# Patient Record
Sex: FEMALE | Race: WHITE | NOT HISPANIC OR LATINO | Employment: OTHER | URBAN - METROPOLITAN AREA
[De-identification: names, ages, dates, MRNs, and addresses within clinical notes are randomized per-mention and may not be internally consistent; named-entity substitution may affect disease eponyms.]

---

## 2018-01-31 RX ORDER — LANOLIN ALCOHOL/MO/W.PET/CERES
1000 CREAM (GRAM) TOPICAL EVERY EVENING
COMMUNITY

## 2018-01-31 RX ORDER — DIGOXIN 125 MCG
125 TABLET ORAL DAILY
COMMUNITY
End: 2018-02-06 | Stop reason: DRUGHIGH

## 2018-01-31 RX ORDER — SPIRONOLACTONE 50 MG/1
50 TABLET, FILM COATED ORAL DAILY
COMMUNITY
End: 2018-11-13 | Stop reason: SDUPTHER

## 2018-01-31 RX ORDER — FENOFIBRATE 48 MG/1
48 TABLET, COATED ORAL DAILY
COMMUNITY
End: 2019-06-14 | Stop reason: HOSPADM

## 2018-01-31 RX ORDER — WARFARIN SODIUM 4 MG/1
TABLET ORAL
COMMUNITY
End: 2018-02-06 | Stop reason: DRUGHIGH

## 2018-01-31 RX ORDER — WARFARIN SODIUM 1 MG/1
0.5 TABLET ORAL DAILY
COMMUNITY
End: 2018-02-06 | Stop reason: DRUGHIGH

## 2018-02-05 ENCOUNTER — ANESTHESIA EVENT (OUTPATIENT)
Dept: PERIOP | Facility: HOSPITAL | Age: 83
End: 2018-02-05
Payer: COMMERCIAL

## 2018-02-06 RX ORDER — CALCIUM GLUCONATE 45(500) MG
500 TABLET ORAL EVERY EVENING
COMMUNITY
End: 2020-03-12 | Stop reason: HOSPADM

## 2018-02-06 RX ORDER — MELATONIN
1000 EVERY EVENING
COMMUNITY

## 2018-02-06 NOTE — ANESTHESIA PREPROCEDURE EVALUATION
Review of Systems/Medical History  Patient summary reviewed  Chart reviewed      Cardiovascular  Exercise tolerance: good,  Dysrhythmias, atrial fibrillation,    Pulmonary  Negative pulmonary ROS        GI/Hepatic  Negative GI/hepatic ROS          Negative  ROS        Endo/Other  Negative endo/other ROS Diabetes type 2 Oral agent,      GYN  Negative gynecology ROS          Hematology  Negative hematology ROS      Musculoskeletal  Negative musculoskeletal ROS   Comment: Left leg fx  Unable to walk      Neurology  Negative neurology ROS      Psychology           Physical Exam    Airway    Mallampati score: II  TM Distance: <3 FB  Neck ROM: full     Dental   lower dentures and upper dentures,     Cardiovascular  Rhythm: irregular, Rate: abnormal,     Pulmonary  Breath sounds clear to auscultation,     Other Findings        Anesthesia Plan  ASA Score- 3     Anesthesia Type- IV sedation with anesthesia and general with ASA Monitors  Additional Monitors:   Airway Plan:         Plan Factors- Patient instructed to abstain from smoking on day of procedure  Patient did not smoke on day of surgery  Induction- intravenous  Postoperative Plan-     Informed Consent- Anesthetic plan and risks discussed with patient

## 2018-02-06 NOTE — PRE-PROCEDURE INSTRUCTIONS
Pre-Surgery Instructions:   Medication Instructions    calcium gluconate 500 mg tablet Patient was instructed by Physician and understands   cholecalciferol (VITAMIN D3) 1,000 units tablet Patient was instructed by Physician and understands   cyanocobalamin (VITAMIN B-12) 1,000 mcg tablet Patient was instructed by Physician and understands   DIGOXIN PO Patient was instructed by Physician and understands   fenofibrate (TRICOR) 48 mg tablet Patient was instructed by Physician and understands   metFORMIN (GLUCOPHAGE) 1000 MG tablet Patient was instructed by Physician and understands   Mirabegron ER (MYRBETRIQ) 50 MG TB24 Patient was instructed by Physician and understands   spironolactone (ALDACTONE) 50 mg tablet Patient was instructed by Physician and understands   WARFARIN SODIUM PO Patient was instructed by Physician and understands   [DISCONTINUED] digoxin (LANOXIN) 0 125 mg tablet -    [DISCONTINUED] warfarin (COUMADIN) 1 mg tablet -    [DISCONTINUED] warfarin (COUMADIN) 4 mg tablet -    Pt instructed to take digoxin with a small sip of water the morning of surgery  Pt given [facility] preop instructions and reviewed with pt  Pt given Chlorhexidine

## 2018-02-14 ENCOUNTER — APPOINTMENT (OUTPATIENT)
Dept: RADIOLOGY | Facility: HOSPITAL | Age: 83
End: 2018-02-14
Payer: COMMERCIAL

## 2018-02-14 ENCOUNTER — ANESTHESIA (OUTPATIENT)
Dept: PERIOP | Facility: HOSPITAL | Age: 83
End: 2018-02-14
Payer: COMMERCIAL

## 2018-02-14 ENCOUNTER — HOSPITAL ENCOUNTER (OUTPATIENT)
Facility: HOSPITAL | Age: 83
Setting detail: OUTPATIENT SURGERY
Discharge: HOME | End: 2018-02-14
Attending: OBSTETRICS & GYNECOLOGY | Admitting: OBSTETRICS & GYNECOLOGY
Payer: COMMERCIAL

## 2018-02-14 VITALS
RESPIRATION RATE: 16 BRPM | OXYGEN SATURATION: 100 % | TEMPERATURE: 97.7 F | HEART RATE: 55 BPM | HEIGHT: 65 IN | WEIGHT: 189 LBS | BODY MASS INDEX: 31.49 KG/M2 | DIASTOLIC BLOOD PRESSURE: 70 MMHG | SYSTOLIC BLOOD PRESSURE: 164 MMHG

## 2018-02-14 DIAGNOSIS — T85.193A OTHER MECHANICAL COMPLICATION OF IMPLANTED ELECTRONIC NEUROSTIMULATOR, GENERATOR, INITIAL ENCOUNTER (HCC): ICD-10-CM

## 2018-02-14 LAB
GLUCOSE SERPL-MCNC: 128 MG/DL (ref 65–140)
GLUCOSE SERPL-MCNC: 86 MG/DL (ref 65–140)

## 2018-02-14 PROCEDURE — C1767 GENERATOR, NEURO NON-RECHARG: HCPCS | Performed by: OBSTETRICS & GYNECOLOGY

## 2018-02-14 PROCEDURE — C1787 PATIENT PROGR, NEUROSTIM: HCPCS | Performed by: OBSTETRICS & GYNECOLOGY

## 2018-02-14 PROCEDURE — 82948 REAGENT STRIP/BLOOD GLUCOSE: CPT

## 2018-02-14 DEVICE — INS 3058 INTERSTIM II EMANUAL US
Type: IMPLANTABLE DEVICE | Site: BACK | Status: FUNCTIONAL
Brand: INTERSTIM®

## 2018-02-14 RX ORDER — PHENAZOPYRIDINE HYDROCHLORIDE 200 MG/1
200 TABLET, FILM COATED ORAL
Status: DISCONTINUED | OUTPATIENT
Start: 2018-02-14 | End: 2018-02-14 | Stop reason: HOSPADM

## 2018-02-14 RX ORDER — FENTANYL CITRATE/PF 50 MCG/ML
25 SYRINGE (ML) INJECTION
Status: DISCONTINUED | OUTPATIENT
Start: 2018-02-14 | End: 2018-02-14 | Stop reason: HOSPADM

## 2018-02-14 RX ORDER — BUPIVACAINE HYDROCHLORIDE 2.5 MG/ML
INJECTION, SOLUTION INFILTRATION; PERINEURAL AS NEEDED
Status: DISCONTINUED | OUTPATIENT
Start: 2018-02-14 | End: 2018-02-14 | Stop reason: HOSPADM

## 2018-02-14 RX ORDER — ONDANSETRON 2 MG/ML
4 INJECTION INTRAMUSCULAR; INTRAVENOUS ONCE AS NEEDED
Status: DISCONTINUED | OUTPATIENT
Start: 2018-02-14 | End: 2018-02-14 | Stop reason: HOSPADM

## 2018-02-14 RX ORDER — FENTANYL CITRATE 50 UG/ML
INJECTION, SOLUTION INTRAMUSCULAR; INTRAVENOUS AS NEEDED
Status: DISCONTINUED | OUTPATIENT
Start: 2018-02-14 | End: 2018-02-14 | Stop reason: SURG

## 2018-02-14 RX ORDER — SODIUM CHLORIDE 9 MG/ML
125 INJECTION, SOLUTION INTRAVENOUS CONTINUOUS
Status: DISCONTINUED | OUTPATIENT
Start: 2018-02-14 | End: 2018-02-14 | Stop reason: HOSPADM

## 2018-02-14 RX ORDER — MAGNESIUM HYDROXIDE 1200 MG/15ML
LIQUID ORAL AS NEEDED
Status: DISCONTINUED | OUTPATIENT
Start: 2018-02-14 | End: 2018-02-14 | Stop reason: HOSPADM

## 2018-02-14 RX ORDER — TRAMADOL HYDROCHLORIDE 50 MG/1
50 TABLET ORAL EVERY 6 HOURS PRN
Status: DISCONTINUED | OUTPATIENT
Start: 2018-02-14 | End: 2018-02-14 | Stop reason: HOSPADM

## 2018-02-14 RX ORDER — PROPOFOL 10 MG/ML
INJECTION, EMULSION INTRAVENOUS AS NEEDED
Status: DISCONTINUED | OUTPATIENT
Start: 2018-02-14 | End: 2018-02-14 | Stop reason: SURG

## 2018-02-14 RX ORDER — PROPOFOL 10 MG/ML
INJECTION, EMULSION INTRAVENOUS CONTINUOUS PRN
Status: DISCONTINUED | OUTPATIENT
Start: 2018-02-14 | End: 2018-02-14 | Stop reason: SURG

## 2018-02-14 RX ADMIN — PROPOFOL 35 MCG/KG/MIN: 10 INJECTION, EMULSION INTRAVENOUS at 07:48

## 2018-02-14 RX ADMIN — FENTANYL CITRATE 100 MCG: 50 INJECTION INTRAMUSCULAR; INTRAVENOUS at 07:31

## 2018-02-14 RX ADMIN — SODIUM CHLORIDE 125 ML/HR: 0.9 INJECTION, SOLUTION INTRAVENOUS at 06:18

## 2018-02-14 RX ADMIN — PHENAZOPYRIDINE HYDROCHLORIDE 200 MG: 200 TABLET ORAL at 06:35

## 2018-02-14 RX ADMIN — PROPOFOL 20 MG: 10 INJECTION, EMULSION INTRAVENOUS at 07:44

## 2018-02-14 RX ADMIN — CEFAZOLIN SODIUM 2000 MG: 2 SOLUTION INTRAVENOUS at 07:47

## 2018-02-14 RX ADMIN — SODIUM CHLORIDE 125 ML/HR: 0.9 INJECTION, SOLUTION INTRAVENOUS at 09:03

## 2018-02-14 RX ADMIN — SODIUM CHLORIDE: 0.9 INJECTION, SOLUTION INTRAVENOUS at 07:31

## 2018-02-14 RX ADMIN — PROPOFOL 10 MG: 10 INJECTION, EMULSION INTRAVENOUS at 07:47

## 2018-02-14 NOTE — OP NOTE
OPERATIVE REPORT  PATIENT NAME: Nasrin Chatterjee    :  1935  MRN: [id number]  Pt Location: [facility] OR ROOM 08    SURGERY DATE: 2018    Surgeon(s) and Role:     * Yamila Moralez MD - Primary     * Arabella Norton MD - Fellow, assisting    Preop Diagnosis:  Other mechanical complication of implanted electronic neurostimulator, generator, initial encounter [facility]) Kamran Rasheed    Post-Op Diagnosis Codes:     * Other mechanical complication of implanted electronic neurostimulator, generator, initial encounter ([address] ) [T85 193A]    Procedure(s) (LRB):  REMOVE AND REPLACE IPG (N/A)    Specimen(s):  * No specimens in log *    Estimated Blood Loss:   10cc    Drains:     None    Anesthesia Type:   IV Sedation with Anesthesia    Operative Indications: Other mechanical complication of implanted electronic neurostimulator, generator, initial encounter [facility]) [T85 193A]      Operative Findings:  Older model interstim removed, leads tested, new stimulator and battery replaced  Complications:   None    Procedure and Technique:  Appropriate preoperative antibiotics chosen per ACOG guidelines were given  Bilateral SCDs were placed in the lower extremities for DVT prevention prior to the institution of anesthesia      No bladder, ureteral, viscus, or solid organ injury were noted at the end of the procedure      Patient was properly identi ed and placed in prone position per OR protocol  IV sedation anesthesia was administered  Patient was prepped and draped in the usual sterile fashion after a timeout was performed      The location of the current IPG device was identified  The previous scar was noted  Local injection of 20cc of Lidocain 1% plain  was administered  Previous upper buttock incision on left was opened sharply with a scalpel, then using blunt dissection the fibrous capsule over the neurostimulator was identified and entered sharply without difficulty    The incision over the capsule was then extended sharply under direct visualization  The Neurostimulator was removed from the scar pocket  It was noted that this InterStim was an older model  The lead wire removed  The leads were individually tested  The best response, by anal bellow,  were seen from leads 0 and 2  The lead was inserted into the header of the new InterStim neurostimulator until the white tip was visualized at the distal window  The single set screw was tightened using the torque wrench until audible click was heard  The neurostimulator was placed into the pocket with the etched identifcation side placed upwards and any excessive lead was placed under the neurostimulator  The clinician  telemetry head,covered in a sterile sleeve was placed ove rthe implanted neurostimulator to ensure proper lead connection and that parameters were within normal range  Impedances were con rmed to be within normal limits, greater than 50 and less than 4,000 ohms  The wound was irrigated with antibiotic solution in sterile water and closed with plain glut subcuticular sutures and horizontal mattress skin sutures using silk  Counts were correct  Adhesive strips and gauze were placed over the incision and covered with transparent dressing      Estimated blood loss was <10cc  patient was transferred toPACU in satisfactory condition    All sponge, lap, instrument counts were correct *2      Patient was provided utilization instructions for the patient  prior to discharge      Dr Kimberlee Harrington was present for the complete procedure          A qualified resident physician was not available   I was present for the entire procedure    Patient Disposition:  PACU     SIGNATURE: Conner Franco MD  DATE: February 14, 2018  TIME: 8:32 AM

## 2018-02-14 NOTE — DISCHARGE INSTRUCTIONS
Acute Wound Care   WHAT YOU NEED TO KNOW:   An acute wound is an injury that causes a break in the skin  DISCHARGE INSTRUCTIONS:   Medicines:   · NSAIDs  help decrease swelling, pain, and fever  This medicine is available with or without a doctor's order  NSAIDs can cause stomach bleeding or kidney problems in certain people  If you take blood thinner medicine, always ask your healthcare provider if NSAIDs are safe for you  Always read the medicine label and follow directions  · Acetaminophen  decreases pain and fever  It is available without a doctor's order  Ask how much to take and how often to take it  Follow directions  Acetaminophen can cause liver damage if not taken correctly  · Antibiotics  may be given to prevent or treat an infection caused by bacteria  · Take your medicine as directed  Contact your healthcare provider if you think your medicine is not helping or if you have side effects  Tell him if you are allergic to any medicine  Keep a list of the medicines, vitamins, and herbs you take  Include the amounts, and when and why you take them  Bring the list or the pill bottles to follow-up visits  Carry your medicine list with you in case of an emergency  Follow up with your healthcare provider as directed: You may need to return to have your stitches or staples removed, wound checked, or bandage changed  Write down your questions so you remember to ask them during your visits  Wound care:   · If your wound was closed with thin strips of medical tape, keep them clean and dry  The strips of medical tape will fall off on their own  Do not pull them off  · Keep the bandage clean and dry  Do not remove the bandage over your wound unless your healthcare provider says it is okay  · Wash your hands before and after you take care of your wound to prevent infection  · Clean the wound as directed  If you cannot reach the wound, have someone help you      · If you have packing, make sure all the gauze used to pack the wound is taken out and replaced as directed  Keep track of how many gauze dressings are placed inside the wound  Contact your healthcare provider if:   · You have muscle, joint, or body aches, sweating, or a fever  · You have more swelling, redness, or bleeding in your wound  · Your skin is itchy, swollen, or you have a rash  · You have questions or concerns about your condition or care  Seek care immediately if:   · You have pus or a foul odor coming from the wound  · You have sudden trouble breathing or chest pain  · Blood soaks through your bandage  © 2016 [address] [employer] is for End User's use only and may not be sold, redistributed or otherwise used for commercial purposes  All illustrations and images included in CareNotes® are the copyrighted property of [employer]  or Raghu Winter  The above information is an  only  It is not intended as medical advice for individual conditions or treatments  Talk to your doctor, nurse or pharmacist before following any medical regimen to see if it is safe and effective for you

## 2018-05-25 ENCOUNTER — HOSPITAL ENCOUNTER (EMERGENCY)
Facility: HOSPITAL | Age: 83
Discharge: HOME | End: 2018-05-25
Payer: COMMERCIAL

## 2018-05-25 VITALS
WEIGHT: 189 LBS | SYSTOLIC BLOOD PRESSURE: 183 MMHG | BODY MASS INDEX: 31.45 KG/M2 | DIASTOLIC BLOOD PRESSURE: 77 MMHG | HEART RATE: 68 BPM | OXYGEN SATURATION: 93 % | TEMPERATURE: 97.9 F | RESPIRATION RATE: 20 BRPM

## 2018-05-25 DIAGNOSIS — S81.812A LACERATION OF LEFT LOWER EXTREMITY, INITIAL ENCOUNTER: Primary | ICD-10-CM

## 2018-05-25 PROCEDURE — 99283 EMERGENCY DEPT VISIT LOW MDM: CPT

## 2018-05-25 PROCEDURE — 90715 TDAP VACCINE 7 YRS/> IM: CPT | Performed by: PHYSICIAN ASSISTANT

## 2018-05-25 PROCEDURE — 90471 IMMUNIZATION ADMIN: CPT

## 2018-05-25 RX ADMIN — TETANUS TOXOID, REDUCED DIPHTHERIA TOXOID AND ACELLULAR PERTUSSIS VACCINE, ADSORBED 0.5 ML: 5; 2.5; 8; 8; 2.5 SUSPENSION INTRAMUSCULAR at 20:06

## 2018-05-25 RX ADMIN — Medication 1 APPLICATION: at 20:06

## 2018-05-25 RX ADMIN — LIDOCAINE HYDROCHLORIDE 5 ML: 10; .005 INJECTION, SOLUTION EPIDURAL; INFILTRATION; INTRACAUDAL; PERINEURAL at 20:30

## 2018-05-26 NOTE — DISCHARGE INSTRUCTIONS
Laceration   WHAT YOU NEED TO KNOW:   A laceration is an injury to the skin and the soft tissue underneath it  Lacerations happen when you are cut or hit by something  They can happen anywhere on the body  DISCHARGE INSTRUCTIONS:   Return to the emergency department if:   · You have heavy bleeding or bleeding that does not stop after 10 minutes of holding firm, direct pressure over the wound  · Your wound opens up  Contact your healthcare provider if:   · You have a fever or chills  · Your laceration is red, warm, or swollen  · You have red streaks on your skin coming from your wound  · You have white or yellow drainage from the wound that smells bad  · You have pain that gets worse, even after treatment  · You have questions or concerns about your condition or care  Medicines:   · Prescription pain medicine  may be given  Ask how to take this medicine safely  · Antibiotics  help treat or prevent a bacterial infection  · Take your medicine as directed  Contact your healthcare provider if you think your medicine is not helping or if you have side effects  Tell him or her if you are allergic to any medicine  Keep a list of the medicines, vitamins, and herbs you take  Include the amounts, and when and why you take them  Bring the list or the pill bottles to follow-up visits  Carry your medicine list with you in case of an emergency  Care for your wound as directed:   · Do not get your wound wet  until your healthcare provider says it is okay  Do not soak your wound in water  Do not go swimming until your healthcare provider says it is okay  Carefully wash the wound with soap and water  Gently pat the area dry or allow it to air dry  · Change your bandages  when they get wet, dirty, or after washing  Apply new, clean bandages as directed  Do not apply elastic bandages or tape too tight  Do not put powders or lotions over your incision  · Apply antibiotic ointment as directed  Your healthcare provider may give you antibiotic ointment to put over your wound if you have stitches  If you have strips of tape over your incision, let them dry up and fall off on their own  If they do not fall off within 14 days, gently remove them  If you have glue over your wound, do not remove or pick at it  If your glue comes off, do not replace it with glue that you have at home  · Check your wound every day for signs of infection such as swelling, redness, or pus  Self-care:   · Apply ice  on your wound for 15 to 20 minutes every hour or as directed  Use an ice pack, or put crushed ice in a plastic bag  Cover it with a towel  Ice helps prevent tissue damage and decreases swelling and pain  · Use a splint as directed  A splint will decrease movement and stress on your wound  It may help it heal faster  A splint may be used for lacerations over joints or areas of your body that bend  Ask your healthcare provider how to apply and remove a splint  · Decrease scarring of your wound  by applying ointments as directed  Do not apply ointments until your healthcare provider says it is okay  You may need to wait until your wound is healed  Ask which ointment to buy and how often to use it  After your wound is healed, use sunscreen over the area when you are out in the sun  You should do this for at least 6 months to 1 year after your injury  Follow up with your healthcare provider as directed: You may need to follow up in 24 to 48 hours to have your wound checked for infection  You will need to return in 3 to 14 days if you have stitches or staples so they can be removed  Care for your wound as directed to prevent infection and help it heal  Write down your questions so you remember to ask them during your visits  © 2017 [employer] Information is for End User's use only and may not be sold, redistributed or otherwise used for commercial purposes   All illustrations and images included in [facility] are the copyrighted property of A [employer] M [employer] Inc  or Raghu Winter  The above information is an  only  It is not intended as medical advice for individual conditions or treatments  Talk to your doctor, nurse or pharmacist before following any medical regimen to see if it is safe and effective for you

## 2018-05-26 NOTE — ED NOTES
Placed a dressing on patient's laceration at this time; provider educated patient on wound care at this time      Maribeth Fajardo RN  05/25/18 6413

## 2018-05-26 NOTE — ED PROVIDER NOTES
History  Chief Complaint   Patient presents with    Extremity Laceration     patient transported via EMS from home; patient was sitting in wheelchair and cut her left leg on frig     80year old F with extensive PMH including a fib on coumadin presents today with laceration to her left lower extremity  Was in her power wheelchair when she hit her leg on the bottom corner of the refrigerator  Dressing applied by patient's   Pt denies any injuries or pain elsewhere  Describes the pain as "throbbing"  Unknown last tetanus  Prior to Admission Medications   Prescriptions Last Dose Informant Patient Reported? Taking?    DIGOXIN PO  Self Yes No   Sig: Take 250 mcg by mouth every morning   Mirabegron ER (MYRBETRIQ) 50 MG TB24  Self Yes No   Sig: Take 50 mg by mouth daily     WARFARIN SODIUM PO  Self Yes No   Sig: Take 4 5 mg by mouth daily   calcium gluconate 500 mg tablet  Self Yes No   Sig: Take 500 mg by mouth every evening   cholecalciferol (VITAMIN D3) 1,000 units tablet  Self Yes No   Sig: Take 1,000 Units by mouth every evening   cyanocobalamin (VITAMIN B-12) 1,000 mcg tablet  Self Yes No   Sig: Take 1,000 mcg by mouth every evening     fenofibrate (TRICOR) 48 mg tablet  Self Yes No   Sig: Take 48 mg by mouth daily   metFORMIN (GLUCOPHAGE) 1000 MG tablet  Self Yes No   Sig: Take 1,000 mg by mouth 2 (two) times a day with meals   spironolactone (ALDACTONE) 50 mg tablet  Self Yes No   Sig: Take 50 mg by mouth daily      Facility-Administered Medications: None       Past Medical History:   Diagnosis Date    [facility]     Arthritis     Assistance needed for mobility     pt can stand with assistance and transfer    Chronic pain disorder     Diabetes mellitus (HCC)     NIDDM    Full dentures     History of transfusion     no adverse reaction    Hyperlipidemia     Irregular heart beat     Numbness and tingling of both feet     Skin abnormality     sacrum area - small red area, less than a dime size    Spinal stenosis     Stroke ([facility] )     Unable to ambulate     Urinary incontinence     ipg stimulator x3 repakcements,battery exchange today 2/14/2018    Wears glasses     Wheelchair dependence        Past Surgical History:   Procedure Laterality Date    BACK SURGERY      fusion    BLADDER SUSPENSION      CARPAL TUNNEL RELEASE Bilateral     COLONOSCOPY      DILATION AND CURETTAGE OF UTERUS      FRACTURE SURGERY Left     femur with hardware    HYSTERECTOMY      INSERTION / PLACEMENT / REVISION NEUROSTIMULATOR      JOINT REPLACEMENT Bilateral     knees    SACRAL NERVE STIMULATOR PLACEMENT N/A 2/14/2018    Procedure: REMOVE AND REPLACE IPG;  Surgeon: Kerry Paulson MD;  Location: [facility];  Service: UroGynecology           TONSILLECTOMY         History reviewed  No pertinent family history  I have reviewed and agree with the history as documented  Social History   Substance Use Topics    Smoking status: Never Smoker    Smokeless tobacco: Never Used    Alcohol use Yes      Comment: very rare        Review of Systems   Skin: Positive for wound  All other systems reviewed and are negative  Physical Exam  Physical Exam   Constitutional: She appears well-developed and well-nourished  No distress  HENT:   Head: Normocephalic and atraumatic  Cardiovascular: Intact distal pulses  An irregularly irregular rhythm present  Pulmonary/Chest: Effort normal and breath sounds normal  No respiratory distress  She has no wheezes  Musculoskeletal: Normal range of motion  Neurological: She is alert  No sensory deficit  Skin: Capillary refill takes less than 2 seconds  She is not diaphoretic    7cm partially gaping laceration to left lower shin  No active bleeding  No foreign bodies  Good sensation and distal pulses  No hematoma  Psychiatric: She has a normal mood and affect         Vital Signs  ED Triage Vitals   Temperature Pulse Respirations Blood Pressure SpO2   05/25/18 1939 05/25/18 1939 05/25/18 1939 05/25/18 1940 05/25/18 1939   97 9 °F (36 6 °C) 68 20 (!) 183/77 93 %      Temp Source Heart Rate Source Patient Position - Orthostatic VS BP Location FiO2 (%)   05/25/18 1939 05/25/18 1939 05/25/18 1939 05/25/18 1939 --   Oral Monitor Sitting Right arm       Pain Score       --                  Vitals:    05/25/18 1939 05/25/18 1940   BP:  (!) 183/77   Pulse: 68    Patient Position - Orthostatic VS: Sitting        Visual Acuity      ED Medications  Medications   LET gel 1 application (1 application Topical Given 5/25/18 2006)   tetanus-diphtheria-acellular pertussis (BOOSTRIX) IM injection 0 5 mL (0 5 mL Intramuscular Given 5/25/18 2006)   lidocaine-epinephrine (XYLOCAINE-MPF/EPINEPHRINE) 1%-1:200,000 injection 5 mL (5 mL Infiltration Given by Other 5/25/18 2030)       Diagnostic Studies  Results Reviewed     None                 No orders to display              Procedures  Lac Repair  Date/Time: 5/25/2018 9:04 PM  Performed by: Jayy Rather by: Emilee Overall   Consent: Verbal consent obtained  Risks and benefits: risks, benefits and alternatives were discussed  Consent given by: patient  Patient identity confirmed: verbally with patient and arm band  Body area: lower extremity  Location details: left lower leg  Laceration length: 7 cm  Foreign bodies: no foreign bodies    Anesthesia:  Local Anesthetic: LET (lido,epi,tetracaine) and lidocaine 1% with epinephrine  Anesthetic total: 6 mL      Procedure Details:  Preparation: Patient was prepped and draped in the usual sterile fashion    Amount of cleaning: extensive  Skin closure: 4-0 nylon  Number of sutures: 10  Approximation: close  Approximation difficulty: simple  Dressing: 4x4 sterile gauze, pressure dressing and gauze roll  Patient tolerance: Patient tolerated the procedure well with no immediate complications             Phone Contacts  ED Phone Contact    ED Course                               MDM  Number of Diagnoses or Management Options  Laceration of left lower extremity, initial encounter:   Diagnosis management comments: Tetanus renewed  Wound approximated well  No hematoma, skin flap appears pink and well-vascularized  Wound care instructions reviewed with patient,  and caregiver  Advised to keep the wound clean and dry, daily dressing changes  Advised follow-up with PCP as soon as possible and suture removal in 10 days  Return to ER for any skin color changes (red or dusky-appearing), purulent drainage, warmth or fevers  CritCare Time    Disposition  Final diagnoses:   Laceration of left lower extremity, initial encounter     Time reflects when diagnosis was documented in both MDM as applicable and the Disposition within this note     Time User Action Codes Description Comment    5/25/2018  8:56 PM Ruthie Barnes Add [Y07 [id number]] Laceration of right lower extremity, initial encounter     5/25/2018  8:57 PM [address] [place] Laceration of right lower extremity, initial encounter     5/25/2018  8:57 PM Ruthie Barnes Add [W93 [id number]] Laceration of left lower extremity, initial encounter       ED Disposition     ED Disposition Condition Comment    Discharge  Allison Crooked discharge to home/self care      Condition at discharge: Good        Follow-up Information     Follow up With Specialties Details Why Contact Info Additional Information    Matthew   [place] [address]  [phone] [address], [address] Angel Souza MD Family Medicine In 10 days For suture removal [facility]  [address]   [phone]             Discharge Medication List as of 5/25/2018  8:59 PM      CONTINUE these medications which have NOT CHANGED    Details   calcium gluconate 500 mg tablet Take 500 mg by mouth every evening, Historical Med cholecalciferol (VITAMIN D3) 1,000 units tablet Take 1,000 Units by mouth every evening, Historical Med      cyanocobalamin (VITAMIN B-12) 1,000 mcg tablet Take 1,000 mcg by mouth every evening  , Historical Med      DIGOXIN PO Take 250 mcg by mouth every morning, Historical Med      fenofibrate (TRICOR) 48 mg tablet Take 48 mg by mouth daily, Historical Med      metFORMIN (GLUCOPHAGE) 1000 MG tablet Take 1,000 mg by mouth 2 (two) times a day with meals, Historical Med      Mirabegron ER (MYRBETRIQ) 50 MG TB24 Take 50 mg by mouth daily  , Historical Med      spironolactone (ALDACTONE) 50 mg tablet Take 50 mg by mouth daily, Historical Med      WARFARIN SODIUM PO Take 4 5 mg by mouth daily, Historical Med           No discharge procedures on file      ED Provider  Electronically Signed by           Jaspreet Zelaya PA-C  05/25/18 3800

## 2018-05-31 ENCOUNTER — APPOINTMENT (INPATIENT)
Dept: RADIOLOGY | Facility: HOSPITAL | Age: 83
DRG: 417 | End: 2018-05-31
Payer: COMMERCIAL

## 2018-05-31 ENCOUNTER — APPOINTMENT (EMERGENCY)
Dept: CT IMAGING | Facility: HOSPITAL | Age: 83
DRG: 417 | End: 2018-05-31
Payer: COMMERCIAL

## 2018-05-31 ENCOUNTER — HOSPITAL ENCOUNTER (INPATIENT)
Facility: HOSPITAL | Age: 83
LOS: 6 days | Discharge: HOME HEALTH | DRG: 417 | End: 2018-06-06
Attending: INTERNAL MEDICINE | Admitting: HOSPITALIST
Payer: COMMERCIAL

## 2018-05-31 DIAGNOSIS — M54.9 BACK PAIN: ICD-10-CM

## 2018-05-31 DIAGNOSIS — Z86.79 HISTORY OF ATRIAL FIBRILLATION: ICD-10-CM

## 2018-05-31 DIAGNOSIS — R77.8 ELEVATED TROPONIN: ICD-10-CM

## 2018-05-31 DIAGNOSIS — R74.8 ELEVATED LIPASE: ICD-10-CM

## 2018-05-31 DIAGNOSIS — K81.0 ACUTE CHOLECYSTITIS: Primary | ICD-10-CM

## 2018-05-31 DIAGNOSIS — R26.2 AMBULATORY DYSFUNCTION: ICD-10-CM

## 2018-05-31 DIAGNOSIS — K85.10 GALLSTONE PANCREATITIS: ICD-10-CM

## 2018-05-31 DIAGNOSIS — R11.2 NAUSEA AND VOMITING: ICD-10-CM

## 2018-05-31 PROBLEM — I48.91 A-FIB (HCC): Status: ACTIVE | Noted: 2018-05-31

## 2018-05-31 PROBLEM — R79.89 ELEVATED TROPONIN: Status: ACTIVE | Noted: 2018-05-31

## 2018-05-31 PROBLEM — E11.9 DIABETES MELLITUS (HCC): Status: ACTIVE | Noted: 2018-05-31

## 2018-05-31 PROBLEM — K81.9 CHOLECYSTITIS: Status: ACTIVE | Noted: 2018-05-31

## 2018-05-31 PROBLEM — Z99.3 WHEELCHAIR DEPENDENCE: Status: ACTIVE | Noted: 2018-05-31

## 2018-05-31 LAB
ALBUMIN SERPL BCP-MCNC: 3.6 G/DL (ref 3.5–5)
ALP SERPL-CCNC: 114 U/L (ref 46–116)
ALT SERPL W P-5'-P-CCNC: 48 U/L (ref 12–78)
ANION GAP SERPL CALCULATED.3IONS-SCNC: 9 MMOL/L (ref 4–13)
APTT PPP: 46 SECONDS (ref 24–36)
AST SERPL W P-5'-P-CCNC: 62 U/L (ref 5–45)
BASOPHILS # BLD AUTO: 0.02 THOUSANDS/ΜL (ref 0–0.1)
BASOPHILS NFR BLD AUTO: 0 % (ref 0–1)
BILIRUB SERPL-MCNC: 0.85 MG/DL (ref 0.2–1)
BUN SERPL-MCNC: 19 MG/DL (ref 5–25)
CALCIUM SERPL-MCNC: 9.3 MG/DL (ref 8.3–10.1)
CHLORIDE SERPL-SCNC: 102 MMOL/L (ref 100–108)
CO2 SERPL-SCNC: 30 MMOL/L (ref 21–32)
CREAT SERPL-MCNC: 0.86 MG/DL (ref 0.6–1.3)
DEPRECATED D DIMER PPP: 369 NG/ML (FEU) (ref 0–424)
EOSINOPHIL # BLD AUTO: 0.05 THOUSAND/ΜL (ref 0–0.61)
EOSINOPHIL NFR BLD AUTO: 1 % (ref 0–6)
ERYTHROCYTE [DISTWIDTH] IN BLOOD BY AUTOMATED COUNT: 13.3 % (ref 11.6–15.1)
GFR SERPL CREATININE-BSD FRML MDRD: 63 ML/MIN/1.73SQ M
GLUCOSE SERPL-MCNC: 175 MG/DL (ref 65–140)
HCT VFR BLD AUTO: 43.2 % (ref 34.8–46.1)
HGB BLD-MCNC: 14.2 G/DL (ref 11.5–15.4)
INR PPP: 2.82 (ref 0.86–1.17)
LIPASE SERPL-CCNC: 1401 U/L (ref 73–393)
LYMPHOCYTES # BLD AUTO: 0.81 THOUSANDS/ΜL (ref 0.6–4.47)
LYMPHOCYTES NFR BLD AUTO: 8 % (ref 14–44)
MCH RBC QN AUTO: 30.4 PG (ref 26.8–34.3)
MCHC RBC AUTO-ENTMCNC: 32.9 G/DL (ref 31.4–37.4)
MCV RBC AUTO: 93 FL (ref 82–98)
MONOCYTES # BLD AUTO: 0.61 THOUSAND/ΜL (ref 0.17–1.22)
MONOCYTES NFR BLD AUTO: 6 % (ref 4–12)
NEUTROPHILS # BLD AUTO: 8.94 THOUSANDS/ΜL (ref 1.85–7.62)
NEUTS SEG NFR BLD AUTO: 86 % (ref 43–75)
NRBC BLD AUTO-RTO: 0 /100 WBCS
PLATELET # BLD AUTO: 140 THOUSANDS/UL (ref 149–390)
PMV BLD AUTO: 10.1 FL (ref 8.9–12.7)
POTASSIUM SERPL-SCNC: 4.6 MMOL/L (ref 3.5–5.3)
PROT SERPL-MCNC: 7.4 G/DL (ref 6.4–8.2)
PROTHROMBIN TIME: 29.7 SECONDS (ref 11.8–14.2)
RBC # BLD AUTO: 4.67 MILLION/UL (ref 3.81–5.12)
SODIUM SERPL-SCNC: 141 MMOL/L (ref 136–145)
TROPONIN I SERPL-MCNC: 0.41 NG/ML
WBC # BLD AUTO: 10.43 THOUSAND/UL (ref 4.31–10.16)

## 2018-05-31 PROCEDURE — 71046 X-RAY EXAM CHEST 2 VIEWS: CPT

## 2018-05-31 PROCEDURE — 74177 CT ABD & PELVIS W/CONTRAST: CPT

## 2018-05-31 PROCEDURE — 85025 COMPLETE CBC W/AUTO DIFF WBC: CPT | Performed by: PHYSICIAN ASSISTANT

## 2018-05-31 PROCEDURE — 85379 FIBRIN DEGRADATION QUANT: CPT | Performed by: PHYSICIAN ASSISTANT

## 2018-05-31 PROCEDURE — 87040 BLOOD CULTURE FOR BACTERIA: CPT | Performed by: PHYSICIAN ASSISTANT

## 2018-05-31 PROCEDURE — 85730 THROMBOPLASTIN TIME PARTIAL: CPT | Performed by: PHYSICIAN ASSISTANT

## 2018-05-31 PROCEDURE — 93005 ELECTROCARDIOGRAM TRACING: CPT

## 2018-05-31 PROCEDURE — 80053 COMPREHEN METABOLIC PANEL: CPT | Performed by: PHYSICIAN ASSISTANT

## 2018-05-31 PROCEDURE — 36415 COLL VENOUS BLD VENIPUNCTURE: CPT | Performed by: PHYSICIAN ASSISTANT

## 2018-05-31 PROCEDURE — 85610 PROTHROMBIN TIME: CPT | Performed by: PHYSICIAN ASSISTANT

## 2018-05-31 PROCEDURE — 83690 ASSAY OF LIPASE: CPT | Performed by: PHYSICIAN ASSISTANT

## 2018-05-31 PROCEDURE — 84484 ASSAY OF TROPONIN QUANT: CPT | Performed by: PHYSICIAN ASSISTANT

## 2018-05-31 PROCEDURE — 99223 1ST HOSP IP/OBS HIGH 75: CPT | Performed by: PHYSICIAN ASSISTANT

## 2018-05-31 RX ORDER — ONDANSETRON 2 MG/ML
4 INJECTION INTRAMUSCULAR; INTRAVENOUS ONCE AS NEEDED
Status: CANCELLED | OUTPATIENT
Start: 2018-05-31

## 2018-05-31 RX ORDER — FENOFIBRATE 48 MG/1
48 TABLET, COATED ORAL DAILY
Status: DISCONTINUED | OUTPATIENT
Start: 2018-06-01 | End: 2018-06-06 | Stop reason: HOSPADM

## 2018-05-31 RX ORDER — DIGOXIN 125 MCG
250 TABLET ORAL EVERY MORNING
Status: DISCONTINUED | OUTPATIENT
Start: 2018-06-01 | End: 2018-06-01

## 2018-05-31 RX ORDER — MORPHINE SULFATE 2 MG/ML
2 INJECTION, SOLUTION INTRAMUSCULAR; INTRAVENOUS EVERY 4 HOURS PRN
Status: DISCONTINUED | OUTPATIENT
Start: 2018-05-31 | End: 2018-06-06 | Stop reason: HOSPADM

## 2018-05-31 RX ORDER — CHOLECALCIFEROL (VITAMIN D3) 125 MCG
1000 CAPSULE ORAL EVERY EVENING
Status: DISCONTINUED | OUTPATIENT
Start: 2018-05-31 | End: 2018-05-31

## 2018-05-31 RX ORDER — ONDANSETRON 2 MG/ML
4 INJECTION INTRAMUSCULAR; INTRAVENOUS EVERY 6 HOURS PRN
Status: DISCONTINUED | OUTPATIENT
Start: 2018-05-31 | End: 2018-06-06 | Stop reason: HOSPADM

## 2018-05-31 RX ORDER — SPIRONOLACTONE 50 MG/1
50 TABLET, FILM COATED ORAL DAILY
Status: DISCONTINUED | OUTPATIENT
Start: 2018-06-01 | End: 2018-06-06 | Stop reason: HOSPADM

## 2018-05-31 RX ORDER — MELATONIN
1000 EVERY EVENING
Status: DISCONTINUED | OUTPATIENT
Start: 2018-05-31 | End: 2018-05-31

## 2018-05-31 RX ORDER — CALCIUM CARBONATE 500(1250)
1 TABLET ORAL
Status: DISCONTINUED | OUTPATIENT
Start: 2018-06-01 | End: 2018-05-31

## 2018-05-31 RX ORDER — ASPIRIN 325 MG
325 TABLET ORAL ONCE
Status: COMPLETED | OUTPATIENT
Start: 2018-05-31 | End: 2018-05-31

## 2018-05-31 RX ORDER — WARFARIN SODIUM 4 MG/1
4 TABLET ORAL DAILY
Status: DISCONTINUED | OUTPATIENT
Start: 2018-06-01 | End: 2018-05-31

## 2018-05-31 RX ORDER — SODIUM CHLORIDE 9 MG/ML
75 INJECTION, SOLUTION INTRAVENOUS CONTINUOUS
Status: DISCONTINUED | OUTPATIENT
Start: 2018-05-31 | End: 2018-06-06 | Stop reason: HOSPADM

## 2018-05-31 RX ADMIN — SODIUM CHLORIDE 3 G: 9 INJECTION, SOLUTION INTRAVENOUS at 23:38

## 2018-05-31 RX ADMIN — ASPIRIN 325 MG: 325 TABLET ORAL at 22:31

## 2018-05-31 RX ADMIN — IOHEXOL 100 ML: 350 INJECTION, SOLUTION INTRAVENOUS at 21:55

## 2018-06-01 ENCOUNTER — APPOINTMENT (INPATIENT)
Dept: NON INVASIVE DIAGNOSTICS | Facility: HOSPITAL | Age: 83
DRG: 417 | End: 2018-06-01
Payer: COMMERCIAL

## 2018-06-01 PROBLEM — K85.10 GALLSTONE PANCREATITIS: Status: ACTIVE | Noted: 2018-06-01

## 2018-06-01 LAB
ANION GAP SERPL CALCULATED.3IONS-SCNC: 9 MMOL/L (ref 4–13)
ATRIAL RATE: 54 BPM
ATRIAL RATE: 60 BPM
BUN SERPL-MCNC: 16 MG/DL (ref 5–25)
CALCIUM SERPL-MCNC: 9.2 MG/DL (ref 8.3–10.1)
CHLORIDE SERPL-SCNC: 104 MMOL/L (ref 100–108)
CO2 SERPL-SCNC: 26 MMOL/L (ref 21–32)
CREAT SERPL-MCNC: 0.78 MG/DL (ref 0.6–1.3)
DIGOXIN SERPL-MCNC: 1.6 NG/ML (ref 0.8–2)
ERYTHROCYTE [DISTWIDTH] IN BLOOD BY AUTOMATED COUNT: 13.3 % (ref 11.6–15.1)
EST. AVERAGE GLUCOSE BLD GHB EST-MCNC: 140 MG/DL
GFR SERPL CREATININE-BSD FRML MDRD: 71 ML/MIN/1.73SQ M
GLUCOSE SERPL-MCNC: 134 MG/DL (ref 65–140)
GLUCOSE SERPL-MCNC: 148 MG/DL (ref 65–140)
GLUCOSE SERPL-MCNC: 158 MG/DL (ref 65–140)
GLUCOSE SERPL-MCNC: 85 MG/DL (ref 65–140)
GLUCOSE SERPL-MCNC: 88 MG/DL (ref 65–140)
GLUCOSE SERPL-MCNC: 90 MG/DL (ref 65–140)
HBA1C MFR BLD: 6.5 % (ref 4.2–6.3)
HCT VFR BLD AUTO: 39.2 % (ref 34.8–46.1)
HGB BLD-MCNC: 12.5 G/DL (ref 11.5–15.4)
INR PPP: 2.82 (ref 0.86–1.17)
MAGNESIUM SERPL-MCNC: 2 MG/DL (ref 1.6–2.6)
MCH RBC QN AUTO: 29.5 PG (ref 26.8–34.3)
MCHC RBC AUTO-ENTMCNC: 31.9 G/DL (ref 31.4–37.4)
MCV RBC AUTO: 93 FL (ref 82–98)
P AXIS: 64 DEGREES
P AXIS: 77 DEGREES
PLATELET # BLD AUTO: 144 THOUSANDS/UL (ref 149–390)
PMV BLD AUTO: 10 FL (ref 8.9–12.7)
POTASSIUM SERPL-SCNC: 3.9 MMOL/L (ref 3.5–5.3)
PR INTERVAL: 288 MS
PR INTERVAL: 310 MS
PROTHROMBIN TIME: 29.7 SECONDS (ref 11.8–14.2)
QRS AXIS: 257 DEGREES
QRS AXIS: 260 DEGREES
QRSD INTERVAL: 142 MS
QRSD INTERVAL: 148 MS
QT INTERVAL: 440 MS
QT INTERVAL: 440 MS
QTC INTERVAL: 417 MS
QTC INTERVAL: 440 MS
RBC # BLD AUTO: 4.24 MILLION/UL (ref 3.81–5.12)
SODIUM SERPL-SCNC: 139 MMOL/L (ref 136–145)
T WAVE AXIS: 162 DEGREES
T WAVE AXIS: 50 DEGREES
TROPONIN I SERPL-MCNC: 0.57 NG/ML
TROPONIN I SERPL-MCNC: 0.74 NG/ML
TROPONIN I SERPL-MCNC: 0.8 NG/ML
VENTRICULAR RATE: 54 BPM
VENTRICULAR RATE: 60 BPM
WBC # BLD AUTO: 7.38 THOUSAND/UL (ref 4.31–10.16)

## 2018-06-01 PROCEDURE — 99285 EMERGENCY DEPT VISIT HI MDM: CPT

## 2018-06-01 PROCEDURE — 93306 TTE W/DOPPLER COMPLETE: CPT | Performed by: INTERNAL MEDICINE

## 2018-06-01 PROCEDURE — 85610 PROTHROMBIN TIME: CPT | Performed by: PHYSICIAN ASSISTANT

## 2018-06-01 PROCEDURE — 99232 SBSQ HOSP IP/OBS MODERATE 35: CPT | Performed by: HOSPITALIST

## 2018-06-01 PROCEDURE — 82948 REAGENT STRIP/BLOOD GLUCOSE: CPT

## 2018-06-01 PROCEDURE — G8988 SELF CARE GOAL STATUS: HCPCS

## 2018-06-01 PROCEDURE — 85027 COMPLETE CBC AUTOMATED: CPT | Performed by: PHYSICIAN ASSISTANT

## 2018-06-01 PROCEDURE — 80162 ASSAY OF DIGOXIN TOTAL: CPT | Performed by: PHYSICIAN ASSISTANT

## 2018-06-01 PROCEDURE — 84484 ASSAY OF TROPONIN QUANT: CPT | Performed by: PHYSICIAN ASSISTANT

## 2018-06-01 PROCEDURE — 80048 BASIC METABOLIC PNL TOTAL CA: CPT | Performed by: PHYSICIAN ASSISTANT

## 2018-06-01 PROCEDURE — 83036 HEMOGLOBIN GLYCOSYLATED A1C: CPT | Performed by: PHYSICIAN ASSISTANT

## 2018-06-01 PROCEDURE — 97167 OT EVAL HIGH COMPLEX 60 MIN: CPT

## 2018-06-01 PROCEDURE — 97163 PT EVAL HIGH COMPLEX 45 MIN: CPT

## 2018-06-01 PROCEDURE — 83735 ASSAY OF MAGNESIUM: CPT | Performed by: PHYSICIAN ASSISTANT

## 2018-06-01 PROCEDURE — G8987 SELF CARE CURRENT STATUS: HCPCS

## 2018-06-01 PROCEDURE — 99221 1ST HOSP IP/OBS SF/LOW 40: CPT | Performed by: INTERNAL MEDICINE

## 2018-06-01 PROCEDURE — G8978 MOBILITY CURRENT STATUS: HCPCS

## 2018-06-01 PROCEDURE — 93010 ELECTROCARDIOGRAM REPORT: CPT | Performed by: INTERNAL MEDICINE

## 2018-06-01 PROCEDURE — 93306 TTE W/DOPPLER COMPLETE: CPT

## 2018-06-01 PROCEDURE — G8979 MOBILITY GOAL STATUS: HCPCS

## 2018-06-01 PROCEDURE — 93005 ELECTROCARDIOGRAM TRACING: CPT

## 2018-06-01 RX ORDER — DIGOXIN 125 MCG
125 TABLET ORAL EVERY MORNING
Status: DISCONTINUED | OUTPATIENT
Start: 2018-06-02 | End: 2018-06-06 | Stop reason: HOSPADM

## 2018-06-01 RX ORDER — PHYTONADIONE 5 MG/1
5 TABLET ORAL ONCE
Status: COMPLETED | OUTPATIENT
Start: 2018-06-01 | End: 2018-06-01

## 2018-06-01 RX ORDER — HYDRALAZINE HYDROCHLORIDE 20 MG/ML
5 INJECTION INTRAMUSCULAR; INTRAVENOUS EVERY 6 HOURS PRN
Status: DISCONTINUED | OUTPATIENT
Start: 2018-06-01 | End: 2018-06-06 | Stop reason: HOSPADM

## 2018-06-01 RX ORDER — OXYBUTYNIN CHLORIDE 5 MG/1
10 TABLET, EXTENDED RELEASE ORAL DAILY
Status: DISCONTINUED | OUTPATIENT
Start: 2018-06-01 | End: 2018-06-06 | Stop reason: HOSPADM

## 2018-06-01 RX ADMIN — MORPHINE SULFATE 2 MG: 2 INJECTION, SOLUTION INTRAMUSCULAR; INTRAVENOUS at 01:41

## 2018-06-01 RX ADMIN — SODIUM CHLORIDE 3 G: 9 INJECTION, SOLUTION INTRAVENOUS at 12:36

## 2018-06-01 RX ADMIN — SODIUM CHLORIDE 3 G: 9 INJECTION, SOLUTION INTRAVENOUS at 23:34

## 2018-06-01 RX ADMIN — OXYBUTYNIN CHLORIDE 10 MG: 5 TABLET, EXTENDED RELEASE ORAL at 09:20

## 2018-06-01 RX ADMIN — SPIRONOLACTONE 50 MG: 50 TABLET ORAL at 09:19

## 2018-06-01 RX ADMIN — ONDANSETRON 4 MG: 2 INJECTION INTRAMUSCULAR; INTRAVENOUS at 10:40

## 2018-06-01 RX ADMIN — MORPHINE SULFATE 2 MG: 2 INJECTION, SOLUTION INTRAMUSCULAR; INTRAVENOUS at 05:43

## 2018-06-01 RX ADMIN — SODIUM CHLORIDE 3 G: 9 INJECTION, SOLUTION INTRAVENOUS at 05:52

## 2018-06-01 RX ADMIN — SODIUM CHLORIDE 75 ML/HR: 0.9 INJECTION, SOLUTION INTRAVENOUS at 00:23

## 2018-06-01 RX ADMIN — ONDANSETRON 4 MG: 2 INJECTION INTRAMUSCULAR; INTRAVENOUS at 01:41

## 2018-06-01 RX ADMIN — SODIUM CHLORIDE 3 G: 9 INJECTION, SOLUTION INTRAVENOUS at 16:53

## 2018-06-01 RX ADMIN — FENOFIBRATE 48 MG: 48 TABLET ORAL at 09:20

## 2018-06-01 RX ADMIN — INSULIN LISPRO 1 UNITS: 100 INJECTION, SOLUTION INTRAVENOUS; SUBCUTANEOUS at 16:53

## 2018-06-01 RX ADMIN — PHYTONADIONE 5 MG: 5 TABLET ORAL at 09:20

## 2018-06-01 RX ADMIN — SODIUM CHLORIDE 75 ML/HR: 0.9 INJECTION, SOLUTION INTRAVENOUS at 12:38

## 2018-06-01 RX ADMIN — DIGOXIN 250 MCG: 125 TABLET ORAL at 09:19

## 2018-06-01 NOTE — CASE MANAGEMENT
Initial Clinical Review    Admission: Date/Time/Statement: 5/31/18 @ 2252     Orders Placed This Encounter   Procedures    Inpatient Admission (expected length of stay for this patient is greater than two midnights)     Standing Status:   Standing     Number of Occurrences:   1     Order Specific Question:   Admitting Physician     Answer:   NANCY SILVA [[id number]]     Order Specific Question:   Level of Care     Answer:   Med Surg [[id number]]     Order Specific Question:   Estimated length of stay     Answer:   More than 2 Midnights     Order Specific Question:   Certification     Answer:   I certify that inpatient services are medically necessary for this patient for a duration of greater than two midnights  See H&P and MD Progress Notes for additional information about the patient's course of treatment  ED: Date/Time/Mode of Arrival:   ED Arrival Information     Expected Arrival Acuity Means of Arrival Escorted By Service Admission Type    - 5/31/2018 19:52 [facility] [address] [facility],[address] EMS General Medicine Urgent    Arrival Complaint    Back Pain           Chief Complaint:   Chief Complaint   Patient presents with    Back Pain     hx of spinal stenosis, today began with increasing back pain and dry heaves,EMS gave 4mg zofran and 50mcg of fentanyl, pt reports relief, 5/10       History of Illness:  80 y o  female who presents with back pain/vomiting since 3pm  She states she has had 3 prior episodes similar to this in the past  These episodes have resolved on their own  Not related to eating certain foods  She only ate fruit for lunch  She denies diarrhea  Denies fever  She is wheelchair bound from prior orthopedic issues  She lives with her  and has home care aide at home       ED Vital Signs:   ED Triage Vitals   Temperature Pulse Respirations Blood Pressure SpO2   05/31/18 1954 05/31/18 1954 05/31/18 1954 05/31/18 1956 05/31/18 1954   97 9 °F (36 6 °C) 62 18 167/60 96 %      Temp Source Heart Rate Source Patient Position - Orthostatic VS BP Location FiO2 (%)   05/31/18 1954 05/31/18 1954 05/31/18 2224 05/31/18 2224 --   Oral Monitor Lying Left arm       Pain Score       05/31/18 1954       5        Wt Readings from Last 1 Encounters:   06/01/18 85 7 kg (188 lb 15 oz)       Abnormal Labs/Diagnostic Test Results:   TROP 0 41,  Glu 175,   AST 62,   LIPASE 1401,   WBC's 10 43,   ANC 8 94  PT 29 7,  PTT 46,  INR 2 82  BC's pending    CXR: No acute cardiopulmonary disease    CT A&P: Gallbladder wall is inflamed and there is pericholecystic free fluid and fat stranding   Dependent gallstones stones are seen however not in the gallbladder neck   Findings are concerning for acute cholecystitis   No CBD dilatation  No CT evident pancreatitis     Incidentally noted fatty liver  ED Treatment:   Medication Administration from 05/31/2018 1952 to 06/01/2018 0025       Date/Time Order Dose Route Action Action by Comments     05/31/2018 2007  EMS REPLENISHMENT MED 0  Does not apply Given to EMS       05/31/2018 2231 aspirin tablet 325 mg 325 mg Oral Given       05/31/2018 2155 iohexol (OMNIPAQUE) 350 MG/ML injection (MULTI-DOSE) 100 mL 100 mL Intravenous Given       05/31/2018 2338 ampicillin-sulbactam (UNASYN) 3 g in sodium chloride 0 9 % 100 mL IVPB 3 g Intravenous New Bag       06/01/2018 0023 sodium chloride 0 9 % infusion 75 mL/hr Intravenous New Bag            Past Medical/Surgical History:    Active Ambulatory Problems     Diagnosis Date Noted    No Active Ambulatory Problems     Resolved Ambulatory Problems     Diagnosis Date Noted    No Resolved Ambulatory Problems     Past Medical History:   Diagnosis Date    A-fib ([address] )     Arthritis     Assistance needed for mobility     Chronic pain disorder     Diabetes mellitus ([address] )     Full dentures     History of transfusion     Hyperlipidemia     Irregular heart beat     Numbness and tingling of both feet     Skin abnormality     Spinal stenosis     Stroke ([address] )  Unable to ambulate     Urinary incontinence     Wears glasses     Wheelchair dependence        Admitting Diagnosis: Acute cholecystitis [K81 0]  Back pain [M54 9]  Nausea and vomiting [R11 2]  Elevated troponin [R74 8]  History of atrial fibrillation [Z86 79]  Elevated lipase [R74 8]  Ambulatory dysfunction [R26 2]    Age/Sex: 80 y o  female    Assessment/Plan:   Principal Problem:    Cholecystitis  Active Problems:    Wheelchair dependence    Diabetes mellitus ([facility] )    A-fib ([facility] )    Elevated troponin     Plan for the Primary Problem(s):  · Acute cholecystitis  ? Admit to med/surg  ED spoke with general surgery  May need cholecystectomy but unable to consider at this point with elevated troponin and INR  WBC only slightly elevated  Afebrile  General surgery recommends starting unasyn  Will keep NPO  Order zofran and morphine PRN     Plan for Additional Problems:   · Wheelchair dependence- from prior orthopedic issues  · DM- hold oral agents  Order accuchecks every 6 hours while NPO  · afib- on coumadin  INR 2 82  Hold coumadin pending surgical evaluation  · Elevated troponin- denies chest pain  Will trend troponin and serial EKG  Consult cardiology     VTE Prophylaxis: Warfarin (Coumadin)  / sequential compression device   Code Status: DNR/DNI  POLST: There is no POLST form on file for this patient (pre-hospital)  Anticipated Length of Stay:  Patient will be admitted on an Inpatient basis with an anticipated length of stay of  Greater than 2 midnights         Admission Orders:  IP  TELE  Serial trops  EKG with 2nd and 3rd Trops  EKG with CP or ST changes  NPO - sips with meds  Consult Cardio  Consult Surgery  PT / OT Eval  CBC,  BMP,  Mag  SCD's    Scheduled Meds:   Current Facility-Administered Medications:  ampicillin-sulbactam 3 g Intravenous Q6H  Last Rate: Stopped (06/01/18 0630)   digoxin 250 mcg Oral QAM     fenofibrate 48 mg Oral Daily             insulin lispro 1-5 Units Subcutaneous Q6H [address] oxybutynin 10 mg Oral Daily             spironolactone 50 mg Oral Daily       Continuous Infusions:   sodium chloride 75 mL/hr Last Rate: 75 mL/hr (06/01/18 0023)     PRN Meds: hydrALAZINE    morphine injection IV x 2    Ondansetron IV X 2  Trops  0 74    6/1: 97 9 - 66 - 18   151/68  Trops: 0 80,   0 57  Mephyton 5 mg po x 1;   INR 2 82  · Acute cholecystitis-patient presented with right upper quadrant pain nausea and vomiting  Has had 3 prior episodes in the past   No fever or evidence of sepsis however CT scan reveals evidence of severely inflamed gallbladder with cholelithiasis  No evidence of choledocholithiasis currently  LFT is normal   Patient on IV Zosyn  Her INR is elevated at 2 82 -under troponin is elevated likely secondary to type 2 non ST elevation MI  Will need her INR to be around 1 5 prior to any surgical intervention  Will have surgery evaluate and decide  If no surgery contemplated today will resume her diet      · DM- hold oral agents  Continue sliding scale  Blood sugars are currently stable      · [id number] coumadin  INR 2 82  Hold coumadin pending surgical evaluation  Will give the patient vitamin K  anticipate that surgery will likely be on Monday  If that is the case INR should trend down to around 1 5  If surgery planned earlier will start the patient on FFP  Continue digoxin  ·         Elevated troponin- denies chest pain  Likely secondary to type 2 non ST wave elevation MI secondary to         cholecystitis  This is now trending down to 0 58 from 0 80  Cardiology eval pending           Essential hypertension-blood pressure currently stable    Continue on IV hydralazine for now        -monitor INR to get it down to around 1 5  Continue IV antibiotic  Will require surgery however timing of surgery to be determined       Thank you,  [address] [facility] in the [facility] by [address] for 2017  Network Utilization Review Department  Phone: [phone]; Fax [phone]  ATTENTION: The Network Utilization Review Department is now centralized for our 7 Facilities  Make a note that we have a new phone and fax numbers for our Department  Please call with any questions or concerns to [phone] and carefully follow the prompts so that you are directed to the right person  All voicemails are confidential  Fax any determinations, approvals, denials, and requests for initial or continue stay review clinical to [phone]  Due to HIGH CALL volume, it would be easier if you could please send faxed requests to expedite your requests and in part, help us provide discharge notifications faster

## 2018-06-01 NOTE — ED PROVIDER NOTES
History  Chief Complaint   Patient presents with    Back Pain     hx of spinal stenosis, today began with increasing back pain and dry heaves,EMS gave 4mg zofran and 50mcg of fentanyl, pt reports relief, 5/10       Vomiting   Severity:  Moderate  Timing:  Constant  Quality:  Stomach contents  Able to tolerate:  Liquids  Progression:  Unchanged  Chronicity:  New  Recent urination:  Normal  Context comment:  Back pain  Relieved by:  Nothing  Worsened by:  Nothing  Ineffective treatments:  None tried  Associated symptoms: abdominal pain    Associated symptoms: no arthralgias, no chills, no cough, no diarrhea, no fever and no headaches    Risk factors: no alcohol use, not pregnant and no suspect food intake        Prior to Admission Medications   Prescriptions Last Dose Informant Patient Reported? Taking?    DIGOXIN PO  Self Yes Yes   Sig: Take 250 mcg by mouth every morning   Mirabegron ER (MYRBETRIQ) 50 MG TB24  Self Yes Yes   Sig: Take 50 mg by mouth daily     WARFARIN SODIUM PO  Self Yes Yes   Sig: Take 4 5 mg by mouth daily   calcium gluconate 500 mg tablet  Self Yes Yes   Sig: Take 500 mg by mouth every evening   cholecalciferol (VITAMIN D3) 1,000 units tablet  Self Yes Yes   Sig: Take 1,000 Units by mouth every evening   cyanocobalamin (VITAMIN B-12) 1,000 mcg tablet  Self Yes Yes   Sig: Take 1,000 mcg by mouth every evening     fenofibrate (TRICOR) 48 mg tablet  Self Yes Yes   Sig: Take 48 mg by mouth daily   metFORMIN (GLUCOPHAGE) 1000 MG tablet  Self Yes Yes   Sig: Take 1,000 mg by mouth 2 (two) times a day with meals   spironolactone (ALDACTONE) 50 mg tablet  Self Yes Yes   Sig: Take 50 mg by mouth daily      Facility-Administered Medications: None       Past Medical History:   Diagnosis Date    [facility]fib ([facility] )     Arthritis     Assistance needed for mobility     pt can stand with assistance and transfer    Chronic pain disorder     Diabetes mellitus (HCC)     NIDDM    Full dentures     History of transfusion     no adverse reaction    Hyperlipidemia     Irregular heart beat     Numbness and tingling of both feet     Skin abnormality     sacrum area - small red area, less than a dime size    Spinal stenosis     Stroke ([address] )     Unable to ambulate     Urinary incontinence     ipg stimulator x3 repakcements,battery exchange today 2/14/2018    Wears glasses     Wheelchair dependence        Past Surgical History:   Procedure Laterality Date    BACK SURGERY      fusion    BLADDER SUSPENSION      CARPAL TUNNEL RELEASE Bilateral     COLONOSCOPY      DILATION AND CURETTAGE OF UTERUS      FRACTURE SURGERY Left     femur with hardware    HYSTERECTOMY      INSERTION / PLACEMENT / REVISION NEUROSTIMULATOR      JOINT REPLACEMENT Bilateral     knees    SACRAL NERVE STIMULATOR PLACEMENT N/A 2/14/2018    Procedure: REMOVE AND REPLACE IPG;  Surgeon: Valentina Henry MD;  Location: [facility];  Service: UroGynecology           TONSILLECTOMY         History reviewed  No pertinent family history  I have reviewed and agree with the history as documented  Social History   Substance Use Topics    Smoking status: Never Smoker    Smokeless tobacco: Never Used    Alcohol use Yes      Comment: very rare        Review of Systems   Constitutional: Negative for chills and fever  HENT: Negative for drooling and sneezing  Eyes: Negative for pain  Respiratory: Negative for cough  Cardiovascular: Negative for chest pain  Gastrointestinal: Positive for abdominal pain and vomiting  Negative for diarrhea  Endocrine: Negative for polydipsia  Genitourinary: Negative for flank pain  Musculoskeletal: Positive for back pain  Negative for arthralgias  Allergic/Immunologic: Negative for food allergies  Neurological: Negative for headaches  Hematological: Does not bruise/bleed easily  Psychiatric/Behavioral: Negative for behavioral problems         Physical Exam  Physical Exam   Constitutional: She appears well-developed and well-nourished  HENT:   Head: Normocephalic and atraumatic  Eyes: Conjunctivae are normal    Neck: Normal range of motion  Neck supple  Cardiovascular: Normal rate  Pulmonary/Chest: Effort normal    Abdominal: She exhibits no abdominal bruit and no mass  There is tenderness         Musculoskeletal:        Back:        Vital Signs  ED Triage Vitals   Temperature Pulse Respirations Blood Pressure SpO2   05/31/18 1954 05/31/18 1954 05/31/18 1954 05/31/18 1956 05/31/18 1954   97 9 °F (36 6 °C) 62 18 167/60 96 %      Temp Source Heart Rate Source Patient Position - Orthostatic VS BP Location FiO2 (%)   05/31/18 1954 05/31/18 1954 05/31/18 2224 05/31/18 2224 --   Oral Monitor Lying Left arm       Pain Score       05/31/18 1954       5           Vitals:    06/02/18 0730 06/02/18 1041 06/02/18 1505 06/02/18 1936   BP: 149/88 149/74 136/59 140/59   Pulse: 63 63 55 58   Patient Position - Orthostatic VS: Lying Lying Sitting Lying       Visual Acuity      ED Medications  Medications   fenofibrate (TRICOR) tablet 48 mg (48 mg Oral Given 6/2/18 0948)   spironolactone (ALDACTONE) tablet 50 mg (50 mg Oral Given 6/2/18 0948)   ampicillin-sulbactam (UNASYN) 3 g in sodium chloride 0 9 % 100 mL IVPB (3 g Intravenous New Bag 6/2/18 2056)   sodium chloride 0 9 % infusion (75 mL/hr Intravenous New Bag 6/2/18 1839)   ondansetron (ZOFRAN) injection 4 mg (4 mg Intravenous Given 6/1/18 1040)   insulin lispro (HumaLOG) 100 units/mL subcutaneous injection 1-5 Units (1 Units Subcutaneous Not Given 6/2/18 8415)   morphine injection 2 mg (2 mg Intravenous Given 6/1/18 0543)   oxybutynin (DITROPAN-XL) 24 hr tablet 10 mg (10 mg Oral Given 6/2/18 0949)   hydrALAZINE (APRESOLINE) injection 5 mg (not administered)   digoxin (LANOXIN) tablet 125 mcg (125 mcg Oral Given 6/2/18 0948)    EMS REPLENISHMENT MED ( Does not apply Given to EMS 5/31/18 2007)   aspirin tablet 325 mg (325 mg Oral Given 5/31/18 6095)   iohexol (OMNIPAQUE) 350 MG/ML injection (MULTI-DOSE) 100 mL (100 mL Intravenous Given 5/31/18 2155)   phytonadione (MEPHYTON) tablet 5 mg (5 mg Oral Given 6/1/18 0920)   phytonadione (MEPHYTON) tablet 5 mg (5 mg Oral Given 6/2/18 0947)       Diagnostic Studies  Results Reviewed     Procedure Component Value Units Date/Time    Blood culture #1 [[id number]] Collected:  05/31/18 2337    Lab Status:  Preliminary result Specimen:  Blood from Arm, Right Updated:  06/02/18 0701     Blood Culture No Growth at 24 hrs  Blood culture #2 [[id number]] Collected:  05/31/18 2337    Lab Status:  Preliminary result Specimen:  Blood from Hand, Right Updated:  06/02/18 0701     Blood Culture No Growth at 24 hrs  Hemoglobin A1c w/EAG Estimation (Orders if not completed within the last 90 days) [[id number]]  (Abnormal) Collected:  06/01/18 0553    Lab Status:  Final result Specimen:  Blood from Arm, Left Updated:  06/01/18 1200     Hemoglobin A1C 6 5 (H) %       mg/dl     Basic metabolic panel [[id number]] Collected:  06/01/18 0553    Lab Status:  Final result Specimen:  Blood from Arm, Left Updated:  06/01/18 0646     Sodium 139 mmol/L      Potassium 3 9 mmol/L      Chloride 104 mmol/L      CO2 26 mmol/L      Anion Gap 9 mmol/L      BUN 16 mg/dL      Creatinine 0 78 mg/dL      Glucose 90 mg/dL      Calcium 9 2 mg/dL      eGFR 71 ml/min/1 73sq m     Narrative:         National Kidney Disease Education Program recommendations are as follows:  GFR calculation is accurate only with a steady state creatinine  Chronic Kidney disease less than 60 ml/min/1 73 sq  meters  Kidney failure less than 15 ml/min/1 73 sq  meters      Magnesium [[id number]]  (Normal) Collected:  06/01/18 0553    Lab Status:  Final result Specimen:  Blood from Arm, Left Updated:  06/01/18 0646     Magnesium 2 0 mg/dL     Protime-INR [[id number]]  (Abnormal) Collected:  06/01/18 0553    Lab Status:  Final result Specimen:  Blood from Arm, Left Updated:  06/01/18 4086     Protime 29 7 (H) seconds      INR 2 82 (H)    Troponin I [[id number]]  (Abnormal) Collected:  06/01/18 0554    Lab Status:  Final result Specimen:  Blood from Arm, Left Updated:  06/01/18 6308     Troponin I 0 57 (H) ng/mL     Narrative:         Siemens Chemistry analyzer 99% cutoff is > 0 04 ng/mL in network labs    o cTnI 99% cutoff is useful only when applied to patients in the clinical setting of myocardial ischemia  o cTnI 99% cutoff should be interpreted in the context of clinical history, ECG findings and possibly cardiac imaging to establish correct diagnosis  o cTnI 99% cutoff may be suggestive but clearly not indicative of a coronary event without the clinical setting of myocardial ischemia  CBC (With Platelets) [[id number]]  (Abnormal) Collected:  06/01/18 0553    Lab Status:  Final result Specimen:  Blood from Arm, Left Updated:  06/01/18 0608     WBC 7 38 Thousand/uL      RBC 4 24 Million/uL      Hemoglobin 12 5 g/dL      Hematocrit 39 2 %      MCV 93 fL      MCH 29 5 pg      MCHC 31 9 g/dL      RDW 13 3 %      Platelets 096 (L) Thousands/uL      MPV 10 0 fL     Troponin I [[id number]]  (Abnormal) Collected:  06/01/18 0304    Lab Status:  Final result Specimen:  Blood from Hand, Left Updated:  06/01/18 0334     Troponin I 0 80 (H) ng/mL     Narrative:         Siemens Chemistry analyzer 99% cutoff is > 0 04 ng/mL in network labs    o cTnI 99% cutoff is useful only when applied to patients in the clinical setting of myocardial ischemia  o cTnI 99% cutoff should be interpreted in the context of clinical history, ECG findings and possibly cardiac imaging to establish correct diagnosis  o cTnI 99% cutoff may be suggestive but clearly not indicative of a coronary event without the clinical setting of myocardial ischemia      Troponin I [[id number]]  (Abnormal) Collected:  06/01/18 0030    Lab Status:  Final result Specimen:  Blood from Hand, Left Updated:  06/01/18 0104     Troponin I 0 74 (H) ng/mL     Narrative: Siemens Chemistry analyzer 99% cutoff is > 0 04 ng/mL in network labs    o cTnI 99% cutoff is useful only when applied to patients in the clinical setting of myocardial ischemia  o cTnI 99% cutoff should be interpreted in the context of clinical history, ECG findings and possibly cardiac imaging to establish correct diagnosis  o cTnI 99% cutoff may be suggestive but clearly not indicative of a coronary event without the clinical setting of myocardial ischemia  Protime-INR [[id number]]  (Abnormal) Collected:  05/31/18 2225    Lab Status:  Final result Specimen:  Blood from Arm, Left Updated:  05/31/18 2242     Protime 29 7 (H) seconds      INR 2 82 (H)    APTT [[id number]]  (Abnormal) Collected:  05/31/18 2225    Lab Status:  Final result Specimen:  Blood from Arm, Left Updated:  05/31/18 2242     PTT 46 (H) seconds     Troponin I [[id number]]  (Abnormal) Collected:  05/31/18 2038    Lab Status:  Final result Specimen:  Blood from Arm, Right Updated:  05/31/18 2106     Troponin I 0 41 (H) ng/mL     Narrative:         Siemens Chemistry analyzer 99% cutoff is > 0 04 ng/mL in network labs    o cTnI 99% cutoff is useful only when applied to patients in the clinical setting of myocardial ischemia  o cTnI 99% cutoff should be interpreted in the context of clinical history, ECG findings and possibly cardiac imaging to establish correct diagnosis  o cTnI 99% cutoff may be suggestive but clearly not indicative of a coronary event without the clinical setting of myocardial ischemia      Comprehensive metabolic panel [[id number]]  (Abnormal) Collected:  05/31/18 2038    Lab Status:  Final result Specimen:  Blood from Arm, Right Updated:  05/31/18 2102     Sodium 141 mmol/L      Potassium 4 6 mmol/L      Chloride 102 mmol/L      CO2 30 mmol/L      Anion Gap 9 mmol/L      BUN 19 mg/dL      Creatinine 0 86 mg/dL      Glucose 175 (H) mg/dL      Calcium 9 3 mg/dL      AST 62 (H) U/L      ALT 48 U/L      Alkaline Phosphatase 114 U/L Total Protein 7 4 g/dL      Albumin 3 6 g/dL      Total Bilirubin 0 85 mg/dL      eGFR 63 ml/min/1 73sq m     Narrative:         National Kidney Disease Education Program recommendations are as follows:  GFR calculation is accurate only with a steady state creatinine  Chronic Kidney disease less than 60 ml/min/1 73 sq  meters  Kidney failure less than 15 ml/min/1 73 sq  meters  Lipase [[id number]]  (Abnormal) Collected:  05/31/18 2038    Lab Status:  Final result Specimen:  Blood from Arm, Right Updated:  05/31/18 2102     Lipase 1,401 (H) u/L     D-Dimer [[id number]]  (Normal) Collected:  05/31/18 2038    Lab Status:  Final result Specimen:  Blood from Arm, Right Updated:  05/31/18 2057     D-Dimer, Quant 369 ng/ml (FEU)     CBC and differential [[id number]]  (Abnormal) Collected:  05/31/18 2038    Lab Status:  Final result Specimen:  Blood from Arm, Right Updated:  05/31/18 2046     WBC 10 43 (H) Thousand/uL      RBC 4 67 Million/uL      Hemoglobin 14 2 g/dL      Hematocrit 43 2 %      MCV 93 fL      MCH 30 4 pg      MCHC 32 9 g/dL      RDW 13 3 %      MPV 10 1 fL      Platelets 762 (L) Thousands/uL      nRBC 0 /100 WBCs      Neutrophils Relative 86 (H) %      Lymphocytes Relative 8 (L) %      Monocytes Relative 6 %      Eosinophils Relative 1 %      Basophils Relative 0 %      Neutrophils Absolute 8 94 (H) Thousands/µL      Lymphocytes Absolute 0 81 Thousands/µL      Monocytes Absolute 0 61 Thousand/µL      Eosinophils Absolute 0 05 Thousand/µL      Basophils Absolute 0 02 Thousands/µL     POCT urinalysis dipstick [[id number]]     Lab Status:  No result Specimen:  Urine                  XR chest 2 views   Final Result by Linda Miller MD (06/01 9170)      No acute cardiopulmonary disease  Workstation performed: [id number]         CT abdomen pelvis with contrast   Final Result by Kareem Burnett MD (05/31 2216)      Gallbladder wall is inflamed and there is pericholecystic free fluid and fat stranding  Dependent gallstones stones are seen however not in the gallbladder neck  Findings are concerning for acute cholecystitis  No CBD dilatation  No CT evident pancreatitis         Incidentally noted fatty liver  The study was marked in [facility] for immediate notification  Workstation performed: [id number]                    Procedures  Procedures       Phone Contacts  ED Phone Contact    ED Course         HEART Risk Score      Most Recent Value   History  1 Filed at: 05/31/2018 2143   ECG  1 Filed at: 05/31/2018 2143   Age  2 Filed at: 05/31/2018 2143   Risk Factors  1 Filed at: 05/31/2018 2143   Troponin  1 Filed at: 05/31/2018 2143   Heart Score Risk Calculator   History  1 Filed at: 05/31/2018 2143   ECG  1 Filed at: 05/31/2018 2143   Age  2 Filed at: 05/31/2018 2143   Risk Factors  1 Filed at: 05/31/2018 2143   Troponin  1 Filed at: 05/31/2018 2143   HEART Score  6 Filed at: 05/31/2018 2143   HEART Score  6 Filed at: 05/31/2018 2143                            [facility]  Number of Diagnoses or Management Options  Acute cholecystitis:   Ambulatory dysfunction:   Back pain:   Elevated lipase:   Elevated troponin:   History of atrial fibrillation:   Nausea and vomiting:   Diagnosis management comments: 80-year-old female presents emergency department for back pain and vomiting  Patient states this happened to her in the past   Labs reviewed  CT reviewed  At this time patient is felt for inpatient management and evaluation by surgery  I have called surgery on phone and they are aware  Patient remained stable in the department and upon transfer to the standard nursing floor      CritCare Time    Disposition  Final diagnoses:   Acute cholecystitis   Back pain   Nausea and vomiting   History of atrial fibrillation   Elevated lipase   Elevated troponin   Ambulatory dysfunction     Time reflects when diagnosis was documented in both MDM as applicable and the Disposition within this note     Time User Action Codes Description Comment    5/31/2018 10:55 PM Emmie Zazueta Add [K81 0] Acute cholecystitis     5/31/2018 10:55 PM Emmie Zazueta Add [M54 9] Back pain     5/31/2018 10:55 PM Emmie Zazueta Add [R11 2] Nausea and vomiting     5/31/2018 10:55 PM Emmie Zazueta Add [Z86 79] History of atrial fibrillation     5/31/2018 10:56 PM Emmie Zazueta Add [R74 8] Elevated lipase     5/31/2018 10:56 PM Jaskaran Fulton Add [R74 8] Elevated troponin     5/31/2018 10:56 PM Jaskaran Fulton Add [R26 2] Ambulatory dysfunction       ED Disposition     ED Disposition Condition Comment    Admit  Case was discussed with CHRISTIAN and the patient's admission status was agreed to be Admission Status: inpatient status to the service of Dr Naseem Pate   Follow-up Information     Follow up With Specialties Details Why Contact Info    [facility]  [facility]Hosp[facility]  Follow up Follow up SN/PT  [address]  [phone]            Current Discharge Medication List      CONTINUE these medications which have NOT CHANGED    Details   calcium gluconate 500 mg tablet Take 500 mg by mouth every evening      cholecalciferol (VITAMIN D3) 1,000 units tablet Take 1,000 Units by mouth every evening      cyanocobalamin (VITAMIN B-12) 1,000 mcg tablet Take 1,000 mcg by mouth every evening        DIGOXIN PO Take 250 mcg by mouth every morning      fenofibrate (TRICOR) 48 mg tablet Take 48 mg by mouth daily      metFORMIN (GLUCOPHAGE) 1000 MG tablet Take 1,000 mg by mouth 2 (two) times a day with meals      Mirabegron ER (MYRBETRIQ) 50 MG TB24 Take 50 mg by mouth daily        spironolactone (ALDACTONE) 50 mg tablet Take 50 mg by mouth daily      WARFARIN SODIUM PO Take 4 5 mg by mouth daily           No discharge procedures on file      ED Provider  Electronically Signed by           Herve Burgess PA-C  06/02/18 1296

## 2018-06-01 NOTE — PLAN OF CARE
Problem: OCCUPATIONAL THERAPY ADULT  Goal: Performs self-care activities at highest level of function for planned discharge setting  See evaluation for individualized goals  Treatment Interventions: ADL retraining, Functional transfer training, UE strengthening/ROM, Endurance training, Patient/family training, Equipment evaluation/education, Compensatory technique education, Continued evaluation, Energy conservation, Activityengagement          See flowsheet documentation for full assessment, interventions and recommendations  Limitation: Decreased ADL status, Decreased endurance, Decreased self-care trans, Decreased high-level ADLs  Prognosis: Fair  Assessment: Pt is a 80 y o  female seen for OT evaluation s/p adm to [address] on 5/31/2018 w/ back pain and vomiting and dx'd w/ Gallstone pancreatitis  Comorbidities affecting pts functional performance include a significant PMH of A-fib, Arthritis, chronic pain disorder, DM, hx of transfusion, HLD, irregular heart beat, numbness and tingling of B/L feet, skin abnormality, spinal stenosis, hx o fstroke, and urinary incontinence  Pt with active OT orders and activity orders for Up with assistance  Pt lives with spouse and dtr in a one level home w/ 0 BEULAH  Pt reports assist from spouse and limited assist from dtr  In addition, pt reports HHA for 6 hrs/day (4 hrs in AM, 2 hrs in PM)  At baseline, pt reports assist w/ ADLs and IADLs, use of electric WC in home environment for mobility, and use of sit to stand mechanical lift for mobility (however pt reports able to pull self into standing position during ADLs)  Upon evaluation, pt currently requires Mod A for LB ADLs, Supervision for UB ADLs, Mod A for toileting, and Max A of 2 for functional transfers (w/ use of Quick Move) 2* the following deficits impacting occupational performance: weakness, decreased strength, decreased balance and decreased tolerance   These impairments, as well at pts difficulty performing ADLS limit pts ability to safely engage in all baseline areas of occupation, including grooming, bathing, dressing, toileting, functional mobility/transfers, community mobility, social participation  and leisure activities   Pt scored overall 45/100 on the Barthel Index  Based on the aforementioned OT evaluation, functional performance deficits, and assessments, pt has been identified as a high complexity evaluation  Pt to continue to benefit from continued acute OT services during hospital stay to address defined deficits and to maximize level of functional independence in the following Occupational Performance areas: grooming, bathing/shower, toilet hygiene, dressing, medication management, socialization, health maintenance, functional mobility, community mobility, clothing management, social participation and transfers to common household surfaces  From OT standpoint, recommend Home OT upon D/C   OT will continue to follow pt 2-3x/wk to address the following goals to  w/in 10-14 days:     OT Discharge Recommendation: Home OT  OT - OK to Discharge: Yes (when medically cleared)

## 2018-06-01 NOTE — PLAN OF CARE
Problem: PHYSICAL THERAPY ADULT  Goal: Performs mobility at highest level of function for planned discharge setting  See evaluation for individualized goals  Treatment/Interventions: Functional transfer training, LE strengthening/ROM, Therapeutic exercise, Endurance training, Patient/family training, Equipment eval/education, Bed mobility, Compensatory technique education, Continued evaluation, Spoke to nursing          See flowsheet documentation for full assessment, interventions and recommendations  Prognosis: Fair  Problem List: Decreased strength, Decreased endurance, Impaired balance, Decreased mobility, Orthopedic restrictions  Assessment: Pt is 79 y/o female admitted with back pain, RUQ pain and nausea  Acute cholecystitis and abnormal troponin  PT consulted, up with assist   Baseline assistance for transfers using mechanical sit to stand device in home  Is able to pull self into standing to assist with ADLS  Locomotion via electric WC  Nonambulatory  Home aide x 6 hours per day  Presents with decreased strength, balance,activity tolerance and mobility  Jordan for bed mobilty needed  S for sit<>Stand transfers using quickmove device  Motivated to improve LE strength and mobilty overall to lessen caregiver burden  Will benefit from IPPT in order to assure optimal function and balance in order to assist with care in home  Anticipate abiltiy to return home with continued family support  Home PT eval may be beneficial to offer improved functional mobility in environment while lessening caregiver burden  Recommendation: Home with family support, Home PT     PT - OK to Discharge: Yes    See flowsheet documentation for full assessment

## 2018-06-01 NOTE — ED NOTES
Per ZENAIDA Fulton PA-C, okay to give Aspirin despite patients listed allergy        Kermit Myers RN  05/31/18 4908

## 2018-06-01 NOTE — PROGRESS NOTES
Progress Note - Shobha Player 80 y o  female MRN: [id number]    Unit/Bed#: E4 -01 Encounter: [id number]    Principal Problem:    Cholecystitis  Active Problems:    Wheelchair dependence    Diabetes mellitus ([facility] )    A-fib ([facility] )    Elevated troponin      Assessment/Plan:  · Acute cholecystitis-patient presented with right upper quadrant pain nausea and vomiting  Has had 3 prior episodes in the past   No fever or evidence of sepsis however CT scan reveals evidence of severely inflamed gallbladder with cholelithiasis  No evidence of choledocholithiasis currently  LFT is normal   Patient on IV Zosyn  Her INR is elevated at 2 82 -under troponin is elevated likely secondary to type 2 non ST elevation MI  Will need her INR to be around 1 5 prior to any surgical intervention  Will have surgery evaluate and decide  If no surgery contemplated today will resume her diet  acute gallstone pancreatitis--lipase 1400-ct clear liquids     · Wheelchair dependence- from prior orthopedic issues-bilateral knee surgery and subsequent femur fracture requiring a grey insertion  · DM- hold oral agents  Continue sliding scale  Blood sugars are currently stable  · [id number] coumadin  INR 2 82  Hold coumadin pending surgical evaluation  Will give the patient vitamin K  anticipate that surgery will likely be on Monday  If that is the case INR should trend down to around 1 5  If surgery planned earlier will start the patient on FFP  Continue digoxin  ·        Elevated troponin- denies chest pain  Likely secondary to type 2 non ST wave elevation MI secondary to         cholecystitis  This is now trending down to 0 58 from 0 80  Cardiology eval pending  Essential hypertension-blood pressure currently stable    Continue on IV hydralazine for now       -monitor INR to get it down to around 1 5  Continue IV antibiotic  Will require surgery however timing of surgery to be determined Subjective:  Patient complaining of pain over the right upper quadrant  Some nausea  CT reveals inflamed gallbladder wall with pericholecystic free fluid and fat stranding  She does have cholelithiasis but no choledocholithiasis  Being treated for acute cholecystitis  LFTs are normal   Troponins are trending down from 0 80 to 0 57  INR is 2 82-will give her vitamin K  Physical Exam:   Vitals: Blood pressure 151/68, pulse 66, temperature 97 9 °F (36 6 °C), temperature source Tympanic Core, resp  rate 18, height 5' 5" (1 651 m), weight 85 7 kg (188 lb 15 oz), SpO2 98 %, not currently breastfeeding  ,Body mass index is 31 44 kg/m²  Gen:  Pleasant, non-tachypnic, non-dyspnic  Conversant  Heart: irregular rate and rhythm, S1S2 present, no murmur, rub or gallop  Lungs: clear to ausculatation bilaterally  No wheezing, crackless, or rhonchi  No accessory muscle use or respiratory distress  Abd:   Right upper quadrant tenderness-Melendez sign negative  Extremities: no clubbing, cyanosis or edema  2+pedal pulses bilaterally  Full range of motion  Neuro: awake, alert and oriented  Cranial nerves 2-12 intact  Skin: warm and dry: no petechiae, purpura and rash      LABS:     Results from last 7 days  Lab Units 06/01/18  0553 05/31/18 2038   WBC Thousand/uL 7 38 10 43*   HEMOGLOBIN g/dL 12 5 14 2   HEMATOCRIT % 39 2 43 2   PLATELETS Thousands/uL 144* 140*       Results from last 7 days  Lab Units 06/01/18  0553 05/31/18 2038   SODIUM mmol/L 139 141   POTASSIUM mmol/L 3 9 4 6   CHLORIDE mmol/L 104 102   CO2 mmol/L 26 30   BUN mg/dL 16 19   CREATININE mg/dL 0 78 0 86   GLUCOSE RANDOM mg/dL 90 175*   CALCIUM mg/dL 9 2 9 3       Intake/Output Summary (Last 24 hours) at 06/01/18 0853  Last data filed at 06/01/18 0630   Gross per 24 hour   Intake           496 25 ml   Output                0 ml   Net           496 25 ml           Current Facility-Administered Medications:  ampicillin-sulbactam 3 g Intravenous Q6H Lizbeth Velasco PA-C Last Rate: Stopped (06/01/18 0630)   digoxin 250 mcg Oral QAM Lizbeth Velasco PA-C    fenofibrate 48 mg Oral Daily Lizbeth Velasco PA-C    insulin lispro 1-5 Units Subcutaneous Q6H [facility] Lizbeth Velasco PA-C    morphine injection 2 mg Intravenous Q4H PRN Lizbeth Velasco PA-C    ondansetron 4 mg Intravenous Q6H PRN Lizbeth Velasco PA-C    oxybutynin 10 mg Oral Daily Lizbeth Velasco PA-C    phytonadione 5 mg Oral Once Farrah Tolentino MD    sodium chloride 75 mL/hr Intravenous Continuous Lizbeth Velasco PA-C Last Rate: 75 mL/hr (06/01/18 0023)   spironolactone 50 mg Oral Daily Lizbeth Velasco PA-C        Family update-updated

## 2018-06-01 NOTE — SOCIAL WORK
CM met with patient at bedside to discuss discharge needs  CM explained role and updated whiteboard  LOS= 1 day, Currently patient is not a bundle  Patient lives with her spouse and daughter in a one level house  Prior to admission patient was non-ambulatory, required use of mech sit to stand to get oob to an electric w/c, required assistance with most ADL's  Patient receives help/support from her family and also has a person care aide for 6 hrs/day 7 days a week from 9am-1pm then 5pm-7pm   Patient state's she will need ambulance transportation home at discharge  Patient has Electric W/C and Mech  Sit to Stand at home  Patient denies use of any VNA services, she is open to [facility] for SN/PT at discharge  Patient has hx of STR at [facility] s/p Left femur/hip fx ORIF, she was there for 11 months  Patient uses [address] on [address]  Patient's PCP is Dr Felipa Strauss MD   Patient has not appointed anyone as POA and states she does not have a living will or advanced directive but is interested in information, Advanced Directive Booklet was provided to patient  Plan: D/C home with [facility] for SN/PT when medically stable, will need BLS transport set up  CM will continue to follow for any further anticipated d/c needs      Prakash Ngo  [phone]

## 2018-06-01 NOTE — PHYSICAL THERAPY NOTE
PT EVALUATION    80 y o     [id number]    Acute cholecystitis [K81 0]  Back pain [M54 9]  Nausea and vomiting [R11 2]  Elevated troponin [R74 8]  History of atrial fibrillation [Z86 79]  Elevated lipase [R74 8]  Ambulatory dysfunction [R26 2]    Past Medical History:   Diagnosis Date    A-fib ([facility] )     Arthritis     Assistance needed for mobility     pt can stand with assistance and transfer    Chronic pain disorder     Diabetes mellitus ([facility] )     NIDDM    Full dentures     History of transfusion     no adverse reaction    Hyperlipidemia     Irregular heart beat     Numbness and tingling of both feet     Skin abnormality     sacrum area - small red area, less than a dime size    Spinal stenosis     Stroke ([facility] )     Unable to ambulate     Urinary incontinence     ipg stimulator x3 repakcements,battery exchange today 2/14/2018    Wears glasses     Wheelchair dependence          Past Surgical History:   Procedure Laterality Date    BACK SURGERY      fusion    BLADDER SUSPENSION      CARPAL TUNNEL RELEASE Bilateral     COLONOSCOPY      DILATION AND CURETTAGE OF UTERUS      FRACTURE SURGERY Left     femur with hardware    HYSTERECTOMY      INSERTION / Luna Mulch / REVISION NEUROSTIMULATOR      JOINT REPLACEMENT Bilateral     knees    SACRAL NERVE STIMULATOR PLACEMENT N/A 2/14/2018    Procedure: REMOVE AND REPLACE IPG;  Surgeon: Lauren Baez MD;  Location: [facility];  Service: UroGynecology           TONSILLECTOMY        06/01/18 1210   Note Type   Note type Eval only   Pain Assessment   Pain Score No Pain   Home Living   Type of [address] [place] [address] One level   Bathroom Shower/Tub Walk-in shower   Bathroom Toilet Raised   Bathroom Equipment Tub transfer bench;Grab bars in shower   [address]; Other (Comment)  (sit to stand mechanical lift )   Additional Comments Reports uses electric WC in home    Sit to stand mechanical lift for mobilty, however states able to pull into standing to assist with ADL management  Prior Function   Level of [place] Needs assistance with ADLs and functional mobility   Lives With Spouse;Daughter  (spouse and dtr-both limited with abiltiy to assist)   Receives Help From Family;Personal care attendant  (91-, then 5-7 for 6 hours/dayx 7 days per week )   ADL Assistance Needs assistance   IADLs Needs assistance   Falls in the last 6 months 0   Vocational Retired   Comments hx of L hip fx with ORIF spending 4 months in bed at SNF with total time there x 11 months  NOnambulatory since  STates was going to [facility] outpt to work towards walking again  LLE and spinal stenosis limting factors to progress  Restrictions/Precautions   Weight Bearing Precautions Per Order No   Other Precautions Bed Alarm; Fall Risk;Multiple lines;Telemetry   General   Additional Pertinent History Pt is 79 y/o female admitted with acute cholecystitis, abnormal troponins with ? NSTEMI type 2  PT consulted  Up with assist    Family/Caregiver Present No  (home aide entered toward end of session )   Cognition   Overall Cognitive Status WFL   RUE Assessment   RUE Assessment WFL   LUE Assessment   LUE Assessment WFL   RLE Assessment   RLE Assessment WFL  (grossly 4-/5)   LLE Assessment   LLE Assessment WFL  (grossly 3+ to 4-/5)   Light Touch   RLE Light Touch Grossly intact   LLE Light Touch Grossly intact   Proprioception   RLE Proprioception Grossly intact   LLE Proprioception Grossly Intact   Wound 06/01/18 Laceration Leg Left   Date First Assessed/Time First Assessed: 06/01/18 0234   Pre-Existing Wound: Yes  Wound Type: Laceration  Location: Leg  Wound Location Orientation: Left   Wound Description (drsg to L lower leg noted )   Bed Mobility   Supine to Sit 4  Minimal assistance   Additional items Assist x 1; Increased time required; Bedrails;HOB elevated   Additional Comments Increased time to reach EOB   Transfers   Sit to Stand 5 Supervision   Additional items Increased time required;Verbal cues; Other  (pulling up from quickmove)   Stand to Sit 5  Supervision   Additional items Increased time required;Verbal cues; Other  (on quickmove)   Mechanical lift 2  Maximal assistance  (quickmove OOB to chair )   Additional items Assist x 2   Additional Comments Max A of 2 for OOB to chair via use of quickmove   for moving [employer] equipment  Ambulation/Elevation   Gait pattern Not appropriate  (nonambulatory at baseline )   Balance   Static Sitting Fair +   Dynamic Sitting Fair   Static Standing Poor +  (tolerated 7 seconds)   Dynamic Standing Poor -   Ambulatory Zero   Endurance Deficit   Endurance Deficit Yes   Endurance Deficit Description fatigue   Activity Tolerance   Activity Tolerance Patient tolerated treatment well;Patient limited by fatigue   Medical Staff Made Aware NurseThai OT   Nurse Made Aware NurseThai updated on mobilty status and need for use of quickmove for OOB<>chair   Assessment   Prognosis Fair   Problem List Decreased strength;Decreased endurance; Impaired balance;Decreased mobility;Orthopedic restrictions   Assessment Pt is 81 y/o female admitted with back pain, RUQ pain and nausea  Acute cholecystitis and abnormal troponin  PT consulted, up with assist   Baseline assistance for transfers using mechanical sit to stand device in home  Is able to pull self into standing to assist with ADLS  Locomotion via electric WC  Nonambulatory  Home aide x 6 hours per day  Presents with decreased strength, balance,activity tolerance and mobility  Jordan for bed mobilty needed  S for sit<>Stand transfers using quickmove device  Motivated to improve LE strength and mobilty overall to lessen caregiver burden  Will benefit from IPPT in order to assure optimal function and balance in order to assist with care in home  Anticipate abiltiy to return home with continued family support    Home PT eval may be beneficial to offer improved functional mobility in environment while lessening caregiver burden  Goals   Patient Goals get better   STG Expiration Date 06/11/18   Short Term Goal #1 10 days:  Bed mobilty wiht Bimal to improve abiltiy and lessen caregiver assist   Transfers sit to stand with Bimal in order to improve function and lessen caregiver assistance  Improve standing tolerance to 4 minutes in order to assist with ADL tolerance  Improve overall balance and strength 1/2 grade in order to assist with functional tasks  Treatment Day 0   Plan   Treatment/Interventions Functional transfer training;LE strengthening/ROM; Therapeutic exercise; Endurance training;Patient/family training;Equipment eval/education; Bed mobility; Compensatory technique education;Continued evaluation;Spoke to nursing   PT Frequency Other (Comment)  (3-4 times/wk)   Recommendation   Recommendation Home with family support;Home PT   PT - OK to Discharge Yes   Additional Comments when medically stable     Barthel Index   Feeding 10   Bathing 0   Grooming Score 5   Dressing Score 5   Bladder Score 5   Bowels Score 10   Toilet Use Score 5   Transfers (Bed/Chair) Score 5   Mobility (Level Surface) Score 0   Stairs Score 0   Barthel Index Score 45     History: co - morbidities, fall risk, use of assistive device, assist for adl's,  multiple lines  Exam: impairments in locomotion, musculoskeletal, balance,posture, joint integrity, skin integrity, cardiac, barthel 45  Clinical: unstable/unpredictable ( fall risk, ongoing medical workup/management, tele)  Complexity:high    Pattricia Boas, PT

## 2018-06-01 NOTE — H&P
History and Physical - [facility] Internal Medicine    Patient Information: Maximo Prado 80 y o  female MRN: [id number]  Unit/Bed#: ED 18 Encounter: [id number]  Admitting Physician: Catherine Alexander PA-C  PCP: Gómez Chapin MD  Date of Admission:  05/31/18    Assessment/Plan:    Hospital Problem List:     Principal Problem:    Cholecystitis  Active Problems:    Wheelchair dependence    Diabetes mellitus ([facility] )    A-fib ([facility] )    Elevated troponin      Plan for the Primary Problem(s):  · Acute cholecystitis  · Admit to med/surg  ED spoke with general surgery  May need cholecystectomy but unable to consider at this point with elevated troponin and INR  WBC only slightly elevated  Afebrile  General surgery recommends starting unasyn  Will keep NPO  Order zofran and morphine PRN    Plan for Additional Problems:   · Wheelchair dependence- from prior orthopedic issues  · DM- hold oral agents  Order accuchecks every 6 hours while NPO  · afib- on coumadin  INR 2 82  Hold coumadin pending surgical evaluation  · Elevated troponin- denies chest pain  Will trend troponin and serial EKG  Consult cardiology    VTE Prophylaxis: Warfarin (Coumadin)  / sequential compression device   Code Status: DNR/DNI  POLST: There is no POLST form on file for this patient (pre-hospital)    Anticipated Length of Stay:  Patient will be admitted on an Inpatient basis with an anticipated length of stay of  Greater than 2 midnights  Justification for Hospital Stay: patient requires surgical consult  Keep NPO for now    Total Time for Visit, including Counseling / Coordination of Care: 45 minutes  Greater than 50% of this total time spent on direct patient counseling and coordination of care      Chief Complaint:   Back pain/vomiting    History of Present Illness:    Maximo Prado is a 80 y o  female who presents with back pain/vomiting since 3pm  She states she has had 3 prior episodes similar to this in the past  These episodes have resolved on their own  Not related to eating certain foods  She only ate fruit for lunch  She denies diarrhea  Denies fever  She is wheelchair bound from prior orthopedic issues  She lives with her  and has home care aide at home  Review of Systems:    Review of Systems   Constitutional: Positive for appetite change  HENT: Negative  Eyes: Negative  Respiratory: Negative  Cardiovascular: Negative  Gastrointestinal: Positive for vomiting  Endocrine: Negative  Genitourinary: Negative  Musculoskeletal: Positive for back pain and gait problem  Skin: Negative  Allergic/Immunologic: Negative  Hematological: Negative  Psychiatric/Behavioral: Negative          Past Medical and Surgical History:     Past Medical History:   Diagnosis Date    [facility]fib ([facility] )     Arthritis     Assistance needed for mobility     pt can stand with assistance and transfer    Chronic pain disorder     Diabetes mellitus ([facility] )     NIDDM    Full dentures     History of transfusion     no adverse reaction    Hyperlipidemia     Irregular heart beat     Numbness and tingling of both feet     Skin abnormality     sacrum area - small red area, less than a dime size    Spinal stenosis     Stroke ([facility] )     Unable to ambulate     Urinary incontinence     ipg stimulator x3 repakcements,battery exchange today 2/14/2018    Wears glasses     Wheelchair dependence        Past Surgical History:   Procedure Laterality Date    BACK SURGERY      fusion    BLADDER SUSPENSION      CARPAL TUNNEL RELEASE Bilateral     COLONOSCOPY      DILATION AND CURETTAGE OF UTERUS      FRACTURE SURGERY Left     femur with hardware    HYSTERECTOMY      INSERTION / PLACEMENT / REVISION NEUROSTIMULATOR      JOINT REPLACEMENT Bilateral     knees    SACRAL NERVE STIMULATOR PLACEMENT N/A 2/14/2018    Procedure: REMOVE AND REPLACE IPG;  Surgeon: Sonia Robb MD;  Location: [facility];  Service: UroGynecology           TONSILLECTOMY         Meds/Allergies:    Prior to Admission medications    Medication Sig Start Date End Date Taking? Authorizing Provider   calcium gluconate 500 mg tablet Take 500 mg by mouth every evening   Yes Historical Provider, MD   cholecalciferol (VITAMIN D3) 1,000 units tablet Take 1,000 Units by mouth every evening   Yes Historical Provider, MD   cyanocobalamin (VITAMIN B-12) 1,000 mcg tablet Take 1,000 mcg by mouth every evening     Yes Historical Provider, MD   DIGOXIN PO Take 250 mcg by mouth every morning   Yes Historical Provider, MD   fenofibrate (TRICOR) 48 mg tablet Take 48 mg by mouth daily   Yes Historical Provider, MD   metFORMIN (GLUCOPHAGE) 1000 MG tablet Take 1,000 mg by mouth 2 (two) times a day with meals   Yes Historical Provider, MD   Mirabegron ER (MYRBETRIQ) 50 MG TB24 Take 50 mg by mouth daily     Yes Historical Provider, MD   spironolactone (ALDACTONE) 50 mg tablet Take 50 mg by mouth daily   Yes Historical Provider, MD   WARFARIN SODIUM PO Take 4 5 mg by mouth daily   Yes Historical Provider, MD     I have reviewed home medications with patient personally  Allergies:    Allergies   Allergen Reactions    Bactrim [Sulfamethoxazole-Trimethoprim] Swelling     Swelling around eyes and red eyes    Aspirin Other (See Comments)     "feels like fist in my chest"       Social History:     Marital Status: /Civil Union   Occupation: retired  Patient Pre-hospital Living Situation: lives with   Patient Pre-hospital Level of Mobility: wheelchair bound  Patient Pre-hospital Diet Restrictions: diabetic  Substance Use History:   History   Alcohol Use    Yes     Comment: very rare     History   Smoking Status    Never Smoker   Smokeless Tobacco    Never Used     History   Drug Use No       Family History:    non-contributory    Physical Exam:     Vitals:   Blood Pressure: 129/70 (05/31/18 2224)  Pulse: 64 (05/31/18 2224)  Temperature: 97 9 °F (36 6 °C) (05/31/18 1954)  Temp Source: Oral (05/31/18 1954)  Respirations: 18 (05/31/18 2224)  Weight - Scale: 86 6 kg (190 lb 14 7 oz) (05/31/18 1954)  SpO2: 96 % (05/31/18 2224)    Physical Exam   Constitutional: She is oriented to person, place, and time  She appears well-developed and well-nourished  No distress  HENT:   Head: Normocephalic and atraumatic  Eyes: Conjunctivae are normal  Pupils are equal, round, and reactive to light  Neck: Normal range of motion  Neck supple  No JVD present  No tracheal deviation present  No thyromegaly present  Cardiovascular: Normal rate  irregular   Pulmonary/Chest: Effort normal and breath sounds normal  No respiratory distress  She has no wheezes  Abdominal: Soft  Bowel sounds are normal  She exhibits no distension and no mass  There is no tenderness  There is no rebound and no guarding  Musculoskeletal: She exhibits no edema or deformity  Bilateral generalized LE weakness/deconditioning  Thoracic paraspinal muscular tenderness   Lymphadenopathy:     She has no cervical adenopathy  Neurological: She is alert and oriented to person, place, and time  Skin: Skin is warm and dry  No rash noted  She is not diaphoretic  No erythema  No pallor  Psychiatric: She has a normal mood and affect  Her behavior is normal    Vitals reviewed  Additional Data:     Lab Results: I have personally reviewed pertinent reports          Results from last 7 days  Lab Units 05/31/18 2038   WBC Thousand/uL 10 43*   HEMOGLOBIN g/dL 14 2   HEMATOCRIT % 43 2   PLATELETS Thousands/uL 140*   NEUTROS PCT % 86*   LYMPHS PCT % 8*   MONOS PCT % 6   EOS PCT % 1       Results from last 7 days  Lab Units 05/31/18 2038   SODIUM mmol/L 141   POTASSIUM mmol/L 4 6   CHLORIDE mmol/L 102   CO2 mmol/L 30   BUN mg/dL 19   CREATININE mg/dL 0 86   CALCIUM mg/dL 9 3   TOTAL PROTEIN g/dL 7 4   BILIRUBIN TOTAL mg/dL 0 85   ALK PHOS U/L 114   ALT U/L 48   AST U/L 62*   GLUCOSE RANDOM mg/dL 175*       Results from last 7 days  Lab Units 05/31/18  2225   INR  2 82*       Imaging: I have personally reviewed pertinent reports  Ct Abdomen Pelvis With Contrast    Result Date: 5/31/2018  Narrative: CT ABDOMEN AND PELVIS WITH IV CONTRAST INDICATION:   severe sudden onset of back pain and nausea vomiting  elevated lipase level  COMPARISON: None  TECHNIQUE:  CT examination of the abdomen and pelvis was performed  Axial, sagittal, and coronal 2D reformatted images were created from the source data and submitted for interpretation  Radiation dose length product (DLP) for this visit:  762 mGy-cm   This examination, like all CT scans performed in the [facility], was performed utilizing techniques to minimize radiation dose exposure, including the use of iterative reconstruction and automated exposure control  IV Contrast:  100 mL of iohexol (OMNIPAQUE) Enteric Contrast:  Enteric contrast was not administered  FINDINGS: ABDOMEN LOWER CHEST:  No clinically significant abnormality identified in the visualized lower chest  LIVER/BILIARY TREE:  Liver is diffusely decreased in density consistent with fatty change  No CT evidence of suspicious hepatic mass  Normal hepatic contours  No biliary dilatation  GALLBLADDER:  Gallbladder wall is inflamed and there is pericholecystic free fluid in fat stranding  Dependent gallstones are seen however not in the gallbladder neck  Findings are concerning for acute cholecystitis however no radiopaque stone is seen  No CBD dilatation  SPLEEN:  Unremarkable  PANCREAS:  Unremarkable  ADRENAL GLANDS:  Unremarkable  KIDNEYS/URETERS:  Unremarkable  No hydronephrosis  STOMACH AND BOWEL:  There is colonic diverticulosis without evidence of acute diverticulitis  APPENDIX:  No findings to suggest appendicitis  ABDOMINOPELVIC CAVITY:  No ascites or free intraperitoneal air  No lymphadenopathy  VESSELS:  Unremarkable for patient's age  PELVIS REPRODUCTIVE ORGANS:  Unremarkable for patient's age  URINARY BLADDER:  Unremarkable  ABDOMINAL WALL/INGUINAL REGIONS:  Right flank region spinal cord stimulator seen to enter the sacrum  Otherwise normal  OSSEOUS STRUCTURES: Thin stabilizing the facet joints of L3-4 L4-5 and L5-S1  No evidence for hardware associated complication  No acute fracture or destructive osseous lesion  Impression: Gallbladder wall is inflamed and there is pericholecystic free fluid and fat stranding  Dependent gallstones stones are seen however not in the gallbladder neck  Findings are concerning for acute cholecystitis  No CBD dilatation  No CT evident pancreatitis    Incidentally noted fatty liver  The study was marked in [facility] for immediate notification  Workstation performed: [id number]       EKG, Pathology, and Other Studies Reviewed on Admission:   · EKG: afib    Allscripts / Epic Records Reviewed: Yes     ** Please Note: This note has been constructed using a voice recognition system   **

## 2018-06-01 NOTE — PLAN OF CARE
Problem: DISCHARGE PLANNING - CARE MANAGEMENT  Goal: Discharge to post-acute care or home with appropriate resources  INTERVENTIONS:  - Conduct assessment to determine patient/family and health care team treatment goals, and need for post-acute services based on payer coverage, community resources, and patient preferences, and barriers to discharge  - Address psychosocial, clinical, and financial barriers to discharge as identified in assessment in conjunction with the patient/family and health care team  - Arrange appropriate level of post-acute services according to patient's   needs and preference and payer coverage in collaboration with the physician and health care team  - Communicate with and update the patient/family, physician, and health care team regarding progress on the discharge plan  - Arrange appropriate transportation to post-acute venues  Outcome: Progressing    Plan: D/C home with [facility] for SN/PT when medically stable, will need BLS transport set up

## 2018-06-01 NOTE — PROGRESS NOTES
Third troponin increased to 0 80  EKG completed and placed in patient chart  Patient denies chest pain  Admitting for Slim PA-C notified, no new orders  Will continue to monitor  Call bell within reach

## 2018-06-01 NOTE — PROGRESS NOTES
Second troponin increased to 0 74  EKG completed and placed in patient chart  Patient denies chest pain or any other discomfort except chronic back pain  Admitting for Slim PA-C notified, no new orders  Will continue to monitor  Call bell within reach

## 2018-06-01 NOTE — OCCUPATIONAL THERAPY NOTE
[address]  Evaluation     Patient Name: Carlos Iglesias  ZKAYG'V Date: 6/1/2018  Problem List  Patient Active Problem List   Diagnosis    Wheelchair dependence    Diabetes mellitus [facility]    [facility]    Cholecystitis    Elevated troponin    Gallstone pancreatitis     Past Medical History  Past Medical History:   Diagnosis Date    A-fib ([facility] )     Arthritis     Assistance needed for mobility     pt can stand with assistance and transfer    Chronic pain disorder     Diabetes mellitus ([facility] )     NIDDM    Full dentures     History of transfusion     no adverse reaction    Hyperlipidemia     Irregular heart beat     Numbness and tingling of both feet     Skin abnormality     sacrum area - small red area, less than a dime size    Spinal stenosis     [facility]     Unable to ambulate     Urinary incontinence     ipg stimulator x3 repakcements,battery exchange today 2/14/2018    Wears glasses     Wheelchair dependence      Past Surgical History  Past Surgical History:   Procedure Laterality Date    BACK SURGERY      fusion    BLADDER SUSPENSION      CARPAL TUNNEL RELEASE Bilateral     COLONOSCOPY      DILATION AND CURETTAGE OF UTERUS      FRACTURE SURGERY Left     femur with hardware    HYSTERECTOMY      INSERTION / PLACEMENT / REVISION NEUROSTIMULATOR      JOINT REPLACEMENT Bilateral     knees    SACRAL NERVE STIMULATOR PLACEMENT N/A 2/14/2018    Procedure: REMOVE AND REPLACE IPG;  Surgeon: Annemarie Saunders MD;  [facility];  Service: UroGynecology           TONSILLECTOMY           06/01/18 1209   Note Type   Note type Eval only   Restrictions/Precautions   Weight Bearing Precautions Per Order No   Other Precautions Bed Alarm; Fall Risk;Multiple lines;Telemetry   Pain Assessment   Pain Assessment No/denies pain   Pain Score No Pain   Home Living   Type of [address] [place] [address] One level   Bathroom Shower/Tub Walk-in shower   Bathroom Toilet Raised   Bathroom Equipment Tub transfer bench;Grab bars in shower   [address]; Other (Comment)  (sit to stand mechanical lift)   Additional Comments Pt lives with spouse and dtr in a one level home w/ 0 BEULAH  Pt reports assist from spouse and limited assist from dtr  In addition, pt reports HHA for 6 hrs/day (4 hrs in AM, 2 hrs in PM)  Prior Function   Level of Sac Needs assistance with ADLs and functional mobility; Needs assistance with IADLs   Lives With Spouse;Daughter   Receives Help From Family;Personal care attendant  (HHA 6 hrs/day 7 days/wk: 9A-1P, 5P-7p)   ADL Assistance Needs assistance   IADLs Needs assistance   Falls in the last 6 months 0   Vocational Retired   Comments At baseline, pt reports assist w/ ADLs and IADLs, use of electric WC in home environment for mobility, and use of sit to stand mechanical lift for mobility (however pt reports able to pull self into standing position during ADLs)  Lifestyle   Autonomy At baseline, pt reports assist w/ ADLs and IADLs, use of electric WC in home environment for mobility, and use of sit to stand mechanical lift for mobility (however pt reports able to pull self into standing position during ADLs)  Reciprocal Relationships Lives w/ spouse and dtr   Service to Others Retired   Intrinsic Gratification Watching TV   Psychosocial   Psychosocial (WDL) [address]  5  Supervision/Setup   Grooming Assistance 5  [address] [facility] 5  Supervision/Setup   LB [address] 3  Moderate Assistance   UB Dressing Assistance 5  Supervision/Setup    [facility] 3  Moderate [address]  3  Moderate Assistance   Bed Mobility   Supine to Sit 4  Minimal assistance   Additional items Assist x 1; Increased time required; Bedrails;HOB elevated   Sit to Supine Unable to assess  (Pt seated OOB in chair at end of session)   Additional Comments Pt seated OOB in chair at end of session w/ call bell and phone within reach  All needs met and pt reports no further questions for OT at this time   Transfers   Sit to Stand 5  Supervision   Additional items Increased time required;Verbal cues; Other  (use of Quick Move)   Stand to Sit 5  Supervision   Additional items Increased time required;Verbal cues; Other  (Use of quick move)   Mechanical lift 2  Maximal assistance   Additional items Assist x 2   Additional Comments Max A of 2 for EOB>chair transfer w/ use of Quick Move  Assist x1 to ensure safety of pt and additional person for moving equipment   Balance   Static Sitting Fair +   Dynamic Sitting Fair   Static Standing Poor +   Dynamic Standing Poor -   Ambulatory Zero   Activity Tolerance   Activity Tolerance Patient tolerated treatment well;Patient limited by fatigue   Medical Staff Made Aware Pt able to be seen per RN Ruth Dina   Nurse Made Aware yes   RUE Assessment   RUE Assessment WFL  (3+/5)   LUE Assessment   LUE Assessment WFL  (3+/5)   Hand Function   Gross Motor Coordination Functional   Fine Motor Coordination Functional   Sensation   Light Touch No apparent deficits   Sharp/Dull No apparent deficits   Vision-Basic Assessment   Current Vision Wears glasses all the time   Vision - Complex Assessment   Ocular Range of Motion [facility]   Acuity Able to read clock/calendar on wall without difficulty   Perception   Inattention/Neglect Appears intact   Cognition   Overall Cognitive Status [facility]   Arousal/Participation Alert; Cooperative   Attention Within functional limits   Orientation Level Oriented X4   Memory Within functional limits   Following Commands Follows one step commands without difficulty   Assessment   Limitation Decreased ADL status; Decreased endurance;Decreased self-care trans;Decreased high-level ADLs   Prognosis Fair   Assessment Pt is a 80 y o  female seen for OT evaluation s/p adm to [facility] on 5/31/2018 w/ back pain and vomiting and dx'd w/ Gallstone pancreatitis  Comorbidities affecting pts functional performance include a significant PMH of A-fib, Arthritis, chronic pain disorder, DM, hx of transfusion, HLD, irregular heart beat, numbness and tingling of B/L feet, skin abnormality, spinal stenosis, hx o fstroke, and urinary incontinence  Pt with active OT orders and activity orders for Up with assistance  Pt lives with spouse and dtr in a one level home w/ 0 BEULAH  Pt reports assist from spouse and limited assist from dtr  In addition, pt reports HHA for 6 hrs/day (4 hrs in AM, 2 hrs in PM)  At baseline, pt reports assist w/ ADLs and IADLs, use of electric WC in home environment for mobility, and use of sit to stand mechanical lift for mobility (however pt reports able to pull self into standing position during ADLs)  Upon evaluation, pt currently requires Mod A for LB ADLs, Supervision for UB ADLs, Mod A for toileting, and Max A of 2 for functional transfers (w/ use of Quick Move) 2* the following deficits impacting occupational performance: weakness, decreased strength, decreased balance and decreased tolerance  These impairments, as well at pts difficulty performing ADLS limit pts ability to safely engage in all baseline areas of occupation, including grooming, bathing, dressing, toileting, functional mobility/transfers, community mobility, social participation  and leisure activities   Pt scored overall 45/100 on the Barthel Index  Based on the aforementioned OT evaluation, functional performance deficits, and assessments, pt has been identified as a high complexity evaluation   Pt to continue to benefit from continued acute OT services during hospital stay to address defined deficits and to maximize level of functional independence in the following Occupational Performance areas: grooming, bathing/shower, toilet hygiene, dressing, medication management, socialization, health maintenance, functional mobility, community mobility, clothing management, social participation and transfers to common household surfaces  From OT standpoint, recommend Home OT upon D/C  OT will continue to follow pt 2-3x/wk to address the following goals to  w/in 10-14 days:   Goals   Patient Goals to get better   LTG Time Frame 10-14   Long Term Goal Please refer to LTGs listed below   Plan   Treatment Interventions ADL retraining;Functional transfer training;UE strengthening/ROM; Endurance training;Patient/family training;Equipment evaluation/education; Compensatory technique education;Continued evaluation; Energy conservation; Activityengagement   Goal Expiration Date 06/15/18   Treatment Day 0   OT Frequency 2-3x/wk   Recommendation   OT Discharge Recommendation Home OT   OT - OK to Discharge Yes  (when medically cleared)   Barthel Index   Feeding 10   Bathing 0   Grooming Score 5   Dressing Score 5   Bladder Score 5   Bowels Score 10   Toilet Use Score 5   Transfers (Bed/Chair) Score 5   Mobility (Level Surface) Score 0   Stairs Score 0   Barthel Index Score 45   Modified Cee Scale   Modified [place] Scale 4        GOALS    1) Pt will improve activity tolerance to G for min 45 min txment sessions    2) Pt will complete bed mobility at a Mod I level w/ G balance/safety demonstrated     3) Pt will complete UB dressing/bathing w/ mod I using adaptive device and DME as needed    4) Pt will complete LB dressing/bathing w/ Supervision using adaptive device and DME as needed    5) Pt will complete toileting w/ Supervision w/ G hygiene/thoroughness and F+ balance demonstrated during clothing management up/down using DME as needed    6) Pt will improve functional transfers to Mod I on/off all surfaces using DME as needed w/ G balance/safety     7) Pt will increase UE strength by 1 MM grade to increase independence in ADLs and transfers    8) Pt will engage in ongoing cognitive assessment w/ G participation w/ mod I to assist w/ safe d/c planning/recommendations    9) Pt will demonstrate G carryover of pt/caregiver education and training as appropriate w/ mod I w/o cues w/ good tolerance    10) Pt will demonstrate 100% carryover of energy conservation techniques w/ mod I t/o functional I/ADL/leisure tasks w/o cues s/p skilled education      Vlad Raw, OTR/L

## 2018-06-01 NOTE — CONSULTS
Consult - Cardiology   Kat Expose 80 y o  female MRN: [id number]  Unit/Bed#: E4 -01 Encounter: [id number]        Reason For Consult:  Abnormal troponin               Assessment and Plan:     1  Asymptomatic troponin elevation: Possible non-STEMI type 2 with uncertain precipitant -  possibly strain of intercurrent illness  2   Acute cholecystitis without choledocholithiasis:  Clinically improving with pending surgical evaluation  3  Permanent atrial fibrillation:  Rate controlled  Anticoagulated with warfarin prior to admission  4  Hypertension:  Controlled  5  Possible aortic sclerosis    · For rate control of atrial fibrillation the patient remains on pre hospital regimen of digoxin only ~~>continue same  Check level  · Warfarin being held in the event cholecystectomy is pursued during this hospitalization (surgical consultation forthcoming)  · Will check echocardiogram for assessment of structure and function without imminent plan for other cardiac testing  History Of Present Illness: This patient is an 70-year-old female patient of Dr Nic Lemus (PCP) who came to the emergency department by ambulance on the evening of 5/31/18 reporting back and right upper quadrant abdominal pain, nausea and dry heaves  A CT scan of the abdomen reported an enlarged and inflamed gallbladder with pericholecystic fluid and fat stranding with non-obstructing gallstones  She was diagnosed with acute cholecystitis and is currently improving on antibiotics  As part of her hospital presentation cardiac biomarkers were obtained with an abnormal troponin level prompting our consultation  Ms Valdez denies routine care by cardiologist   She has a history of permanent atrial fibrillation which is reportedly well controlled on monotherapy digoxin  She is anticoagulated with warfarin which is managed by her PCP  She denies any history of CAD, Congestive heart failure, or valvular heart disease    She reports she has never been hospitalized for any cardiac related problems  At baseline she is and mobile and reliant on a wheelchair  She can bear weight and with assistance transfer in and out of her wheelchair  She denies any present or prior symptoms of chest pain or dyspnea, and she is poorly where for heart rate and rhythm  Past Medical History:        Past Medical History:   Diagnosis Date    A-fib ([facility] )     Arthritis     Assistance needed for mobility     pt can stand with assistance and transfer    Chronic pain disorder     Diabetes mellitus ([facility] )     NIDDM    Full dentures     History of transfusion     no adverse reaction    Hyperlipidemia     Irregular heart beat     Numbness and tingling of both feet     Skin abnormality     sacrum area - small red area, less than a dime size    Spinal stenosis     Stroke ([facility] )     Unable to ambulate     Urinary incontinence     ipg stimulator x3 repakcements,battery exchange today 2/14/2018    Wears glasses     Wheelchair dependence     Past Surgical History:   Procedure Laterality Date    BACK SURGERY      fusion    BLADDER SUSPENSION      CARPAL TUNNEL RELEASE Bilateral     COLONOSCOPY      DILATION AND CURETTAGE OF UTERUS      FRACTURE SURGERY Left     femur with hardware    HYSTERECTOMY      INSERTION / PLACEMENT / REVISION NEUROSTIMULATOR      JOINT REPLACEMENT Bilateral     knees    SACRAL NERVE STIMULATOR PLACEMENT N/A 2/14/2018    Procedure: REMOVE AND REPLACE IPG;  Surgeon: Yani Bernard MD;  Location: [facility];  Service: UroGynecology           TONSILLECTOMY          Allergy:        Allergies   Allergen Reactions    Bactrim [Sulfamethoxazole-Trimethoprim] Swelling     Swelling around eyes and red eyes    Aspirin Other (See Comments)     "feels like fist in my chest"       Medications:       Prior to Admission medications    Medication Sig Start Date End Date Taking?  Authorizing Provider   calcium gluconate 500 mg tablet Take 500 mg by mouth every evening   Yes Historical Provider, MD   cholecalciferol (VITAMIN D3) 1,000 units tablet Take 1,000 Units by mouth every evening   Yes Historical Provider, MD   cyanocobalamin (VITAMIN B-12) 1,000 mcg tablet Take 1,000 mcg by mouth every evening     Yes Historical Provider, MD   DIGOXIN PO Take 250 mcg by mouth every morning   Yes Historical Provider, MD   fenofibrate (TRICOR) 48 mg tablet Take 48 mg by mouth daily   Yes Historical Provider, MD   metFORMIN (GLUCOPHAGE) 1000 MG tablet Take 1,000 mg by mouth 2 (two) times a day with meals   Yes Historical Provider, MD   Mirabegron ER (MYRBETRIQ) 50 MG TB24 Take 50 mg by mouth daily     Yes Historical Provider, MD   spironolactone (ALDACTONE) 50 mg tablet Take 50 mg by mouth daily   Yes Historical Provider, MD   WARFARIN SODIUM PO Take 4 5 mg by mouth daily   Yes Historical Provider, MD       Family History:     History reviewed  No pertinent family history  Social History:       Social History     Social History    Marital status: /Civil Union     Spouse name: N/A    Number of children: N/A    Years of education: N/A     Social History Main Topics    Smoking status: Never Smoker    Smokeless tobacco: Never Used    Alcohol use Yes      Comment: very rare    Drug use: No    Sexual activity: Not Asked     Other Topics Concern    None     Social History Narrative    None       ROS:  Symptoms per HPI  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 with prior arrangements for upcoming resection  Minor tremor    Exam:  General:  Alert, normally conversant, comfortable-appearing elderly female  Head:  Irregularly shaped ulcerative lesion of approximately 1 5 cm on the right skull superior to the ear with some dried blood but no acute drainage  Eyes:  EOMI  Pupils - equal, round, reactive to accomodation  No icterus  Normal Conjunctiva     Oropharynx:  Moist without lesion  Neck:  No gross bruit nor JVD  Heart: Irregular with controlled rate  No soft basilar MICHELLE  Lungs:  Clear with good effort and air exchange  No rales/rhonchi/wheeze  Abdomen:   Soft and seemingly nontender with normoactive bowel sounds  No mass or organomegaly  Lower Limbs:  No pitting edema  Left lower extremity with clean/dry/intact bandage overlying wound with stitches  Pulses[de-identified]  RLE - DP:  1-2 +                 LLE - DP:  1-2 +         Musculoskeletal:  Grossly symmetrical   Independent movement of limbs observed  Formal testing of strength and range of motion not pursued  Neurologic:    Oriented to:  Person, place, situation    Cranial Nerves: grossly intact - vision, smell, taste, and hearing  were not tested  Sensation: Was not tested    DATA:            Weights: Wt Readings from Last 3 Encounters:   06/01/18 85 7 kg (188 lb 15 oz)   05/25/18 85 7 kg (189 lb)   02/14/18 85 7 kg (189 lb)   , Body mass index is 31 44 kg/m²           Lab Studies:      Results from last 7 days  Lab Units 06/01/18  0554 06/01/18  0304 06/01/18  0030   TROPONIN I ng/mL 0 57* 0 80* 0 74*            Results from last 7 days  Lab Units 06/01/18  0553 05/31/18  2038   WBC Thousand/uL 7 38 10 43*   HEMOGLOBIN g/dL 12 5 14 2   HEMATOCRIT % 39 2 43 2   PLATELETS Thousands/uL 144* 140*   ,   Results from last 7 days  Lab Units 06/01/18  0553 05/31/18  2038   SODIUM mmol/L 139 141   POTASSIUM mmol/L 3 9 4 6   CHLORIDE mmol/L 104 102   CO2 mmol/L 26 30   BUN mg/dL 16 19   CREATININE mg/dL 0 78 0 86   CALCIUM mg/dL 9 2 9 3   TOTAL PROTEIN g/dL  --  7 4   BILIRUBIN TOTAL mg/dL  --  0 85   ALK PHOS U/L  --  114   ALT U/L  --  48   AST U/L  --  62*   GLUCOSE RANDOM mg/dL 90 175*

## 2018-06-01 NOTE — CONSULTS
Consultation - General Surgery   Dru Harp 80 y o  female MRN: [id number]  Unit/Bed#: E4 -01 Encounter: [id number]    Assessment/Plan     Assessment:  Ms Trang Burger is a pleasant 71-year-old female presenting with signs and symptoms of acute gallstone pancreatitis  She is clinically stable at the present time  I would advise allowing additional hospital days for the resolution of the pancreatitis prior to moving forward with laparoscopic cholecystectomy with intraoperative cholangiogram during this hospitalization for the definitive treatment of her symptomatic gallstone disease  Plan:  The case was discussed with Dr Catherine Lagunas   We will allow the pancreatitis to resolve and allowed time to clarify any additional issues with regards to her troponin elevations  Once these matters of been satisfactorily addressed will move forward with definitive treatment of her gallstone disease during this hospitalization  History of Present Illness     HPI:  Dru Harp is a 80 y o  female who presents with signs and symptoms of acute gallstone pancreatitis  Inpatient consult to Acute Care Surgery  Consult performed by: Neo Read ordered by: Abdiel Gracia          Review of Systems   Constitutional: Positive for activity change and appetite change  HENT: Negative  Eyes: Negative  Respiratory: Negative  Cardiovascular: Negative  Gastrointestinal: Positive for abdominal pain  Negative for nausea and vomiting  Endocrine: Negative  Genitourinary: Negative  Musculoskeletal: Negative  Skin: Negative  Allergic/Immunologic: Negative  Neurological: Negative  Hematological: Negative  Psychiatric/Behavioral: Negative          Historical Information   Past Medical History:   Diagnosis Date    A-fib [facility])     Arthritis     Assistance needed for mobility     pt can stand with assistance and transfer    Chronic pain disorder     Diabetes mellitus ([facility] )     NIDDM  Full dentures     History of transfusion     no adverse reaction    Hyperlipidemia     Irregular heart beat     Numbness and tingling of both feet     Skin abnormality     sacrum area - small red area, less than a dime size    Spinal stenosis     Stroke ([address] )     Unable to ambulate     Urinary incontinence     ipg stimulator x3 repakcements,battery exchange today 2/14/2018    Wears glasses     Wheelchair dependence      Past Surgical History:   Procedure Laterality Date    BACK SURGERY      fusion    BLADDER SUSPENSION      CARPAL TUNNEL RELEASE Bilateral     COLONOSCOPY      DILATION AND CURETTAGE OF UTERUS      FRACTURE SURGERY Left     femur with hardware    HYSTERECTOMY      INSERTION / Rubina Nanci / REVISION NEUROSTIMULATOR      JOINT REPLACEMENT Bilateral     knees    SACRAL NERVE STIMULATOR PLACEMENT N/A 2/14/2018    Procedure: REMOVE AND REPLACE IPG;  Surgeon: Gaviota Seay MD;  Location: [facility];  Service: UroGynecology           TONSILLECTOMY       Social History   History   Alcohol Use    Yes     Comment: very rare     History   Drug Use No     History   Smoking Status    Never Smoker   Smokeless Tobacco    Never Used     Family History: non-contributory    Meds/Allergies   all current active meds have been reviewed  Allergies   Allergen Reactions    Bactrim [Sulfamethoxazole-Trimethoprim] Swelling     Swelling around eyes and red eyes    Aspirin Other (See Comments)     "feels like fist in my chest"       Objective   First Vitals:   Blood Pressure: 167/60 (05/31/18 1956)  Pulse: 62 (05/31/18 1954)  Temperature: 97 9 °F (36 6 °C) (05/31/18 1954)  Temp Source: Oral (05/31/18 1954)  Respirations: 18 (05/31/18 1954)  Height: 5' 5" (165 1 cm) (06/01/18 0035)  Weight - Scale: 86 6 kg (190 lb 14 7 oz) (05/31/18 1954)  SpO2: 96 % (05/31/18 1954)    Current Vitals:   Blood Pressure: 134/85 (06/01/18 1100)  Pulse: 55 (06/01/18 1100)  Temperature: 97 9 °F (36 6 °C) (06/01/18 1100)  Temp Source: Tympanic (06/01/18 1100)  Respirations: 18 (06/01/18 1100)  Height: 5' 5" (165 1 cm) (06/01/18 0035)  Weight - Scale: 85 7 kg (188 lb 15 oz) (06/01/18 0035)  SpO2: 98 % (06/01/18 1100)      Intake/Output Summary (Last 24 hours) at 06/01/18 1312  Last data filed at 06/01/18 0630   Gross per 24 hour   Intake           496 25 ml   Output                0 ml   Net           496 25 ml       Invasive Devices     Peripheral Intravenous Line            Peripheral IV 05/31/18 Right Antecubital less than 1 day    Peripheral IV 05/31/18 Right Wrist less than 1 day                Physical Exam   Constitutional: She is oriented to person, place, and time  She appears well-developed and well-nourished  HENT:   Head: Normocephalic and atraumatic  Eyes: Conjunctivae are normal  Pupils are equal, round, and reactive to light  Neck: Normal range of motion  Neck supple  Cardiovascular: Normal rate and regular rhythm  Pulmonary/Chest: Effort normal and breath sounds normal    Abdominal:   The abdomen is soft and flat with mild epigastric tenderness to percussion and palpation  Musculoskeletal: Normal range of motion  Neurological: She is alert and oriented to person, place, and time  Skin: Skin is warm and dry  Psychiatric: Her behavior is normal  Judgment normal        Lab Results: I have personally reviewed pertinent lab results  Imaging: I have personally reviewed pertinent reports  EKG, Pathology, and Other Studies: I have personally reviewed pertinent reports  Counseling / Coordination of Care  Total floor / unit time spent today 30 minutes  Greater than 50% of total time was spent with the patient and / or family counseling and / or coordination of care

## 2018-06-02 LAB
ALBUMIN SERPL BCP-MCNC: 2.8 G/DL (ref 3.5–5)
ALP SERPL-CCNC: 99 U/L (ref 46–116)
ALT SERPL W P-5'-P-CCNC: 89 U/L (ref 12–78)
ANION GAP SERPL CALCULATED.3IONS-SCNC: 8 MMOL/L (ref 4–13)
AST SERPL W P-5'-P-CCNC: 73 U/L (ref 5–45)
ATRIAL RATE: 55 BPM
BASOPHILS # BLD AUTO: 0.03 THOUSANDS/ΜL (ref 0–0.1)
BASOPHILS NFR BLD AUTO: 1 % (ref 0–1)
BILIRUB SERPL-MCNC: 0.56 MG/DL (ref 0.2–1)
BUN SERPL-MCNC: 10 MG/DL (ref 5–25)
CALCIUM SERPL-MCNC: 8.6 MG/DL (ref 8.3–10.1)
CHLORIDE SERPL-SCNC: 106 MMOL/L (ref 100–108)
CHOLEST SERPL-MCNC: 97 MG/DL (ref 50–200)
CO2 SERPL-SCNC: 27 MMOL/L (ref 21–32)
CREAT SERPL-MCNC: 0.9 MG/DL (ref 0.6–1.3)
EOSINOPHIL # BLD AUTO: 0.18 THOUSAND/ΜL (ref 0–0.61)
EOSINOPHIL NFR BLD AUTO: 3 % (ref 0–6)
ERYTHROCYTE [DISTWIDTH] IN BLOOD BY AUTOMATED COUNT: 13.2 % (ref 11.6–15.1)
GFR SERPL CREATININE-BSD FRML MDRD: 60 ML/MIN/1.73SQ M
GLUCOSE SERPL-MCNC: 102 MG/DL (ref 65–140)
GLUCOSE SERPL-MCNC: 107 MG/DL (ref 65–140)
GLUCOSE SERPL-MCNC: 162 MG/DL (ref 65–140)
GLUCOSE SERPL-MCNC: 207 MG/DL (ref 65–140)
GLUCOSE SERPL-MCNC: 90 MG/DL (ref 65–140)
HCT VFR BLD AUTO: 38.2 % (ref 34.8–46.1)
HDLC SERPL-MCNC: 40 MG/DL (ref 40–60)
HGB BLD-MCNC: 12.4 G/DL (ref 11.5–15.4)
INR PPP: 2.39 (ref 0.86–1.17)
LDLC SERPL CALC-MCNC: 39 MG/DL (ref 0–100)
LIPASE SERPL-CCNC: 98 U/L (ref 73–393)
LYMPHOCYTES # BLD AUTO: 1.37 THOUSANDS/ΜL (ref 0.6–4.47)
LYMPHOCYTES NFR BLD AUTO: 21 % (ref 14–44)
MCH RBC QN AUTO: 30.3 PG (ref 26.8–34.3)
MCHC RBC AUTO-ENTMCNC: 32.5 G/DL (ref 31.4–37.4)
MCV RBC AUTO: 93 FL (ref 82–98)
MONOCYTES # BLD AUTO: 0.56 THOUSAND/ΜL (ref 0.17–1.22)
MONOCYTES NFR BLD AUTO: 8 % (ref 4–12)
NEUTROPHILS # BLD AUTO: 4.52 THOUSANDS/ΜL (ref 1.85–7.62)
NEUTS SEG NFR BLD AUTO: 68 % (ref 43–75)
NRBC BLD AUTO-RTO: 0 /100 WBCS
P AXIS: 64 DEGREES
PLATELET # BLD AUTO: 125 THOUSANDS/UL (ref 149–390)
PMV BLD AUTO: 10.1 FL (ref 8.9–12.7)
POTASSIUM SERPL-SCNC: 3.8 MMOL/L (ref 3.5–5.3)
PR INTERVAL: 340 MS
PROT SERPL-MCNC: 6.1 G/DL (ref 6.4–8.2)
PROTHROMBIN TIME: 26.1 SECONDS (ref 11.8–14.2)
QRS AXIS: 253 DEGREES
QRSD INTERVAL: 136 MS
QT INTERVAL: 446 MS
QTC INTERVAL: 426 MS
RBC # BLD AUTO: 4.09 MILLION/UL (ref 3.81–5.12)
SODIUM SERPL-SCNC: 141 MMOL/L (ref 136–145)
T WAVE AXIS: 189 DEGREES
TRIGL SERPL-MCNC: 88 MG/DL
VENTRICULAR RATE: 55 BPM
WBC # BLD AUTO: 6.66 THOUSAND/UL (ref 4.31–10.16)

## 2018-06-02 PROCEDURE — 85025 COMPLETE CBC W/AUTO DIFF WBC: CPT | Performed by: HOSPITALIST

## 2018-06-02 PROCEDURE — 80053 COMPREHEN METABOLIC PANEL: CPT | Performed by: HOSPITALIST

## 2018-06-02 PROCEDURE — 99232 SBSQ HOSP IP/OBS MODERATE 35: CPT | Performed by: HOSPITALIST

## 2018-06-02 PROCEDURE — 80061 LIPID PANEL: CPT | Performed by: INTERNAL MEDICINE

## 2018-06-02 PROCEDURE — 82948 REAGENT STRIP/BLOOD GLUCOSE: CPT

## 2018-06-02 PROCEDURE — 85610 PROTHROMBIN TIME: CPT | Performed by: HOSPITALIST

## 2018-06-02 PROCEDURE — 83690 ASSAY OF LIPASE: CPT | Performed by: SURGERY

## 2018-06-02 PROCEDURE — 93010 ELECTROCARDIOGRAM REPORT: CPT | Performed by: INTERNAL MEDICINE

## 2018-06-02 RX ORDER — PHYTONADIONE 5 MG/1
5 TABLET ORAL ONCE
Status: COMPLETED | OUTPATIENT
Start: 2018-06-02 | End: 2018-06-02

## 2018-06-02 RX ADMIN — SODIUM CHLORIDE 3 G: 9 INJECTION, SOLUTION INTRAVENOUS at 20:56

## 2018-06-02 RX ADMIN — SODIUM CHLORIDE 75 ML/HR: 0.9 INJECTION, SOLUTION INTRAVENOUS at 18:39

## 2018-06-02 RX ADMIN — OXYBUTYNIN CHLORIDE 10 MG: 5 TABLET, EXTENDED RELEASE ORAL at 09:49

## 2018-06-02 RX ADMIN — SODIUM CHLORIDE 75 ML/HR: 0.9 INJECTION, SOLUTION INTRAVENOUS at 05:08

## 2018-06-02 RX ADMIN — FENOFIBRATE 48 MG: 48 TABLET ORAL at 09:48

## 2018-06-02 RX ADMIN — PHYTONADIONE 5 MG: 5 TABLET ORAL at 09:47

## 2018-06-02 RX ADMIN — DIGOXIN 125 MCG: 125 TABLET ORAL at 09:48

## 2018-06-02 RX ADMIN — SPIRONOLACTONE 50 MG: 50 TABLET ORAL at 09:48

## 2018-06-02 RX ADMIN — SODIUM CHLORIDE 3 G: 9 INJECTION, SOLUTION INTRAVENOUS at 05:05

## 2018-06-02 RX ADMIN — SODIUM CHLORIDE 3 G: 9 INJECTION, SOLUTION INTRAVENOUS at 12:06

## 2018-06-02 RX ADMIN — INSULIN LISPRO 2 UNITS: 100 INJECTION, SOLUTION INTRAVENOUS; SUBCUTANEOUS at 12:07

## 2018-06-02 NOTE — PROGRESS NOTES
Progress Note - Chrissie Bernal 80 y o  female MRN: [id number]    Unit/Bed#: E4 -01 Encounter: [id number]    Principal Problem:    Gallstone pancreatitis  Acute cholecystitis  Active Problems:    Wheelchair dependence    Diabetes mellitus ([facility] )    A-fib ([facility] )    Cholecystitis    Elevated troponin      Assessment/Plan:    Acute cholecystitis-patient presented with right upper quadrant pain nausea and vomiting  Much improved  Has had 3 prior episodes in the past   No fever or evidence of sepsis however CT scan reveals evidence of severely inflamed gallbladder with cholelithiasis  No evidence of choledocholithiasis   LFT is normal   Patient on IV Zosyn  Her INR still elevated at 2 3 -Will need -Will need her INR to be around 1 5 prior to any surgical intervention  Likely will have surgery on Monday      acute gallstone pancreatitis--lipase 1400 on admission which has now normalized to 98 -will advance diet today if okay with surgery      Wheelchair dependence- from prior orthopedic issues-bilateral knee surgery and subsequent femur fracture requiring a grey insertion      DM type 2- hold oral agents  Continue sliding scale  Blood sugars are currently stable      [id number] coumadin  INR 2 39  Hold coumadin pending surgical evaluation  Will give the patient vitamin K  anticipate that surgery will likely be on Monday  Continue digoxin     Elevated troponin- denies chest pain  Likely secondary to type 2 non ST wave elevation MI secondary to  cholecystitis  This is now trending down to 0 58 from 0 80  Cardiology input appreciated  No suggestion of any ACS  Salo Murray Echocardiogram   reveals an EF of 70% with mild concentric hypertrophy with moderate to markedly dilated left atrium, mild mitral regurg, mild the tricuspid valve regurg     Essential hypertension-blood pressure currently stable  Continue on IV hydralazine for now      Mild thrombocytopenia likely secondary to 1    Will continue to monitor  Subjective:  Patient much improved  Denies abdominal pain today  Lipase has normalized to 98  INR around 2 3  Can advance diet to low fat if okay with surgery  Anticipate surgery on Monday once INR has decreased to around 1 5  Will give 1 more dose of vitamin K today  Physical Exam:   Vitals: Blood pressure 149/88, pulse 63, temperature 98 3 °F (36 8 °C), temperature source Tympanic, resp  rate 18, height 5' 5" (1 651 m), weight 85 7 kg (188 lb 15 oz), SpO2 95 %, not currently breastfeeding  ,Body mass index is 31 44 kg/m²  Gen:  Pleasant, non-tachypnic, non-dyspnic  Conversant  Heart: regular rate and rhythm, S1S2 present, no murmur, rub or gallop  Lungs: clear to ausculatation bilaterally  No wheezing, crackless, or rhonchi  No accessory muscle use or respiratory distress  Abd: soft, non-tender, non-distended  NABS, no guarding, rebound or peritoneal signs  Extremities: no clubbing, cyanosis or edema  2+pedal pulses bilaterally  Full range of motion  Neuro: awake, alert and oriented  Cranial nerves 2-12 intact  Strength and sensation grossly intact  Skin: warm and dry: no petechiae, purpura and rash      LABS:     Results from last 7 days  Lab Units 06/02/18  0721 06/01/18  0553 05/31/18 2038   WBC Thousand/uL 6 66 7 38 10 43*   HEMOGLOBIN g/dL 12 4 12 5 14 2   HEMATOCRIT % 38 2 39 2 43 2   PLATELETS Thousands/uL 125* 144* 140*       Results from last 7 days  Lab Units 06/02/18  0545 06/01/18  0553 05/31/18 2038   SODIUM mmol/L 141 139 141   POTASSIUM mmol/L 3 8 3 9 4 6   CHLORIDE mmol/L 106 104 102   CO2 mmol/L 27 26 30   BUN mg/dL 10 16 19   CREATININE mg/dL 0 90 0 78 0 86   GLUCOSE RANDOM mg/dL 90 90 175*   CALCIUM mg/dL 8 6 9 2 9 3    INR 2 39  Lipase 98    Intake/Output Summary (Last 24 hours) at 06/02/18 1003  Last data filed at 06/02/18 0700   Gross per 24 hour   Intake                0 ml   Output              861 ml   Net             -861 ml           Current Facility-Administered Medications:  ampicillin-sulbactam 3 g Intravenous Q6H Manny Lima PA-C Last Rate: 3 g (06/02/18 0505)   digoxin 125 mcg Oral QAWALE Abad MD    fenofibrate 48 mg Oral Daily Manny Lima PA-C    hydrALAZINE 5 mg Intravenous Q6H PRN Bernie Tobias MD    insulin lispro 1-5 Units Subcutaneous Q6H [address] Manny Lima PA-C    morphine injection 2 mg Intravenous Q4H PRN Manny Lima PA-C    ondansetron 4 mg Intravenous Q6H PRN Manny Lima PA-C    oxybutynin 10 mg Oral Daily Manny Lima PA-C    sodium chloride 75 mL/hr Intravenous Continuous Manny Lima PA-C Last Rate: 75 mL/hr (06/02/18 0508)   spironolactone 50 mg Oral Daily Manny Lima PA-C        Family update-updated  in detail yesterday

## 2018-06-02 NOTE — PROGRESS NOTES
Subjective: The patient reports feeling better  She denies complaints of pain at rest   She has tolerated her diet and is free from nausea and vomiting  Objective:  Vital signs: The patient has been afebrile with stable vital signs over the past 48 hr  In general the patient appears comfortable  She is awake alert and oriented x3  She has a very pleasant disposition and is a reliable historian  Abdomen:  Soft, rotund mildly tender to percussion palpation limited to the epigastrium  No right upper quadrant tenderness no peritoneal signs no Melendez sign  Hospital day 2 with resolving acute gallstone pancreatitis  The patient is clinically stable from a surgical perspective  The case was discussed with Dr Ashely Barnes of Internal Medicine    We will give 1 additional day for normalization of the INR and aim for Monday June 4th to proceed with definitive treatment of her symptomatic gallstone disease by laparoscopic cholecystectomy with intraoperative cholangiogram

## 2018-06-02 NOTE — PROGRESS NOTES
RN spoke with PT regarding taking pt via sit to stand to the bathroom  PT would not recommend pt to use the sit to stand to bathroom  It does not have a harness nor will it be safe to ambulate that distance  RN explained to pt and we can use sit to stand for getting oob to chair or commode but not safe for bathroom use

## 2018-06-03 LAB
GLUCOSE SERPL-MCNC: 102 MG/DL (ref 65–140)
GLUCOSE SERPL-MCNC: 125 MG/DL (ref 65–140)
GLUCOSE SERPL-MCNC: 126 MG/DL (ref 65–140)
GLUCOSE SERPL-MCNC: 98 MG/DL (ref 65–140)
INR PPP: 1.44 (ref 0.86–1.17)
PROTHROMBIN TIME: 17.6 SECONDS (ref 11.8–14.2)

## 2018-06-03 PROCEDURE — 85610 PROTHROMBIN TIME: CPT | Performed by: HOSPITALIST

## 2018-06-03 PROCEDURE — 82948 REAGENT STRIP/BLOOD GLUCOSE: CPT

## 2018-06-03 PROCEDURE — 99232 SBSQ HOSP IP/OBS MODERATE 35: CPT | Performed by: HOSPITALIST

## 2018-06-03 RX ADMIN — SODIUM CHLORIDE 3 G: 9 INJECTION, SOLUTION INTRAVENOUS at 04:26

## 2018-06-03 RX ADMIN — FENOFIBRATE 48 MG: 48 TABLET ORAL at 08:55

## 2018-06-03 RX ADMIN — SPIRONOLACTONE 50 MG: 50 TABLET ORAL at 08:55

## 2018-06-03 RX ADMIN — SODIUM CHLORIDE 3 G: 9 INJECTION, SOLUTION INTRAVENOUS at 20:57

## 2018-06-03 RX ADMIN — SODIUM CHLORIDE 75 ML/HR: 0.9 INJECTION, SOLUTION INTRAVENOUS at 19:16

## 2018-06-03 RX ADMIN — OXYBUTYNIN CHLORIDE 10 MG: 5 TABLET, EXTENDED RELEASE ORAL at 08:55

## 2018-06-03 RX ADMIN — INSULIN LISPRO 1 UNITS: 100 INJECTION, SOLUTION INTRAVENOUS; SUBCUTANEOUS at 00:30

## 2018-06-03 RX ADMIN — SODIUM CHLORIDE 3 G: 9 INJECTION, SOLUTION INTRAVENOUS at 16:00

## 2018-06-03 RX ADMIN — SODIUM CHLORIDE 3 G: 9 INJECTION, SOLUTION INTRAVENOUS at 08:55

## 2018-06-03 NOTE — PROGRESS NOTES
Progress Note - Allison Van 80 y o  female MRN: [id number]    Unit/Bed#: E4 -01 Encounter: [id number]    Principal Problem:    Gallstone pancreatitis  Acute cholecystitis  Active Problems:    Wheelchair dependence    Diabetes mellitus ([address] )    A-fib ([facility] [address] )    Cholecystitis    Elevated troponin      Assessment/Plan:  Acute cholecystitis-patient presented with right upper quadrant pain nausea and vomiting  resolved   Has had 3 prior episodes in the past   No fever or evidence of sepsis however CT scan reveals evidence of severely inflamed gallbladder with cholelithiasis   No evidence of choledocholithiasis    LFT is normal   Patient on IV Zosyn#3   Her INR was elevated at 2 3 but this has now trended down rapidly to 1 4 today  Patient is stable for surgery from a medical point of view  Patient kept NPO from this morning        acute gallstone pancreatitis--lipase 1400 on admission which has now normalized to 98 -patient was tolerating a p o  diet  Kept NPO today it case surgery could be performed today since INR has now trended down to below 1 5       DM type 2- hold oral agents   Continue sliding scale   Blood sugars are currently stable      wogn-[id number]-cw coumadin   INR 2 39  Hold coumadin pending surgical evaluation   Will give the patient vitamin K  anticipate that surgery will likely be on Monday  Continue digoxin     Elevated troponin- denies chest pain  Likely secondary to type 2 non ST wave elevation MI secondary to  cholecystitis   This is now trending down to 0 58 from 0 80  Cardiology input appreciated  No suggestion of any ACS  Rahul Yousif Echocardiogram   reveals an EF of 70% with mild concentric hypertrophy with moderate to markedly dilated left atrium, mild mitral regurg, mild the tricuspid valve regurg  No contraindication to surgery      Essential hypertension-blood pressure currently stable   Continue on IV hydralazine for now      Mild thrombocytopenia likely secondary to 1    Will continue to monitor    Wheelchair dependence- from prior orthopedic issues-bilateral knee surgery and subsequent femur fracture requiring a grey insertion  Discussed with surgeon Dr Ty Pizano cleared for surgery any time today or tomorrow      Subjective:  Patient remains stable  INR has trended down rapidly to 1 4  Patient denies any abdominal pain and gallstone pancreatitis has resolved  Patient is stable for surgery-will keep her NPO in case surgery could be done today  Otherwise she will have surgery on Monday  Discussed with Dr Madeline Rivera    Physical Exam:   Vitals: Blood pressure 148/78, pulse (!) 53, temperature (!) 96 7 °F (35 9 °C), temperature source Temporal, resp  rate 18, height 5' 5" (1 651 m), weight 85 7 kg (188 lb 15 oz), SpO2 95 %, not currently breastfeeding  ,Body mass index is 31 44 kg/m²  Gen:  Pleasant, non-tachypnic, non-dyspnic  Conversant  Heart: regular rate and rhythm, S1S2 present, no murmur, rub or gallop  Lungs: clear to ausculatation bilaterally  No wheezing, crackless, or rhonchi  No accessory muscle use or respiratory distress  Abd: soft, non-tender, non-distended  NABS, no guarding, rebound or peritoneal signs  Extremities: no clubbing, cyanosis or edema  2+pedal pulses bilaterally  Full range of motion  Neuro: awake, alert and oriented  Cranial nerves 2-12 intact  Strength and sensation grossly intact  Skin: warm and dry: no petechiae, purpura and rash      LABS:     Results from last 7 days  Lab Units 06/02/18  0721 06/01/18  0553 05/31/18 2038   WBC Thousand/uL 6 66 7 38 10 43*   HEMOGLOBIN g/dL 12 4 12 5 14 2   HEMATOCRIT % 38 2 39 2 43 2   PLATELETS Thousands/uL 125* 144* 140*       Results from last 7 days  Lab Units 06/02/18  0545 06/01/18  0553 05/31/18 2038   SODIUM mmol/L 141 139 141   POTASSIUM mmol/L 3 8 3 9 4 6   CHLORIDE mmol/L 106 104 102   CO2 mmol/L 27 26 30   BUN mg/dL 10 16 19   CREATININE mg/dL 0 90 0 78 0 86   GLUCOSE RANDOM mg/dL 90 90 175*   CALCIUM mg/dL 8 6 9 2 9 3       Intake/Output Summary (Last 24 hours) at 06/03/18 1042  Last data filed at 06/03/18 0834   Gross per 24 hour   Intake             1052 ml   Output              434 ml   Net              618 ml           Current Facility-Administered Medications:  ampicillin-sulbactam 3 g Intravenous Q6H Iain Ta PA-C Last Rate: 3 g (06/03/18 0855)   digoxin 125 mcg Oral QAM Clement Benavides MD    fenofibrate 48 mg Oral Daily Iain Ta PA-C    hydrALAZINE 5 mg Intravenous Q6H PRN Andie Roberts MD    insulin lispro 1-5 Units Subcutaneous Q6H [address] Iain Ta PA-C    morphine injection 2 mg Intravenous Q4H PRN Iain Ta PA-C    ondansetron 4 mg Intravenous Q6H PRN aIin Ta PA-C    oxybutynin 10 mg Oral Daily Iain Ta PA-C    sodium chloride 75 mL/hr Intravenous Continuous Iain Ta PA-C Last Rate: 75 mL/hr (06/02/18 1839)   spironolactone 50 mg Oral Daily Iain Ta PA-C        Family update- updated

## 2018-06-04 ENCOUNTER — ANESTHESIA (INPATIENT)
Dept: PERIOP | Facility: HOSPITAL | Age: 83
DRG: 417 | End: 2018-06-04
Payer: COMMERCIAL

## 2018-06-04 ENCOUNTER — ANESTHESIA EVENT (INPATIENT)
Dept: PERIOP | Facility: HOSPITAL | Age: 83
DRG: 417 | End: 2018-06-04
Payer: COMMERCIAL

## 2018-06-04 ENCOUNTER — APPOINTMENT (INPATIENT)
Dept: RADIOLOGY | Facility: HOSPITAL | Age: 83
DRG: 417 | End: 2018-06-04
Payer: COMMERCIAL

## 2018-06-04 PROBLEM — K81.0 ACUTE CHOLECYSTITIS: Status: ACTIVE | Noted: 2018-05-31

## 2018-06-04 LAB
BASOPHILS # BLD AUTO: 0.04 THOUSANDS/ΜL (ref 0–0.1)
BASOPHILS NFR BLD AUTO: 1 % (ref 0–1)
EOSINOPHIL # BLD AUTO: 0.2 THOUSAND/ΜL (ref 0–0.61)
EOSINOPHIL NFR BLD AUTO: 3 % (ref 0–6)
ERYTHROCYTE [DISTWIDTH] IN BLOOD BY AUTOMATED COUNT: 13.2 % (ref 11.6–15.1)
GLUCOSE SERPL-MCNC: 127 MG/DL (ref 65–140)
GLUCOSE SERPL-MCNC: 197 MG/DL (ref 65–140)
GLUCOSE SERPL-MCNC: 200 MG/DL (ref 65–140)
GLUCOSE SERPL-MCNC: 83 MG/DL (ref 65–140)
GLUCOSE SERPL-MCNC: 97 MG/DL (ref 65–140)
HCT VFR BLD AUTO: 36.8 % (ref 34.8–46.1)
HGB BLD-MCNC: 11.8 G/DL (ref 11.5–15.4)
INR PPP: 1.2 (ref 0.86–1.17)
LYMPHOCYTES # BLD AUTO: 1.63 THOUSANDS/ΜL (ref 0.6–4.47)
LYMPHOCYTES NFR BLD AUTO: 27 % (ref 14–44)
MCH RBC QN AUTO: 29.6 PG (ref 26.8–34.3)
MCHC RBC AUTO-ENTMCNC: 32.1 G/DL (ref 31.4–37.4)
MCV RBC AUTO: 92 FL (ref 82–98)
MONOCYTES # BLD AUTO: 0.5 THOUSAND/ΜL (ref 0.17–1.22)
MONOCYTES NFR BLD AUTO: 8 % (ref 4–12)
NEUTROPHILS # BLD AUTO: 3.69 THOUSANDS/ΜL (ref 1.85–7.62)
NEUTS SEG NFR BLD AUTO: 61 % (ref 43–75)
NRBC BLD AUTO-RTO: 0 /100 WBCS
PLATELET # BLD AUTO: 128 THOUSANDS/UL (ref 149–390)
PMV BLD AUTO: 10.1 FL (ref 8.9–12.7)
PROTHROMBIN TIME: 15.3 SECONDS (ref 11.8–14.2)
RBC # BLD AUTO: 3.99 MILLION/UL (ref 3.81–5.12)
WBC # BLD AUTO: 6.06 THOUSAND/UL (ref 4.31–10.16)

## 2018-06-04 PROCEDURE — 82948 REAGENT STRIP/BLOOD GLUCOSE: CPT

## 2018-06-04 PROCEDURE — 99232 SBSQ HOSP IP/OBS MODERATE 35: CPT | Performed by: INTERNAL MEDICINE

## 2018-06-04 PROCEDURE — 85610 PROTHROMBIN TIME: CPT | Performed by: HOSPITALIST

## 2018-06-04 PROCEDURE — 74300 X-RAY BILE DUCTS/PANCREAS: CPT

## 2018-06-04 PROCEDURE — 88304 TISSUE EXAM BY PATHOLOGIST: CPT | Performed by: PATHOLOGY

## 2018-06-04 PROCEDURE — 0FT44ZZ RESECTION OF GALLBLADDER, PERCUTANEOUS ENDOSCOPIC APPROACH: ICD-10-PCS | Performed by: SURGERY

## 2018-06-04 PROCEDURE — 47563 LAPARO CHOLECYSTECTOMY/GRAPH: CPT | Performed by: SURGERY

## 2018-06-04 PROCEDURE — BF101ZZ FLUOROSCOPY OF BILE DUCTS USING LOW OSMOLAR CONTRAST: ICD-10-PCS | Performed by: SURGERY

## 2018-06-04 PROCEDURE — 85025 COMPLETE CBC W/AUTO DIFF WBC: CPT | Performed by: HOSPITALIST

## 2018-06-04 PROCEDURE — 47563 LAPARO CHOLECYSTECTOMY/GRAPH: CPT | Performed by: PHYSICIAN ASSISTANT

## 2018-06-04 RX ORDER — FENTANYL CITRATE 50 UG/ML
INJECTION, SOLUTION INTRAMUSCULAR; INTRAVENOUS AS NEEDED
Status: DISCONTINUED | OUTPATIENT
Start: 2018-06-04 | End: 2018-06-04 | Stop reason: SURG

## 2018-06-04 RX ORDER — HYDROCODONE BITARTRATE AND ACETAMINOPHEN 5; 325 MG/1; MG/1
2 TABLET ORAL EVERY 6 HOURS PRN
Status: DISCONTINUED | OUTPATIENT
Start: 2018-06-04 | End: 2018-06-06 | Stop reason: HOSPADM

## 2018-06-04 RX ORDER — HYDROCODONE BITARTRATE AND ACETAMINOPHEN 5; 325 MG/1; MG/1
1 TABLET ORAL EVERY 4 HOURS PRN
Status: DISCONTINUED | OUTPATIENT
Start: 2018-06-04 | End: 2018-06-06 | Stop reason: HOSPADM

## 2018-06-04 RX ORDER — EPHEDRINE SULFATE 50 MG/ML
INJECTION, SOLUTION INTRAVENOUS AS NEEDED
Status: DISCONTINUED | OUTPATIENT
Start: 2018-06-04 | End: 2018-06-04 | Stop reason: SURG

## 2018-06-04 RX ORDER — FENTANYL CITRATE/PF 50 MCG/ML
25 SYRINGE (ML) INJECTION
Status: DISCONTINUED | OUTPATIENT
Start: 2018-06-04 | End: 2018-06-04 | Stop reason: HOSPADM

## 2018-06-04 RX ORDER — MAGNESIUM HYDROXIDE 1200 MG/15ML
LIQUID ORAL AS NEEDED
Status: DISCONTINUED | OUTPATIENT
Start: 2018-06-04 | End: 2018-06-04 | Stop reason: HOSPADM

## 2018-06-04 RX ORDER — ROCURONIUM BROMIDE 10 MG/ML
INJECTION, SOLUTION INTRAVENOUS AS NEEDED
Status: DISCONTINUED | OUTPATIENT
Start: 2018-06-04 | End: 2018-06-04 | Stop reason: SURG

## 2018-06-04 RX ORDER — BUPIVACAINE HYDROCHLORIDE AND EPINEPHRINE 2.5; 5 MG/ML; UG/ML
INJECTION, SOLUTION EPIDURAL; INFILTRATION; INTRACAUDAL; PERINEURAL AS NEEDED
Status: DISCONTINUED | OUTPATIENT
Start: 2018-06-04 | End: 2018-06-04 | Stop reason: HOSPADM

## 2018-06-04 RX ORDER — PROPOFOL 10 MG/ML
INJECTION, EMULSION INTRAVENOUS AS NEEDED
Status: DISCONTINUED | OUTPATIENT
Start: 2018-06-04 | End: 2018-06-04 | Stop reason: SURG

## 2018-06-04 RX ORDER — NYSTATIN 100000 [USP'U]/G
POWDER TOPICAL 2 TIMES DAILY
Status: DISCONTINUED | OUTPATIENT
Start: 2018-06-04 | End: 2018-06-06 | Stop reason: HOSPADM

## 2018-06-04 RX ORDER — ONDANSETRON 2 MG/ML
INJECTION INTRAMUSCULAR; INTRAVENOUS AS NEEDED
Status: DISCONTINUED | OUTPATIENT
Start: 2018-06-04 | End: 2018-06-04 | Stop reason: SURG

## 2018-06-04 RX ORDER — ONDANSETRON 2 MG/ML
4 INJECTION INTRAMUSCULAR; INTRAVENOUS ONCE
Status: DISCONTINUED | OUTPATIENT
Start: 2018-06-04 | End: 2018-06-04 | Stop reason: HOSPADM

## 2018-06-04 RX ORDER — GLYCOPYRROLATE 0.2 MG/ML
INJECTION INTRAMUSCULAR; INTRAVENOUS AS NEEDED
Status: DISCONTINUED | OUTPATIENT
Start: 2018-06-04 | End: 2018-06-04 | Stop reason: SURG

## 2018-06-04 RX ORDER — ESMOLOL HYDROCHLORIDE 10 MG/ML
INJECTION INTRAVENOUS AS NEEDED
Status: DISCONTINUED | OUTPATIENT
Start: 2018-06-04 | End: 2018-06-04 | Stop reason: SURG

## 2018-06-04 RX ADMIN — FENTANYL CITRATE 50 MCG: 50 INJECTION, SOLUTION INTRAMUSCULAR; INTRAVENOUS at 11:18

## 2018-06-04 RX ADMIN — DEXAMETHASONE SODIUM PHOSPHATE 4 MG: 10 INJECTION INTRAMUSCULAR; INTRAVENOUS at 11:03

## 2018-06-04 RX ADMIN — DIGOXIN 125 MCG: 125 TABLET ORAL at 08:13

## 2018-06-04 RX ADMIN — SODIUM CHLORIDE: 0.9 INJECTION, SOLUTION INTRAVENOUS at 11:50

## 2018-06-04 RX ADMIN — GLYCOPYRROLATE 0.2 MG: 0.2 INJECTION, SOLUTION INTRAMUSCULAR; INTRAVENOUS at 11:12

## 2018-06-04 RX ADMIN — ROCURONIUM BROMIDE 40 MG: 10 INJECTION INTRAVENOUS at 10:56

## 2018-06-04 RX ADMIN — OXYBUTYNIN CHLORIDE 10 MG: 5 TABLET, EXTENDED RELEASE ORAL at 08:13

## 2018-06-04 RX ADMIN — FENTANYL CITRATE 50 MCG: 50 INJECTION, SOLUTION INTRAMUSCULAR; INTRAVENOUS at 11:14

## 2018-06-04 RX ADMIN — SODIUM CHLORIDE 75 ML/HR: 0.9 INJECTION, SOLUTION INTRAVENOUS at 02:00

## 2018-06-04 RX ADMIN — FENOFIBRATE 48 MG: 48 TABLET ORAL at 08:13

## 2018-06-04 RX ADMIN — SODIUM CHLORIDE 3 G: 9 INJECTION, SOLUTION INTRAVENOUS at 08:14

## 2018-06-04 RX ADMIN — SUGAMMADEX 170 MG: 100 INJECTION, SOLUTION INTRAVENOUS at 11:44

## 2018-06-04 RX ADMIN — PROPOFOL 120 MG: 10 INJECTION, EMULSION INTRAVENOUS at 10:55

## 2018-06-04 RX ADMIN — SODIUM CHLORIDE 75 ML/HR: 0.9 INJECTION, SOLUTION INTRAVENOUS at 21:59

## 2018-06-04 RX ADMIN — FENTANYL CITRATE 50 MCG: 50 INJECTION, SOLUTION INTRAMUSCULAR; INTRAVENOUS at 10:55

## 2018-06-04 RX ADMIN — SODIUM CHLORIDE 75 ML/HR: 0.9 INJECTION, SOLUTION INTRAVENOUS at 10:13

## 2018-06-04 RX ADMIN — SPIRONOLACTONE 50 MG: 50 TABLET ORAL at 08:13

## 2018-06-04 RX ADMIN — EPHEDRINE SULFATE 5 MG: 50 INJECTION, SOLUTION INTRAMUSCULAR; INTRAVENOUS; SUBCUTANEOUS at 11:04

## 2018-06-04 RX ADMIN — INSULIN LISPRO 1 UNITS: 100 INJECTION, SOLUTION INTRAVENOUS; SUBCUTANEOUS at 17:40

## 2018-06-04 RX ADMIN — NYSTATIN: 100000 POWDER TOPICAL at 19:20

## 2018-06-04 RX ADMIN — ESMOLOL HYDROCHLORIDE 10 MG: 10 INJECTION, SOLUTION INTRAVENOUS at 11:38

## 2018-06-04 RX ADMIN — HYDRALAZINE HYDROCHLORIDE 5 MG: 20 INJECTION INTRAMUSCULAR; INTRAVENOUS at 13:39

## 2018-06-04 RX ADMIN — ONDANSETRON HYDROCHLORIDE 4 MG: 2 INJECTION, SOLUTION INTRAVENOUS at 12:36

## 2018-06-04 RX ADMIN — ONDANSETRON HYDROCHLORIDE 4 MG: 2 INJECTION, SOLUTION INTRAVENOUS at 10:53

## 2018-06-04 RX ADMIN — SODIUM CHLORIDE 3 G: 9 INJECTION, SOLUTION INTRAVENOUS at 02:44

## 2018-06-04 RX ADMIN — ESMOLOL HYDROCHLORIDE 20 MG: 10 INJECTION, SOLUTION INTRAVENOUS at 11:22

## 2018-06-04 RX ADMIN — LIDOCAINE HYDROCHLORIDE 80 MG: 20 INJECTION, SOLUTION INTRAVENOUS at 10:55

## 2018-06-04 NOTE — PROGRESS NOTES
Patient returned from the OR; 5 trochar sites assessed; no bleeding from sites  Patient has ice-pack over mid-abdomen; no complaints of pain  BP taken  Elevated in OR through procedure; hydralazine given on the unit

## 2018-06-04 NOTE — OP NOTE
OPERATIVE REPORT  PATIENT NAME: Randy Palmer    :  1935  MRN: [id number]  Pt Location: [facility] OR ROOM 03    SURGERY DATE: 2018    Surgeon(s) and Role:     * Hiral Ybarra MD - Primary     Siomara Mcmanus PA-C - Assisting    Preop Diagnosis:  Gallstone pancreatitis [K85 10]    Post-Op Diagnosis Codes:     * Gallstone pancreatitis [K85 10]    Procedure(s) (LRB):  CHOLECYSTECTOMY LAPAROSCOPIC (N/A)  CHOLANGIOGRAM (N/A)    Specimen(s):  ID Type Source Tests Collected by Time Destination   1 : GALLBLADDER Tissue Gallbladder TISSUE EXAM Hiral Ybarra MD 2018 1054        Estimated Blood Loss:   Minimal    Drains:       Anesthesia Type:   General    Operative Indications:  Gallstone pancreatitis [K85 10]      Operative Findings:  Gallstone milked backwards from cystic duct  Cholangiogram showed good flow of contrast into the intrahepatic ducts at the bifurcation as well as into the duodenum  There was a small filling defect initially noted but it was either air bubble or passed through with contrast as completion cholangiogram showed no filling defects    Complications:   None    Procedure and Technique:    Procedure Details   The patient was seen again in the Holding Room  The risks, benefits, complications, treatment options, and expected outcomes were discussed with the patient  The possibilities of reaction to medication, pulmonary aspiration, perforation of viscus, bleeding, recurrent infection, finding a normal gallbladder, the need for additional procedures, failure to diagnose a condition, the possible need to convert to an open procedure, and creating a complication requiring transfusion or operation were discussed with the patient  The patient and/or family concurred with the proposed plan, giving informed consent  The site of surgery properly noted/marked   The patient was taken to Operating Room, identified as Randy Palmer and the procedure verified as Laparoscopic Cholecystectomy with Possible Intraoperative Cholangiogram  A Time Out was held and the above information confirmed  Prior to the induction of general anesthesia, antibiotic prophylaxis was administered  General endotracheal anesthesia was then administered and tolerated well  After the induction, the abdomen was prepped in the usual sterile fashion  The patient was positioned in the supine position  With the patient in a head-up position, an 11 mm trocar was placed in the umbilical area and three 5 mm trocars placed in the right abdomen, under direct vision  There was no evidence of intra-abdominal injury or bleeding  All skin incisions were infiltrated with a local anesthetic agent before making the incision and placing the trocars  The gallbladder was identified, the fundus grasped and retracted cephalad  Adhesions were lysed bluntly and with the electrocautery where indicated, taking care not to injure any adjacent organs or viscus  The infundibulum was grasped and retracted laterally, exposing the peritoneum overlying the triangle of Calot  The peritoneum was removed anteriorly and posteriorly to the gallbladder, with special attention to the backside of the gallbladder dissection  This allowed for freeing up the gallbladder  The critical view of the triangle Calot was carried out, dissecting out the cystic duct and cystic artery as the only two tubular structures leading to the gallbladder  Once these were clearly identified, the back wall of the gallbladder was lifted away from the cystic plate to expose the posterior aspect of this dissection, ensuring that there were no posterior structures leading into the liver  An incision was made in the cystic duct and the cholangiogram catheter introduced  The catheter was secured using an endoclip  The study showed no stones in the common bile duct and free flow of dye into the duodenum, and good visualization of the distal and proximal biliary tree   The catheter was then removed  The cystic duct was then doubly ligated with surgical clips and/or Endoloop suture on the patient side and singly clipped on the gallbladder side and divided  The cystic artery was re-identified and ligated with clips and divided as well  The gallbladder was dissected from the liver bed in retrograde fashion with the electrocautery or harmonic scalpel where appropriate  The gallbladder was removed, via an Endo pouch, through the epigastric port    The liver bed was irrigated and inspected  Hemostasis was achieved  Copious irrigation was utilized and was repeatedly aspirated until clear  Pneumoperitoneum was completely reduced after viewing removal of the trocars under direct vision  The wounds were thoroughly irrigated and if needed, fascia was then closed with a figure of eight suture; the skin was then closed with 4-0 Monocryl sutures and a sterile dressing was applied  Instrument, sponge, and needle counts were correct at closure and at the conclusion of the case  The patient tolerated the procedure well  This text is generated with voice recognition software  There may be translation, syntax,  or grammatical errors  If you have any questions, please contact the dictating provider                I was present for the entire procedure and A qualified resident physician was not available    Patient Disposition:  PACU     SIGNATURE: William Godfrey MD  DATE: June 4, 2018  TIME: 11:45 AM

## 2018-06-04 NOTE — OCCUPATIONAL THERAPY NOTE
Occupational Therapy Cancellation Note:    Patient Name: Itzel Steiner  [id number] Date: 6/4/2018    Attempted to see pt for OT treatment session this AM, however pt cx'd this AM 2* unavailable off floor @ OR for scheduled Laparoscopic Cholecystectomy and Cholangiogram  OT to follow pt on caseload as able to A w/ safe d/c planning/recommendations         Nomi Macias, OTR/L

## 2018-06-04 NOTE — PHYSICAL THERAPY NOTE
Physical Therapy Cancellation Note            Pt off floor in OR for scheduled lap Cholecystectomy and Cholangiogram  Will  attempt at later time for PT intervention and assist in d/c planning and recommendations   Alberto Petersen, PTA

## 2018-06-04 NOTE — ANESTHESIA PREPROCEDURE EVALUATION
Review of Systems/Medical History  Patient summary reviewed  Chart reviewed      Cardiovascular  Exercise tolerance (METS): good,  Hyperlipidemia, Dysrhythmias , atrial fibrillation,   Comment:    LEFT VENTRICLE:  Systolic function was hyperdynamic  Ejection fraction was estimated to be 70 %  There were no regional wall motion abnormalities  There was mild concentric hypertrophy  Features were consistent with a pseudonormal left ventricular filling pattern, with concomitant abnormal relaxation and increased filling pressure (grade 2 diastolic dysfunction)    ,  Pulmonary  Negative pulmonary ROS        GI/Hepatic  Negative GI/hepatic ROS          Negative  ROS   Comment: Urinary incontinence- ipg stimulator in place     Endo/Other  Negative endo/other ROS Diabetes type 2 Oral agent,      GYN  Negative gynecology ROS          Hematology  Negative hematology ROS     Comment: inr currently 1 2 Musculoskeletal  Negative musculoskeletal ROS   Comment: Left leg fx  Unable to walk  Spinal stenosis Arthritis     Neurology  Negative neurology ROS   TIA, CVA , no residual symptoms,   Comment: TIA x 2 plus CVA Psychology   Negative psychology ROS              Physical Exam    Airway    Mallampati score: III  TM Distance: <3 FB  Neck ROM: full     Dental   lower dentures and upper dentures,     Cardiovascular  Rhythm: irregular, Rate: abnormal,     Pulmonary  Breath sounds clear to auscultation,     Other Findings        Anesthesia Plan  ASA Score- 3     Anesthesia Type- general with ASA Monitors  Additional Monitors:   Airway Plan: ETT  Comment: Medically cleared  Plan Factors- Patient instructed to abstain from smoking on day of procedure  Patient did not smoke on day of surgery  Induction- intravenous  Postoperative Plan-     Informed Consent- Anesthetic plan and risks discussed with patient

## 2018-06-04 NOTE — PROGRESS NOTES
Pt  Passes  lg  Amt  Urine frequently-  Very  [address] an attends  To be placed;   Benefits  Of hospital  System  Explained to pt; pt  Irritable-  Attends applied  Per  request

## 2018-06-04 NOTE — ANESTHESIA POSTPROCEDURE EVALUATION
Post-Op Assessment Note      CV Status:  Stable    Mental Status:  Alert and awake    Hydration Status:  Euvolemic    PONV Controlled:  Controlled    Airway Patency:  Patent  Airway: intubated    Post Op Vitals Reviewed: Yes          Staff: Anesthesiologist           /77 (06/04/18 1408)    Temp 97 6 °F (36 4 °C) (06/04/18 1408)    Pulse 60 (06/04/18 1408)   Resp 18 (06/04/18 1408)    SpO2 99 % (06/04/18 1408)

## 2018-06-05 LAB
ANION GAP SERPL CALCULATED.3IONS-SCNC: 10 MMOL/L (ref 4–13)
BASOPHILS # BLD AUTO: 0.02 THOUSANDS/ΜL (ref 0–0.1)
BASOPHILS NFR BLD AUTO: 0 % (ref 0–1)
BUN SERPL-MCNC: 9 MG/DL (ref 5–25)
CALCIUM SERPL-MCNC: 8.7 MG/DL (ref 8.3–10.1)
CHLORIDE SERPL-SCNC: 109 MMOL/L (ref 100–108)
CO2 SERPL-SCNC: 25 MMOL/L (ref 21–32)
CREAT SERPL-MCNC: 0.77 MG/DL (ref 0.6–1.3)
EOSINOPHIL # BLD AUTO: 0.11 THOUSAND/ΜL (ref 0–0.61)
EOSINOPHIL NFR BLD AUTO: 1 % (ref 0–6)
ERYTHROCYTE [DISTWIDTH] IN BLOOD BY AUTOMATED COUNT: 13.3 % (ref 11.6–15.1)
GFR SERPL CREATININE-BSD FRML MDRD: 72 ML/MIN/1.73SQ M
GLUCOSE SERPL-MCNC: 117 MG/DL (ref 65–140)
GLUCOSE SERPL-MCNC: 129 MG/DL (ref 65–140)
GLUCOSE SERPL-MCNC: 135 MG/DL (ref 65–140)
GLUCOSE SERPL-MCNC: 147 MG/DL (ref 65–140)
GLUCOSE SERPL-MCNC: 163 MG/DL (ref 65–140)
GLUCOSE SERPL-MCNC: 226 MG/DL (ref 65–140)
HCT VFR BLD AUTO: 36.4 % (ref 34.8–46.1)
HGB BLD-MCNC: 11.8 G/DL (ref 11.5–15.4)
INR PPP: 1.25 (ref 0.86–1.17)
LYMPHOCYTES # BLD AUTO: 1.66 THOUSANDS/ΜL (ref 0.6–4.47)
LYMPHOCYTES NFR BLD AUTO: 20 % (ref 14–44)
MCH RBC QN AUTO: 30 PG (ref 26.8–34.3)
MCHC RBC AUTO-ENTMCNC: 32.4 G/DL (ref 31.4–37.4)
MCV RBC AUTO: 93 FL (ref 82–98)
MONOCYTES # BLD AUTO: 0.7 THOUSAND/ΜL (ref 0.17–1.22)
MONOCYTES NFR BLD AUTO: 9 % (ref 4–12)
NEUTROPHILS # BLD AUTO: 5.72 THOUSANDS/ΜL (ref 1.85–7.62)
NEUTS SEG NFR BLD AUTO: 70 % (ref 43–75)
NRBC BLD AUTO-RTO: 0 /100 WBCS
PLATELET # BLD AUTO: 136 THOUSANDS/UL (ref 149–390)
PMV BLD AUTO: 10.3 FL (ref 8.9–12.7)
POTASSIUM SERPL-SCNC: 3.5 MMOL/L (ref 3.5–5.3)
PROTHROMBIN TIME: 15.8 SECONDS (ref 11.8–14.2)
RBC # BLD AUTO: 3.93 MILLION/UL (ref 3.81–5.12)
SODIUM SERPL-SCNC: 144 MMOL/L (ref 136–145)
WBC # BLD AUTO: 8.21 THOUSAND/UL (ref 4.31–10.16)

## 2018-06-05 PROCEDURE — 80048 BASIC METABOLIC PNL TOTAL CA: CPT | Performed by: INTERNAL MEDICINE

## 2018-06-05 PROCEDURE — 97110 THERAPEUTIC EXERCISES: CPT

## 2018-06-05 PROCEDURE — 99024 POSTOP FOLLOW-UP VISIT: CPT | Performed by: PHYSICIAN ASSISTANT

## 2018-06-05 PROCEDURE — 97535 SELF CARE MNGMENT TRAINING: CPT

## 2018-06-05 PROCEDURE — 82948 REAGENT STRIP/BLOOD GLUCOSE: CPT

## 2018-06-05 PROCEDURE — 85610 PROTHROMBIN TIME: CPT | Performed by: HOSPITALIST

## 2018-06-05 PROCEDURE — 97530 THERAPEUTIC ACTIVITIES: CPT

## 2018-06-05 PROCEDURE — 99232 SBSQ HOSP IP/OBS MODERATE 35: CPT | Performed by: PHYSICIAN ASSISTANT

## 2018-06-05 PROCEDURE — 85025 COMPLETE CBC W/AUTO DIFF WBC: CPT | Performed by: INTERNAL MEDICINE

## 2018-06-05 RX ORDER — HYDROCODONE BITARTRATE AND ACETAMINOPHEN 5; 325 MG/1; MG/1
1 TABLET ORAL EVERY 4 HOURS PRN
Qty: 20 TABLET | Refills: 0 | Status: SHIPPED | OUTPATIENT
Start: 2018-06-05 | End: 2018-06-06

## 2018-06-05 RX ADMIN — OXYBUTYNIN CHLORIDE 10 MG: 5 TABLET, EXTENDED RELEASE ORAL at 10:16

## 2018-06-05 RX ADMIN — NYSTATIN: 100000 POWDER TOPICAL at 08:23

## 2018-06-05 RX ADMIN — HYDROCODONE BITARTRATE AND ACETAMINOPHEN 1 TABLET: 5; 325 TABLET ORAL at 02:53

## 2018-06-05 RX ADMIN — HYDRALAZINE HYDROCHLORIDE 5 MG: 20 INJECTION INTRAMUSCULAR; INTRAVENOUS at 23:18

## 2018-06-05 RX ADMIN — WARFARIN SODIUM 4.5 MG: 2.5 TABLET ORAL at 17:56

## 2018-06-05 RX ADMIN — INSULIN LISPRO 2 UNITS: 100 INJECTION, SOLUTION INTRAVENOUS; SUBCUTANEOUS at 21:56

## 2018-06-05 RX ADMIN — DIGOXIN 125 MCG: 125 TABLET ORAL at 08:22

## 2018-06-05 RX ADMIN — NYSTATIN: 100000 POWDER TOPICAL at 17:56

## 2018-06-05 RX ADMIN — SPIRONOLACTONE 50 MG: 50 TABLET ORAL at 08:22

## 2018-06-05 RX ADMIN — FENOFIBRATE 48 MG: 48 TABLET ORAL at 10:16

## 2018-06-05 RX ADMIN — HYDROCODONE BITARTRATE AND ACETAMINOPHEN 1 TABLET: 5; 325 TABLET ORAL at 14:57

## 2018-06-05 RX ADMIN — HYDROCODONE BITARTRATE AND ACETAMINOPHEN 1 TABLET: 5; 325 TABLET ORAL at 20:22

## 2018-06-05 RX ADMIN — MORPHINE SULFATE 2 MG: 2 INJECTION, SOLUTION INTRAMUSCULAR; INTRAVENOUS at 06:57

## 2018-06-05 NOTE — PROGRESS NOTES
Progress Note - General Surgery   Shobha Player 80 y o  female MRN: [id number]  Unit/Bed#: E4 -01 Encounter: [id number]    Assessment:  Acute gallstone pancreatitis  POD #1 Laparoscopic Cholecystectomy with IOC    Plan:  Advance diet and activity  Incentive spirometry  IF tolerates diet and pain controlled can discharge home with follow up as outptient  Medical mangement    Subjective/Objective   Chief Complaint: abdominal pain    Subjective: c/o pain in shoulder    Objective:     Blood pressure 149/64, pulse 58, temperature (!) 96 9 °F (36 1 °C), temperature source Tympanic, resp  rate 16, height 5' 5" (1 651 m), weight 85 7 kg (188 lb 15 oz), SpO2 95 %, not currently breastfeeding  ,Body mass index is 31 44 kg/m²        Intake/Output Summary (Last 24 hours) at 06/05/18 0821  Last data filed at 06/04/18 2159   Gross per 24 hour   Intake          1761 25 ml   Output              900 ml   Net           861 25 ml       Invasive Devices     Peripheral Intravenous Line            Peripheral IV 06/04/18 Left Forearm 1 day                Physical Exam: /64 (BP Location: Right arm)   Pulse 58   Temp (!) 96 9 °F (36 1 °C) (Tympanic)   Resp 16   Ht 5' 5" (1 651 m)   Wt 85 7 kg (188 lb 15 oz)   SpO2 95%   BMI 31 44 kg/m²   General appearance: alert and oriented, in no acute distress  Lungs: clear to auscultation bilaterally  Heart: regular rate and rhythm, S1, S2 normal, no murmur, click, rub or gallop  Abdomen: soft, mild RUQ incisional tenderness, +BS  Extremities: extremities normal, warm and well-perfused; no cyanosis, clubbing, or edema  Incisions; c/d/i    Lab, Imaging and other studies:  CBC:   Lab Results   Component Value Date    WBC 8 21 06/05/2018    HGB 11 8 06/05/2018    HCT 36 4 06/05/2018    MCV 93 06/05/2018     (L) 06/05/2018    MCH 30 0 06/05/2018    MCHC 32 4 06/05/2018    RDW 13 3 06/05/2018    MPV 10 3 06/05/2018    NRBC 0 06/05/2018     VTE Pharmacologic Prophylaxis: Heparin  VTE Mechanical Prophylaxis: sequential compression device

## 2018-06-05 NOTE — CASE MANAGEMENT
Continued Stay Review    Date:  6/5/2018    Vital Signs: /64 (BP Location: Right arm)   Pulse 58   Temp (!) 96 9 °F (36 1 °C) (Tympanic)   Resp 16   Ht 5' 5" (1 651 m)   Wt 85 7 kg (188 lb 15 oz)   SpO2 95%   BMI 31 44 kg/m²     Medications:   Scheduled Meds:   Current Facility-Administered Medications:  digoxin 125 mcg Oral QAM Lenny Dodson MD    fenofibrate 48 mg Oral Daily [place] [facility]                            insulin lispro 1-5 Units Subcutaneous Q6H [facility], LUCIA            nystatin  Topical BID Yessica Mcdermott MD            oxybutynin 10 mg Oral Daily [facility]            spironolactone 50 mg Oral Daily [place] [facility]    warfarin 4 5 mg Oral Daily (warfarin) Clover Armenta PA-C      Continuous Infusions:   sodium chloride 75 mL/hr Last Rate: 75 mL/hr (06/04/18 3359)     PRN Meds: hydrALAZINE    HYDROcodone-acetaminophen x 1    HYDROcodone-acetaminophen    morphine injection IV X 1    ondansetron    Abnormal Labs/Diagnostic Results:  Cl 109,   Plats 136    Age/Sex: 80 y o  female       Post OP Day 1:  S/P  CHOLECYSTECTOMY LAPAROSCOPIC (N/A)  CHOLANGIOGRAM (N/A)   On 6/4/2018      Appetite not good but tolerated tea and ginger ale   c/o 10/10 right upper abd pain this am    Assessment/Plan:   · Acute cholecystitis:  Initially presented right quadrant pain, nausea, vomiting  CT of abdomen pelvis revealed severely inflamed gallbladder with cholelithiasis and no evidence of choledocholithiasis  LFTs remain normal   Received 4 days of IV Zosyn  Underwent laparoscopic cholecystectomy on 6/4/18  Did have episode of 10/10 pain this am relieved with morphine       · Acute gallstone pancreatitis:  Lipase 1400 admission  Now normalized  Underwent cholecystectomy yesterday      Discharge Plan:  Anticipate DC to home with VN [facility] for SN / PT once medically cleared      Thank you,  [facility] in the [place] by Raghu Winter for 2017  Network Utilization Review Department  Phone: [phone]; Fax [phone]  ATTENTION: The Network Utilization Review Department is now centralized for our 7 Facilities  Make a note that we have a new phone and fax numbers for our Department  Please call with any questions or concerns to [phone] and carefully follow the prompts so that you are directed to the right person  All voicemails are confidential  Fax any determinations, approvals, denials, and requests for initial or continue stay review clinical to [phone]  Due to HIGH CALL volume, it would be easier if you could please send faxed requests to expedite your requests and in part, help us provide discharge notifications faster

## 2018-06-05 NOTE — SOCIAL WORK
CM spoke with Audi Fonseca PA-C  Patient is a tentative d/c home tomorrow  She is set up with [facility] for SN/PT services  Cm called  he states he has a subscription with [address] [place] [address], they directed me to use [employer], spoke with [facility] who states they will honor [employer] for this patient  CM arranged transportation with [facility] from [employer],  time 12pm 6/6/18       Luh Santillan, RN  [phone]

## 2018-06-05 NOTE — PLAN OF CARE
Problem: PHYSICAL THERAPY ADULT  Goal: Performs mobility at highest level of function for planned discharge setting  See evaluation for individualized goals  Treatment/Interventions: Functional transfer training, LE strengthening/ROM, Therapeutic exercise, Endurance training, Patient/family training, Equipment eval/education, Bed mobility, Compensatory technique education, Continued evaluation, Spoke to nursing          See flowsheet documentation for full assessment, interventions and recommendations  Outcome: Progressing  Prognosis: Fair  Problem List: Decreased strength, Decreased endurance, Impaired balance, Decreased mobility, Pain, Obesity  Assessment: PT demonstrates increased difficulty in performing transfers requiring mod assist x2 and use of sit to stand  Pt was able to achieve full upright standing posture with use of sit to stand and moderate cues for improved standing posture  Pt able to weightbear through le's, improved tolerance for static standing , pt able to maintain static standing for 1' 45"     Pt performed seated b/l le arom exercises x 12 reps  Pt will continue to benefit from inpt PT inorder to maximize functional mobility, strength, endurance, activity tolerance and balance inorder to assure optimal function to assist with care in home at d/c  Recommend d/c to home with family support and assistance and HHPT  Recommendation: Home with family support, Home PT     PT - OK to Discharge: Yes    See flowsheet documentation for full assessment

## 2018-06-05 NOTE — PLAN OF CARE
Problem: OCCUPATIONAL THERAPY ADULT  Goal: Performs self-care activities at highest level of function for planned discharge setting  See evaluation for individualized goals  Treatment Interventions: ADL retraining, Functional transfer training, UE strengthening/ROM, Endurance training, Patient/family training, Equipment evaluation/education, Compensatory technique education, Continued evaluation, Energy conservation, Activityengagement          See flowsheet documentation for full assessment, interventions and recommendations  Outcome: Progressing  Limitation: Decreased ADL status, Decreased endurance, Decreased self-care trans, Decreased high-level ADLs  Prognosis: Fair  Assessment: Pt seen for OT treatment session this PM focusing on functional activity tolerance, ADLs, UE ROM/strengthening, and transfers w/ use of Quick Move  Pt alert and cooperative throughout session  Pt seated OOB in chair at start of session  Pt noted to be incontinent of urine at start of session  Transfers (sit<>stand w/ use of Quick Move, Quick Move<>BSC) completed w/ Mod A of 2 2* assist required to initiate forward weight shift from surface for sit>stand and assist for controlled descent to surface for stand>sit  Toileting completed w/ Total A 2* pt requiring BUE support on Quick Move when standing for perineal hygiene  Pt's functional standing tolerance increased to approx  2 mins during toileting tasks and static standing trials w/ BUE support on Quick Move prior to requesting seated rest break 2* increased fatigue reported  Dressing tasks completed while seated OOB in chair  UB dressing completed w/ Min A to don/doff hospital gown 2* setup required, increased time to complete, and assist to bring gown around/down in back  LB dressing completed w/ Max A  Pt able to doff B/L socks w/ Supervision, however required assist to don B/L socks, thread BLEs into brief, and pull brief over hips   Light grooming tasks (brush hair) completed w/ Supervision 2* setup required and increased time to complete  UE exercises completed while seated OOB in chair to increase UE ROM/strength needed for ADLs and transfers  Educated pt on proper form, pacing, and breathing for all exercises w/ pt verbalizing understanding of all education  No c/o pain or fatigue reported  Pt seated OOB in chair w/ PTA Diamond at end of session  All needs met and pt reports no further questions for OT at this time  Continue to recommend Home OT upon D/C  OT to continue to follow pt on caseload        OT Discharge Recommendation: Home OT  OT - OK to Discharge: Yes (when medically cleared)

## 2018-06-05 NOTE — PROGRESS NOTES
[facility] Medicine Progress Note  Patient: Mario Alberto Nuñez 80 y o  female   MRN: [id number]  PCP: Beatriz Ohara MD  Unit/Bed#: E4 -01 Encounter: [id number]  Date Of Visit: 06/05/18    Assessment:    Principal Problem:    Gallstone pancreatitis  Active Problems:    Wheelchair dependence    Diabetes mellitus ([address] )    A-fib ([facility] [address] )    Acute cholecystitis    Elevated troponin      Plan:    · Acute cholecystitis:  Initially presented right quadrant pain, nausea, vomiting  CT of abdomen pelvis revealed severely inflamed gallbladder with cholelithiasis and no evidence of choledocholithiasis  LFTs remain normal   Received 4 days of IV Zosyn  Underwent laparoscopic cholecystectomy on 6/4/18  Did have episode of 10/10 pain this am relieved with morphine  · Acute gallstone pancreatitis:  Lipase 1400 admission  Now normalized  Underwent cholecystectomy yesterday  · Type 2 diabetes:  Oral agents held  Continue insulin sliding scale  Blood sugars stable  · Permanent atrial fibrillation:  Continue digoxin  Anticoagulated on Coumadin  INR 1 25  S/p surgery hgb has remained stable at 11 8  Will resume coumadin today  · Elevated troponin: denies chest pain  Likely secondary to type 2 non ST wave elevation MI secondary to  cholecystitis  No suggestion of any ACS  Echocardiogram reveals an EF of 70% with mild concentric hypertrophy with moderate to markedly dilated left atrium, mild mitral regurg, mild the tricuspid valve regurg  · Essential hypertension:  Continue current regimen    · Hyperlipidemia: continue fenofibrate  · Wheelchair dependence:  From prior orthopedic issues-bilateral knee surgery and subsequent femur fracture requiring a grey insertion  · Thrombocytopenia:  Likely secondary to above  Platelets 618 --> 603    · Laceration to LLE: Stitches in place with no signs of acute infection  Initially placed 5/25/18 and was to be removed6/4/18 by PCP   Will remove here later today        VTE Pharmacologic Prophylaxis:   Pharmacologic: Warfarin (Coumadin)  Mechanical VTE Prophylaxis in Place: Yes    Discharge Plan:  Expected a c  within the next 24 hours once tolerating diet and pain is controlled  Discussions with Specialists or Other Care Team Provider: Dr Brian Porter    Education and Discussions with Family / Patient: patient    Time Spent for Care: 20 minutes  More than 50% of total time spent on counseling and coordination of care as described above  Current Length of Stay: 5 day(s)  Current Patient Status: Inpatient   Code Status: Level 3 - DNAR and DNI    Subjective:   Pt resting comfortably in chair at bedside  Did not eat much this am but did tolerate her tea and gingerale  Episode of 10/10 right sided upper abdominal pain earlier relieved with morphine  Also noted some right shoulder pain that improved with belching  She is wheelchair bound at baseline  S the other complaints, chest pain, chest pressure, palpitations, shortness of breath  No nausea vomiting  Objective:     Vitals:   Temp (24hrs), Av 9 °F (36 6 °C), Min:96 7 °F (35 9 °C), Max:99 6 °F (37 6 °C)    HR:  [57-82] 58  Resp:  [12-18] 16  BP: (133-198)/(63-90) 149/64  SpO2:  [95 %-100 %] 95 %  Body mass index is 31 44 kg/m²  Input and Output Summary (last 24 hours): Intake/Output Summary (Last 24 hours) at 18 1137  Last data filed at 18 2159   Gross per 24 hour   Intake          1761 25 ml   Output              696 ml   Net          1065 25 ml       Physical Exam:     Physical Exam   Constitutional: She is oriented to person, place, and time  She appears well-developed and well-nourished  No distress  HENT:   Head: Normocephalic and atraumatic  Eyes: Conjunctivae are normal    Cardiovascular: Normal rate, regular rhythm and normal heart sounds  Pulmonary/Chest: Effort normal and breath sounds normal  No respiratory distress  She has no wheezes  She has no rales   She exhibits no tenderness  Abdominal: Soft  Bowel sounds are normal  She exhibits no distension and no mass  There is no tenderness  There is no rebound and no guarding  Abdominal incisions clean dry and intact   Musculoskeletal: She exhibits no edema  Neurological: She is alert and oriented to person, place, and time  Skin: Skin is warm and dry  She is not diaphoretic  Psychiatric: She has a normal mood and affect  Her behavior is normal    Nursing note and vitals reviewed  Additional Data:     Labs:      Results from last 7 days  Lab Units 06/05/18  0458   WBC Thousand/uL 8 21   HEMOGLOBIN g/dL 11 8   HEMATOCRIT % 36 4   PLATELETS Thousands/uL 136*   NEUTROS PCT % 70   LYMPHS PCT % 20   MONOS PCT % 9   EOS PCT % 1       Results from last 7 days  Lab Units 06/05/18  0458 06/02/18  0545   SODIUM mmol/L 144 141   POTASSIUM mmol/L 3 5 3 8   CHLORIDE mmol/L 109* 106   CO2 mmol/L 25 27   BUN mg/dL 9 10   CREATININE mg/dL 0 77 0 90   CALCIUM mg/dL 8 7 8 6   TOTAL PROTEIN g/dL  --  6 1*   BILIRUBIN TOTAL mg/dL  --  0 56   ALK PHOS U/L  --  99   ALT U/L  --  89*   AST U/L  --  73*   GLUCOSE RANDOM mg/dL 129 90       Results from last 7 days  Lab Units 06/05/18  0458   INR  1 25*       * I Have Reviewed All Lab Data Listed Above  * Additional Pertinent Lab Tests Reviewed: [address] Admission Reviewed    Imaging:    Imaging Reports Reviewed Today Include: CT abdomen  Imaging Personally Reviewed by Myself Includes:      Recent Cultures (last 7 days):       Results from last 7 days  Lab Units 05/31/18  2337   BLOOD CULTURE  No Growth After 4 Days  No Growth After 4 Days         Last 24 Hours Medication List:     Current Facility-Administered Medications:  digoxin 125 mcg Oral QAM Cecy Kincaid MD    fenofibrate 48 mg Oral Daily Paul Sutton PA-C    hydrALAZINE 5 mg Intravenous Q6H PRN Kalpesh Graham MD    HYDROcodone-acetaminophen 1 tablet Oral Q4H PRN Alex Cruz MD HYDROcodone-acetaminophen 2 tablet Oral Q6H PRN Arya Cevrantes MD    insulin lispro 1-5 Units Subcutaneous Q6H [facility] Davian Bajwa PA-C    morphine injection 2 mg Intravenous Q4H PRN Davian Aydee, LUCIA    nystatin  Topical BID Yesica Webb MD    ondansetron 4 mg Intravenous Q6H PRN Davian Aydee, LUCIA    oxybutynin 10 mg Oral Daily Davianray Bajwa PA-C    sodium chloride 75 mL/hr Intravenous Continuous Davian Bajwa PA-C Last Rate: 75 mL/hr (06/04/18 4882)   spironolactone 50 mg Oral Daily Davianharper Bajwa PA-C         Today, Patient Was Seen By: Cony Cantu PA-C    ** Please Note: This note has been constructed using a voice recognition system   **

## 2018-06-05 NOTE — OCCUPATIONAL THERAPY NOTE
OccupationalTherapy Progress Note     Patient Name: Magnus Dudley  CKVBS'F Date: 6/5/2018  Problem List  Patient Active Problem List   Diagnosis    Wheelchair dependence    Diabetes mellitus ([facility] [address] )    A-fib ([facility] [address] )    Acute cholecystitis    Elevated troponin    Gallstone pancreatitis         06/05/18 1529   Restrictions/Precautions   Weight Bearing Precautions Per Order No   Other Precautions Bed Alarm; Fall Risk;Multiple lines;Telemetry   Lifestyle   Autonomy At baseline, pt reports assist w/ ADLs and IADLs, use of electric WC in home environment for mobility, and use of sit to stand mechanical lift for mobility (however pt reports able to pull self into standing position during ADLs)  Reciprocal Relationships Lives w/ spouse and dtr   Service to Others Retired   Intrinsic Gratification Watching TV   Pain Assessment   Pain Assessment 0-10   Pain Score 6   Pain Type Surgical pain   Pain Location Rib cage   Pain Orientation Right   Pain [address] Pain Intervention(s) Repositioned;Medication (See MAR); Ambulation/increased activity; Emotional support   Response to Interventions Tolerated   Multiple Pain Sites No   ADL   Where Assessed Chair   Grooming Assistance 5  Supervision/Setup   Grooming Deficit Setup;Supervision/safety; Increased time to complete;Brushing hair   Grooming Comments Light grooming tasks (brush hair) completed w/ Supervision 2* setup required and increased time to complete  UB Dressing Assistance 4  Minimal Assistance   UB Dressing Deficit Setup;Verbal cueing;Supervision/safety; Increased time to complete;Pull around back;Pull down in back   UB Dressing Comments UB dressing completed w/ Min A to don/doff hospital gown 2* setup required, increased time to complete, and assist to bring gown around/down in back  LB Dressing Assistance 2  Maximal Assistance   LB Dressing Deficit Setup;Steadying;Verbal cueing;Supervision/safety; Increased time to complete; Don/doff R sock;Don/doff L sock; Thread RLE into underwear; Thread LLE into underwear;Pull up over hips   LB Dressing Comments LB dressing completed w/ Max A  Pt able to doff B/L socks w/ Supervision, however required assist to don B/L socks, thread BLEs into brief, and pull brief over hips  Toileting Assistance  1  Total Assistance   Toileting Deficit Setup;Steadying;Verbal cueing;Supervison/safety; Increased time to complete;Perineal hygiene   Toileting Comments Toileting completed w/ Total A 2* pt requiring BUE support on Quick Move when standing for perineal hygiene  Functional Standing Tolerance   Time 2 mins   Activity Toileting tasks, static standing trials   Comments Pt's functional standing tolerance increased to approx  2 mins during toileting tasks and static standing trials w/ BUE support on Quick Move prior to requesting seated rest break 2* increased fatigue reported  Bed Mobility   Supine to Sit Unable to assess  (Pt seated OOB in chair at start/end of session)   Sit to Supine Unable to assess  (Pt seated OOB in chair at start/end of session)   Additional Comments pt seated OOB in chair w/ PTA Lawrence Obregon at end of session  All needs met and pt reports no further questions for OT at this time  Transfers   Sit to Stand 3  Moderate assistance   Additional items Assist x 2; Increased time required;Verbal cues  (use of Quick Move)   Stand to Sit 3  Moderate assistance   Additional items Assist x 2; Increased time required;Verbal cues;Armrests  (use of Quick Move)   Mechanical lift 3  Moderate assistance   Additional items Assist x 2;Armrests; Increased time required;Verbal cues  (use of Quick Move)   Additional Comments Transfers (sit<>stand w/ use of Quick Move, Quick Move<>BSC) completed w/ Mod A of 2 2* assist required to initiate forward weight shift from surface for sit>stand and assist for controlled descent to surface for stand>sit      Therapeutic Exercise - ROM   UE-ROM Yes   ROM- Right Upper Extremities   R Shoulder AROM; Flexion; Extension;Horizontal ABduction   R Elbow AROM;Elbow extension;Elbow flexion   R Position Seated   R Weight/Reps/Sets 10 reps x1   RUE ROM Comment UE exercises completed while seated OOB in chair to increase UE ROM/strength needed for ADLs and transfers  Educated pt on proper form, pacing, and breathing for all exercises w/ pt verbalizing understanding of all education  No c/o pain or fatigue reported  ROM - Left Upper Extremities    L Shoulder AROM; Flexion; Extension;Horizontal ABduction   L Elbow AROM;Elbow flexion;Elbow extension   L Position Seated   L Weight/Reps/Sets 10 reps x1   LUE ROM Comment UE exercises completed while seated OOB in chair to increase UE ROM/strength needed for ADLs and transfers  Educated pt on proper form, pacing, and breathing for all exercises w/ pt verbalizing understanding of all education  No c/o pain or fatigue reported  Cognition   Overall Cognitive Status WFL   Arousal/Participation Alert; Cooperative   Attention Within functional limits   Orientation Level Oriented X4   Memory Within functional limits   Following Commands Follows one step commands without difficulty   Activity Tolerance   Activity Tolerance Patient limited by pain; Patient tolerated treatment well   Medical Staff Made Aware Pt able to be seen per RN Cassandra   Assessment   Assessment Pt seen for OT treatment session this PM focusing on functional activity tolerance, ADLs, UE ROM/strengthening, and transfers w/ use of Quick Move  Pt alert and cooperative throughout session  Pt seated OOB in chair at start of session  Pt noted to be incontinent of urine at start of session  Transfers (sit<>stand w/ use of Quick Move, Quick Move<>BSC) completed w/ Mod A of 2 2* assist required to initiate forward weight shift from surface for sit>stand and assist for controlled descent to surface for stand>sit   Toileting completed w/ Total A 2* pt requiring BUE support on Quick Move when standing for perineal hygiene  Pt's functional standing tolerance increased to approx  2 mins during toileting tasks and static standing trials w/ BUE support on Quick Move prior to requesting seated rest break 2* increased fatigue reported  Dressing tasks completed while seated OOB in chair  UB dressing completed w/ Min A to don/doff hospital gown 2* setup required, increased time to complete, and assist to bring gown around/down in back  LB dressing completed w/ Max A  Pt able to doff B/L socks w/ Supervision, however required assist to don B/L socks, thread BLEs into brief, and pull brief over hips  Light grooming tasks (brush hair) completed w/ Supervision 2* setup required and increased time to complete  UE exercises completed while seated OOB in chair to increase UE ROM/strength needed for ADLs and transfers  Educated pt on proper form, pacing, and breathing for all exercises w/ pt verbalizing understanding of all education  No c/o pain or fatigue reported  Pt seated OOB in chair w/ PTA Diamond at end of session  All needs met and pt reports no further questions for OT at this time  Continue to recommend Home OT upon D/C  OT to continue to follow pt on caseload  Plan   Treatment Interventions ADL retraining;Functional transfer training;UE strengthening/ROM; Endurance training;Patient/family training;Equipment evaluation/education; Compensatory technique education;Continued evaluation; Energy conservation; Activityengagement   Goal Expiration Date 06/15/18   Treatment Day 1   OT Frequency 3-5x/wk   Recommendation   OT Discharge Recommendation Home OT   OT - OK to Discharge Yes  (when medically cleared)   Barthel Index   Feeding 10   Bathing 0   Grooming Score 5   Dressing Score 5   Bladder Score 5   Bowels Score 10   Toilet Use Score 5   Transfers (Bed/Chair) Score 5   Mobility (Level Surface) Score 0   Stairs Score 0   Barthel Index Score 45   Modified Cee Scale   Modified Cee Scale 4         Briseyda Mendoza, OTR/L

## 2018-06-05 NOTE — DISCHARGE INSTRUCTIONS
Your gallbladder was removed  You will need to follow up with Dr Dana Iglesias in 2 weeks for surgery follow-up    You will need to see your PCP for further management of your Coumadin levels  Natasha Rosa Instructions                                                           Dr Harpal Bernal  M D     1  General: You will feel pulling sensations around the wound or funny aches and pains around the incisions  This is normal  Even minor surgery is a change in your body and this is your bodys way of reaction to it  If you have had abdominal surgery, it may help to support the incision with a small pillow or blanket for comfort when moving or coughing  2  Wound care:    Bandage/Dressing - Make sure to remove the bandage in about 24 hours, unless instructed otherwise  You usually don't have to redress the wound after 24-48 hours, unless for comfort  Keep the incision clean and dry  Let air get to it  If the Steri-Strips fall off, just keep the wound clean  Glue - Leave glue alone, it will fall off on its own, no need for an additional dressings    3  Water: You may shower over the wound, unless there are drain tubes left in place  Do not bathe or use a pool or hot tub until cleared by the physician  You may shower right over the staples, glue or Steri-Strips and rinse wound with soapy water but do not scrub incision pat dry when you are done  4  Activity: You may go up and down stairs, walk as much as you are comfortable, but walk at least 3 times each day  If you have had abdominal surgery, do not lift anything heavier than 15 pounds for at least 2 weeks, unless cleared by the doctor  5  Diet: You may resume a regular diet  If you had a same-day surgery or overnight stay surgery, you may wish to eat lightly for a few days: soups, crackers, and sandwiches  You may resume a regular diet when ready      6  Medications: Resume all of your previous medications, unless told otherwise by the doctor  Avoid aspirin or ibuprofen (Advil, Motrin, etc ) products for 2-3 days after the date of surgery  You may, at that time, began to take them again  Tylenol is always fine, unless you are taking any narcotic pain medication containing Tylenol (such as Percocet, Darvocet, Vicodin, or anything containing acetaminophen)  Do not take Tylenol if you're taking these medications  You do not need to take the narcotic pain medications unless you are having significant pain and discomfort  7  Driving: He will need someone to drive you home on the day of surgery  Do not drive or make any important decisions while on narcotic pain medication or 24 hours and after anesthesia or sedation for surgery  Generally, you may drive when your off all narcotic pain medications  8  Upset Stomach: You may take Maalox, Tums, or similar items for an upset stomach  If your narcotic pain medication causes an upset stomach, do not take it on an empty stomach  Try taking it with at least some crackers or toast      9  Constipation: Patients often experienced constipation after surgery  You may take over-the-counter medication for this, such as Metamucil, Senokot, Dulcolax, milk of magnesia, etc  You may take a suppository unless you have had anorectal surgery such as a procedure on your hemorrhoids  If you experience significant nausea or vomiting after abdominal surgery, call the office before trying any of these medications  10  Call the office: If you are experiencing any of the following, fevers above 101 5°, significant nausea or vomiting, if the wound develops drainage and/or is excessive redness around the wound, or if you have significant diarrhea or other worsening symptoms  11  Pain: You may be given a prescription for pain  This will be given to the hospital, the day of surgery      12  Sexual Activity: You may resume sexual activity when you feel ready and comfortable and your incision is sealed and healed without apparent infection risk      [facility]  Phone: [phone]

## 2018-06-05 NOTE — PROGRESS NOTES
During shift report this morning patient was complaining of left upper abdominal pain that radiates to her upper shoulder  10/10  Morphine given  Will continue to monitor and assess  Physician in to assess patient this morning  Patient started on incentive spirometry and up out of bed into chair for breakfast  Gingerale given;patient belching  Will continue to assess  (8:30am)    Assessed patient again at 10:30  No pain or discomfort reported  Patient able to lift both arms up above head with no pain  Thanked us for the pain medication and gingerale and that helped her belch  Now smiling

## 2018-06-05 NOTE — PHYSICAL THERAPY NOTE
Physical Therapy Treatment Note       06/05/18 1554   Pain Assessment   Pain Assessment 0-10   Pain Score 5   Pain Type Surgical pain   Pain Location Abdomen   Pain Orientation Right; Lower   Pain [address] Pain Intervention(s) Ambulation/increased activity; Emotional support   Response to Interventions tolerated   Restrictions/Precautions   Weight Bearing Precautions Per Order No   Other Precautions Chair Alarm; Fall Risk;Pain;Multiple lines;Telemetry   General   Chart Reviewed Yes   Response to Previous Treatment Patient with no complaints from previous session  Family/Caregiver Present No   Cognition   Overall Cognitive Status WFL   Arousal/Participation Alert; Cooperative   Attention Within functional limits   Orientation Level Oriented X4   Memory Within functional limits   Following Commands Follows one step commands without difficulty   Subjective   Subjective PT reports R lower abdominal pain discomfort from surgery yesterday  Pt agreeable to PT  Transfers   Sit to Stand 3  Moderate assistance   Additional items Assist x 2; Increased time required;Verbal cues  (use of sit to stand)   Stand to Sit 3  Moderate assistance   Additional items Assist x 2; Increased time required;Verbal cues  (use of sit to stand)   Additional Comments PT peformed static standing activity with use of sit to stand and mod assist x2 working on standing posture, balance, b/lle weightbearing activity and standing tolerance  x 1' 45"  Ambulation/Elevation   Gait pattern Not appropriate   Balance   Static Sitting Fair +  (supported in chair  )   Dynamic Sitting Fair   Static Standing Zero   Dynamic Standing Poor -   Ambulatory Zero   Endurance Deficit   Endurance Deficit Description fatigue,    Activity Tolerance   Activity Tolerance Patient tolerated treatment well;Patient limited by fatigue;Patient limited by pain   Exercises   Glute Sets Sitting;AROM; Bilateral  (x 12 reps  )   Hip Flexion Sitting;AROM; Bilateral  (x 12 reps  )   Hip Abduction Sitting;AROM; Bilateral  (x 12 reps  )   Hip Adduction Sitting;AROM; Bilateral  (x 12 reps, isometric hip add  )   Knee AROM [employer]; Bilateral  (x 12 reps  )   Ankle Pumps Sitting;AROM; Bilateral  (x 12 reps  )   Balance training  pt performed seated w/c push ups x 7 reps with mod assist x1 with useof bed pad to clear bottom from chair  forwrd trunkal weighthsifting onto feet/le's performed x 5 reps  Assessment   Prognosis Fair   Problem List Decreased strength;Decreased endurance; Impaired balance;Decreased mobility;Pain;Obesity   Assessment PT demonstrates increased difficulty in performing transfers requiring mod assist x2 and use of sit to stand  Pt was able to achieve full upright standing posture with use of sit to stand and moderate cues for improved standing posture  Pt able to weightbear through le's, improved tolerance for static standing , pt able to maintain static standing for 1' 45"     Pt performed seated b/l le arom exercises x 12 reps  Pt will continue to benefit from inpt PT inorder to maximize functional mobility, strength, endurance, activity tolerance and balance inorder to assure optimal function to assist with care in home at d/c  Recommend d/c to home with family support and assistance and HHPT  Goals   Patient Goals to get better   STG Expiration Date 06/11/18   Treatment Day 1   Plan   Treatment/Interventions Functional transfer training;LE strengthening/ROM; Therapeutic exercise; Endurance training;Patient/family training;Equipment eval/education; Bed mobility; Compensatory technique education;OT   Progress Slow progress, decreased activity tolerance   PT Frequency (3-4x week)   Recommendation   Recommendation Home with family support;Home PT   PT - OK to Discharge Yes       Oumou Vallejo, CESAR

## 2018-06-06 VITALS
OXYGEN SATURATION: 96 % | WEIGHT: 188.93 LBS | HEART RATE: 65 BPM | SYSTOLIC BLOOD PRESSURE: 161 MMHG | RESPIRATION RATE: 18 BRPM | HEIGHT: 65 IN | DIASTOLIC BLOOD PRESSURE: 66 MMHG | TEMPERATURE: 99 F | BODY MASS INDEX: 31.48 KG/M2

## 2018-06-06 LAB
BACTERIA BLD CULT: NORMAL
BACTERIA BLD CULT: NORMAL
ERYTHROCYTE [DISTWIDTH] IN BLOOD BY AUTOMATED COUNT: 13.4 % (ref 11.6–15.1)
GLUCOSE SERPL-MCNC: 110 MG/DL (ref 65–140)
HCT VFR BLD AUTO: 37.4 % (ref 34.8–46.1)
HGB BLD-MCNC: 12.1 G/DL (ref 11.5–15.4)
INR PPP: 1.25 (ref 0.86–1.17)
MCH RBC QN AUTO: 30.2 PG (ref 26.8–34.3)
MCHC RBC AUTO-ENTMCNC: 32.4 G/DL (ref 31.4–37.4)
MCV RBC AUTO: 93 FL (ref 82–98)
PLATELET # BLD AUTO: 125 THOUSANDS/UL (ref 149–390)
PMV BLD AUTO: 10 FL (ref 8.9–12.7)
PROTHROMBIN TIME: 15.8 SECONDS (ref 11.8–14.2)
RBC # BLD AUTO: 4.01 MILLION/UL (ref 3.81–5.12)
WBC # BLD AUTO: 9.38 THOUSAND/UL (ref 4.31–10.16)

## 2018-06-06 PROCEDURE — 82948 REAGENT STRIP/BLOOD GLUCOSE: CPT

## 2018-06-06 PROCEDURE — 97535 SELF CARE MNGMENT TRAINING: CPT

## 2018-06-06 PROCEDURE — 99239 HOSP IP/OBS DSCHRG MGMT >30: CPT | Performed by: PHYSICIAN ASSISTANT

## 2018-06-06 PROCEDURE — 99024 POSTOP FOLLOW-UP VISIT: CPT | Performed by: PHYSICIAN ASSISTANT

## 2018-06-06 PROCEDURE — 85027 COMPLETE CBC AUTOMATED: CPT | Performed by: PHYSICIAN ASSISTANT

## 2018-06-06 PROCEDURE — 85610 PROTHROMBIN TIME: CPT | Performed by: PHYSICIAN ASSISTANT

## 2018-06-06 PROCEDURE — 97530 THERAPEUTIC ACTIVITIES: CPT

## 2018-06-06 RX ORDER — HYDROCODONE BITARTRATE AND ACETAMINOPHEN 5; 325 MG/1; MG/1
1 TABLET ORAL EVERY 4 HOURS PRN
Qty: 20 TABLET | Refills: 0 | Status: SHIPPED | OUTPATIENT
Start: 2018-06-06 | End: 2018-06-16

## 2018-06-06 RX ORDER — HYDROCODONE BITARTRATE AND ACETAMINOPHEN 5; 325 MG/1; MG/1
1 TABLET ORAL EVERY 4 HOURS PRN
Qty: 20 TABLET | Refills: 0 | Status: SHIPPED | OUTPATIENT
Start: 2018-06-06 | End: 2018-06-06

## 2018-06-06 RX ADMIN — DIGOXIN 125 MCG: 125 TABLET ORAL at 10:15

## 2018-06-06 RX ADMIN — OXYBUTYNIN CHLORIDE 10 MG: 5 TABLET, EXTENDED RELEASE ORAL at 10:16

## 2018-06-06 RX ADMIN — NYSTATIN: 100000 POWDER TOPICAL at 10:16

## 2018-06-06 RX ADMIN — FENOFIBRATE 48 MG: 48 TABLET ORAL at 10:16

## 2018-06-06 RX ADMIN — SPIRONOLACTONE 50 MG: 50 TABLET ORAL at 10:16

## 2018-06-06 RX ADMIN — HYDROCODONE BITARTRATE AND ACETAMINOPHEN 1 TABLET: 5; 325 TABLET ORAL at 03:01

## 2018-06-06 NOTE — DISCHARGE SUMMARY
Discharge Summary - [facility]    Patient Information: Adriano Rodriguez 80 y o  female MRN: [id number]  Unit/Bed#: E4 -01 Encounter: [id number]    Discharging Physician / Practitioner: Helen Liang PA-C  PCP: Everardo Foster MD  Admission Date: 5/31/2018  Discharge Date: 06/06/18    Disposition:     Home    Reason for Admission:  Acute cholecystitis / acute gallstone pancreatitis    Discharge Diagnoses:     Principal Problem:    Gallstone pancreatitis  Active Problems:    Wheelchair dependence    Diabetes mellitus ([facility] [address] )    A-fib ([facility] [address] )    Acute cholecystitis    Elevated troponin  Resolved Problems:    * No resolved hospital problems  *      Consultations During Hospital Stay:  · General surgery-Dr Rey Huang  · Cardiology-Dr Ankita Caldwell    Procedures Performed:   · 5/31/18:  CT abdomen pelvis with contrast:  Gallbladder wall inflamed with jacob cholecystic free fluid and fat stranding  Depending gallstones are seen however not in the gallbladder neck  Findings consistent with acute cholecystitis  No common bile duct dilatation  No evident pancreatitis  Incidentally noted fatty liver  · 6/1/18:  Chest x-ray:  No acute cardiopulmonary disease    · 6/1/18 Echocardiogram:  Systolic function hyperdynamic LVEF 70%, no regional wall motion abnormalities, mild contracted hydro per atrophy, julia mid abnormal relaxation and increased filling pressure  Left atrium moderately to markedly dilated  Mitral valve mild annular calcification, mild regurg  Aortic valve with moderate calcification, tricuspid valve with moderate regurgitation  · 6/4/18:   Intraoperative cholangiogram:  Fluoroscopic guidance for intraoperative cholangiogram   No persistent filling defects    Significant Findings / Test Results:   · Acute cholecystitis- requiring cholecystectomy  · Gallstone pancreatitis-lipase 1400 --> now resolved    Incidental Findings:   · Type 2 NSTEMI secondary to acute cholecystitis  · LLE laceration- 10 stiches removed during this hospitalization  Test Results Pending at Discharge (will require follow up): · None     Outpatient follow-up Requested:  · General surgery-Dr Fran Dior  · PCP- Dr Anna Gomez    Complications:  None    Hospital Course:     Prashant Montejo is a 80 y o  female patient with past medical history of type 2 diabetes, permanent atrial fibrillation anticoagulated on Coumadin and on digoxin, essential hypertension, hyperlipidemia, wheelchair dependence due to prior orthopedic surgery  She originally presented to the hospital on 5/31/2018 due to right upper quadrant abdominal pain, right-sided back pain, nausea, and dry heaves  CT of abdomen revealed enlarged and distended gallbladder with pericholecystic fluid, fat stranding, and nonobstructing gallstones  Her lipase was noted to be 1400 and she was found to have acute gallstone pancreatitis  She was started on IV fluids, IV antibiotics-Unasyn, and pain control  Incidentally she was found to have mildly elevated troponins  Seen and evaluated by Cardiology felt this was a NSTEMI type 2 most likely related to acute cholecystitis with no evidence of ACS  Once the patient's INR and lipase trended downwards, she was taken to surgery for a cholecystectomy with intraoperative cholangiogram for definitive treatment of her symptomatic gallstone disease  Postoperatively she tolerated her diet and her pain was managed  She will be given a script for norco 5/325  Additionally, the patient sustained a laceration to her left lower extremity on 5/25/18 and 10 stitches were placed at [facility] ED at that time  She was to have these removed by her PCP on 6/4/18  However they were removed during her hospital stay here  In conclusion she is stable for discharge at this time with recommended follow-up with General surgery as an outpatient-Dr Fran Dior in 2 weeks  VNA services have been set up    She will also be asked to follow up with PCP-Dr Shoenberger for further monitoring of Coumadin/INR  Condition at Discharge: stable     Discharge Day Visit / Exam:     Subjective:  Patient sitting in chair at bedside  She is doing well  She still has occasional right upper quadrant discomfort however pain is manageable with oral pain medication  She is tolerating a diet without any nausea or vomiting  Stitches were removed from her left lower extremity yesterday  She will be asked to follow up with General surgery in 2 weeks as an outpatient  Also asked to follow up with PCP for further monitoring of Coumadin and INR  Home VNA services set up  Denies any chest pain, chest pressure, palpitations, shortness of breath, nausea, vomiting, diarrhea, constipation  Vitals: Blood Pressure: 161/66 (06/06/18 0730)  Pulse: 65 (06/06/18 0730)  Temperature: 99 °F (37 2 °C) (06/06/18 0730)  Temp Source: Temporal (06/06/18 0730)  Respirations: 18 (06/06/18 0730)  Height: 5' 5" (165 1 cm) (06/01/18 0035)  Weight - Scale: 85 7 kg (188 lb 15 oz) (06/01/18 0035)  SpO2: 96 % (06/06/18 0730)     Exam:   Physical Exam   Constitutional: She is oriented to person, place, and time  She appears well-developed and well-nourished  No distress  HENT:   Head: Normocephalic and atraumatic  Eyes: Conjunctivae are normal    Cardiovascular: Normal rate, regular rhythm and normal heart sounds  Pulmonary/Chest: Effort normal and breath sounds normal  No respiratory distress  She has no wheezes  She has no rales  She exhibits no tenderness  Abdominal: Soft  Bowel sounds are normal  She exhibits no distension and no mass  There is no tenderness  There is no rebound and no guarding  Abdominal incisions clean dry and intact   Neurological: She is alert and oriented to person, place, and time  Skin: Skin is warm and dry  She is not diaphoretic  Psychiatric: She has a normal mood and affect  Her behavior is normal    Nursing note and vitals reviewed        Discussion with Family:   via telephone    Discharge instructions/Information to patient and family:   See after visit summary for information provided to patient and family  Provisions for Follow-Up Care:  See after visit summary for information related to follow-up care and any pertinent home health orders  Planned Readmission: no     Discharge Statement:  I spent 46 minutes discharging the patient  This time was spent on the day of discharge  I had direct contact with the patient on the day of discharge  Greater than 50% of the total time was spent examining patient, answering all patient questions, arranging and discussing plan of care with patient as well as directly providing post-discharge instructions  Additional time then spent on discharge activities  Discharge Medications:  See after visit summary for reconciled discharge medications provided to patient and family        ** Please Note: This note has been constructed using a voice recognition system **

## 2018-06-06 NOTE — POST OP PROGRESS NOTES
Progress Note - General Surgery   Wash Poplin 80 y o  female MRN: [id number]  Unit/Bed#: E4 -01 Encounter: [id number]    Assessment:  Gallstone pancreatitis  POD #2 laparoscopic cholecystectomy    Plan:  Discharge home  Follow up as outpatient    Subjective/Objective   Chief Complaint: abdominal pain    Subjective: c/o shoulder pain, tolerating diet    Objective:     Blood pressure 161/66, pulse 65, temperature 99 °F (37 2 °C), temperature source Temporal, resp  rate 18, height 5' 5" (1 651 m), weight 85 7 kg (188 lb 15 oz), SpO2 96 %, not currently breastfeeding  ,Body mass index is 31 44 kg/m²  Intake/Output Summary (Last 24 hours) at 06/06/18 1004  Last data filed at 06/05/18 1845   Gross per 24 hour   Intake           1797 5 ml   Output             1000 ml   Net            797 5 ml       Invasive Devices     Peripheral Intravenous Line            Peripheral IV 06/04/18 Left Forearm 2 days                Physical Exam: General appearance: alert and oriented, in no acute distress  Lungs: clear to auscultation bilaterally  Heart: regular rate and rhythm, S1, S2 normal, no murmur, click, rub or gallop  Abdomen: soft, non-tender; bowel sounds normal; no masses,  no organomegaly  Extremities: extremities normal, warm and well-perfused; no cyanosis, clubbing, or edema  incisions: c/d/i    Lab, Imaging and other studies:I have personally reviewed pertinent lab results      VTE Pharmacologic Prophylaxis: Heparin  VTE Mechanical Prophylaxis: sequential compression device

## 2018-06-06 NOTE — NURSING NOTE
AVS reviewed with patient; script given to patient; EMS came to  patient with wheelchair to transport home

## 2018-06-06 NOTE — PLAN OF CARE
Problem: OCCUPATIONAL THERAPY ADULT  Goal: Performs self-care activities at highest level of function for planned discharge setting  See evaluation for individualized goals  Treatment Interventions: ADL retraining, Functional transfer training, UE strengthening/ROM, Endurance training, Patient/family training, Equipment evaluation/education, Compensatory technique education, Continued evaluation, Energy conservation, Activityengagement          See flowsheet documentation for full assessment, interventions and recommendations  Outcome: Progressing  Limitation: Decreased ADL status, Decreased endurance, Decreased self-care trans, Decreased high-level ADLs  Prognosis: Fair  Assessment: Pt seen for OT treatment session this AM focusing on functional activity tolerance, bed mobility, ADLs, and functional transfers w/ use of Quick Move  Pt alert and cooperative throughout session  Pt lying supine at start of session, requiring Mod A of supine>sit 2* assist w/ LE management and trunk control  Transfers (sit<>stand, Quick Move<>BSC) completed w/ Min A of 2 2* assist required to initiate forward weight shift for sit>stand and assist for controlled descent to surface for stand>sit  Toileting tasks completed w/ Total A 2* pt requiring BUE support on Quick Move when standing for perineal hygiene  Pt able to tolerate approx  2 mins of standing for toileting tasks prior to requesting seated rest break  ADL routine completed while seated OOB in chair  UB bathing completed w/ Supervision 2* setup required and increased time to complete  LB bathing completed w/ Max A  Pt able to wash B/L upper legs w/ Supervision, however required assist to wash buttocks, perineal area, B/L lower legs, and B/L feet 2* decreased functional reach and pt requiring BUE support on Quick Move when standing  UB dressing completed w/ Min A to don/[facility] gown 2* assist required to bring gown down/around in back   LB dressing completed w/ Total A to don/doff B/L socks and don brief 2* pt reporting increased pain in abdomen when attempting to reach forward  Light grooming tasks completed w/ Supervision 2* setup required and increased time to complete  Pt seated OOB in chair at end of session w/ call bell and phone within reach  All needs met and pt reports no further questions for OT at this time  Continue to recommend Home OT upon D/C  OT to follow pt on caseload        OT Discharge Recommendation: Home OT  OT - OK to Discharge: Yes (when medically cleared)

## 2018-06-06 NOTE — OCCUPATIONAL THERAPY NOTE
OccupationalTherapy Progress Note     Patient Name: Piyush Funez  XMXHG'M Date: 6/6/2018  Problem List  Patient Active Problem List   Diagnosis    Wheelchair dependence    Diabetes mellitus ([facility] [address] )    A-fib ([facility] )    Acute cholecystitis    Elevated troponin    Gallstone pancreatitis         06/06/18 5623   Restrictions/Precautions   Weight Bearing Precautions Per Order No   Other Precautions Bed Alarm; Fall Risk;Pain   Lifestyle   Autonomy At baseline, pt reports assist w/ ADLs and IADLs, use of electric WC in home environment for mobility, and use of sit to stand mechanical lift for mobility (however pt reports able to pull self into standing position during ADLs)  Reciprocal Relationships Lives w/ spouse and dtr   Service to Others Retired   Intrinsic Gratification Watching TV   Pain Assessment   Pain Assessment 0-10   Pain Score 5   Pain Type Surgical pain   Pain Location Abdomen; Shoulder   Pain Orientation Right   Pain Descriptors Aching   Effect of Pain on Daily Activities Increased pain w/ activity   Hospital Pain Intervention(s) Repositioned; Ambulation/increased activity; Emotional support   Response to Interventions Tolerated   Multiple Pain Sites No   ADL   Where Assessed Chair   Grooming Assistance 5  Supervision/Setup   Grooming Deficit Setup;Supervision/safety; Increased time to complete   Grooming Comments Light grooming tasks completed w/ Supervision 2* setup required and increased time to complete  UB Bathing Assistance 5  Supervision/Setup   UB Bathing Deficit Setup;Supervision/safety; Increased time to complete   UB Bathing Comments UB bathing completed w/ Supervision 2* setup required and increased time to complete  LB Bathing Assistance 2  Maximal Assistance   LB Bathing Deficit Setup;Steadying;Supervision/safety;Verbal cueing; Increased time to complete; Buttocks; Perineal area;Right lower leg including foot; Left lower leg including foot   LB Bathing Comments LB bathing completed w/ Max A  Pt able to wash B/L upper legs w/ Supervision, however required assist to wash buttocks, perineal area, B/L lower legs, and B/L feet 2* decreased functional reach and pt requiring BUE support on Quick Move when standing  UB Dressing Assistance 4  Minimal Assistance   UB Dressing Deficit Setup;Verbal cueing;Supervision/safety; Increased time to complete;Pull around back;Pull down in back   UB Dressing Comments UB dressing completed w/ Min A to don/doff hospital gown 2* assist required to bring gown down/around in back  LB Dressing Assistance 1  Total Assistance   LB Dressing Deficit Setup;Steadying; Requires assistive device for steadying;Verbal cueing;Supervision/safety; Increased time to complete; Don/doff R sock; Don/doff L sock; Thread RLE into underwear; Thread LLE into underwear;Pull up over hips   LB Dressing Comments LB dressing completed w/ Total A to don/doff B/L socks and don brief 2* pt reporting increased pain in abdomen when attempting to reach forward  Toileting Assistance  1  Total Assistance   Toileting Deficit Setup;Perineal hygiene   Toileting Comments Toileting tasks completed w/ Total A 2* pt requiring BUE support on Quick Move when standing for perineal hygiene  Functional Standing Tolerance   Time 2 Mins   Activity Toileting tasks   Comments Pt able to tolerate approx  2 mins of standing for toileting tasks prior to requesting seated rest break  Bed Mobility   Supine to Sit 3  Moderate assistance   Additional items Assist x 1;Bedrails; Increased time required;Verbal cues;LE management   Sit to Supine Unable to assess  (Pt seated OOB in chair at end of session)   Additional Comments Pt seated OOB in chair at end of session w/ call bell and phone within reach  All needs met and pt reports no further questions for OT at this time  Transfers   Sit to Stand 4  Minimal assistance   Additional items Assist x 2;Bedrails; Increased time required;Verbal cues  (use of quick move)   Stand to Sit 4 Minimal assistance   Additional items Assist x 2;Armrests; Increased time required;Verbal cues  (use of quick move)   Mechanical lift 4  Minimal assistance   Additional items Assist x 2;Armrests; Increased time required;Verbal cues  (use of quick move)   Additional Comments Transfers (sit<>stand, Quick Move<>BSC) completed w/ Min A of 2 2* assist required to initiate forward weight shift for sit>stand and assist for controlled descent to surface for stand>sit  Cognition   Overall Cognitive Status WFL   Arousal/Participation Alert; Cooperative   Attention Within functional limits   Orientation Level Oriented X4   Memory Within functional limits   Following Commands Follows one step commands without difficulty   Activity Tolerance   Activity Tolerance Patient limited by fatigue;Patient limited by pain   Medical Staff Made Aware Pt able to be seen per RN Cassandra   Assessment   Assessment Pt seen for OT treatment session this AM focusing on functional activity tolerance, bed mobility, ADLs, and functional transfers w/ use of Quick Move  Pt alert and cooperative throughout session  Pt lying supine at start of session, requiring Mod A of supine>sit 2* assist w/ LE management and trunk control  Transfers (sit<>stand, Quick Move<>BSC) completed w/ Min A of 2 2* assist required to initiate forward weight shift for sit>stand and assist for controlled descent to surface for stand>sit  Toileting tasks completed w/ Total A 2* pt requiring BUE support on Quick Move when standing for perineal hygiene  Pt able to tolerate approx  2 mins of standing for toileting tasks prior to requesting seated rest break  ADL routine completed while seated OOB in chair  UB bathing completed w/ Supervision 2* setup required and increased time to complete  LB bathing completed w/ Max A   Pt able to wash B/L upper legs w/ Supervision, however required assist to wash buttocks, perineal area, B/L lower legs, and B/L feet 2* decreased functional reach and pt requiring BUE support on Quick Move when standing  UB dressing completed w/ Min A to don/do hospital gown 2* assist required to bring gown down/around in back  LB dressing completed w/ Total A to don/do B/L socks and don brief 2* pt reporting increased pain in abdomen when attempting to reach forward  Light grooming tasks completed w/ Supervision 2* setup required and increased time to complete  Pt seated OOB in chair at end of session w/ call bell and phone within reach  All needs met and pt reports no further questions for OT at this time  Continue to recommend Home OT upon D/C  OT to follow pt on caseload  Plan   Treatment Interventions ADL retraining;Functional transfer training;UE strengthening/ROM; Endurance training;Patient/family training;Equipment evaluation/education; Compensatory technique education; Energy conservation; Activityengagement   Goal Expiration Date 06/15/18   Treatment Day 2   OT Frequency 3-5x/wk   Recommendation   OT Discharge Recommendation Home OT   OT - OK to Discharge Yes  (when medically cleared)   Barthel Index   Feeding 10   Bathing 0   Grooming Score 5   Dressing Score 5   Bladder Score 5   Bowels Score 10   Toilet Use Score 5   Transfers (Bed/Chair) Score 5   Mobility (Level Surface) Score 0   Stairs Score 0   Barthel Index Score 45   Modified [place] Scale   Modified Cee Scale 4       Briseyda Mendoza OTR/L

## 2018-06-13 LAB
APTT PPP: 30 SEC (ref 23–34)
GLUCOSE SERPL-MCNC: 123 MG/DL (ref 70–99)
PT - I.N. RATIO (HISTORICAL): 1.2 RATIO (INR) (ref 0.89–1.1)
PT, PATIENT (HISTORICAL): 12.6 SEC (ref 9.5–11.6)

## 2018-06-27 ENCOUNTER — OFFICE VISIT (OUTPATIENT)
Dept: SURGERY | Facility: MEDICAL CENTER | Age: 83
End: 2018-06-27

## 2018-06-27 VITALS — TEMPERATURE: 96.9 F

## 2018-06-27 DIAGNOSIS — Z98.890 POST-OPERATIVE STATE: Primary | ICD-10-CM

## 2018-06-27 PROCEDURE — 99024 POSTOP FOLLOW-UP VISIT: CPT | Performed by: SURGERY

## 2018-06-27 NOTE — PATIENT INSTRUCTIONS
Patient is doing well postoperatively  She does not require further surgical follow-up  She should call the office with any new questions or concerns

## 2018-06-27 NOTE — PROGRESS NOTES
Assessment/Plan:   Zelda Sheriff is a 80 y  o female who comes in today for postoperative check after laparoscopic cholecystectomy done on 06/04/2018  Patient was hospitalized with gallstone pancreatitis  She recovered well and underwent  A laparoscopic cholecystectomy  On recent admission  Today patient continues to do well  Her incisions are well-healed  Her abdominal exam is benign  She is back to her normal activities  She is tolerating a full diet and has no issues with her bowels    Pathology: Reviewed with patient, all questions answered  No findings of malignant disease  Patient does not require further surgical follow-up unless she develops new symptoms or concerns  HPI:  Zelda Sheriff is a 80 y  o female who comes in today for postoperative check after recent    Laparoscopic cholecystectomy  Today patient is relatively asymptomatic  She denies any pain at incision sites  She is tolerating a regular diet  She has had no issues with diarrhea  She has had no nausea, vomiting, fevers, chills  She has resumed her normal diet and her appetite has returned  She has not needed pain medication for weeks  States her incisions have healed well  Patient  Does not ambulate due to her history of spinal stenosis and recent femur fracture  She has returned to her normal activities        ROS:  General ROS: negative for - chills, fatigue, fever or night sweats, weight loss  Respiratory ROS: no cough, shortness of breath, or wheezing  Cardiovascular ROS: no chest pain or dyspnea on exertion  Genito-Urinary ROS: no dysuria, trouble voiding, or hematuria  Musculoskeletal ROS: negative for - gait disturbance, joint pain or muscle pain  Neurological ROS: no TIA or stroke symptom  Abdomen:   As per HPI  Skin:  Surgical incision sites    ALLERGIES  Bactrim [sulfamethoxazole-trimethoprim] and Aspirin    Current Outpatient Prescriptions:     calcium gluconate 500 mg tablet, Take 500 mg by mouth every evening, Disp: , Rfl:     cholecalciferol (VITAMIN D3) 1,000 units tablet, Take 1,000 Units by mouth every evening, Disp: , Rfl:     cyanocobalamin (VITAMIN B-12) 1,000 mcg tablet, Take 1,000 mcg by mouth every evening  , Disp: , Rfl:     DIGOXIN PO, Take 250 mcg by mouth every morning, Disp: , Rfl:     fenofibrate (TRICOR) 48 mg tablet, Take 48 mg by mouth daily, Disp: , Rfl:     metFORMIN (GLUCOPHAGE) 1000 MG tablet, Take 1,000 mg by mouth 2 (two) times a day with meals, Disp: , Rfl:     Mirabegron ER (MYRBETRIQ) 50 MG TB24, Take 50 mg by mouth daily  , Disp: , Rfl:     spironolactone (ALDACTONE) 50 mg tablet, Take 50 mg by mouth daily, Disp: , Rfl:     WARFARIN SODIUM PO, Take 4 5 mg by mouth daily, Disp: , Rfl:   Past Medical History:   Diagnosis Date    [facility]fib ([facility] )     Arthritis     Assistance needed for mobility     pt can stand with assistance and transfer    Chronic pain disorder     Diabetes mellitus (HCC)     NIDDM    Full dentures     History of transfusion     no adverse reaction    Hyperlipidemia     Irregular heart beat     Numbness and tingling of both feet     Skin abnormality     sacrum area - small red area, less than a dime size    Spinal stenosis     [facility]     Unable to ambulate     Urinary incontinence     ipg stimulator x3 repakcements,battery exchange today 2/14/2018    Wears glasses     Wheelchair dependence      Past Surgical History:   Procedure Laterality Date    BACK SURGERY      fusion    BLADDER SUSPENSION      CARPAL TUNNEL RELEASE Bilateral     CHOLANGIOGRAM N/A 6/4/2018    Procedure: CHOLANGIOGRAM;  Surgeon: Hiral Ybarra MD;  [facility]: [facility];  Service: General    CHOLECYSTECTOMY LAPAROSCOPIC N/A 6/4/2018    Procedure: CHOLECYSTECTOMY LAPAROSCOPIC;  Surgeon: Hiral Ybarra MD;  [facility];  Service: General    COLONOSCOPY      DILATION AND CURETTAGE OF UTERUS      FRACTURE SURGERY Left     femur with hardware    HYSTERECTOMY      INSERTION / PLACEMENT / REVISION NEUROSTIMULATOR      JOINT REPLACEMENT Bilateral     knees    SACRAL NERVE STIMULATOR PLACEMENT N/A 2/14/2018    Procedure: REMOVE AND REPLACE IPG;  Surgeon: Keyshawn Marsh MD;  Location: [facility];  Service: UroGynecology           TONSILLECTOMY       No family history on file  reports that she has never smoked  She has never used smokeless tobacco  She reports that she drinks alcohol  She reports that she does not use drugs        Physical Exam     PHYSICAL EXAM  General:   Elderly female, cooperative, no distress,  Alert and oriented x3  Abdominal: soft, nondistended, nontender or normal bowel sounds  Incision: clean, dry, and intact and healing well    Brittany Mcmanus PA-C

## 2018-11-13 ENCOUNTER — OFFICE VISIT (OUTPATIENT)
Dept: URGENT CARE | Facility: CLINIC | Age: 83
End: 2018-11-13
Payer: COMMERCIAL

## 2018-11-13 ENCOUNTER — APPOINTMENT (OUTPATIENT)
Dept: RADIOLOGY | Facility: CLINIC | Age: 83
End: 2018-11-13
Payer: COMMERCIAL

## 2018-11-13 VITALS
SYSTOLIC BLOOD PRESSURE: 167 MMHG | DIASTOLIC BLOOD PRESSURE: 68 MMHG | HEART RATE: 75 BPM | OXYGEN SATURATION: 100 % | TEMPERATURE: 98.2 F | RESPIRATION RATE: 22 BRPM | BODY MASS INDEX: 33.15 KG/M2 | HEIGHT: 65 IN | WEIGHT: 199 LBS

## 2018-11-13 DIAGNOSIS — L03.116 CELLULITIS OF LEFT FOOT: Primary | ICD-10-CM

## 2018-11-13 DIAGNOSIS — S99.922A FOOT INJURY, LEFT, INITIAL ENCOUNTER: ICD-10-CM

## 2018-11-13 PROCEDURE — 99203 OFFICE O/P NEW LOW 30 MIN: CPT | Performed by: PHYSICIAN ASSISTANT

## 2018-11-13 PROCEDURE — 73630 X-RAY EXAM OF FOOT: CPT

## 2018-11-13 RX ORDER — CEPHALEXIN 500 MG/1
500 CAPSULE ORAL EVERY 6 HOURS SCHEDULED
Qty: 40 CAPSULE | Refills: 0 | Status: SHIPPED | OUTPATIENT
Start: 2018-11-13 | End: 2018-11-23

## 2018-11-13 RX ORDER — SPIRONOLACTONE 50 MG/1
50 TABLET, FILM COATED ORAL DAILY
COMMUNITY
Start: 2014-08-24 | End: 2020-03-12 | Stop reason: HOSPADM

## 2018-11-13 RX ORDER — DIGOXIN 250 MCG
250 TABLET ORAL DAILY
Status: ON HOLD | COMMUNITY
Start: 2014-08-24 | End: 2019-06-14 | Stop reason: SDUPTHER

## 2018-11-13 RX ORDER — BIOTIN 1 MG
TABLET ORAL
COMMUNITY
End: 2018-11-13 | Stop reason: SDUPTHER

## 2018-11-13 NOTE — PROGRESS NOTES
[facility]        NAME: Beny Oneill is a 80 y o  female  : 1935    MRN: [id number]  DATE: 2018  TIME: 2:31 PM    Assessment and Plan   Cellulitis of left foot [L03 [id number]]  1  Cellulitis of left foot  XR foot 3+ vw left    cephalexin (KEFLEX) 500 mg capsule         Patient Instructions     No fracture noted on xray, will call if radiology report differs  Take keflex 4 times daily until gone  Take with food and eat yogurt or a probiotic daily to decrease GI upset  Ice to the foot and elevation frequently  Watch for signs of worsening infection- spreading redness, increased drainage, increased pain  Follow up with PCP in 3-5 days  Proceed to  ER if symptoms worsen  Chief Complaint     Chief Complaint   Patient presents with    Foot Pain     thursday pt hit her left foot under her bed  pt is having pain 6/10, no numbness/tingling  open abrasion noted along with bruising and swelling  History of Present Illness       This is an 80year old female PMH DM2 and A-fib currently on coumadin presenting for injury to her left foot 5 days prior  She states that she was turning in her wheelchair when her foot hit the underside of her bed  She states that it was very painful initially, with a lot of swelling  She sustained a laceration to the top of her foot along with significant bruising  She states that she did notice a small amount of pus drainage from the laceration a few days ago  She is wheelchair bound and has not put weight on the foot  She applied black salve to the wound which she states seemed to help  She continues to have significant swelling, spreading redness around the top of the foot, bruising into the toes, tenderness to palpation, and serous drainage  She denies any fevers, redness spreading beyond the top of the foot, or calf pain  Review of Systems   Review of Systems   Constitutional: Negative for fever     Musculoskeletal: Positive for gait problem and joint swelling  Skin: Positive for color change and wound  Neurological: Negative for dizziness, weakness and numbness           Current Medications       Current Outpatient Prescriptions:     calcium gluconate 500 mg tablet, Take 500 mg by mouth every evening, Disp: , Rfl:     cholecalciferol (VITAMIN D3) 1,000 units tablet, Take 1,000 Units by mouth every evening, Disp: , Rfl:     cyanocobalamin (VITAMIN B-12) 1,000 mcg tablet, Take 1,000 mcg by mouth every evening  , Disp: , Rfl:     digoxin (LANOXIN) 0 25 mg tablet, Take by mouth, Disp: , Rfl:     fenofibrate (TRICOR) 48 mg tablet, Take 48 mg by mouth daily, Disp: , Rfl:     metFORMIN (GLUCOPHAGE) 1000 MG tablet, Take 1,000 mg by mouth 2 (two) times a day with meals, Disp: , Rfl:     Mirabegron ER (MYRBETRIQ) 50 MG TB24, Take 50 mg by mouth daily  , Disp: , Rfl:     spironolactone (ALDACTONE) 50 mg tablet, Take by mouth, Disp: , Rfl:     WARFARIN SODIUM PO, Take 4 mg by mouth daily  , Disp: , Rfl:     cephalexin (KEFLEX) 500 mg capsule, Take 1 capsule (500 mg total) by mouth every 6 (six) hours for 10 days, Disp: 40 capsule, Rfl: 0    Current Allergies     Allergies as of 11/13/2018 - Reviewed 11/13/2018   Allergen Reaction Noted    Bactrim [sulfamethoxazole-trimethoprim] Swelling 01/31/2018    Aspirin Other (See Comments) 12/30/2014            The following portions of the patient's history were reviewed and updated as appropriate: allergies, current medications, past family history, past medical history, past social history, past surgical history and problem list      Past Medical History:   Diagnosis Date    [facility]     Arthritis     Assistance needed for mobility     pt can stand with assistance and transfer    Chronic pain disorder     Diabetes mellitus (HCC)     NIDDM    Full dentures     History of transfusion     no adverse reaction    Hyperlipidemia     Irregular heart beat     Numbness and tingling of both feet     Skin abnormality     sacrum area - small red area, less than a dime size    Spinal stenosis     Stroke ([address] )     Unable to ambulate     Urinary incontinence     ipg stimulator x3 repakcements,battery exchange today 2/14/2018    Wears glasses     Wheelchair dependence        Past Surgical History:   Procedure Laterality Date    BACK SURGERY      fusion    BLADDER SUSPENSION      CARPAL TUNNEL RELEASE Bilateral     CHOLANGIOGRAM N/A 6/4/2018    Procedure: CHOLANGIOGRAM;  Surgeon: Steve Sy MD;  [facility];  Service: General    CHOLECYSTECTOMY LAPAROSCOPIC N/A 6/4/2018    Procedure: CHOLECYSTECTOMY LAPAROSCOPIC;  Surgeon: Steve Sy MD;  [facility];  Service: General    COLONOSCOPY      DILATION AND CURETTAGE OF UTERUS      FRACTURE SURGERY Left     femur with hardware    HYSTERECTOMY      INSERTION / Estevan En / REVISION NEUROSTIMULATOR      JOINT REPLACEMENT Bilateral     knees    SACRAL NERVE STIMULATOR PLACEMENT N/A 2/14/2018    Procedure: REMOVE AND REPLACE IPG;  Surgeon: Ajay Partida MD;  [facility];  Service: UroGynecology           TONSILLECTOMY         Family History   Problem Relation Age of Onset    No Known Problems Mother     No Known Problems Father          Medications have been verified  Objective   /68 (BP Location: Left arm, Patient Position: Sitting)   Pulse 75   Temp 98 2 °F (36 8 °C) (Tympanic)   Resp 22   Ht 5' 5" (1 651 m)   Wt 90 3 kg (199 lb)   SpO2 100%   BMI 33 12 kg/m²        Physical Exam     Physical Exam   Constitutional: She appears well-developed and well-nourished  No distress  HENT:   Nose: Nose normal    Cardiovascular: Normal rate, regular rhythm and normal heart sounds  Pulmonary/Chest: Effort normal and breath sounds normal  No respiratory distress  She has no decreased breath sounds  She has no wheezes  She has no rhonchi  She has no rales     Musculoskeletal:   Left foot: There is significant edema to the top of the foot with spreading erythema  Tenderness to palpation over the edematous areas  There is ecchymosis around the swelling and going into the toes  There is a 1 cm in diameter opening in the skin, covered with black salve  FAROM of the ankle and toes  Sensation grossly intact to light touch  Ankle strength equal bilaterally  No fluctuance or drainage noted  Neurological: She is alert  Skin: Skin is warm and dry  She is not diaphoretic  Nursing note and vitals reviewed

## 2018-11-13 NOTE — PATIENT INSTRUCTIONS
No fracture noted on xray, will call if radiology report differs  Take keflex 4 times daily until gone  Take with food and eat yogurt or a probiotic daily to decrease GI upset  Ice to the foot and elevation frequently  Watch for signs of worsening infection- spreading redness, increased drainage, increased pain  Follow up with your PCP in 3-5 days for recheck  Go to the ER for any distress

## 2019-01-25 ENCOUNTER — HOSPITAL ENCOUNTER (INPATIENT)
Facility: HOSPITAL | Age: 84
LOS: 2 days | Discharge: HOME HEALTH | DRG: 153 | End: 2019-01-28
Attending: EMERGENCY MEDICINE | Admitting: INTERNAL MEDICINE
Payer: COMMERCIAL

## 2019-01-25 ENCOUNTER — APPOINTMENT (EMERGENCY)
Dept: RADIOLOGY | Facility: HOSPITAL | Age: 84
DRG: 153 | End: 2019-01-25
Payer: COMMERCIAL

## 2019-01-25 DIAGNOSIS — E86.0 DEHYDRATION: ICD-10-CM

## 2019-01-25 DIAGNOSIS — R53.1 WEAKNESS: Primary | ICD-10-CM

## 2019-01-25 LAB
BASOPHILS # BLD AUTO: 0.05 THOUSANDS/ΜL (ref 0–0.1)
BASOPHILS NFR BLD AUTO: 0 % (ref 0–1)
EOSINOPHIL # BLD AUTO: 0.05 THOUSAND/ΜL (ref 0–0.61)
EOSINOPHIL NFR BLD AUTO: 0 % (ref 0–6)
ERYTHROCYTE [DISTWIDTH] IN BLOOD BY AUTOMATED COUNT: 12.8 % (ref 11.6–15.1)
HCT VFR BLD AUTO: 49.1 % (ref 34.8–46.1)
HGB BLD-MCNC: 15.7 G/DL (ref 11.5–15.4)
IMM GRANULOCYTES # BLD AUTO: 0.06 THOUSAND/UL (ref 0–0.2)
IMM GRANULOCYTES NFR BLD AUTO: 1 % (ref 0–2)
LYMPHOCYTES # BLD AUTO: 1.22 THOUSANDS/ΜL (ref 0.6–4.47)
LYMPHOCYTES NFR BLD AUTO: 9 % (ref 14–44)
MCH RBC QN AUTO: 29.4 PG (ref 26.8–34.3)
MCHC RBC AUTO-ENTMCNC: 32 G/DL (ref 31.4–37.4)
MCV RBC AUTO: 92 FL (ref 82–98)
MONOCYTES # BLD AUTO: 1.17 THOUSAND/ΜL (ref 0.17–1.22)
MONOCYTES NFR BLD AUTO: 9 % (ref 4–12)
NEUTROPHILS # BLD AUTO: 10.5 THOUSANDS/ΜL (ref 1.85–7.62)
NEUTS SEG NFR BLD AUTO: 81 % (ref 43–75)
NRBC BLD AUTO-RTO: 0 /100 WBCS
PLATELET # BLD AUTO: 193 THOUSANDS/UL (ref 149–390)
PMV BLD AUTO: 10.6 FL (ref 8.9–12.7)
RBC # BLD AUTO: 5.34 MILLION/UL (ref 3.81–5.12)
WBC # BLD AUTO: 13.05 THOUSAND/UL (ref 4.31–10.16)

## 2019-01-25 PROCEDURE — 71046 X-RAY EXAM CHEST 2 VIEWS: CPT

## 2019-01-25 PROCEDURE — 83605 ASSAY OF LACTIC ACID: CPT | Performed by: EMERGENCY MEDICINE

## 2019-01-25 PROCEDURE — 87631 RESP VIRUS 3-5 TARGETS: CPT | Performed by: EMERGENCY MEDICINE

## 2019-01-25 PROCEDURE — 36415 COLL VENOUS BLD VENIPUNCTURE: CPT | Performed by: EMERGENCY MEDICINE

## 2019-01-25 PROCEDURE — 85025 COMPLETE CBC W/AUTO DIFF WBC: CPT | Performed by: EMERGENCY MEDICINE

## 2019-01-25 PROCEDURE — 99285 EMERGENCY DEPT VISIT HI MDM: CPT

## 2019-01-25 PROCEDURE — 84484 ASSAY OF TROPONIN QUANT: CPT | Performed by: EMERGENCY MEDICINE

## 2019-01-25 PROCEDURE — 96360 HYDRATION IV INFUSION INIT: CPT

## 2019-01-25 PROCEDURE — 93005 ELECTROCARDIOGRAM TRACING: CPT

## 2019-01-25 PROCEDURE — 83880 ASSAY OF NATRIURETIC PEPTIDE: CPT | Performed by: EMERGENCY MEDICINE

## 2019-01-25 PROCEDURE — 80048 BASIC METABOLIC PNL TOTAL CA: CPT | Performed by: EMERGENCY MEDICINE

## 2019-01-25 RX ADMIN — SODIUM CHLORIDE 500 ML: 0.9 INJECTION, SOLUTION INTRAVENOUS at 23:32

## 2019-01-26 PROBLEM — R32 URINARY INCONTINENCE: Status: ACTIVE | Noted: 2019-01-26

## 2019-01-26 PROBLEM — R53.1 GENERALIZED WEAKNESS: Status: ACTIVE | Noted: 2019-01-26

## 2019-01-26 LAB
ANION GAP SERPL CALCULATED.3IONS-SCNC: 11 MMOL/L (ref 4–13)
ANION GAP SERPL CALCULATED.3IONS-SCNC: 8 MMOL/L (ref 4–13)
BACTERIA UR QL AUTO: ABNORMAL /HPF
BASOPHILS # BLD AUTO: 0.06 THOUSANDS/ΜL (ref 0–0.1)
BASOPHILS NFR BLD AUTO: 1 % (ref 0–1)
BILIRUB UR QL STRIP: NEGATIVE
BUN SERPL-MCNC: 13 MG/DL (ref 5–25)
BUN SERPL-MCNC: 14 MG/DL (ref 5–25)
CALCIUM SERPL-MCNC: 10.9 MG/DL (ref 8.3–10.1)
CALCIUM SERPL-MCNC: 9.5 MG/DL (ref 8.3–10.1)
CHLORIDE SERPL-SCNC: 100 MMOL/L (ref 100–108)
CHLORIDE SERPL-SCNC: 95 MMOL/L (ref 100–108)
CLARITY UR: CLEAR
CO2 SERPL-SCNC: 25 MMOL/L (ref 21–32)
CO2 SERPL-SCNC: 30 MMOL/L (ref 21–32)
COLOR UR: YELLOW
CREAT SERPL-MCNC: 0.83 MG/DL (ref 0.6–1.3)
CREAT SERPL-MCNC: 1.02 MG/DL (ref 0.6–1.3)
DIGOXIN SERPL-MCNC: 1.3 NG/ML (ref 0.8–2)
EOSINOPHIL # BLD AUTO: 0.13 THOUSAND/ΜL (ref 0–0.61)
EOSINOPHIL NFR BLD AUTO: 1 % (ref 0–6)
ERYTHROCYTE [DISTWIDTH] IN BLOOD BY AUTOMATED COUNT: 12.7 % (ref 11.6–15.1)
FLUAV AG SPEC QL IA: NEGATIVE
FLUAV AG SPEC QL: NORMAL
FLUBV AG SPEC QL IA: NEGATIVE
FLUBV AG SPEC QL: NORMAL
GFR SERPL CREATININE-BSD FRML MDRD: 51 ML/MIN/1.73SQ M
GFR SERPL CREATININE-BSD FRML MDRD: 65 ML/MIN/1.73SQ M
GLUCOSE SERPL-MCNC: 105 MG/DL (ref 65–140)
GLUCOSE SERPL-MCNC: 126 MG/DL (ref 65–140)
GLUCOSE SERPL-MCNC: 130 MG/DL (ref 65–140)
GLUCOSE SERPL-MCNC: 141 MG/DL (ref 65–140)
GLUCOSE SERPL-MCNC: 147 MG/DL (ref 65–140)
GLUCOSE SERPL-MCNC: 161 MG/DL (ref 65–140)
GLUCOSE SERPL-MCNC: 96 MG/DL (ref 65–140)
GLUCOSE UR STRIP-MCNC: NEGATIVE MG/DL
HCT VFR BLD AUTO: 45 % (ref 34.8–46.1)
HGB BLD-MCNC: 14.4 G/DL (ref 11.5–15.4)
HGB UR QL STRIP.AUTO: ABNORMAL
IMM GRANULOCYTES # BLD AUTO: 0.04 THOUSAND/UL (ref 0–0.2)
IMM GRANULOCYTES NFR BLD AUTO: 0 % (ref 0–2)
INR PPP: 2.58 (ref 0.86–1.17)
KETONES UR STRIP-MCNC: NEGATIVE MG/DL
LACTATE SERPL-SCNC: 1.6 MMOL/L (ref 0.5–2)
LEUKOCYTE ESTERASE UR QL STRIP: NEGATIVE
LYMPHOCYTES # BLD AUTO: 1.97 THOUSANDS/ΜL (ref 0.6–4.47)
LYMPHOCYTES NFR BLD AUTO: 18 % (ref 14–44)
MCH RBC QN AUTO: 29.6 PG (ref 26.8–34.3)
MCHC RBC AUTO-ENTMCNC: 32 G/DL (ref 31.4–37.4)
MCV RBC AUTO: 92 FL (ref 82–98)
MONOCYTES # BLD AUTO: 1.17 THOUSAND/ΜL (ref 0.17–1.22)
MONOCYTES NFR BLD AUTO: 11 % (ref 4–12)
NEUTROPHILS # BLD AUTO: 7.59 THOUSANDS/ΜL (ref 1.85–7.62)
NEUTS SEG NFR BLD AUTO: 69 % (ref 43–75)
NITRITE UR QL STRIP: NEGATIVE
NON-SQ EPI CELLS URNS QL MICRO: ABNORMAL /HPF
NRBC BLD AUTO-RTO: 0 /100 WBCS
NT-PROBNP SERPL-MCNC: 2141 PG/ML
PH UR STRIP.AUTO: 5.5 [PH] (ref 4.5–8)
PLATELET # BLD AUTO: 148 THOUSANDS/UL (ref 149–390)
PMV BLD AUTO: 9.9 FL (ref 8.9–12.7)
POTASSIUM SERPL-SCNC: 4.3 MMOL/L (ref 3.5–5.3)
POTASSIUM SERPL-SCNC: 4.5 MMOL/L (ref 3.5–5.3)
PROT UR STRIP-MCNC: NEGATIVE MG/DL
PROTHROMBIN TIME: 27.7 SECONDS (ref 11.8–14.2)
RBC # BLD AUTO: 4.87 MILLION/UL (ref 3.81–5.12)
RBC #/AREA URNS AUTO: ABNORMAL /HPF
RSV B RNA SPEC QL NAA+PROBE: NORMAL
SODIUM SERPL-SCNC: 133 MMOL/L (ref 136–145)
SODIUM SERPL-SCNC: 136 MMOL/L (ref 136–145)
SP GR UR STRIP.AUTO: 1.02 (ref 1–1.03)
TROPONIN I SERPL-MCNC: 0.02 NG/ML
UROBILINOGEN UR QL STRIP.AUTO: 0.2 E.U./DL
WBC # BLD AUTO: 10.96 THOUSAND/UL (ref 4.31–10.16)
WBC #/AREA URNS AUTO: ABNORMAL /HPF

## 2019-01-26 PROCEDURE — 82948 REAGENT STRIP/BLOOD GLUCOSE: CPT

## 2019-01-26 PROCEDURE — 81001 URINALYSIS AUTO W/SCOPE: CPT

## 2019-01-26 PROCEDURE — 99223 1ST HOSP IP/OBS HIGH 75: CPT | Performed by: PHYSICIAN ASSISTANT

## 2019-01-26 PROCEDURE — 85025 COMPLETE CBC W/AUTO DIFF WBC: CPT | Performed by: PHYSICIAN ASSISTANT

## 2019-01-26 PROCEDURE — 85610 PROTHROMBIN TIME: CPT | Performed by: PHYSICIAN ASSISTANT

## 2019-01-26 PROCEDURE — 80048 BASIC METABOLIC PNL TOTAL CA: CPT | Performed by: PHYSICIAN ASSISTANT

## 2019-01-26 PROCEDURE — 80162 ASSAY OF DIGOXIN TOTAL: CPT | Performed by: PHYSICIAN ASSISTANT

## 2019-01-26 RX ORDER — SODIUM CHLORIDE 9 MG/ML
75 INJECTION, SOLUTION INTRAVENOUS CONTINUOUS
Status: DISCONTINUED | OUTPATIENT
Start: 2019-01-26 | End: 2019-01-26

## 2019-01-26 RX ORDER — DIGOXIN 125 MCG
250 TABLET ORAL DAILY
Status: DISCONTINUED | OUTPATIENT
Start: 2019-01-26 | End: 2019-01-28 | Stop reason: HOSPADM

## 2019-01-26 RX ORDER — OXYBUTYNIN CHLORIDE 10 MG/1
10 TABLET, EXTENDED RELEASE ORAL DAILY
Status: DISCONTINUED | OUTPATIENT
Start: 2019-01-26 | End: 2019-01-28

## 2019-01-26 RX ORDER — ONDANSETRON 2 MG/ML
4 INJECTION INTRAMUSCULAR; INTRAVENOUS EVERY 8 HOURS PRN
Status: DISCONTINUED | OUTPATIENT
Start: 2019-01-26 | End: 2019-01-28 | Stop reason: HOSPADM

## 2019-01-26 RX ORDER — WARFARIN SODIUM 4 MG/1
4 TABLET ORAL DAILY
Status: DISCONTINUED | OUTPATIENT
Start: 2019-01-26 | End: 2019-01-28 | Stop reason: HOSPADM

## 2019-01-26 RX ORDER — CHOLECALCIFEROL (VITAMIN D3) 125 MCG
1000 CAPSULE ORAL EVERY EVENING
Status: DISCONTINUED | OUTPATIENT
Start: 2019-01-26 | End: 2019-01-28 | Stop reason: HOSPADM

## 2019-01-26 RX ORDER — CALCIUM CARBONATE 500(1250)
1 TABLET ORAL
Status: DISCONTINUED | OUTPATIENT
Start: 2019-01-26 | End: 2019-01-28 | Stop reason: HOSPADM

## 2019-01-26 RX ORDER — FENOFIBRATE 48 MG/1
48 TABLET, COATED ORAL DAILY
Status: DISCONTINUED | OUTPATIENT
Start: 2019-01-26 | End: 2019-01-28 | Stop reason: HOSPADM

## 2019-01-26 RX ORDER — MELATONIN
1000 EVERY EVENING
Status: DISCONTINUED | OUTPATIENT
Start: 2019-01-26 | End: 2019-01-28 | Stop reason: HOSPADM

## 2019-01-26 RX ORDER — ACETAMINOPHEN 325 MG/1
650 TABLET ORAL EVERY 6 HOURS PRN
Status: DISCONTINUED | OUTPATIENT
Start: 2019-01-26 | End: 2019-01-28 | Stop reason: HOSPADM

## 2019-01-26 RX ADMIN — WARFARIN SODIUM 4 MG: 4 TABLET ORAL at 18:20

## 2019-01-26 RX ADMIN — SODIUM CHLORIDE 75 ML/HR: 0.9 INJECTION, SOLUTION INTRAVENOUS at 05:15

## 2019-01-26 RX ADMIN — OXYBUTYNIN CHLORIDE 10 MG: 10 TABLET, EXTENDED RELEASE ORAL at 09:37

## 2019-01-26 RX ADMIN — VITAMIN D, TAB 1000IU (100/BT) 1000 UNITS: 25 TAB at 18:20

## 2019-01-26 RX ADMIN — CYANOCOBALAMIN TAB 500 MCG 1000 MCG: 500 TAB at 18:20

## 2019-01-26 RX ADMIN — INSULIN LISPRO 1 UNITS: 100 INJECTION, SOLUTION INTRAVENOUS; SUBCUTANEOUS at 12:24

## 2019-01-26 RX ADMIN — CALCIUM 1 TABLET: 500 TABLET ORAL at 18:20

## 2019-01-26 RX ADMIN — DIGOXIN 250 MCG: 125 TABLET ORAL at 09:37

## 2019-01-26 RX ADMIN — FENOFIBRATE 48 MG: 48 TABLET, FILM COATED ORAL at 09:38

## 2019-01-26 NOTE — ASSESSMENT & PLAN NOTE
Chronic wheelchair dependence  Has assistance at home during the days but minimal assistance at night

## 2019-01-26 NOTE — H&P
[address] [facility]  H&P- Corbin Hensley 1935, 80 y o  female MRN: [id number]  Unit/Bed#: ED 17 Encounter: [id number]  Primary Care Provider: Christen Marcelino MD   Date and time admitted to hospital: 1/25/2019 10:01 PM    * Generalized weakness   Assessment & Plan    Patient reporting full body weakness for the past week  Patient is wheelchair bound but typically able to move around and stand with assistance, patient reports she was unable to do this as she typically does because of the weakness  Likely secondary to URI, patient reporting fever prior to arrival at ED   Flu negative   WBC 13 05  Given IV fluid bolus in ED, will continue gentle IV hydration   Urine negative for UTI      A-fib [facility])   Assessment & Plan    Continue on digoxin   On coumadin will continue at home dose, will check INR      Urinary incontinence   Assessment & Plan    S/p ipg stimulator   Continue oxybutynin      Diabetes mellitus ([facility] )   Assessment & Plan    Lab Results   Component Value Date    HGBA1C 6 5 (H) 06/01/2018       No results for input(s): POCGLU in the last 72 hours  Blood Sugar Average: Last 72 hrs:     Hold oral Metformin  ACCU checks with SSI      Wheelchair dependence   Assessment & Plan    Chronic wheelchair dependence  Has assistance at home during the days but minimal assistance at night        VTE Prophylaxis: Warfarin (Coumadin)  / sequential compression device   Code Status: DNR DNI  POLST: POLST form is not discussed and not completed at this time  Discussion with family: caretaker at bedside    Anticipated Length of Stay:  Patient will be admitted on an Observation basis with an anticipated length of stay of  Less than 2 midnights  Justification for Hospital Stay: generalized weakness    Total Time for Visit, including Counseling / Coordination of Care: 45 minutes  Greater than 50% of this total time spent on direct patient counseling and coordination of care      Chief Complaint: Generalized weakness     History of Present Illness:    Joyce President is a 80 y o  female with a PMHx of DM2, A  Fib, ambulatory dysfunction and urinary incontinence who presents with generalized weakness  Patient reports she had a URI since the beginning of the week, reporting congestion, cough, fevers, chills, sore throat  Patient reports she began gradually feeling weaker over the past week, but reports she was unable to stand up with assistance or move herself around in bed as she typically is able to do today  Patient reports this evening she had a fever of 100 4  Patient reports she is beginning to feel better from her respiratory complaints, but reports the weakness has been getting more severe  Patient reports she feels she is still eating and drinking normally  Patient denies any pain  Patient also denies any shortness of breath, chest pain, palpitations, or leg swelling  Review of Systems:    Review of Systems   Constitutional: Positive for activity change, fatigue and fever  HENT: Positive for congestion, sinus pressure and sore throat  Negative for ear pain, nosebleeds, sneezing and trouble swallowing  Eyes: Negative for photophobia, pain and visual disturbance  Respiratory: Positive for cough  Negative for chest tightness, shortness of breath and wheezing  Cardiovascular: Negative for chest pain, palpitations and leg swelling  Gastrointestinal: Negative for abdominal pain, diarrhea, nausea and vomiting  Endocrine: Negative  Genitourinary: Negative for difficulty urinating, dysuria, flank pain and hematuria  Musculoskeletal: Positive for gait problem  Negative for back pain and neck stiffness  Skin: Negative for rash  Allergic/Immunologic: Negative  Neurological: Positive for weakness  Negative for syncope, speech difficulty and light-headedness  Hematological: Negative  Psychiatric/Behavioral: Negative for confusion and sleep disturbance     All other systems reviewed and are negative  Past Medical and Surgical History:     Past Medical History:   Diagnosis Date    A-fib ([facility] )     Arthritis     Assistance needed for mobility     pt can stand with assistance and transfer    Chronic pain disorder     Diabetes mellitus ([facility] )     NIDDM    Full dentures     History of transfusion     no adverse reaction    Hyperlipidemia     Irregular heart beat     Numbness and tingling of both feet     Skin abnormality     sacrum area - small red area, less than a dime size    Spinal stenosis     Stroke ([facility] )     Unable to ambulate     Urinary incontinence     ipg stimulator x3 repakcements,battery exchange today 2/14/2018    Wears glasses     Wheelchair dependence        Past Surgical History:   Procedure Laterality Date    BACK SURGERY      fusion    BLADDER SUSPENSION      CARPAL TUNNEL RELEASE Bilateral     CHOLANGIOGRAM N/A 6/4/2018    Procedure: CHOLANGIOGRAM;  Surgeon: Guilherme Meier MD;  [facility];  Service: General    CHOLECYSTECTOMY LAPAROSCOPIC N/A 6/4/2018    Procedure: CHOLECYSTECTOMY LAPAROSCOPIC;  Surgeon: Guilherme Meier MD;  [facility];  Service: General    COLONOSCOPY      DILATION AND CURETTAGE OF UTERUS      FRACTURE SURGERY Left     femur with hardware    HYSTERECTOMY      INSERTION / Justice Sherine / REVISION NEUROSTIMULATOR      JOINT REPLACEMENT Bilateral     knees    SACRAL NERVE STIMULATOR PLACEMENT N/A 2/14/2018    Procedure: REMOVE AND REPLACE IPG;  Surgeon: Brandon Qureshi MD;  [facility];  Service: UroGynecology           TONSILLECTOMY         Meds/Allergies:    Prior to Admission medications    Medication Sig Start Date End Date Taking?  Authorizing Provider   calcium gluconate 500 mg tablet Take 500 mg by mouth every evening   Yes Historical Provider, MD   cholecalciferol (VITAMIN D3) 1,000 units tablet Take 1,000 Units by mouth every evening   Yes Historical Provider, MD   cyanocobalamin (VITAMIN B-12) 1,000 mcg tablet Take 1,000 mcg by mouth every evening     Yes Historical Provider, MD   digoxin (LANOXIN) 0 25 mg tablet Take by mouth 8/24/14  Yes Historical Provider, MD   fenofibrate (TRICOR) 48 mg tablet Take 48 mg by mouth daily   Yes Historical Provider, MD   metFORMIN (GLUCOPHAGE) 1000 MG tablet Take 1,000 mg by mouth 2 (two) times a day with meals   Yes Historical Provider, MD   Mirabegron ER (MYRBETRIQ) 50 MG TB24 Take 50 mg by mouth daily     Yes Historical Provider, MD   spironolactone (ALDACTONE) 50 mg tablet Take by mouth 8/24/14  Yes Historical Provider, MD   WARFARIN SODIUM PO Take 4 mg by mouth daily     Yes Historical Provider, MD     I have reviewed home medications with patient personally  Allergies:    Allergies   Allergen Reactions    Bactrim [Sulfamethoxazole-Trimethoprim] Swelling     Swelling around eyes and red eyes    Aspirin Other (See Comments)     "feels like fist in my chest"    Ciprofloxacin     Nitrofurantoin     Other      "5mz/Tmp "  "1 60 mTab amme"     Per pt's allergy list        Social History:     Marital Status: /Civil Union   Occupation: retired  Patient Pre-hospital Living Situation: home with home help assistance  Patient Pre-hospital Level of Mobility: wheelchair bound  Patient Pre-hospital Diet Restrictions: diabetic   Substance Use History:   History   Alcohol Use    Yes     Comment: very rare     History   Smoking Status    Never Smoker   Smokeless Tobacco    Never Used     History   Drug Use No       Family History:    Family History   Problem Relation Age of Onset    No Known Problems Mother     No Known Problems Father        Physical Exam:     Vitals:   Blood Pressure: 162/71 (01/26/19 0234)  Pulse: 74 (01/26/19 0234)  Temperature: 98 5 °F (36 9 °C) (01/25/19 2159)  Temp Source: Oral (01/25/19 2159)  Respirations: 17 (01/26/19 0234)  SpO2: 98 % (01/26/19 0234)    Physical Exam   Constitutional: She is oriented to person, place, and time  She appears well-nourished  No distress  HENT:   Head: Normocephalic and atraumatic  Mouth/Throat: Oropharynx is clear and moist    Eyes: Conjunctivae and EOM are normal  No scleral icterus  Neck: Normal range of motion  Cardiovascular: Normal rate and normal heart sounds  An irregular rhythm present  No murmur heard  Pulmonary/Chest: Effort normal and breath sounds normal  No respiratory distress  She has no wheezes  She has no rales  Abdominal: Soft  She exhibits no distension  Musculoskeletal: Normal range of motion  She exhibits no edema  Neurological: She is alert and oriented to person, place, and time  No cranial nerve deficit  Skin: Skin is warm and dry  Bruising (multiple bruises on bilateral shins ) and lesion (wound on top of right foot ) noted  No rash noted  Psychiatric: She has a normal mood and affect  Her behavior is normal  Thought content normal    Vitals reviewed  Additional Data:     Lab Results: I have personally reviewed pertinent reports  Results from last 7 days  Lab Units 01/25/19  2330   WBC Thousand/uL 13 05*   HEMOGLOBIN g/dL 15 7*   HEMATOCRIT % 49 1*   PLATELETS Thousands/uL 193   NEUTROS PCT % 81*   LYMPHS PCT % 9*   MONOS PCT % 9   EOS PCT % 0       Results from last 7 days  Lab Units 01/25/19  2330   SODIUM mmol/L 133*   POTASSIUM mmol/L 4 5   CHLORIDE mmol/L 95*   CO2 mmol/L 30   BUN mg/dL 13   CREATININE mg/dL 1 02   ANION GAP mmol/L 8   CALCIUM mg/dL 10 9*   GLUCOSE RANDOM mg/dL 141*                   Results from last 7 days  Lab Units 01/25/19  2330   LACTIC ACID mmol/L 1 6       Imaging: I have personally reviewed pertinent reports        XR chest 2 views   ED Interpretation by April Elmore MD (01/26 0011)   Primary reviewed: no acute abnormality          EKG, Pathology, and Other Studies Reviewed on Admission:   · EKG: complete RBBB, 84bpm     Allscripts / Epic Records Reviewed: Yes     ** Please Note: This note has been constructed using a voice recognition system   **

## 2019-01-26 NOTE — ED PROVIDER NOTES
History  Chief Complaint   Patient presents with    Weakness - Generalized     per ems pt has had a cold x a few days, saw family doctor on tuesday for same  pt reports increasing weakness  79 y/o F presents for evaluation generalized weakness  Patient states she has been having generalized weakness which started gradually earlier this week it has been constant, getting progressively worse, currently rated as severe  Patient is usually able to put full oral self up with a lift assist was unable to do so today  Patient reports flu-like symptoms over the past 5 days with cough, congestion, sore throat, generalized body aches and malaise  No chest pain, shortness of breath, headache, recent fall/head trauma, focal neuro deficits or weakness, vertigo, urinary complaints  History provided by:  Patient      Prior to Admission Medications   Prescriptions Last Dose Informant Patient Reported? Taking?    Mirabegron ER (MYRBETRIQ) 50 MG TB24  Self Yes No   Sig: Take 50 mg by mouth daily     WARFARIN SODIUM PO  Self Yes No   Sig: Take 4 mg by mouth daily     calcium gluconate 500 mg tablet  Self Yes No   Sig: Take 500 mg by mouth every evening   cholecalciferol (VITAMIN D3) 1,000 units tablet  Self Yes No   Sig: Take 1,000 Units by mouth every evening   cyanocobalamin (VITAMIN B-12) 1,000 mcg tablet  Self Yes No   Sig: Take 1,000 mcg by mouth every evening     digoxin (LANOXIN) 0 25 mg tablet   Yes No   Sig: Take by mouth   fenofibrate (TRICOR) 48 mg tablet  Self Yes No   Sig: Take 48 mg by mouth daily   metFORMIN (GLUCOPHAGE) 1000 MG tablet  Self Yes No   Sig: Take 1,000 mg by mouth 2 (two) times a day with meals   spironolactone (ALDACTONE) 50 mg tablet   Yes No   Sig: Take by mouth      Facility-Administered Medications: None       Past Medical History:   Diagnosis Date    [facility]fib ([facility] )     Arthritis     Assistance needed for mobility     pt can stand with assistance and transfer    Chronic pain disorder  Diabetes mellitus ([facility] )     NIDDM    Full dentures     History of transfusion     no adverse reaction    Hyperlipidemia     Irregular heart beat     Numbness and tingling of both feet     Skin abnormality     sacrum area - small red area, less than a dime size    Spinal stenosis     Stroke ([facility] )     Unable to ambulate     Urinary incontinence     ipg stimulator x3 repakcements,battery exchange today 2/14/2018    Wears glasses     Wheelchair dependence        Past Surgical History:   Procedure Laterality Date    BACK SURGERY      fusion    BLADDER SUSPENSION      CARPAL TUNNEL RELEASE Bilateral     CHOLANGIOGRAM N/A 6/4/2018    Procedure: CHOLANGIOGRAM;  Surgeon: Edwige Mizra MD;  [facility];  Service: General    CHOLECYSTECTOMY LAPAROSCOPIC N/A 6/4/2018    Procedure: CHOLECYSTECTOMY LAPAROSCOPIC;  Surgeon: Edwige Mirza MD;  [facility];  Service: General    COLONOSCOPY      DILATION AND CURETTAGE OF UTERUS      FRACTURE SURGERY Left     femur with hardware    HYSTERECTOMY      INSERTION / Evelene Jonathan / REVISION NEUROSTIMULATOR      JOINT REPLACEMENT Bilateral     knees    SACRAL NERVE STIMULATOR PLACEMENT N/A 2/14/2018    Procedure: REMOVE AND REPLACE IPG;  Surgeon: Doris Bales MD;  [facility];  Service: UroGynecology           TONSILLECTOMY         Family History   Problem Relation Age of Onset    No Known Problems Mother     No Known Problems Father      I have reviewed and agree with the history as documented  Social History   Substance Use Topics    Smoking status: Never Smoker    Smokeless tobacco: Never Used    Alcohol use Yes      Comment: very rare        Review of Systems   Constitutional: Positive for chills and fatigue  Negative for activity change, appetite change and fever  HENT: Positive for congestion, rhinorrhea and sore throat  Negative for dental problem, ear pain, trouble swallowing and voice change      Eyes: Negative for pain and redness  Respiratory: Positive for cough  Negative for chest tightness, shortness of breath and wheezing  Cardiovascular: Negative for chest pain and palpitations  Gastrointestinal: Negative for abdominal pain, blood in stool, constipation, diarrhea, nausea and vomiting  Endocrine: Negative for cold intolerance and heat intolerance  Genitourinary: Negative for dysuria, frequency and hematuria  Musculoskeletal: Negative for arthralgias and myalgias  Skin: Negative for color change, pallor and rash  Neurological: Positive for weakness  Negative for numbness  Hematological: Does not bruise/bleed easily  Psychiatric/Behavioral: Negative for agitation, hallucinations and suicidal ideas  Physical Exam  Physical Exam   Constitutional: She is oriented to person, place, and time  She appears well-developed and well-nourished  HENT:   Mouth/Throat: No oropharyngeal exudate  TMs normal bilaterally +pharyngeal erythema no rhinorrhea nontender palpation of sinuses, normal looking turbinates   Eyes: Conjunctivae and EOM are normal    Neck: Normal range of motion  Neck supple  No meningeal signs   Cardiovascular: Normal rate, regular rhythm, normal heart sounds and intact distal pulses  Pulmonary/Chest: Effort normal and breath sounds normal  No respiratory distress  She has no wheezes  She has no rales  She exhibits no tenderness  Abdominal: Soft  Bowel sounds are normal  She exhibits no distension and no mass  There is no tenderness  No hernia  No cvat   Musculoskeletal: Normal range of motion  She exhibits no edema  Lymphadenopathy:     She has no cervical adenopathy  Neurological: She is alert and oriented to person, place, and time  No cranial nerve deficit  nonfocal neuro exam   Skin: No rash noted  No erythema  No edema   Psychiatric: She has a normal mood and affect  Her behavior is normal    Nursing note and vitals reviewed        Vital Signs  ED Triage Vitals [01/25/19 2159]   Temperature Pulse Respirations Blood Pressure SpO2   98 5 °F (36 9 °C) 95 16 156/72 97 %      Temp Source Heart Rate Source Patient Position - Orthostatic VS BP Location FiO2 (%)   Oral Monitor Sitting Right arm --      Pain Score       --           Vitals:    01/25/19 2159   BP: 156/72   Pulse: 95   Patient Position - Orthostatic VS: Sitting       Visual Acuity      ED Medications  Medications   sodium chloride 0 9 % bolus 500 mL (0 mL Intravenous Stopped 1/26/19 0048)       Diagnostic Studies  Results Reviewed     Procedure Component Value Units Date/Time    Rapid Influenza Screen with Reflex PCR [[id number]]  (Normal) Collected:  01/25/19 2330    Lab Status:  Final result Specimen:  Nasopharyngeal from Nasopharyngeal Swab Updated:  01/26/19 0005     Rapid Influenza A Ag Negative     Rapid Influenza B Ag Negative    INFLUENZA A/B AND RSV, PCR [[id number]] Collected:  01/25/19 2330    Lab Status: In process Specimen:  Nasopharyngeal from Nasopharyngeal Swab Updated:  01/26/19 2707    Basic metabolic panel [[id number]]  (Abnormal) Collected:  01/25/19 2330    Lab Status:  Final result Specimen:  Blood from Arm, Left Updated:  01/26/19 0005     Sodium 133 (L) mmol/L      Potassium 4 5 mmol/L      Chloride 95 (L) mmol/L      CO2 30 mmol/L      ANION GAP 8 mmol/L      BUN 13 mg/dL      Creatinine 1 02 mg/dL      Glucose 141 (H) mg/dL      Calcium 10 9 (H) mg/dL      eGFR 51 ml/min/1 73sq m     Narrative:         National Kidney Disease Education Program recommendations are as follows:  GFR calculation is accurate only with a steady state creatinine  Chronic Kidney disease less than 60 ml/min/1 73 sq  meters  Kidney failure less than 15 ml/min/1 73 sq  meters      B-type natriuretic peptide [[id number]]  (Abnormal) Collected:  01/25/19 2330    Lab Status:  Final result Specimen:  Blood from Arm, Left Updated:  01/26/19 0005     NT-proBNP 2,141 (H) pg/mL     Lactic acid, plasma [[id number]]  (Normal) Collected:  01/25/19 2330    Lab Status:  Final result Specimen:  Blood from Arm, Left Updated:  01/26/19 0003     LACTIC ACID 1 6 mmol/L     Narrative:         Result may be elevated if tourniquet was used during collection      Troponin I [[id number]]  (Normal) Collected:  01/25/19 2330    Lab Status:  Final result Specimen:  Blood from Arm, Left Updated:  01/26/19 0002     Troponin I 0 02 ng/mL     CBC and differential [[id number]]  (Abnormal) Collected:  01/25/19 2330    Lab Status:  Final result Specimen:  Blood from Arm, Left Updated:  01/25/19 2340     WBC 13 05 (H) Thousand/uL      RBC 5 34 (H) Million/uL      Hemoglobin 15 7 (H) g/dL      Hematocrit 49 1 (H) %      MCV 92 fL      MCH 29 4 pg      MCHC 32 0 g/dL      RDW 12 8 %      MPV 10 6 fL      Platelets 587 Thousands/uL      nRBC 0 /100 WBCs      Neutrophils Relative 81 (H) %      Immat GRANS % 1 %      Lymphocytes Relative 9 (L) %      Monocytes Relative 9 %      Eosinophils Relative 0 %      Basophils Relative 0 %      Neutrophils Absolute 10 50 (H) Thousands/µL      Immature Grans Absolute 0 06 Thousand/uL      Lymphocytes Absolute 1 22 Thousands/µL      Monocytes Absolute 1 17 Thousand/µL      Eosinophils Absolute 0 05 Thousand/µL      Basophils Absolute 0 05 Thousands/µL     POCT urinalysis dipstick [[id number]]     Lab Status:  No result Specimen:  Urine                  XR chest 2 views   ED Interpretation by Jami Bethea MD (01/26 0011)   Primary reviewed: no acute abnormality                 Procedures  ECG 12 Lead Documentation  Date/Time: 1/26/2019 12:42 AM  Performed by: Mendez Driver by: Chancy Spatz     ECG reviewed by me, the ED Provider: yes    Patient location:  ED  Rate:     ECG rate:  84    ECG rate assessment: normal    Rhythm:     Rhythm: paced    Pacing:     Capture:  Complete  Conduction:     Conduction: abnormal      Abnormal conduction: complete RBBB             Phone Contacts  ED Phone Contact    ED Course  ED Course as of Jan 26 0148   Sat Jan 26, 2019   0148 S/o to dr Eleni Marshall                                  MDM  Number of Diagnoses or Management Options  Diagnosis management comments: Generalized weakness in the setting of flu-like illness-will do cardiac/septic workup, IV fluids, assess patient's ability to perform ADLs    CritCare Time    Disposition  Final diagnoses:   None     ED Disposition     None      Follow-up Information    None         Patient's Medications   Discharge Prescriptions    No medications on file     No discharge procedures on file      ED Provider  Electronically Signed by           Ksenia Aviles MD  01/26/19 0903

## 2019-01-26 NOTE — UTILIZATION REVIEW
Initial Clinical Review    Admission: Date/Time/Statement: SAMI [email or web address]  Orders Placed This Encounter   Procedures    Place in Observation (expected length of stay for this patient is less than two midnights)     Standing Status:   Standing     Number of Occurrences:   1     Order Specific Question:   Admitting Physician     Answer:   Lacie Lees     Order Specific Question:   Level of Care     Answer:   Med Surg [16]     ED: Date/Time/Mode of Arrival:   ED Arrival Information     Expected Arrival Acuity Means of Arrival Escorted By Service Admission Type    - 1/25/2019 21:56 Urgent Ambulance [address] [place] [address] Urgent    Arrival Complaint    FEVER        Chief Complaint:   Chief Complaint   Patient presents with    Weakness - Generalized     per ems pt has had a cold x a few days, saw family doctor on tuesday for same  pt reports increasing weakness  History of Illness: 80 y o  female with a PMHx of DM2, A  Fib, ambulatory dysfunction and urinary incontinence who presents with generalized weakness  Patient reports she had a URI since the beginning of the week, reporting congestion, cough, fevers, chills, sore throat  Patient reports she began gradually feeling weaker over the past week, but reports she was unable to stand up with assistance or move herself around in bed as she typically is able to do today  Patient reports this evening she had a fever of 100 4  Patient reports she is beginning to feel better from her respiratory complaints, but reports the weakness has been getting more severe  Patient reports she feels she is still eating and drinking normally    ED Vital Signs:   ED Triage Vitals   Temperature Pulse Respirations Blood Pressure SpO2   01/25/19 2159 01/25/19 2159 01/25/19 2159 01/25/19 2159 01/25/19 2159   98 5 °F (36 9 °C) 95 16 156/72 97 %      Temp Source Heart Rate Source Patient Position - Orthostatic VS BP Location FiO2 (%)   01/25/19 2159 01/25/19 2159 01/25/19 2159 01/25/19 2159 --   Oral Monitor Sitting Right arm       Pain Score       01/26/19 0445       No Pain        Wt Readings from Last 1 Encounters:   11/13/18 90 3 kg (199 lb)     Vital Signs (abnormal):   01/26/19 0738  98 6 °F (37 °C)  74  16  142/75  95 %  None (Room air)  Lying   01/26/19 0447  98 6 °F (37 °C)  75  20   173/60  99 %  None (Room air)  Lying   01/26/19 0234  --  74  17  162/71  98 %  None (Room air)  --   01/25/19 2159  98 5 °F (36 9 °C)  95  16  156/72  97 %  None (Room air)  Sitting       Pertinent Labs/Diagnostic Test Results:   Lab Units 01/25/19  2330   WBC Thousand/uL 13 05*   HEMOGLOBIN g/dL 15 7*   HEMATOCRIT % 49 1*   PLATELETS Thousands/uL 193   NEUTROS PCT % 81*   LYMPHS PCT % 9*   MONOS PCT % 9   EOS PCT % 0         Results from last 7 days  Lab Units 01/25/19  2330   SODIUM mmol/L 133*   POTASSIUM mmol/L 4 5   CHLORIDE mmol/L 95*   CO2 mmol/L 30   BUN mg/dL 13   CREATININE mg/dL 1 02   ANION GAP mmol/L 8   CALCIUM mg/dL 10 9*   GLUCOSE RANDOM mg/dL 141*     · EKG: complete RBBB, 84bpm      Cxr: pending    ED Treatment:   Medication Administration from 01/25/2019 2156 to 01/26/2019 0559       Date/Time Order Dose Route Action Action by Comments     01/26/2019 0048 sodium chloride 0 9 % bolus 500 mL 0 mL Intravenous Stopped Cassie Valdivia RN      01/25/2019 2332 sodium chloride 0 9 % bolus 500 mL 500 mL Intravenous [address] Cassie Valdivia, [address] [place] [address]      01/26/2019 0515 sodium chloride 0 9 % infusion 75 mL/hr Intravenous Raul Kennedy, [facility]         Past Medical/Surgical History:    Active Ambulatory Problems     Diagnosis Date Noted    Wheelchair dependence 05/31/2018    Diabetes mellitus ([facility] ) 05/31/2018    A-fib ([facility] ) 05/31/2018    Acute cholecystitis 05/31/2018    Elevated troponin 05/31/2018    Gallstone pancreatitis 06/01/2018     Resolved Ambulatory Problems     Diagnosis Date Noted    No Resolved Ambulatory Problems     Past Medical History:   Diagnosis Date  A-fib ([facility] )     Arthritis     Assistance needed for mobility     Chronic pain disorder     Diabetes mellitus ([facility] )     Full dentures     History of transfusion     Hyperlipidemia     Irregular heart beat     Numbness and tingling of both feet     Skin abnormality     Spinal stenosis     Stroke ([facility] )     Unable to ambulate     Urinary incontinence     Wears glasses     Wheelchair dependence      Admitting Diagnosis: Dehydration [E86 0]  Weakness [R53 1]  Fever [R50 9]  Age/Sex: 80 y o  female  Assessment/Plan: 81 Yo female for generalized weakness of full body for past week  Wheelchair bound, but able to move around and stand with assistance, patient reports she was unable to do this as she typically does because of the weakness  Fever, wbc 13 05, ivf, urine cx- neg  Anticipated Length of Stay:  Patient will be admitted on an Observation basis with an anticipated length of stay of  Less than 2 midnights     Justification for Hospital Stay: generalized weakness     Admission Orders:  OOB  SEQ COMP DEVICE  CONSULT WOUND CARE  CONS CARB DIET    Scheduled Meds:   Current Facility-Administered Medications:  acetaminophen 650 mg Oral Q6H PRN Alyse Smudde, PA-C   calcium carbonate 1 tablet Oral After C H  [employer], PA-C   cholecalciferol 1,000 Units Oral QPM Alyse Smudde, PA-C   cyanocobalamin 1,000 mcg Oral QPM Alyse Smudde, PA-C   digoxin 250 mcg Oral Daily Alyse Smudde, PA-C   fenofibrate 48 mg Oral Daily Alyse Smudde, PA-C   insulin lispro 1-5 Units Subcutaneous HS Alyse Smudde, PA-C   insulin lispro 1-6 Units Subcutaneous TID AC Alyse Smudde, PA-C   ondansetron 4 mg Intravenous Q8H PRN Alyse Smudde, PA-C   oxybutynin 10 mg Oral Daily Alyse Smudde, PA-C   warfarin 4 mg Oral Daily Alyse Smudde, PA-C     Continuous Infusions:    PRN Meds:   acetaminophen    ondansetron

## 2019-01-26 NOTE — ASSESSMENT & PLAN NOTE
Patient reporting full body weakness for the past week  Patient is wheelchair bound but typically able to move around and stand with assistance, patient reports she was unable to do this as she typically does because of the weakness  Likely secondary to URI, patient reporting fever prior to arrival at ED   Flu negative   WBC 13 05  Given IV fluid bolus in ED, will continue gentle IV hydration   Urine negative for UTI

## 2019-01-26 NOTE — ED RE-EVALUATION NOTE
2:36 AM  Patient was signed out to me earlier  I was asked to follow up after IV fluids  Patient still feels extremely weak  Her aide was telling me that when she was at home tonight she almost fell multiple times in her sit to stand chair when she was trying to use the bathroom  Ate states that she does not feel comfortable with her going home with this amount of weakness because she is only home with her  overnight who cannot pick her up  Will admit for observation, IV fluids and reassessment       Yuko Murray DO  01/26/19 1533

## 2019-01-27 LAB
ATRIAL RATE: 84 BPM
GLUCOSE SERPL-MCNC: 106 MG/DL (ref 65–140)
GLUCOSE SERPL-MCNC: 140 MG/DL (ref 65–140)
GLUCOSE SERPL-MCNC: 143 MG/DL (ref 65–140)
GLUCOSE SERPL-MCNC: 146 MG/DL (ref 65–140)
P AXIS: 51 DEGREES
PR INTERVAL: 272 MS
QRS AXIS: 224 DEGREES
QRSD INTERVAL: 138 MS
QT INTERVAL: 372 MS
QTC INTERVAL: 439 MS
T WAVE AXIS: 31 DEGREES
VENTRICULAR RATE: 84 BPM

## 2019-01-27 PROCEDURE — 93010 ELECTROCARDIOGRAM REPORT: CPT | Performed by: INTERNAL MEDICINE

## 2019-01-27 PROCEDURE — 99232 SBSQ HOSP IP/OBS MODERATE 35: CPT | Performed by: INTERNAL MEDICINE

## 2019-01-27 PROCEDURE — 82948 REAGENT STRIP/BLOOD GLUCOSE: CPT

## 2019-01-27 RX ADMIN — CALCIUM 1 TABLET: 500 TABLET ORAL at 17:15

## 2019-01-27 RX ADMIN — WARFARIN SODIUM 4 MG: 4 TABLET ORAL at 17:15

## 2019-01-27 RX ADMIN — DIGOXIN 250 MCG: 125 TABLET ORAL at 10:17

## 2019-01-27 RX ADMIN — CYANOCOBALAMIN TAB 500 MCG 1000 MCG: 500 TAB at 17:15

## 2019-01-27 RX ADMIN — VITAMIN D, TAB 1000IU (100/BT) 1000 UNITS: 25 TAB at 17:19

## 2019-01-27 RX ADMIN — OXYBUTYNIN CHLORIDE 10 MG: 10 TABLET, EXTENDED RELEASE ORAL at 10:18

## 2019-01-27 RX ADMIN — FENOFIBRATE 48 MG: 48 TABLET, FILM COATED ORAL at 10:17

## 2019-01-27 NOTE — PHYSICIAN ADVISOR
Current patient class: Observation  The patient is currently on Hospital Day: 2 at [facility]      The patient was admitted to the hospital at N/A on N/A for the following diagnosis:  Dehydration [E86 0]  Weakness [R53 1]  Fever [R50 9]       There is documentation in the medical record of an expected length of stay of at least 2 midnights  The patient is therefore expected to satisfy the 2 midnight benchmark and given the 2 midnight presumption is appropriate for INPATIENT ADMISSION  Given this expectation of a satisfying stay, CMS instructs us that the patient is most often appropriate for inpatient admission under part A provided medical necessity is documented in the chart  After review of the relevant documentation, labs, vital signs and test results, the patient is appropriate for INPATIENT ADMISSION  Admission to the hospital as an inpatient is a complex decision making process which requires the practitioner to consider the patients presenting complaint, history and physical examination and all relevant testing  With this in mind, in this case, the patient was deemed appropriate for INPATIENT ADMISSION  After review of the documentation and testing available at the time of the admission I concur with this clinical determination of medical necessity  Rationale is as follows: The patient is a 80 yrs old Female who presented to the ED at 1/25/2019 10:01 PM with a chief complaint of Weakness - Generalized (per ems pt has had a cold x a few days, saw family doctor on tuesday for same  pt reports increasing weakness  )     Given the need for further hospitalization, and along with the documentation of medical necessity present in the chart, the patient is appropriate for inpatient admission  The patient is expected to satisfy the 2 midnight benchmark, and will require further acute medical care   The patient does have comorbid conditions which increases the risk for significant adverse outcome  Given this the patient is appropriate for inpatient admission        The patients vitals on arrival were ED Triage Vitals   Temperature Pulse Respirations Blood Pressure SpO2   01/25/19 2159 01/25/19 2159 01/25/19 2159 01/25/19 2159 01/25/19 2159   98 5 °F (36 9 °C) 95 16 156/72 97 %      Temp Source Heart Rate Source Patient Position - Orthostatic VS BP Location FiO2 (%)   01/25/19 2159 01/25/19 2159 01/25/19 2159 01/25/19 2159 --   Oral Monitor Sitting Right arm       Pain Score       01/26/19 0445       No Pain           Past Medical History:   Diagnosis Date    [facility]-fib [facility])     Arthritis     Assistance needed for mobility     pt can stand with assistance and transfer    Chronic pain disorder     Diabetes mellitus ([address] )     NIDDM    Full dentures     History of transfusion     no adverse reaction    Hyperlipidemia     Irregular heart beat     Numbness and tingling of both feet     Skin abnormality     sacrum area - small red area, less than a dime size    Spinal stenosis     Stroke ([address]     Unable to ambulate     Urinary incontinence     ipg stimulator x3 repakcements,battery exchange today 2/14/2018    Wears glasses     Wheelchair dependence      Past Surgical History:   Procedure Laterality Date    BACK SURGERY      fusion    BLADDER SUSPENSION      CARPAL TUNNEL RELEASE Bilateral     CHOLANGIOGRAM N/A 6/4/2018    Procedure: CHOLANGIOGRAM;  Surgeon: Andrew Perry MD;  [facility];  Service: General    CHOLECYSTECTOMY LAPAROSCOPIC N/A 6/4/2018    Procedure: CHOLECYSTECTOMY LAPAROSCOPIC;  Surgeon: Andrew Perry MD;  [facility];  Service: General    COLONOSCOPY      DILATION AND CURETTAGE OF UTERUS      FRACTURE SURGERY Left     femur with hardware    HYSTERECTOMY      INSERTION / Dalphine Charlie / REVISION NEUROSTIMULATOR      JOINT REPLACEMENT Bilateral     knees    SACRAL NERVE STIMULATOR PLACEMENT N/A 2/14/2018    Procedure: REMOVE AND REPLACE IPG;  Surgeon: Denise Magana MD;  [facility];  Service: UroGynecology           TONSILLECTOMY             Consults have been placed to:   IP CONSULT TO WOUND CARE    Vitals:    01/25/19 2159 01/26/19 0234 01/26/19 0447 01/26/19 0738   BP: 156/72 162/71 (!) 173/60 142/75   BP Location: Right arm  Right arm Right arm   Pulse: 95 74 75 74   Resp: 16 17 20 16   Temp: 98 5 °F (36 9 °C)  98 6 °F (37 °C) 98 6 °F (37 °C)   TempSrc: Oral  Tympanic Temporal   SpO2: 97% 98% 99% 95%       Most recent labs:    Recent Labs      01/25/19   2330  01/26/19   0650  01/26/19   0808   WBC  13 05*  10 96*   --    HGB  15 7*  14 4   --    HCT  49 1*  45 0   --    PLT  193  148*   --    K  4 5  4 3   --    CALCIUM  10 9*  9 5   --    BUN  13  14   --    CREATININE  1 02  0 83   --    INR   --    --   2 58*   TROPONINI  0 02   --    --        Scheduled Meds:  Current Facility-Administered Medications:  acetaminophen 650 mg Oral Q6H PRN Alyse Smudde, PA-C   calcium carbonate 1 tablet Oral After C H  Montoya Elicia, PA-C   cholecalciferol 1,000 Units Oral QPM Alyse Smudde, PA-C   cyanocobalamin 1,000 mcg Oral QPM Alyse Smudde, PA-C   digoxin 250 mcg Oral Daily Alyse Smudde, PA-C   fenofibrate 48 mg Oral Daily Alyse Smudde, PA-C   insulin lispro 1-5 Units Subcutaneous HS Alyse Smudde, PA-C   insulin lispro 1-6 Units Subcutaneous TID AC Alyse Smudde, PA-C   ondansetron 4 mg Intravenous Q8H PRN Alyse Smudde, PA-C   oxybutynin 10 mg Oral Daily Alyse Smudde, PA-C   warfarin 4 mg Oral Daily Alyse Smudde, PA-C     Continuous Infusions:   PRN Meds:   acetaminophen    ondansetron    Surgical procedures (if appropriate):

## 2019-01-27 NOTE — PROGRESS NOTES
Progress Note - Shanna Whittaker 80 y o  female MRN: [id number]    Unit/Bed#: [facility] 2 -01 Encounter: [id number]    Assessment/Plan:    Acute viral URI   continue supportive care    Ambulatory dysfunction awaiting PT OT eval patient will accept short-term rehab      AFib    rate control with digoxin continue warfarin, INR therapeutic at 2 58    Diabetes   diet control, a m  Glucose 106    Essential tremor  Monitor    Subjective:   Still with cough but less today clear sputum, denies chest pain nausea vomiting diarrhea no fevers chills appetite is improving    Objective:     Vitals: Blood pressure 121/71, pulse 62, temperature 98 6 °F (37 °C), temperature source Temporal, resp  rate 16, SpO2 99 %, not currently breastfeeding  ,There is no height or weight on file to calculate BMI          Results from last 7 days  Lab Units 01/26/19  0808 01/26/19  0650   WBC Thousand/uL  --  10 96*   HEMOGLOBIN g/dL  --  14 4   HEMATOCRIT %  --  45 0   PLATELETS Thousands/uL  --  148*   INR  2 58*  --        Results from last 7 days  Lab Units 01/26/19  0650   POTASSIUM mmol/L 4 3   CHLORIDE mmol/L 100   CO2 mmol/L 25   BUN mg/dL 14   CREATININE mg/dL 0 83   CALCIUM mg/dL 9 5       Scheduled Meds:    Current Facility-Administered Medications:  acetaminophen 650 mg Oral Q6H PRN Alyse Smudde, PA-C   calcium carbonate 1 tablet Oral After C H  Montoya Elicia, PA-C   cholecalciferol 1,000 Units Oral QPM Alyse Smudde, PA-C   cyanocobalamin 1,000 mcg Oral QPM Alyse Smudde, PA-C   digoxin 250 mcg Oral Daily Alyse Smudde, PA-C   fenofibrate 48 mg Oral Daily Alyse Smudde, PA-C   insulin lispro 1-5 Units Subcutaneous HS Alyse Smudde, PA-C   insulin lispro 1-6 Units Subcutaneous TID AC Alyse Smudde, PA-C   ondansetron 4 mg Intravenous Q8H PRN Alyse Smudde, PA-C   oxybutynin 10 mg Oral Daily Alyse Smudde, PA-C   warfarin 4 mg Oral Daily Alyse Smudde, PA-C       Continuous Infusions:     Physical exam:  General appearance:  Alert no distress interaction appropriate  Head/Eyes:  Nonicteric PERRL EOMI facial tremor  Neck:  Supple  Lungs:  Decreased BS bilateral no wheezing rhonchi or rales  Heart: normal S1 S2 irregular  Abdomen: Soft nontender with bowel sounds  Extremities: no edema  Skin: no rash    Invasive Devices     Peripheral Intravenous Line            Peripheral IV 01/25/19 Left Antecubital 1 day                  VTE Pharmacologic Prophylaxis:  warfarin   VTE Mechanical Prophylaxis:  SCDs                     Counseling / Coordination of Care  Total floor / unit time spent today 30   minutes  Greater than 50% of total time was spent with the patient and / or family counseling and / or coordination of care    A description of the counseling / coordination of care:

## 2019-01-28 VITALS
TEMPERATURE: 97.1 F | HEART RATE: 56 BPM | OXYGEN SATURATION: 98 % | DIASTOLIC BLOOD PRESSURE: 69 MMHG | SYSTOLIC BLOOD PRESSURE: 152 MMHG | RESPIRATION RATE: 19 BRPM

## 2019-01-28 LAB
GLUCOSE SERPL-MCNC: 140 MG/DL (ref 65–140)
GLUCOSE SERPL-MCNC: 211 MG/DL (ref 65–140)
INR PPP: 2.66 (ref 0.86–1.17)
PROTHROMBIN TIME: 28.4 SECONDS (ref 11.8–14.2)

## 2019-01-28 PROCEDURE — 82948 REAGENT STRIP/BLOOD GLUCOSE: CPT

## 2019-01-28 PROCEDURE — 85610 PROTHROMBIN TIME: CPT | Performed by: INTERNAL MEDICINE

## 2019-01-28 PROCEDURE — 99238 HOSP IP/OBS DSCHRG MGMT 30/<: CPT | Performed by: INTERNAL MEDICINE

## 2019-01-28 RX ORDER — WARFARIN SODIUM 3 MG/1
4 TABLET ORAL
Status: ON HOLD | COMMUNITY
End: 2019-06-14 | Stop reason: SDUPTHER

## 2019-01-28 RX ADMIN — FENOFIBRATE 48 MG: 48 TABLET, FILM COATED ORAL at 08:54

## 2019-01-28 RX ADMIN — DIGOXIN 250 MCG: 125 TABLET ORAL at 08:54

## 2019-01-28 NOTE — NURSING NOTE
Reviewed discharge instructions with patient and spouse at bedside  Both patient and spouse verbalized understanding  All questions and concerns addressed at this time, nothing further to address presently  Discharge instructions handed to patient at time of discharge  Patient transferred to motorized wheelchair and left home with  in handicapped [place]  Patient with all supplies necessary at home prior to d/c  Faxed AVS to [facility] agency at time of d/c  VSS, IV removed, denies pain  Patient discharged to home

## 2019-01-28 NOTE — SOCIAL WORK
Pt admitted with weakness likely 2' URI  Pt lives at home with  and [place] living on 1st floor with garage access to house via ramp  Pt is bedbound/wc bound using a mechanical lift from sit to stand- pt nonambulatory  Pt has HHA from 9 to 1 and 5-7 (6hrs/day) 7 days a week  Denies legal issues, mental health hx nor POA  Pt's PCP is Dr Bobbi Yeung and uses [address] [facility] on [address] for prescriptions  Pt's family to transport home as they have a w/c accessible van  No further questions/concerns voiced  No barriers to d/c identified at this time  CLEMENT#2 signed and to be scanned into EHR

## 2019-01-28 NOTE — PROGRESS NOTES
Nurse reports patient has no urine output for 9 hours  Bladder scan done and revealed >300 mL, 2nd bladder revealed >400 mL    Urine retention protocol and I&O ordered  Oxybutynin stopped

## 2019-01-28 NOTE — DISCHARGE INSTR - OTHER ORDERS
Wound Care:  1  Cleanse left foot wound with normal saline, pat dry  Apply Dermagran to wound bed  Cover with 4x4 and wrap with maiy  Change dressing every other day  2   Apply Hydraguard lotion to bilateral buttocks, sacrum, and heels twice per day and PRN  3   Turn and reposition every 2 hours to re-distribute pressure on buttocks and sacrum  Weight shift frequently while in chair  4   Specialty offloading cushion to wheelchair when OOB

## 2019-01-28 NOTE — UTILIZATION REVIEW
PLEASE NOTE:  Patient Upgraded to INPT 1/26 @ 2032 from [facility] 1/26 @ [facility], TAYLOR Registered Nurse Addendum   Utilization Review Date of Service: 1/26/2019  9:58 AM         []Hide copied text  Initial Clinical Review     Admission: Date/Time/Statement:      01/26/19 2032  Inpatient Admission Once     Transfer Service: General Medicine       Question Answer Comment   Admitting Physician Jamaica Fox    Level of Care Med Surg    Estimated length of stay More than 2 Midnights    Certification I certify that inpatient services are medically necessary for this patient for a duration of greater than two midnights  See H&P and MD Progress Notes for additional information about the patient's course of treatment  01/26/19 2032     ED: Date/Time/Mode of Arrival:             ED Arrival Information      Expected Arrival Acuity Means of Arrival Escorted By Service Admission Type     - 1/25/2019 21:56 Urgent Ambulance [facility] General Medicine Urgent     Arrival Complaint     FEVER          Chief Complaint:        Chief Complaint   Patient presents with    Weakness - Generalized       per ems pt has had a cold x a few days, saw family doctor on tuesday for same  pt reports increasing weakness        History of Illness: 80 y  o  female with a PMHx of DM2, A  Fib, ambulatory dysfunction and urinary incontinence who presents with generalized weakness  Patient reports she had a URI since the beginning of the week, reporting congestion, cough, fevers, chills, sore throat  Patient reports she began gradually feeling weaker over the past week, but reports she was unable to stand up with assistance or move herself around in bed as she typically is able to do today  Eli Keys reports this evening she had a fever of 100 4   Patient reports she is beginning to feel better from her respiratory complaints, but reports the weakness has been getting more severe   Patient reports she feels she is still eating and drinking normally    ED Vital Signs:            ED Triage Vitals   Temperature Pulse Respirations Blood Pressure SpO2   01/25/19 2159 01/25/19 2159 01/25/19 2159 01/25/19 2159 01/25/19 2159   98 5 °F (36 9 °C) 95 16 156/72 97 %       Temp Source Heart Rate Source Patient Position - Orthostatic VS BP Location FiO2 (%)   01/25/19 2159 01/25/19 2159 01/25/19 2159 01/25/19 2159 --   Oral Monitor Sitting Right arm         Pain Score           01/26/19 0445           No Pain                Wt Readings from Last 1 Encounters:   11/13/18 90 3 kg (199 lb)      Vital Signs (abnormal):   01/26/19 0738   98 6 °F (37 °C)   74   16   142/75   95 %   None (Room air)   Lying   01/26/19 0447   98 6 °F (37 °C)   75   20    173/60   99 %   None (Room air)   Lying   01/26/19 0234   --   74   17   162/71   98 %   None (Room air)   --   01/25/19 2159   98 5 °F (36 9 °C)   95   16   156/72   97 %   None (Room air)   Sitting         Pertinent Labs/Diagnostic Test Results:   Lab Units 01/25/19  2330   WBC Thousand/uL 13 05*   HEMOGLOBIN g/dL 15 7*   HEMATOCRIT % 49 1*   PLATELETS Thousands/uL 193   NEUTROS PCT % 81*   LYMPHS PCT % 9*   MONOS PCT % 9   EOS PCT % 0         Results from last 7 days  Lab Units 01/25/19  2330   SODIUM mmol/L 133*   POTASSIUM mmol/L 4 5   CHLORIDE mmol/L 95*   CO2 mmol/L 30   BUN mg/dL 13   CREATININE mg/dL 1 02   ANION GAP mmol/L 8   CALCIUM mg/dL 10 9*   GLUCOSE RANDOM mg/dL 141*      · EKG: complete RBBB, 84bpm      Cxr: pending     ED Treatment:              Medication Administration from 01/25/2019 2156 to 01/26/2019 0559        Date/Time Order Dose Route Action Action by Comments       01/26/2019 0048 sodium chloride 0 9 % bolus 500 mL 0 mL Intravenous Stopped          01/25/2019 2332 sodium chloride 0 9 % bolus 500 mL 500 mL Intravenous New Bag          01/26/2019 0515 sodium chloride 0 9 % infusion 75 mL/hr Intravenous New Bag             Past Medical/Surgical History:        Past Medical History: Diagnosis Date    A-fib [facility])      Arthritis      Assistance needed for mobility      Chronic pain disorder      Diabetes mellitus ([address] )      Full dentures      History of transfusion      Hyperlipidemia      Irregular heart beat      Numbness and tingling of both feet      Skin abnormality      Spinal stenosis      Stroke (HCC)      Unable to ambulate      Urinary incontinence      Wears glasses      Wheelchair dependence        Admitting Diagnosis: Dehydration [E86 0]  Weakness [R53 1]  Fever [R50 9]  Age/Sex: 80 y o  female    Assessment/Plan: 79 Yo female for generalized weakness of full body for past week  Wheelchair bound, but able to move around and stand with assistance, patient reports she was unable to do this as she typically does because of the weakness  Fever, wbc 13 05, ivf, urine cx- neg          Admission Orders:   OOB  SEQ COMP DEVICE  CONSULT WOUND CARE  CONS CARB DIET     Scheduled Meds:   Current Facility-Administered Medications:  acetaminophen 650 mg Oral Q6H PRN    calcium carbonate 1 tablet Oral After Dinner    cholecalciferol 1,000 Units Oral QPM    cyanocobalamin 1,000 mcg Oral QPM    digoxin 250 mcg Oral Daily    fenofibrate 48 mg Oral Daily    insulin lispro 1-5 Units Subcutaneous HS    insulin lispro 1-6 Units Subcutaneous TID AC    ondansetron 4 mg Intravenous Q8H PRN    oxybutynin 10 mg Oral Daily    warfarin 4 mg Oral Daily       Continuous Infusions:  IVF @ 75 / hr - DC'd  PRN Meds:   acetaminophen    ondansetron      ADDENDUM: UPGRADED to INPT 1/26  Acute viral URI                                    continue supportive care     Ambulatory dysfunction          awaiting PT OT eval patient will accept short-term rehab                               AFib                                        rate control with digoxin continue warfarin, INR therapeutic at 2 58     Diabetes                                 diet control, a m  Glucose 106          1/27: 98 6 - 62 - 16 121/71;  RA  Acute viral URI                                    continue supportive care  Ambulatory dysfunction          awaiting PT OT eval patient will accept short-term rehab                               AFib                                        rate control with digoxin continue warfarin, INR therapeutic at 2 58  Diabetes                                 diet control, a m  Glucose 106    1/28:   no urine output for 9 hours  Bladder scan done and revealed >300 mL, 2nd bladder revealed >400 mL  Oxybutynin stopped    Network Utilization Review Department  Phone: [phone]; Fax [phone]  [email or web address]  ATTENTION: Please call with any questions or concerns to [phone]  and carefully listen to the prompts so that you are directed to the right person  Send all requests for admission clinical reviews, approved or denied determinations and any other requests to fax [phone]   All voicemails are confidential

## 2019-01-28 NOTE — PLAN OF CARE
Problem: DISCHARGE PLANNING - CARE MANAGEMENT  Goal: Discharge to post-acute care or home with appropriate resources  INTERVENTIONS:  - Conduct assessment to determine patient/family and health care team treatment goals, and need for post-acute services based on payer coverage, community resources, and patient preferences, and barriers to discharge  - Address psychosocial, clinical, and financial barriers to discharge as identified in assessment in conjunction with the patient/family and health care team  - Arrange appropriate level of post-acute services according to patients   needs and preference and payer coverage in collaboration with the physician and health care team  - Communicate with and update the patient/family, physician, and health care team regarding progress on the discharge plan  - Arrange appropriate transportation to post-acute venues  Outcome: Completed Date Met: 01/28/19  CM to communicate with current [facility] services so to provide continuity of care post discharge

## 2019-01-28 NOTE — PLAN OF CARE
DISCHARGE PLANNING     Discharge to home or other facility with appropriate resources Adequate for Discharge        INFECTION - ADULT     Absence or prevention of progression during hospitalization Adequate for Discharge        Knowledge Deficit     Patient/family/caregiver demonstrates understanding of disease process, treatment plan, medications, and discharge instructions Adequate for Discharge        METABOLIC, FLUID AND ELECTROLYTES - ADULT     Glucose maintained within target range Adequate for Discharge        NEUROSENSORY - ADULT     Achieves maximal functionality and self care Adequate for Discharge        PAIN - ADULT     Verbalizes/displays adequate comfort level or baseline comfort level Adequate for Discharge        Potential for Falls     Patient will remain free of falls Adequate for Discharge        Prexisting or High Potential for Compromised Skin Integrity     Skin integrity is maintained or improved Adequate for Discharge        SAFETY ADULT     Maintain or return to baseline ADL function Adequate for Discharge     Maintain or return mobility status to optimal level Adequate for Discharge

## 2019-01-28 NOTE — PROGRESS NOTES
Patient has not voided in 9 hours during my shift  Bladder scanned her twice, first bladder scan at 322 ml, second bladder scan 412 ml  RRNP made aware  No new orders at present time  Will continue to monitor patient

## 2019-01-28 NOTE — ASSESSMENT & PLAN NOTE
Lab Results   Component Value Date    HGBA1C 6 5 (H) 06/01/2018       Recent Labs      01/27/19   1128  01/27/19   1613  01/27/19 2057 01/28/19   0812   POCGLU  146*  143*  140  140     Continue metformin after discharge

## 2019-01-28 NOTE — CONSULTS
Consult Note - Wound   Aida Montero 80 y o  female MRN: [id number]  Unit/Bed#: [facility] 2 -01 Encounter: [id number]      History and Present Illness:  80year old female presented to the hospital with generalized weakness and upper respiratory symptoms  Patient's history significant for DM, spinal stenosis, stroke, ambulatory dysfunction (has motorized wheelchair and daily home health aides)  Assessment Findings:   Patient denies pain at rest   Able to turn with assist x 2 for assessment in bed  Incontinent of urine--purewick in use at this time  Reports fair appetite, improving as she is feeling better  Skin is generally fragile, dry, warm, and flaky  Blanchable hyperpigmentation and erythema to bilateral buttocks and posterior thighs likely due to chronic incontinence  Bilateral heels, buttocks, and sacrum are intact  Mild erythema to left abdominal fold--no evidence of candidiasis or open areas at this time  1   Present on admission traumatic wound to left dorsal foot--100% beefy red, partial thickness  Patient states wound has been present for several months  No evidence of wound infection at this time  Positioning wedges in use for prevention  Plan:   1-Hydraguard lotion to bilateral sacrum, buttock and heels BID and PRN  2-Elevate heels to offload pressure  3-Soft care cushion when out of bed  4-Moisturize skin daily with skin nourishing cream   5-Turn/reposition q2h or when medically stable for pressure re-distribution on skin  6-Cleanse left foot wound with normal saline, pat dry  Apply Dermagran gauze to wound bed  Cover with 4x4 and wrap with mayi  Change every other day and PRN with soilage or dislodgement  7-Wound care team to follow  Plan of care reviewed with primary RN, Noah Alexander, and Dr Tim Vega  Patient to be discharged home today--patient has home health aides daily, motorized, wheelchair, and visiting nurses for wound care      Wound 01/28/19 Foot Left dorsal traumatic wound (Active)   Wound Description Clean;Beefy red 1/28/2019 10:30 AM   Mulu-wound Assessment Clean;Dry; Intact 1/28/2019 10:30 AM   Shape Oval 1/28/2019 10:30 AM   Wound Length (cm) 1 5 cm 1/28/2019 10:30 AM   Wound Width (cm) 0 5 cm 1/28/2019 10:30 AM   Wound Depth (cm) 0 1 1/28/2019 10:30 AM   Calculated Wound Volume (cm^3) 0 08 cm^3 1/28/2019 10:30 AM   Tunneling 0 cm 1/28/2019 10:30 AM   Undermining 0 1/28/2019 10:30 AM   Drainage Amount None 1/28/2019 10:30 AM   Non-staged Wound Description Partial thickness 1/28/2019 10:30 AM   Treatments Cleansed;Irrigation with NSS;Site care 1/28/2019 10:30 AM   Dressing Other (Comment) 1/28/2019 10:30 AM   Wound packed? No 1/28/2019 10:30 AM   Patient Tolerance Tolerated well 1/28/2019 10:30 AM   Dressing Status Clean;Dry; Intact 1/28/2019 10:30 AM         Jose Montalvo BSN, RN, [employer]

## 2019-01-28 NOTE — DISCHARGE SUMMARY
Discharge- Maria C Torres 1935, 80 y o  female MRN: [id number]  Unit/Bed#: [facility] [address] 206-01 Encounter: [id number]  Primary Care Provider: Anya Zavala MD   Date and time admitted to hospital: 1/25/2019 10:01 PM        Admitting Provider:  Enma Hightower DO  Discharge Provider:  Valentina Luna DO  Admission Date: 1/25/2019       Discharge Date: 01/28/19   LOS: 2  Primary Care Physician at Discharge: Anya Zavala, [address] COURSE:  Maria C Torres is a 80 y o  female who presented to the hospital generalized weakness  He was concerns of viral syndrome the patient was admitted  During hospitalization no evidence of fevers and influenza testing negative  No evidence of UTI  She is being discharged home with  in good condition  DISCHARGE DIAGNOSES  * Generalized weakness   Assessment & Plan    Generalized weakness likely secondary to viral URI  Influenza negative  Being discharged home in good condition     Urinary incontinence   Assessment & Plan    Urinary incontinence status post stimulator  Did have retention during hospitalization of 300 mL  A-fib [facility])   Assessment & Plan    Paroxysmal atrial fibrillation  Continue digoxin  Warfarin can be resumed at 3 mg daily     Diabetes mellitus [facility])   Assessment & Plan    Lab Results   Component Value Date    HGBA1C 6 5 (H) 06/01/2018       Recent Labs      01/27/19   1128  01/27/19   1613  01/27/19   2057  01/28/19   0812   POCGLU  146*  143*  140  140     Continue metformin after discharge     Wheelchair dependence   Assessment & Plan    Wheelchair dependence and has aides during day     CONSULTING PROVIDERS   None    RADIOLOGY RESULTS  Xr Chest 2 Views  Result Date: 1/27/2019  Impression: No focal consolidation, pleural effusion, or pneumothorax   Workstation performed: [id number]       LABS  Results from last 7 days  Lab Units 01/28/19  0515 01/26/19  0808 01/26/19  0650 01/25/19  2330   WBC Thousand/uL  -- --  10 96* 13 05*   HEMOGLOBIN g/dL  --   --  14 4 15 7*   HEMATOCRIT %  --   --  45 0 49 1*   MCV fL  --   --  92 92   PLATELETS Thousands/uL  --   --  148* 193   INR  2 66* 2 58*  --   --        Results from last 7 days  Lab Units 01/26/19  0650 01/25/19  2330   SODIUM mmol/L 136 133*   POTASSIUM mmol/L 4 3 4 5   CHLORIDE mmol/L 100 95*   CO2 mmol/L 25 30   BUN mg/dL 14 13   CREATININE mg/dL 0 83 1 02   CALCIUM mg/dL 9 5 10 9*   EGFR ml/min/1 73sq m 65 51   GLUCOSE RANDOM mg/dL 126 141*       Results from last 7 days  Lab Units 01/25/19  2330   TROPONIN I ng/mL 0 02       Results from last 7 days  Lab Units 01/25/19  2330   NT-PRO BNP pg/mL 2,141*            Results from last 7 days  Lab Units 01/28/19  0812 01/27/19  2057 01/27/19  1613 01/27/19  1128 01/27/19  0715 01/26/19  2212 01/26/19  2135 01/26/19  1639 01/26/19  1105 01/26/19  0801   POC GLUCOSE mg/dl 140 140 143* 146* 106 130 147* 96 161* 105               Results from last 7 days  Lab Units 01/25/19  2330   LACTIC ACID mmol/L 1 6           Cultures:     Results from last 7 days  Lab Units 01/26/19  0214   COLOR UA  Yellow   CLARITY UA  Clear   SPEC GRAV UA  1 020   PH UA  5 5   LEUKOCYTES UA  Negative   NITRITE UA  Negative   GLUCOSE UA mg/dl Negative   KETONES UA mg/dl Negative   BILIRUBIN UA  Negative   BLOOD UA  Small*        Results from last 7 days  Lab Units 01/26/19  0214   RBC UA /hpf 4-10*   WBC UA /hpf None Seen   EPITHELIAL CELLS WET PREP /hpf Occasional   BACTERIA UA /hpf Occasional        Results from last 7 days  Lab Units 01/25/19  2330   INFLUENZA A PCR  None Detected   INFLUENZA B PCR  None Detected   RSV PCR  None Detected       PHYSICAL EXAM:  Vitals:   Blood Pressure: 152/69 (01/28/19 0735)  Pulse: 56 (01/28/19 0735)  Temperature: (!) 97 1 °F (36 2 °C) (01/28/19 0735)  Temp Source: Temporal (01/28/19 0735)  Respirations: 19 (01/28/19 0735)  SpO2: 98 % (01/28/19 0735)    General appearance: alert, appears stated age and cooperative  Head: Normocephalic, without obvious abnormality, atraumatic  Eyes: conjunctivae/corneas clear  PERRL, EOM's intact  Lungs: diminished breath sounds  Heart: regular rate and rhythm  Abdomen: soft, non-tender; bowel sounds normal; no masses,  no organomegaly  Back: symmetric, no curvature  ROM normal  No CVA tenderness  Extremities: edema +1 lower extremities  Neurologic: Grossly normal    Planned Re-admission: No  Discharge Disposition: Home with [facility]    Test Results Pending at Discharge: None  Incidental findings: None    Medications   Please see After Visit Summary for reconciled discharge medications provided to patient and family  Diet restrictions: Diabetic diet   Activity restrictions: No strenuous activity  Discharge Condition: good    Outpatient Follow-Up  1  Obdulia Rodriguez MD [place] [phone] - follow-up within one week      Code Status: Level 3 - DNAR and DNI  Discharge Statement   I spent 35 minutes discharging the patient  This time was spent on the day of discharge  Greater than 50% of total time was spent with the patient and / or family counseling and / or coordination of care  ** Please Note: This note has been constructed using a voice recognition system   **

## 2019-01-28 NOTE — PLAN OF CARE
DISCHARGE PLANNING     Discharge to home or other facility with appropriate resources Progressing        INFECTION - ADULT     Absence or prevention of progression during hospitalization Progressing        Knowledge Deficit     Patient/family/caregiver demonstrates understanding of disease process, treatment plan, medications, and discharge instructions Progressing        METABOLIC, FLUID AND ELECTROLYTES - ADULT     Glucose maintained within target range Progressing        NEUROSENSORY - ADULT     Achieves maximal functionality and self care Progressing        PAIN - ADULT     Verbalizes/displays adequate comfort level or baseline comfort level Progressing        Potential for Falls     Patient will remain free of falls Progressing        Prexisting or High Potential for Compromised Skin Integrity     Skin integrity is maintained or improved Progressing        SAFETY ADULT     Maintain or return to baseline ADL function Progressing     Maintain or return mobility status to optimal level Progressing

## 2019-01-28 NOTE — ASSESSMENT & PLAN NOTE
Generalized weakness likely secondary to viral URI  Influenza negative    Being discharged home in good condition

## 2019-06-11 ENCOUNTER — APPOINTMENT (EMERGENCY)
Dept: CT IMAGING | Facility: HOSPITAL | Age: 84
DRG: 057 | End: 2019-06-11
Payer: COMMERCIAL

## 2019-06-11 ENCOUNTER — HOSPITAL ENCOUNTER (INPATIENT)
Facility: HOSPITAL | Age: 84
LOS: 3 days | Discharge: HOME | DRG: 057 | End: 2019-06-14
Attending: EMERGENCY MEDICINE | Admitting: INTERNAL MEDICINE
Payer: COMMERCIAL

## 2019-06-11 DIAGNOSIS — R47.81 SLURRED SPEECH: Primary | ICD-10-CM

## 2019-06-11 DIAGNOSIS — Z86.73 HISTORY OF CVA (CEREBROVASCULAR ACCIDENT): ICD-10-CM

## 2019-06-11 DIAGNOSIS — Z99.3 WHEELCHAIR DEPENDENCE: ICD-10-CM

## 2019-06-11 DIAGNOSIS — E11.9 TYPE 2 DIABETES MELLITUS WITHOUT COMPLICATION, WITHOUT LONG-TERM CURRENT USE OF INSULIN (HCC): ICD-10-CM

## 2019-06-11 DIAGNOSIS — I48.20 CHRONIC ATRIAL FIBRILLATION (HCC): ICD-10-CM

## 2019-06-11 PROBLEM — Z96.89 SPINAL CORD STIMULATOR STATUS: Status: ACTIVE | Noted: 2019-06-11

## 2019-06-11 PROBLEM — I10 ESSENTIAL HYPERTENSION: Status: ACTIVE | Noted: 2019-06-11

## 2019-06-11 PROBLEM — N32.81 OVERACTIVE BLADDER: Status: ACTIVE | Noted: 2019-06-11

## 2019-06-11 PROBLEM — E86.0 DEHYDRATION: Status: ACTIVE | Noted: 2019-06-11

## 2019-06-11 LAB
ANION GAP SERPL CALCULATED.3IONS-SCNC: 12 MMOL/L (ref 4–13)
APTT PPP: 38 SECONDS (ref 26–38)
BASOPHILS # BLD AUTO: 0.06 THOUSANDS/ΜL (ref 0–0.1)
BASOPHILS NFR BLD AUTO: 1 % (ref 0–1)
BUN SERPL-MCNC: 19 MG/DL (ref 5–25)
CALCIUM SERPL-MCNC: 10.5 MG/DL (ref 8.3–10.1)
CHLORIDE SERPL-SCNC: 103 MMOL/L (ref 100–108)
CO2 SERPL-SCNC: 28 MMOL/L (ref 21–32)
CREAT SERPL-MCNC: 0.85 MG/DL (ref 0.6–1.3)
DIGOXIN SERPL-MCNC: 2.9 NG/ML (ref 0.8–2)
EOSINOPHIL # BLD AUTO: 0.25 THOUSAND/ΜL (ref 0–0.61)
EOSINOPHIL NFR BLD AUTO: 3 % (ref 0–6)
ERYTHROCYTE [DISTWIDTH] IN BLOOD BY AUTOMATED COUNT: 12.6 % (ref 11.6–15.1)
GFR SERPL CREATININE-BSD FRML MDRD: 64 ML/MIN/1.73SQ M
GLUCOSE SERPL-MCNC: 103 MG/DL (ref 65–140)
GLUCOSE SERPL-MCNC: 133 MG/DL (ref 65–140)
GLUCOSE SERPL-MCNC: 168 MG/DL (ref 65–140)
HCT VFR BLD AUTO: 45.8 % (ref 34.8–46.1)
HGB BLD-MCNC: 14.2 G/DL (ref 11.5–15.4)
IMM GRANULOCYTES # BLD AUTO: 0.04 THOUSAND/UL (ref 0–0.2)
IMM GRANULOCYTES NFR BLD AUTO: 1 % (ref 0–2)
INR PPP: 1.64 (ref 0.86–1.17)
LYMPHOCYTES # BLD AUTO: 1.81 THOUSANDS/ΜL (ref 0.6–4.47)
LYMPHOCYTES NFR BLD AUTO: 23 % (ref 14–44)
MAGNESIUM SERPL-MCNC: 2 MG/DL (ref 1.6–2.6)
MCH RBC QN AUTO: 29.6 PG (ref 26.8–34.3)
MCHC RBC AUTO-ENTMCNC: 31 G/DL (ref 31.4–37.4)
MCV RBC AUTO: 95 FL (ref 82–98)
MONOCYTES # BLD AUTO: 0.62 THOUSAND/ΜL (ref 0.17–1.22)
MONOCYTES NFR BLD AUTO: 8 % (ref 4–12)
NEUTROPHILS # BLD AUTO: 5.1 THOUSANDS/ΜL (ref 1.85–7.62)
NEUTS SEG NFR BLD AUTO: 64 % (ref 43–75)
NRBC BLD AUTO-RTO: 0 /100 WBCS
PLATELET # BLD AUTO: 190 THOUSANDS/UL (ref 149–390)
PMV BLD AUTO: 10 FL (ref 8.9–12.7)
POTASSIUM SERPL-SCNC: 4.3 MMOL/L (ref 3.5–5.3)
PROTHROMBIN TIME: 19.5 SECONDS (ref 11.8–14.2)
RBC # BLD AUTO: 4.8 MILLION/UL (ref 3.81–5.12)
SODIUM SERPL-SCNC: 143 MMOL/L (ref 136–145)
TROPONIN I SERPL-MCNC: <0.02 NG/ML
WBC # BLD AUTO: 7.88 THOUSAND/UL (ref 4.31–10.16)

## 2019-06-11 PROCEDURE — 83735 ASSAY OF MAGNESIUM: CPT | Performed by: EMERGENCY MEDICINE

## 2019-06-11 PROCEDURE — 70496 CT ANGIOGRAPHY HEAD: CPT

## 2019-06-11 PROCEDURE — 84484 ASSAY OF TROPONIN QUANT: CPT | Performed by: EMERGENCY MEDICINE

## 2019-06-11 PROCEDURE — 93005 ELECTROCARDIOGRAM TRACING: CPT

## 2019-06-11 PROCEDURE — 85730 THROMBOPLASTIN TIME PARTIAL: CPT | Performed by: EMERGENCY MEDICINE

## 2019-06-11 PROCEDURE — 85610 PROTHROMBIN TIME: CPT | Performed by: EMERGENCY MEDICINE

## 2019-06-11 PROCEDURE — 99284 EMERGENCY DEPT VISIT MOD MDM: CPT | Performed by: EMERGENCY MEDICINE

## 2019-06-11 PROCEDURE — 99223 1ST HOSP IP/OBS HIGH 75: CPT | Performed by: INTERNAL MEDICINE

## 2019-06-11 PROCEDURE — 82948 REAGENT STRIP/BLOOD GLUCOSE: CPT

## 2019-06-11 PROCEDURE — 85025 COMPLETE CBC W/AUTO DIFF WBC: CPT | Performed by: EMERGENCY MEDICINE

## 2019-06-11 PROCEDURE — 99285 EMERGENCY DEPT VISIT HI MDM: CPT

## 2019-06-11 PROCEDURE — 36415 COLL VENOUS BLD VENIPUNCTURE: CPT | Performed by: EMERGENCY MEDICINE

## 2019-06-11 PROCEDURE — 80162 ASSAY OF DIGOXIN TOTAL: CPT | Performed by: EMERGENCY MEDICINE

## 2019-06-11 PROCEDURE — 70498 CT ANGIOGRAPHY NECK: CPT

## 2019-06-11 PROCEDURE — 80048 BASIC METABOLIC PNL TOTAL CA: CPT | Performed by: EMERGENCY MEDICINE

## 2019-06-11 RX ORDER — WARFARIN SODIUM 5 MG/1
5 TABLET ORAL
Status: COMPLETED | OUTPATIENT
Start: 2019-06-11 | End: 2019-06-11

## 2019-06-11 RX ORDER — MELATONIN
1000 EVERY EVENING
Status: DISCONTINUED | OUTPATIENT
Start: 2019-06-11 | End: 2019-06-14 | Stop reason: HOSPADM

## 2019-06-11 RX ORDER — ATORVASTATIN CALCIUM 40 MG/1
40 TABLET, FILM COATED ORAL EVERY EVENING
Status: DISCONTINUED | OUTPATIENT
Start: 2019-06-11 | End: 2019-06-14 | Stop reason: HOSPADM

## 2019-06-11 RX ORDER — ACETAMINOPHEN 325 MG/1
650 TABLET ORAL EVERY 4 HOURS PRN
Status: DISCONTINUED | OUTPATIENT
Start: 2019-06-11 | End: 2019-06-14 | Stop reason: HOSPADM

## 2019-06-11 RX ORDER — DOCUSATE SODIUM 100 MG/1
100 CAPSULE, LIQUID FILLED ORAL 2 TIMES DAILY
Status: DISCONTINUED | OUTPATIENT
Start: 2019-06-11 | End: 2019-06-14 | Stop reason: HOSPADM

## 2019-06-11 RX ORDER — ONDANSETRON 2 MG/ML
4 INJECTION INTRAMUSCULAR; INTRAVENOUS EVERY 6 HOURS PRN
Status: DISCONTINUED | OUTPATIENT
Start: 2019-06-11 | End: 2019-06-13

## 2019-06-11 RX ORDER — CHOLECALCIFEROL (VITAMIN D3) 125 MCG
1000 CAPSULE ORAL EVERY EVENING
Status: DISCONTINUED | OUTPATIENT
Start: 2019-06-11 | End: 2019-06-14 | Stop reason: HOSPADM

## 2019-06-11 RX ORDER — DIGOXIN 125 MCG
250 TABLET ORAL DAILY
Status: DISCONTINUED | OUTPATIENT
Start: 2019-06-12 | End: 2019-06-11

## 2019-06-11 RX ORDER — LABETALOL 20 MG/4 ML (5 MG/ML) INTRAVENOUS SYRINGE
10 ONCE
Status: DISCONTINUED | OUTPATIENT
Start: 2019-06-11 | End: 2019-06-11

## 2019-06-11 RX ORDER — SODIUM CHLORIDE 9 MG/ML
50 INJECTION, SOLUTION INTRAVENOUS CONTINUOUS
Status: DISCONTINUED | OUTPATIENT
Start: 2019-06-11 | End: 2019-06-13

## 2019-06-11 RX ORDER — OXYBUTYNIN CHLORIDE 10 MG/1
10 TABLET, EXTENDED RELEASE ORAL DAILY
Status: DISCONTINUED | OUTPATIENT
Start: 2019-06-11 | End: 2019-06-14 | Stop reason: HOSPADM

## 2019-06-11 RX ADMIN — DOCUSATE SODIUM 100 MG: 100 CAPSULE, LIQUID FILLED ORAL at 18:22

## 2019-06-11 RX ADMIN — CYANOCOBALAMIN TAB 500 MCG 1000 MCG: 500 TAB at 18:22

## 2019-06-11 RX ADMIN — IOHEXOL 85 ML: 350 INJECTION, SOLUTION INTRAVENOUS at 14:05

## 2019-06-11 RX ADMIN — ATORVASTATIN CALCIUM 40 MG: 40 TABLET, FILM COATED ORAL at 18:22

## 2019-06-11 RX ADMIN — VITAMIN D, TAB 1000IU (100/BT) 1000 UNITS: 25 TAB at 18:22

## 2019-06-11 RX ADMIN — INSULIN LISPRO 1 UNITS: 100 INJECTION, SOLUTION INTRAVENOUS; SUBCUTANEOUS at 21:26

## 2019-06-11 RX ADMIN — SODIUM CHLORIDE 75 ML/HR: 0.9 INJECTION, SOLUTION INTRAVENOUS at 16:14

## 2019-06-11 RX ADMIN — WARFARIN SODIUM 5 MG: 5 TABLET ORAL at 18:22

## 2019-06-12 ENCOUNTER — APPOINTMENT (INPATIENT)
Dept: CT IMAGING | Facility: HOSPITAL | Age: 84
DRG: 057 | End: 2019-06-12
Payer: COMMERCIAL

## 2019-06-12 LAB
25(OH)D3 SERPL-MCNC: 20.7 NG/ML (ref 30–100)
ANION GAP SERPL CALCULATED.3IONS-SCNC: 12 MMOL/L (ref 4–13)
ATRIAL RATE: 54 BPM
BUN SERPL-MCNC: 16 MG/DL (ref 5–25)
CALCIUM SERPL-MCNC: 9.6 MG/DL (ref 8.3–10.1)
CHLORIDE SERPL-SCNC: 107 MMOL/L (ref 100–108)
CHOLEST SERPL-MCNC: 102 MG/DL (ref 50–200)
CO2 SERPL-SCNC: 24 MMOL/L (ref 21–32)
CREAT SERPL-MCNC: 0.76 MG/DL (ref 0.6–1.3)
DIGOXIN SERPL-MCNC: 1.6 NG/ML (ref 0.8–2)
EST. AVERAGE GLUCOSE BLD GHB EST-MCNC: 117 MG/DL
GFR SERPL CREATININE-BSD FRML MDRD: 73 ML/MIN/1.73SQ M
GLUCOSE SERPL-MCNC: 110 MG/DL (ref 65–140)
GLUCOSE SERPL-MCNC: 115 MG/DL (ref 65–140)
GLUCOSE SERPL-MCNC: 134 MG/DL (ref 65–140)
GLUCOSE SERPL-MCNC: 143 MG/DL (ref 65–140)
GLUCOSE SERPL-MCNC: 163 MG/DL (ref 65–140)
HBA1C MFR BLD: 5.7 % (ref 4.2–6.3)
HDLC SERPL-MCNC: 40 MG/DL (ref 40–60)
INR PPP: 1.8 (ref 0.86–1.17)
LDLC SERPL CALC-MCNC: 43 MG/DL (ref 0–100)
P AXIS: 53 DEGREES
POTASSIUM SERPL-SCNC: 4.1 MMOL/L (ref 3.5–5.3)
PROTHROMBIN TIME: 21 SECONDS (ref 11.8–14.2)
PTH-INTACT SERPL-MCNC: 20.7 PG/ML (ref 18.4–80.1)
QRS AXIS: 264 DEGREES
QRSD INTERVAL: 136 MS
QT INTERVAL: 428 MS
QTC INTERVAL: 405 MS
SODIUM SERPL-SCNC: 143 MMOL/L (ref 136–145)
T WAVE AXIS: 4 DEGREES
TRIGL SERPL-MCNC: 96 MG/DL
VENTRICULAR RATE: 54 BPM

## 2019-06-12 PROCEDURE — 99232 SBSQ HOSP IP/OBS MODERATE 35: CPT | Performed by: INTERNAL MEDICINE

## 2019-06-12 PROCEDURE — 80048 BASIC METABOLIC PNL TOTAL CA: CPT | Performed by: PHYSICIAN ASSISTANT

## 2019-06-12 PROCEDURE — 99223 1ST HOSP IP/OBS HIGH 75: CPT | Performed by: PHYSICIAN ASSISTANT

## 2019-06-12 PROCEDURE — 82948 REAGENT STRIP/BLOOD GLUCOSE: CPT

## 2019-06-12 PROCEDURE — 93010 ELECTROCARDIOGRAM REPORT: CPT | Performed by: INTERNAL MEDICINE

## 2019-06-12 PROCEDURE — 83970 ASSAY OF PARATHORMONE: CPT | Performed by: INTERNAL MEDICINE

## 2019-06-12 PROCEDURE — G8979 MOBILITY GOAL STATUS: HCPCS | Performed by: PHYSICAL THERAPIST

## 2019-06-12 PROCEDURE — 83036 HEMOGLOBIN GLYCOSYLATED A1C: CPT | Performed by: PHYSICIAN ASSISTANT

## 2019-06-12 PROCEDURE — G8987 SELF CARE CURRENT STATUS: HCPCS

## 2019-06-12 PROCEDURE — 80061 LIPID PANEL: CPT | Performed by: PHYSICIAN ASSISTANT

## 2019-06-12 PROCEDURE — 80162 ASSAY OF DIGOXIN TOTAL: CPT | Performed by: INTERNAL MEDICINE

## 2019-06-12 PROCEDURE — 82306 VITAMIN D 25 HYDROXY: CPT | Performed by: INTERNAL MEDICINE

## 2019-06-12 PROCEDURE — G8978 MOBILITY CURRENT STATUS: HCPCS | Performed by: PHYSICAL THERAPIST

## 2019-06-12 PROCEDURE — 97163 PT EVAL HIGH COMPLEX 45 MIN: CPT | Performed by: PHYSICAL THERAPIST

## 2019-06-12 PROCEDURE — 97166 OT EVAL MOD COMPLEX 45 MIN: CPT

## 2019-06-12 PROCEDURE — 85610 PROTHROMBIN TIME: CPT | Performed by: INTERNAL MEDICINE

## 2019-06-12 PROCEDURE — G8988 SELF CARE GOAL STATUS: HCPCS

## 2019-06-12 RX ORDER — DIGOXIN 125 MCG
250 TABLET ORAL DAILY
Status: DISCONTINUED | OUTPATIENT
Start: 2019-06-12 | End: 2019-06-12

## 2019-06-12 RX ORDER — DIGOXIN 125 MCG
125 TABLET ORAL DAILY
Status: DISCONTINUED | OUTPATIENT
Start: 2019-06-13 | End: 2019-06-13

## 2019-06-12 RX ADMIN — INSULIN LISPRO 1 UNITS: 100 INJECTION, SOLUTION INTRAVENOUS; SUBCUTANEOUS at 12:46

## 2019-06-12 RX ADMIN — ATORVASTATIN CALCIUM 40 MG: 40 TABLET, FILM COATED ORAL at 18:14

## 2019-06-12 RX ADMIN — OXYBUTYNIN CHLORIDE 10 MG: 10 TABLET, EXTENDED RELEASE ORAL at 09:33

## 2019-06-12 RX ADMIN — SODIUM CHLORIDE 75 ML/HR: 0.9 INJECTION, SOLUTION INTRAVENOUS at 05:56

## 2019-06-12 RX ADMIN — CYANOCOBALAMIN TAB 500 MCG 1000 MCG: 500 TAB at 18:14

## 2019-06-12 RX ADMIN — DOCUSATE SODIUM 100 MG: 100 CAPSULE, LIQUID FILLED ORAL at 18:14

## 2019-06-12 RX ADMIN — VITAMIN D, TAB 1000IU (100/BT) 1000 UNITS: 25 TAB at 18:17

## 2019-06-12 RX ADMIN — DOCUSATE SODIUM 100 MG: 100 CAPSULE, LIQUID FILLED ORAL at 09:33

## 2019-06-13 ENCOUNTER — APPOINTMENT (INPATIENT)
Dept: CT IMAGING | Facility: HOSPITAL | Age: 84
DRG: 057 | End: 2019-06-13
Payer: COMMERCIAL

## 2019-06-13 LAB
GLUCOSE SERPL-MCNC: 125 MG/DL (ref 65–140)
GLUCOSE SERPL-MCNC: 128 MG/DL (ref 65–140)
GLUCOSE SERPL-MCNC: 144 MG/DL (ref 65–140)
GLUCOSE SERPL-MCNC: 195 MG/DL (ref 65–140)
INR PPP: 1.9 (ref 0.86–1.17)
PROTHROMBIN TIME: 21.9 SECONDS (ref 11.8–14.2)

## 2019-06-13 PROCEDURE — 97530 THERAPEUTIC ACTIVITIES: CPT

## 2019-06-13 PROCEDURE — 99232 SBSQ HOSP IP/OBS MODERATE 35: CPT | Performed by: INTERNAL MEDICINE

## 2019-06-13 PROCEDURE — 85610 PROTHROMBIN TIME: CPT | Performed by: PHYSICIAN ASSISTANT

## 2019-06-13 PROCEDURE — 97110 THERAPEUTIC EXERCISES: CPT

## 2019-06-13 PROCEDURE — 97535 SELF CARE MNGMENT TRAINING: CPT

## 2019-06-13 PROCEDURE — 82948 REAGENT STRIP/BLOOD GLUCOSE: CPT

## 2019-06-13 PROCEDURE — 99232 SBSQ HOSP IP/OBS MODERATE 35: CPT | Performed by: PSYCHIATRY & NEUROLOGY

## 2019-06-13 PROCEDURE — 70450 CT HEAD/BRAIN W/O DYE: CPT

## 2019-06-13 RX ORDER — PROPRANOLOL HYDROCHLORIDE 10 MG/1
10 TABLET ORAL EVERY 8 HOURS SCHEDULED
Status: DISCONTINUED | OUTPATIENT
Start: 2019-06-13 | End: 2019-06-14 | Stop reason: HOSPADM

## 2019-06-13 RX ORDER — WARFARIN SODIUM 5 MG/1
5 TABLET ORAL
Status: COMPLETED | OUTPATIENT
Start: 2019-06-13 | End: 2019-06-13

## 2019-06-13 RX ORDER — LANOLIN ALCOHOL/MO/W.PET/CERES
6 CREAM (GRAM) TOPICAL
Status: DISCONTINUED | OUTPATIENT
Start: 2019-06-13 | End: 2019-06-14 | Stop reason: HOSPADM

## 2019-06-13 RX ADMIN — OXYBUTYNIN CHLORIDE 10 MG: 10 TABLET, EXTENDED RELEASE ORAL at 08:57

## 2019-06-13 RX ADMIN — METFORMIN HYDROCHLORIDE 500 MG: 500 TABLET ORAL at 18:38

## 2019-06-13 RX ADMIN — PROPRANOLOL HYDROCHLORIDE 10 MG: 10 TABLET ORAL at 22:22

## 2019-06-13 RX ADMIN — DOCUSATE SODIUM 100 MG: 100 CAPSULE, LIQUID FILLED ORAL at 18:38

## 2019-06-13 RX ADMIN — DIGOXIN 125 MCG: 125 TABLET ORAL at 08:57

## 2019-06-13 RX ADMIN — MELATONIN TAB 3 MG 6 MG: 3 TAB at 02:38

## 2019-06-13 RX ADMIN — VITAMIN D, TAB 1000IU (100/BT) 1000 UNITS: 25 TAB at 18:38

## 2019-06-13 RX ADMIN — ATORVASTATIN CALCIUM 40 MG: 40 TABLET, FILM COATED ORAL at 18:38

## 2019-06-13 RX ADMIN — CYANOCOBALAMIN TAB 500 MCG 1000 MCG: 500 TAB at 18:38

## 2019-06-13 RX ADMIN — ACETAMINOPHEN 650 MG: 325 TABLET ORAL at 08:57

## 2019-06-13 RX ADMIN — INSULIN LISPRO 2 UNITS: 100 INJECTION, SOLUTION INTRAVENOUS; SUBCUTANEOUS at 12:35

## 2019-06-13 RX ADMIN — SODIUM CHLORIDE 50 ML/HR: 0.9 INJECTION, SOLUTION INTRAVENOUS at 01:31

## 2019-06-13 RX ADMIN — WARFARIN SODIUM 5 MG: 5 TABLET ORAL at 18:38

## 2019-06-13 RX ADMIN — PROPRANOLOL HYDROCHLORIDE 10 MG: 10 TABLET ORAL at 18:38

## 2019-06-13 RX ADMIN — MELATONIN TAB 3 MG 6 MG: 3 TAB at 22:22

## 2019-06-13 RX ADMIN — DOCUSATE SODIUM 100 MG: 100 CAPSULE, LIQUID FILLED ORAL at 08:57

## 2019-06-14 VITALS
BODY MASS INDEX: 33.08 KG/M2 | DIASTOLIC BLOOD PRESSURE: 62 MMHG | SYSTOLIC BLOOD PRESSURE: 146 MMHG | WEIGHT: 193.78 LBS | HEART RATE: 52 BPM | TEMPERATURE: 97.4 F | RESPIRATION RATE: 18 BRPM | OXYGEN SATURATION: 98 % | HEIGHT: 64 IN

## 2019-06-14 PROBLEM — E86.0 DEHYDRATION: Status: RESOLVED | Noted: 2019-06-11 | Resolved: 2019-06-14

## 2019-06-14 LAB — GLUCOSE SERPL-MCNC: 116 MG/DL (ref 65–140)

## 2019-06-14 PROCEDURE — 82948 REAGENT STRIP/BLOOD GLUCOSE: CPT

## 2019-06-14 PROCEDURE — 99239 HOSP IP/OBS DSCHRG MGMT >30: CPT | Performed by: INTERNAL MEDICINE

## 2019-06-14 RX ORDER — ATORVASTATIN CALCIUM 40 MG/1
40 TABLET, FILM COATED ORAL EVERY EVENING
Qty: 30 TABLET | Refills: 0 | Status: SHIPPED | OUTPATIENT
Start: 2019-06-14 | End: 2020-11-11 | Stop reason: SDUPTHER

## 2019-06-14 RX ORDER — DIGOXIN 250 MCG
125 TABLET ORAL DAILY
Qty: 30 TABLET | Refills: 0 | Status: SHIPPED | OUTPATIENT
Start: 2019-06-14 | End: 2019-06-18 | Stop reason: HOSPADM

## 2019-06-14 RX ORDER — PROPRANOLOL HYDROCHLORIDE 10 MG/1
10 TABLET ORAL EVERY 8 HOURS SCHEDULED
Qty: 90 TABLET | Refills: 0 | Status: SHIPPED | OUTPATIENT
Start: 2019-06-14 | End: 2019-06-14 | Stop reason: HOSPADM

## 2019-06-14 RX ORDER — WARFARIN SODIUM 4 MG/1
4 TABLET ORAL
Qty: 30 TABLET | Refills: 0 | Status: SHIPPED | OUTPATIENT
Start: 2019-06-14 | End: 2019-06-18 | Stop reason: HOSPADM

## 2019-06-14 RX ADMIN — METFORMIN HYDROCHLORIDE 500 MG: 500 TABLET ORAL at 08:47

## 2019-06-14 RX ADMIN — DOCUSATE SODIUM 100 MG: 100 CAPSULE, LIQUID FILLED ORAL at 08:48

## 2019-06-14 RX ADMIN — PROPRANOLOL HYDROCHLORIDE 10 MG: 10 TABLET ORAL at 06:51

## 2019-06-14 RX ADMIN — OXYBUTYNIN CHLORIDE 10 MG: 10 TABLET, EXTENDED RELEASE ORAL at 08:48

## 2019-06-16 ENCOUNTER — HOSPITAL ENCOUNTER (INPATIENT)
Facility: HOSPITAL | Age: 84
LOS: 2 days | Discharge: HOME HEALTH | DRG: 069 | End: 2019-06-18
Attending: EMERGENCY MEDICINE | Admitting: INTERNAL MEDICINE
Payer: COMMERCIAL

## 2019-06-16 ENCOUNTER — APPOINTMENT (EMERGENCY)
Dept: CT IMAGING | Facility: HOSPITAL | Age: 84
DRG: 069 | End: 2019-06-16
Payer: COMMERCIAL

## 2019-06-16 ENCOUNTER — APPOINTMENT (EMERGENCY)
Dept: RADIOLOGY | Facility: HOSPITAL | Age: 84
DRG: 069 | End: 2019-06-16
Payer: COMMERCIAL

## 2019-06-16 DIAGNOSIS — R47.81 SLURRED SPEECH: ICD-10-CM

## 2019-06-16 DIAGNOSIS — I48.20 CHRONIC ATRIAL FIBRILLATION (HCC): ICD-10-CM

## 2019-06-16 DIAGNOSIS — Z99.3 WHEELCHAIR DEPENDENCE: ICD-10-CM

## 2019-06-16 DIAGNOSIS — I10 ESSENTIAL HYPERTENSION: ICD-10-CM

## 2019-06-16 DIAGNOSIS — I63.9 CVA (CEREBRAL VASCULAR ACCIDENT) (HCC): Primary | ICD-10-CM

## 2019-06-16 LAB
ABO GROUP BLD: NORMAL
ANION GAP SERPL CALCULATED.3IONS-SCNC: 8 MMOL/L (ref 4–13)
APTT PPP: 39 SECONDS (ref 26–38)
ATRIAL RATE: 113 BPM
BLD GP AB SCN SERPL QL: NEGATIVE
BUN SERPL-MCNC: 15 MG/DL (ref 5–25)
CALCIUM SERPL-MCNC: 10.1 MG/DL (ref 8.3–10.1)
CHLORIDE SERPL-SCNC: 104 MMOL/L (ref 100–108)
CO2 SERPL-SCNC: 30 MMOL/L (ref 21–32)
CREAT SERPL-MCNC: 0.9 MG/DL (ref 0.6–1.3)
DIGOXIN SERPL-MCNC: 2.5 NG/ML (ref 0.8–2)
ERYTHROCYTE [DISTWIDTH] IN BLOOD BY AUTOMATED COUNT: 12.7 % (ref 11.6–15.1)
GFR SERPL CREATININE-BSD FRML MDRD: 59 ML/MIN/1.73SQ M
GLUCOSE SERPL-MCNC: 145 MG/DL (ref 65–140)
GLUCOSE SERPL-MCNC: 145 MG/DL (ref 65–140)
GLUCOSE SERPL-MCNC: 99 MG/DL (ref 65–140)
HCT VFR BLD AUTO: 44.4 % (ref 34.8–46.1)
HGB BLD-MCNC: 14.2 G/DL (ref 11.5–15.4)
INR PPP: 1.81 (ref 0.86–1.17)
MAGNESIUM SERPL-MCNC: 2 MG/DL (ref 1.6–2.6)
MCH RBC QN AUTO: 30 PG (ref 26.8–34.3)
MCHC RBC AUTO-ENTMCNC: 32 G/DL (ref 31.4–37.4)
MCV RBC AUTO: 94 FL (ref 82–98)
PLATELET # BLD AUTO: 170 THOUSANDS/UL (ref 149–390)
PMV BLD AUTO: 10.5 FL (ref 8.9–12.7)
POTASSIUM SERPL-SCNC: 4.6 MMOL/L (ref 3.5–5.3)
PROTHROMBIN TIME: 21.1 SECONDS (ref 11.8–14.2)
QRS AXIS: 258 DEGREES
QRSD INTERVAL: 138 MS
QT INTERVAL: 444 MS
QTC INTERVAL: 428 MS
RBC # BLD AUTO: 4.74 MILLION/UL (ref 3.81–5.12)
RH BLD: POSITIVE
SODIUM SERPL-SCNC: 142 MMOL/L (ref 136–145)
SPECIMEN EXPIRATION DATE: NORMAL
T WAVE AXIS: 12 DEGREES
VENTRICULAR RATE: 56 BPM
WBC # BLD AUTO: 7.45 THOUSAND/UL (ref 4.31–10.16)

## 2019-06-16 PROCEDURE — 36415 COLL VENOUS BLD VENIPUNCTURE: CPT | Performed by: EMERGENCY MEDICINE

## 2019-06-16 PROCEDURE — 99223 1ST HOSP IP/OBS HIGH 75: CPT | Performed by: INTERNAL MEDICINE

## 2019-06-16 PROCEDURE — 93005 ELECTROCARDIOGRAM TRACING: CPT

## 2019-06-16 PROCEDURE — 85027 COMPLETE CBC AUTOMATED: CPT | Performed by: EMERGENCY MEDICINE

## 2019-06-16 PROCEDURE — NC001 PR NO CHARGE: Performed by: PSYCHIATRY & NEUROLOGY

## 2019-06-16 PROCEDURE — 85730 THROMBOPLASTIN TIME PARTIAL: CPT | Performed by: EMERGENCY MEDICINE

## 2019-06-16 PROCEDURE — 70496 CT ANGIOGRAPHY HEAD: CPT

## 2019-06-16 PROCEDURE — 82948 REAGENT STRIP/BLOOD GLUCOSE: CPT

## 2019-06-16 PROCEDURE — 86901 BLOOD TYPING SEROLOGIC RH(D): CPT | Performed by: EMERGENCY MEDICINE

## 2019-06-16 PROCEDURE — 99291 CRITICAL CARE FIRST HOUR: CPT | Performed by: EMERGENCY MEDICINE

## 2019-06-16 PROCEDURE — 83735 ASSAY OF MAGNESIUM: CPT | Performed by: PHYSICIAN ASSISTANT

## 2019-06-16 PROCEDURE — 80048 BASIC METABOLIC PNL TOTAL CA: CPT | Performed by: EMERGENCY MEDICINE

## 2019-06-16 PROCEDURE — 85610 PROTHROMBIN TIME: CPT | Performed by: EMERGENCY MEDICINE

## 2019-06-16 PROCEDURE — 86850 RBC ANTIBODY SCREEN: CPT | Performed by: EMERGENCY MEDICINE

## 2019-06-16 PROCEDURE — 99204 OFFICE O/P NEW MOD 45 MIN: CPT | Performed by: PSYCHIATRY & NEUROLOGY

## 2019-06-16 PROCEDURE — 93010 ELECTROCARDIOGRAM REPORT: CPT | Performed by: INTERNAL MEDICINE

## 2019-06-16 PROCEDURE — 70498 CT ANGIOGRAPHY NECK: CPT

## 2019-06-16 PROCEDURE — 71045 X-RAY EXAM CHEST 1 VIEW: CPT

## 2019-06-16 PROCEDURE — 80162 ASSAY OF DIGOXIN TOTAL: CPT | Performed by: EMERGENCY MEDICINE

## 2019-06-16 PROCEDURE — 86900 BLOOD TYPING SEROLOGIC ABO: CPT | Performed by: EMERGENCY MEDICINE

## 2019-06-16 PROCEDURE — 99291 CRITICAL CARE FIRST HOUR: CPT

## 2019-06-16 PROCEDURE — 70450 CT HEAD/BRAIN W/O DYE: CPT

## 2019-06-16 RX ORDER — SPIRONOLACTONE 50 MG/1
50 TABLET, FILM COATED ORAL DAILY
Status: DISCONTINUED | OUTPATIENT
Start: 2019-06-16 | End: 2019-06-18 | Stop reason: HOSPADM

## 2019-06-16 RX ORDER — FENOFIBRATE 48 MG/1
48 TABLET, COATED ORAL DAILY
COMMUNITY
End: 2019-06-18 | Stop reason: HOSPADM

## 2019-06-16 RX ORDER — OXYBUTYNIN CHLORIDE 10 MG/1
10 TABLET, EXTENDED RELEASE ORAL DAILY
Status: DISCONTINUED | OUTPATIENT
Start: 2019-06-16 | End: 2019-06-18 | Stop reason: HOSPADM

## 2019-06-16 RX ORDER — CLOPIDOGREL BISULFATE 75 MG/1
300 TABLET ORAL ONCE
Status: COMPLETED | OUTPATIENT
Start: 2019-06-16 | End: 2019-06-16

## 2019-06-16 RX ORDER — ATORVASTATIN CALCIUM 40 MG/1
40 TABLET, FILM COATED ORAL EVERY EVENING
Status: DISCONTINUED | OUTPATIENT
Start: 2019-06-16 | End: 2019-06-18 | Stop reason: HOSPADM

## 2019-06-16 RX ORDER — ACETAMINOPHEN 325 MG/1
650 TABLET ORAL EVERY 4 HOURS PRN
Status: DISCONTINUED | OUTPATIENT
Start: 2019-06-16 | End: 2019-06-18 | Stop reason: HOSPADM

## 2019-06-16 RX ORDER — WARFARIN SODIUM 4 MG/1
4 TABLET ORAL
Status: DISCONTINUED | OUTPATIENT
Start: 2019-06-16 | End: 2019-06-17

## 2019-06-16 RX ADMIN — OXYBUTYNIN CHLORIDE 10 MG: 10 TABLET, EXTENDED RELEASE ORAL at 16:26

## 2019-06-16 RX ADMIN — WARFARIN SODIUM 4 MG: 4 TABLET ORAL at 17:36

## 2019-06-16 RX ADMIN — CLOPIDOGREL BISULFATE 300 MG: 75 TABLET ORAL at 12:11

## 2019-06-16 RX ADMIN — IOHEXOL 85 ML: 350 INJECTION, SOLUTION INTRAVENOUS at 11:36

## 2019-06-16 RX ADMIN — SPIRONOLACTONE 50 MG: 50 TABLET ORAL at 16:26

## 2019-06-16 RX ADMIN — ATORVASTATIN CALCIUM 40 MG: 40 TABLET, FILM COATED ORAL at 17:36

## 2019-06-17 ENCOUNTER — APPOINTMENT (OUTPATIENT)
Dept: NEUROLOGY | Facility: HOSPITAL | Age: 84
DRG: 069 | End: 2019-06-17
Payer: COMMERCIAL

## 2019-06-17 ENCOUNTER — APPOINTMENT (INPATIENT)
Dept: CT IMAGING | Facility: HOSPITAL | Age: 84
DRG: 069 | End: 2019-06-17
Payer: COMMERCIAL

## 2019-06-17 LAB
ANION GAP SERPL CALCULATED.3IONS-SCNC: 10 MMOL/L (ref 4–13)
ATRIAL RATE: 57 BPM
BUN SERPL-MCNC: 15 MG/DL (ref 5–25)
CALCIUM SERPL-MCNC: 9.4 MG/DL (ref 8.3–10.1)
CHLORIDE SERPL-SCNC: 106 MMOL/L (ref 100–108)
CO2 SERPL-SCNC: 26 MMOL/L (ref 21–32)
CREAT SERPL-MCNC: 0.79 MG/DL (ref 0.6–1.3)
DIGOXIN SERPL-MCNC: 0.9 NG/ML (ref 0.8–2)
GFR SERPL CREATININE-BSD FRML MDRD: 69 ML/MIN/1.73SQ M
GLUCOSE SERPL-MCNC: 103 MG/DL (ref 65–140)
GLUCOSE SERPL-MCNC: 108 MG/DL (ref 65–140)
INR PPP: 2.05 (ref 0.86–1.17)
MAGNESIUM SERPL-MCNC: 1.8 MG/DL (ref 1.6–2.6)
P AXIS: 0 DEGREES
POTASSIUM SERPL-SCNC: 3.9 MMOL/L (ref 3.5–5.3)
PR INTERVAL: 360 MS
PROTHROMBIN TIME: 23.2 SECONDS (ref 11.8–14.2)
QRS AXIS: 253 DEGREES
QRSD INTERVAL: 130 MS
QT INTERVAL: 412 MS
QTC INTERVAL: 401 MS
SODIUM SERPL-SCNC: 142 MMOL/L (ref 136–145)
T WAVE AXIS: -11 DEGREES
VENTRICULAR RATE: 57 BPM

## 2019-06-17 PROCEDURE — 97163 PT EVAL HIGH COMPLEX 45 MIN: CPT

## 2019-06-17 PROCEDURE — 97167 OT EVAL HIGH COMPLEX 60 MIN: CPT

## 2019-06-17 PROCEDURE — G8978 MOBILITY CURRENT STATUS: HCPCS

## 2019-06-17 PROCEDURE — 70450 CT HEAD/BRAIN W/O DYE: CPT

## 2019-06-17 PROCEDURE — 97530 THERAPEUTIC ACTIVITIES: CPT

## 2019-06-17 PROCEDURE — 93010 ELECTROCARDIOGRAM REPORT: CPT | Performed by: INTERNAL MEDICINE

## 2019-06-17 PROCEDURE — G8988 SELF CARE GOAL STATUS: HCPCS

## 2019-06-17 PROCEDURE — 83735 ASSAY OF MAGNESIUM: CPT | Performed by: PHYSICIAN ASSISTANT

## 2019-06-17 PROCEDURE — 95816 EEG AWAKE AND DROWSY: CPT

## 2019-06-17 PROCEDURE — 82948 REAGENT STRIP/BLOOD GLUCOSE: CPT

## 2019-06-17 PROCEDURE — 99232 SBSQ HOSP IP/OBS MODERATE 35: CPT | Performed by: PHYSICIAN ASSISTANT

## 2019-06-17 PROCEDURE — 95816 EEG AWAKE AND DROWSY: CPT | Performed by: PSYCHIATRY & NEUROLOGY

## 2019-06-17 PROCEDURE — 85610 PROTHROMBIN TIME: CPT | Performed by: PHYSICIAN ASSISTANT

## 2019-06-17 PROCEDURE — 80162 ASSAY OF DIGOXIN TOTAL: CPT | Performed by: PHYSICIAN ASSISTANT

## 2019-06-17 PROCEDURE — 80048 BASIC METABOLIC PNL TOTAL CA: CPT | Performed by: PHYSICIAN ASSISTANT

## 2019-06-17 PROCEDURE — 99232 SBSQ HOSP IP/OBS MODERATE 35: CPT | Performed by: PSYCHIATRY & NEUROLOGY

## 2019-06-17 PROCEDURE — G8979 MOBILITY GOAL STATUS: HCPCS

## 2019-06-17 PROCEDURE — G8987 SELF CARE CURRENT STATUS: HCPCS

## 2019-06-17 RX ORDER — DIGOXIN 125 MCG
125 TABLET ORAL DAILY
Status: DISCONTINUED | OUTPATIENT
Start: 2019-06-17 | End: 2019-06-18 | Stop reason: HOSPADM

## 2019-06-17 RX ADMIN — SPIRONOLACTONE 50 MG: 50 TABLET ORAL at 09:36

## 2019-06-17 RX ADMIN — ATORVASTATIN CALCIUM 40 MG: 40 TABLET, FILM COATED ORAL at 17:42

## 2019-06-17 RX ADMIN — OXYBUTYNIN CHLORIDE 10 MG: 10 TABLET, EXTENDED RELEASE ORAL at 09:37

## 2019-06-17 RX ADMIN — DIGOXIN 125 MCG: 125 TABLET ORAL at 09:36

## 2019-06-18 VITALS
HEART RATE: 56 BPM | WEIGHT: 188.49 LBS | DIASTOLIC BLOOD PRESSURE: 70 MMHG | TEMPERATURE: 97 F | RESPIRATION RATE: 18 BRPM | BODY MASS INDEX: 32.18 KG/M2 | HEIGHT: 64 IN | SYSTOLIC BLOOD PRESSURE: 146 MMHG | OXYGEN SATURATION: 98 %

## 2019-06-18 PROBLEM — R47.81 SLURRED SPEECH: Status: RESOLVED | Noted: 2019-06-11 | Resolved: 2019-06-18

## 2019-06-18 PROCEDURE — 99239 HOSP IP/OBS DSCHRG MGMT >30: CPT | Performed by: PHYSICIAN ASSISTANT

## 2019-06-18 RX ORDER — DIGOXIN 125 MCG
125 TABLET ORAL DAILY
Qty: 30 TABLET | Refills: 0 | Status: SHIPPED | OUTPATIENT
Start: 2019-06-19 | End: 2020-10-26 | Stop reason: HOSPADM

## 2019-06-18 RX ORDER — LISINOPRIL 2.5 MG/1
2.5 TABLET ORAL DAILY
Qty: 30 TABLET | Refills: 0 | Status: SHIPPED | OUTPATIENT
Start: 2019-06-18 | End: 2020-03-12 | Stop reason: HOSPADM

## 2019-06-18 RX ORDER — DOCUSATE SODIUM 100 MG/1
100 CAPSULE, LIQUID FILLED ORAL 2 TIMES DAILY PRN
Status: DISCONTINUED | OUTPATIENT
Start: 2019-06-18 | End: 2019-06-18 | Stop reason: HOSPADM

## 2019-06-18 RX ORDER — LISINOPRIL 2.5 MG/1
2.5 TABLET ORAL DAILY
Status: DISCONTINUED | OUTPATIENT
Start: 2019-06-18 | End: 2019-06-18 | Stop reason: HOSPADM

## 2019-06-18 RX ADMIN — OXYBUTYNIN CHLORIDE 10 MG: 10 TABLET, EXTENDED RELEASE ORAL at 10:33

## 2019-06-18 RX ADMIN — LISINOPRIL 2.5 MG: 2.5 TABLET ORAL at 12:46

## 2019-06-18 RX ADMIN — DIGOXIN 125 MCG: 125 TABLET ORAL at 10:33

## 2019-06-18 RX ADMIN — DOCUSATE SODIUM 100 MG: 100 CAPSULE, LIQUID FILLED ORAL at 12:46

## 2019-06-18 RX ADMIN — APIXABAN 5 MG: 5 TABLET, FILM COATED ORAL at 10:34

## 2019-06-18 RX ADMIN — SPIRONOLACTONE 50 MG: 50 TABLET ORAL at 10:33

## 2019-06-19 ENCOUNTER — TELEPHONE (OUTPATIENT)
Dept: NEUROLOGY | Facility: CLINIC | Age: 84
End: 2019-06-19

## 2019-06-24 ENCOUNTER — TELEPHONE (OUTPATIENT)
Dept: NEUROLOGY | Facility: CLINIC | Age: 84
End: 2019-06-24

## 2019-07-03 DIAGNOSIS — I10 ESSENTIAL HYPERTENSION: ICD-10-CM

## 2019-07-03 DIAGNOSIS — Z86.73 HISTORY OF CVA (CEREBROVASCULAR ACCIDENT): ICD-10-CM

## 2019-07-03 RX ORDER — LISINOPRIL 2.5 MG/1
TABLET ORAL
Qty: 30 TABLET | Refills: 0 | OUTPATIENT
Start: 2019-07-03

## 2019-07-03 RX ORDER — ATORVASTATIN CALCIUM 40 MG/1
TABLET, FILM COATED ORAL
Qty: 30 TABLET | Refills: 0 | OUTPATIENT
Start: 2019-07-03

## 2019-07-11 ENCOUNTER — OFFICE VISIT (OUTPATIENT)
Dept: NEUROLOGY | Facility: CLINIC | Age: 84
End: 2019-07-11
Payer: COMMERCIAL

## 2019-07-11 VITALS — SYSTOLIC BLOOD PRESSURE: 130 MMHG | HEART RATE: 70 BPM | DIASTOLIC BLOOD PRESSURE: 68 MMHG

## 2019-07-11 DIAGNOSIS — Z86.73 HISTORY OF STROKE: ICD-10-CM

## 2019-07-11 DIAGNOSIS — E11.9 TYPE 2 DIABETES MELLITUS WITHOUT COMPLICATION, WITHOUT LONG-TERM CURRENT USE OF INSULIN (HCC): Primary | ICD-10-CM

## 2019-07-11 DIAGNOSIS — I48.20 CHRONIC ATRIAL FIBRILLATION (HCC): ICD-10-CM

## 2019-07-11 DIAGNOSIS — G24.3 CERVICAL DYSTONIA: ICD-10-CM

## 2019-07-11 PROCEDURE — 99215 OFFICE O/P EST HI 40 MIN: CPT | Performed by: PSYCHIATRY & NEUROLOGY

## 2019-07-11 RX ORDER — GABAPENTIN 300 MG/1
300 CAPSULE ORAL DAILY
COMMUNITY
End: 2020-10-26 | Stop reason: HOSPADM

## 2019-07-11 RX ORDER — PRIMIDONE 50 MG/1
TABLET ORAL 2 TIMES DAILY
COMMUNITY

## 2019-07-11 RX ORDER — FOLIC ACID 1 MG/1
1000 TABLET ORAL DAILY
Refills: 3 | COMMUNITY
Start: 2019-06-28

## 2019-07-11 NOTE — PATIENT INSTRUCTIONS
Stroke:  Piyush Haley presents for follow-up with regard to her recent episodes of dysarthric speech which were felt to represent either a TIA or a small stroke  Notably she has a history of stroke with atrial fibrillation and previously was treated with Coumadin, however her INR was subtherapeutic at the time of presentation  Since then she has been taking her medications as prescribed  She describes no falls or severe bleeding issues  She has had no recurrent stroke events   -for ongoing stroke prevention she should continue her current combination of Eliquis (full dose), statin, and appropriate blood pressure and glycemic control  -we will defer to the good judgment of her primary care team for monitoring of her cholesterol panel and blood sugar numbers  We would suggest targeting a hemoglobin A1c of less than 7% on the blood pressure of less than 130/80 on a regular basis  -I think she is clearly benefitting from physical therapy and this should continue  -from my standpoint she is not in need of additional testing with regard to her recent stroke  Cervical dystonia:  She has a no-no head tremor which is been going on for 6-7 years and has progressively worsening  Her cervical range of motion is limited, particularly with neck extension, and less so with head turning to either side  She does, however, have 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  -I have reviewed her case with Dr Jim Chandra for from our movement disorders team who has agreed to see her for a comprehensive dystonia evaluation and potential treatment with botulinum toxin injection  I will plan for her to return to the office as soon as possible to see Dr Tyra Bonilla her  She should see someone from the stroke team in approximately 4-6 months unless she has seen Dr Rachel Patel during the same time frame    If she has any symptoms concerning for recurrent TIA or stroke such as sudden painless loss of vision or double vision, difficulty speaking or swallowing, vertigo/room spinning that does not quickly resolve, or weakness/numbness affecting 1 side of the face or body she should proceed by ambulance to the nearest emergency room immediately  If she were to have a fall in which she strikes her head and has residual symptoms or if he develops a sudden extremely severe unusual headache she should also be seen in the nearest emergency room

## 2019-07-11 NOTE — PROGRESS NOTES
Patient ID: Piyush Funez is a 80 y o  female  Assessment/Plan:    No problem-specific Assessment & Plan notes found for this encounter  Diagnoses and all orders for this visit:    Type 2 diabetes mellitus without complication, without long-term current use of insulin (HCC)    Chronic atrial fibrillation (HCC)    Cervical dystonia    History of stroke    Other orders  -     primidone (MYSOLINE) 50 mg tablet; Take by mouth 2 (two) times a day  -     folic acid (FOLVITE) 1 mg tablet; Take 1,000 mcg by mouth daily  -     gabapentin (NEURONTIN) 300 mg capsule; Take 300 mg by mouth daily       Patient Instructions   Stroke:  Sherif Talbert presents for follow-up with regard to her recent episodes of dysarthric speech which were felt to represent either a TIA or a small stroke  Notably she has a history of stroke with atrial fibrillation and previously was treated with Coumadin, however her INR was subtherapeutic at the time of presentation  Since then she has been taking her medications as prescribed  She describes no falls or severe bleeding issues  She has had no recurrent stroke events   -for ongoing stroke prevention she should continue her current combination of Eliquis (full dose), statin, and appropriate blood pressure and glycemic control  -we will defer to the good judgment of her primary care team for monitoring of her cholesterol panel and blood sugar numbers  We would suggest targeting a hemoglobin A1c of less than 7% on the blood pressure of less than 130/80 on a regular basis  -I think she is clearly benefitting from physical therapy and this should continue  -from my standpoint she is not in need of additional testing with regard to her recent stroke  Cervical dystonia:  She has a no-no head tremor which is been going on for 6-7 years and has progressively worsening  Her cervical range of motion is limited, particularly with neck extension, and less so with head turning to either side    She does, however, have 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  -I have reviewed her case with Dr Nathalie Henao for from our movement disorders team who has agreed to see her for a comprehensive dystonia evaluation and potential treatment with botulinum toxin injection  I will plan for her to return to the office as soon as possible to see Dr Renzo Leslie her  She should see someone from the stroke team in approximately 4-6 months unless she has seen Dr Flynn Johnson during the same time frame  If she has any symptoms concerning for recurrent TIA or stroke such as sudden painless loss of vision or double vision, difficulty speaking or swallowing, vertigo/room spinning that does not quickly resolve, or weakness/numbness affecting 1 side of the face or body she should proceed by ambulance to the nearest emergency room immediately  If she were to have a fall in which she strikes her head and has residual symptoms or if he develops a sudden extremely severe unusual headache she should also be seen in the nearest emergency room  Subjective:    KELLY     Mik Gracia presents for follow-up with regard to her prior history of stroke  She reports a remote history of stroke, and that she has some left-sided weakness at baseline  She was evaluated twice recently in June of 2019 for episodic dysarthric speech which is felt to be a possible TIA versus small stroke  Noncontrast head CT did not reveal any significant changes, however this may have been a small event  Notably she does have history of atrial fibrillation and was previously on Coumadin however the time of her recent presentation she was subtherapeutic  She was appropriately transition to Eliquis at the time of her inpatient hospitalization and is doing well  She spends really all of her time in her wheelchair, but is receiving physical therapy and she reports that this is really helping her quite significantly    I reviewed her cholesterol panel and hemoglobin A1c which were performed to the time for recent hospitalization and which were in a good range  She reports approximately a 6-7 year history of a no-no head tremor  This has been worsening recently and began worsening prior to her most recent stroke  She denies any other tremor of any kind  She notes that she is able to stop the tremor briefly if she lays all the way back in her chair with pillows behind her head and places her hand on her right cheek (consistent with a Geste Antagoniste)  She reports that this is extremely bothersome to her  She is not able to watch television or do other things because the head shaking all the time  It also bothers her at night when she is trying to go to sleep      Past Medical History:   Diagnosis Date    A-fib [facility])     Arthritis     Assistance needed for mobility     pt can stand with assistance and transfer    Chronic pain disorder     Diabetes mellitus ([address] )     NIDDM    Full dentures     History of transfusion     no adverse reaction    Hyperlipidemia     Irregular heart beat     Numbness and tingling of both feet     Skin abnormality     sacrum area - small red area, less than a dime size    Spinal stenosis     St[facility] [address] [address]     Unable to ambulate     Urinary incontinence     ipg stimulator x3 repakcements,battery exchange today 2/14/2018    Wears glasses     Wheelchair dependence        Social History     Socioeconomic History    Marital status: /Civil Union     Spouse name: None    Number of children: None    Years of education: None    Highest education level: None   Occupational History    None   Social Needs    Financial resource strain: None    Food insecurity:     Worry: None     Inability: None    Transportation needs:     Medical: None     Non-medical: None   Tobacco Use    Smoking status: Never Smoker    Smokeless tobacco: Never Used   Substance and Sexual Activity    Alcohol use: Yes     Comment: very rare  Drug use: No    Sexual activity: None   Lifestyle    Physical activity:     Days per week: None     Minutes per session: None    Stress: None   Relationships    Social connections:     Talks on phone: None     Gets together: None     Attends Faith service: None     Active member of club or organization: None     Attends meetings of clubs or organizations: None     Relationship status: None    Intimate partner violence:     Fear of current or ex partner: None     Emotionally abused: None     Physically abused: None     Forced sexual activity: None   Other Topics Concern    None   Social History Narrative    None       Family History   Problem Relation Age of Onset    No Known Problems Mother     No Known Problems Father          Current Outpatient Medications:     apixaban (ELIQUIS) 5 mg, Take 1 tablet (5 mg total) by mouth 2 (two) times a day, Disp: 60 tablet, Rfl: 0    atorvastatin (LIPITOR) 40 mg tablet, Take 1 tablet (40 mg total) by mouth every evening, Disp: 30 tablet, Rfl: 0    calcium gluconate 500 mg tablet, Take 500 mg by mouth every evening, Disp: , Rfl:     cholecalciferol (VITAMIN D3) 1,000 units tablet, Take 1,000 Units by mouth every evening, Disp: , Rfl:     cyanocobalamin (VITAMIN B-12) 1,000 mcg tablet, Take 1,000 mcg by mouth every evening  , Disp: , Rfl:     digoxin (LANOXIN) 0 125 mg tablet, Take 1 tablet (125 mcg total) by mouth daily, Disp: 30 tablet, Rfl: 0    folic acid (FOLVITE) 1 mg tablet, Take 1,000 mcg by mouth daily, Disp: , Rfl: 3    gabapentin (NEURONTIN) 300 mg capsule, Take 300 mg by mouth daily, Disp: , Rfl:     lisinopril (ZESTRIL) 2 5 mg tablet, Take 1 tablet (2 5 mg total) by mouth daily, Disp: 30 tablet, Rfl: 0    metFORMIN (GLUCOPHAGE) 1000 MG tablet, Take 0 5 tablets (500 mg total) by mouth 2 (two) times a day with meals, Disp: 60 tablet, Rfl: 0    Mirabegron ER (MYRBETRIQ) 50 MG TB24, Take 50 mg by mouth daily  , Disp: , Rfl:     primidone (MYSOLINE) 50 mg tablet, Take by mouth 2 (two) times a day, Disp: , Rfl:     spironolactone (ALDACTONE) 50 mg tablet, Take 50 mg by mouth daily  , Disp: , Rfl:         The following portions of the patient's history were reviewed: allergies, current medications, past family history, past medical history, past social history and problem list            Objective:    Blood pressure 130/68, pulse 70, not currently breastfeeding  Physical Exam    Neurological Exam    At the time of my evaluation Gillian Wong was awake, alert, and in no distress  She was in reasonably good spirits  There is no significant aphasia but mild to moderate dysarthria  Cranial nerves 2-12 were otherwise intact with the exception of some alterations in her sternocleidomastoid  Particularly she has what appears to be a mild right head tilt with a little bit of leftward rotation  She has a continuous no-no tremor  On specific testing she appears to have right sternocleidomastoid hypertrophy compared to the left  Strength was 4+/5 in the bilateral upper extremities with a left upper extremity fix  Strength is symmetric in the bilateral lower extremities  Sensation was intact to light touch in the bilateral upper lower extremities  There is no obvious ataxia  There was no postural tremor or rest predominant tremor at the time of her examination  Her movements were not particularly bradykinetic  Her speech was minimally hypophonic  ROS:    Review of Systems   Constitutional: Negative  Negative for appetite change and fever  HENT: Negative  Negative for hearing loss, tinnitus, trouble swallowing and voice change  Eyes: Negative  Negative for photophobia and pain  Respiratory: Negative  Negative for shortness of breath  Cardiovascular: Negative  Negative for palpitations  Gastrointestinal: Negative  Negative for nausea and vomiting  Endocrine: Negative  Negative for cold intolerance and heat intolerance  Genitourinary: Negative  Negative for dysuria, frequency and urgency  Musculoskeletal: Negative  Negative for myalgias and neck pain  Skin: Negative  Negative for rash  Neurological: Positive for tremors (head tremors) and speech difficulty (slurred speech)  Negative for dizziness, seizures, syncope, facial asymmetry, weakness, light-headedness, numbness and headaches  Hematological: Negative  Does not bruise/bleed easily  Psychiatric/Behavioral: Negative  Negative for confusion, hallucinations and sleep disturbance  Reviewed ROS as entered by medical assistant

## 2019-07-17 ENCOUNTER — TELEPHONE (OUTPATIENT)
Dept: NEUROLOGY | Facility: CLINIC | Age: 84
End: 2019-07-17

## 2019-07-17 ENCOUNTER — OFFICE VISIT (OUTPATIENT)
Dept: NEUROLOGY | Facility: CLINIC | Age: 84
End: 2019-07-17
Payer: COMMERCIAL

## 2019-07-17 VITALS — SYSTOLIC BLOOD PRESSURE: 124 MMHG | DIASTOLIC BLOOD PRESSURE: 60 MMHG | HEART RATE: 72 BPM

## 2019-07-17 DIAGNOSIS — G24.3 CERVICAL DYSTONIA: Primary | ICD-10-CM

## 2019-07-17 PROCEDURE — 99214 OFFICE O/P EST MOD 30 MIN: CPT | Performed by: PSYCHIATRY & NEUROLOGY

## 2019-07-17 NOTE — ASSESSMENT & PLAN NOTE
Alcus Hodgkin is an 80year old wheelchair-bound woman with past history significant for stroke and afib on systemic anticoagulation who presents for evaluation of an isolated head tremor  Her presentation is most consistent with cervical dystonia with dystonic tremor  The tremor is very debilitating for the patient  It affects her vision and causes significant fatigue  We discussed the etiology and treatment options for dystonia  At this time, patient is not appropriate nor interested in oral treatments  Then we discussed the use of Botox for dystonia  We discussed the mechanism of action, time course, method of administration and potential risks and benefits  All the patients questions and concerns were addressed and the patient expressed an interest in trying Botox therapy for cervical dystonia      Proposed injections: 1cc dilution, no EMG  LEFT Sternocleidomastoid: 10 units x2  LEFT Splenious capitis: 20 units  RIGHT Splenious capitis: 30 units  RIGHT Levator scapulae: 20 units   Total: 90 units

## 2019-07-17 NOTE — TELEPHONE ENCOUNTER
Called [facility]- spoke with Rayshawn Gill  I provided her with the following codes: 54719,  with a diagnosis of G24 3  She states no authorization is required for [phone] [id number], but an authorization is required for  which needs to be completed through [employer]  She states the patient's PCP office will need to initiate this prior authorization  She states the patient has an HMO plan and needs to authorize anything that is ordered

## 2019-07-17 NOTE — TELEPHONE ENCOUNTER
----- Message from Donny Morris MD sent at 7/17/2019  9:45 AM EDT -----  New Botox  Cervical dystonia   100 units, no EMG

## 2019-07-18 ENCOUNTER — OFFICE VISIT (OUTPATIENT)
Dept: CARDIOLOGY CLINIC | Facility: CLINIC | Age: 84
End: 2019-07-18
Payer: COMMERCIAL

## 2019-07-18 VITALS
SYSTOLIC BLOOD PRESSURE: 118 MMHG | HEART RATE: 73 BPM | DIASTOLIC BLOOD PRESSURE: 60 MMHG | BODY MASS INDEX: 32.35 KG/M2 | HEIGHT: 64 IN

## 2019-07-18 DIAGNOSIS — I10 ESSENTIAL HYPERTENSION: ICD-10-CM

## 2019-07-18 DIAGNOSIS — I48.0 PAROXYSMAL ATRIAL FIBRILLATION (HCC): Primary | ICD-10-CM

## 2019-07-18 DIAGNOSIS — Z86.73 HISTORY OF STROKE: ICD-10-CM

## 2019-07-18 PROBLEM — E78.5 HYPERLIPIDEMIA: Status: ACTIVE | Noted: 2019-07-18

## 2019-07-18 PROCEDURE — 99203 OFFICE O/P NEW LOW 30 MIN: CPT | Performed by: INTERNAL MEDICINE

## 2019-07-18 PROCEDURE — 93000 ELECTROCARDIOGRAM COMPLETE: CPT | Performed by: INTERNAL MEDICINE

## 2019-07-18 PROCEDURE — 3074F SYST BP LT 130 MM HG: CPT | Performed by: INTERNAL MEDICINE

## 2019-07-22 NOTE — PROGRESS NOTES
Cardiology Consultation     Cha Bennett  [id number]  1935  [address] [place] [id number]      HPI:  Patient with a history of paroxysmal atrial fibrillation and prior strokes previously on warfarin anticoagulation was admitted to [facility] on June 11th through June 14th and again on June 16th through June 18, 2019  Both time she was admitted with slurred speech  The symptoms resolved without residual and patient has no residual neurologic defects from her prior strokes  Her warfarin management has been difficult and she has frequently been subtherapeutic  During the last admission she was changed from warfarin to Eliquis  She has had no further episodes of slurred speech since she has been on Eliquis  At the time of both admissions, she presented in sinus rhythm  Other problems have included type 2 diabetes mellitus, cervical dystonia, spinal cord stimulator, wheelchair dependent, overactive bladder, essential hypertension  1  Paroxysmal atrial fibrillation (HCC)  POCT ECG   2  Essential hypertension     3   History of stroke       Patient Active Problem List   Diagnosis    Wheelchair dependence    Type 2 diabetes mellitus (HCC)    Paroxysmal atrial fibrillation (HCC)    Acute cholecystitis    Elevated troponin    Gallstone pancreatitis    Generalized weakness    Urinary incontinence    Spinal cord stimulator status    Overactive bladder    Essential hypertension    Cervical dystonia    History of stroke    Hyperlipidemia     Past Medical History:   Diagnosis Date    A[facility]fib [facility] [address] [facility]     Arthritis     Assistance needed for mobility     pt can stand with assistance and transfer    Chronic pain disorder     Diabetes mellitus ([facility] [address] )     NIDDM    Full dentures     History of transfusion     no adverse reaction    Hyperlipidemia     Irregular heart beat     Numbness and tingling of both feet     Skin abnormality     sacrum area - small red area, less than a dime size    Spinal stenosis     Stroke (HCC)     Unable to ambulate     Urinary incontinence     ipg stimulator x3 repakcements,battery exchange today 2/14/2018    Wears glasses     Wheelchair dependence      Social History     Socioeconomic History    Marital status: /Civil Union     Spouse name: Not on file    Number of children: Not on file    Years of education: Not on file    Highest education level: Not on file   Occupational History    Not on file   Social Needs    Financial resource strain: Not on file    Food insecurity:     Worry: Not on file     Inability: Not on file    Transportation needs:     Medical: Not on file     Non-medical: Not on file   Tobacco Use    Smoking status: Never Smoker    Smokeless tobacco: Never Used   Substance and Sexual Activity    Alcohol use: Yes     Comment: very rare    Drug use: No    Sexual activity: Not on file   Lifestyle    Physical activity:     Days per week: Not on file     Minutes per session: Not on file    Stress: Not on file   Relationships    Social connections:     Talks on phone: Not on file     Gets together: Not on file     Attends [employer] service: Not on file     Active member of club or organization: Not on file     Attends meetings of clubs or organizations: Not on file     Relationship status: Not on file    Intimate partner violence:     Fear of current or ex partner: Not on file     Emotionally abused: Not on file     Physically abused: Not on file     Forced sexual activity: Not on file   Other Topics Concern    Not on file   Social History Narrative    Not on file      Family History   Problem Relation Age of Onset    No Known Problems Mother     No Known Problems Father      Past Surgical History:   Procedure Laterality Date    BACK SURGERY      fusion    BLADDER SUSPENSION      CARPAL TUNNEL RELEASE Bilateral     CHOLANGIOGRAM N/A 6/4/2018    Procedure: CHOLANGIOGRAM;  Surgeon: Tyler Campos MD;  [facility];  Service: [facility] N/A 6/4/2018    Procedure: CHOLECYSTECTOMY LAPAROSCOPIC;  Surgeon: Tyler Campos MD;  [facility]: [facility];  Service: General    COLONOSCOPY      DILATION AND CURETTAGE OF UTERUS      FRACTURE SURGERY Left     femur with hardware    HYSTERECTOMY      INSERTION / Maudry Harry / REVISION NEUROSTIMULATOR      JOINT REPLACEMENT Bilateral     knees    SACRAL NERVE STIMULATOR PLACEMENT N/A 2/14/2018    Procedure: REMOVE AND REPLACE IPG;  Surgeon: Audrey Jones MD;  [facility]: [facility];  Service: UroGynecology           TONSILLECTOMY         Current Outpatient Medications:     apixaban (ELIQUIS) 5 mg, Take 1 tablet (5 mg total) by mouth 2 (two) times a day, Disp: 60 tablet, Rfl: 0    atorvastatin (LIPITOR) 40 mg tablet, Take 1 tablet (40 mg total) by mouth every evening, Disp: 30 tablet, Rfl: 0    calcium gluconate 500 mg tablet, Take 500 mg by mouth every evening, Disp: , Rfl:     cholecalciferol (VITAMIN D3) 1,000 units tablet, Take 1,000 Units by mouth every evening, Disp: , Rfl:     cyanocobalamin (VITAMIN B-12) 1,000 mcg tablet, Take 1,000 mcg by mouth every evening  , Disp: , Rfl:     digoxin (LANOXIN) 0 125 mg tablet, Take 1 tablet (125 mcg total) by mouth daily, Disp: 30 tablet, Rfl: 0    folic acid (FOLVITE) 1 mg tablet, Take 1,000 mcg by mouth daily, Disp: , Rfl: 3    gabapentin (NEURONTIN) 300 mg capsule, Take 300 mg by mouth daily, Disp: , Rfl:     lisinopril (ZESTRIL) 2 5 mg tablet, Take 1 tablet (2 5 mg total) by mouth daily, Disp: 30 tablet, Rfl: 0    metFORMIN (GLUCOPHAGE) 1000 MG tablet, Take 0 5 tablets (500 mg total) by mouth 2 (two) times a day with meals, Disp: 60 tablet, Rfl: 0    Mirabegron ER (MYRBETRIQ) 50 MG TB24, Take 50 mg by mouth daily  , Disp: , Rfl:     primidone (MYSOLINE) 50 mg tablet, Take by mouth 2 (two) times a day, Disp: , Rfl:     spironolactone (ALDACTONE) 50 mg tablet, Take 50 mg by mouth daily  , Disp: , Rfl:   Allergies   Allergen Reactions    Bactrim [Sulfamethoxazole-Trimethoprim] Swelling     Swelling around eyes and red eyes    Aspirin Other (See Comments)     "feels like fist in my chest"    Ciprofloxacin     Nitrofurantoin     Other      "5mz/Tmp "  "1 60 mTab amme"     Per pt's allergy list      Vitals:    07/18/19 1001   BP: 118/60   Pulse: 73   Height: 5' 4" (1 626 m)       Review of Systems:  Review of Systems   Constitutional: Negative  HENT: Negative  Constant tremor of cervical neck  Respiratory: Negative for chest tightness and shortness of breath  Cardiovascular: Negative for chest pain and leg swelling  Gastrointestinal: Negative  Endocrine: Negative  Genitourinary: Negative  Musculoskeletal: Positive for gait problem  Wheelchair-bound but can ambulate with assistance   Skin: Negative  Allergic/Immunologic: Negative  Hematological: Negative  Psychiatric/Behavioral: Negative  Physical Exam:  Physical Exam   Constitutional: She is oriented to person, place, and time  She appears well-developed and well-nourished  HENT:   Head: Normocephalic and atraumatic  Neck: Normal range of motion  Neck supple  No JVD present  No tracheal deviation present  No thyromegaly present  Cardiovascular: Normal rate, regular rhythm, normal heart sounds and intact distal pulses  Exam reveals no gallop and no friction rub  No murmur heard  Pulmonary/Chest: Effort normal  No respiratory distress  She has no rales  Abdominal: Soft  Bowel sounds are normal    Musculoskeletal: Normal range of motion  She exhibits no edema  Neurological: She is alert and oriented to person, place, and time  Skin: Skin is warm and dry  Psychiatric: She has a normal mood and affect  Her behavior is normal        Discussion/Summary:  1   Patient has had no further neurologic episodes since she has been on Eliquis anticoagulation rather than warfarin  Her warfarin was difficult to control and she was frequently subtherapeutic  2  Patient is in normal sinus rhythm and at the time of her last 2 hospitalizations, she was in normal sinus rhythm  3  If symptomatic paroxysmal atrial fibrillation becomes a problem in the future, could try amiodarone to keep patient in sinus rhythm  However, she appears to be in sinus rhythm most of the time and has had no further neurologic problems since her anticoagulation has been changed  Thus it does not seem warranted at this time to begin amiodarone  4  Return in 3 months

## 2019-07-29 NOTE — TELEPHONE ENCOUNTER
Cruz James to verify the information listed below  Spoke with Nicole- she advised me that [id number] does not need prior authorization however,  will need a prior authorization through [address] (phone: [phone])  Call reference #: [id number]        Veronica,    Please schedule a [facility] Botox appointment for the patient  It will be specialty pharmacy   Please let me know once the patient is scheduled so I can attach the referral     Thank you,    Sara Jaquez

## 2019-07-31 NOTE — TELEPHONE ENCOUNTER
Called [facility]- spoke with Danie Man- I made her aware that I was calling to enroll the patient with there specialty pharmacy for Botox  I was able to provide her with the patient's demographics, insurance information, and contact information  She was able to transfer me over to a pharmacist so  Can provide a verbal Rx for Botox  Spoke with joellen Oakes- provided a verbal script for Botox 100 units QTY: 1 under [facility] for cervical dystonia G24 3 with 3 refills  She is aware that this medication is needed by 8/12/19  Will do a status check in 2 days

## 2019-08-01 ENCOUNTER — TELEPHONE (OUTPATIENT)
Dept: NEUROLOGY | Facility: CLINIC | Age: 84
End: 2019-08-01

## 2019-08-05 NOTE — TELEPHONE ENCOUNTER
Called [facility]- spoke with Wayne Schlatter- she states the patient's Botox order is currently in insurance verification  She states a prior authorization is needed  Will submit PA today through cover my meds

## 2019-08-05 NOTE — TELEPHONE ENCOUNTER
PA Approved by [facility]  Approval letter will be scanned into the patient's chart under "media" by the end of the day      Authorization information:    Authorization #: [id number]  Valid dates: 7/31/2019 until 1/31/2020 valid for 2 visits (up to 200 units)

## 2019-08-05 NOTE — TELEPHONE ENCOUNTER
Type Date User Summary Attachment   General 08/05/2019  3:06 PM Al Rodriguez Care Coordination  -   Note    UPDATE:     Botox- authorization #: [id number]- valid for 2 visits (up to 200 units) from 7/31/2019 until 1/31/2020   Please use [facility]      Thank you,     Marco Antonio Rowland

## 2019-08-07 NOTE — TELEPHONE ENCOUNTER
Called [facility]- spoke with Laura Sanon- she states the patient's Botox order is currently in insurance verification  Spoke with Alyssia Preston in the insurance verification department- she states is getting everything ready so she can transfer me over to patient care  I spoke with Cindy, in patient care- she states ready to be set up for delivery  Patient's consent is not on file  I attempted to contact the patient while I was on the phone with [facility]- the patient did not answer  lmom awaiting a call back- when patient calls back please let her know that her Botox is ready to be set up for delivery  Patient will need to call [facility] ASAP to give consent to ship  Please provide her with the phone number listed below      [facility]- [phone]

## 2019-08-08 DIAGNOSIS — I48.20 CHRONIC ATRIAL FIBRILLATION (HCC): ICD-10-CM

## 2019-08-08 NOTE — TELEPHONE ENCOUNTER
Called [facility]- spoke with Flavia- Patient's Botox is ready to be set up for delivery  Patient's consent is on file  Botox to be delivered on Tuesday 8/13/2019 to the [facility] location via [address]- a signature will be required  Veronica,    Please await Botox delivery and document once it has arrived  Please let me know if you do not receive the patient's Botox order      Thank you,    Miguel Curran

## 2019-08-08 NOTE — TELEPHONE ENCOUNTER
Called and spoke with the patient- she is aware that her Botox order is ready to be set up for delivery  Patient is aware that she will need to call and give consent  Patient is questioning wether she has a co-pay or not- I advised her that they did not mention one to me but that they would go over that information when she calls to give consent  I provided the patient with the phone number for [facility]  She states she will take care of this morning  Will call to coordinate delivery after lunch

## 2019-08-11 RX ORDER — APIXABAN 5 MG/1
TABLET, FILM COATED ORAL
Qty: 60 TABLET | Refills: 0 | OUTPATIENT
Start: 2019-08-11

## 2019-08-13 NOTE — TELEPHONE ENCOUNTER
Efra Shah,    Patient's botox arrived in office today, 8-13-19, stored in fridge and logged into book      Thank you,  John Fortune

## 2019-08-18 DIAGNOSIS — I48.20 CHRONIC ATRIAL FIBRILLATION (HCC): ICD-10-CM

## 2019-08-20 ENCOUNTER — PROCEDURE VISIT (OUTPATIENT)
Dept: NEUROLOGY | Facility: CLINIC | Age: 84
End: 2019-08-20
Payer: COMMERCIAL

## 2019-08-20 VITALS — DIASTOLIC BLOOD PRESSURE: 73 MMHG | TEMPERATURE: 97.7 F | SYSTOLIC BLOOD PRESSURE: 163 MMHG

## 2019-08-20 DIAGNOSIS — G24.3 CERVICAL DYSTONIA: Primary | ICD-10-CM

## 2019-08-20 PROCEDURE — 64616 CHEMODENERV MUSC NECK DYSTON: CPT | Performed by: PSYCHIATRY & NEUROLOGY

## 2019-08-20 RX ORDER — DIGOXIN 125 MCG
TABLET ORAL
Qty: 30 TABLET | Refills: 0 | OUTPATIENT
Start: 2019-08-20

## 2019-08-20 NOTE — PROGRESS NOTES
Cha Bennett returned today to the Botox clinic in the [address] at the [facility]  As you know, the patient is a 80 y o  female who is receiving Botox injections for cervical dystonia        Medications:      Current Outpatient Medications on File Prior to Visit   Medication Sig Dispense Refill    apixaban (ELIQUIS) 5 mg Take 1 tablet (5 mg total) by mouth 2 (two) times a day 60 tablet 0    atorvastatin (LIPITOR) 40 mg tablet Take 1 tablet (40 mg total) by mouth every evening 30 tablet 0    calcium gluconate 500 mg tablet Take 500 mg by mouth every evening      cholecalciferol (VITAMIN D3) 1,000 units tablet Take 1,000 Units by mouth every evening      cyanocobalamin (VITAMIN B-12) 1,000 mcg tablet Take 1,000 mcg by mouth every evening        digoxin (LANOXIN) 0 125 mg tablet Take 1 tablet (125 mcg total) by mouth daily 30 tablet 0    folic acid (FOLVITE) 1 mg tablet Take 1,000 mcg by mouth daily  3    gabapentin (NEURONTIN) 300 mg capsule Take 300 mg by mouth daily      lisinopril (ZESTRIL) 2 5 mg tablet Take 1 tablet (2 5 mg total) by mouth daily 30 tablet 0    metFORMIN (GLUCOPHAGE) 1000 MG tablet Take 0 5 tablets (500 mg total) by mouth 2 (two) times a day with meals 60 tablet 0    Mirabegron ER (MYRBETRIQ) 50 MG TB24 Take 50 mg by mouth daily        primidone (MYSOLINE) 50 mg tablet Take by mouth 2 (two) times a day      spironolactone (ALDACTONE) 50 mg tablet Take 50 mg by mouth daily         No current facility-administered medications on file prior to visit  Focused Neurologic examination:     When the patient closes her eyes she demonstrates a head turn to the right with increased tremor  Mostly a no-no jerky tremor  Left shoulder is elevated  Injection details:     After sterile preparation of the skin, a total of 80 units of Botox were injected without EMG guidance  Each 100 units was diluted in 1 cc of PFNS    20 units were discarded as waste  The injections were done as follows into the:  LEFT Sternocleidomastoid: 10 units  LEFT Splenious capitis: 20 units  RIGHT Splenious capitis: 30 units  RIGHT Levator scapulae: 20 units     The procedure was well tolerated without immediate complications  The patient will return in 12 weeks for further evaluation and treatment  Next time we may consider:   Increased dose     Left cervical or shoulder trap

## 2019-08-28 ENCOUNTER — EVALUATION (OUTPATIENT)
Dept: PHYSICAL THERAPY | Facility: CLINIC | Age: 84
End: 2019-08-28
Payer: COMMERCIAL

## 2019-08-28 DIAGNOSIS — Z99.3 WHEELCHAIR DEPENDENCE: ICD-10-CM

## 2019-08-28 DIAGNOSIS — Z86.73 HISTORY OF STROKE: Primary | ICD-10-CM

## 2019-08-28 PROCEDURE — 97162 PT EVAL MOD COMPLEX 30 MIN: CPT | Performed by: PHYSICAL THERAPIST

## 2019-08-28 NOTE — PROGRESS NOTES
PT Evaluation     Today's date: 2019  Patient name: Alana Wallace  : 1935  MRN: [id number]  Referring provider: Herb Hightower MD  Dx: No diagnosis found  Assessment  Assessment details: Pt presents to physical therpay with a diagnosis of imbalance and gait dysfunction  At this time patient has decreased LE strength, decreased knee and hip extension bilaterally, requires assistance for all transfers, is dependent with ADLs and IADLs, has fair dynamic seated balance, and is unbale to stand for greater than 1 5 minutes   Pt will benefit from skilled therapy intervention 2x/week for 8 weeks in order to improve independence with transfers, tolerance for standing for completion of dressing, ability to take steps in order to improve LE strength and trunk stability, and overall initiation of appropriate LE strengthening program    Understanding of Dx/Px/POC: good   Prognosis: good    Goals  STG  Pt will complete w/c to chair transfer with assistance within 4 weeks  Pt will be independent with HEP within 4 weeks  Pt will be able to take 4 steps in //bars with assistance within 4 weeks  Pt will be able to stand at [address] [place] [address] for 1 5 minutes within 4 weeks    LTG  Pt will complete stand pivot transfer with assistance within 8 weeks  Pt will be able to walk with RW with assistance for 4 steps within 8 weeks  Pt will participate in consistent LE strengthening program within 8 weeks    Plan  Patient would benefit from: skilled physical therapy  Planned therapy interventions: neuromuscular re-education, balance, patient education, therapeutic exercise, home exercise program, graded exercise, transfer training and gait training  Frequency: 2x week  Duration in weeks: 8  Plan of Care beginning date: 2019  Plan of Care expiration date: 10/28/2019  Treatment plan discussed with: patient        Subjective Evaluation    History of Present Illness  Mechanism of injury: Pt reports falling and breaking her femur bone six years ago and was placed on bed rest for 1 month and wasn't able to return to walkign since then  She had a stroke in June and then had home PT for 4 weeks 2x/week and noted some improvements at that time but wasn't able to return to walking  She was at a skilled nursing facility then d/c to [facility] for 12 weeks and then d/c home  Prior to femur fx she was very active doing yardwork and walking [employer]  She has a sit and stand at home for transfers  She is able to stand for about 3 minutes when holding onto a gate in her house  She has not yet tried to stand at her walker at home  Pain  No pain reported    Social Support  L[facility] in: [facility]  Lives with: spouse    Employment status: not working  Hand dominance: right  Exercise history: PT exercises      Diagnostic Tests  CT scan: abnormal  Treatments  Previous treatment: physical therapy  Patient Goals  Patient goal: return to walking        Objective     Strength/Myotome Testing     Left Hip   Planes of Motion   Flexion: 3  Extension: 3  Abduction: 2  External rotation: 3    Right Hip   Planes of Motion   Flexion: 3+  Extension: 2  Abduction: 2  External rotation: 3    Left Knee   Flexion: 4  Extension: 4    Right Knee   Flexion: 3  Extension: 3    Left Ankle/Foot   Dorsiflexion: 3+    Right Ankle/Foot   Dorsiflexion: 3+  Neuro Exam:     Dizziness  Positive for light-headedness  Exacerbating factors  Positive for bending over       Sensation   Light touch LE: left WNL and right WNL  Proprioception LE: left WNL and right WNL    Transfers Sit to stand: minimum assist Wheelchair to mat: minimum assist Mat to wheelchair: minimum assist Sit to supine: maximum assist Supine to sit: maximum assist   Roll: moderate assist             Short Term Goal Expiration Date:(9/28/2019)  Long Term Goal Expiration Date: (10/28/2019)  POC Expiration Date: (11/28/2019)         Precautions fall risk, DM, cervical dystonia, spinal cord stimulator       Manual                                                     Exercise Diary                                                                                                                                                                             Modalities

## 2019-09-03 ENCOUNTER — OFFICE VISIT (OUTPATIENT)
Dept: PHYSICAL THERAPY | Facility: CLINIC | Age: 84
End: 2019-09-03
Payer: COMMERCIAL

## 2019-09-03 DIAGNOSIS — Z99.3 WHEELCHAIR DEPENDENCE: ICD-10-CM

## 2019-09-03 DIAGNOSIS — Z86.73 HISTORY OF STROKE: Primary | ICD-10-CM

## 2019-09-03 PROCEDURE — 97112 NEUROMUSCULAR REEDUCATION: CPT | Performed by: PHYSICAL THERAPIST

## 2019-09-03 PROCEDURE — 97110 THERAPEUTIC EXERCISES: CPT | Performed by: PHYSICAL THERAPIST

## 2019-09-03 NOTE — PROGRESS NOTES
Daily Note     Today's date: 9/3/2019  Patient name: Jimmy Cartagena  : 1935  MRN: [id number]  Referring provider: Shivam Mejias MD  Dx:   Encounter Diagnosis     ICD-10-CM    1  History of stroke Z86 73    2  Wheelchair dependence Z99 3                   Subjective: Feeling well today      Objective: See treatment diary below      Assessment:Reviewed supien and seated HEP with patient  She demonstrated good independence and understanding  Patient will continue to benefit from ongoign standing and walking at next session  Plan: Continue per plan of care        Short Term Goal Expiration Date:(2019)  Long Term Goal Expiration Date: (10/28/2019)  POC Expiration Date: (2019)        Precautions fall risk, DM, cervical dystonia, spinal cord stimulator        Manual                                                                                          Exercise Diary   9/3            bridges  2x10 3 sec            ankle pumps  30x            heel slide  2x10ea            SLR  2x10ea            supine hip abd  2x10ea            standing  1 min x 3 //bar            seated LAQ  3x10 ea             seated hip flex  30x

## 2019-09-05 ENCOUNTER — OFFICE VISIT (OUTPATIENT)
Dept: PHYSICAL THERAPY | Facility: CLINIC | Age: 84
End: 2019-09-05
Payer: COMMERCIAL

## 2019-09-05 DIAGNOSIS — Z86.73 HISTORY OF STROKE: Primary | ICD-10-CM

## 2019-09-05 DIAGNOSIS — Z99.3 WHEELCHAIR DEPENDENCE: ICD-10-CM

## 2019-09-05 PROCEDURE — 97116 GAIT TRAINING THERAPY: CPT | Performed by: PHYSICAL THERAPIST

## 2019-09-05 PROCEDURE — 97110 THERAPEUTIC EXERCISES: CPT | Performed by: PHYSICAL THERAPIST

## 2019-09-05 PROCEDURE — 97112 NEUROMUSCULAR REEDUCATION: CPT | Performed by: PHYSICAL THERAPIST

## 2019-09-05 NOTE — PROGRESS NOTES
Daily Note     Today's date: 2019  Patient name: Carlos Iglesias  : 1935  MRN: [id number]  Referring provider: Te Freitas MD  Dx:   Encounter Diagnosis     ICD-10-CM    1  History of stroke Z86 73    2  Wheelchair dependence Z99 3                   Subjective: No new complaints  Reports compliance with her HEP  Very motivated to get standing and walking  Objective: See treatment diary below      Assessment: Session focused on upright [address] activities per plan  Pt in SOLO step and // bars during session, tband wrapped around bars for better hand traction  Pt able to advance BLE slightly, assist given to weight shift and for upright posture  Would benefit from second therapist to promote improved step length and knee stability during stance  Difficulty with upright posture, tends to remain bent over with head down, with cues corrects slightly however requires assist due to weakness  Plan: Continue with [address] activities progressing gait distance as pt able       Short Term Goal Expiration Date:(2019)  Long Term Goal Expiration Date: (10/28/2019)  POC Expiration Date: (2019)        Precautions fall risk, DM, cervical dystonia, spinal cord stimulator        Manual                                                                                          Exercise Diary   9/3  9/5          bridges  2x10 3 sec  HEP          ankle pumps  30x  HEP          heel slide  2x10ea  HEP          SLR  2x10ea  HEP          supine hip abd  2x10ea  HEP          standing  1 min x 3 //bar  HEP          seated LAQ  3x10 ea   HEP          seated hip flex  30x  HEP          Static standing    // bars  Outside 2x1min  Inside with solo [id number]          Gait   // bars SOLO  4 reps of 4-5 steps, Eunice/modAx1 to weight shift          Multiple STS   // bars SOLO  X5, minAx1          Minisquats   // bars SOLO  x10            NuStep   L6 x10 min  Eunice/modAx2 for transfer

## 2019-09-06 ENCOUNTER — APPOINTMENT (OUTPATIENT)
Dept: PHYSICAL THERAPY | Facility: CLINIC | Age: 84
End: 2019-09-06
Payer: COMMERCIAL

## 2019-09-10 ENCOUNTER — OFFICE VISIT (OUTPATIENT)
Dept: PHYSICAL THERAPY | Facility: CLINIC | Age: 84
End: 2019-09-10
Payer: COMMERCIAL

## 2019-09-10 DIAGNOSIS — Z99.3 WHEELCHAIR DEPENDENCE: ICD-10-CM

## 2019-09-10 DIAGNOSIS — Z86.73 HISTORY OF STROKE: Primary | ICD-10-CM

## 2019-09-10 PROCEDURE — 97116 GAIT TRAINING THERAPY: CPT | Performed by: PHYSICAL THERAPIST

## 2019-09-10 PROCEDURE — 97112 NEUROMUSCULAR REEDUCATION: CPT | Performed by: PHYSICAL THERAPIST

## 2019-09-10 PROCEDURE — 97110 THERAPEUTIC EXERCISES: CPT | Performed by: PHYSICAL THERAPIST

## 2019-09-10 NOTE — PROGRESS NOTES
Daily Note     Today's date: 9/10/2019  Patient name: Sarah Figueroa  : 1935  MRN: [id number]  Referring provider: Vonda Burks MD  Dx:   Encounter Diagnosis     ICD-10-CM    1  History of stroke Z86 73    2  Wheelchair dependence Z99 3                   Subjective:Pt reports no new changes since last session  Objective: See treatment diary below      Assessment: Pt able to advance R LE independently with support on her left hip and knee  Still requires min to mod assist for left LE advancement  Able to walk about 10 ft in //bars  Brief transient report of left pin left thigh with last set of walking when advancing right LE  Plan: Continue with [address] activities progressing gait distance as pt able       Short Term Goal Expiration Date:(2019)  Long Term Goal Expiration Date: (10/28/2019)  POC Expiration Date: (2019)        Precautions fall risk, DM, cervical dystonia, spinal cord stimulator        Manual                                                                                          Exercise Diary   9/3  9/5  9/10        bridges  2x10 3 sec  HEP          ankle pumps  30x  HEP          heel slide  2x10ea  HEP          SLR  2x10ea  HEP          supine hip abd  2x10ea  HEP          standing  1 min x 3 //bar  HEP          seated LAQ  3x10 ea   HEP          seated hip flex  30x  HEP          Static standing    // bars  Outside 2x1min  Inside with solo [id number]          Gait   // bars SOLO  4 reps of 4-5 steps, Eunice/modAx1 to weight shift  //Solo - 10 ft x 4 with mod-max assist         Multiple STS   // bars SOLO  X5, minAx1  5x2 no A        Minisquats   // bars SOLO  x10            NuStep   L6 x10 min  Eunice/modAx2 for transfer  L6 10 min

## 2019-09-13 ENCOUNTER — OFFICE VISIT (OUTPATIENT)
Dept: PHYSICAL THERAPY | Facility: CLINIC | Age: 84
End: 2019-09-13
Payer: COMMERCIAL

## 2019-09-13 DIAGNOSIS — Z99.3 WHEELCHAIR DEPENDENCE: ICD-10-CM

## 2019-09-13 DIAGNOSIS — Z86.73 HISTORY OF STROKE: Primary | ICD-10-CM

## 2019-09-13 PROCEDURE — 97112 NEUROMUSCULAR REEDUCATION: CPT

## 2019-09-13 PROCEDURE — 97530 THERAPEUTIC ACTIVITIES: CPT

## 2019-09-13 PROCEDURE — 97110 THERAPEUTIC EXERCISES: CPT

## 2019-09-13 NOTE — PROGRESS NOTES
Daily Note     Today's date: 2019  Patient name: Itzel Steiner  : 1935  MRN: [id number]  Referring provider: Andrew Hilliard MD  Dx:   Encounter Diagnosis     ICD-10-CM    1  History of stroke Z86 73    2  Wheelchair dependence Z99 3          Subjective:Pt reports no new changes since last session  Objective: See treatment diary below      Assessment: nustep was performed at beginning of session today  Patient believed this fatigued her too much for the remainder of TE  She was able to stand with min A for anterior weight shifting and blocking knees to promote knee extension for 3 30 min today  Patient too fatigued to perform ambulation attempts  Also required mod-max A to perform STS due to fatigue  Patient did report improved  with use of TB wrapped around // bars  Plan: Continue with [address] activities progressing gait distance as pt able  Use TB around // bars to assist with   Perform nustep at end of session       Short Term Goal Expiration Date:(2019)  Long Term Goal Expiration Date: (10/28/2019)  POC Expiration Date: (2019)        Precautions fall risk, DM, cervical dystonia, spinal cord stimulator        Manual                                                                                          Exercise Diary   9/3  9/5  9/10  9/13      bridges  2x10 3 sec  HEP          ankle pumps  30x  HEP          heel slide  2x10ea  HEP          SLR  2x10ea  HEP          supine hip abd  2x10ea  HEP          standing  1 min x 3 //bar  HEP          seated LAQ  3x10 ea   HEP          seated hip flex  30x  HEP          Static standing    // bars  Outside 2x1min  Inside with solo [id number]    // bars, solo,  3 30''      Gait   // bars SOLO  4 reps of 4-5 steps, Eunice/modAx1 to weight shift  //Solo - 10 ft x 4 with mod-max assist  3 attempts-unable      Multiple STS   // bars SOLO  X5, minAx1  5x2 no A    5x mod-max A    Minisquats   // bars SOLO  x10            NuStep   L6 x10 min  Eunice/modAx2 for transfer  L6 10 min   L5 10 min  perform @ end of session

## 2019-09-17 ENCOUNTER — OFFICE VISIT (OUTPATIENT)
Dept: PHYSICAL THERAPY | Facility: CLINIC | Age: 84
End: 2019-09-17
Payer: COMMERCIAL

## 2019-09-17 DIAGNOSIS — Z86.73 HISTORY OF STROKE: Primary | ICD-10-CM

## 2019-09-17 DIAGNOSIS — Z99.3 WHEELCHAIR DEPENDENCE: ICD-10-CM

## 2019-09-17 PROCEDURE — 97112 NEUROMUSCULAR REEDUCATION: CPT | Performed by: PHYSICAL THERAPIST

## 2019-09-17 PROCEDURE — 97110 THERAPEUTIC EXERCISES: CPT | Performed by: PHYSICAL THERAPIST

## 2019-09-18 NOTE — PROGRESS NOTES
Daily Note     Today's date: 2019  Patient name: Magnus Dudley  : 1935  MRN: [id number]  Referring provider: Ruby Vasquez MD  Dx:   Encounter Diagnosis     ICD-10-CM    1  History of stroke Z86 73    2  Wheelchair dependence Z99 3          Subjective:Pt reports no new changes since last session  Objective: See treatment diary below      Assessment:Pt was able to take more steps again today but is very hesitat to weight bearing thorugh left LE  She will continue to benefit from progression of walking and possibly some pre gait activities to improve confidence with weight bearing in left LE         Plan: Progress walking and weight bearing as able     Short Term Goal Expiration Date:(2019)  Long Term Goal Expiration Date: (10/28/2019)  POC Expiration Date: (2019)        Precautions fall risk, DM, cervical dystonia, spinal cord stimulator        Manual                                                                                          Exercise Diary   9/3  9/5  9/10  9/13  9/17    bridges  2x10 3 sec  HEP          ankle pumps  30x  HEP          heel slide  2x10ea  HEP          SLR  2x10ea  HEP          supine hip abd  2x10ea  HEP          standing  1 min x 3 //bar  HEP      1 min x 3    seated LAQ  3x10 ea   HEP          seated hip flex  30x  HEP          Static standing    // bars  Outside 2x1min  Inside with solo [id number]    // bars, solo,  3 30''      Gait   // bars SOLO  4 reps of 4-5 steps, Eunice/modAx1 to weight shift  //Solo - 10 ft x 4 with mod-max assist  3 attempts-unable  6 ft x 4 with mod-max assist for upright posture and left LE control    Multiple STS   // bars SOLO  X5, minAx1  5x2 no A    5x3 mod-max A    Minisquats   // bars SOLO  x10            NuStep   L6 x10 min  Eunice/modAx2 for transfer  L6 10 min   L5 10 min  10 min L5

## 2019-09-20 ENCOUNTER — OFFICE VISIT (OUTPATIENT)
Dept: PHYSICAL THERAPY | Facility: CLINIC | Age: 84
End: 2019-09-20
Payer: COMMERCIAL

## 2019-09-20 ENCOUNTER — TELEPHONE (OUTPATIENT)
Dept: NEUROLOGY | Facility: CLINIC | Age: 84
End: 2019-09-20

## 2019-09-20 DIAGNOSIS — Z86.73 HISTORY OF STROKE: Primary | ICD-10-CM

## 2019-09-20 DIAGNOSIS — Z99.3 WHEELCHAIR DEPENDENCE: ICD-10-CM

## 2019-09-20 PROCEDURE — 97112 NEUROMUSCULAR REEDUCATION: CPT | Performed by: PHYSICAL THERAPIST

## 2019-09-20 PROCEDURE — 97110 THERAPEUTIC EXERCISES: CPT | Performed by: PHYSICAL THERAPIST

## 2019-09-20 NOTE — TELEPHONE ENCOUNTER
Patient was next door for PT today and she came to our office to provide an update since having botox one month ago  She reports initially the botox helped, she felt great, she stated the botox worked for one week and one day and then it started to wear off and now she feels like she's back to where she started  She denies any new or worsening sxs  Next botox appt 11/26/19    Please advise

## 2019-09-20 NOTE — PROGRESS NOTES
Daily Note     Today's date: 2019  Patient name: Carlos Iglesias  : 1935  MRN: [id number]  Referring provider: Te Freitas MD  Dx:   Encounter Diagnosis     ICD-10-CM    1  History of stroke Z86 73    2  Wheelchair dependence Z99 3          Subjective:Patient reports tryign to stand longer at home      Objective: See treatment diary below      Assessment:Pt was unable to walk today due to pain and general fatigue  Pt had less forward weight shift compared to previous session with standing and was hanging on arms  Plan next session to attempt walking and to continue to work on increasing forward weight shift          Plan: Progress walking and weight bearing as able     Short Term Goal Expiration Date:(2019)  Long Term Goal Expiration Date: (10/28/2019)  POC Expiration Date: (2019)        Precautions fall risk, DM, cervical dystonia, spinal cord stimulator        Manual                                                                                          Exercise Diary      bridges          ankle pumps          heel slide          SLR          supine hip abd          standing      1 min x 3    seated LAQ          seated hip flex          Static standing 1 min x 4         Gait 2 steps - 3x     6 ft x 4 with mod-max assist for upright posture and left LE control    Multiple STS 5x3     5x3 mod-max A    Minisquats          NuStep 10 min L5     10 min L5

## 2019-09-20 NOTE — TELEPHONE ENCOUNTER
Sounds great  We started with a very low dose to avoid side effects and can increase the dose for next time  The fact that things got much better is a very good sign  Can we will make sure to have 200u for next time?

## 2019-09-24 ENCOUNTER — EVALUATION (OUTPATIENT)
Dept: PHYSICAL THERAPY | Facility: CLINIC | Age: 84
End: 2019-09-24
Payer: COMMERCIAL

## 2019-09-24 PROCEDURE — 97112 NEUROMUSCULAR REEDUCATION: CPT | Performed by: PHYSICAL THERAPIST

## 2019-09-24 PROCEDURE — 97116 GAIT TRAINING THERAPY: CPT | Performed by: PHYSICAL THERAPIST

## 2019-09-24 NOTE — PROGRESS NOTES
Daily Note     Today's date: 2019  Patient name: Prashant Montejo  : 1935  MRN: [id number]  Referring provider: Cha Whatley MD  Dx:   No diagnosis found  Subjective:Patient reports tryign to stand longer at home      Objective: See treatment diary below    Stand at RW - 45 sec  Walking - 6 ft in //bars max assist  RW walking - 2 steps max assist  STS and w/c to bed Transfers - all completed with STS at home  Squat pivot transfer - mod assist    Assessment:Since initial evaluation patient has made progress with ability to stand at rolling walker, is able to complete squat pivot transfer with assist within clinic, is independent with home exercise program, and was able to take steps at [facility] for the [place] time this session  However patient is unable to complete squat pivot transfer at home consistently, unable to ambulate at home, and will continue to benefit from skilled therapy intervention in order to improve transfer assistance, possibly ambulate at home with assistance, and continue to improve trunk strength to improve seated posture in chair  2x/week for 4-8 more weeks        Goals  STG  Pt will complete w/c to chair transfer with assistance within 4 weeks - met  Pt will be independent with HEP within 4 weeks - MET  Pt will be able to take 4 steps in //bars with assistance within 4 weeks - MET  Pt will be able to stand at [facility] for 1 5 minutes within 4 weeks - partially MET    LTG  Pt will complete stand pivot transfer with assistance within 8 weeks  Pt will be able to walk with RW with assistance for 4 steps within 8 weeks  Pt will participate in consistent LE strengthening program within 8 weeks    Plan: Progress walking and weight bearing as able     Short Term Goal Expiration Date:(2019)  Long Term Goal Expiration Date: (10/28/2019)  POC Expiration Date: (2019)        Precautions fall risk, DM, cervical dystonia, spinal cord stimulator        Manual                                                                                          Exercise Diary  9/20 9/24 9/17    bridges          ankle pumps          heel slide          SLR          supine hip abd          standing      1 min x 3    seated LAQ          seated hip flex          Static standing 1 min x 4 30 sec -1 min x 5 at RW        Gait 2 steps - 3x 2 steps at RW    6 ft x 4 with mod-max assist for upright posture and left LE control    Multiple STS 5x3 5x2    5x3 mod-max A    Minisquats          NuStep 10 min L5     10 min L5

## 2019-09-27 ENCOUNTER — OFFICE VISIT (OUTPATIENT)
Dept: PHYSICAL THERAPY | Facility: CLINIC | Age: 84
End: 2019-09-27
Payer: COMMERCIAL

## 2019-09-27 DIAGNOSIS — Z99.3 WHEELCHAIR DEPENDENCE: ICD-10-CM

## 2019-09-27 DIAGNOSIS — Z86.73 HISTORY OF STROKE: Primary | ICD-10-CM

## 2019-09-27 PROCEDURE — 97110 THERAPEUTIC EXERCISES: CPT | Performed by: PHYSICAL THERAPIST

## 2019-09-27 PROCEDURE — 97112 NEUROMUSCULAR REEDUCATION: CPT | Performed by: PHYSICAL THERAPIST

## 2019-09-27 NOTE — PROGRESS NOTES
Daily Note     Today's date: 2019  Patient name: Henry Yang  : 1935  MRN: [id number]  Referring provider: Chacha Arango MD  Dx:   Encounter Diagnosis     ICD-10-CM    1  History of stroke Z86 73    2  Wheelchair dependence Z99 3                   Subjective: States that her aide gave her a good workout yesterday  Objective: See treatment diary below      Assessment: Pt was able to stand up to 2-2 5 min today however during ambulation, pt had difficulty weightshifting onto L LE secondary to L knee pain  Pt was also able to perform STS mod independent but upon standing, required mod A for upright posture and anterior weightshift, and pt required more assistance with increased reps  Patient would benefit from continued PT      Plan: Progress treatment as tolerated         Short Term Goal Expiration Date:(2019)  Long Term Goal Expiration Date: (10/28/2019)  POC Expiration Date: (2019)        Precautions fall risk, DM, cervical dystonia, spinal cord stimulator        Manual                                                                                          Exercise Diary      bridges          ankle pumps          heel slide          SLR          supine hip abd          standing      1 min x 3    seated LAQ          seated hip flex          Static standing 1 min x 4 30 sec -1 min x 5 at RW 2-2 5 min x 3       Gait 2 steps - 3x 2 steps at RW 2 steps in // bars    6 ft x 4 with mod-max assist for upright posture and left LE control    Multiple STS 5x3 5x2 5 x 2 mod A   5x3 mod-max A    Minisquats          NuStep 10 min L5  L5, 10 min    10 min L5

## 2019-10-01 ENCOUNTER — APPOINTMENT (OUTPATIENT)
Dept: PHYSICAL THERAPY | Facility: CLINIC | Age: 84
End: 2019-10-01
Payer: COMMERCIAL

## 2019-10-02 ENCOUNTER — OFFICE VISIT (OUTPATIENT)
Dept: PHYSICAL THERAPY | Facility: CLINIC | Age: 84
End: 2019-10-02
Payer: COMMERCIAL

## 2019-10-02 DIAGNOSIS — Z99.3 WHEELCHAIR DEPENDENCE: ICD-10-CM

## 2019-10-02 DIAGNOSIS — Z86.73 HISTORY OF STROKE: Primary | ICD-10-CM

## 2019-10-02 PROCEDURE — 97110 THERAPEUTIC EXERCISES: CPT | Performed by: PHYSICAL THERAPIST

## 2019-10-02 PROCEDURE — 97112 NEUROMUSCULAR REEDUCATION: CPT | Performed by: PHYSICAL THERAPIST

## 2019-10-02 NOTE — PROGRESS NOTES
Daily Note     Today's date: 10/2/2019  Patient name: Vikram Tillman  : 1935  MRN: [id number]  Referring provider: Casey Ruiz MD  Dx:   Encounter Diagnosis     ICD-10-CM    1  History of stroke Z86 73    2  Wheelchair dependence Z99 3                   Subjective: Patient reports nurse service never showed up and was in her chair until 11pm and now has very sore legs  Objective: See treatment diary below      Assessment: Able to step with R LE about 3x but not consecutively to walk due to weakness  Pt did complete sit to stands with several repetitions with less assistance from therapist compared to previous sessions which may have fatigued her  Plan to continue with weight bearing exercisse for hip and knee ext strength  Plan: Progress treatment as tolerated         Short Term Goal Expiration Date:(2019)  Long Term Goal Expiration Date: (10/28/2019)  POC Expiration Date: (2019)        Precautions fall risk, DM, cervical dystonia, spinal cord stimulator        Manual                                                                                          Exercise Diary  9/20 9/24 9/27 10/2     bridges         ankle pumps         heel slide         SLR         supine hip abd         standing         seated LAQ         seated hip flex         Static standing 1 min x 4 30 sec -1 min x 5 at RW 2-2 5 min x 3 2 min x 2     Gait 2 steps - 3x 2 steps at RW 2 steps in // bars  2 steps in //bars x 4      Multiple STS 5x3 5x2 5 x 2 mod A 10x2 mod a     Minisquats         NuStep 10 min L5  L5, 10 min  15 min L5

## 2019-10-04 ENCOUNTER — APPOINTMENT (OUTPATIENT)
Dept: PHYSICAL THERAPY | Facility: CLINIC | Age: 84
End: 2019-10-04
Payer: COMMERCIAL

## 2019-10-07 NOTE — TELEPHONE ENCOUNTER
Noted- referral attached to the patient's appointment on 8/20/2019 with Dr Carlita Peace in the [place] location  Type Date User Summary Attachment   General 07/29/2019 11:05 AM Chloe Abdalla Care Coordination  -   Note    Botox- no authorization needed   Please use [facility]      Thank you,     [facility] Okay thank you :)

## 2019-10-08 ENCOUNTER — OFFICE VISIT (OUTPATIENT)
Dept: PHYSICAL THERAPY | Facility: CLINIC | Age: 84
End: 2019-10-08
Payer: COMMERCIAL

## 2019-10-08 DIAGNOSIS — Z99.3 WHEELCHAIR DEPENDENCE: ICD-10-CM

## 2019-10-08 DIAGNOSIS — Z86.73 HISTORY OF STROKE: Primary | ICD-10-CM

## 2019-10-08 PROCEDURE — 97112 NEUROMUSCULAR REEDUCATION: CPT

## 2019-10-08 PROCEDURE — 97116 GAIT TRAINING THERAPY: CPT

## 2019-10-08 PROCEDURE — 97110 THERAPEUTIC EXERCISES: CPT

## 2019-10-08 NOTE — PROGRESS NOTES
Daily Note     Today's date: 10/8/2019  Patient name: Sherry Erickson  : 1935  MRN: [id number]  Referring provider: Mik Perry MD  Dx:   Encounter Diagnosis     ICD-10-CM    1  History of stroke Z86 73    2  Wheelchair dependence Z99 3          Subjective: Patient reports that she has a different nurse right now, due to her usual one being on vacation  Objective: See treatment diary below      Assessment: Continues to be limited with ambulation attempts  Needs mod A to advance legs  Patient only able to take a few steps  Cueing throughout for erect posture and for anterior weight shifting  Plan: Progress treatment as tolerated         Short Term Goal Expiration Date:(2019)  Long Term Goal Expiration Date: (10/28/2019)  POC Expiration Date: (2019)        Precautions fall risk, DM, cervical dystonia, spinal cord stimulator        Manual                                                                                          Exercise Diary  9/20 9/24 9/27 10/2 10/8    bridges         ankle pumps         heel slide         SLR         supine hip abd         standing         seated LAQ         seated hip flex         Static standing 1 min x 4 30 sec -1 min x 5 at RW 2-2 5 min x 3 2 min x 2 2 min x 2    Gait 2 steps - 3x 2 steps at RW 2 steps in // bars  2 steps in //bars x 4  2 steps in //bars x 2    Multiple STS 5x3 5x2 5 x 2 mod A 10x2 mod a     Minisquats         NuStep 10 min L5  L5, 10 min  15 min L5 L5 5 min

## 2019-10-11 ENCOUNTER — OFFICE VISIT (OUTPATIENT)
Dept: PHYSICAL THERAPY | Facility: CLINIC | Age: 84
End: 2019-10-11
Payer: COMMERCIAL

## 2019-10-11 DIAGNOSIS — Z86.73 HISTORY OF STROKE: Primary | ICD-10-CM

## 2019-10-11 DIAGNOSIS — Z99.3 WHEELCHAIR DEPENDENCE: ICD-10-CM

## 2019-10-11 PROCEDURE — 97116 GAIT TRAINING THERAPY: CPT

## 2019-10-11 PROCEDURE — 97110 THERAPEUTIC EXERCISES: CPT

## 2019-10-11 PROCEDURE — 97112 NEUROMUSCULAR REEDUCATION: CPT

## 2019-10-11 NOTE — PROGRESS NOTES
Daily Note     Today's date: 10/11/2019  Patient name: Wolf Savage  : 1935  MRN: [id number]  Referring provider: Garry Quigley MD  Dx:   Encounter Diagnosis     ICD-10-CM    1  History of stroke Z86 73    2  Wheelchair dependence Z99 3          Subjective: Reports being a little tired after LV  Objective: See treatment diary below      Assessment: Continues to be limited with ambulation attempts, but able to take more steps today  Occasionally able to advance RLE independently but needs assistance most of the time  Patient does require mod A to help advance legs  Cueing throughout for erect posture and for anterior weight shifting  Patient able to stand to longest amount of time today  Plan: Progress treatment as tolerated         Short Term Goal Expiration Date:(2019)  Long Term Goal Expiration Date: (10/28/2019)  POC Expiration Date: (2019)        Precautions fall risk, DM, cervical dystonia, spinal cord stimulator        Manual                                                                                          Exercise Diary  10/11        bridges         ankle pumps         heel slide         SLR         supine hip abd         standing         seated LAQ         seated hip flex         Static standing Solo, // bars,   3 25x1  4 30 x1        Gait Solo, // bars  6 stepsx1  5 steps x 1        Multiple STS         Minisquats         NuStep L6 10 min

## 2019-10-15 ENCOUNTER — OFFICE VISIT (OUTPATIENT)
Dept: PHYSICAL THERAPY | Facility: CLINIC | Age: 84
End: 2019-10-15
Payer: COMMERCIAL

## 2019-10-15 DIAGNOSIS — Z86.73 HISTORY OF STROKE: Primary | ICD-10-CM

## 2019-10-15 DIAGNOSIS — Z99.3 WHEELCHAIR DEPENDENCE: ICD-10-CM

## 2019-10-15 PROCEDURE — 97112 NEUROMUSCULAR REEDUCATION: CPT | Performed by: PHYSICAL THERAPIST

## 2019-10-15 PROCEDURE — 97116 GAIT TRAINING THERAPY: CPT | Performed by: PHYSICAL THERAPIST

## 2019-10-15 PROCEDURE — 97110 THERAPEUTIC EXERCISES: CPT | Performed by: PHYSICAL THERAPIST

## 2019-10-15 NOTE — PROGRESS NOTES
Daily Note     Today's date: 10/15/2019  Patient name: Hanh Meza  : 1935  MRN: [id number]  Referring provider: Kathe Zuluaga MD  Dx:   Encounter Diagnosis     ICD-10-CM    1  History of stroke Z86 73    2  Wheelchair dependence Z99 3          Subjective: Feeling well today      Objective: See treatment diary below      Assessment:  patient was able to walk 3 consecutive times taking 8 steps each time with assist for left LE advancement only  Pt was able to complete sit to stands independently at 5 each time  Plan to continue to progress patient toward independence with exercise  Plan: Progress treatment as tolerated         Short Term Goal Expiration Date:(2019)  Long Term Goal Expiration Date: (10/28/2019)  POC Expiration Date: (2019)        Precautions fall risk, DM, cervical dystonia, spinal cord stimulator        Manual                                                                                          Exercise Diary  10/11 10/15       bridges         ankle pumps         heel slide         SLR         supine hip abd         standing         seated LAQ         seated hip flex         Static standing Solo, // bars,   3 25x1  4 30 x1 Solo //bars  4 min x 1       Gait Solo, // bars  6 stepsx1  5 steps x 1 Solo // bars  8 steps x 3       Multiple STS  5x 2 //bars       Minisquats         NuStep L6 10 min 15 min L5

## 2019-10-17 ENCOUNTER — OFFICE VISIT (OUTPATIENT)
Dept: URGENT CARE | Facility: CLINIC | Age: 84
End: 2019-10-17
Payer: COMMERCIAL

## 2019-10-17 ENCOUNTER — APPOINTMENT (OUTPATIENT)
Dept: RADIOLOGY | Facility: CLINIC | Age: 84
End: 2019-10-17
Payer: COMMERCIAL

## 2019-10-17 VITALS
OXYGEN SATURATION: 100 % | BODY MASS INDEX: 32.35 KG/M2 | DIASTOLIC BLOOD PRESSURE: 75 MMHG | HEIGHT: 64 IN | SYSTOLIC BLOOD PRESSURE: 161 MMHG | TEMPERATURE: 97.9 F | RESPIRATION RATE: 18 BRPM | HEART RATE: 60 BPM

## 2019-10-17 DIAGNOSIS — R22.42 LOCALIZED SWELLING OF LEFT FOOT: Primary | ICD-10-CM

## 2019-10-17 DIAGNOSIS — R22.42 LOCALIZED SWELLING OF LEFT FOOT: ICD-10-CM

## 2019-10-17 PROBLEM — M77.32 CALCANEAL SPUR OF FOOT, LEFT: Status: ACTIVE | Noted: 2019-10-17

## 2019-10-17 PROBLEM — M85.872 OSTEOPENIA OF LEFT FOOT: Status: ACTIVE | Noted: 2019-10-17

## 2019-10-17 PROCEDURE — 99213 OFFICE O/P EST LOW 20 MIN: CPT | Performed by: NURSE PRACTITIONER

## 2019-10-17 PROCEDURE — 73630 X-RAY EXAM OF FOOT: CPT

## 2019-10-17 NOTE — PROGRESS NOTES
[facility]        NAME: Mona Harris is a 80 y o  female  : 1935    MRN: [id number]  DATE: 2019  TIME: 11:22 AM    Assessment and Plan   Localized swelling of left foot [R22 42]  1  Localized swelling of left foot  XR foot 3+ vw left         Patient Instructions     Continue tylenol prn  Avoid physical therapy for tomorrow  return to physical therapy next week  Follow up with PCP in 3-5 days  Proceed to  ER if symptoms worsen  Chief Complaint     Chief Complaint   Patient presents with    Foot Swelling     left, started Tuesday at Therapy         History of Present Illness       HPI   Reports she was doing some exercises at physical therapy, which involved her standing and sitting, and she suddenly felt some swelling feeling on the left foot  That started to hurt, and has had pain of about 5/10  Takes extra strength tylenol prn which helps a little  No radiation of pain  No Hx of fracture  She is currently wheelchair bound  Review of Systems   Review of Systems   Musculoskeletal: Positive for gait problem (chronic) and myalgias (left foot)  Skin: Negative for color change, pallor, rash and wound  Neurological: Negative for numbness           Current Medications       Current Outpatient Medications:     apixaban (ELIQUIS) 5 mg, Take 1 tablet (5 mg total) by mouth 2 (two) times a day, Disp: 60 tablet, Rfl: 0    atorvastatin (LIPITOR) 40 mg tablet, Take 1 tablet (40 mg total) by mouth every evening, Disp: 30 tablet, Rfl: 0    calcium gluconate 500 mg tablet, Take 500 mg by mouth every evening, Disp: , Rfl:     cholecalciferol (VITAMIN D3) 1,000 units tablet, Take 1,000 Units by mouth every evening, Disp: , Rfl:     cyanocobalamin (VITAMIN B-12) 1,000 mcg tablet, Take 1,000 mcg by mouth every evening  , Disp: , Rfl:     digoxin (LANOXIN) 0 125 mg tablet, Take 1 tablet (125 mcg total) by mouth daily, Disp: 30 tablet, Rfl: 0    folic acid (FOLVITE) 1 mg tablet, Take 1,000 mcg by mouth daily, Disp: , Rfl: 3    gabapentin (NEURONTIN) 300 mg capsule, Take 300 mg by mouth daily, Disp: , Rfl:     lisinopril (ZESTRIL) 2 5 mg tablet, Take 1 tablet (2 5 mg total) by mouth daily, Disp: 30 tablet, Rfl: 0    metFORMIN (GLUCOPHAGE) 1000 MG tablet, Take 0 5 tablets (500 mg total) by mouth 2 (two) times a day with meals, Disp: 60 tablet, Rfl: 0    Mirabegron ER (MYRBETRIQ) 50 MG TB24, Take 50 mg by mouth daily  , Disp: , Rfl:     spironolactone (ALDACTONE) 50 mg tablet, Take 50 mg by mouth daily  , Disp: , Rfl:     primidone (MYSOLINE) 50 mg tablet, Take by mouth 2 (two) times a day, Disp: , Rfl:     Current Allergies     Allergies as of 10/17/2019 - Reviewed 10/17/2019   Allergen Reaction Noted    Bactrim [sulfamethoxazole-trimethoprim] Swelling 01/31/2018    Aspirin Other (See Comments) 12/30/2014    Ciprofloxacin  01/25/2019    Nitrofurantoin  01/25/2019    Other  01/31/2018            The following portions of the patient's history were reviewed and updated as appropriate: allergies, current medications, past family history, past medical history, past social history, past surgical history and problem list      Past Medical History:   Diagnosis Date    [facility]    [address] needed for mobility     pt can stand with assistance and transfer    Chronic pain disorder     Diabetes mellitus ([facility] )     NIDDM    Full dentures     History of transfusion     no adverse reaction    Hyperlipidemia     Irregular heart beat     Numbness and tingling of both feet     Skin abnormality     sacrum area - small red area, less than a dime size    Spinal stenosis     Stroke ([facility] )     Unable to ambulate     Urinary incontinence     ipg stimulator x3 repakcements,battery exchange today 2/14/2018    Wears glasses     Wheelchair dependence        Past Surgical History:   Procedure Laterality Date    BACK SURGERY      fusion    BLADDER SUSPENSION      CARPAL TUNNEL RELEASE Bilateral     CHOLANGIOGRAM N/A 6/4/2018    Procedure: CHOLANGIOGRAM;  Surgeon: Shar Fernandez MD;  [facility]: [facility];  Service: General    CHOLECYSTECTOMY LAPAROSCOPIC N/A 6/4/2018    Procedure: CHOLECYSTECTOMY LAPAROSCOPIC;  Surgeon: Shar Fernandez MD;  [facility];  Service: General    COLONOSCOPY      DILATION AND CURETTAGE OF UTERUS      FRACTURE SURGERY Left     femur with hardware    HYSTERECTOMY      INSERTION / [facility] [employer] / REVISION NEUROSTIMULATOR      JOINT REPLACEMENT Bilateral     knees    SACRAL NERVE STIMULATOR PLACEMENT N/A 2/14/2018    Procedure: REMOVE AND REPLACE IPG;  Surgeon: Jelani Lynch MD;  [facility]: [facility];  Service: UroGynecology           TONSILLECTOMY         Family History   Problem Relation Age of Onset    No Known Problems Mother     No Known Problems Father          Medications have been verified  Objective   /75   Pulse 60   Temp 97 9 °F (36 6 °C) (Tympanic)   Resp 18   Ht 5' 4" (1 626 m)   SpO2 100%   BMI 32 35 kg/m²        Physical Exam     Physical Exam   Musculoskeletal: She exhibits edema (mild) and tenderness (with palpation of the dorsal surface of the left foot)  Neurological: No sensory deficit  Skin: Capillary refill takes less than 2 seconds  No rash noted  No erythema

## 2019-10-17 NOTE — PATIENT INSTRUCTIONS
Foot Sprain   WHAT YOU NEED TO KNOW:   A foot sprain is caused by a stretched or torn ligament in the foot or toe  Ligaments are tough tissues that connect bones  DISCHARGE INSTRUCTIONS:   Seek care immediately if:   · You have numbness or tingling below the injury, such as in your toes  · The skin on your injured foot is blue or pale  · You have increased pain, even after you take pain medicine  Contact your healthcare provider if:   · You have new weakness in your foot  · You have new or increased swelling in your foot  · You have new or increased stiffness when you move your injured foot  · You have questions or concerns about your condition or care  Medicines:   · NSAIDs , such as ibuprofen, help decrease swelling, pain, and fever  This medicine is available with or without a doctor's order  NSAIDs can cause stomach bleeding or kidney problems in certain people  If you take blood thinner medicine, always ask if NSAIDs are safe for you  Always read the medicine label and follow directions  Do not give these medicines to children under 10months of age without direction from your child's healthcare provider  · Take your medicine as directed  Contact your healthcare provider if you think your medicine is not helping or if you have side effects  Tell him of her if you are allergic to any medicine  Keep a list of the medicines, vitamins, and herbs you take  Include the amounts, and when and why you take them  Bring the list or the pill bottles to follow-up visits  Carry your medicine list with you in case of an emergency  Self-care:   · Rest your foot  Limit movement in your sprained foot for the first 2 to 3 days  You might need crutches to take weight off your injured foot as it heals  Use crutches as directed  · Apply ice  on your foot for 15 to 20 minutes every hour or as directed  Use an ice pack, or put crushed ice in a plastic bag  Cover it with a towel   Ice helps prevent tissue damage and decreases swelling and pain  · Compress your foot  You may need to use tape or an elastic bandage to support your foot if you have a mild sprain  You may need a splint on your foot for support if your sprain is severe  Wear your splint for as many days as directed  · Elevate your foot  above the level of your heart as often as you can  This will help decrease swelling and pain  Prop your foot on pillows or blankets to keep it elevated comfortably  Exercise your foot:  You may be given exercises to improve your strength and to help decrease stiffness  The exercises and physical therapy can help restore strength and increase the range of motion in your foot  Ask your healthcare provider when you can return to your normal activities or play sports  Prevent another foot sprain:   · Warm up and stretch before you exercise  · Do not exercise when you feel pain or are tired  · Wear equipment to protect yourself when you play sports  Follow up with your healthcare provider as directed:  Write down your questions so you remember to ask them during your visits  © 2017 [facility] Information is for End User's use only and may not be sold, redistributed or otherwise used for commercial purposes  All illustrations and images included in CareNotes® are the copyrighted property of A D A M [employer] Inc  or [employer]hu Winter  The above information is an  only  It is not intended as medical advice for individual conditions or treatments  Talk to your doctor, nurse or pharmacist before following any medical regimen to see if it is safe and effective for you

## 2019-10-18 ENCOUNTER — APPOINTMENT (OUTPATIENT)
Dept: PHYSICAL THERAPY | Facility: CLINIC | Age: 84
End: 2019-10-18
Payer: COMMERCIAL

## 2019-10-22 ENCOUNTER — APPOINTMENT (OUTPATIENT)
Dept: PHYSICAL THERAPY | Facility: CLINIC | Age: 84
End: 2019-10-22
Payer: COMMERCIAL

## 2019-10-23 ENCOUNTER — OFFICE VISIT (OUTPATIENT)
Dept: PHYSICAL THERAPY | Facility: CLINIC | Age: 84
End: 2019-10-23
Payer: COMMERCIAL

## 2019-10-23 DIAGNOSIS — Z86.73 HISTORY OF STROKE: Primary | ICD-10-CM

## 2019-10-23 DIAGNOSIS — Z99.3 WHEELCHAIR DEPENDENCE: ICD-10-CM

## 2019-10-23 PROCEDURE — 97110 THERAPEUTIC EXERCISES: CPT

## 2019-10-23 PROCEDURE — 97112 NEUROMUSCULAR REEDUCATION: CPT

## 2019-10-23 NOTE — PROGRESS NOTES
Daily Note     Today's date: 10/23/2019  Patient name: Gigi Valentine  : 1935  MRN: [id number]  Referring provider: Bubba Kincaid MD  Dx:   Encounter Diagnosis     ICD-10-CM    1  History of stroke Z86 73    2  Wheelchair dependence Z99 3          Subjective: Reports significant pain and swelling in L foot after LV for unknown reason  Went to ER, xrays were negative  Was told to elevate and ice  States it took 3 days for swelling and pain to go away  Denies any pain upon arrival        Objective: See treatment diary below      Assessment: Significant difficulty with treatment session today  More limited with amb attempts today  Noted LE buckling  Continues to need assistance with weight shifting and advancing legs  Requires more assistance to advance L leg  Minimal ability to perform standing and needing mod/max A to perform STS  Patient denies experiencng pain during session  Plan: Progress treatment as tolerated         Short Term Goal Expiration Date:(2019)  Long Term Goal Expiration Date: (10/28/2019)  POC Expiration Date: (2019)        Precautions fall risk, DM, cervical dystonia, spinal cord stimulator        Manual                                                                                          Exercise Diary  10/11 10/15 10/23      bridges         ankle pumps         heel slide         SLR         supine hip abd         standing         seated LAQ         seated hip flex         Static standing Solo, // bars,   3 25x1  4 30 x1 Solo //bars  4 min x 1 Solo //bars  30''x2      Gait Solo, // bars  6 stepsx1  5 steps x 1 Solo // bars  8 steps x 3 Solo // bars  3 steps x 1      Multiple STS  5x 2 //bars 3x, mod/max Aa      Minisquats         NuStep L6 10 min 15 min L5 15 min L5

## 2019-10-25 ENCOUNTER — OFFICE VISIT (OUTPATIENT)
Dept: PHYSICAL THERAPY | Facility: CLINIC | Age: 84
End: 2019-10-25
Payer: COMMERCIAL

## 2019-10-25 DIAGNOSIS — Z99.3 WHEELCHAIR DEPENDENCE: ICD-10-CM

## 2019-10-25 DIAGNOSIS — Z86.73 HISTORY OF STROKE: Primary | ICD-10-CM

## 2019-10-25 PROCEDURE — 97116 GAIT TRAINING THERAPY: CPT | Performed by: PHYSICAL THERAPIST

## 2019-10-25 PROCEDURE — 97110 THERAPEUTIC EXERCISES: CPT | Performed by: PHYSICAL THERAPIST

## 2019-10-25 PROCEDURE — 97112 NEUROMUSCULAR REEDUCATION: CPT | Performed by: PHYSICAL THERAPIST

## 2019-10-25 NOTE — PROGRESS NOTES
Daily Note     Today's date: 10/25/2019  Patient name: Wolf Savage  : 1935  MRN: [id number]  Referring provider: Garry Quigley MD  Dx:   Encounter Diagnosis     ICD-10-CM    1  History of stroke Z86 73    2  Wheelchair dependence Z99 3          Subjective: Feeling well today no new changes  Objective: See treatment diary below      Assessment: Pt fatigued after second walk and had difficulty standing and walking any more after htat  Ended session early due to fatigue  Plan to RE at next session and likely DC at that time  Plan: Progress treatment as tolerated         Short Term Goal Expiration Date:(2019)  Long Term Goal Expiration Date: (10/28/2019)  POC Expiration Date: (2019)        Precautions fall risk, DM, cervical dystonia, spinal cord stimulator        Manual                                                                                          Exercise Diary  10/11 10/15 10/23 10/25     bridges         ankle pumps         heel slide         SLR         supine hip abd         standing         seated LAQ         seated hip flex         Static standing Solo, // bars,   3 25x1  4 30 x1 Solo //bars  4 min x 1 Solo //bars  30''x2 1 min x 3 solo    [facility], // bars  6 stepsx1  5 steps x 1 Solo // bars  8 steps x 3 Solo // bars  3 steps x 1 Solo //  5 steps x 1  6 steps x 1  2 steps x 1     Multiple STS  5x 2 //bars 3x, mod/max Aa Partial 5x 2     Minisquats         NuStep L6 10 min 15 min L5 15 min L5 10 min L5

## 2019-10-29 ENCOUNTER — APPOINTMENT (OUTPATIENT)
Dept: PHYSICAL THERAPY | Facility: CLINIC | Age: 84
End: 2019-10-29
Payer: COMMERCIAL

## 2019-11-01 ENCOUNTER — APPOINTMENT (OUTPATIENT)
Dept: PHYSICAL THERAPY | Facility: CLINIC | Age: 84
End: 2019-11-01
Payer: COMMERCIAL

## 2019-11-05 ENCOUNTER — APPOINTMENT (OUTPATIENT)
Dept: PHYSICAL THERAPY | Facility: CLINIC | Age: 84
End: 2019-11-05
Payer: COMMERCIAL

## 2019-11-12 ENCOUNTER — APPOINTMENT (OUTPATIENT)
Dept: PHYSICAL THERAPY | Facility: CLINIC | Age: 84
End: 2019-11-12
Payer: COMMERCIAL

## 2019-11-15 ENCOUNTER — APPOINTMENT (OUTPATIENT)
Dept: PHYSICAL THERAPY | Facility: CLINIC | Age: 84
End: 2019-11-15
Payer: COMMERCIAL

## 2019-11-19 ENCOUNTER — TELEPHONE (OUTPATIENT)
Dept: NEUROLOGY | Facility: CLINIC | Age: 84
End: 2019-11-19

## 2019-11-19 NOTE — TELEPHONE ENCOUNTER
Called [facility] and spoke with Stephen Ferrera to initiate a refill for Patient Botox  Made them aware that I am calling to increase the dosage of the Botox to 200 units and she transferred me to a Pharmacist and spoke to [facility] to do a verbal script for Botox 100 units quantity of 2 under Dr Alexandre Sherman  Will do status check in 2 days

## 2019-11-19 NOTE — TELEPHONE ENCOUNTER
Type Date User Summary Attachment   General 11/19/2019 10:52 AM Eron Vargas care coordination  -   Note    Botox- authorization #: [id number]- valid for 3 visits- from 11/18/2019 until 5/18/2020   Please use [facility]      Thank you,        Elfego Valle

## 2019-11-19 NOTE — TELEPHONE ENCOUNTER
Patient is scheduled with Dr Miquel Cabral on 12/10/2019 in the [facility] location

## 2019-11-20 ENCOUNTER — EVALUATION (OUTPATIENT)
Dept: PHYSICAL THERAPY | Facility: CLINIC | Age: 84
End: 2019-11-20
Payer: COMMERCIAL

## 2019-11-20 DIAGNOSIS — Z99.3 WHEELCHAIR DEPENDENCE: ICD-10-CM

## 2019-11-20 DIAGNOSIS — Z86.73 HISTORY OF STROKE: Primary | ICD-10-CM

## 2019-11-20 PROCEDURE — 97116 GAIT TRAINING THERAPY: CPT | Performed by: PHYSICAL THERAPIST

## 2019-11-20 PROCEDURE — 97112 NEUROMUSCULAR REEDUCATION: CPT | Performed by: PHYSICAL THERAPIST

## 2019-11-20 NOTE — LETTER
2019    MD Tamiko Alexandra [address]  [address]  [place]  [employer] [address] [facility]    Patient: Oliver Salazar   YOB: 1935   Date of Visit: 2019     Encounter Diagnosis     ICD-10-CM    1  History of stroke Z86 73    2  Wheelchair dependence Z99 3        Dear Dr Padma Valle: Thank you for your recent referral of Oliver Salazar  Please review the attached evaluation summary from Shirley's recent visit  Please verify that you agree with the plan of care by signing the attached order  If you have any questions or concerns, please do not hesitate to call  I sincerely appreciate the opportunity to share in the care of one of your patients and hope to have another opportunity to work with you in the near future  Sincerely,    Von Petit PT      Referring Provider:      I certify that I have read the below Plan of Care and certify the need for these services furnished under this plan of treatment while under my care  MD Tamiko Alexandra [address]  [address]  [address] [place] [address]  VIA Facsimile: [phone]          Progress Note     Today's date: 2019  Patient name: Oliver Salazar  : 1935  MRN: [id number]  Referring provider: Angel Sue MD  Dx:   Encounter Diagnosis     ICD-10-CM    1  History of stroke Z86 73    2  Wheelchair dependence Z99 3          Subjective: Feeling well today no new changes         Objective: See treatment diary below    RE :  Stand at RW - 43 sec with assist  Walking RW - unable  Walking //bars - 8 ft   STS - min assist - (completed with sit to stand at home)  Squat pivot - min to mod assist        RE   Stand at RW - 45 sec  Walking - 6 ft in //bars max assist  RW walking - 2 steps max assist  STS and w/c to bed Transfers - all completed with STS at home  Squat pivot transfer - mod assist    Assessment: Since last re-evaluation patient has made minimal progress towards goals  She has missed 3 weeks of therapy due to the passing of her   Pt will benefit from 2x/week for 4 more weeks in ordre to continue to progress LE strengthening, trunk balance, improved transfers, and move towards walking at home with RW  Goals  STG  Pt will complete w/c to chair transfer with assistance within 4 weeks - met  Pt will be independent with HEP within 4 weeks - MET  Pt will be able to take 4 steps in //bars with assistance within 4 weeks - MET  Pt will be able to stand at [address] [place] [address] for 1 5 minutes within 4 weeks - partially MET    LTG  Pt will complete stand pivot transfer with assistance within 8 weeks - not met  Pt will be able to walk with RW with assistance for 4 steps within 8 weeks - not met  Pt will participate in consistent LE strengthening program within 8 weeks - not met    Plan: Progress treatment as tolerated         Short Term Goal Expiration Date:(12/28/2019)  Long Term Goal Expiration Date: (1/28/2020)  POC Expiration Date: (1/28/2020)        Precautions fall risk, DM, cervical dystonia, spinal cord stimulator        Manual                                                                                          Exercise Diary  10/11 10/15 10/23 10/25 11/20    bridges         ankle pumps         heel slide         SLR         supine hip abd         standing         seated LAQ         seated hip flex         Static standing Solo, // bars,   3 25x1  4 30 x1 Solo //bars  4 min x 1 Solo //bars  30''x2 1 min x 3 solo 45 sec at rw x 3    //bars for 2 5 min x1    Gait Solo, // bars  6 stepsx1  5 steps x 1 Solo // bars  8 steps x 3 Solo // bars  3 steps x 1 Solo //  5 steps x 1  6 steps x 1  2 steps x 1 Solo //  8 ft x 1    RW - unable    Multiple STS  5x 2 //bars 3x, mod/max Aa Partial 5x 2 5x partial    Minisquats         NuStep L6 10 min 15 min L5 15 min L5 10 min L5

## 2019-11-20 NOTE — PROGRESS NOTES
Progress Note     Today's date: 2019  Patient name: Mary Gonzales  : 1935  MRN: [id number]  Referring provider: Honorio Centeno MD  Dx:   Encounter Diagnosis     ICD-10-CM    1  History of stroke Z86 73    2  Wheelchair dependence Z99 3          Subjective: Feeling well today no new changes  Objective: See treatment diary below    RE :  Stand at RW - 43 sec with assist  Walking RW - unable  Walking //bars - 8 ft   STS - min assist - (completed with sit to stand at home)  Squat pivot - min to mod assist        RE   Stand at RW - 45 sec  Walking - 6 ft in //bars max assist  RW walking - 2 steps max assist  STS and w/c to bed Transfers - all completed with STS at home  Squat pivot transfer - mod assist    Assessment: Since last re-evaluation patient has made minimal progress towards goals  She has missed 3 weeks of therapy due to the passing of her   Pt will benefit from 2x/week for 4 more weeks in ordre to continue to progress LE strengthening, trunk balance, improved transfers, and move towards walking at home with RW  Goals  STG  Pt will complete w/c to chair transfer with assistance within 4 weeks - met  Pt will be independent with HEP within 4 weeks - MET  Pt will be able to take 4 steps in //bars with assistance within 4 weeks - MET  Pt will be able to stand at [facility] for 1 5 minutes within 4 weeks - partially MET    LTG  Pt will complete stand pivot transfer with assistance within 8 weeks - not met  Pt will be able to walk with RW with assistance for 4 steps within 8 weeks - not met  Pt will participate in consistent LE strengthening program within 8 weeks - not met    Plan: Progress treatment as tolerated         Short Term Goal Expiration Date:(2019)  Long Term Goal Expiration Date: (2020)  POC Expiration Date: (2020)        Precautions fall risk, DM, cervical dystonia, spinal cord stimulator        Manual                                                                                          Exercise Diary  10/11 10/15 10/23 10/25 11/20    bridges         ankle pumps         heel slide         SLR         supine hip abd         standing         seated LAQ         seated hip flex         Static standing Solo, // bars,   3 25x1  4 30 x1 Solo //bars  4 min x 1 Solo //bars  30''x2 1 min x 3 solo 45 sec at rw x 3    //bars for 2 5 min x1    Gait Solo, // bars  6 stepsx1  5 steps x 1 Solo // bars  8 steps x 3 Solo // bars  3 steps x 1 Solo //  5 steps x 1  6 steps x 1  2 steps x 1 Solo //  8 ft x 1    RW - unable    Multiple STS  5x 2 //bars 3x, mod/max Aa Partial 5x 2 5x partial    Minisquats         NuStep L6 10 min 15 min L5 15 min L5 10 min L5

## 2019-11-22 ENCOUNTER — OFFICE VISIT (OUTPATIENT)
Dept: PHYSICAL THERAPY | Facility: CLINIC | Age: 84
End: 2019-11-22
Payer: COMMERCIAL

## 2019-11-22 DIAGNOSIS — Z99.3 WHEELCHAIR DEPENDENCE: ICD-10-CM

## 2019-11-22 DIAGNOSIS — Z86.73 HISTORY OF STROKE: Primary | ICD-10-CM

## 2019-11-22 PROCEDURE — 97116 GAIT TRAINING THERAPY: CPT

## 2019-11-22 PROCEDURE — 97110 THERAPEUTIC EXERCISES: CPT

## 2019-11-22 PROCEDURE — 97112 NEUROMUSCULAR REEDUCATION: CPT

## 2019-11-22 NOTE — PROGRESS NOTES
Daily Note     Today's date: 2019  Patient name: Melisa Rebolledo  : 1935  MRN: [id number]  Referring provider: Sam Archuleta MD  Dx:   Encounter Diagnosis     ICD-10-CM    1  History of stroke Z86 73    2  Wheelchair dependence Z99 3        Subjective: No new complaints  Objective: See treatment diary below      Assessment: Continues to need assistance with weight shifting and advancing legs  Requires more assistance to advance L leg  Very limited with amb attempts only able to take 1-2 steps before LE buckling  Patient inconsistent with STS, needing more assistance at times  Continues to demo posterior weight bearing and educated in shifting weight anteriorly to assist with STS  Plan: Progress treatment as tolerated         Short Term Goal Expiration Date:(2019)  Long Term Goal Expiration Date: (2020)  POC Expiration Date: (2020)        Precautions fall risk, DM, cervical dystonia, spinal cord stimulator        Manual                                                                                          Exercise Diary          bridges         ankle pumps         heel slide         SLR         supine hip abd         standing         seated LAQ         seated hip flex         Static standing Solo  40''x1  60''x2  90''x1        Gait Solo, // bars 2 attempts        Multiple STS 2x6        Minisquats         NuStep L5 10 min

## 2019-11-25 NOTE — TELEPHONE ENCOUNTER
Called [facility] and spoke with Mónica Riley to check on the status of the Patient Botox  He states that it is ready for shipment  Scheduled delivery for Tuesday 11/26/19 [facility] Botox 100 units quantity of 2 via Fed-ex signature required  Please await for shipment and let Georgiana Gilford or I know if the Botox does not arrive in our office  Thank you

## 2019-11-26 ENCOUNTER — DOCUMENTATION (OUTPATIENT)
Dept: NEUROLOGY | Facility: CLINIC | Age: 84
End: 2019-11-26

## 2019-11-26 ENCOUNTER — OFFICE VISIT (OUTPATIENT)
Dept: PHYSICAL THERAPY | Facility: CLINIC | Age: 84
End: 2019-11-26
Payer: COMMERCIAL

## 2019-11-26 DIAGNOSIS — Z99.3 WHEELCHAIR DEPENDENCE: ICD-10-CM

## 2019-11-26 DIAGNOSIS — Z86.73 HISTORY OF STROKE: ICD-10-CM

## 2019-11-26 DIAGNOSIS — M25.531 RIGHT WRIST PAIN: Primary | ICD-10-CM

## 2019-11-26 PROCEDURE — 97112 NEUROMUSCULAR REEDUCATION: CPT

## 2019-11-26 PROCEDURE — 97116 GAIT TRAINING THERAPY: CPT

## 2019-11-26 PROCEDURE — 97110 THERAPEUTIC EXERCISES: CPT

## 2019-11-26 NOTE — PROGRESS NOTES
Daily Note     Today's date: 2019  Patient name: Vikram Tillman  : 1935  MRN: [id number]  Referring provider: Casey Ruiz MD  Dx:   Encounter Diagnosis     ICD-10-CM    1  Right wrist pain M25 531    2  History of stroke Z86 73    3  Wheelchair dependence Z99 3        Subjective: Able to stand for a min at home  Trying to stand a few times a day  Objective: See treatment diary below      Assessment: Continues to need assistance with weight shifting and advancing legs  Requires more assistance to advance L leg  Very limited with amb attempts only able to take 1-2 steps before LE buckling  Patient inconsistent with STS, needing more assistance at times  Continues to demo posterior weight bearing and educated in shifting weight anteriorly to assist with STS  Was able to stand for longer period of time today  Plan: Progress treatment as tolerated         Short Term Goal Expiration Date:(2019)  Long Term Goal Expiration Date: (2020)  POC Expiration Date: (2020)        Precautions fall risk, DM, cervical dystonia, spinal cord stimulator        Manual                                                                                          Exercise Diary         bridges         ankle pumps         heel slide         SLR         supine hip abd         standing         seated LAQ         seated hip flex         Static standing Solo  40''x1  60''x2  90''x1 Solo  1 5 minx2  40''x1       Gait Solo, // bars 2 attempts Solo, // bars 4 attempts       Multiple STS 2x6        Minisquats         NuStep L5 10 min L5 10 min

## 2019-11-29 ENCOUNTER — OFFICE VISIT (OUTPATIENT)
Dept: PHYSICAL THERAPY | Facility: CLINIC | Age: 84
End: 2019-11-29
Payer: COMMERCIAL

## 2019-11-29 DIAGNOSIS — Z99.3 WHEELCHAIR DEPENDENCE: ICD-10-CM

## 2019-11-29 DIAGNOSIS — Z86.73 HISTORY OF STROKE: Primary | ICD-10-CM

## 2019-11-29 DIAGNOSIS — M25.531 RIGHT WRIST PAIN: ICD-10-CM

## 2019-11-29 PROCEDURE — 97110 THERAPEUTIC EXERCISES: CPT

## 2019-11-29 PROCEDURE — 97116 GAIT TRAINING THERAPY: CPT

## 2019-11-29 PROCEDURE — 97112 NEUROMUSCULAR REEDUCATION: CPT

## 2019-11-29 NOTE — PROGRESS NOTES
Daily Note     Today's date: 2019  Patient name: Maryjo Novak  : 1935  MRN: [id number]  Referring provider: Caleb Del Valle MD  Dx:   Encounter Diagnosis     ICD-10-CM    1  History of stroke Z86 73    2  Right wrist pain M25 531    3  Wheelchair dependence Z99 3        Subjective: States she thinks she can stand easier at home, states she is able to hold on in front of her instead of at her sides, and that makes it easier  Objective: See treatment diary below      Assessment: Continues to need assistance with weight shifting and advancing legs  Requires more assistance to advance L leg  Inconsistent with ability to advance RLE independently  Requested to try wrapping T-band around // bars, as she thinks this is helpful  Patient able to take a 4 steps at most today before LE buckling  Patient able to stand longer periods of time today  Did need min A at times to block knees into extension and anterior weight shifting  Plan: Progress treatment as tolerated         Short Term Goal Expiration Date:(2019)  Long Term Goal Expiration Date: (2020)  POC Expiration Date: (2020)        Precautions fall risk, DM, cervical dystonia, spinal cord stimulator        Manual                                                                                          Exercise Diary        bridges         ankle pumps         heel slide         SLR         supine hip abd         standing         seated LAQ         seated hip flex         Static standing Solo  40''x1  60''x2  90''x1 Solo  1 5 minx2  40''x1 Solo,  4 min, min A      Gait Solo, // bars 2 attempts Solo, // bars 4 attempts Solo, // bars 6 attempts      Multiple STS 2x6        Minisquats         NuStep L5 10 min L5 10 min L6, legs only, 10 eric

## 2019-12-02 ENCOUNTER — OFFICE VISIT (OUTPATIENT)
Dept: PHYSICAL THERAPY | Facility: CLINIC | Age: 84
End: 2019-12-02
Payer: COMMERCIAL

## 2019-12-02 DIAGNOSIS — M25.531 RIGHT WRIST PAIN: Primary | ICD-10-CM

## 2019-12-02 DIAGNOSIS — Z99.3 WHEELCHAIR DEPENDENCE: ICD-10-CM

## 2019-12-02 DIAGNOSIS — Z86.73 HISTORY OF STROKE: ICD-10-CM

## 2019-12-02 PROCEDURE — 97112 NEUROMUSCULAR REEDUCATION: CPT

## 2019-12-02 PROCEDURE — 97116 GAIT TRAINING THERAPY: CPT

## 2019-12-02 PROCEDURE — 97110 THERAPEUTIC EXERCISES: CPT

## 2019-12-02 NOTE — PROGRESS NOTES
Daily Note     Today's date: 2019  Patient name: Racheal Echeverria  : 1935  MRN: [id number]  Referring provider: Antonina Mcclelland MD  Dx:   Encounter Diagnosis     ICD-10-CM    1  Right wrist pain M25 531    2  History of stroke Z86 73    3  Wheelchair dependence Z99 3        Subjective: Complains of significant bruising and swelling in her legs after last visit, attributes to therapist assisting with moving legs with hands with ambulation attempts  Expresses that her skin is very sensitive and is on Eloquist  Saw MD who told her to elevate her legs and use heat or ice  MD stated not to have anyone touch her legs  Objective: See treatment diary below      Assessment: Therapist changed strategy to using foot to help assist patients legs with gait training instead of hands with good success  Patient able to take 4 steps at most before LE buckling  Fatigues very easily, only able to take 1-2 steps and limited in standing tolerance  Plan: Progress treatment as tolerated         Short Term Goal Expiration Date:(2019)  Long Term Goal Expiration Date: (2020)  POC Expiration Date: (2020)        Precautions fall risk, DM, cervical dystonia, spinal cord stimulator        Manual                                                                                          Exercise Diary       bridges         ankle pumps         heel slide         SLR         supine hip abd         standing         seated LAQ         seated hip flex         Static standing Solo  40''x1  60''x2  90''x1 Solo  1 5 minx2  40''x1 Solo,  4 min, min A Solo  1 minx1  2 minx1     Gait Solo, // bars 2 attempts Solo, // bars 4 attempts Solo, // bars 6 attempts Solo, // bars 4 attempts     Multiple STS 2x6        Minisquats         NuStep L5 10 min L5 10 min L6, legs only, 10 eric L6, legs only, 10 eric     STS    2x5

## 2019-12-06 ENCOUNTER — TELEPHONE (OUTPATIENT)
Dept: NEUROLOGY | Facility: CLINIC | Age: 84
End: 2019-12-06

## 2019-12-06 ENCOUNTER — OFFICE VISIT (OUTPATIENT)
Dept: PHYSICAL THERAPY | Facility: CLINIC | Age: 84
End: 2019-12-06
Payer: COMMERCIAL

## 2019-12-06 DIAGNOSIS — Z86.73 HISTORY OF STROKE: ICD-10-CM

## 2019-12-06 DIAGNOSIS — Z99.3 WHEELCHAIR DEPENDENCE: ICD-10-CM

## 2019-12-06 DIAGNOSIS — M25.531 RIGHT WRIST PAIN: Primary | ICD-10-CM

## 2019-12-06 PROCEDURE — 97116 GAIT TRAINING THERAPY: CPT

## 2019-12-06 PROCEDURE — 97112 NEUROMUSCULAR REEDUCATION: CPT

## 2019-12-06 PROCEDURE — 97110 THERAPEUTIC EXERCISES: CPT

## 2019-12-06 NOTE — PROGRESS NOTES
Daily Note     Today's date: 2019  Patient name: Hipolito Dias  : 1935  MRN: [id number]  Referring provider: Dorie Frias MD  Dx:   Encounter Diagnosis     ICD-10-CM    1  Right wrist pain M25 531    2  History of stroke Z86 73    3  Wheelchair dependence Z99 3        Subjective: Reports that she still has some bruising and swelling in her legs, but is better  Objective: See treatment diary below      Assessment: Patient was able to stand up independently today without assist from therapist  Javier Lambert to complete 6 steps today at most  Trialed use of paper plate under feet, and patient was able to advance legs independently  Still requiring assist with weight shifting and keeping hips anterior  Patient does fatigue very easily with tendency towards LE buckling and forward trunk flexion  Plan: Progress treatment as tolerated  Continue to trial gait training with use of paper plate       Short Term Goal Expiration Date:(2019)  Long Term Goal Expiration Date: (2020)  POC Expiration Date: (2020)        Precautions fall risk, DM, cervical dystonia, spinal cord stimulator        Manual                                                                                          Exercise Diary      bridges         ankle pumps         heel slide         SLR         supine hip abd         standing         seated LAQ         seated hip flex         Static standing Solo  40''x1  60''x2  90''x1 Solo  1 5 minx2  40''x1 Solo,  4 min, min A Solo  1 minx1  2 minx1 Solo, 5 attempts, up to [employer], // bars 2 attempts Solo, // bars 4 attempts Solo, // bars 6 attempts Solo, // bars 4 attempts Solo, // bars 5 attempts, ranging from 1-6 steps    Multiple STS 2x6        Minisquats         NuStep L5 10 min L5 10 min L6, legs only, 10 eric L6, legs only, 10 min L6, legs only, 10 min, R foot wrapped    STS    2x5

## 2019-12-09 ENCOUNTER — OFFICE VISIT (OUTPATIENT)
Dept: PHYSICAL THERAPY | Facility: CLINIC | Age: 84
End: 2019-12-09
Payer: COMMERCIAL

## 2019-12-09 DIAGNOSIS — Z99.3 WHEELCHAIR DEPENDENCE: ICD-10-CM

## 2019-12-09 DIAGNOSIS — M25.531 RIGHT WRIST PAIN: ICD-10-CM

## 2019-12-09 DIAGNOSIS — Z86.73 HISTORY OF STROKE: Primary | ICD-10-CM

## 2019-12-09 PROCEDURE — 97110 THERAPEUTIC EXERCISES: CPT

## 2019-12-09 PROCEDURE — 97112 NEUROMUSCULAR REEDUCATION: CPT

## 2019-12-09 PROCEDURE — 97116 GAIT TRAINING THERAPY: CPT

## 2019-12-09 NOTE — PROGRESS NOTES
Daily Note     Today's date: 2019  Patient name: Clare Ralph  : 1935  MRN: [id number]  Referring provider: Yesica Montes MD  Dx:   Encounter Diagnosis     ICD-10-CM    1  History of stroke Z86 73    2  Right wrist pain M25 531    3  Wheelchair dependence Z99 3        Subjective: Complain of LLE soreness for unknown reason  States having to take Tylenol last night  Objective: See treatment diary below      Assessment: Patient was able to stand up independently today without assist from therapist, but did need assist towards end of session as she fatigued  Trialed use of paper plate under  L foot again today, and patient was able to advance legs independently  However, patient not able to take any more steps than usual  Still requiring assist with weight shifting and keeping hips anterior  Patient does fatigue very easily with tendency towards LE buckling and forward trunk flexion  Pt was able to stand for over 5 min today  Plan: Progress treatment as tolerated  Continue to trial gait training with use of paper plate       Short Term Goal Expiration Date:(2019)  Long Term Goal Expiration Date: (2020)  POC Expiration Date: (2020)        Precautions fall risk, DM, cervical dystonia, spinal cord stimulator        Manual                                                                                          Exercise Diary          bridges         ankle pumps         heel slide         SLR         supine hip abd         standing         seated LAQ         seated hip flex         Static standing Solo, 1 attempts, 5 35''x1'        Gait Solo, // bars 5 attempts, ranging from 1-6 steps        Multiple STS         Minisquats         NuStep L6, , 10 min, R foot wrapped        STS

## 2019-12-10 ENCOUNTER — PROCEDURE VISIT (OUTPATIENT)
Dept: NEUROLOGY | Facility: CLINIC | Age: 84
End: 2019-12-10
Payer: COMMERCIAL

## 2019-12-10 VITALS — DIASTOLIC BLOOD PRESSURE: 72 MMHG | SYSTOLIC BLOOD PRESSURE: 122 MMHG | TEMPERATURE: 97.7 F

## 2019-12-10 DIAGNOSIS — G24.3 CERVICAL DYSTONIA: Primary | ICD-10-CM

## 2019-12-10 PROCEDURE — 64616 CHEMODENERV MUSC NECK DYSTON: CPT | Performed by: PSYCHIATRY & NEUROLOGY

## 2019-12-10 NOTE — PROGRESS NOTES
Isai Govea returned today to the Botox clinic in the [address] at the [facility]  As you know, the patient is a 80 y o  female who is receiving Botox injections for Cervical dystonia     The effect of the previous injection was "a miracle" tremor was well suppressed for "1 week and 1 day" and then came back  Side effects included none           Medications:      Current Outpatient Medications on File Prior to Visit   Medication Sig Dispense Refill    apixaban (ELIQUIS) 5 mg Take 1 tablet (5 mg total) by mouth 2 (two) times a day 60 tablet 0    atorvastatin (LIPITOR) 40 mg tablet Take 1 tablet (40 mg total) by mouth every evening 30 tablet 0    calcium gluconate 500 mg tablet Take 500 mg by mouth every evening      cholecalciferol (VITAMIN D3) 1,000 units tablet Take 1,000 Units by mouth every evening      cyanocobalamin (VITAMIN B-12) 1,000 mcg tablet Take 1,000 mcg by mouth every evening        digoxin (LANOXIN) 0 125 mg tablet Take 1 tablet (125 mcg total) by mouth daily 30 tablet 0    folic acid (FOLVITE) 1 mg tablet Take 1,000 mcg by mouth daily  3    gabapentin (NEURONTIN) 300 mg capsule Take 300 mg by mouth daily      lisinopril (ZESTRIL) 2 5 mg tablet Take 1 tablet (2 5 mg total) by mouth daily 30 tablet 0    metFORMIN (GLUCOPHAGE) 1000 MG tablet Take 0 5 tablets (500 mg total) by mouth 2 (two) times a day with meals 60 tablet 0    Mirabegron ER (MYRBETRIQ) 50 MG TB24 Take 50 mg by mouth daily        primidone (MYSOLINE) 50 mg tablet Take by mouth 2 (two) times a day      spironolactone (ALDACTONE) 50 mg tablet Take 50 mg by mouth daily         No current facility-administered medications on file prior to visit  Focused Neurologic examination:     When the patient closes her eyes she demonstrates a head tilt to the right and small turn to the left   Mostly a no-no jerky tremor  Left shoulder is elevated  mild left SCM hypertophy  Injection details:     After sterile preparation of the skin, a total of 120 units of Botox were injected without EMG guidance  Each 100 units was diluted in 1 cc of PFNS  80 units were discarded as waste  The injections were done as follows into the:  LEFT Sternocleidomastoid: 15 units (increased)   LEFT Splenious capitis: 30 units(increased)   RIGHT Splenious capitis: 45 units(increased)   RIGHT Levator scapulae: 30 units (increased)     The procedure was well tolerated without immediate complications  The patient will return in 12 weeks for further evaluation and treatment  Next time we may consider:   Further increase in dose     Add trapezius or semispinalis

## 2019-12-11 ENCOUNTER — OFFICE VISIT (OUTPATIENT)
Dept: PHYSICAL THERAPY | Facility: CLINIC | Age: 84
End: 2019-12-11
Payer: COMMERCIAL

## 2019-12-11 DIAGNOSIS — Z86.73 HISTORY OF STROKE: Primary | ICD-10-CM

## 2019-12-11 DIAGNOSIS — Z99.3 WHEELCHAIR DEPENDENCE: ICD-10-CM

## 2019-12-11 PROCEDURE — 97116 GAIT TRAINING THERAPY: CPT | Performed by: PHYSICAL THERAPIST

## 2019-12-11 PROCEDURE — 97112 NEUROMUSCULAR REEDUCATION: CPT | Performed by: PHYSICAL THERAPIST

## 2019-12-11 PROCEDURE — 97110 THERAPEUTIC EXERCISES: CPT | Performed by: PHYSICAL THERAPIST

## 2019-12-11 NOTE — PROGRESS NOTES
Daily Note     Today's date: 2019  Patient name: Earl Pryor  : 1935  MRN: [id number]  Referring provider: Du Nayak MD  Dx:   Encounter Diagnosis     ICD-10-CM    1  History of stroke Z86 73    2  Wheelchair dependence Z99 3        Subjective: Some complaint of left knee soreness during session  Objective: See treatment diary below      Assessment: Patient was able walk more frequent longer distances this session compared to previous  Plan to continue to push ambulation and upright exercise  Plan: Progress treatment as tolerated  Continue to trial gait training with use of paper plate       Short Term Goal Expiration Date:(2019)  Long Term Goal Expiration Date: (2020)  POC Expiration Date: (2020)        Precautions fall risk, DM, cervical dystonia, spinal cord stimulator        Manual                                                                                          Exercise Diary         bridges         ankle pumps         heel slide         SLR         supine hip abd         standing         seated LAQ         seated hip flex         Static standing Solo, 1 attempts, 5 35''x1' Solo: 5 min x 1       Gait Solo, // bars 5 attempts, ranging from 1-6 steps Solo //  5 attempts, 8 steps, 6 steps x 3, 4 steps x 1       Multiple STS         Minisquats         NuStep L6, , 10 min, R foot wrapped L6 10 min        STS

## 2019-12-16 ENCOUNTER — OFFICE VISIT (OUTPATIENT)
Dept: PHYSICAL THERAPY | Facility: CLINIC | Age: 84
End: 2019-12-16
Payer: COMMERCIAL

## 2019-12-16 DIAGNOSIS — Z99.3 WHEELCHAIR DEPENDENCE: ICD-10-CM

## 2019-12-16 DIAGNOSIS — Z86.73 HISTORY OF STROKE: Primary | ICD-10-CM

## 2019-12-16 PROCEDURE — 97110 THERAPEUTIC EXERCISES: CPT | Performed by: PHYSICAL THERAPIST

## 2019-12-16 PROCEDURE — 97112 NEUROMUSCULAR REEDUCATION: CPT | Performed by: PHYSICAL THERAPIST

## 2019-12-16 NOTE — PROGRESS NOTES
Daily Note     Today's date: 2019  Patient name: Tony Moralez  : 1935  MRN: [id number]  Referring provider: Ayaan Chan MD  Dx:   Encounter Diagnosis     ICD-10-CM    1  History of stroke Z86 73    2  Wheelchair dependence Z99 3        Subjective: Some complaint of left knee soreness during session  Objective: See treatment diary below    RE :  Stand at RW - 2 min sec with assist  Walking RW - 2 steps max assist  Walking //bars - 8 ft   STS - min assist - (completed with sit to stand at home)  Squat pivot - min to mod assist    RE :  Stand at RW - 43 sec with assist  Walking RW - unable  Walking //bars - 8 ft   STS - min assist - (completed with sit to stand at home)  Squat pivot - min to mod assist           RE   Stand at RW - 45 sec  Walking - 6 ft in //bars max assist  RW walking - 2 steps max assist  STS and w/c to bed Transfers - all completed with STS at home  Squat pivot transfer - mod assist     Assessment: Pt has improved with ability to take steps with RW and stand at rw for longer periods of time which will advance her ability to transfer and to possibly take steps at home  Plan to continue 2x/week for 4 more weeks       Goals  STG  Pt will complete w/c to chair transfer with assistance within 4 weeks - met  Pt will be independent with HEP within 4 weeks - MET  Pt will be able to take 4 steps in //bars with assistance within 4 weeks - MET  Pt will be able to stand at [facility] for 1 5 minutes within 4 weeks - partially MET    LTG  Pt will complete stand pivot transfer with assistance within 8 weeks - not met  Pt will be able to walk with RW with assistance for 4 steps within 8 weeks - not met  Pt will participate in consistent LE strengthening program within 8 weeks - not met        Plan: Progress treatment as tolerated  Continue to trial gait training with use of paper plate       Short Term Goal Expiration Date:(2019)  Long Term Goal Expiration Date: (1/28/2020)  POC Expiration Date: (1/28/2020)        Precautions fall risk, DM, cervical dystonia, spinal cord stimulator        Manual                                                                                          Exercise Diary  12/9 12/11 12/16      bridges         ankle pumps         heel slide         SLR         supine hip abd         standing         seated LAQ         seated hip flex         Static standing Solo, 1 attempts, 5 35''x1' Solo: 5 min x 1       Gait Solo, // bars 5 attempts, ranging from 1-6 steps Solo //  5 attempts, 8 steps, 6 steps x 3, 4 steps x 1       Multiple STS         Minisquats         NuStep L6, , 10 min, R foot wrapped L6 10 min        STS

## 2019-12-18 ENCOUNTER — OFFICE VISIT (OUTPATIENT)
Dept: PHYSICAL THERAPY | Facility: CLINIC | Age: 84
End: 2019-12-18
Payer: COMMERCIAL

## 2019-12-18 DIAGNOSIS — Z99.3 WHEELCHAIR DEPENDENCE: ICD-10-CM

## 2019-12-18 DIAGNOSIS — Z86.73 HISTORY OF STROKE: Primary | ICD-10-CM

## 2019-12-18 DIAGNOSIS — M25.531 RIGHT WRIST PAIN: ICD-10-CM

## 2019-12-18 PROCEDURE — 97110 THERAPEUTIC EXERCISES: CPT

## 2019-12-18 PROCEDURE — 97112 NEUROMUSCULAR REEDUCATION: CPT

## 2019-12-18 NOTE — PROGRESS NOTES
Daily Note     Today's date: 2019  Patient name: Bibi Snyder  : 1935  MRN: [id number]  Referring provider: Chloe Teague MD  Dx:   Encounter Diagnosis     ICD-10-CM    1  History of stroke Z86 73    2  Wheelchair dependence Z99 3    3  Right wrist pain M25 531        Subjective: Some complaint of left knee soreness during session  Objective: See treatment diary below       Assessment: Trialed amb with RW, patient able to take 4 steps at most  More difficulty with STS with walker, needing cueing to push off from arm rest  Patient not able to take any more steps in // bars vs  W/ RW  Continues to need assist for anterior weight shifting and weight shifting  Patient able to advance LLE with use of paper plate  More difficulty advancing RLE today  Plan: Progress treatment as tolerated  Continue to trial gait training with use of paper plate       Short Term Goal Expiration Date:(2019)  Long Term Goal Expiration Date: (2020)  POC Expiration Date: (2020)        Precautions fall risk, DM, cervical dystonia, spinal cord stimulator        Manual                                                                                          Exercise Diary        bridges         ankle pumps         heel slide         SLR         supine hip abd         standing         seated LAQ         seated hip flex         Static standing Solo, 1 attempts, 5 35''x1' Solo: 5 min x 1 Solo,  W/RW-2 attempts  // bars: 2 5 min, 1 5 min      Gait Solo, // bars 5 attempts, ranging from 1-6 steps Solo //  5 attempts, 8 steps, 6 steps x 3, 4 steps x 1 Solo,  W/ RW-3 attemps  //bars-3 attempts      Multiple STS         Minisquats         NuStep L6, , 10 min, R foot wrapped L6 10 min  L6, , 10 min, R foot wrapped      STS

## 2019-12-23 ENCOUNTER — OFFICE VISIT (OUTPATIENT)
Dept: PHYSICAL THERAPY | Facility: CLINIC | Age: 84
End: 2019-12-23
Payer: COMMERCIAL

## 2019-12-23 DIAGNOSIS — Z86.73 HISTORY OF STROKE: Primary | ICD-10-CM

## 2019-12-23 DIAGNOSIS — Z99.3 WHEELCHAIR DEPENDENCE: ICD-10-CM

## 2019-12-23 PROCEDURE — 97112 NEUROMUSCULAR REEDUCATION: CPT | Performed by: PHYSICAL THERAPIST

## 2019-12-27 ENCOUNTER — APPOINTMENT (OUTPATIENT)
Dept: PHYSICAL THERAPY | Facility: CLINIC | Age: 84
End: 2019-12-27
Payer: COMMERCIAL

## 2019-12-30 ENCOUNTER — OFFICE VISIT (OUTPATIENT)
Dept: PHYSICAL THERAPY | Facility: CLINIC | Age: 84
End: 2019-12-30
Payer: COMMERCIAL

## 2019-12-30 DIAGNOSIS — Z99.3 WHEELCHAIR DEPENDENCE: ICD-10-CM

## 2019-12-30 DIAGNOSIS — M25.531 RIGHT WRIST PAIN: ICD-10-CM

## 2019-12-30 DIAGNOSIS — Z86.73 HISTORY OF STROKE: Primary | ICD-10-CM

## 2019-12-30 PROCEDURE — 97110 THERAPEUTIC EXERCISES: CPT

## 2019-12-30 PROCEDURE — 97112 NEUROMUSCULAR REEDUCATION: CPT

## 2019-12-30 PROCEDURE — 97116 GAIT TRAINING THERAPY: CPT

## 2019-12-30 NOTE — PROGRESS NOTES
Daily Note     Today's date: 2019  Patient name: Licha Chu  : 1935  MRN: [id number]  Referring provider: Talita Kothari MD  Dx:   Encounter Diagnosis     ICD-10-CM    1  History of stroke Z86 73    2  Wheelchair dependence Z99 3    3  Right wrist pain M25 531        Subjective: Complains of head shaking (tremors)  Feels they are getting worse  Complains of weakness in LLE  Objective: See treatment diary below       Assessment: Patient unable to advance legs independently  Trialed use of paper plate again today  Able to advance LLE, but unable to advance RLE  Needing assist for LE advancement  Continues to demo excessive forward trunk lean and tendency towards posterior weight shifting  Continued to educate on anterior weight shifting  Advised to contact PCP for follow up  Plan: Progress treatment as tolerated  Continue to trial gait training with use of paper plate       Short Term Goal Expiration Date:(2019)  Long Term Goal Expiration Date: (2020)  POC Expiration Date: (2020)        Precautions fall risk, DM, cervical dystonia, spinal cord stimulator        Manual                                                                                          Exercise Diary      bridges         ankle pumps         heel slide         SLR         supine hip abd         standing         seated LAQ         seated hip flex         Static standing Solo, 1 attempts, 5 35''x1' Solo: 5 min x 1 Solo,  W/RW-2 attempts  // bars: 2 5 min, 1 5 min RW 2 min x 3 Solo, Outside of // bars,   2 49x1  1 47x1  1 45''x1        Gait Solo, // bars 5 attempts, ranging from 1-6 steps Solo //  5 attempts, 8 steps, 6 steps x 3, 4 steps x 1 Solo,  W/ RW-3 attemps  //bars-3 attempts Solo RW - 6 steps x 1  2 steps x 2 max a Solo, RW  2 attemptsx1 step  2 attempsx2 steps (paper plate)    Multiple STS         Minisquats         NuStep L6, , 10 min, R foot wrapped L6 10 min  L6, , 10 min, R foot wrapped  L5 , 10 min, R foot wrapped    STS    10x

## 2020-01-03 ENCOUNTER — OFFICE VISIT (OUTPATIENT)
Dept: PHYSICAL THERAPY | Facility: CLINIC | Age: 85
End: 2020-01-03
Payer: COMMERCIAL

## 2020-01-03 DIAGNOSIS — Z99.3 WHEELCHAIR DEPENDENCE: ICD-10-CM

## 2020-01-03 DIAGNOSIS — M25.531 RIGHT WRIST PAIN: ICD-10-CM

## 2020-01-03 DIAGNOSIS — Z86.73 HISTORY OF STROKE: Primary | ICD-10-CM

## 2020-01-03 PROCEDURE — 97116 GAIT TRAINING THERAPY: CPT | Performed by: PHYSICAL THERAPIST

## 2020-01-03 PROCEDURE — 97110 THERAPEUTIC EXERCISES: CPT | Performed by: PHYSICAL THERAPIST

## 2020-01-03 PROCEDURE — 97112 NEUROMUSCULAR REEDUCATION: CPT | Performed by: PHYSICAL THERAPIST

## 2020-01-03 NOTE — PROGRESS NOTES
Daily Note     Today's date: 1/3/2020  Patient name: Yoni Hodges  : 1935  MRN: [id number]  Referring provider: Cynthia Akers MD  Dx:   Encounter Diagnosis     ICD-10-CM    1  History of stroke Z86 73    2  Wheelchair dependence Z99 3    3  Right wrist pain M25 531        Subjective: Reports having another bladder infection    Objective: See treatment diary below       Assessment: Patient was able to ambulate today for further distances with independent R LE advancement, and was able to maintain balance at RW with only CG assist for the first time  Plan to continue to advance as able and will walk longer distances as she is able  Plan: Progress treatment as tolerated  Continue to trial gait training with use of paper plate       Short Term Goal Expiration Date:(2019)  Long Term Goal Expiration Date: (2020)  POC Expiration Date: (2020)        Precautions fall risk, DM, cervical dystonia, spinal cord stimulator        Manual                                                                                          Exercise Diary  1/3    12/30    bridges         ankle pumps         heel slide         SLR         supine hip abd         standing         seated LAQ         seated hip flex         Static standing RW - 30 sec CG assist x1    Solo, Outside of // bars,   2 49x1  1 47x1  1 45''x1        Gait Solo RW max A    10 ft x 1  8 ft x 1  6 ft x 2      Solo, RW  2 attemptsx1 step  2 attempsx2 steps (paper plate)    Multiple STS         Minisquats         NuStep 10 min L5    L5 , 10 min, R foot wrapped    STS

## 2020-01-07 ENCOUNTER — OFFICE VISIT (OUTPATIENT)
Dept: PHYSICAL THERAPY | Facility: CLINIC | Age: 85
End: 2020-01-07
Payer: COMMERCIAL

## 2020-01-07 DIAGNOSIS — Z99.3 WHEELCHAIR DEPENDENCE: ICD-10-CM

## 2020-01-07 DIAGNOSIS — Z86.73 HISTORY OF STROKE: Primary | ICD-10-CM

## 2020-01-07 PROCEDURE — 97116 GAIT TRAINING THERAPY: CPT | Performed by: PHYSICAL THERAPIST

## 2020-01-07 PROCEDURE — 97112 NEUROMUSCULAR REEDUCATION: CPT | Performed by: PHYSICAL THERAPIST

## 2020-01-07 PROCEDURE — 97110 THERAPEUTIC EXERCISES: CPT | Performed by: PHYSICAL THERAPIST

## 2020-01-07 NOTE — PROGRESS NOTES
Daily Note     Today's date: 2020  Patient name: Bijan Corona  : 1935  MRN: [id number]  Referring provider: Familia Jorge MD  Dx:   Encounter Diagnosis     ICD-10-CM    1  History of stroke Z86 73    2  Wheelchair dependence Z99 3        Subjective: Feeling OK today    Objective: See treatment diary below       Assessment: Not able to walk as far compared to prevous session  Mostly limited by UE weakness and fatigue  Plan next session to continue with progression as able  Plan: Progress treatment as tolerated  Continue to trial gait training with use of paper plate       Short Term Goal Expiration Date:(2019)  Long Term Goal Expiration Date: (2020)  POC Expiration Date: (2020)        Precautions fall risk, DM, cervical dystonia, spinal cord stimulator        Manual                                                                                          Exercise Diary  1/3 1/7   12/30    bridges         ankle pumps         heel slide         SLR         supine hip abd         standing         seated LAQ         seated hip flex         Static standing RW - 30 sec CG assist x1 // bars 3 min no assist   Solo, Outside of // bars,   2 49x1  1 47x1  1 45''x1        Gait Solo RW max A    10 ft x 1  8 ft x 1  6 ft x 2   Solo RW max a    6 ft x 3  8 ft x 2   Solo, RW  2 attemptsx1 step  2 attempsx2 steps (paper plate)    Multiple STS  10x       Minisquats         NuStep 10 min L5 10 min L5   L5 , 10 min, R foot wrapped    STS

## 2020-01-10 ENCOUNTER — TELEPHONE (OUTPATIENT)
Dept: NEUROLOGY | Facility: CLINIC | Age: 85
End: 2020-01-10

## 2020-01-10 NOTE — TELEPHONE ENCOUNTER
We know the botox can help her tremor as she had the week of excellent control after the first round  It can take 3-4 rounds to find the right dose and the right muscles  We will increase the dose next time and add a couple of muscles  I am still very optimistic that we will be able to control her tremor

## 2020-01-10 NOTE — TELEPHONE ENCOUNTER
Patient called in to provide update on head tremor after receiving 2 botox treatments  Reports she still has head tremor, only notices small improvement "once in a great while"  Asking for any further recommendations

## 2020-01-13 ENCOUNTER — OFFICE VISIT (OUTPATIENT)
Dept: PHYSICAL THERAPY | Facility: CLINIC | Age: 85
End: 2020-01-13
Payer: COMMERCIAL

## 2020-01-13 DIAGNOSIS — Z99.3 WHEELCHAIR DEPENDENCE: ICD-10-CM

## 2020-01-13 DIAGNOSIS — M25.531 RIGHT WRIST PAIN: ICD-10-CM

## 2020-01-13 DIAGNOSIS — Z86.73 HISTORY OF STROKE: Primary | ICD-10-CM

## 2020-01-13 PROCEDURE — 97112 NEUROMUSCULAR REEDUCATION: CPT | Performed by: PHYSICAL THERAPIST

## 2020-01-13 PROCEDURE — 97116 GAIT TRAINING THERAPY: CPT | Performed by: PHYSICAL THERAPIST

## 2020-01-13 PROCEDURE — 97110 THERAPEUTIC EXERCISES: CPT | Performed by: PHYSICAL THERAPIST

## 2020-01-13 NOTE — PROGRESS NOTES
Daily Note     Today's date: 2020  Patient name: Vikram Tillman  : 1935  MRN: [id number]  Referring provider: Casey Ruiz MD  Dx:   Encounter Diagnosis     ICD-10-CM    1  History of stroke Z86 73    2  Wheelchair dependence Z99 3    3  Right wrist pain M25 531        Subjective: Is taking medicaiton for UTI    Objective: See treatment diary below       Assessment: Pt was able to walk a significantly longer distance compared to previous sessions and advance eft LE independently  Pt will continue to benefit form progression of ambulation distance with less assistance  Plan: Progress treatment as tolerated  Continue to trial gait training with use of paper plate       Short Term Goal Expiration Date:(2019)  Long Term Goal Expiration Date: (2020)  POC Expiration Date: (2020)        Precautions fall risk, DM, cervical dystonia, spinal cord stimulator        Manual                                                                                          Exercise Diary  1/3 1/7 1/13  12/30    bridges         ankle pumps         heel slide         SLR         supine hip abd         standing         seated LAQ         seated hip flex         Static standing RW - 30 sec CG assist x1 // bars 3 min no assist   Solo, Outside of // bars,   2 49x1  1 47x1  1 45''x1        Gait Solo RW max A    10 ft x 1  8 ft x 1  6 ft x 2   Solo RW max a    6 ft x 3  8 ft x 2 Solo RW max a    12 ft x4   6 ft x1  Solo, RW  2 attemptsx1 step  2 attempsx2 steps (paper plate)    Multiple STS  10x       Minisquats         NuStep 10 min L5 10 min L5 10 min L5  L5 , 10 min, R foot wrapped    STS

## 2020-01-17 ENCOUNTER — OFFICE VISIT (OUTPATIENT)
Dept: PHYSICAL THERAPY | Facility: CLINIC | Age: 85
End: 2020-01-17
Payer: COMMERCIAL

## 2020-01-17 DIAGNOSIS — Z99.3 WHEELCHAIR DEPENDENCE: ICD-10-CM

## 2020-01-17 DIAGNOSIS — Z86.73 HISTORY OF STROKE: Primary | ICD-10-CM

## 2020-01-17 DIAGNOSIS — M25.531 RIGHT WRIST PAIN: ICD-10-CM

## 2020-01-17 PROCEDURE — 97112 NEUROMUSCULAR REEDUCATION: CPT

## 2020-01-17 PROCEDURE — 97110 THERAPEUTIC EXERCISES: CPT

## 2020-01-17 NOTE — PROGRESS NOTES
Daily Note     Today's date: 2020  Patient name: Glen Vargas  : 1935  MRN: [id number]  Referring provider: Karen Westfall MD  Dx:   Encounter Diagnosis     ICD-10-CM    1  History of stroke Z86 73    2  Wheelchair dependence Z99 3    3  Right wrist pain M25 531        Subjective: Feeling tired today  Objective: See treatment diary below       Assessment: Patient required use of paper plates bilaterally today  Unable to independently advance RLE without  Still needs assist from therapist to advance LLE even with use of paper plate  Continues to demo significant forward trunk lean and difficulty maintaining hips anteriorly  Demo mild improvement with standing at higher surface, but requires min A from therapist to stay upright  Plan: Progress treatment as tolerated  Continue to trial gait training with use of paper plate       Short Term Goal Expiration Date:(2019)  Long Term Goal Expiration Date: (2020)  POC Expiration Date: (2020)        Precautions fall risk, DM, cervical dystonia, spinal cord stimulator        Manual                                                                                          Exercise Diary  1/3 1/7 1/13 1/17     bridges         ankle pumps         heel slide         SLR         supine hip abd         standing         seated LAQ         seated hip flex         Static standing RW - 30 sec CG assist x1 // bars 3 min no assist  @ RW 34''  @ adjustable chart table-1 31'  Min A'     Gait Solo RW max A    10 ft x 1  8 ft x 1  6 ft x 2   Solo RW max a    6 ft x 3  8 ft x 2 Solo RW max a    12 ft x4   6 ft x1 Solo RW, Max A    6ftx3  4ftx1     Multiple STS  10x       Minisquats         NuStep 10 min L5 10 min L5 10 min L5 10 min L6     STS

## 2020-01-20 ENCOUNTER — APPOINTMENT (OUTPATIENT)
Dept: PHYSICAL THERAPY | Facility: CLINIC | Age: 85
End: 2020-01-20
Payer: COMMERCIAL

## 2020-01-22 ENCOUNTER — EVALUATION (OUTPATIENT)
Dept: PHYSICAL THERAPY | Facility: CLINIC | Age: 85
End: 2020-01-22
Payer: COMMERCIAL

## 2020-01-22 DIAGNOSIS — Z86.73 HISTORY OF STROKE: Primary | ICD-10-CM

## 2020-01-22 DIAGNOSIS — Z99.3 WHEELCHAIR DEPENDENCE: ICD-10-CM

## 2020-01-22 DIAGNOSIS — M25.531 RIGHT WRIST PAIN: ICD-10-CM

## 2020-01-22 PROCEDURE — 97110 THERAPEUTIC EXERCISES: CPT

## 2020-01-22 PROCEDURE — 97112 NEUROMUSCULAR REEDUCATION: CPT

## 2020-01-22 PROCEDURE — 97116 GAIT TRAINING THERAPY: CPT

## 2020-01-22 NOTE — LETTER
2020    MD Tamiko Dee [address]  [address] [place] [address] Dr    Patient: Sam Issa   YOB: 1935   Date of Visit: 2020     Encounter Diagnosis     ICD-10-CM    1  History of stroke Z86 73    2  Wheelchair dependence Z99 3    3  Right wrist pain M25 531        Dear Dr Raheem Kay: Thank you for your recent referral of Sam Issa  Please review the attached evaluation summary from Shirley's recent visit  Please verify that you agree with the plan of care by signing the attached order  If you have any questions or concerns, please do not hesitate to call  I sincerely appreciate the opportunity to share in the care of one of your patients and hope to have another opportunity to work with you in the near future  Sincerely,    Amparo Caldwell, PT      Referring Provider:      I certify that I have read the below Plan of Care and certify the need for these services furnished under this plan of treatment while under my care  MD Tamiko Dee [address]  [address]  VIA Facsimile: [phone]          Daily Note     Today's date: 2020  Patient name: Sam Issa  : 1935  MRN: [id number]  Referring provider: Cee Gale MD  Dx:   Encounter Diagnosis     ICD-10-CM    1  History of stroke Z86 73    2  Wheelchair dependence Z99 3    3  Right wrist pain M25 531        Subjective: Said her butt was very sore and red earlier this week which she attributes to sitting  MD told her to put some cream on it  Objective: See treatment diary below       Assessment: Continues to demo inconsistencies with ability to advance legs  Requires max A from therapist and use of paper plate on LLE  Able to perform one ambulation attempt advancing RLE independently  Then required assist from therapist or use of paper plate under RLE for all future attempts           Plan: Progress treatment as tolerated  Continue to trial gait training with use of paper plate       Short Term Goal Expiration Date:(12/28/2019)  Long Term Goal Expiration Date: (1/28/2020)  POC Expiration Date: (1/28/2020)        Precautions fall risk, DM, cervical dystonia, spinal cord stimulator        Manual                                                                                          Exercise Diary  1/3 1/7 1/13 1/17 1/22    bridges         ankle pumps         heel slide         SLR         supine hip abd         standing         seated LAQ         seated hip flex         Static standing RW - 30 sec CG assist x1 // bars 3 min no assist  @ RW 29''  @ adjustable chart table-1 31'  Min A' @ RW 1 11'x1  28''x1  51''x1    Gait Solo RW max A    10 ft x 1  8 ft x 1  6 ft x 2   Solo RW max a    6 ft x 3  8 ft x 2 Solo RW max a    12 ft x4   6 ft x1 Solo RW, Max A    6ftx3  4ftx1 Solo, RW,  Max A     8ftx1  4ftX1  2ftx2    Multiple STS  10x       Minisquats         NuStep 10 min L5 10 min L5 10 min L5 10 min L6     STS                                                                                             Progress Update:    Subjective: Some complaint of left knee soreness during session       Objective: See treatment diary below     RE 12/20:  Stand at RW - 2 min sec with assist  Walking RW - 2 steps max assist  Walking //bars - 8 ft   STS - min assist - (completed with sit to stand at home)  Squat pivot - min to mod assist     RE 11/20:  Stand at RW - 43 sec with assist  Walking RW - unable  Walking //bars - 8 ft   STS - min assist - (completed with sit to stand at home)  Squat pivot - min to mod assist    RE: 1/22  Standing at RW - 2 min 11 sec with assist  Walking RW - 12 ft max assist           RE 9/24  Stand at RW - 45 sec  Walking - 6 ft in //bars max assist  RW walking - 2 steps max assist  STS and w/c to bed Transfers - all completed with STS at home  Squat pivot transfer - mod assist     Assessment: Since last progress update pt has made significant gains in distance and stability with walking with RW  Standing time at [address] [place] [address] has also increase  Pt will continue to benefit from skilled PT in order to improve strenght, mobility, LE strength, decrease assist for trasnfers       Goals  STG  Pt will complete w/c to chair transfer with assistance within 4 weeks - met  Pt will be independent with HEP within 4 weeks - MET  Pt will be able to take 4 steps in //bars with assistance within 4 weeks - MET  Pt will be able to stand at [address] [place] [address] for 1 5 minutes within 4 weeks - partially MET    LTG (continued at update 1/22)  Pt will complete stand pivot transfer with assistance within 8 weeks - not met  Pt will be able to walk with RW with assistance for 4 steps within 8 weeks - not met  Pt will participate in consistent LE strengthening program within 8 weeks - not met           Plan: Progress treatment as tolerated  Continue to trial gait training with use of paper plate    Short Term Goal Expiration Date:(2/28/2019)  Long Term Goal Expiration Date: (3/28/2020)  POC Expiration Date: ( 3/28/2020)        Precautions fall risk, DM, cervical dystonia, spinal cord stimulator

## 2020-01-22 NOTE — PROGRESS NOTES
Daily Note     Today's date: 2020  Patient name: Melisa Rebolledo  : 1935  MRN: [id number]  Referring provider: Sam Archuleta MD  Dx:   Encounter Diagnosis     ICD-10-CM    1  History of stroke Z86 73    2  Wheelchair dependence Z99 3    3  Right wrist pain M25 531        Subjective: Said her butt was very sore and red earlier this week which she attributes to sitting  MD told her to put some cream on it  Objective: See treatment diary below       Assessment: Continues to demo inconsistencies with ability to advance legs  Requires max A from therapist and use of paper plate on LLE  Able to perform one ambulation attempt advancing RLE independently  Then required assist from therapist or use of paper plate under RLE for all future attempts  Plan: Progress treatment as tolerated  Continue to trial gait training with use of paper plate       Short Term Goal Expiration Date:(2019)  Long Term Goal Expiration Date: (2020)  POC Expiration Date: (2020)        Precautions fall risk, DM, cervical dystonia, spinal cord stimulator        Manual                                                                                          Exercise Diary  1/3 1/7 1/13 1/17 1/22    bridges         ankle pumps         heel slide         SLR         supine hip abd         standing         seated LAQ         seated hip flex         Static standing RW - 30 sec CG assist x1 // bars 3 min no assist  @ RW 34''  @ adjustable chart table-1 31'  Min A' @ RW 1 11'x1  28''x1  51''x1    Gait Solo RW max A    10 ft x 1  8 ft x 1  6 ft x 2   Solo RW max a    6 ft x 3  8 ft x 2 Solo RW max a    12 ft x4   6 ft x1 Solo RW, Max A    6ftx3  4ftx1 Solo, RW,  Max A     8ftx1  4ftX1  2ftx2    Multiple STS  10x       Minisquats         NuStep 10 min L5 10 min L5 10 min L5 10 min L6     STS

## 2020-01-24 ENCOUNTER — APPOINTMENT (OUTPATIENT)
Dept: PHYSICAL THERAPY | Facility: CLINIC | Age: 85
End: 2020-01-24
Payer: COMMERCIAL

## 2020-01-24 NOTE — PROGRESS NOTES
Progress Update:    Subjective: Some complaint of left knee soreness during session       Objective: See treatment diary below     RE 12/20:  Stand at RW - 2 min sec with assist  Walking RW - 2 steps max assist  Walking //bars - 8 ft   STS - min assist - (completed with sit to stand at home)  Squat pivot - min to mod assist     RE 11/20:  Stand at RW - 43 sec with assist  Walking RW - unable  Walking //bars - 8 ft   STS - min assist - (completed with sit to stand at home)  Squat pivot - min to mod assist    RE: 1/22  Standing at RW - 2 min 11 sec with assist  Walking RW - 12 ft max assist           RE 9/24  Stand at RW - 45 sec  Walking - 6 ft in //bars max assist  RW walking - 2 steps max assist  STS and w/c to bed Transfers - all completed with STS at home  Squat pivot transfer - mod assist     Assessment: Since last progress update pt has made significant gains in distance and stability with walking with RW  Standing time at [facility] has also increase  Pt will continue to benefit from skilled PT in order to improve strenght, mobility, LE strength, decrease assist for trasnfers       Goals  STG  Pt will complete w/c to chair transfer with assistance within 4 weeks - met  Pt will be independent with HEP within 4 weeks - MET  Pt will be able to take 4 steps in //bars with assistance within 4 weeks - MET  Pt will be able to stand at [facility] for 1 5 minutes within 4 weeks - partially MET    LTG (continued at update 1/22)  Pt will complete stand pivot transfer with assistance within 8 weeks - not met  Pt will be able to walk with RW with assistance for 4 steps within 8 weeks - not met  Pt will participate in consistent LE strengthening program within 8 weeks - not met           Plan: Progress treatment as tolerated  Continue to trial gait training with use of paper plate    Short Term Goal Expiration Date:(2/28/2019)  Long Term Goal Expiration Date: (3/28/2020)  POC Expiration Date: (3/28/2020)        Precautions fall risk, DM, cervical dystonia, spinal cord stimulator

## 2020-01-27 ENCOUNTER — TRANSCRIBE ORDERS (OUTPATIENT)
Dept: PHYSICAL THERAPY | Facility: CLINIC | Age: 85
End: 2020-01-27

## 2020-01-27 ENCOUNTER — OFFICE VISIT (OUTPATIENT)
Dept: PHYSICAL THERAPY | Facility: CLINIC | Age: 85
End: 2020-01-27
Payer: COMMERCIAL

## 2020-01-27 DIAGNOSIS — Z99.3 WHEELCHAIR DEPENDENCE: ICD-10-CM

## 2020-01-27 DIAGNOSIS — M25.531 RIGHT WRIST PAIN: ICD-10-CM

## 2020-01-27 DIAGNOSIS — Z86.73 HISTORY OF STROKE: Primary | ICD-10-CM

## 2020-01-27 PROCEDURE — 97116 GAIT TRAINING THERAPY: CPT

## 2020-01-27 PROCEDURE — 97530 THERAPEUTIC ACTIVITIES: CPT

## 2020-01-27 PROCEDURE — 97110 THERAPEUTIC EXERCISES: CPT

## 2020-01-27 NOTE — PROGRESS NOTES
Daily Note     Today's date: 2020  Patient name: Ricky Arauz  : 1935  MRN: [id number]  Referring provider: Annemarie Green MD  Dx:   Encounter Diagnosis     ICD-10-CM    1  History of stroke Z86 73    2  Wheelchair dependence Z99 3    3  Right wrist pain M25 531          Subjective: States that she is having issues with her aides, states they are not showing up to her house to help her  Objective: See treatment diary below      Assessment: Significant difficulty with ambulation attempts today  Very minimal ability to take steps bilaterally even with use of paper plates  Continues to have difficulty maintaining knee ext in stance phase, resulting in LE buckling  Plan: Progress treatment as tolerated         Short Term Goal Expiration Date:(2019)  Long Term Goal Expiration Date: (2020)  POC Expiration Date: (2020)        Precautions fall risk, DM, cervical dystonia, spinal cord stimulator        Manual                                                                                          Exercise Diary          bridges         ankle pumps         heel slide         SLR         supine hip abd         standing         seated LAQ         seated hip flex         Static standing @RW  1 minx1  30''x1        Gait Solo, RW,  Max A   4ftX1  2ftx3          Multiple STS         Minisquats         NuStep L6 15 min        STS

## 2020-01-29 ENCOUNTER — OFFICE VISIT (OUTPATIENT)
Dept: PHYSICAL THERAPY | Facility: CLINIC | Age: 85
End: 2020-01-29
Payer: COMMERCIAL

## 2020-01-29 DIAGNOSIS — Z86.73 HISTORY OF STROKE: Primary | ICD-10-CM

## 2020-01-29 DIAGNOSIS — Z99.3 WHEELCHAIR DEPENDENCE: ICD-10-CM

## 2020-01-29 DIAGNOSIS — M25.531 RIGHT WRIST PAIN: ICD-10-CM

## 2020-01-29 PROCEDURE — 97110 THERAPEUTIC EXERCISES: CPT

## 2020-01-29 PROCEDURE — 97112 NEUROMUSCULAR REEDUCATION: CPT

## 2020-01-29 NOTE — PROGRESS NOTES
Daily Note     Today's date: 2020  Patient name: Sam Issa  : 1935  MRN: [id number]  Referring provider: Cee Gale MD  Dx:   Encounter Diagnosis     ICD-10-CM    1  History of stroke Z86 73    2  Wheelchair dependence Z99 3    3  Right wrist pain M25 531          Subjective: Patient reports some bruising in her legs after last session  Objective: See treatment diary below      Assessment: Significant difficulty with ambulation attempts today  Very minimal ability to take steps bilaterally even with use of paper plates  Continues to have difficulty maintaining knee ext in stance phase, resulting in LE buckling  Plan: Progress treatment as tolerated         Short Term Goal Expiration Date:(2019)  Long Term Goal Expiration Date: (2020)  POC Expiration Date: (2020)        Precautions fall risk, DM, cervical dystonia, spinal cord stimulator        Manual                                                                                          Exercise Diary         bridges         ankle pumps         heel slide         SLR         supine hip abd         standing         seated LAQ         seated hip flex         Static standing @RW  1 minx1  30''x1 Outside // bars,  1 07x1  30''x2  48''x2       Gait Solo, RW,  Max A   4ftX1  2ftx3   Solo, RW  Max A  5 attempts       Multiple STS         Minisquats         NuStep L6 15 min L8 10 min       STS

## 2020-02-04 ENCOUNTER — APPOINTMENT (EMERGENCY)
Dept: RADIOLOGY | Facility: HOSPITAL | Age: 85
DRG: 948 | End: 2020-02-04
Payer: COMMERCIAL

## 2020-02-04 ENCOUNTER — APPOINTMENT (INPATIENT)
Dept: NON INVASIVE DIAGNOSTICS | Facility: HOSPITAL | Age: 85
DRG: 948 | End: 2020-02-04
Payer: COMMERCIAL

## 2020-02-04 ENCOUNTER — HOSPITAL ENCOUNTER (INPATIENT)
Facility: HOSPITAL | Age: 85
LOS: 2 days | Discharge: HOME | DRG: 948 | End: 2020-02-06
Attending: EMERGENCY MEDICINE | Admitting: HOSPITALIST
Payer: COMMERCIAL

## 2020-02-04 DIAGNOSIS — I48.91 ATRIAL FIBRILLATION (HCC): ICD-10-CM

## 2020-02-04 DIAGNOSIS — I63.9 CVA (CEREBRAL VASCULAR ACCIDENT) (HCC): Primary | ICD-10-CM

## 2020-02-04 PROBLEM — R29.90 STROKE-LIKE SYMPTOMS: Status: ACTIVE | Noted: 2020-02-04

## 2020-02-04 PROBLEM — E87.1 HYPONATREMIA: Status: ACTIVE | Noted: 2020-02-04

## 2020-02-04 LAB
ANION GAP SERPL CALCULATED.3IONS-SCNC: 5 MMOL/L (ref 4–13)
APTT PPP: 33 SECONDS (ref 23–37)
BUN SERPL-MCNC: 26 MG/DL (ref 5–25)
CALCIUM SERPL-MCNC: 10.1 MG/DL (ref 8.3–10.1)
CHLORIDE SERPL-SCNC: 99 MMOL/L (ref 100–108)
CO2 SERPL-SCNC: 27 MMOL/L (ref 21–32)
CREAT SERPL-MCNC: 0.96 MG/DL (ref 0.6–1.3)
ERYTHROCYTE [DISTWIDTH] IN BLOOD BY AUTOMATED COUNT: 12.6 % (ref 11.6–15.1)
GFR SERPL CREATININE-BSD FRML MDRD: 54 ML/MIN/1.73SQ M
GLUCOSE SERPL-MCNC: 129 MG/DL (ref 65–140)
GLUCOSE SERPL-MCNC: 144 MG/DL (ref 65–140)
GLUCOSE SERPL-MCNC: 157 MG/DL (ref 65–140)
GLUCOSE SERPL-MCNC: 158 MG/DL (ref 65–140)
HCT VFR BLD AUTO: 42.2 % (ref 34.8–46.1)
HGB BLD-MCNC: 13.8 G/DL (ref 11.5–15.4)
INR PPP: 1.26 (ref 0.84–1.19)
MCH RBC QN AUTO: 30.5 PG (ref 26.8–34.3)
MCHC RBC AUTO-ENTMCNC: 32.7 G/DL (ref 31.4–37.4)
MCV RBC AUTO: 93 FL (ref 82–98)
PLATELET # BLD AUTO: 179 THOUSANDS/UL (ref 149–390)
PMV BLD AUTO: 10.3 FL (ref 8.9–12.7)
POTASSIUM SERPL-SCNC: 5.2 MMOL/L (ref 3.5–5.3)
PROTHROMBIN TIME: 15.4 SECONDS (ref 11.6–14.5)
RBC # BLD AUTO: 4.53 MILLION/UL (ref 3.81–5.12)
SODIUM SERPL-SCNC: 131 MMOL/L (ref 136–145)
TROPONIN I SERPL-MCNC: <0.02 NG/ML
TSH SERPL DL<=0.05 MIU/L-ACNC: 1.32 UIU/ML (ref 0.36–3.74)
VIT B12 SERPL-MCNC: 577 PG/ML (ref 100–900)
WBC # BLD AUTO: 9.3 THOUSAND/UL (ref 4.31–10.16)

## 2020-02-04 PROCEDURE — 84484 ASSAY OF TROPONIN QUANT: CPT | Performed by: EMERGENCY MEDICINE

## 2020-02-04 PROCEDURE — NC001 PR NO CHARGE: Performed by: PSYCHIATRY & NEUROLOGY

## 2020-02-04 PROCEDURE — 82948 REAGENT STRIP/BLOOD GLUCOSE: CPT

## 2020-02-04 PROCEDURE — 85610 PROTHROMBIN TIME: CPT | Performed by: EMERGENCY MEDICINE

## 2020-02-04 PROCEDURE — 85027 COMPLETE CBC AUTOMATED: CPT | Performed by: EMERGENCY MEDICINE

## 2020-02-04 PROCEDURE — 93005 ELECTROCARDIOGRAM TRACING: CPT

## 2020-02-04 PROCEDURE — 36415 COLL VENOUS BLD VENIPUNCTURE: CPT | Performed by: EMERGENCY MEDICINE

## 2020-02-04 PROCEDURE — 82607 VITAMIN B-12: CPT | Performed by: HOSPITALIST

## 2020-02-04 PROCEDURE — 99285 EMERGENCY DEPT VISIT HI MDM: CPT | Performed by: EMERGENCY MEDICINE

## 2020-02-04 PROCEDURE — 70496 CT ANGIOGRAPHY HEAD: CPT

## 2020-02-04 PROCEDURE — 93306 TTE W/DOPPLER COMPLETE: CPT

## 2020-02-04 PROCEDURE — 99223 1ST HOSP IP/OBS HIGH 75: CPT | Performed by: PHYSICIAN ASSISTANT

## 2020-02-04 PROCEDURE — 71045 X-RAY EXAM CHEST 1 VIEW: CPT

## 2020-02-04 PROCEDURE — 70498 CT ANGIOGRAPHY NECK: CPT

## 2020-02-04 PROCEDURE — 99222 1ST HOSP IP/OBS MODERATE 55: CPT | Performed by: HOSPITALIST

## 2020-02-04 PROCEDURE — 99285 EMERGENCY DEPT VISIT HI MDM: CPT

## 2020-02-04 PROCEDURE — 80048 BASIC METABOLIC PNL TOTAL CA: CPT | Performed by: EMERGENCY MEDICINE

## 2020-02-04 PROCEDURE — 84443 ASSAY THYROID STIM HORMONE: CPT | Performed by: HOSPITALIST

## 2020-02-04 PROCEDURE — 85730 THROMBOPLASTIN TIME PARTIAL: CPT | Performed by: EMERGENCY MEDICINE

## 2020-02-04 RX ORDER — GABAPENTIN 300 MG/1
300 CAPSULE ORAL DAILY
Status: DISCONTINUED | OUTPATIENT
Start: 2020-02-05 | End: 2020-02-06 | Stop reason: HOSPADM

## 2020-02-04 RX ORDER — LISINOPRIL 2.5 MG/1
2.5 TABLET ORAL DAILY
Status: DISCONTINUED | OUTPATIENT
Start: 2020-02-05 | End: 2020-02-06 | Stop reason: HOSPADM

## 2020-02-04 RX ORDER — PRIMIDONE 50 MG/1
50 TABLET ORAL EVERY 12 HOURS SCHEDULED
Status: DISCONTINUED | OUTPATIENT
Start: 2020-02-04 | End: 2020-02-06 | Stop reason: HOSPADM

## 2020-02-04 RX ORDER — OXYBUTYNIN CHLORIDE 5 MG/1
5 TABLET, EXTENDED RELEASE ORAL DAILY
Status: DISCONTINUED | OUTPATIENT
Start: 2020-02-05 | End: 2020-02-06 | Stop reason: HOSPADM

## 2020-02-04 RX ORDER — MELATONIN
1000 EVERY EVENING
Status: DISCONTINUED | OUTPATIENT
Start: 2020-02-04 | End: 2020-02-06 | Stop reason: HOSPADM

## 2020-02-04 RX ORDER — SODIUM CHLORIDE 9 MG/ML
50 INJECTION, SOLUTION INTRAVENOUS CONTINUOUS
Status: DISCONTINUED | OUTPATIENT
Start: 2020-02-04 | End: 2020-02-05

## 2020-02-04 RX ORDER — ASPIRIN 300 MG/1
300 SUPPOSITORY RECTAL ONCE
Status: COMPLETED | OUTPATIENT
Start: 2020-02-04 | End: 2020-02-04

## 2020-02-04 RX ORDER — SPIRONOLACTONE 50 MG/1
50 TABLET, FILM COATED ORAL DAILY
Status: DISCONTINUED | OUTPATIENT
Start: 2020-02-05 | End: 2020-02-06 | Stop reason: HOSPADM

## 2020-02-04 RX ORDER — CALCIUM CARBONATE 500(1250)
1 TABLET ORAL
Status: DISCONTINUED | OUTPATIENT
Start: 2020-02-05 | End: 2020-02-06 | Stop reason: HOSPADM

## 2020-02-04 RX ORDER — DIGOXIN 125 MCG
125 TABLET ORAL DAILY
Status: DISCONTINUED | OUTPATIENT
Start: 2020-02-05 | End: 2020-02-06 | Stop reason: HOSPADM

## 2020-02-04 RX ORDER — FOLIC ACID 1 MG/1
1000 TABLET ORAL DAILY
Status: DISCONTINUED | OUTPATIENT
Start: 2020-02-05 | End: 2020-02-06 | Stop reason: HOSPADM

## 2020-02-04 RX ADMIN — ASPIRIN 300 MG: 300 SUPPOSITORY RECTAL at 09:50

## 2020-02-04 RX ADMIN — MELATONIN 1000 UNITS: at 17:54

## 2020-02-04 RX ADMIN — APIXABAN 5 MG: 5 TABLET, FILM COATED ORAL at 17:55

## 2020-02-04 RX ADMIN — CYANOCOBALAMIN TAB 500 MCG 1000 MCG: 500 TAB at 17:55

## 2020-02-04 RX ADMIN — IOHEXOL 85 ML: 350 INJECTION, SOLUTION INTRAVENOUS at 09:14

## 2020-02-04 RX ADMIN — SODIUM CHLORIDE 50 ML/HR: 0.9 INJECTION, SOLUTION INTRAVENOUS at 14:26

## 2020-02-05 ENCOUNTER — APPOINTMENT (INPATIENT)
Dept: RADIOLOGY | Facility: HOSPITAL | Age: 85
DRG: 948 | End: 2020-02-05
Payer: COMMERCIAL

## 2020-02-05 VITALS
OXYGEN SATURATION: 97 % | SYSTOLIC BLOOD PRESSURE: 147 MMHG | RESPIRATION RATE: 17 BRPM | DIASTOLIC BLOOD PRESSURE: 63 MMHG | HEART RATE: 69 BPM | HEIGHT: 65 IN | TEMPERATURE: 97.8 F | WEIGHT: 179.9 LBS | BODY MASS INDEX: 29.97 KG/M2

## 2020-02-05 PROBLEM — E04.1 THYROID NODULE: Status: ACTIVE | Noted: 2020-02-05

## 2020-02-05 LAB
ANION GAP SERPL CALCULATED.3IONS-SCNC: 5 MMOL/L (ref 4–13)
ATRIAL RATE: 202 BPM
BUN SERPL-MCNC: 21 MG/DL (ref 5–25)
CALCIUM SERPL-MCNC: 9.9 MG/DL (ref 8.3–10.1)
CHLORIDE SERPL-SCNC: 106 MMOL/L (ref 100–108)
CHOLEST SERPL-MCNC: 104 MG/DL (ref 50–200)
CO2 SERPL-SCNC: 26 MMOL/L (ref 21–32)
CREAT SERPL-MCNC: 0.7 MG/DL (ref 0.6–1.3)
EST. AVERAGE GLUCOSE BLD GHB EST-MCNC: 120 MG/DL
GFR SERPL CREATININE-BSD FRML MDRD: 80 ML/MIN/1.73SQ M
GLUCOSE SERPL-MCNC: 109 MG/DL (ref 65–140)
GLUCOSE SERPL-MCNC: 113 MG/DL (ref 65–140)
GLUCOSE SERPL-MCNC: 118 MG/DL (ref 65–140)
GLUCOSE SERPL-MCNC: 135 MG/DL (ref 65–140)
GLUCOSE SERPL-MCNC: 147 MG/DL (ref 65–140)
HBA1C MFR BLD: 5.8 % (ref 4.2–6.3)
HDLC SERPL-MCNC: 44 MG/DL
LDLC SERPL CALC-MCNC: 36 MG/DL (ref 0–100)
P AXIS: 83 DEGREES
POTASSIUM SERPL-SCNC: 4 MMOL/L (ref 3.5–5.3)
QRS AXIS: 255 DEGREES
QRSD INTERVAL: 132 MS
QT INTERVAL: 380 MS
QTC INTERVAL: 418 MS
SODIUM SERPL-SCNC: 137 MMOL/L (ref 136–145)
T WAVE AXIS: 55 DEGREES
TRIGL SERPL-MCNC: 121 MG/DL
VENTRICULAR RATE: 73 BPM

## 2020-02-05 PROCEDURE — 80061 LIPID PANEL: CPT | Performed by: HOSPITALIST

## 2020-02-05 PROCEDURE — 93010 ELECTROCARDIOGRAM REPORT: CPT | Performed by: INTERNAL MEDICINE

## 2020-02-05 PROCEDURE — 99232 SBSQ HOSP IP/OBS MODERATE 35: CPT | Performed by: PHYSICIAN ASSISTANT

## 2020-02-05 PROCEDURE — 99233 SBSQ HOSP IP/OBS HIGH 50: CPT | Performed by: PHYSICIAN ASSISTANT

## 2020-02-05 PROCEDURE — 82948 REAGENT STRIP/BLOOD GLUCOSE: CPT

## 2020-02-05 PROCEDURE — 83036 HEMOGLOBIN GLYCOSYLATED A1C: CPT | Performed by: HOSPITALIST

## 2020-02-05 PROCEDURE — 93306 TTE W/DOPPLER COMPLETE: CPT | Performed by: INTERNAL MEDICINE

## 2020-02-05 PROCEDURE — 80048 BASIC METABOLIC PNL TOTAL CA: CPT | Performed by: HOSPITALIST

## 2020-02-05 PROCEDURE — NC001 PR NO CHARGE: Performed by: PHYSICIAN ASSISTANT

## 2020-02-05 PROCEDURE — 99223 1ST HOSP IP/OBS HIGH 75: CPT | Performed by: PHYSICAL MEDICINE & REHABILITATION

## 2020-02-05 PROCEDURE — 97163 PT EVAL HIGH COMPLEX 45 MIN: CPT

## 2020-02-05 PROCEDURE — 70450 CT HEAD/BRAIN W/O DYE: CPT

## 2020-02-05 PROCEDURE — 97167 OT EVAL HIGH COMPLEX 60 MIN: CPT

## 2020-02-05 RX ORDER — ATORVASTATIN CALCIUM 40 MG/1
40 TABLET, FILM COATED ORAL
Status: DISCONTINUED | OUTPATIENT
Start: 2020-02-05 | End: 2020-02-06 | Stop reason: HOSPADM

## 2020-02-05 RX ADMIN — SPIRONOLACTONE 50 MG: 50 TABLET ORAL at 08:11

## 2020-02-05 RX ADMIN — PRIMIDONE 50 MG: 50 TABLET ORAL at 00:05

## 2020-02-05 RX ADMIN — CYANOCOBALAMIN TAB 500 MCG 1000 MCG: 500 TAB at 16:49

## 2020-02-05 RX ADMIN — FOLIC ACID 1000 MCG: 1 TABLET ORAL at 08:11

## 2020-02-05 RX ADMIN — PRIMIDONE 50 MG: 50 TABLET ORAL at 08:14

## 2020-02-05 RX ADMIN — APIXABAN 5 MG: 5 TABLET, FILM COATED ORAL at 08:12

## 2020-02-05 RX ADMIN — LISINOPRIL 2.5 MG: 2.5 TABLET ORAL at 08:11

## 2020-02-05 RX ADMIN — GABAPENTIN 300 MG: 300 CAPSULE ORAL at 08:12

## 2020-02-05 RX ADMIN — CALCIUM 1 TABLET: 500 TABLET ORAL at 08:11

## 2020-02-05 RX ADMIN — DIGOXIN 125 MCG: 125 TABLET ORAL at 08:11

## 2020-02-05 RX ADMIN — ATORVASTATIN CALCIUM 40 MG: 40 TABLET, FILM COATED ORAL at 16:49

## 2020-02-05 RX ADMIN — OXYBUTYNIN 5 MG: 5 TABLET, FILM COATED, EXTENDED RELEASE ORAL at 08:11

## 2020-02-05 RX ADMIN — PRIMIDONE 50 MG: 50 TABLET ORAL at 21:49

## 2020-02-05 RX ADMIN — MELATONIN 1000 UNITS: at 16:48

## 2020-02-05 RX ADMIN — APIXABAN 5 MG: 5 TABLET, FILM COATED ORAL at 16:48

## 2020-02-06 LAB
ANION GAP SERPL CALCULATED.3IONS-SCNC: 5 MMOL/L (ref 4–13)
BASOPHILS # BLD AUTO: 0.07 THOUSANDS/ΜL (ref 0–0.1)
BASOPHILS NFR BLD AUTO: 1 % (ref 0–1)
BUN SERPL-MCNC: 23 MG/DL (ref 5–25)
CALCIUM SERPL-MCNC: 9.8 MG/DL (ref 8.3–10.1)
CHLORIDE SERPL-SCNC: 105 MMOL/L (ref 100–108)
CO2 SERPL-SCNC: 27 MMOL/L (ref 21–32)
CREAT SERPL-MCNC: 0.84 MG/DL (ref 0.6–1.3)
EOSINOPHIL # BLD AUTO: 0.3 THOUSAND/ΜL (ref 0–0.61)
EOSINOPHIL NFR BLD AUTO: 4 % (ref 0–6)
ERYTHROCYTE [DISTWIDTH] IN BLOOD BY AUTOMATED COUNT: 12.6 % (ref 11.6–15.1)
GFR SERPL CREATININE-BSD FRML MDRD: 64 ML/MIN/1.73SQ M
GLUCOSE SERPL-MCNC: 124 MG/DL (ref 65–140)
GLUCOSE SERPL-MCNC: 126 MG/DL (ref 65–140)
GLUCOSE SERPL-MCNC: 151 MG/DL (ref 65–140)
HCT VFR BLD AUTO: 39.1 % (ref 34.8–46.1)
HGB BLD-MCNC: 12.8 G/DL (ref 11.5–15.4)
IMM GRANULOCYTES # BLD AUTO: 0.04 THOUSAND/UL (ref 0–0.2)
IMM GRANULOCYTES NFR BLD AUTO: 1 % (ref 0–2)
LYMPHOCYTES # BLD AUTO: 2.05 THOUSANDS/ΜL (ref 0.6–4.47)
LYMPHOCYTES NFR BLD AUTO: 29 % (ref 14–44)
MCH RBC QN AUTO: 30.7 PG (ref 26.8–34.3)
MCHC RBC AUTO-ENTMCNC: 32.7 G/DL (ref 31.4–37.4)
MCV RBC AUTO: 94 FL (ref 82–98)
MONOCYTES # BLD AUTO: 0.61 THOUSAND/ΜL (ref 0.17–1.22)
MONOCYTES NFR BLD AUTO: 9 % (ref 4–12)
NEUTROPHILS # BLD AUTO: 3.93 THOUSANDS/ΜL (ref 1.85–7.62)
NEUTS SEG NFR BLD AUTO: 56 % (ref 43–75)
NRBC BLD AUTO-RTO: 0 /100 WBCS
PLATELET # BLD AUTO: 150 THOUSANDS/UL (ref 149–390)
PMV BLD AUTO: 10.5 FL (ref 8.9–12.7)
POTASSIUM SERPL-SCNC: 4.3 MMOL/L (ref 3.5–5.3)
PROCALCITONIN SERPL-MCNC: <0.05 NG/ML
RBC # BLD AUTO: 4.17 MILLION/UL (ref 3.81–5.12)
SODIUM SERPL-SCNC: 137 MMOL/L (ref 136–145)
WBC # BLD AUTO: 7 THOUSAND/UL (ref 4.31–10.16)

## 2020-02-06 PROCEDURE — 99239 HOSP IP/OBS DSCHRG MGMT >30: CPT | Performed by: PHYSICIAN ASSISTANT

## 2020-02-06 PROCEDURE — 85025 COMPLETE CBC W/AUTO DIFF WBC: CPT | Performed by: PHYSICIAN ASSISTANT

## 2020-02-06 PROCEDURE — 84145 PROCALCITONIN (PCT): CPT | Performed by: PHYSICIAN ASSISTANT

## 2020-02-06 PROCEDURE — 80048 BASIC METABOLIC PNL TOTAL CA: CPT | Performed by: PHYSICIAN ASSISTANT

## 2020-02-06 PROCEDURE — 82948 REAGENT STRIP/BLOOD GLUCOSE: CPT

## 2020-02-06 RX ADMIN — SPIRONOLACTONE 50 MG: 50 TABLET ORAL at 08:51

## 2020-02-06 RX ADMIN — LISINOPRIL 2.5 MG: 2.5 TABLET ORAL at 08:51

## 2020-02-06 RX ADMIN — PRIMIDONE 50 MG: 50 TABLET ORAL at 08:53

## 2020-02-06 RX ADMIN — CYANOCOBALAMIN TAB 500 MCG 1000 MCG: 500 TAB at 17:14

## 2020-02-06 RX ADMIN — APIXABAN 5 MG: 5 TABLET, FILM COATED ORAL at 08:51

## 2020-02-06 RX ADMIN — CALCIUM 1 TABLET: 500 TABLET ORAL at 08:51

## 2020-02-06 RX ADMIN — OXYBUTYNIN 5 MG: 5 TABLET, FILM COATED, EXTENDED RELEASE ORAL at 08:51

## 2020-02-06 RX ADMIN — ATORVASTATIN CALCIUM 40 MG: 40 TABLET, FILM COATED ORAL at 17:14

## 2020-02-06 RX ADMIN — FOLIC ACID 1000 MCG: 1 TABLET ORAL at 08:52

## 2020-02-06 RX ADMIN — APIXABAN 5 MG: 5 TABLET, FILM COATED ORAL at 17:14

## 2020-02-06 RX ADMIN — GABAPENTIN 300 MG: 300 CAPSULE ORAL at 08:51

## 2020-02-06 RX ADMIN — MELATONIN 1000 UNITS: at 17:14

## 2020-02-06 RX ADMIN — DIGOXIN 125 MCG: 125 TABLET ORAL at 08:51

## 2020-02-14 ENCOUNTER — HOSPITAL ENCOUNTER (INPATIENT)
Facility: HOSPITAL | Age: 85
LOS: 3 days | Discharge: HOME | DRG: 948 | End: 2020-02-18
Attending: EMERGENCY MEDICINE | Admitting: INTERNAL MEDICINE
Payer: COMMERCIAL

## 2020-02-14 DIAGNOSIS — R65.10 SIRS (SYSTEMIC INFLAMMATORY RESPONSE SYNDROME) (HCC): Primary | ICD-10-CM

## 2020-02-14 DIAGNOSIS — R47.81 SLURRED SPEECH: ICD-10-CM

## 2020-02-14 DIAGNOSIS — R29.898 WEAKNESS OF BOTH LOWER EXTREMITIES: ICD-10-CM

## 2020-02-14 LAB
ANION GAP SERPL CALCULATED.3IONS-SCNC: 10 MMOL/L (ref 4–13)
BASOPHILS # BLD AUTO: 0.06 THOUSANDS/ΜL (ref 0–0.1)
BASOPHILS NFR BLD AUTO: 0 % (ref 0–1)
BUN SERPL-MCNC: 29 MG/DL (ref 5–25)
CALCIUM SERPL-MCNC: 10.2 MG/DL (ref 8.3–10.1)
CHLORIDE SERPL-SCNC: 103 MMOL/L (ref 100–108)
CO2 SERPL-SCNC: 20 MMOL/L (ref 21–32)
CREAT SERPL-MCNC: 0.94 MG/DL (ref 0.6–1.3)
EOSINOPHIL # BLD AUTO: 0.1 THOUSAND/ΜL (ref 0–0.61)
EOSINOPHIL NFR BLD AUTO: 1 % (ref 0–6)
ERYTHROCYTE [DISTWIDTH] IN BLOOD BY AUTOMATED COUNT: 12.4 % (ref 11.6–15.1)
GFR SERPL CREATININE-BSD FRML MDRD: 56 ML/MIN/1.73SQ M
GLUCOSE SERPL-MCNC: 142 MG/DL (ref 65–140)
HCT VFR BLD AUTO: 41.4 % (ref 34.8–46.1)
HGB BLD-MCNC: 13.6 G/DL (ref 11.5–15.4)
IMM GRANULOCYTES # BLD AUTO: 0.11 THOUSAND/UL (ref 0–0.2)
IMM GRANULOCYTES NFR BLD AUTO: 1 % (ref 0–2)
LYMPHOCYTES # BLD AUTO: 1.09 THOUSANDS/ΜL (ref 0.6–4.47)
LYMPHOCYTES NFR BLD AUTO: 6 % (ref 14–44)
MCH RBC QN AUTO: 30.8 PG (ref 26.8–34.3)
MCHC RBC AUTO-ENTMCNC: 32.9 G/DL (ref 31.4–37.4)
MCV RBC AUTO: 94 FL (ref 82–98)
MONOCYTES # BLD AUTO: 1.27 THOUSAND/ΜL (ref 0.17–1.22)
MONOCYTES NFR BLD AUTO: 7 % (ref 4–12)
NEUTROPHILS # BLD AUTO: 15.3 THOUSANDS/ΜL (ref 1.85–7.62)
NEUTS SEG NFR BLD AUTO: 85 % (ref 43–75)
NRBC BLD AUTO-RTO: 0 /100 WBCS
PLATELET # BLD AUTO: 180 THOUSANDS/UL (ref 149–390)
PMV BLD AUTO: 10.1 FL (ref 8.9–12.7)
POTASSIUM SERPL-SCNC: 4.9 MMOL/L (ref 3.5–5.3)
RBC # BLD AUTO: 4.42 MILLION/UL (ref 3.81–5.12)
SODIUM SERPL-SCNC: 133 MMOL/L (ref 136–145)
TSH SERPL DL<=0.05 MIU/L-ACNC: 1.36 UIU/ML (ref 0.36–3.74)
WBC # BLD AUTO: 17.93 THOUSAND/UL (ref 4.31–10.16)

## 2020-02-14 PROCEDURE — 80048 BASIC METABOLIC PNL TOTAL CA: CPT | Performed by: EMERGENCY MEDICINE

## 2020-02-14 PROCEDURE — 84443 ASSAY THYROID STIM HORMONE: CPT | Performed by: EMERGENCY MEDICINE

## 2020-02-14 PROCEDURE — 36415 COLL VENOUS BLD VENIPUNCTURE: CPT | Performed by: EMERGENCY MEDICINE

## 2020-02-14 PROCEDURE — 93005 ELECTROCARDIOGRAM TRACING: CPT

## 2020-02-14 PROCEDURE — 99285 EMERGENCY DEPT VISIT HI MDM: CPT

## 2020-02-14 PROCEDURE — 99285 EMERGENCY DEPT VISIT HI MDM: CPT | Performed by: EMERGENCY MEDICINE

## 2020-02-14 PROCEDURE — 85025 COMPLETE CBC W/AUTO DIFF WBC: CPT | Performed by: EMERGENCY MEDICINE

## 2020-02-14 PROCEDURE — 96360 HYDRATION IV INFUSION INIT: CPT

## 2020-02-14 RX ADMIN — SODIUM CHLORIDE 1000 ML: 0.9 INJECTION, SOLUTION INTRAVENOUS at 22:53

## 2020-02-15 ENCOUNTER — APPOINTMENT (EMERGENCY)
Dept: RADIOLOGY | Facility: HOSPITAL | Age: 85
DRG: 948 | End: 2020-02-15
Payer: COMMERCIAL

## 2020-02-15 PROBLEM — I48.0 PAROXYSMAL ATRIAL FIBRILLATION (HCC): Chronic | Status: ACTIVE | Noted: 2018-05-31

## 2020-02-15 PROBLEM — R65.10 SIRS (SYSTEMIC INFLAMMATORY RESPONSE SYNDROME) (HCC): Status: ACTIVE | Noted: 2020-02-15

## 2020-02-15 PROBLEM — E87.1 HYPONATREMIA: Chronic | Status: ACTIVE | Noted: 2020-02-04

## 2020-02-15 PROBLEM — N32.81 OVERACTIVE BLADDER: Chronic | Status: ACTIVE | Noted: 2019-06-11

## 2020-02-15 PROBLEM — I10 ESSENTIAL HYPERTENSION: Chronic | Status: ACTIVE | Noted: 2019-06-11

## 2020-02-15 PROBLEM — Z96.89 SPINAL CORD STIMULATOR STATUS: Chronic | Status: ACTIVE | Noted: 2019-06-11

## 2020-02-15 PROBLEM — R29.898 WEAKNESS OF BOTH LOWER EXTREMITIES: Status: ACTIVE | Noted: 2020-02-15

## 2020-02-15 PROBLEM — G24.3 CERVICAL DYSTONIA: Chronic | Status: ACTIVE | Noted: 2019-07-11

## 2020-02-15 PROBLEM — R47.9 TRANSIENT SPEECH DISTURBANCE: Status: ACTIVE | Noted: 2020-02-15

## 2020-02-15 PROBLEM — E11.9 TYPE 2 DIABETES MELLITUS (HCC): Chronic | Status: ACTIVE | Noted: 2018-05-31

## 2020-02-15 LAB
ANION GAP SERPL CALCULATED.3IONS-SCNC: 5 MMOL/L (ref 4–13)
BACTERIA UR QL AUTO: ABNORMAL /HPF
BILIRUB UR QL STRIP: NEGATIVE
BUN SERPL-MCNC: 25 MG/DL (ref 5–25)
CALCIUM SERPL-MCNC: 10.2 MG/DL (ref 8.3–10.1)
CHLORIDE SERPL-SCNC: 105 MMOL/L (ref 100–108)
CLARITY UR: CLEAR
CO2 SERPL-SCNC: 24 MMOL/L (ref 21–32)
COLOR UR: YELLOW
COLOR, POC: NORMAL
CREAT SERPL-MCNC: 0.73 MG/DL (ref 0.6–1.3)
DIGOXIN SERPL-MCNC: 1.6 NG/ML (ref 0.8–2)
ERYTHROCYTE [DISTWIDTH] IN BLOOD BY AUTOMATED COUNT: 12.4 % (ref 11.6–15.1)
FLUAV RNA NPH QL NAA+PROBE: NORMAL
FLUBV RNA NPH QL NAA+PROBE: NORMAL
GFR SERPL CREATININE-BSD FRML MDRD: 76 ML/MIN/1.73SQ M
GLUCOSE P FAST SERPL-MCNC: 109 MG/DL (ref 65–99)
GLUCOSE SERPL-MCNC: 106 MG/DL (ref 65–140)
GLUCOSE SERPL-MCNC: 109 MG/DL (ref 65–140)
GLUCOSE SERPL-MCNC: 115 MG/DL (ref 65–140)
GLUCOSE SERPL-MCNC: 143 MG/DL (ref 65–140)
GLUCOSE SERPL-MCNC: 169 MG/DL (ref 65–140)
GLUCOSE SERPL-MCNC: 84 MG/DL (ref 65–140)
GLUCOSE UR STRIP-MCNC: NEGATIVE MG/DL
HCT VFR BLD AUTO: 37.3 % (ref 34.8–46.1)
HGB BLD-MCNC: 12.4 G/DL (ref 11.5–15.4)
HGB UR QL STRIP.AUTO: ABNORMAL
HYALINE CASTS #/AREA URNS LPF: ABNORMAL /LPF
KETONES UR STRIP-MCNC: NEGATIVE MG/DL
LACTATE SERPL-SCNC: 1.7 MMOL/L (ref 0.5–2)
LEUKOCYTE ESTERASE UR QL STRIP: NEGATIVE
MCH RBC QN AUTO: 31 PG (ref 26.8–34.3)
MCHC RBC AUTO-ENTMCNC: 33.2 G/DL (ref 31.4–37.4)
MCV RBC AUTO: 93 FL (ref 82–98)
NITRITE UR QL STRIP: NEGATIVE
NON-SQ EPI CELLS URNS QL MICRO: ABNORMAL /HPF
PH UR STRIP.AUTO: 6 [PH] (ref 4.5–8)
PLATELET # BLD AUTO: 205 THOUSANDS/UL (ref 149–390)
PMV BLD AUTO: 10.5 FL (ref 8.9–12.7)
POTASSIUM SERPL-SCNC: 4.3 MMOL/L (ref 3.5–5.3)
PROT UR STRIP-MCNC: NEGATIVE MG/DL
RBC # BLD AUTO: 4 MILLION/UL (ref 3.81–5.12)
RBC #/AREA URNS AUTO: ABNORMAL /HPF
RSV RNA NPH QL NAA+PROBE: NORMAL
SODIUM SERPL-SCNC: 134 MMOL/L (ref 136–145)
SP GR UR STRIP.AUTO: 1.02 (ref 1–1.03)
UROBILINOGEN UR QL STRIP.AUTO: 0.2 E.U./DL
WBC # BLD AUTO: 13.62 THOUSAND/UL (ref 4.31–10.16)
WBC #/AREA URNS AUTO: ABNORMAL /HPF

## 2020-02-15 PROCEDURE — 81001 URINALYSIS AUTO W/SCOPE: CPT

## 2020-02-15 PROCEDURE — 87040 BLOOD CULTURE FOR BACTERIA: CPT | Performed by: EMERGENCY MEDICINE

## 2020-02-15 PROCEDURE — 82948 REAGENT STRIP/BLOOD GLUCOSE: CPT

## 2020-02-15 PROCEDURE — 36415 COLL VENOUS BLD VENIPUNCTURE: CPT | Performed by: EMERGENCY MEDICINE

## 2020-02-15 PROCEDURE — 87631 RESP VIRUS 3-5 TARGETS: CPT | Performed by: EMERGENCY MEDICINE

## 2020-02-15 PROCEDURE — 85027 COMPLETE CBC AUTOMATED: CPT | Performed by: HOSPITALIST

## 2020-02-15 PROCEDURE — 92610 EVALUATE SWALLOWING FUNCTION: CPT

## 2020-02-15 PROCEDURE — 99220 PR INITIAL OBSERVATION CARE/DAY 70 MINUTES: CPT | Performed by: HOSPITALIST

## 2020-02-15 PROCEDURE — 87147 CULTURE TYPE IMMUNOLOGIC: CPT | Performed by: EMERGENCY MEDICINE

## 2020-02-15 PROCEDURE — 99223 1ST HOSP IP/OBS HIGH 75: CPT | Performed by: PSYCHIATRY & NEUROLOGY

## 2020-02-15 PROCEDURE — 71045 X-RAY EXAM CHEST 1 VIEW: CPT

## 2020-02-15 PROCEDURE — 80048 BASIC METABOLIC PNL TOTAL CA: CPT | Performed by: HOSPITALIST

## 2020-02-15 PROCEDURE — 83605 ASSAY OF LACTIC ACID: CPT | Performed by: EMERGENCY MEDICINE

## 2020-02-15 PROCEDURE — 80162 ASSAY OF DIGOXIN TOTAL: CPT | Performed by: HOSPITALIST

## 2020-02-15 RX ORDER — FOLIC ACID 1 MG/1
1000 TABLET ORAL DAILY
Status: DISCONTINUED | OUTPATIENT
Start: 2020-02-15 | End: 2020-02-18 | Stop reason: HOSPADM

## 2020-02-15 RX ORDER — PRIMIDONE 50 MG/1
50 TABLET ORAL EVERY 12 HOURS SCHEDULED
Status: DISCONTINUED | OUTPATIENT
Start: 2020-02-15 | End: 2020-02-18 | Stop reason: HOSPADM

## 2020-02-15 RX ORDER — SPIRONOLACTONE 50 MG/1
50 TABLET, FILM COATED ORAL DAILY
Status: DISCONTINUED | OUTPATIENT
Start: 2020-02-15 | End: 2020-02-18 | Stop reason: HOSPADM

## 2020-02-15 RX ORDER — OXYBUTYNIN CHLORIDE 10 MG/1
10 TABLET, EXTENDED RELEASE ORAL DAILY
Status: DISCONTINUED | OUTPATIENT
Start: 2020-02-15 | End: 2020-02-18 | Stop reason: HOSPADM

## 2020-02-15 RX ORDER — CALCIUM CARBONATE 500(1250)
1 TABLET ORAL 2 TIMES DAILY WITH MEALS
Status: DISCONTINUED | OUTPATIENT
Start: 2020-02-15 | End: 2020-02-18 | Stop reason: HOSPADM

## 2020-02-15 RX ORDER — MELATONIN
1000 EVERY EVENING
Status: DISCONTINUED | OUTPATIENT
Start: 2020-02-15 | End: 2020-02-18 | Stop reason: HOSPADM

## 2020-02-15 RX ORDER — ATORVASTATIN CALCIUM 40 MG/1
40 TABLET, FILM COATED ORAL EVERY EVENING
Status: DISCONTINUED | OUTPATIENT
Start: 2020-02-15 | End: 2020-02-18 | Stop reason: HOSPADM

## 2020-02-15 RX ORDER — ACETAMINOPHEN 325 MG/1
650 TABLET ORAL EVERY 6 HOURS PRN
Status: DISCONTINUED | OUTPATIENT
Start: 2020-02-15 | End: 2020-02-18 | Stop reason: HOSPADM

## 2020-02-15 RX ORDER — LISINOPRIL 2.5 MG/1
2.5 TABLET ORAL DAILY
Status: DISCONTINUED | OUTPATIENT
Start: 2020-02-15 | End: 2020-02-18 | Stop reason: HOSPADM

## 2020-02-15 RX ORDER — GABAPENTIN 300 MG/1
300 CAPSULE ORAL DAILY
Status: DISCONTINUED | OUTPATIENT
Start: 2020-02-15 | End: 2020-02-18 | Stop reason: HOSPADM

## 2020-02-15 RX ORDER — POLYETHYLENE GLYCOL 3350 17 G/17G
17 POWDER, FOR SOLUTION ORAL DAILY PRN
Status: DISCONTINUED | OUTPATIENT
Start: 2020-02-15 | End: 2020-02-18 | Stop reason: HOSPADM

## 2020-02-15 RX ORDER — SODIUM CHLORIDE 9 MG/ML
75 INJECTION, SOLUTION INTRAVENOUS CONTINUOUS
Status: DISPENSED | OUTPATIENT
Start: 2020-02-15 | End: 2020-02-15

## 2020-02-15 RX ORDER — ONDANSETRON 2 MG/ML
4 INJECTION INTRAMUSCULAR; INTRAVENOUS EVERY 6 HOURS PRN
Status: DISCONTINUED | OUTPATIENT
Start: 2020-02-15 | End: 2020-02-18 | Stop reason: HOSPADM

## 2020-02-15 RX ORDER — DIGOXIN 125 MCG
125 TABLET ORAL DAILY
Status: DISCONTINUED | OUTPATIENT
Start: 2020-02-15 | End: 2020-02-18 | Stop reason: HOSPADM

## 2020-02-15 RX ORDER — DOCUSATE SODIUM 100 MG/1
100 CAPSULE, LIQUID FILLED ORAL 2 TIMES DAILY
Status: DISCONTINUED | OUTPATIENT
Start: 2020-02-15 | End: 2020-02-18 | Stop reason: HOSPADM

## 2020-02-15 RX ORDER — SENNOSIDES 8.6 MG
1 TABLET ORAL DAILY
Status: DISCONTINUED | OUTPATIENT
Start: 2020-02-15 | End: 2020-02-18 | Stop reason: HOSPADM

## 2020-02-15 RX ADMIN — CALCIUM 1 TABLET: 500 TABLET ORAL at 17:30

## 2020-02-15 RX ADMIN — LISINOPRIL 2.5 MG: 2.5 TABLET ORAL at 09:57

## 2020-02-15 RX ADMIN — APIXABAN 5 MG: 5 TABLET, FILM COATED ORAL at 09:57

## 2020-02-15 RX ADMIN — SODIUM CHLORIDE 75 ML/HR: 0.9 INJECTION, SOLUTION INTRAVENOUS at 04:58

## 2020-02-15 RX ADMIN — MELATONIN 1000 UNITS: at 17:31

## 2020-02-15 RX ADMIN — FOLIC ACID 1000 MCG: 1 TABLET ORAL at 09:58

## 2020-02-15 RX ADMIN — DOCUSATE SODIUM 100 MG: 100 CAPSULE, LIQUID FILLED ORAL at 09:58

## 2020-02-15 RX ADMIN — GABAPENTIN 300 MG: 300 CAPSULE ORAL at 09:57

## 2020-02-15 RX ADMIN — SENNOSIDES 8.6 MG: 8.6 TABLET, FILM COATED ORAL at 09:57

## 2020-02-15 RX ADMIN — DIGOXIN 125 MCG: 125 TABLET ORAL at 09:58

## 2020-02-15 RX ADMIN — CYANOCOBALAMIN TAB 500 MCG 1000 MCG: 500 TAB at 17:31

## 2020-02-15 RX ADMIN — OXYBUTYNIN 10 MG: 5 TABLET, FILM COATED, EXTENDED RELEASE ORAL at 09:58

## 2020-02-15 RX ADMIN — PRIMIDONE 50 MG: 50 TABLET ORAL at 09:58

## 2020-02-15 RX ADMIN — INSULIN LISPRO 1 UNITS: 100 INJECTION, SOLUTION INTRAVENOUS; SUBCUTANEOUS at 12:20

## 2020-02-15 RX ADMIN — APIXABAN 5 MG: 5 TABLET, FILM COATED ORAL at 17:31

## 2020-02-15 RX ADMIN — ATORVASTATIN CALCIUM 40 MG: 40 TABLET, FILM COATED ORAL at 17:31

## 2020-02-15 RX ADMIN — CALCIUM 1 TABLET: 500 TABLET ORAL at 09:57

## 2020-02-15 RX ADMIN — DOCUSATE SODIUM 100 MG: 100 CAPSULE, LIQUID FILLED ORAL at 17:31

## 2020-02-15 RX ADMIN — PRIMIDONE 50 MG: 50 TABLET ORAL at 21:21

## 2020-02-15 RX ADMIN — SPIRONOLACTONE 50 MG: 50 TABLET ORAL at 09:57

## 2020-02-15 NOTE — PROGRESS NOTES
[facility]  Progress Note - Katy Tobin 1935, 80 y o  female MRN: [id number]    Unit/Bed#: [facility] 914-01 Encounter: [id number]    Primary Care Provider: Bethany Fried MD   Date and time admitted to hospital: 2/14/2020  9:57 PM        * Generalized weakness  Assessment & Plan  · Patient presented with generalized weakness in the setting of ambulatory dysfunction with chronic bilateral lower extremity weakness being wheelchair-bound  · She noted to have significantly elevated white blood cell count and tachycardia present on admission  Sepsis work up pending   · FLU negative  · Blood cultures pending  · Chest Xray -negative   · digoxin level normal   · Mildly decreased sodium- slowly resolving with IV hydration   · Patient was admitted February 4 through February 6 with TIA symptom dysarthria  CT of the head neck did not reveal significant occlusion dissection, neurology consulted however patient could not undergo MRI due to spinal cord stimulator in place  She also was evaluated by PT OT and returned back to her baseline of functionality( wheelchair-bound) so outpatient PT recommended  · Bedside speech and swallow- to remain upright for at least hour after meals   · Will get re-evaluation by PT OT specialist to see if  the patient might benefit from short-term rehab- pending    SIRS (systemic inflammatory response syndrome) (HCC)  Assessment & Plan  · Heart rate 104 +WBC 17 9  · Normal procalcitonin level  · Full sepsis workup collected by ER  · Will observe off antibiotics    Continue to monitor     Transient speech disturbance  Assessment & Plan  · Patient reporting speech issues  · Neurology consulted  · Transfer for EEG continuous monitoring for 24 hours    Weakness of both lower extremities  Assessment & Plan  · Chronic weakness  · Patient is wheelchair-bound  · Continue fall precautions  · PT OT evaluation- possible need for rehab on discharge     Cervical dystonia  Assessment & Plan  · Patient follows with neurologist as outpatient  · Continue gabapentin     Essential hypertension  Assessment & Plan  · On spironolactone and lisinopril with holding parameters  · BP stable    Overactive bladder  Assessment & Plan  · Continue Mirabegron    Hyponatremia  Assessment & Plan  · Sodium 133 patient is asymptomatic will provide gentle overnight hydration  · Sodium 134 in the morning   · repeat BMP in a m  Spinal cord stimulator status  Assessment & Plan  · Continue to follow-up with neurosurgery    Paroxysmal atrial fibrillation (HCC)  Assessment & Plan  · On digoxin and Eliquis  · Did level normal    Type 2 diabetes mellitus [facility])  Assessment & Plan  Lab Results   Component Value Date    HGBA1C 5 8 2020       Recent Labs     02/15/20  0708 02/15/20  1200   POCGLU 115 169*       Blood Sugar Average: Last 72 hrs:  (P) 142   Hemoglobin A1c is acceptable, will place ADA diet, Accu-Cheks and insulin sliding scale  Hold metformin      VTE Pharmacologic Prophylaxis:   Pharmacologic: Apixaban (Eliquis)  Mechanical VTE Prophylaxis in Place: Yes    Patient Centered Rounds: I have performed bedside rounds with nursing staff today  Discussions with Specialists or Other Care Team Provider: none    Education and Discussions with Family / Patient: patient    Time Spent for Care: 20 minutes  More than 50% of total time spent on counseling and coordination of care as described above  Current Length of Stay: 0 day(s)    Current Patient Status: Inpatient   Certification Statement: The patient will continue to require additional inpatient hospital stay due to further evaluation and work up     Discharge Plan: pending clinical improvement     Code Status: Level 3 - DNAR and DNI      Subjective:   Patient sitting up in bed  Patient has no current complaints      Objective:     Vitals:   Temp (24hrs), Av 9 °F (36 6 °C), Min:97 5 °F (36 4 °C), Max:98 3 °F (36 8 °C)    Temp: [97 5 °F (36 4 °C)-98 3 °F (36 8 °C)] 98 3 °F (36 8 °C)  HR:  [] 60  Resp:  [20-22] 20  BP: (121-151)/(55-67) 148/59  SpO2:  [95 %-100 %] 100 %  Body mass index is 30 41 kg/m²  Input and Output Summary (last 24 hours): Intake/Output Summary (Last 24 hours) at 2/15/2020 1455  Last data filed at 2/15/2020 1453  Gross per 24 hour   Intake 1983 75 ml   Output 253 ml   Net 1730 75 ml       Physical Exam:     Physical Exam   Constitutional: She is oriented to person, place, and time  She appears well-developed and well-nourished  Obese   HENT:   Head: Normocephalic and atraumatic  Eyes: Pupils are equal, round, and reactive to light  Neck: Normal range of motion  Pulmonary/Chest: Effort normal and breath sounds normal  No respiratory distress  Abdominal: Soft  Bowel sounds are normal  She exhibits no distension  Musculoskeletal: She exhibits no edema  Neurological: She is alert and oriented to person, place, and time  Head shaking  Bilateral lower extremity 3/5 strength   Skin: Skin is warm and dry  Psychiatric: She has a normal mood and affect   Her behavior is normal  Judgment and thought content normal          Additional Data:     Labs:    Results from last 7 days   Lab Units 02/15/20  0457 02/14/20  2251   WBC Thousand/uL 13 62* 17 93*   HEMOGLOBIN g/dL 12 4 13 6   HEMATOCRIT % 37 3 41 4   PLATELETS Thousands/uL 205 180   NEUTROS PCT %  --  85*   LYMPHS PCT %  --  6*   MONOS PCT %  --  7   EOS PCT %  --  1     Results from last 7 days   Lab Units 02/15/20  0457   SODIUM mmol/L 134*   POTASSIUM mmol/L 4 3   CHLORIDE mmol/L 105   CO2 mmol/L 24   BUN mg/dL 25   CREATININE mg/dL 0 73   ANION GAP mmol/L 5   CALCIUM mg/dL 10 2*   GLUCOSE RANDOM mg/dL 109         Results from last 7 days   Lab Units 02/15/20  1200 02/15/20  0708   POC GLUCOSE mg/dl 169* 115         Results from last 7 days   Lab Units 02/15/20  0147   LACTIC ACID mmol/L 1 7           * I Have Reviewed All Lab Data Listed Above   * Additional Pertinent Lab Tests Reviewed: [facility] [facility] Admission Reviewed    Imaging:    Imaging Reports Reviewed Today Include:  None  Imaging Personally Reviewed by Myself Includes:  None    Recent Cultures (last 7 days):     Results from last 7 days   Lab Units 02/15/20  0157 02/15/20  0147   BLOOD CULTURE  Received in Microbiology Lab  Culture in Progress  Received in Microbiology Lab  Culture in Progress  Last 24 Hours Medication List:     Current Facility-Administered Medications:  acetaminophen 650 mg Oral Q6H PRN Maximo Ariza MD   apixaban 5 mg Oral BID Maximo Ariza MD   atorvastatin 40 mg Oral QPM Maximo Ariza MD   calcium carbonate 1 tablet Oral BID With Meals Maximo Ariza MD   cholecalciferol 1,000 Units Oral QPM Maximo Ariza MD   vitamin B-12 1,000 mcg Oral QPM Maximo Ariza MD   digoxin 125 mcg Oral Daily Maximo Ariza MD   docusate sodium 100 mg Oral BID Maximo Ariza MD   folic acid 4,138 mcg Oral Daily Maximo Ariza MD   gabapentin 300 mg Oral Daily Maximo Ariza MD   insulin lispro 1-6 Units Subcutaneous TID AC Maximo Ariza MD   insulin lispro 1-6 Units Subcutaneous HS Maximo Ariza MD   lisinopril 2 5 mg Oral Daily Maximo Ariza MD   ondansetron 4 mg Intravenous Q6H PRN Maximo Ariza MD   oxybutynin 10 mg Oral Daily Maximo Ariza MD   polyethylene glycol 17 g Oral Daily PRN Maximo Ariza MD   primidone 50 mg Oral Q12H [facility] Maximo Ariza MD   senna 1 tablet Oral Daily Maximo Ariza MD   spironolactone 50 mg Oral Daily Maximo Ariza MD        Today, Patient Was Seen By: VALERI Pugh    ** Please Note: Dictation voice to text software may have been used in the creation of this document   **

## 2020-02-15 NOTE — ASSESSMENT & PLAN NOTE
· Heart rate 104 +WBC 17 9  · Normal procalcitonin level  · Full sepsis workup collected by ER  · Will observe off antibiotics    Continue to monitor

## 2020-02-15 NOTE — UTILIZATION REVIEW
Initial Clinical Review  Observation 2/15/20 @ 0225, converted to inpatient admission 2/15/20 @ [phone] for continued care & tx for generalized weakness  Per MD:  Patient meets Pieter Garcia with elevated heart rate, respiratory rate, and white count  No clear source of infection was found  Persistent weakness with head shaking, slow speech, ble strength 3/5  Admitting Physician Libertad Morris    Level of Care Med Surg    Estimated length of stay More than 2 Midnights    Certification I certify that inpatient services are medically necessary for this patient for a duration of greater than two midnights  See H&P and MD Progress Notes for additional information about the patient's course of treatment            Order History   Inpatient   Date/Time Action Taken User   02/15/20 0951 Release 593 [facility] (auto-released)   02/15/20 2411 Complete VALERI Blanchard     ED Arrival Information     Expected Arrival Acuity Means of Arrival Escorted By Service Admission Type    - 2/14/2020 21:56 Urgent Ambulance Upper [address] [facility] EMS Hospitalist Urgent    Arrival Complaint    [address]        Chief Complaint   Patient presents with    Weakness - Generalized     EMS reported that the pt has been weak since her d/c (02/06/2020)  However today the pt has had an increase in weakness  PT recently started on seizure medicaiton      Assessment/Plan:   80 yof Patient presents for evaluation of weakness  Past medical history significant for AFib on Eliquis with history of prior cardiovascular events without residual side effects, hyperlipidemia, urinary incontinence, spinal stenosis, spinal stimulator placement, generalized weakness, diabetes  Patient tells me that about this morning when she woke up that she feels weaker than she normally does     ED Triage Vitals [02/14/20 2205]   Temperature Pulse Respirations Blood Pressure SpO2   97 8 °F (36 6 °C) 102 22 151/67 98 %      Temp Source Heart Rate Source Patient Position - Orthostatic VS BP Location FiO2 (%)   Oral Monitor Sitting Right arm --      Pain Score       No Pain        Wt Readings from Last 1 Encounters:   02/14/20 82 9 kg (182 lb 12 2 oz)     Additional Vital Signs:   02/15/20 0200    96  20  121/61  85  95 %  None (Room air)     02/15/20 0015    99  20  143/66    98 %  None (Room air)     02/14/20 2205  97 8 °F (36 6 °C)  102  22  151/67    98 %  None (Room air)  Sitting   Pertinent Labs/Diagnostic Test Results:   Results from last 7 days   Lab Units 02/15/20  0457 02/14/20  2251   WBC Thousand/uL 13 62* 17 93*   HEMOGLOBIN g/dL 12 4 13 6   HEMATOCRIT % 37 3 41 4   PLATELETS Thousands/uL 205 180   NEUTROS ABS Thousands/µL  --  15 30*     Results from last 7 days   Lab Units 02/15/20  0457 02/14/20  2250   SODIUM mmol/L 134* 133*   POTASSIUM mmol/L 4 3 4 9   CHLORIDE mmol/L 105 103   CO2 mmol/L 24 20*   ANION GAP mmol/L 5 10   BUN mg/dL 25 29*   CREATININE mg/dL 0 73 0 94   EGFR ml/min/1 73sq m 76 56   CALCIUM mg/dL 10 2* 10 2*     Results from last 7 days   Lab Units 02/15/20  0708   POC GLUCOSE mg/dl 115     Results from last 7 days   Lab Units 02/15/20  0457 02/14/20  2250   GLUCOSE RANDOM mg/dL 109 142*     Results from last 7 days   Lab Units 02/14/20  2250   TSH 3RD GENERATON uIU/mL 1 360     Results from last 7 days   Lab Units 02/15/20  0147   LACTIC ACID mmol/L 1 7     Results from last 7 days   Lab Units 02/15/20  0457   DIGOXIN LVL ng/mL 1 6     Results from last 7 days   Lab Units 02/15/20  0019   CLARITY UA  Clear   COLOR UA  Yellow  see results   SPEC GRAV UA  1 025   PH UA  6 0   GLUCOSE UA mg/dl Negative   KETONES UA mg/dl Negative   BLOOD UA  Trace*   PROTEIN UA mg/dl Negative   NITRITE UA  Negative   BILIRUBIN UA  Negative   UROBILINOGEN UA E U /dl 0 2   LEUKOCYTES UA  Negative   WBC UA /hpf None Seen   RBC UA /hpf 2-4*   BACTERIA UA /hpf None Seen   EPITHELIAL CELLS WET PREP /hpf None Seen     Results from last 7 days   Lab Units 02/15/20  0003 INFLUENZA A PCR  None Detected   INFLUENZA B PCR  None Detected   RSV PCR  None Detected   2/15  cxr=No acute cardiopulmonary disease  Ekg=Rate: 96, sinus rhythm, left axis  Wide , QT interval within normal range  RBBB No ST elevations or depressions to indicate ischemia  No diffuse elevations to indicate pericarditis  No biphasic T waves in precordial leads to indicate Wellens  EKG consistent with prior on 2/4/20      ED Treatment:   Medication Administration from 02/14/2020 2156 to 02/15/2020 0406       Date/Time Order Dose Route Action     02/14/2020 7438 sodium chloride 0 9 % bolus 1,000 mL 1,000 mL Intravenous New Bag        Past Medical History:   Diagnosis Date    A-fib [facility])     Arthritis     Assistance needed for mobility     pt can stand with assistance and transfer    Chronic pain disorder     Diabetes mellitus ([facility] )     NIDDM    Full dentures     History of transfusion     no adverse reaction    Hyperlipidemia     Irregular heart beat     Numbness and tingling of both feet     Skin abnormality     sacrum area - small red area, less than a dime size    Spinal stenosis     Stroke ([facility] )     Unable to ambulate     Urinary incontinence     ipg stimulator x3 repakcements,battery exchange today 2/14/2018    Wears glasses     Wheelchair dependence      Present on Admission:   Generalized weakness   Weakness of both lower extremities   SIRS (systemic inflammatory response syndrome) (HCC)   Cervical dystonia   Hyponatremia   Overactive bladder   Paroxysmal atrial fibrillation (HCC)   Type 2 diabetes mellitus ([address] )   Essential hypertension  Admitting Diagnosis: Weakness [R53 1]  SIRS (systemic inflammatory response syndrome) ([address] ) [R65 10]  Age/Sex: 80 y o  female  Admission Orders:  Fall precautions  Pt/ot/st eval & tx  Scd/foot pumps  accuchecks with coverage scale  Consult neuro  Scheduled Medications:  apixaban 5 mg Oral BID   atorvastatin 40 mg Oral QPM   calcium carbonate 1 tablet Oral BID With Meals   cholecalciferol 1,000 Units Oral QPM   vitamin B-12 1,000 mcg Oral QPM   digoxin 125 mcg Oral Daily   docusate sodium 100 mg Oral BID   folic acid 0,661 mcg Oral Daily   gabapentin 300 mg Oral Daily   insulin lispro 1-6 Units Subcutaneous TID AC   insulin lispro 1-6 Units Subcutaneous HS   lisinopril 2 5 mg Oral Daily   oxybutynin 10 mg Oral Daily   primidone 50 mg Oral Q12H STEPHANIE   senna 1 tablet Oral Daily   spironolactone 50 mg Oral Daily     Continuous IV Infusions:  sodium chloride 75 mL/hr Intravenous Continuous     PRN Meds:  acetaminophen 650 mg Oral Q6H PRN   ondansetron 4 mg Intravenous Q6H PRN   polyethylene glycol 17 g Oral Daily PRN     Network Utilization Review Department  [email or web address]  ATTENTION: Please call with any questions or concerns to [phone] and carefully listen to the prompts so that you are directed to the right person  All voicemails are confidential   Delia Rainey all requests for admission clinical reviews, approved or denied determinations and any other requests to dedicated fax number below belonging to the campus where the patient is receiving treatment   List of dedicated fax numbers for the Facilities:  [address] [place] [address] DENIALS (Administrative/Medical Necessity) [phone]   [address] [place] [address] (Maternity/NICU/Pediatrics) [phone]   Nicole Vivas [phone]   Bereket Mosley [phone]   Alexandro Hodge [phone]   [facility] [phone]   [address] [facility] [address] [facility] [phone]   [facility]  [phone]   [address] [facility] [address]  [address] [address] [address] [facility] [phone]

## 2020-02-15 NOTE — ASSESSMENT & PLAN NOTE
· Sodium 133 patient is asymptomatic will provide gentle overnight hydration  · Sodium 134 in the morning   · repeat BMP in a m

## 2020-02-15 NOTE — ASSESSMENT & PLAN NOTE
· Patient presented with generalized weakness in the setting of ambulatory dysfunction with chronic bilateral lower extremity weakness being wheelchair-bound  · She noted to have significantly elevated white blood cell count and tachycardia present on admission  Sepsis work up pending   · FLU negative  · Blood cultures pending  · Chest Xray -negative   · digoxin level normal   · Mildly decreased sodium- slowly resolving with IV hydration   · Patient was admitted February 4 through February 6 with TIA symptom dysarthria  CT of the head neck did not reveal significant occlusion dissection, neurology consulted however patient could not undergo MRI due to spinal cord stimulator in place  She also was evaluated by PT OT and returned back to her baseline of functionality( wheelchair-bound) so outpatient PT recommended      · Bedside speech and swallow- to remain upright for at least hour after meals   · Will get re-evaluation by PT OT specialist to see if  the patient might benefit from short-term rehab- pending

## 2020-02-15 NOTE — ASSESSMENT & PLAN NOTE
Heart rate 104 +WBC 17 9  Normal procalcitonin level  Full sepsis workup collected by ER  Still no definitive source of infection identified possible virus infection  Will observe off antibiotic

## 2020-02-15 NOTE — ASSESSMENT & PLAN NOTE
Patient presented with generalized weakness in the setting of ambulatory dysfunction with chronic bilateral lower extremity weakness being wheelchair-bound  She noted to have significantly elevated white blood cell count and tachycardia present on admission  Will obtain sepsis workup to rule out underlying infectious process, influenza and urinalysis are negative ,chest x-ray still pending  Follow-up on digoxin level to rule out digoxin toxicity  Monitor electrolytes and replace as needed  Mildly decreased sodium will provide overnight IV hydration  Fall precautions  Patient was admitted February 4 through February 6 with TIA symptom dysarthria  CT of the head neck did not reveal significant occlusion dissection, neurology consulted however patient could not undergo MRI due to spinal cord stimulator in place  She also was evaluated by PT OT and returned back to her baseline of functionality( wheelchair-bound) so outpatient PT recommended  On the floor patient claiming to have slurred speech upon arrival to the ER  She  is talking slowly with no definitive dysarthria upon my assessment   Will place neurology consult  Bedside speech and swallow  Will get re-evaluation by PT OT specialist to see if  the patient might benefit from short-term rehab

## 2020-02-15 NOTE — ASSESSMENT & PLAN NOTE
· Chronic weakness  · Patient is wheelchair-bound  · Continue fall precautions  · PT OT evaluation- possible need for rehab on discharge

## 2020-02-15 NOTE — ASSESSMENT & PLAN NOTE
Lab Results   Component Value Date    HGBA1C 5 8 02/05/2020       Recent Labs     02/15/20  0708 02/15/20  1200   POCGLU 115 169*       Blood Sugar Average: Last 72 hrs:  (P) 142   Hemoglobin A1c is acceptable, will place ADA diet, Accu-Cheks and insulin sliding scale  Hold metformin

## 2020-02-15 NOTE — PLAN OF CARE
Problem: Potential for Falls  Goal: Patient will remain free of falls  Description  INTERVENTIONS:  - Assess patient frequently for physical needs  -  Identify cognitive and physical deficits and behaviors that affect risk of falls    -  [place] fall precautions as indicated by assessment   - Educate patient/family on patient safety including physical limitations  - Instruct patient to call for assistance with activity based on assessment  - Modify environment to reduce risk of injury  - Consider OT/PT consult to assist with strengthening/mobility  Outcome: Progressing     Problem: Prexisting or High Potential for Compromised Skin Integrity  Goal: Skin integrity is maintained or improved  Description  INTERVENTIONS:  - Identify patients at risk for skin breakdown  - Assess and monitor skin integrity  - Assess and monitor nutrition and hydration status  - Monitor labs   - Assess for incontinence   - Turn and reposition patient  - Assist with mobility/ambulation  - Relieve pressure over bony prominences  - Avoid friction and shearing  - Provide appropriate hygiene as needed including keeping skin clean and dry  - Evaluate need for skin moisturizer/barrier cream  - Collaborate with interdisciplinary team   - Patient/family teaching  - Consider wound care consult   Outcome: Progressing

## 2020-02-15 NOTE — ED NOTES
Michael Ravi ([phone] / [phone]) requesting an update on her Mother and asking if she has a room     Linh Garcia ([phone])     Jorge Ontiveros RN  02/14/20 8372

## 2020-02-15 NOTE — ASSESSMENT & PLAN NOTE
Lab Results   Component Value Date    HGBA1C 5 8 02/05/2020       No results for input(s): POCGLU in the last 72 hours      Blood Sugar Average: Last 72 hrs:     Hemoglobin A1c is acceptable, will place ADA diet, Accu-Cheks and insulin sliding scale  Hold metformin

## 2020-02-15 NOTE — ED PROVIDER NOTES
History  Chief Complaint   Patient presents with    Weakness - Generalized     EMS reported that the pt has been weak since her d/c (02/06/2020)  However today the pt has had an increase in weakness  PT recently started on seizure medicaiton      Patient presents for evaluation of weakness  Past medical history significant for AFib on Eliquis with history of prior cardiovascular events without residual side effects, hyperlipidemia, urinary incontinence, spinal stenosis, spinal stimulator placement, generalized weakness, diabetes  Patient tells me that about this morning when she woke up that she feels weaker than she normally does  Denies any recent falls, head trauma, change in vision, headaches, chest pain, shortness of breath, fever/chills, nausea/vomiting, chest pain, abdominal pain, constipation/diarrhea, dysuria/hematuria, vaginal discharge  Patient denies having any pain anywhere right now  She tells me that her only complaint is that she feels weak all over  She denies any focal weakness or changes in sensation  She states that she has been taking her medications  She uses wheelchair at baseline  She lives at home with her daughter who helps take care of her  Prior to Admission Medications   Prescriptions Last Dose Informant Patient Reported? Taking?    Mirabegron ER (MYRBETRIQ) 50 MG TB24  Self Yes Yes   Sig: Take 50 mg by mouth daily     apixaban (ELIQUIS) 5 mg  Self No Yes   Sig: Take 1 tablet (5 mg total) by mouth 2 (two) times a day   atorvastatin (LIPITOR) 40 mg tablet  Self No Yes   Sig: Take 1 tablet (40 mg total) by mouth every evening   calcium gluconate 500 mg tablet  Self Yes Yes   Sig: Take 500 mg by mouth every evening   cholecalciferol (VITAMIN D3) 1,000 units tablet  Self Yes Yes   Sig: Take 1,000 Units by mouth every evening   cyanocobalamin (VITAMIN B-12) 1,000 mcg tablet  Self Yes Yes   Sig: Take 1,000 mcg by mouth every evening     digoxin (LANOXIN) 0 125 mg tablet  Self No Yes   Sig: Take 1 tablet (125 mcg total) by mouth daily   folic acid (FOLVITE) 1 mg tablet  Self Yes Yes   Sig: Take 1,000 mcg by mouth daily   gabapentin (NEURONTIN) 300 mg capsule  Self Yes Yes   Sig: Take 300 mg by mouth daily   lisinopril (ZESTRIL) 2 5 mg tablet  Self No Yes   Sig: Take 1 tablet (2 5 mg total) by mouth daily   metFORMIN (GLUCOPHAGE) 1000 MG tablet  Self No Yes   Sig: Take 0 5 tablets (500 mg total) by mouth 2 (two) times a day with meals   primidone (MYSOLINE) 50 mg tablet  Self Yes Yes   Sig: Take by mouth 2 (two) times a day   spironolactone (ALDACTONE) 50 mg tablet  Self Yes Yes   Sig: Take 50 mg by mouth daily        Facility-Administered Medications: None       Past Medical History:   Diagnosis Date    A-fib ([facility] )     Arthritis     Assistance needed for mobility     pt can stand with assistance and transfer    Chronic pain disorder     Diabetes mellitus ([facility] )     NIDDM    Full dentures     History of transfusion     no adverse reaction    Hyperlipidemia     Irregular heart beat     Numbness and tingling of both feet     Skin abnormality     sacrum area - small red area, less than a dime size    Spinal stenosis     Stroke ([facility] )     Unable to ambulate     Urinary incontinence     ipg stimulator x3 repakcements,battery exchange today 2/14/2018    Wears glasses     Wheelchair dependence        Past Surgical History:   Procedure Laterality Date    BACK SURGERY      fusion    BLADDER SUSPENSION      CARPAL TUNNEL RELEASE Bilateral     CHOLANGIOGRAM N/A 6/4/2018    Procedure: CHOLANGIOGRAM;  Surgeon: Jhoana Wilson MD;  Location: [facility];  Service: General    CHOLECYSTECTOMY LAPAROSCOPIC N/A 6/4/2018    Procedure: CHOLECYSTECTOMY LAPAROSCOPIC;  Surgeon: Jhoana Wilson MD;  [facility];  Service: General    COLONOSCOPY      DILATION AND CURETTAGE OF UTERUS      FRACTURE SURGERY Left     femur with hardware    HYSTERECTOMY      INSERTION / Shilpi Astorga / REVISION NEUROSTIMULATOR      JOINT REPLACEMENT Bilateral     knees    SACRAL NERVE STIMULATOR PLACEMENT N/A 2/14/2018    Procedure: REMOVE AND REPLACE IPG;  Surgeon: Munir Castano MD;  Location: [facility];  Service: UroGynecology           TONSILLECTOMY         Family History   Problem Relation Age of Onset    No Known Problems Mother     No Known Problems Father      I have reviewed and agree with the history as documented  Social History     Tobacco Use    Smoking status: Never Smoker    Smokeless tobacco: Never Used   Substance Use Topics    Alcohol use: Not Currently     Comment: very rare    Drug use: No        Review of Systems   Constitutional: Negative for chills, diaphoresis, fatigue and fever  Respiratory: Negative for cough and shortness of breath  Cardiovascular: Negative for chest pain and palpitations  Gastrointestinal: Negative for abdominal distention, abdominal pain, constipation, diarrhea, nausea and vomiting  Genitourinary: Negative for dysuria, frequency and hematuria  Musculoskeletal: Negative for arthralgias, myalgias and neck pain  Neurological: Positive for weakness  Negative for dizziness, syncope, light-headedness and headaches  All other systems reviewed and are negative  Physical Exam  ED Triage Vitals [02/14/20 2205]   Temperature Pulse Respirations Blood Pressure SpO2   97 8 °F (36 6 °C) 102 22 151/67 98 %      Temp Source Heart Rate Source Patient Position - Orthostatic VS BP Location FiO2 (%)   Oral Monitor Sitting Right arm --      Pain Score       No Pain             Orthostatic Vital Signs  Vitals:    02/14/20 2205 02/15/20 0015 02/15/20 0200   BP: 151/67 143/66 121/61   Pulse: 102 99 96   Patient Position - Orthostatic VS: Sitting         Physical Exam   Constitutional: She is oriented to person, place, and time  She appears well-nourished  No distress  HENT:   Head: Normocephalic and atraumatic     Right Ear: External ear normal    Left Ear: External ear normal    Mouth/Throat: Oropharynx is clear and moist    Eyes: Pupils are equal, round, and reactive to light  Conjunctivae and EOM are normal  Right eye exhibits no discharge  Left eye exhibits no discharge  No scleral icterus  Neck: Normal range of motion  Neck supple  No JVD present  Cardiovascular: Normal rate, regular rhythm, normal heart sounds and intact distal pulses  Exam reveals no gallop and no friction rub  No murmur heard  Pulmonary/Chest: Effort normal and breath sounds normal  No respiratory distress  She has no wheezes  She has no rales  Abdominal: Soft  Bowel sounds are normal  She exhibits no distension and no mass  There is no tenderness  There is no guarding  Musculoskeletal: Normal range of motion  She exhibits no tenderness or deformity  Neurological: She is alert and oriented to person, place, and time  No cranial nerve deficit or sensory deficit  She exhibits normal muscle tone  Coordination normal    Skin: Skin is warm  She is not diaphoretic  Psychiatric: She has a normal mood and affect  Her behavior is normal  Judgment and thought content normal    Vitals reviewed  ED Medications  Medications   sodium chloride 0 9 % bolus 1,000 mL (0 mL Intravenous Stopped 2/15/20 0019)       Diagnostic Studies  Results Reviewed     Procedure Component Value Units Date/Time    Lactic acid, plasma [[id number]]  (Normal) Collected:  02/15/20 0147    Lab Status:  Final result Specimen:  Blood from Hand, Left Updated:  02/15/20 0223     LACTIC ACID 1 7 mmol/L     Narrative:       Result may be elevated if tourniquet was used during collection  Blood culture #1 [[id number]] Collected:  02/15/20 0157    Lab Status: In process Specimen:  Blood from Arm, Right Updated:  02/15/20 0200    Blood culture #2 [[id number]] Collected:  02/15/20 0147    Lab Status:   In process Specimen:  Blood from Hand, Left Updated:  02/15/20 0154    Influenza A/B and RSV PCR [[id number]]  (Normal) Collected:  02/15/20 0003    Lab Status:  Final result Specimen:  Nasopharyngeal Swab Updated:  02/15/20 0109     INFLUENZA A PCR None Detected     INFLUENZA B PCR None Detected     RSV PCR None Detected    Urine Microscopic [[id number]]  (Abnormal) Collected:  02/15/20 0019    Lab Status:  Final result Specimen:  Urine, Clean Catch Updated:  02/15/20 0032     RBC, UA 2-4 /hpf      WBC, UA None Seen /hpf      Epithelial Cells None Seen /hpf      Bacteria, UA None Seen /hpf      Hyaline Casts, UA None Seen /lpf     POCT urinalysis dipstick [[id number]]  (Normal) Resulted:  02/15/20 0019    Lab Status:  Final result Specimen:  Urine Updated:  02/15/20 0019     Color, UA see results    Urine Macroscopic, POC [[id number]]  (Abnormal) Collected:  02/15/20 0019    Lab Status:  Final result Specimen:  Urine Updated:  02/15/20 0016     Color, UA Yellow     Clarity, UA Clear     pH, UA 6 0     Leukocytes, UA Negative     Nitrite, UA Negative     Protein, UA Negative mg/dl      Glucose, UA Negative mg/dl      Ketones, UA Negative mg/dl      Urobilinogen, UA 0 2 E U /dl      Bilirubin, UA Negative     Blood, UA Trace     Specific [place], UA 1 025    Narrative:       CLINITEK RESULT    Basic metabolic panel [[id number]]  (Abnormal) Collected:  02/14/20 2250    Lab Status:  Final result Specimen:  Blood from Arm, Right Updated:  02/14/20 2350     Sodium 133 mmol/L      Potassium 4 9 mmol/L      Chloride 103 mmol/L      CO2 20 mmol/L      ANION GAP 10 mmol/L      BUN 29 mg/dL      Creatinine 0 94 mg/dL      Glucose 142 mg/dL      Calcium 10 2 mg/dL      eGFR 56 ml/min/1 73sq m     Narrative:       [facility] guidelines for Chronic Kidney Disease (CKD):     Stage 1 with normal or high GFR (GFR > 90 mL/min/1 73 square meters)    Stage 2 Mild CKD (GFR = 60-89 mL/min/1 73 square meters)    Stage 3A Moderate CKD (GFR = 45-59 mL/min/1 73 square meters)    Stage 3B Moderate CKD (GFR = 30-44 mL/min/1 73 square meters)    Stage 4 Severe CKD (GFR = 15-29 mL/min/1 73 square meters)    Stage 5 End Stage CKD (GFR <15 mL/min/1 73 square meters)  Note: GFR calculation is accurate only with a steady state creatinine    TSH [[id number]]  (Normal) Collected:  02/14/20 2250    Lab Status:  Final result Specimen:  Blood from Arm, Right Updated:  02/14/20 2350     TSH 3RD GENERATON 1 360 uIU/mL     Narrative:       Patients undergoing fluorescein dye angiography may retain small amounts of fluorescein in the body for 48-72 hours post procedure  Samples containing fluorescein can produce falsely depressed TSH values  If the patient had this procedure,a specimen should be resubmitted post fluorescein clearance  CBC and differential [[id number]]  (Abnormal) Collected:  02/14/20 2251    Lab Status:  Final result Specimen:  Blood from Arm, Right Updated:  02/14/20 2257     WBC 17 93 Thousand/uL      RBC 4 42 Million/uL      Hemoglobin 13 6 g/dL      Hematocrit 41 4 %      MCV 94 fL      MCH 30 8 pg      MCHC 32 9 g/dL      RDW 12 4 %      MPV 10 1 fL      Platelets 149 Thousands/uL      nRBC 0 /100 WBCs      Neutrophils Relative 85 %      Immat GRANS % 1 %      Lymphocytes Relative 6 %      Monocytes Relative 7 %      Eosinophils Relative 1 %      Basophils Relative 0 %      Neutrophils Absolute 15 30 Thousands/µL      Immature Grans Absolute 0 11 Thousand/uL      Lymphocytes Absolute 1 09 Thousands/µL      Monocytes Absolute 1 27 Thousand/µL      Eosinophils Absolute 0 10 Thousand/µL      Basophils Absolute 0 06 Thousands/µL                  XR chest 1 view portable    (Results Pending)         Procedures  Procedures      ED Course           Identification of Seniors at Risk      Most Recent Value   (ISAR) Identification of Seniors at Risk   Before the illness or injury that brought you to the Emergency, did you need someone to help you on a regular basis?   1 Filed at: 02/14/2020 1994   In the last 24 hours, have you needed more help than usual?     Have you been hospitalized for one or more nights during the past 6 months? 1 Filed at: 02/14/2020 2217   In general, do you see well?    In general, do you have serious problems with your memory? 1 Filed at: 02/14/2020 2217   Do you take more than three different medications every day? 1 Filed at: 02/14/2020 2217   ISAR Score  4 Filed at: 02/14/2020 2217                          [facility]  Number of Diagnoses or Management Options  SIRS (systemic inflammatory response syndrome) [facility]):   Diagnosis management comments: Rate: 96, sinus rhythm, left axis  Wide , QT interval within normal range  RBBB No ST elevations or depressions to indicate ischemia  No diffuse elevations to indicate pericarditis  No biphasic T waves in precordial leads to indicate Wellens  EKG consistent with prior on 2/4/20  Patient was found to meet sirs with elevated heart rate, respiratory rate, and white count  No clear source of infection was found  Discussed the case with slim and patient was admitted for observation overnight  Disposition  Final diagnoses:   SIRS (systemic inflammatory response syndrome) ([facility] )     Time reflects when diagnosis was documented in both MDM as applicable and the Disposition within this note     Time User Action Codes Description Comment    2/15/2020  2:13 AM Adrianne Pritchett Add [R65 10] SIRS (systemic inflammatory response syndrome) [facility])       ED Disposition     ED Disposition Condition Date/Time Comment    Admit Stable Sat Feb 15, 2020  1:36 AM Case was discussed with CHANTE and the patient's admission status was agreed to be Admission Status: observation status to the service of Dr Ginna Zhu  Follow-up Information    None         Patient's Medications   Discharge Prescriptions    No medications on file     No discharge procedures on file  ED Provider  Attending physically available and evaluated Marina Brownlee   JJ managed the patient along with the ED Attending      Electronically Signed by         Irene Bowden MD  02/15/20 7231

## 2020-02-15 NOTE — ASSESSMENT & PLAN NOTE
· Patient reporting speech issues  · Neurology consulted  · Transfer for EEG continuous monitoring for 24 hours

## 2020-02-15 NOTE — PLAN OF CARE
Problem: Potential for Falls  Goal: Patient will remain free of falls  Description  INTERVENTIONS:  - Assess patient frequently for physical needs  -  Identify cognitive and physical deficits and behaviors that affect risk of falls    -  [place] fall precautions as indicated by assessment   - Educate patient/family on patient safety including physical limitations  - Instruct patient to call for assistance with activity based on assessment  - Modify environment to reduce risk of injury  - Consider OT/PT consult to assist with strengthening/mobility  Outcome: Progressing     Problem: Prexisting or High Potential for Compromised Skin Integrity  Goal: Skin integrity is maintained or improved  Description  INTERVENTIONS:  - Identify patients at risk for skin breakdown  - Assess and monitor skin integrity  - Assess and monitor nutrition and hydration status  - Monitor labs   - Assess for incontinence   - Turn and reposition patient  - Assist with mobility/ambulation  - Relieve pressure over bony prominences  - Avoid friction and shearing  - Provide appropriate hygiene as needed including keeping skin clean and dry  - Evaluate need for skin moisturizer/barrier cream  - Collaborate with interdisciplinary team   - Patient/family teaching  - Consider wound care consult   Outcome: Progressing

## 2020-02-15 NOTE — CONSULTS
Consultation - Neurology   Vikram Tillman 80 y o  female MRN: [id number]  Unit/Bed#: [facility] 914-01 Encounter: [id number]      Assessment/Plan   Assessment/Plan:  80-year-old female with history of AFib on anticoagulation, chronic pain disorder (spinal stimulator placed) with baseline ambulatory dysfunction (wheelchair-bound at baseline per prior notes), prior stroke, cervical dystonia managed by outpatient movement disorder team, vascular risk factors presenting with recurrent dysarthria/speech change and generalized weakness beginning yesterday morning 2/14  Speech symptoms have again resolved today; ultimately the etiology of these events is unclear, prior differential included TIA, however has had multiple events now in the setting of systemic anticoagulation with Eliquis given her AFib  Overall symptoms are fairly nonspecific, there is lower overall suspicion for seizures/epileptic origin    1  Recurrent intermittent speech disturbance:  -CT head during last admission with no acute intracranial pathology  -as mentioned in prior neurology notes from previous admission, unable to obtain MRI brain with spinal stimulator  -patient is currently asymptomatic (without speech disturbance), however feel only other diagnostic study at this juncture to potentially look for the etiology of her speech changes would be video EEG monitoring to hopefully capture her speech change, will further discuss with attending neurologist and epileptologist today if this is to be pursued   -no role for AED initiation  -avoidance of CNS altering or deliriogenic medications or factors while admitted  -continued primary management per Internal Medicine   -metabolic/infectious derangement monitoring and correction   -afebrile, continued leukocytosis today although downward trending, no profound metabolic derangements   -UA, chest x-ray, influenza/RSV panel negative    2   AFib:  -on Eliquis  -no concerning findings for acute cerebrovascular event on exam today    3  Lower extremity weakness:  -no significant findings on neuro exam this afternoon  -continue with therapy evaluations/supportive care  -fall precautions    4  Cervical dystonia:  -follows with outpatient movement disorder team  -per patient, supposed to get Botox in March 5  Chronic pain/spinal stenosis:  -spinal stimulator in place    Will further discuss plan of care with attending neurologist     History of Present Illness     Reason for Consult / Principal Problem: Recurrent Dysarthria/Speech change, LE weakness    HPI: Alan Caraballo is a 80 y o  female with history as mentioned above in assessment who neurology is asked to evaluate in regards to recurrence of intermittent speech disturbance with word-finding trouble in dysarthria as well as lower extremity/generalized weakness  History is obtained primarily through discussion with the patient this afternoon as well as chart review  Of note the patient was seen several weeks ago by the inpatient neuro hospitalist team [place] [facility] [place] similar presentation of generalized weakness/lethargy and dysarthria upon awakening that morning  Could not undergo MRI due to spinal stimulator present  Repeat CT head was negative for any acute pathology/focal lesions  It was felt that the etiology of her recurrent speech episodes were unclear, prior EEG was unremarkable, lower suspicion overall for seizure  Foster to be maybe hemodynamic with hypoperfusion factor in the setting of right ICA stenosis  A patient follow up with possible sleep-deprived EEG was recommend  Acutely the patient presented last night to the ED for recurrent generalized weakness with speech change  She woke up yesterday morning and appreciated feeling weaker than normal (diffuse/generalized weakness without laterality or focality) , she had mild tachycardia with leukocytosis on initial lab work (UA, chest x-ray, lactic acid unremarkable, flu screen negative)    She also complained to primary team during admission of dysarthria, no profound dysarthria was appreciated during exam last night however neurology was consulted for further recommendations  Upon further discussion with the patient this this afternoon, she states she awoke yesterday and felt generally weak in her legs and to a degree in her arms (generalized, no laterality to this weakness)  Patient intermittently throughout the day yesterday was having "trouble getting my words out" as well as slurred speech, per the patient her daughter endorse this as well and stated her speech was off  Today the patient states her speech is back to baseline, no recurrence of dysarthria or word-finding trouble  No other acute neurologic deficits or symptoms  Patient states that prior to this event yesterday and the one 1-2 weeks ago, her last event of speech disturbance was several months prior (was evaluated by the neuro hospitalist team at that time)  She has had no significant illness or infection over the past week or 2, no significant neurologic symptoms otherwise including headache, vision changes, numbness  Does confirm she is wheelchair-bound, daughter assists with most ADLs  EEG in June 2019 showed intermittent diffuse theta/bilateral temporal delta, suggestive of bilateral temporal cerebral dysfunction, no epileptiform discharges nor electrographic seizures at that time  She also follows with the outpatient movement disorder team for isolated head tremor/cervical dystonia/dystonic tremor  Patient was initiated on Botox injections (most recently in early December 2019)  Inpatient consult to Neurology  Consult performed by: Cintia Wells PA-C  Consult ordered by: Melo Robles MD          Review of Systems   Constitutional: Negative  HENT: Negative  Eyes: Negative  Respiratory: Negative  Cardiovascular: Negative  Gastrointestinal: Negative      Musculoskeletal: Positive for back pain and gait problem  Neurological: Positive for speech difficulty and weakness  Negative for dizziness, tremors, seizures, syncope, facial asymmetry, light-headedness, numbness and headaches  All other ROS reviewed and negative      Historical Information   Past Medical History:   Diagnosis Date    A-fib [facility])     Arthritis     Assistance needed for mobility     pt can stand with assistance and transfer    Chronic pain disorder     Diabetes mellitus ([facility] )     NIDDM    Full dentures     History of transfusion     no adverse reaction    Hyperlipidemia     Irregular heart beat     Numbness and tingling of both feet     Skin abnormality     sacrum area - small red area, less than a dime size    Spinal stenosis     Stroke ([facility] )     Unable to ambulate     Urinary incontinence     ipg stimulator x3 repakcements,battery exchange today 2/14/2018    Wears glasses     Wheelchair dependence      Past Surgical History:   Procedure Laterality Date    BACK SURGERY      fusion    BLADDER SUSPENSION      CARPAL TUNNEL RELEASE Bilateral     CHOLANGIOGRAM N/A 6/4/2018    Procedure: CHOLANGIOGRAM;  Surgeon: Philis Gottron, MD;  [facility];  Service: General    CHOLECYSTECTOMY LAPAROSCOPIC N/A 6/4/2018    Procedure: CHOLECYSTECTOMY LAPAROSCOPIC;  Surgeon: Philis Gottron, MD;  [facility];  Service: General    COLONOSCOPY      DILATION AND CURETTAGE OF UTERUS      FRACTURE SURGERY Left     femur with hardware    HYSTERECTOMY      INSERTION / [place] July / REVISION NEUROSTIMULATOR      JOINT REPLACEMENT Bilateral     knees    SACRAL NERVE STIMULATOR PLACEMENT N/A 2/14/2018    Procedure: REMOVE AND REPLACE IPG;  Surgeon: Guillermo Barreto MD;  [facility];  Service: UroGynecology           TONSILLECTOMY       Social History   Social History     Substance and Sexual Activity   Alcohol Use Not Currently    Comment: very rare     Social History     Substance and Sexual Activity   Drug Use No Social History     Tobacco Use   Smoking Status Never Smoker   Smokeless Tobacco Never Used     Family History:   Family History   Problem Relation Age of Onset    No Known Problems Mother     No Known Problems Father        Review of previous medical records was completed  Meds/Allergies   current meds:   Current Facility-Administered Medications   Medication Dose Route Frequency    acetaminophen (TYLENOL) tablet 650 mg  650 mg Oral Q6H PRN    apixaban (ELIQUIS) tablet 5 mg  5 mg Oral BID    atorvastatin (LIPITOR) tablet 40 mg  40 mg Oral QPM    calcium carbonate (OYSTER SHELL,OSCAL) 500 mg tablet 1 tablet  1 tablet Oral BID With Meals    cholecalciferol (VITAMIN D3) tablet 1,000 Units  1,000 Units Oral QPM    cyanocobalamin (VITAMIN B-12) tablet 1,000 mcg  1,000 mcg Oral QPM    digoxin (LANOXIN) tablet 125 mcg  125 mcg Oral Daily    docusate sodium (COLACE) capsule 100 mg  100 mg Oral BID    folic acid (FOLVITE) tablet 1,000 mcg  1,000 mcg Oral Daily    gabapentin (NEURONTIN) capsule 300 mg  300 mg Oral Daily    insulin lispro (HumaLOG) 100 units/mL subcutaneous injection 1-6 Units  1-6 Units Subcutaneous TID AC    insulin lispro (HumaLOG) 100 units/mL subcutaneous injection 1-6 Units  1-6 Units Subcutaneous HS    lisinopril (ZESTRIL) tablet 2 5 mg  2 5 mg Oral Daily    ondansetron (ZOFRAN) injection 4 mg  4 mg Intravenous Q6H PRN    oxybutynin (DITROPAN-XL) 24 hr tablet 10 mg  10 mg Oral Daily    polyethylene glycol (MIRALAX) packet 17 g  17 g Oral Daily PRN    primidone (MYSOLINE) tablet 50 mg  50 mg Oral Q12H STEPHANIE    senna (SENOKOT) tablet 8 6 mg  1 tablet Oral Daily    sodium chloride 0 9 % infusion  75 mL/hr Intravenous Continuous    spironolactone (ALDACTONE) tablet 50 mg  50 mg Oral Daily    and PTA meds:   Prior to Admission Medications   Prescriptions Last Dose Informant Patient Reported? Taking?    Mirabegron ER (MYRBETRIQ) 50 MG TB24  Self Yes Yes   Sig: Take 50 mg by mouth daily     apixaban (ELIQUIS) 5 mg  Self No Yes   Sig: Take 1 tablet (5 mg total) by mouth 2 (two) times a day   atorvastatin (LIPITOR) 40 mg tablet  Self No Yes   Sig: Take 1 tablet (40 mg total) by mouth every evening   calcium gluconate 500 mg tablet  Self Yes Yes   Sig: Take 500 mg by mouth every evening   cholecalciferol (VITAMIN D3) 1,000 units tablet  Self Yes Yes   Sig: Take 1,000 Units by mouth every evening   cyanocobalamin (VITAMIN B-12) 1,000 mcg tablet  Self Yes Yes   Sig: Take 1,000 mcg by mouth every evening     digoxin (LANOXIN) 0 125 mg tablet  Self No Yes   Sig: Take 1 tablet (125 mcg total) by mouth daily   folic acid (FOLVITE) 1 mg tablet  Self Yes Yes   Sig: Take 1,000 mcg by mouth daily   gabapentin (NEURONTIN) 300 mg capsule  Self Yes Yes   Sig: Take 300 mg by mouth daily   lisinopril (ZESTRIL) 2 5 mg tablet  Self No Yes   Sig: Take 1 tablet (2 5 mg total) by mouth daily   metFORMIN (GLUCOPHAGE) 1000 MG tablet  Self No Yes   Sig: Take 0 5 tablets (500 mg total) by mouth 2 (two) times a day with meals   primidone (MYSOLINE) 50 mg tablet  Self Yes Yes   Sig: Take by mouth 2 (two) times a day   spironolactone (ALDACTONE) 50 mg tablet  Self Yes Yes   Sig: Take 50 mg by mouth daily        Facility-Administered Medications: None       Allergies   Allergen Reactions    Bactrim [Sulfamethoxazole-Trimethoprim] Swelling     Swelling around eyes and red eyes    Aspirin Other (See Comments)     "feels like fist in my chest"    Ciprofloxacin     Nitrofurantoin     Other      "5mz/Tmp "  "1 60 mTab amme"     Per pt's allergy list        Objective   Vitals:Blood pressure 148/59, pulse 60, temperature 98 3 °F (36 8 °C), resp  rate 20, weight 82 9 kg (182 lb 12 2 oz), SpO2 100 %, not currently breastfeeding  ,Body mass index is 30 41 kg/m²      Intake/Output Summary (Last 24 hours) at 2/15/2020 1207  Last data filed at 2/15/2020 1203  Gross per 24 hour   Intake 1498 75 ml   Output 253 ml   Net 1245 75 ml       Invasive Devices: Invasive Devices     Peripheral Intravenous Line            Peripheral IV 02/15/20 Left Arm less than 1 day                Physical Exam   Constitutional: She is oriented to person, place, and time  She appears well-developed and well-nourished  HENT:   Head: Normocephalic and atraumatic  Eyes: Pupils are equal, round, and reactive to light  Conjunctivae and EOM are normal    Neck: Normal range of motion  Neck supple  Cardiovascular: Normal rate and regular rhythm  Pulmonary/Chest: Effort normal and breath sounds normal    Abdominal: Soft  Bowel sounds are normal    Musculoskeletal: Normal range of motion  Neurological: She is alert and oriented to person, place, and time  Skin: Skin is warm and dry  Neurologic Exam     Mental Status   Oriented to person, place, and time  Patient is awake and alert, fully oriented  Her speech is without dysarthria, somewhat slower in verbal output  No aphasia, able to name clearly what she had for lunch as well as objects and colors  Follows commands well  Cranial Nerves     CN II   Visual fields full to confrontation  CN III, IV, VI   Pupils are equal, round, and reactive to light  Extraocular motions are normal      CN V   Facial sensation intact  CN VII   Facial expression full, symmetric  CN VIII   CN VIII normal      CN IX, X   CN IX normal    CN X normal      CN XI   CN XI normal      CN XII   CN XII normal      Motor Exam   Muscle bulk: normal  Overall muscle tone: normal  Right arm pronator drift: absent  Left arm pronator drift: absent  Age-appropriate strength in the upper extremities without laterality  With lower extremity, range of motion is somewhat limited, however at least 4/5 hip flexor as well as knee range of motion, and full dorsi/plantar flexion bilaterally    No significant weakness appreciated with formal testing of the lower extremities given her age and wheelchair-bound status at baseline  Sensory Exam   Light touch normal      Diminished vibratory sensation in the toes bilaterally, sharp pinprick sensation throughout proximal extremities  No felipe sensory deficit appreciated  Gait, Coordination, and Reflexes     Tremor   Resting tremor: Head  Head tremor noted throughout exam, tremulousness in the arms with finger-to-nose, no ataxia or clumsiness, no clear ankle clonus  Gait not assessed formally  Lab Results:   CBC:   Results from last 7 days   Lab Units 02/15/20  0457 02/14/20  2251   WBC Thousand/uL 13 62* 17 93*   RBC Million/uL 4 00 4 42   HEMOGLOBIN g/dL 12 4 13 6   HEMATOCRIT % 37 3 41 4   MCV fL 93 94   PLATELETS Thousands/uL 205 180   , BMP/CMP:   Results from last 7 days   Lab Units 02/15/20  0457 02/14/20  2250   SODIUM mmol/L 134* 133*   POTASSIUM mmol/L 4 3 4 9   CHLORIDE mmol/L 105 103   CO2 mmol/L 24 20*   BUN mg/dL 25 29*   CREATININE mg/dL 0 73 0 94   CALCIUM mg/dL 10 2* 10 2*   EGFR ml/min/1 73sq m 76 56   , Vitamin B12:   , HgBA1C:   , TSH:   Results from last 7 days   Lab Units 02/14/20  2250   TSH 3RD GENERATON uIU/mL 1 360   , Coagulation:   , Lipid Profile:   , Ammonia:   , Urinalysis:   Results from last 7 days   Lab Units 02/15/20  0019   COLOR UA  Yellow  see results   CLARITY UA  Clear   SPEC GRAV UA  1 025   PH UA  6 0   LEUKOCYTES UA  Negative   NITRITE UA  Negative   GLUCOSE UA mg/dl Negative   KETONES UA mg/dl Negative   BILIRUBIN UA  Negative   BLOOD UA  Trace*   , Drug Screen:   , Medication Drug Levels:       Invalid input(s): CARBAMAZEPINE,  PHENOBARB, LACOSAMIDE, OXCARBAZEPINE  Imaging Studies: I have personally reviewed pertinent films in PACS   No neuroimaging obtained this admission  EKG, Pathology, and Other Studies: I have personally reviewed pertinent reports  VTE Prophylaxis: Sequential compression device (Venodyne)  and Eliquis    Code Status: Level 3 - DNAR and DNI    Total time spent today 55 minutes   Discussed plan of care with patient and primary team:  Possible video EEG, will discuss with attending, no need for additional neuro imaging, exam nonfocal, unclear etiology of speech disturbance, medical workup per primary team given leukocytosis

## 2020-02-15 NOTE — SPEECH THERAPY NOTE
Speech Language/Pathology  Speech/Language Pathology  Assessment    Patient Name: Hipolito Dias  URNMV'N Date: 2/15/2020     Problem List  Principal Problem:    Generalized weakness  Active Problems:    Type 2 diabetes mellitus (HCC)    Paroxysmal atrial fibrillation ([facility] )    Spinal cord stimulator status    Overactive bladder    Essential hypertension    Cervical dystonia    Hyponatremia    Weakness of both lower extremities    SIRS (systemic inflammatory response syndrome) ([facility] )    Past Medical History  Past Medical History:   Diagnosis Date    A[facility]fib ([facility] )     Arthritis     Assistance needed for mobility     pt can stand with assistance and transfer    Chronic pain disorder     Diabetes mellitus ([facility] )     NIDDM    Full dentures     History of transfusion     no adverse reaction    Hyperlipidemia     Irregular heart beat     Numbness and tingling of both feet     Skin abnormality     sacrum area - small red area, less than a dime size    Spinal stenosis     Stroke ([facility] )     Unable to ambulate     Urinary incontinence     ipg stimulator x3 repakcements,battery exchange today 2/14/2018    Wears glasses     Wheelchair dependence      Past Surgical History  Past Surgical History:   Procedure Laterality Date    BACK SURGERY      fusion    BLADDER SUSPENSION      CARPAL TUNNEL RELEASE Bilateral     CHOLANGIOGRAM N/A 6/4/2018    Procedure: CHOLANGIOGRAM;  Surgeon: Cris Perez MD;  [facility];  Service: General    CHOLECYSTECTOMY LAPAROSCOPIC N/A 6/4/2018    Procedure: CHOLECYSTECTOMY LAPAROSCOPIC;  Surgeon: Cris Perez MD;  [facility];  Service: General    COLONOSCOPY      DILATION AND CURETTAGE OF UTERUS      FRACTURE SURGERY Left     femur with hardware    HYSTERECTOMY      INSERTION / [employer] / REVISION NEUROSTIMULATOR      JOINT REPLACEMENT Bilateral     knees    SACRAL NERVE STIMULATOR PLACEMENT N/A 2/14/2018    Procedure: REMOVE AND REPLACE IPG;  Surgeon: Sveta Ortiz MD;  Location: [facility];  Service: UroGynecology           TONSILLECTOMY         Patient presents for evaluation of weakness  Past medical history significant for AFib on Eliquis with history of prior cardiovascular events without residual side effects, hyperlipidemia, urinary incontinence, spinal stenosis, spinal stimulator placement, generalized weakness, diabetes  Patient tells me that about this morning when she woke up that she feels weaker than she normally does  Denies any recent falls, head trauma, change in vision, headaches, chest pain, shortness of breath, fever/chills, nausea/vomiting, chest pain, abdominal pain, constipation/diarrhea, dysuria/hematuria, vaginal discharge  Patient denies having any pain anywhere right now  She tells me that her only complaint is that she feels weak all over  She denies any focal weakness or changes in sensation  She states that she has been taking her medications  She uses wheelchair at baseline  She lives at home with her daughter who helps take care of her  CXR pending     Bedside Swallow Evaluation:    Summary:  Pt presents w/ fairly functionally oropharyngeal swallow w/ no s/s aspiration on today's assessment  Pt w/ effective oral manipulation of liquids and solids  Swallow prompt w/ wfl laryngeal excursion to palpation  No cough, throat clearing, or wet vocal quality noted today  Suspect esophageal component due to pt report of feeling full quickly, burping and coughing during meals, and globus sensation  Education provided to pt and nsg about remaining upright as much as possible and alternating liquids and solids  Recommendations:  Diet: Regular, choose softer material   Liquid: Thin   Meds: As tolerated   Supervision: Intermittent   Positioning:Upright  Strategies: Alternate liquids/solids, Pt to take PO/Meds only when fully alert and upright, Pt to remain upright during meals and at least 1 hour after   Oral care:  At least 2x/day  Aspiration precautions  Reflux precautions    Therapy Prognosis: Fair  Prognosis considerations: Advanced age, H/o CVA  Frequency: At least x1     Goal(s):  Pt will tolerate least restrictive diet w/out s/s aspiration or oral/pharyngeal difficulties  Patient's goal: None stated    Consider consult w/:  Rehab  GI  Neurology  Nutrition    Reason for consult:  R/o aspiration  Determine safest and least restrictive diet  Failed nursing dysphagia assessment  Change in mental status  New neuro event  Stroke protocol  H/o neurological disease  respiratory compromise  Nursing reported cough w/ PO  current pna  recurrent pna's   poor intake  weight loss   h/o dysphagia   H/o Head and neck CA  C/o pill dysphagia  C/o solid food dysphagia  C/o liquid dysphagia    Precautions:  Contact  Droplet  [place]    Current diet:  Regular, diet genevieve CHO, consistent carbohydrate    Premorbid diet[de-identified]  Regular w/ thin     Previous VBS:  -    O2 requirement:  RA    Voice/Speech:    Social:  Home w/ daughters    Follows commands:  Yes for oral mech     Cognitive Status:  A&A    Oral mech exam:  Dentition: upper and lower dentures   Labial strength and ROM: wfl   Lingual strength and ROM: wfl   Mandibular strength and ROM: wfl   Velum: bilateral movement   Secretion management: wfl     Items administered:  Puree, soft solid, hard solid, nectar thick liquid, thin liquids, meds whole/crushed in applesauce/water  Liquids were taken by straw/cup  Oral stage:  Lip closure: slow retrieval, adequate seal   Mastication: wfl   Bolus formation: wfl   Bolus control: wfl   Transfer: wfl   Oral residue: none   Pocketing: none     Pharyngeal stage:  Swallow promptness: mild delay   Laryngeal rise: wfl upon palpation   Wet voice: none   Throat clear: mild x1 w/ nectar  Cough: none   Secondary swallows: w/ liquids   Audible swallows: very audible w/ liquids    Esophageal stage:  Belching noted consistently w/ po trials   Pt reports she often vandana while she's eating and gets full very quickly   Pt also reports she "can feel it stick in her stomach"    Aspiration precautions posted    Results d/w:  Pt, nursing

## 2020-02-16 ENCOUNTER — APPOINTMENT (INPATIENT)
Dept: NEUROLOGY | Facility: CLINIC | Age: 85
DRG: 948 | End: 2020-02-16
Payer: COMMERCIAL

## 2020-02-16 LAB
ANION GAP SERPL CALCULATED.3IONS-SCNC: 1 MMOL/L (ref 4–13)
ATRIAL RATE: 96 BPM
BUN SERPL-MCNC: 21 MG/DL (ref 5–25)
CALCIUM SERPL-MCNC: 10.1 MG/DL (ref 8.3–10.1)
CHLORIDE SERPL-SCNC: 104 MMOL/L (ref 100–108)
CO2 SERPL-SCNC: 30 MMOL/L (ref 21–32)
CREAT SERPL-MCNC: 0.78 MG/DL (ref 0.6–1.3)
ERYTHROCYTE [DISTWIDTH] IN BLOOD BY AUTOMATED COUNT: 12.3 % (ref 11.6–15.1)
GFR SERPL CREATININE-BSD FRML MDRD: 70 ML/MIN/1.73SQ M
GLUCOSE SERPL-MCNC: 104 MG/DL (ref 65–140)
GLUCOSE SERPL-MCNC: 105 MG/DL (ref 65–140)
GLUCOSE SERPL-MCNC: 114 MG/DL (ref 65–140)
GLUCOSE SERPL-MCNC: 120 MG/DL (ref 65–140)
GLUCOSE SERPL-MCNC: 184 MG/DL (ref 65–140)
HCT VFR BLD AUTO: 37.1 % (ref 34.8–46.1)
HGB BLD-MCNC: 12 G/DL (ref 11.5–15.4)
MCH RBC QN AUTO: 30.5 PG (ref 26.8–34.3)
MCHC RBC AUTO-ENTMCNC: 32.3 G/DL (ref 31.4–37.4)
MCV RBC AUTO: 94 FL (ref 82–98)
PLATELET # BLD AUTO: 154 THOUSANDS/UL (ref 149–390)
PMV BLD AUTO: 10.1 FL (ref 8.9–12.7)
POTASSIUM SERPL-SCNC: 4.8 MMOL/L (ref 3.5–5.3)
QRS AXIS: 257 DEGREES
QRSD INTERVAL: 130 MS
QT INTERVAL: 348 MS
QTC INTERVAL: 439 MS
RBC # BLD AUTO: 3.93 MILLION/UL (ref 3.81–5.12)
SODIUM SERPL-SCNC: 135 MMOL/L (ref 136–145)
T WAVE AXIS: 39 DEGREES
VENTRICULAR RATE: 96 BPM
WBC # BLD AUTO: 7.34 THOUSAND/UL (ref 4.31–10.16)

## 2020-02-16 PROCEDURE — 97163 PT EVAL HIGH COMPLEX 45 MIN: CPT

## 2020-02-16 PROCEDURE — 95715 VEEG EA 12-26HR INTMT MNTR: CPT

## 2020-02-16 PROCEDURE — 80048 BASIC METABOLIC PNL TOTAL CA: CPT | Performed by: NURSE PRACTITIONER

## 2020-02-16 PROCEDURE — 95712 VEEG 2-12 HR INTMT MNTR: CPT

## 2020-02-16 PROCEDURE — 85027 COMPLETE CBC AUTOMATED: CPT | Performed by: NURSE PRACTITIONER

## 2020-02-16 PROCEDURE — 99232 SBSQ HOSP IP/OBS MODERATE 35: CPT | Performed by: NURSE PRACTITIONER

## 2020-02-16 PROCEDURE — 82948 REAGENT STRIP/BLOOD GLUCOSE: CPT

## 2020-02-16 PROCEDURE — 93010 ELECTROCARDIOGRAM REPORT: CPT | Performed by: INTERNAL MEDICINE

## 2020-02-16 PROCEDURE — 95700 EEG CONT REC W/VID EEG TECH: CPT

## 2020-02-16 PROCEDURE — 99232 SBSQ HOSP IP/OBS MODERATE 35: CPT | Performed by: PSYCHIATRY & NEUROLOGY

## 2020-02-16 RX ORDER — LANOLIN ALCOHOL/MO/W.PET/CERES
3 CREAM (GRAM) TOPICAL
Status: COMPLETED | OUTPATIENT
Start: 2020-02-16 | End: 2020-02-16

## 2020-02-16 RX ADMIN — OXYBUTYNIN 10 MG: 5 TABLET, FILM COATED, EXTENDED RELEASE ORAL at 07:49

## 2020-02-16 RX ADMIN — GABAPENTIN 300 MG: 300 CAPSULE ORAL at 07:49

## 2020-02-16 RX ADMIN — PRIMIDONE 50 MG: 50 TABLET ORAL at 07:52

## 2020-02-16 RX ADMIN — CALCIUM 1 TABLET: 500 TABLET ORAL at 07:50

## 2020-02-16 RX ADMIN — PRIMIDONE 50 MG: 50 TABLET ORAL at 21:25

## 2020-02-16 RX ADMIN — SENNOSIDES 8.6 MG: 8.6 TABLET, FILM COATED ORAL at 07:51

## 2020-02-16 RX ADMIN — DIGOXIN 125 MCG: 125 TABLET ORAL at 07:50

## 2020-02-16 RX ADMIN — CYANOCOBALAMIN TAB 500 MCG 1000 MCG: 500 TAB at 16:33

## 2020-02-16 RX ADMIN — POLYETHYLENE GLYCOL 3350 17 G: 17 POWDER, FOR SOLUTION ORAL at 16:33

## 2020-02-16 RX ADMIN — INSULIN LISPRO 1 UNITS: 100 INJECTION, SOLUTION INTRAVENOUS; SUBCUTANEOUS at 11:39

## 2020-02-16 RX ADMIN — LISINOPRIL 2.5 MG: 2.5 TABLET ORAL at 07:50

## 2020-02-16 RX ADMIN — DOCUSATE SODIUM 100 MG: 100 CAPSULE, LIQUID FILLED ORAL at 16:33

## 2020-02-16 RX ADMIN — CALCIUM 1 TABLET: 500 TABLET ORAL at 16:33

## 2020-02-16 RX ADMIN — DOCUSATE SODIUM 100 MG: 100 CAPSULE, LIQUID FILLED ORAL at 07:50

## 2020-02-16 RX ADMIN — FOLIC ACID 1000 MCG: 1 TABLET ORAL at 07:51

## 2020-02-16 RX ADMIN — SPIRONOLACTONE 50 MG: 50 TABLET ORAL at 07:50

## 2020-02-16 RX ADMIN — APIXABAN 5 MG: 5 TABLET, FILM COATED ORAL at 16:34

## 2020-02-16 RX ADMIN — APIXABAN 5 MG: 5 TABLET, FILM COATED ORAL at 07:50

## 2020-02-16 RX ADMIN — ATORVASTATIN CALCIUM 40 MG: 40 TABLET, FILM COATED ORAL at 16:33

## 2020-02-16 RX ADMIN — MELATONIN 1000 UNITS: at 16:33

## 2020-02-16 RX ADMIN — MELATONIN 3 MG: 3 TAB ORAL at 21:25

## 2020-02-16 NOTE — PHYSICAL THERAPY NOTE
Physical Therapy Evaluation    Patient Name: Sherry Erickson    CDYSU'C Date: 2/16/2020     Problem List  Principal Problem:    Generalized weakness  Active Problems:    Type 2 diabetes mellitus (HCC)    Paroxysmal atrial fibrillation (HCC)    Spinal cord stimulator status    Overactive bladder    Essential hypertension    Cervical dystonia    Hyponatremia    Weakness of both lower extremities    SIRS (systemic inflammatory response syndrome) (HCC)    Transient speech disturbance    Slurred speech       Past Medical History  Past Medical History:   Diagnosis Date    A-fib ([facility] )     Arthritis     Assistance needed for mobility     pt can stand with assistance and transfer    Chronic pain disorder     Diabetes mellitus ([facility] )     NIDDM    Full dentures     History of transfusion     no adverse reaction    Hyperlipidemia     Irregular heart beat     Numbness and tingling of both feet     Skin abnormality     sacrum area - small red area, less than a dime size    Spinal stenosis     Stroke ([facility] )     Unable to ambulate     Urinary incontinence     ipg stimulator x3 repakcements,battery exchange today 2/14/2018    Wears glasses     Wheelchair dependence         Past Surgical History  Past Surgical History:   Procedure Laterality Date    BACK SURGERY      fusion    BLADDER SUSPENSION      CARPAL TUNNEL RELEASE Bilateral     CHOLANGIOGRAM N/A 6/4/2018    Procedure: CHOLANGIOGRAM;  Surgeon: Lilo Griffin MD;  [facility]: [facility];  Service: General    CHOLECYSTECTOMY LAPAROSCOPIC N/A 6/4/2018    Procedure: CHOLECYSTECTOMY LAPAROSCOPIC;  Surgeon: iLlo Griffin MD;  [facility];  Service: General    COLONOSCOPY      DILATION AND CURETTAGE OF UTERUS      FRACTURE SURGERY Left     femur with hardware    HYSTERECTOMY      INSERTION / PLACEMENT / REVISION NEUROSTIMULATOR      JOINT REPLACEMENT Bilateral     knees    SACRAL NERVE STIMULATOR PLACEMENT N/A 2/14/2018    Procedure: REMOVE AND REPLACE IPG;  Surgeon: Virginia Paez MD;  [facility];  Service: UroGynecology           TONSILLECTOMY             02/16/20 1151   Note Type   Note type Eval only   Pain Assessment   Pain Assessment No/denies pain   Pain Score No Pain   Home Living   Type of [address] Two level;Ramped entrance; Able to live on main level with bedroom/bathroom; Performs ADLs on one level   Bathroom Equipment Shower chair;Commode   Home Equipment Wheelchair-electric;Mechanical lift   Additional Comments Pt resides with her daughter in a [facility] with ramped entrance and first floor set up  Pt has motorized WC and sit to stand lift  Pt uses sit to stand lift for transfers into WC  However, pt is currently recieving OOPT to  work on LE strengtengthing, standing, and ambulation  Pt has HHA from 8am to 8pm, pt reports she is currently not happy with HHA services as they arrive late sometimes  Prior Function   Level of [place] Independent with ADLs and functional mobility   Lives With Daughter   Receives Help From Family;Personal care attendant   ADL Assistance Needs assistance   IADLs Needs assistance   Falls in the last 6 months 0   Restrictions/Precautions   Other Precautions Fall Risk;Multiple lines; Seizure  (EEG mointoring )   Cognition   Overall Cognitive Status WFL   Arousal/Participation Cooperative   Orientation Level Oriented X4   Memory Within functional limits   Following Commands Follows one step commands without difficulty   Comments Pt is pleasant and coopeartive; motiviated to participate in therapy       RLE Assessment   RLE Assessment   (Grossly 2+/5 except full ROM in [place] DF/PF)   LLE Assessment   LLE Assessment   (Grossly 2+/5 except full ROM in [place] DF/PF)   Coordination   Movements are Fluid and Coordinated 0   Coordination and Movement Description moderate retropulsion   Bed Mobility   Supine to Sit 2  Maximal assistance Additional items Assist x 1; Increased time required;Verbal cues;LE management   Sit to Supine 2  Maximal assistance   Additional items Assist x 1; Increased time required;Verbal cues;LE management   Additional Comments Pt required max A x 1 for UB support and BLE management for supine <> sit transfer  HOB elevated for ease  Pt able to initate BLE movement towards EOB  Pt tolerated sitting EOB for approx 5 minutes with max A x 1 for static sitting balance  Moderate retropulsion noted  Transfers   Additional Comments Deferred due to poor trunk control   Balance   Static Sitting Poor -   Dynamic Sitting Poor -   Endurance Deficit   Endurance Deficit Yes   Endurance Deficit Description Generalized weakness   Activity Tolerance   Activity Tolerance Patient limited by fatigue   Medical Staff Made Aware CM   Nurse Made Aware Yes, RN Alaina   Assessment   Prognosis Guarded   Problem List Decreased strength;Decreased endurance; Impaired balance;Decreased range of motion;Decreased mobility; Decreased coordination;Obesity   Assessment Pt is 80 y o  female seen for high complexity PT evaluation s/p admission to [facility] on 2/15/20 with recurrent dysarthra/speech changes and generalized weakness, etiology unclear at this time  EEG monitoring at this time to rule out any epileptiform origin  Comorbidities affecting pt's physical performance at time of assessment include: A Fib, chronic pain disorder, h/o CVA, cervical dystonia, generalized weakness, SIRS, essential HTN, overactive bladder, and hyponatremia  PTA, pt is primiarly WC bound, uses a sit to stand for transfers; however, receiving OOPT to improve standing and ambulation  Pt resides with her daughter in a [facility] with 1st floor entrance and first floor set up  Pt has daily aids from 8am to 8pm   Upon evaluation, pt required max A x 1 for bed mobility; pt able to initiate movement of BLE off EOB   Tolerated sitting EOB with max A x 1 for static sitting balance, moderate retropulsion noted  Patient's decreased mobility level and increased fall risk is secondary to deficits in trunk/head control, sitting balance, BLE weakness, core weakness, sitting tolerance, activity tolerance, posture, endurance, and overall functional mobility  Current clinical presentation is unstable/unpredictable seen in pt's presentation of ongoing medical management, increased reliance on assistance compared to PLOF, current EEG mointoring, and significant PMH  Pt to benefit from continued PT tx to address deficits as defined above and maximize level of functional independent mobility and consistency  From PT/mobility standpoint, recommendation at time of d/c is home with family, continued 24 hour care, and continued OP therapies  Pt may benefit from electric hospital bed to decrease burden of care of caregiver  Pt to see for further transfers as appropriate  Goals   Patient Goals To get stronger   STG Expiration Date 03/01/20   Short Term Goal #1 In 1-2 weeks, the patient will complete the following 1) Perform all aspect of bed mobility with min A x 1 2) Maintain static sitting balance with min A x 1 3) Increase BLE strength by 1/2-1 grade 4) PT to see if and when appropriate for standing/transfers  5)Participate in therapy program consisting of ROM, strengthening, and balance  Plan   Treatment/Interventions LE strengthening/ROM; Functional transfer training;Bed mobility; Patient/family training; Therapeutic exercise; Endurance training   PT Frequency   (2-3x/wk)   Recommendation   Recommendation Home with family support; Outpatient PT  (with HHA, family support)   Equipment Recommended Walker; Wheelchair  (Electric hospital bed?)   PT - OK to Discharge Yes  (once medically stable)   Modified [place] Scale   Modified [place] Scale 5   Barthel Index   Feeding 5   Bathing 0   Grooming Score 0   Dressing Score 5   Bladder Score 0   Bowels Score 10   Toilet Use Score 5   Transfers (Bed/Chair) Score 5   Mobility (Level Surface) Score 0   Stairs Score 0   Barthel Index Score 30       Gracia Pounds PT, DPT

## 2020-02-16 NOTE — PROGRESS NOTES
Progress Note - Neurology   Racheal Echeverria 80 y o  female MRN: [id number]  Unit/Bed#: [facility] 716-01 Encounter: [id number]    Assessment/Plan:  72-year-old female with history of AFib on anticoagulation, chronic pain disorder (spinal stimulator placed) with baseline ambulatory dysfunction (wheelchair-bound at baseline per prior notes), prior stroke, cervical dystonia managed by outpatient movement disorder team, vascular risk factors presenting with recurrent dysarthra/speech change also described as word-finding difficulty and generalized weakness beginning morning of 2/14      Speech symptoms have again resolved during this admission; ultimately the etiology of these events is unclear, prior differential included TIA, however has had multiple events now in the setting of systemic anticoagulation with Eliquis given her AFib  Overall symptoms are fairly nonspecific, there is lower overall suspicion for epileptic origin, however placed on video EEG monitoring to exclude/rule out any epileptiform origin  1 Recurrent intermittent speech disturbance:  -video EEG monitoring initiated last night, per discussion with epileptologist:  Theta/delta background, frequent bifrontal triphasic waves, no focal epileptiform discharges nor electrographic seizures  Continue monitoring through today and reassess tomorrow  -CT head last admission/several weeks ago with no acute intracranial pathology  -as mentioned in prior notes, spinal stimulator precludes imaging with MRI brain  -no clear role for AED initiation with unclear etiology  -continued medical management per primary team   -like/infectious derangement monitoring and correction   -remains afebrile, leukocytosis has resolved   -prior workup this admission with UA, chest x-ray, influenza panel negative    2  Atrial fibrillation:  -anticoagulated on Eliquis    3   Cervical dystonia:  -management per outpatient movement disorder team  -patient states Botox injection is scheduled for March 4  Generalized weakness:  -no concerning findings on exam, as mentioned above spinal stimulator precludes MRI    5  Chronic pain/spinal stenosis:  -spinal stimulator in place    Discussed plan of care with attending neurologist      Subjective:   Patient resting in bed, video EEG monitoring ongoing  Per discussion with patient herself, she feels her speech issues have remained resolved throughout the admission thus far  She did speak with her daughter this morning around 11:00 AM; according to the patient "my daughter still felt it was not speaking right, she could not understand me, said I was talking to softly"  The patient herself during this conversation did not appreciate any issues with her speech  She does feel that generalized weakness today has improved compared to yesterday, sat up at bedside for the 1st time this morning  No new headaches or vision abnormalities, no lateralizing weakness today  Patient does feel tired as she states she did not sleep much at all last night  Vitals: Blood pressure 137/60, pulse 58, temperature 98 2 °F (36 8 °C), resp  rate 18, weight 82 9 kg (182 lb 12 2 oz), SpO2 99 %, not currently breastfeeding  ,Body mass index is 30 41 kg/m²  Physical Exam:   Physical Exam   Constitutional: She is oriented to person, place, and time  She appears well-developed and well-nourished  HENT:   Head: Normocephalic and atraumatic  Eyes: Pupils are equal, round, and reactive to light  Conjunctivae and EOM are normal    Neck: Normal range of motion  Neck supple  Cardiovascular: Normal rate and regular rhythm  Pulmonary/Chest: Effort normal and breath sounds normal    Abdominal: Soft  Bowel sounds are normal    Musculoskeletal: Normal range of motion  Neurological: She is alert and oriented to person, place, and time  Skin: Skin is warm and dry  Neurologic Exam     Mental Status   Oriented to person, place, and time       Awake and alert, oriented, names the correct room she is in, speech is slightly hypophonic but without any dysarthria, no aphasia with conversation or naming objects/colors  She follows commands well  Cranial Nerves     CN III, IV, VI   Pupils are equal, round, and reactive to light  Extraocular motions are normal      CN V   Facial sensation intact  CN VII   Facial expression full, symmetric  CN VIII   CN VIII normal      CN XI   CN XI normal      Bilateral end gaze nystagmus, several beats, no visual field deficit with confrontation, no other cranial nerve deficit appreciated  Motor Exam   Muscle bulk: normal  Overall muscle tone: normal  Age-appropriate strength in the uppers, no deficit or laterality, strength in the lower extremities is also appropriate for her age, no profound weakness or laterality  Sensory Exam   Light touch normal    No extinction with double simultaneous tactile stimulation in the lower extremities  Gait, Coordination, and Reflexes   No clear upgoing toe nor ankle clonus  Volitional movements without ataxia or clumsiness  Lab, Imaging and other studies:   Video EEG monitoring 02/15/2020-02/16/2020: final read pending, see above in assessment/plan for epileptologist updates    CBC:   Results from last 7 days   Lab Units 02/16/20  0604 02/15/20  0457 02/14/20  2251   WBC Thousand/uL 7 34 13 62* 17 93*   RBC Million/uL 3 93 4 00 4 42   HEMOGLOBIN g/dL 12 0 12 4 13 6   HEMATOCRIT % 37 1 37 3 41 4   MCV fL 94 93 94   PLATELETS Thousands/uL 154 205 180   , BMP/CMP:   Results from last 7 days   Lab Units 02/16/20  0604 02/15/20  0457 02/14/20  2250   SODIUM mmol/L 135* 134* 133*   POTASSIUM mmol/L 4 8 4 3 4 9   CHLORIDE mmol/L 104 105 103   CO2 mmol/L 30 24 20*   BUN mg/dL 21 25 29*   CREATININE mg/dL 0 78 0 73 0 94   CALCIUM mg/dL 10 1 10 2* 10 2*   EGFR ml/min/1 73sq m 70 76 56   , Vitamin B12:   , HgBA1C:   , TSH:   Results from last 7 days   Lab Units 02/14/20  2250   TSH 3RD Emily Mirza uIU/mL 1 360   , Coagulation:   , Lipid Profile:   , Ammonia:   , Urinalysis:   Results from last 7 days   Lab Units 02/15/20  0019   COLOR UA  Yellow  see results   CLARITY UA  Clear   SPEC GRAV UA  1 025   PH UA  6 0   LEUKOCYTES UA  Negative   NITRITE UA  Negative   GLUCOSE UA mg/dl Negative   KETONES UA mg/dl Negative   BILIRUBIN UA  Negative   BLOOD UA  Trace*   , Drug Screen:   , Medication Drug Levels:       Invalid input(s): CARBAMAZEPINE,  PHENOBARB, LACOSAMIDE, OXCARBAZEPINE  VTE Prophylaxis: Sequential compression device (Venodyne)  and Eliquis    Total time spent today 20 minutes   Discussed plan of care with patient and primary team:  Continuing video EEG today, will discuss findings with epileptologist, no AED, medical management per primary team

## 2020-02-16 NOTE — ASSESSMENT & PLAN NOTE
· Patient presented with generalized weakness in the setting of ambulatory dysfunction with chronic bilateral lower extremity weakness being wheelchair-bound  · Sepsis workup negative    · digoxin level normal   · Mildly decreased sodium- slowly resolving with IV hydration  135 today   · Patient was admitted February 4 through February 6 with TIA symptom dysarthria  CT of the head neck did not reveal significant occlusion dissection, neurology consulted however patient could not undergo MRI due to spinal cord stimulator in place  She also was evaluated by PT OT and returned back to her baseline of functionality( wheelchair-bound) so outpatient PT recommended      · Bedside speech and swallow- to remain upright for at least hour after meals   · Will get re-evaluation by PT OT specialist to see if  the patient might benefit from short-term rehab- pending

## 2020-02-16 NOTE — PLAN OF CARE
Problem: PHYSICAL THERAPY ADULT  Goal: Performs mobility at highest level of function for planned discharge setting  See evaluation for individualized goals  Description  Treatment/Interventions: LE strengthening/ROM, Functional transfer training, Bed mobility, Patient/family training, Therapeutic exercise, Endurance training  Equipment Recommended: Walker, Wheelchair(Electric hospital bed?)       See flowsheet documentation for full assessment, interventions and recommendations  Note:   Prognosis: Guarded  Problem List: Decreased strength, Decreased endurance, Impaired balance, Decreased range of motion, Decreased mobility, Decreased coordination, Obesity  Assessment: Pt is 80 y o  female seen for high complexity PT evaluation s/p admission to [facility] on 2/15/20 with recurrent dysarthra/speech changes and generalized weakness, etiology unclear at this time  EEG monitoring at this time to rule out any epileptiform origin  Comorbidities affecting pt's physical performance at time of assessment include: A Fib, chronic pain disorder, h/o CVA, cervical dystonia, generalized weakness, SIRS, essential HTN, overactive bladder, and hyponatremia  PTA, pt is primiarly WC bound, uses a sit to stand for transfers; however, receiving OOPT to improve standing and ambulation  Pt resides with her daughter in a [facility] with 1st floor entrance and first floor set up  Pt has daily aids from 8am to 8pm   Upon evaluation, pt required max A x 1 for bed mobility; pt able to initiate movement of BLE off EOB  Tolerated sitting EOB with max A x 1 for static sitting balance, moderate retropulsion noted  Patient's decreased mobility level and increased fall risk is secondary to deficits in trunk/head control, sitting balance, BLE weakness, core weakness, sitting tolerance, activity tolerance, posture, endurance, and overall functional mobility    Current clinical presentation is unstable/unpredictable seen in pt's presentation of ongoing medical management, increased reliance on assistance compared to PLOF, current EEG mointoring, and significant PMH  Pt to benefit from continued PT tx to address deficits as defined above and maximize level of functional independent mobility and consistency  From PT/mobility standpoint, recommendation at time of d/c is home with family, continued 24 hour care, and continued OP therapies  Pt may benefit from electric hospital bed to decrease burden of care of caregiver  Pt to see for further transfers as appropriate  Recommendation: Home with family support, Outpatient PT(with HHA, family support)     PT - OK to Discharge: Yes(once medically stable)    See flowsheet documentation for full assessment

## 2020-02-16 NOTE — PLAN OF CARE
Problem: Potential for Falls  Goal: Patient will remain free of falls  Description  INTERVENTIONS:  - Assess patient frequently for physical needs  -  Identify cognitive and physical deficits and behaviors that affect risk of falls    -  Fields fall precautions as indicated by assessment   - Educate patient/family on patient safety including physical limitations  - Instruct patient to call for assistance with activity based on assessment  - Modify environment to reduce risk of injury  - Consider OT/PT consult to assist with strengthening/mobility  Outcome: Progressing     Problem: Prexisting or High Potential for Compromised Skin Integrity  Goal: Skin integrity is maintained or improved  Description  INTERVENTIONS:  - Identify patients at risk for skin breakdown  - Assess and monitor skin integrity  - Assess and monitor nutrition and hydration status  - Monitor labs   - Assess for incontinence   - Turn and reposition patient  - Assist with mobility/ambulation  - Relieve pressure over bony prominences  - Avoid friction and shearing  - Provide appropriate hygiene as needed including keeping skin clean and dry  - Evaluate need for skin moisturizer/barrier cream  - Collaborate with interdisciplinary team   - Patient/family teaching  - Consider wound care consult   Outcome: Progressing     Problem: PAIN - ADULT  Goal: Verbalizes/displays adequate comfort level or baseline comfort level  Description  Interventions:  - Encourage patient to monitor pain and request assistance  - Assess pain using appropriate pain scale  - Administer analgesics based on type and severity of pain and evaluate response  - Implement non-pharmacological measures as appropriate and evaluate response  - Notify physician/advanced practitioner if interventions unsuccessful or patient reports new pain   Outcome: Progressing     Problem: INFECTION - ADULT  Goal: Absence or prevention of progression during hospitalization  Description  INTERVENTIONS:  - Assess and monitor for signs and symptoms of infection  - Monitor lab/diagnostic results  - Monitor all insertion sites, i e  indwelling lines, tubes, and drains  - Phil Campbell appropriate cooling/warming therapies per order  - Administer medications as ordered  - Instruct and encourage patient and family to use good hand hygiene technique  - Identify and instruct in appropriate isolation precautions for identified infection/condition   Outcome: Progressing     Problem: SAFETY ADULT  Goal: Patient will remain free of falls  Description  INTERVENTIONS:  - Assess patient frequently for physical needs  -  Identify cognitive and physical deficits and behaviors that affect risk of falls  -  Phil Campbell fall precautions as indicated by assessment   - Educate patient/family on patient safety including physical limitations  - Instruct patient to call for assistance with activity based on assessment  - Modify environment to reduce risk of injury  - Consider OT/PT consult to assist with strengthening/mobility  Outcome: Progressing     Problem: DISCHARGE PLANNING  Goal: Discharge to home or other facility with appropriate resources  Description  INTERVENTIONS:  - Identify barriers to discharge w/patient and caregiver  - Arrange for needed discharge resources and transportation as appropriate  - Identify discharge learning needs (meds, wound care, etc )  - Refer to Case Management Department for coordinating discharge planning if the patient needs post-hospital services based on physician/advanced practitioner order or complex needs related to functional status, cognitive ability, or social support system   Outcome: Progressing     Problem: Knowledge Deficit  Goal: Patient/family/caregiver demonstrates understanding of disease process, treatment plan, medications, and discharge instructions  Description  Complete learning assessment and assess knowledge base    Interventions:  - Provide teaching at level of understanding  - Provide teaching via preferred learning methods  Outcome: Progressing     Problem: NEUROSENSORY - ADULT  Goal: Achieves stable or improved neurological status  Description  INTERVENTIONS  - Monitor and report changes in neurological status  - Monitor vital signs such as temperature, blood pressure, glucose, and any other labs ordered   - Initiate measures to prevent increased intracranial pressure  - Monitor for seizure activity and implement precautions if appropriate      Outcome: Progressing  Goal: Remains free of injury related to seizures activity  Description  INTERVENTIONS  - Maintain airway, patient safety  and administer oxygen as ordered  - Monitor patient for seizure activity, document and report duration and description of seizure to physician/advanced practitioner  - If seizure occurs,  ensure patient safety during seizure  - Reorient patient post seizure  - Seizure pads on all 4 side rails  - Instruct patient/family to notify RN of any seizure activity including if an aura is experienced  - Instruct patient/family to call for assistance with activity based on nursing assessment  - Administer anti-seizure medications if ordered    Outcome: Progressing  Goal: Achieves maximal functionality and self care  Description  INTERVENTIONS  - Monitor swallowing and airway patency with patient fatigue and changes in neurological status  - Encourage and assist patient to increase activity and self care     - Encourage visually impaired, hearing impaired and aphasic patients to use assistive/communication devices  Outcome: Progressing     Problem: METABOLIC, FLUID AND ELECTROLYTES - ADULT  Goal: Glucose maintained within target range  Description  INTERVENTIONS:  - Monitor Blood Glucose as ordered  - Assess for signs and symptoms of hyperglycemia and hypoglycemia  - Administer ordered medications to maintain glucose within target range  - Assess nutritional intake and initiate nutrition service referral as needed  Outcome: Progressing

## 2020-02-16 NOTE — ASSESSMENT & PLAN NOTE
· Patient reporting speech issues  · Neurology consulted  · EEG monitoring initiated last night  Theta/delta background, frequent bifrontal triphasic waves, no focal epileptiform discharges nor electrographic seizures    Continue monitoring through today and reassess tomorrow by neurology

## 2020-02-16 NOTE — ASSESSMENT & PLAN NOTE
Lab Results   Component Value Date    HGBA1C 5 8 02/05/2020       Recent Labs     02/15/20  1729 02/15/20  2055 02/16/20  0716 02/16/20  1103   POCGLU 84 143* 104 184*       Blood Sugar Average: Last 72 hrs:  (P) 129 [id number]   Hemoglobin A1c is acceptable, will place ADA diet, Accu-Cheks and insulin sliding scale  Hold metformin

## 2020-02-16 NOTE — PROGRESS NOTES
[facility]   Progress Note - Gigi  1935, 80 y o  female MRN: [id number]    Unit/Bed#: [facility] 716-01 Encounter: [id number]    Primary Care Provider: Gavin Farmer MD   Date and time admitted to hospital: 2/14/2020  9:57 PM        * Generalized weakness  Assessment & Plan  · Patient presented with generalized weakness in the setting of ambulatory dysfunction with chronic bilateral lower extremity weakness being wheelchair-bound  · Sepsis workup negative    · digoxin level normal   · Mildly decreased sodium- slowly resolving with IV hydration  135 today   · Patient was admitted February 4 through February 6 with TIA symptom dysarthria  CT of the head neck did not reveal significant occlusion dissection, neurology consulted however patient could not undergo MRI due to spinal cord stimulator in place  She also was evaluated by PT OT and returned back to her baseline of functionality( wheelchair-bound) so outpatient PT recommended  · Bedside speech and swallow- to remain upright for at least hour after meals   · Will get re-evaluation by PT OT specialist to see if  the patient might benefit from short-term rehab- pending    SIRS (systemic inflammatory response syndrome) ([facility] [address] )  Assessment & Plan  · Negative work up     Transient speech disturbance  Assessment & Plan  · Patient reporting speech issues  · Neurology consulted  · EEG monitoring initiated last night  Theta/delta background, frequent bifrontal triphasic waves, no focal epileptiform discharges nor electrographic seizures    Continue monitoring through today and reassess tomorrow by neurology     Weakness of both lower extremities  Assessment & Plan  · Chronic weakness  · Patient is wheelchair-bound  · Continue fall precautions  · PT OT evaluation- would benefit from rehab     Cervical dystonia  Assessment & Plan  · Patient follows with neurologist as outpatient  · Continue gabapentin   · Stimulator     Essential hypertension  Assessment & Plan  · On spironolactone and lisinopril with holding parameters  · BP stable    Overactive bladder  Assessment & Plan  · Continue Mirabegron    Hyponatremia  Assessment & Plan  · Sodium trending up slowly  135 today   · repeat BMP in a m  Spinal cord stimulator status  Assessment & Plan  · Continue to follow-up with neurosurgery    Paroxysmal atrial fibrillation (HCC)  Assessment & Plan  · On digoxin and Eliquis  · Dig level normal    Type 2 diabetes mellitus [facility])  Assessment & Plan  Lab Results   Component Value Date    HGBA1C 5 8 02/05/2020       Recent Labs     02/15/20  1729 02/15/20  2055 02/16/20  0716 02/16/20  1103   POCGLU 84 143* 104 184*       Blood Sugar Average: Last 72 hrs:  (P) 129 [id number]   Hemoglobin A1c is acceptable, will place ADA diet, Accu-Cheks and insulin sliding scale  Hold metformin      VTE Pharmacologic Prophylaxis:   Pharmacologic: Apixaban (Eliquis)  Mechanical VTE Prophylaxis in Place: Yes    Patient Centered Rounds: I have performed bedside rounds with nursing staff today  Discussions with Specialists or Other Care Team Provider: none    Education and Discussions with Family / Patient: patient    Time Spent for Care: 20 minutes  More than 50% of total time spent on counseling and coordination of care as described above  Current Length of Stay: 1 day(s)    Current Patient Status: Inpatient   Certification Statement: The patient will continue to require additional inpatient hospital stay due to further evaluation and monitoring of slurred speech  To continue with 24 hour EEG monitoring     Discharge Plan: pending work up and possible rehab     Code Status: Level 3 - DNAR and DNI      Subjective:   Patient lying in bed  Was started on 24 hour EEG late last night  States she did not sleep well over night  Groggy  States her slurred speech seems improved, maybe barely there  No further complaints       Objective:     Vitals:   Temp (24hrs), Av °F (36 7 °C), Min:97 4 °F (36 3 °C), Max:98 6 °F (37 °C)    Temp:  [97 4 °F (36 3 °C)-98 6 °F (37 °C)] 98 2 °F (36 8 °C)  HR:  [58-66] 66  Resp:  [16-18] 18  BP: (106-163)/(47-60) 163/59  SpO2:  [96 %-100 %] 100 %  Body mass index is 30 41 kg/m²  Input and Output Summary (last 24 hours): Intake/Output Summary (Last 24 hours) at 2020 1240  Last data filed at 2020 1156  Gross per 24 hour   Intake 1425 ml   Output 1722 ml   Net -297 ml       Physical Exam:     Physical Exam   Constitutional: She is oriented to person, place, and time  She appears well-developed and well-nourished  Neck: Normal range of motion  Cardiovascular: Normal rate and regular rhythm  No murmur heard  Pulmonary/Chest: Effort normal and breath sounds normal  No respiratory distress  Abdominal: Soft  Bowel sounds are normal  She exhibits no distension  Neurological: She is alert and oriented to person, place, and time  Skin: Skin is warm and dry  Psychiatric: She has a normal mood and affect  Her behavior is normal  Judgment and thought content normal          Additional Data:     Labs:    Results from last 7 days   Lab Units 20  0604  20  2251   WBC Thousand/uL 7 34   < > 17 93*   HEMOGLOBIN g/dL 12 0   < > 13 6   HEMATOCRIT % 37 1   < > 41 4   PLATELETS Thousands/uL 154   < > 180   NEUTROS PCT %  --   --  85*   LYMPHS PCT %  --   --  6*   MONOS PCT %  --   --  7   EOS PCT %  --   --  1    < > = values in this interval not displayed       Results from last 7 days   Lab Units 20  0604   SODIUM mmol/L 135*   POTASSIUM mmol/L 4 8   CHLORIDE mmol/L 104   CO2 mmol/L 30   BUN mg/dL 21   CREATININE mg/dL 0 78   ANION GAP mmol/L 1*   CALCIUM mg/dL 10 1   GLUCOSE RANDOM mg/dL 105         Results from last 7 days   Lab Units 20  1103 20  0716 02/15/20  2055 02/15/20  1729 02/15/20  1646 02/15/20  1200 02/15/20  0708   POC GLUCOSE mg/dl 184* 104 143* 84 106 169* 115 Results from last 7 days   Lab Units 02/15/20  0147   LACTIC ACID mmol/L 1 7           * I Have Reviewed All Lab Data Listed Above  * Additional Pertinent Lab Tests Reviewed: [address] Admission Reviewed    Imaging:    Imaging Reports Reviewed Today Include: none  Imaging Personally Reviewed by Myself Includes:  none    Recent Cultures (last 7 days):     Results from last 7 days   Lab Units 02/15/20  0157 02/15/20  0147   BLOOD CULTURE   --  No Growth at 24 hrs  GRAM STAIN RESULT  Gram positive cocci in clusters*  --        Last 24 Hours Medication List:     Current Facility-Administered Medications:  acetaminophen 650 mg Oral Q6H PRN Viviana Sonya, MD   apixaban 5 mg Oral BID Viviana Sonya, MD   atorvastatin 40 mg Oral QPM Viviana Null, MD   calcium carbonate 1 tablet Oral BID With Meals Viviana Hassan, MD   cholecalciferol 1,000 Units Oral QPM Viviana Null, MD   vitamin B-12 1,000 mcg Oral QPM Viviana Null, MD   digoxin 125 mcg Oral Daily Viviana Null, MD   docusate sodium 100 mg Oral BID Viviana Null, MD   folic acid 1,567 mcg Oral Daily Viviana Null, MD   gabapentin 300 mg Oral Daily Viviana Null, MD   insulin lispro 1-6 Units Subcutaneous TID AC Viviana Sonya, MD   insulin lispro 1-6 Units Subcutaneous HS Viviana Sonya, MD   lisinopril 2 5 mg Oral Daily Viviana Null, MD   ondansetron 4 mg Intravenous Q6H PRN Viviana Sonya, MD   oxybutynin 10 mg Oral Daily Viviana Sonya, MD   polyethylene glycol 17 g Oral Daily PRN Viviana Sonya, MD   primidone 50 mg Oral Q12H [address] Viviana Null, MD   senna 1 tablet Oral Daily Viviana Null, MD   spironolactone 50 mg Oral Daily Viviana Sonya, MD        Today, Patient Was Seen By: VALERI Xiao    ** Please Note: Dictation voice to text software may have been used in the creation of this document   **

## 2020-02-16 NOTE — ASSESSMENT & PLAN NOTE
· Chronic weakness  · Patient is wheelchair-bound  · Continue fall precautions  · PT OT evaluation- would benefit from rehab

## 2020-02-17 PROBLEM — R65.10 SIRS (SYSTEMIC INFLAMMATORY RESPONSE SYNDROME) (HCC): Status: RESOLVED | Noted: 2020-02-15 | Resolved: 2020-02-17

## 2020-02-17 PROBLEM — E87.1 HYPONATREMIA: Chronic | Status: RESOLVED | Noted: 2020-02-04 | Resolved: 2020-02-17

## 2020-02-17 LAB
BACTERIA BLD CULT: ABNORMAL
GLUCOSE SERPL-MCNC: 111 MG/DL (ref 65–140)
GLUCOSE SERPL-MCNC: 151 MG/DL (ref 65–140)
GLUCOSE SERPL-MCNC: 183 MG/DL (ref 65–140)
GLUCOSE SERPL-MCNC: 202 MG/DL (ref 65–140)
GRAM STN SPEC: ABNORMAL

## 2020-02-17 PROCEDURE — 82948 REAGENT STRIP/BLOOD GLUCOSE: CPT

## 2020-02-17 PROCEDURE — 99233 SBSQ HOSP IP/OBS HIGH 50: CPT | Performed by: INTERNAL MEDICINE

## 2020-02-17 PROCEDURE — 99232 SBSQ HOSP IP/OBS MODERATE 35: CPT | Performed by: PSYCHIATRY & NEUROLOGY

## 2020-02-17 RX ADMIN — GABAPENTIN 300 MG: 300 CAPSULE ORAL at 07:44

## 2020-02-17 RX ADMIN — PRIMIDONE 50 MG: 50 TABLET ORAL at 07:44

## 2020-02-17 RX ADMIN — CALCIUM 1 TABLET: 500 TABLET ORAL at 07:43

## 2020-02-17 RX ADMIN — DOCUSATE SODIUM 100 MG: 100 CAPSULE, LIQUID FILLED ORAL at 16:38

## 2020-02-17 RX ADMIN — CYANOCOBALAMIN TAB 500 MCG 1000 MCG: 500 TAB at 16:38

## 2020-02-17 RX ADMIN — SPIRONOLACTONE 50 MG: 50 TABLET ORAL at 07:44

## 2020-02-17 RX ADMIN — APIXABAN 5 MG: 5 TABLET, FILM COATED ORAL at 16:38

## 2020-02-17 RX ADMIN — MELATONIN 1000 UNITS: at 16:39

## 2020-02-17 RX ADMIN — DOCUSATE SODIUM 100 MG: 100 CAPSULE, LIQUID FILLED ORAL at 07:44

## 2020-02-17 RX ADMIN — DIGOXIN 125 MCG: 125 TABLET ORAL at 07:45

## 2020-02-17 RX ADMIN — ATORVASTATIN CALCIUM 40 MG: 40 TABLET, FILM COATED ORAL at 16:38

## 2020-02-17 RX ADMIN — APIXABAN 5 MG: 5 TABLET, FILM COATED ORAL at 07:44

## 2020-02-17 RX ADMIN — LISINOPRIL 2.5 MG: 2.5 TABLET ORAL at 07:44

## 2020-02-17 RX ADMIN — FOLIC ACID 1000 MCG: 1 TABLET ORAL at 07:45

## 2020-02-17 RX ADMIN — INSULIN LISPRO 2 UNITS: 100 INJECTION, SOLUTION INTRAVENOUS; SUBCUTANEOUS at 22:09

## 2020-02-17 RX ADMIN — INSULIN LISPRO 1 UNITS: 100 INJECTION, SOLUTION INTRAVENOUS; SUBCUTANEOUS at 16:38

## 2020-02-17 RX ADMIN — OXYBUTYNIN 10 MG: 5 TABLET, FILM COATED, EXTENDED RELEASE ORAL at 07:43

## 2020-02-17 RX ADMIN — CALCIUM 1 TABLET: 500 TABLET ORAL at 16:37

## 2020-02-17 RX ADMIN — INSULIN LISPRO 1 UNITS: 100 INJECTION, SOLUTION INTRAVENOUS; SUBCUTANEOUS at 12:40

## 2020-02-17 RX ADMIN — SENNOSIDES 8.6 MG: 8.6 TABLET, FILM COATED ORAL at 07:44

## 2020-02-17 NOTE — ASSESSMENT & PLAN NOTE
· Chronic weakness  · Patient is wheelchair-bound  · Continue fall precautions  · PT OT  Recommend outpatient PT /OT

## 2020-02-17 NOTE — ASSESSMENT & PLAN NOTE
· Patient reporting speech issues  · Neurology consulted  · EEG monitoring: Theta/delta background, frequent bifrontal triphasic waves, no focal epileptiform discharges nor electrographic seizures  -  Remove 2/17  · at this time no AEDs recommended  · unable to get MRI brain due to spine stimulator  · neurology has signed off

## 2020-02-17 NOTE — ASSESSMENT & PLAN NOTE
Lab Results   Component Value Date    HGBA1C 5 8 02/05/2020       Recent Labs     02/16/20  2104 02/17/20  0647 02/17/20  1131 02/17/20  1614   POCGLU 120 111 183* 151*       Blood Sugar Average: Last 72 hrs:  (P) 132   Hemoglobin A1c is acceptable, will place ADA diet, Accu-Cheks and insulin sliding scale  Hold metformin

## 2020-02-17 NOTE — SOCIAL WORK
30 day readmission  Pt states her daughter called PCP  Pt states her PCP advised she come to the hospital     Met with pt to discuss the role of CM and to discuss any help pt may need prior to dc  Pt lives with her daughter Vance Osman in a 2 story home with a ramp to enter  Pt has a 1st floor setup  Pt has a sit to stand, electric chair, shower chair, and a recliner chair  Pt does not ambulate  Pt sleeps in her recliner chair  Pt has caregivers daily 8am-8pm through the [employer] program/[facility]  Pt states she has been having difficulty with [facility]  Pt informed CM that she and her daughter have been in contact with the [employer] program to see about having another agency  Pt's PCP is Dr Cortes Gillette  Pt's preferred pharmacy is [address] [place] [address] in [facility]  No hx of mental health or D&A treatment  Pt has a hx of rehab at [facility]  No hx of [facility]      Vance Osman [facility]  Pt states she was receiving outpt therapy prior to admission  CM discussed therapies recommendation to continue outpt therapy  Pt is agreeable  CM informed [facility] Niesha Agustin of [facility]; she [facility]      Contact: Vance Osman (daughter) [phone] or [phone]  No POA or living will  Tony Kirk has a handicap [place] and will transport home at dc      CM reviewed d/c planning process including the following: identifying help at home, patient preference for d/c planning needs, Discharge Lounge, [facility] Meds to Bed program, availability of treatment team to discuss questions or concerns patient and/or family may have regarding understanding medications and recognizing signs and symptoms once discharged  CM also encouraged patient to follow up with all recommended appointments after discharge  Patient advised of importance for patient and family to participate in managing patients medical well being      Patient/caregiver received discharge checklist  Content reviewed   Patient/caregiver encouraged to participate in discharge plan of care prior to discharge home

## 2020-02-17 NOTE — ED ATTENDING ATTESTATION
2/14/2020  IAmanda MD, saw and evaluated the patient  I have discussed the patient with the resident/non-physician practitioner and agree with the resident's/non-physician practitioner's findings, Plan of Care, and MDM as documented in the resident's/non-physician practitioner's note, except where noted  All available labs and Radiology studies were reviewed  I was present for key portions of any procedure(s) performed by the resident/non-physician practitioner and I was immediately available to provide assistance  At this point I agree with the current assessment done in the Emergency Department  I have conducted an independent evaluation of this patient a history and physical is as follows:    Patient is a 79-year-old female who presents with generalized weakness recently discharged on 02/06/2020 patient states that she has become progressively weak and is unable to care for self at home denies any recent falls or trauma change in vision headaches are her symptoms  Patient uses wheelchair baseline is with daughter who helps take care of her  Regular rate rhythm, lungs are clear    Abdomen soft nontender nondistended normal bowel sounds no signs of peritonitis  Neuro exam nonfocal gait not assessed secondary to weakness no lateralizing deficits    Impression generalized weakness    Check screening labs plan on admitting patient to hospital as patient is unable to care for self at home due to her generalized weakness        ED Course        Critical Care Time  Procedures

## 2020-02-17 NOTE — PROGRESS NOTES
Progress Note - Sami Weaver 1935, 80 y o  female MRN: [id number]    Unit/Bed#: [facility] 711-01 Encounter: [id number]    Primary Care Provider: Deisy Melendez MD   Date and time admitted to hospital: 2/14/2020  9:57 PM        Transient speech disturbance  Assessment & Plan  · Patient reporting speech issues  · Neurology consulted  · EEG monitoring: Theta/delta background, frequent bifrontal triphasic waves, no focal epileptiform discharges nor electrographic seizures  -  Remove 2/17  · at this time no AEDs recommended  · unable to get MRI brain due to spine stimulator  · neurology has signed off    Weakness of both lower extremities  Assessment & Plan  · Chronic weakness  · Patient is wheelchair-bound  · Continue fall precautions  · PT OT  Recommend outpatient PT /OT    Cervical dystonia  Assessment & Plan  · Patient follows with neurologist as outpatient  · Continue gabapentin   · Stimulator  In place    Essential hypertension  Assessment & Plan  · On spironolactone and lisinopril with holding parameters  · BP  controlled    Overactive bladder  Assessment & Plan  · Continue  oxybutynin    Spinal cord stimulator status  Assessment & Plan  · Continue to follow-up with neurosurgery    Paroxysmal atrial fibrillation (HCC)  Assessment & Plan  ·  Rate controlled  · On digoxin and Eliquis  · Dig level normal    Type 2 diabetes mellitus [facility])  Assessment & Plan  Lab Results   Component Value Date    HGBA1C 5 8 02/05/2020       Recent Labs     02/16/20  2104 02/17/20  0647 02/17/20  1131 02/17/20  1614   POCGLU 120 111 183* 151*       Blood Sugar Average: Last 72 hrs:  (P) 132   Hemoglobin A1c is acceptable, will place ADA diet, Accu-Cheks and insulin sliding scale  Hold metformin    * Generalized weakness  Assessment & Plan  · Patient presented with generalized weakness in the setting of ambulatory dysfunction with chronic bilateral lower extremity weakness being wheelchair-bound  · Sepsis workup negative · digoxin level normal   · Mildly decreased sodium- slowly resolving with IV hydration  135 today   · Patient was admitted  through  with TIA symptom dysarthria  CT of the head neck did not reveal significant occlusion dissection, neurology consulted however patient could not undergo MRI due to spinal cord stimulator in place  She also was evaluated by PT OT and returned back to her baseline of functionality( wheelchair-bound) so outpatient PT recommended  · Bedside speech and swallow- to remain upright for at least hour after meals     SIRS (systemic inflammatory response syndrome) (HCC)resolved as of 2020  Assessment & Plan  · Negative work up   ·  not due to infectious cause    Hyponatremiaresolved as of 2020  Assessment & Plan  ·  resolved      VTE Pharmacologic Prophylaxis:   Pharmacologic: Apixaban (Eliquis)  Mechanical VTE Prophylaxis in Place: Yes    Patient Centered Rounds: I have performed bedside rounds with nursing staff today  Discussions with Specialists or Other Care Team Provider: neurology    Education and Discussions with Family / Patient: patient    Time Spent for Care: 45 minutes  More than 50% of total time spent on counseling and coordination of care as described above  Current Length of Stay: 2 day(s)    Current Patient Status: Inpatient   Certification Statement: The patient will continue to require additional inpatient hospital stay due to further monitoring    Discharge Plan: home with outpatient PT    Code Status: Level 3 - DNAR and DNI      Subjective:   No acute overnight events  This morning patient was taken off EEG  She has no complaints  She is concerned about returning of symptoms      Objective:     Vitals:   Temp (24hrs), Av 9 °F (36 6 °C), Min:97 5 °F (36 4 °C), Max:98 3 °F (36 8 °C)    Temp:  [97 5 °F (36 4 °C)-98 3 °F (36 8 °C)] 97 5 °F (36 4 °C)  HR:  [55-68] 55  Resp:  [16-18] 18  BP: (132-157)/(52-62) 132/55  SpO2:  [97 %-100 %] 100 %  Body mass index is 30 41 kg/m²  Input and Output Summary (last 24 hours): Intake/Output Summary (Last 24 hours) at 2/17/2020 1812  Last data filed at 2/17/2020 1743  Gross per 24 hour   Intake 600 ml   Output 1350 ml   Net -750 ml       Physical Exam:     Physical Exam   Constitutional: She is oriented to person, place, and time  She appears well-developed and well-nourished  HENT:   Head: Normocephalic and atraumatic  Mouth/Throat: Oropharynx is clear and moist    Eyes: Pupils are equal, round, and reactive to light  Conjunctivae are normal    Neck: Neck supple  No JVD present  Cardiovascular: Normal rate, regular rhythm, normal heart sounds and intact distal pulses  Pulmonary/Chest: Effort normal and breath sounds normal    Abdominal: Soft  Bowel sounds are normal    Musculoskeletal: She exhibits no edema or tenderness  Neurological: She is alert and oriented to person, place, and time  Skin: Skin is warm and dry  Capillary refill takes 2 to 3 seconds  Psychiatric: She has a normal mood and affect  Her behavior is normal  Judgment and thought content normal    Nursing note and vitals reviewed  Additional Data:     Labs:    Results from last 7 days   Lab Units 02/16/20  0604  02/14/20  2251   WBC Thousand/uL 7 34   < > 17 93*   HEMOGLOBIN g/dL 12 0   < > 13 6   HEMATOCRIT % 37 1   < > 41 4   PLATELETS Thousands/uL 154   < > 180   NEUTROS PCT %  --   --  85*   LYMPHS PCT %  --   --  6*   MONOS PCT %  --   --  7   EOS PCT %  --   --  1    < > = values in this interval not displayed       Results from last 7 days   Lab Units 02/16/20  0604   SODIUM mmol/L 135*   POTASSIUM mmol/L 4 8   CHLORIDE mmol/L 104   CO2 mmol/L 30   BUN mg/dL 21   CREATININE mg/dL 0 78   ANION GAP mmol/L 1*   CALCIUM mg/dL 10 1   GLUCOSE RANDOM mg/dL 105         Results from last 7 days   Lab Units 02/17/20  1614 02/17/20  1131 02/17/20  0647 02/16/20  2104 02/16/20  1624 02/16/20  1103 02/16/20  5526 02/15/20  2055 02/15/20  1729 02/15/20  1646 02/15/20  1200 02/15/20  0708   POC GLUCOSE mg/dl 151* 183* 111 120 114 184* 104 143* 84 106 169* 115         Results from last 7 days   Lab Units 02/15/20  0147   LACTIC ACID mmol/L 1 7           * I Have Reviewed All Lab Data Listed Above  * Additional Pertinent Lab Tests Reviewed: No New Labs Available For Today    Imaging:    Imaging Reports Reviewed Today Include: n/a  Imaging Personally Reviewed by Myself Includes:  n/a    Recent Cultures (last 7 days):     Results from last 7 days   Lab Units 02/15/20  0157 02/15/20  0147   BLOOD CULTURE  Staphylococcus coagulase negative* No Growth at 48 hrs     GRAM STAIN RESULT  Gram positive cocci in clusters*  --        Last 24 Hours Medication List:     Current Facility-Administered Medications:  acetaminophen 650 mg Oral Q6H PRN Becky Mabry MD   apixaban 5 mg Oral BID Becky Mabry MD   atorvastatin 40 mg Oral QPM Becky Mabry MD   calcium carbonate 1 tablet Oral BID With Meals Becky Mabry MD   cholecalciferol 1,000 Units Oral QPM Becky Mabry MD   vitamin B-12 1,000 mcg Oral QPM Becky Mabry MD   digoxin 125 mcg Oral Daily Becky Mabry MD   docusate sodium 100 mg Oral BID Becky Mabry MD   folic acid 9,476 mcg Oral Daily Becky Mabry MD   gabapentin 300 mg Oral Daily Becky Mabry MD   insulin lispro 1-6 Units Subcutaneous TID AC Becky Mabry MD   insulin lispro 1-6 Units Subcutaneous HS Becky Mabry MD   lisinopril 2 5 mg Oral Daily Becky Mabry MD   ondansetron 4 mg Intravenous Q6H PRN Becky Mabry MD   oxybutynin 10 mg Oral Daily Becky Mabry MD   polyethylene glycol 17 g Oral Daily PRN Becky Mabry MD   primidone 50 mg Oral Q12H [address] Becky Mabry MD   senna 1 tablet Oral Daily Becky Mabry MD   spironolactone 50 mg Oral Daily Becky Mabry MD        Today, Patient Was Seen By: Hunter Elizondo MD    ** Please Note: Dictation voice to text software may have been used in the creation of this document   **

## 2020-02-17 NOTE — PLAN OF CARE
Problem: Potential for Falls  Goal: Patient will remain free of falls  Description  INTERVENTIONS:  - Assess patient frequently for physical needs  -  Identify cognitive and physical deficits and behaviors that affect risk of falls    -  [place] fall precautions as indicated by assessment   - Educate patient/family on patient safety including physical limitations  - Instruct patient to call for assistance with activity based on assessment  - Modify environment to reduce risk of injury  - Consider OT/PT consult to assist with strengthening/mobility  Outcome: Progressing     Problem: Prexisting or High Potential for Compromised Skin Integrity  Goal: Skin integrity is maintained or improved  Description  INTERVENTIONS:  - Identify patients at risk for skin breakdown  - Assess and monitor skin integrity  - Assess and monitor nutrition and hydration status  - Monitor labs   - Assess for incontinence   - Turn and reposition patient  - Assist with mobility/ambulation  - Relieve pressure over bony prominences  - Avoid friction and shearing  - Provide appropriate hygiene as needed including keeping skin clean and dry  - Evaluate need for skin moisturizer/barrier cream  - Collaborate with interdisciplinary team   - Patient/family teaching  - Consider wound care consult   Outcome: Progressing     Problem: PAIN - ADULT  Goal: Verbalizes/displays adequate comfort level or baseline comfort level  Description  Interventions:  - Encourage patient to monitor pain and request assistance  - Assess pain using appropriate pain scale  - Administer analgesics based on type and severity of pain and evaluate response  - Implement non-pharmacological measures as appropriate and evaluate response  - Notify physician/advanced practitioner if interventions unsuccessful or patient reports new pain   Outcome: Progressing     Problem: INFECTION - ADULT  Goal: Absence or prevention of progression during hospitalization  Description  INTERVENTIONS:  - Assess and monitor for signs and symptoms of infection  - Monitor lab/diagnostic results  - Monitor all insertion sites, i e  indwelling lines, tubes, and drains  - [place] appropriate cooling/warming therapies per order  - Administer medications as ordered  - Instruct and encourage patient and family to use good hand hygiene technique  - Identify and instruct in appropriate isolation precautions for identified infection/condition   Outcome: Progressing     Problem: SAFETY ADULT  Goal: Patient will remain free of falls  Description  INTERVENTIONS:  - Assess patient frequently for physical needs  -  Identify cognitive and physical deficits and behaviors that affect risk of falls  -  [place] fall precautions as indicated by assessment   - Educate patient/family on patient safety including physical limitations  - Instruct patient to call for assistance with activity based on assessment  - Modify environment to reduce risk of injury  - Consider OT/PT consult to assist with strengthening/mobility  Outcome: Progressing     Problem: DISCHARGE PLANNING  Goal: Discharge to home or other facility with appropriate resources  Description  INTERVENTIONS:  - Identify barriers to discharge w/patient and caregiver  - Arrange for needed discharge resources and transportation as appropriate  - Identify discharge learning needs (meds, wound care, etc )  - Refer to Case Management Department for coordinating discharge planning if the patient needs post-hospital services based on physician/advanced practitioner order or complex needs related to functional status, cognitive ability, or social support system   Outcome: Progressing     Problem: Knowledge Deficit  Goal: Patient/family/caregiver demonstrates understanding of disease process, treatment plan, medications, and discharge instructions  Description  Complete learning assessment and assess knowledge base    Interventions:  - Provide teaching at level of understanding  - Provide teaching via preferred learning methods  Outcome: Progressing     Problem: NEUROSENSORY - ADULT  Goal: Achieves stable or improved neurological status  Description  INTERVENTIONS  - Monitor and report changes in neurological status  - Monitor vital signs such as temperature, blood pressure, glucose, and any other labs ordered   - Initiate measures to prevent increased intracranial pressure  - Monitor for seizure activity and implement precautions if appropriate      Outcome: Progressing  Goal: Remains free of injury related to seizures activity  Description  INTERVENTIONS  - Maintain airway, patient safety  and administer oxygen as ordered  - Monitor patient for seizure activity, document and report duration and description of seizure to physician/advanced practitioner  - If seizure occurs,  ensure patient safety during seizure  - Reorient patient post seizure  - Seizure pads on all 4 side rails  - Instruct patient/family to notify RN of any seizure activity including if an aura is experienced  - Instruct patient/family to call for assistance with activity based on nursing assessment  - Administer anti-seizure medications if ordered    Outcome: Progressing  Goal: Achieves maximal functionality and self care  Description  INTERVENTIONS  - Monitor swallowing and airway patency with patient fatigue and changes in neurological status  - Encourage and assist patient to increase activity and self care     - Encourage visually impaired, hearing impaired and aphasic patients to use assistive/communication devices  Outcome: Progressing     Problem: METABOLIC, FLUID AND ELECTROLYTES - ADULT  Goal: Glucose maintained within target range  Description  INTERVENTIONS:  - Monitor Blood Glucose as ordered  - Assess for signs and symptoms of hyperglycemia and hypoglycemia  - Administer ordered medications to maintain glucose within target range  - Assess nutritional intake and initiate nutrition service referral as needed  Outcome: Progressing

## 2020-02-17 NOTE — ASSESSMENT & PLAN NOTE
· Patient presented with generalized weakness in the setting of ambulatory dysfunction with chronic bilateral lower extremity weakness being wheelchair-bound  · Sepsis workup negative    · digoxin level normal   · Mildly decreased sodium- slowly resolving with IV hydration  135 today   · Patient was admitted February 4 through February 6 with TIA symptom dysarthria  CT of the head neck did not reveal significant occlusion dissection, neurology consulted however patient could not undergo MRI due to spinal cord stimulator in place  She also was evaluated by PT OT and returned back to her baseline of functionality( wheelchair-bound) so outpatient PT recommended      · Bedside speech and swallow- to remain upright for at least hour after meals

## 2020-02-17 NOTE — PROGRESS NOTES
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, vascular risk factors presenting with recurrent dysarthra/speech change also described as word-finding difficulty and generalized weakness beginning morning of 2/14      Speech symptoms have again resolved during this admission; ultimately the etiology of these events is unclear, prior differential included TIA, however has had multiple events now in the setting of systemic anticoagulation with Eliquis given her AFib   Overall symptoms are fairly nonspecific, video EEG monitoring completed this admission and did not demonstrate any evidence of epileptiform abnormalities or electrographic seizures  1  Recurrent/intermittent speech disturbance, currently resolved:  -video EEG monitoring discontinued this morning as no concerning epileptiform abnormalities nor electrographic seizures  -CT head from recent admission with no acute intracranial pathology  -unable to obtain MRI brain due to spinal stimulator  -would not initiate AED given no concerning epileptic findings and unclear etiology of events  -metabolic/infectious derangement monitoring correction per primary team   -remains afebrile, no leukocytosis yesterday   -metabolic derangements yesterday   -prior workup a chest x-ray, UA, flu panel unremarkable    2   Atrial fibrillation:  -anticoagulated on Eliquis 5 mg twice daily      3  Cervical dystonia:  -management per outpatient movement disorder team  -patient states Botox injection is scheduled for March 4  Generalized weakness:  -no concerning findings on exam, as mentioned above spinal stimulator precludes MRI     5  Chronic pain/spinal stenosis:  -spinal stimulator in place    No further inpatient neurologic workup, okay from neurologic standpoint for discharge  Discussed plan of care with attending neurologist     Subjective:   Patient resting in bedside chair, video EEG monitoring off this morning, states she slept poorly, feels better though sitting up and after being washed  Speech continues to be clear without any worsening or recurrence of word-finding/dysarthria  Feels stronger overall, no new neurologic deficits otherwise  Vitals: Blood pressure 132/54, pulse 59, temperature 97 7 °F (36 5 °C), resp  rate 16, weight 82 9 kg (182 lb 12 2 oz), SpO2 97 %, not currently breastfeeding  ,Body mass index is 30 41 kg/m²  Physical Exam:   Physical Exam   Constitutional: She is oriented to person, place, and time  She appears well-developed  HENT:   Head: Normocephalic and atraumatic  Eyes: Pupils are equal, round, and reactive to light  EOM are normal    Neck: Normal range of motion  Neck supple  Cardiovascular: Normal rate and regular rhythm  Pulmonary/Chest: Effort normal and breath sounds normal    Abdominal: Soft  Bowel sounds are normal    Musculoskeletal: Normal range of motion  Neurological: She is alert and oriented to person, place, and time  She has a normal Finger-Nose-Finger Test    Skin: Skin is warm and dry  Neurologic Exam     Mental Status   Oriented to person, place, and time  Able to read  Able to repeat  Normal comprehension  Speech clear/hypophonic, no aphasia or dysarthria  Oriented  Follows commands  Cranial Nerves     CN II   Visual fields full to confrontation  CN III, IV, VI   Pupils are equal, round, and reactive to light  Extraocular motions are normal      CN V   Facial sensation intact  CN VII   Facial expression full, symmetric  Bilateral end gaze nystagmus        Motor Exam   Age-appropriate strength throughout, mild degree of weakness in proximal lowers, age-appropriate strength distal lower extremities bilaterally  No lateralizing deficit nor focality to exam      Sensory Exam   Light touch normal      Gait, Coordination, and Reflexes     Coordination   Finger to nose coordination: normal  Head tremor observed throughout exam, low-amplitude  Lab, Imaging and other studies:   Video EEG monitoring 02/16/2020-02/17/2020:  Final read pending  CBC:   Results from last 7 days   Lab Units 02/16/20  0604 02/15/20  0457 02/14/20  2251   WBC Thousand/uL 7 34 13 62* 17 93*   RBC Million/uL 3 93 4 00 4 42   HEMOGLOBIN g/dL 12 0 12 4 13 6   HEMATOCRIT % 37 1 37 3 41 4   MCV fL 94 93 94   PLATELETS Thousands/uL 154 205 180   , BMP/CMP:   Results from last 7 days   Lab Units 02/16/20  0604 02/15/20  0457 02/14/20  2250   SODIUM mmol/L 135* 134* 133*   POTASSIUM mmol/L 4 8 4 3 4 9   CHLORIDE mmol/L 104 105 103   CO2 mmol/L 30 24 20*   BUN mg/dL 21 25 29*   CREATININE mg/dL 0 78 0 73 0 94   CALCIUM mg/dL 10 1 10 2* 10 2*   EGFR ml/min/1 73sq m 70 76 56   , Vitamin B12:   , HgBA1C:   , TSH:   Results from last 7 days   Lab Units 02/14/20  2250   TSH 3RD GENERATON uIU/mL 1 360   , Coagulation:   , Lipid Profile:   , Ammonia:   , Urinalysis:   Results from last 7 days   Lab Units 02/15/20  0019   COLOR UA  Yellow  see results   CLARITY UA  Clear   SPEC GRAV UA  1 025   PH UA  6 0   LEUKOCYTES UA  Negative   NITRITE UA  Negative   GLUCOSE UA mg/dl Negative   KETONES UA mg/dl Negative   BILIRUBIN UA  Negative   BLOOD UA  Trace*   , Drug Screen:   , Medication Drug Levels:       Invalid input(s): CARBAMAZEPINE,  PHENOBARB, LACOSAMIDE, OXCARBAZEPINE  VTE Prophylaxis: Sequential compression device (Venodyne)     Total time spent today 20 minutes  Discussed plan care with patient and primary team:  Video EEG monitoring stopped, no concerning findings, no further inpatient neurologic workup

## 2020-02-18 VITALS
BODY MASS INDEX: 32.38 KG/M2 | SYSTOLIC BLOOD PRESSURE: 123 MMHG | TEMPERATURE: 97.7 F | WEIGHT: 182.76 LBS | HEART RATE: 63 BPM | RESPIRATION RATE: 16 BRPM | DIASTOLIC BLOOD PRESSURE: 57 MMHG | HEIGHT: 63 IN | OXYGEN SATURATION: 100 %

## 2020-02-18 LAB
GLUCOSE SERPL-MCNC: 132 MG/DL (ref 65–140)
GLUCOSE SERPL-MCNC: 219 MG/DL (ref 65–140)

## 2020-02-18 PROCEDURE — 82948 REAGENT STRIP/BLOOD GLUCOSE: CPT

## 2020-02-18 PROCEDURE — 99239 HOSP IP/OBS DSCHRG MGMT >30: CPT | Performed by: INTERNAL MEDICINE

## 2020-02-18 RX ADMIN — CALCIUM 1 TABLET: 500 TABLET ORAL at 08:54

## 2020-02-18 RX ADMIN — INSULIN LISPRO 2 UNITS: 100 INJECTION, SOLUTION INTRAVENOUS; SUBCUTANEOUS at 11:37

## 2020-02-18 RX ADMIN — GABAPENTIN 300 MG: 300 CAPSULE ORAL at 08:55

## 2020-02-18 RX ADMIN — DOCUSATE SODIUM 100 MG: 100 CAPSULE, LIQUID FILLED ORAL at 08:55

## 2020-02-18 RX ADMIN — FOLIC ACID 1000 MCG: 1 TABLET ORAL at 08:55

## 2020-02-18 RX ADMIN — APIXABAN 5 MG: 5 TABLET, FILM COATED ORAL at 08:55

## 2020-02-18 RX ADMIN — LISINOPRIL 2.5 MG: 2.5 TABLET ORAL at 08:54

## 2020-02-18 RX ADMIN — SENNOSIDES 8.6 MG: 8.6 TABLET, FILM COATED ORAL at 08:54

## 2020-02-18 RX ADMIN — PRIMIDONE 50 MG: 50 TABLET ORAL at 08:56

## 2020-02-18 RX ADMIN — OXYBUTYNIN 10 MG: 5 TABLET, FILM COATED, EXTENDED RELEASE ORAL at 08:55

## 2020-02-18 RX ADMIN — SPIRONOLACTONE 50 MG: 50 TABLET ORAL at 08:55

## 2020-02-18 RX ADMIN — DIGOXIN 125 MCG: 125 TABLET ORAL at 08:55

## 2020-02-18 NOTE — DISCHARGE SUMMARY
Discharge Summary - Cassidy Kevin 80 y o  female MRN: [id number]    Unit/Bed#: [facility] 680-41 Encounter: [id number]    Admission Date:   Admission Orders (From admission, onward)     Ordered        02/15/20 0951  Inpatient Admission  Once         02/15/20 0225  Place in Observation  Once                     Admitting Diagnosis: Weakness [R53 1]  SIRS (systemic inflammatory response syndrome) ([facility] [address] ) [R65 10]    HPI: Cassidy Kevin is a 80 y o  female who presented for evaluation of generalized weakness noted this morning  She denies any complain to the ER however upon my assessment she claims that she has again dysarthria and was admitted for dysarthria  Of notes she was evaluated in the hospital from February 4th to February 6 with similar presentation of dysarthria bilateral lower extremity weakness however neurologist mentioned that bilateral lower extremity weakness is chronic in nature patient dysarthria improve MRI could not be obtained due to spinal cord stimulator in place  Patient was evaluated by PT OT and recommended to be discharged with outpatient PT  At baseline she is wheelchair bound    Procedures Performed: No orders of the defined types were placed in this encounter  Summary of Hospital Course:  Despite the patient being source positive no clear source of infection was ever identified and the patient's vitals normalized without further intervention  Blood cultures originally obtained and the patient was SIRS positive came back 1/2 bottles for coagulase negative Staph-skin contaminant most likely and no antibiotics were required on discharge  The patient's dysarthria spontaneously improved and has not recurred since she has been hospitalized  Neurology and their evaluation indicated that an MRI would be desirable put unable to obtain such due to implanted hardware  Patient then underwent video EEG monitoring that did not reveal any seizure-like activity    Physical therapy evaluation cleared the patient for home PT OT  Patient is med now considered medically stable for discharge home follow-up with her PCP in 2 weeks for medication review and Wellness check  Significant Findings, Care, Treatment and Services Provided:      Complications:  None    Discharge Diagnosis:  Self-limiting episode of Dysarthria, likely TIA    Resolved Problems  Date Reviewed: 2/17/2020          Resolved    Hyponatremia 2/17/2020     Resolved by  Fidelia Pillai MD    SIRS (systemic inflammatory response syndrome) ([facility] [address] ) 2/17/2020     Resolved by  Fidelia Pillai MD          Condition at Discharge: good         Discharge instructions/Information to patient and family:   See after visit summary for information provided to patient and family  Provisions for Follow-Up Care:  See after visit summary for information related to follow-up care and any pertinent home health orders  PCP: Ofelia Willis MD    Disposition: Home    Planned Readmission: No      Discharge Statement   I spent 30 minutes discharging the patient  This time was spent on the day of discharge  I had direct contact with the patient on the day of discharge  Additional documentation is required if more than 30 minutes were spent on discharge  Discharge Medications:  See after visit summary for reconciled discharge medications provided to patient and family

## 2020-02-18 NOTE — PLAN OF CARE
Problem: Potential for Falls  Goal: Patient will remain free of falls  Description  INTERVENTIONS:  - Assess patient frequently for physical needs  -  Identify cognitive and physical deficits and behaviors that affect risk of falls    -  [place] fall precautions as indicated by assessment   - Educate patient/family on patient safety including physical limitations  - Instruct patient to call for assistance with activity based on assessment  - Modify environment to reduce risk of injury  - Consider OT/PT consult to assist with strengthening/mobility  Outcome: Progressing     Problem: Prexisting or High Potential for Compromised Skin Integrity  Goal: Skin integrity is maintained or improved  Description  INTERVENTIONS:  - Identify patients at risk for skin breakdown  - Assess and monitor skin integrity  - Assess and monitor nutrition and hydration status  - Monitor labs   - Assess for incontinence   - Turn and reposition patient  - Assist with mobility/ambulation  - Relieve pressure over bony prominences  - Avoid friction and shearing  - Provide appropriate hygiene as needed including keeping skin clean and dry  - Evaluate need for skin moisturizer/barrier cream  - Collaborate with interdisciplinary team   - Patient/family teaching  - Consider wound care consult   Outcome: Progressing     Problem: PAIN - ADULT  Goal: Verbalizes/displays adequate comfort level or baseline comfort level  Description  Interventions:  - Encourage patient to monitor pain and request assistance  - Assess pain using appropriate pain scale  - Administer analgesics based on type and severity of pain and evaluate response  - Implement non-pharmacological measures as appropriate and evaluate response  - Notify physician/advanced practitioner if interventions unsuccessful or patient reports new pain   Outcome: Progressing     Problem: INFECTION - ADULT  Goal: Absence or prevention of progression during hospitalization  Description  INTERVENTIONS:  - Assess and monitor for signs and symptoms of infection  - Monitor lab/diagnostic results  - Monitor all insertion sites, i e  indwelling lines, tubes, and drains  - [place] appropriate cooling/warming therapies per order  - Administer medications as ordered  - Instruct and encourage patient and family to use good hand hygiene technique  - Identify and instruct in appropriate isolation precautions for identified infection/condition   Outcome: Progressing     Problem: SAFETY ADULT  Goal: Patient will remain free of falls  Description  INTERVENTIONS:  - Assess patient frequently for physical needs  -  Identify cognitive and physical deficits and behaviors that affect risk of falls  -  [place] fall precautions as indicated by assessment   - Educate patient/family on patient safety including physical limitations  - Instruct patient to call for assistance with activity based on assessment  - Modify environment to reduce risk of injury  - Consider OT/PT consult to assist with strengthening/mobility  Outcome: Progressing     Problem: Knowledge Deficit  Goal: Patient/family/caregiver demonstrates understanding of disease process, treatment plan, medications, and discharge instructions  Description  Complete learning assessment and assess knowledge base    Interventions:  - Provide teaching at level of understanding  - Provide teaching via preferred learning methods  Outcome: Progressing     Problem: DISCHARGE PLANNING  Goal: Discharge to home or other facility with appropriate resources  Description  INTERVENTIONS:  - Identify barriers to discharge w/patient and caregiver  - Arrange for needed discharge resources and transportation as appropriate  - Identify discharge learning needs (meds, wound care, etc )  - Refer to Case Management Department for coordinating discharge planning if the patient needs post-hospital services based on physician/advanced practitioner order or complex needs related to functional status, cognitive ability, or social support system   Outcome: Progressing     Problem: NEUROSENSORY - ADULT  Goal: Achieves stable or improved neurological status  Description  INTERVENTIONS  - Monitor and report changes in neurological status  - Monitor vital signs such as temperature, blood pressure, glucose, and any other labs ordered   - Initiate measures to prevent increased intracranial pressure  - Monitor for seizure activity and implement precautions if appropriate      Outcome: Progressing  Goal: Remains free of injury related to seizures activity  Description  INTERVENTIONS  - Maintain airway, patient safety  and administer oxygen as ordered  - Monitor patient for seizure activity, document and report duration and description of seizure to physician/advanced practitioner  - If seizure occurs,  ensure patient safety during seizure  - Reorient patient post seizure  - Seizure pads on all 4 side rails  - Instruct patient/family to notify RN of any seizure activity including if an aura is experienced  - Instruct patient/family to call for assistance with activity based on nursing assessment  - Administer anti-seizure medications if ordered    Outcome: Progressing  Goal: Achieves maximal functionality and self care  Description  INTERVENTIONS  - Monitor swallowing and airway patency with patient fatigue and changes in neurological status  - Encourage and assist patient to increase activity and self care     - Encourage visually impaired, hearing impaired and aphasic patients to use assistive/communication devices  Outcome: Progressing     Problem: METABOLIC, FLUID AND ELECTROLYTES - ADULT  Goal: Glucose maintained within target range  Description  INTERVENTIONS:  - Monitor Blood Glucose as ordered  - Assess for signs and symptoms of hyperglycemia and hypoglycemia  - Administer ordered medications to maintain glucose within target range  - Assess nutritional intake and initiate nutrition service referral as needed  Outcome: Progressing     Problem: Nutrition/Hydration-ADULT  Goal: Nutrient/Hydration intake appropriate for improving, restoring or maintaining nutritional needs  Description  Monitor and assess patient's nutrition/hydration status for malnutrition  Collaborate with interdisciplinary team and initiate plan and interventions as ordered  Monitor patient's weight and dietary intake as ordered or per policy  Utilize nutrition screening tool and intervene as necessary  Determine patient's food preferences and provide high-protein, high-caloric foods as appropriate       INTERVENTIONS:  - Monitor oral intake, urinary output, labs, and treatment plans  - Assess nutrition and hydration status and recommend course of action  - Evaluate amount of meals eaten  - Assist patient with eating if necessary   - Allow adequate time for meals  - Recommend/ encourage appropriate diets, oral nutritional supplements, and vitamin/mineral supplements  - Order, calculate, and assess calorie counts as needed  - Recommend, monitor, and adjust tube feedings and TPN/PPN based on assessed needs  - Assess need for intravenous fluids  - Provide specific nutrition/hydration education as appropriate  - Include patient/family/caregiver in decisions related to nutrition  Outcome: Progressing

## 2020-02-18 NOTE — SOCIAL WORK
Pt discussed with [facility] for care coordination & pt is medically ready for dc today with OPPT  S/w pt & her dtr will transport home  RN left VM for dtr  IMM reviewed  Pt aware of OPPT & states will have ride

## 2020-02-18 NOTE — RESTORATIVE TECHNICIAN NOTE
Restorative Specialist Mobility Note       Activity: Chair, Dangle, Stand at bedside(Educated/encouraged pt to get In/OOB with assistance  Chair alarm on   Pt callbell, phone/tray within reach )     Assistive Device: Other (Comment)(Assist x2 OOB to the chair )          Chris WOODRUFF, Restorative Technician, [employer]

## 2020-02-19 ENCOUNTER — APPOINTMENT (EMERGENCY)
Dept: RADIOLOGY | Facility: HOSPITAL | Age: 85
DRG: 853 | End: 2020-02-19
Payer: COMMERCIAL

## 2020-02-19 ENCOUNTER — HOSPITAL ENCOUNTER (INPATIENT)
Facility: HOSPITAL | Age: 85
LOS: 22 days | Discharge: NURSING, REHAB OR LONG-TERM CARE | DRG: 853 | End: 2020-03-12
Attending: EMERGENCY MEDICINE | Admitting: INTERNAL MEDICINE
Payer: COMMERCIAL

## 2020-02-19 DIAGNOSIS — R29.90 STROKE-LIKE SYMPTOMS: ICD-10-CM

## 2020-02-19 DIAGNOSIS — R53.1 WEAKNESS: Primary | ICD-10-CM

## 2020-02-19 DIAGNOSIS — J90 PLEURAL EFFUSION, LEFT: ICD-10-CM

## 2020-02-19 DIAGNOSIS — R53.83 FATIGUE: ICD-10-CM

## 2020-02-19 DIAGNOSIS — R47.81 SLURRED SPEECH: ICD-10-CM

## 2020-02-19 DIAGNOSIS — R13.12 OROPHARYNGEAL DYSPHAGIA: ICD-10-CM

## 2020-02-19 DIAGNOSIS — R26.2 AMBULATORY DYSFUNCTION: ICD-10-CM

## 2020-02-19 DIAGNOSIS — R47.01 APHASIA: ICD-10-CM

## 2020-02-19 PROBLEM — R47.1 DYSARTHRIA: Status: ACTIVE | Noted: 2020-02-15

## 2020-02-19 PROBLEM — R29.810 FACIAL DROOP: Status: ACTIVE | Noted: 2020-02-19

## 2020-02-19 LAB
ANION GAP SERPL CALCULATED.3IONS-SCNC: 3 MMOL/L (ref 4–13)
APTT PPP: 33 SECONDS (ref 23–37)
ATRIAL RATE: 62 BPM
BUN SERPL-MCNC: 20 MG/DL (ref 5–25)
CALCIUM SERPL-MCNC: 9.9 MG/DL (ref 8.3–10.1)
CHLORIDE SERPL-SCNC: 100 MMOL/L (ref 100–108)
CO2 SERPL-SCNC: 30 MMOL/L (ref 21–32)
CREAT SERPL-MCNC: 0.92 MG/DL (ref 0.6–1.3)
ERYTHROCYTE [DISTWIDTH] IN BLOOD BY AUTOMATED COUNT: 12.3 % (ref 11.6–15.1)
GFR SERPL CREATININE-BSD FRML MDRD: 57 ML/MIN/1.73SQ M
GLUCOSE SERPL-MCNC: 107 MG/DL (ref 65–140)
GLUCOSE SERPL-MCNC: 120 MG/DL (ref 65–140)
GLUCOSE SERPL-MCNC: 129 MG/DL (ref 65–140)
HCT VFR BLD AUTO: 39 % (ref 34.8–46.1)
HGB BLD-MCNC: 12.8 G/DL (ref 11.5–15.4)
INR PPP: 1.17 (ref 0.84–1.19)
MCH RBC QN AUTO: 30.4 PG (ref 26.8–34.3)
MCHC RBC AUTO-ENTMCNC: 32.8 G/DL (ref 31.4–37.4)
MCV RBC AUTO: 93 FL (ref 82–98)
P AXIS: 77 DEGREES
PLATELET # BLD AUTO: 195 THOUSANDS/UL (ref 149–390)
PMV BLD AUTO: 9.7 FL (ref 8.9–12.7)
POTASSIUM SERPL-SCNC: 4.7 MMOL/L (ref 3.5–5.3)
PR INTERVAL: 270 MS
PROTHROMBIN TIME: 14.5 SECONDS (ref 11.6–14.5)
QRS AXIS: 251 DEGREES
QRSD INTERVAL: 142 MS
QT INTERVAL: 420 MS
QTC INTERVAL: 426 MS
RBC # BLD AUTO: 4.21 MILLION/UL (ref 3.81–5.12)
SODIUM SERPL-SCNC: 133 MMOL/L (ref 136–145)
T WAVE AXIS: 17 DEGREES
TROPONIN I SERPL-MCNC: <0.02 NG/ML
VENTRICULAR RATE: 62 BPM
WBC # BLD AUTO: 9 THOUSAND/UL (ref 4.31–10.16)

## 2020-02-19 PROCEDURE — 85730 THROMBOPLASTIN TIME PARTIAL: CPT | Performed by: EMERGENCY MEDICINE

## 2020-02-19 PROCEDURE — 93005 ELECTROCARDIOGRAM TRACING: CPT

## 2020-02-19 PROCEDURE — 85610 PROTHROMBIN TIME: CPT | Performed by: EMERGENCY MEDICINE

## 2020-02-19 PROCEDURE — 80048 BASIC METABOLIC PNL TOTAL CA: CPT | Performed by: EMERGENCY MEDICINE

## 2020-02-19 PROCEDURE — 70498 CT ANGIOGRAPHY NECK: CPT

## 2020-02-19 PROCEDURE — 70496 CT ANGIOGRAPHY HEAD: CPT

## 2020-02-19 PROCEDURE — 84484 ASSAY OF TROPONIN QUANT: CPT | Performed by: EMERGENCY MEDICINE

## 2020-02-19 PROCEDURE — 85027 COMPLETE CBC AUTOMATED: CPT | Performed by: EMERGENCY MEDICINE

## 2020-02-19 PROCEDURE — 99285 EMERGENCY DEPT VISIT HI MDM: CPT | Performed by: EMERGENCY MEDICINE

## 2020-02-19 PROCEDURE — 93010 ELECTROCARDIOGRAM REPORT: CPT | Performed by: INTERNAL MEDICINE

## 2020-02-19 PROCEDURE — 36415 COLL VENOUS BLD VENIPUNCTURE: CPT | Performed by: EMERGENCY MEDICINE

## 2020-02-19 PROCEDURE — 99285 EMERGENCY DEPT VISIT HI MDM: CPT

## 2020-02-19 PROCEDURE — 99223 1ST HOSP IP/OBS HIGH 75: CPT | Performed by: INTERNAL MEDICINE

## 2020-02-19 PROCEDURE — 82948 REAGENT STRIP/BLOOD GLUCOSE: CPT

## 2020-02-19 PROCEDURE — 99223 1ST HOSP IP/OBS HIGH 75: CPT | Performed by: PSYCHIATRY & NEUROLOGY

## 2020-02-19 RX ORDER — CALCIUM GLUCONATE 45(500) MG
500 TABLET ORAL EVERY EVENING
Status: DISCONTINUED | OUTPATIENT
Start: 2020-02-19 | End: 2020-02-19 | Stop reason: RX

## 2020-02-19 RX ORDER — ATORVASTATIN CALCIUM 40 MG/1
40 TABLET, FILM COATED ORAL EVERY EVENING
Status: DISCONTINUED | OUTPATIENT
Start: 2020-02-19 | End: 2020-03-03

## 2020-02-19 RX ORDER — PRIMIDONE 50 MG/1
50 TABLET ORAL EVERY 12 HOURS SCHEDULED
Status: DISCONTINUED | OUTPATIENT
Start: 2020-02-19 | End: 2020-03-03

## 2020-02-19 RX ORDER — DEXTROSE AND SODIUM CHLORIDE 5; .9 G/100ML; G/100ML
50 INJECTION, SOLUTION INTRAVENOUS CONTINUOUS
Status: DISCONTINUED | OUTPATIENT
Start: 2020-02-19 | End: 2020-02-21

## 2020-02-19 RX ORDER — SPIRONOLACTONE 50 MG/1
50 TABLET, FILM COATED ORAL DAILY
Status: DISCONTINUED | OUTPATIENT
Start: 2020-02-20 | End: 2020-03-03

## 2020-02-19 RX ORDER — FOLIC ACID 1 MG/1
1000 TABLET ORAL DAILY
Status: DISCONTINUED | OUTPATIENT
Start: 2020-02-20 | End: 2020-03-03

## 2020-02-19 RX ORDER — ACETAMINOPHEN 325 MG/1
650 TABLET ORAL EVERY 6 HOURS PRN
Status: DISCONTINUED | OUTPATIENT
Start: 2020-02-19 | End: 2020-02-21

## 2020-02-19 RX ORDER — LISINOPRIL 2.5 MG/1
2.5 TABLET ORAL DAILY
Status: DISCONTINUED | OUTPATIENT
Start: 2020-02-20 | End: 2020-02-23

## 2020-02-19 RX ORDER — MELATONIN
1000 EVERY EVENING
Status: DISCONTINUED | OUTPATIENT
Start: 2020-02-19 | End: 2020-03-03

## 2020-02-19 RX ORDER — DIGOXIN 125 MCG
125 TABLET ORAL DAILY
Status: DISCONTINUED | OUTPATIENT
Start: 2020-02-20 | End: 2020-03-03

## 2020-02-19 RX ORDER — GABAPENTIN 300 MG/1
300 CAPSULE ORAL DAILY
Status: DISCONTINUED | OUTPATIENT
Start: 2020-02-20 | End: 2020-02-22

## 2020-02-19 RX ORDER — OXYBUTYNIN CHLORIDE 5 MG/1
5 TABLET, EXTENDED RELEASE ORAL DAILY
Status: DISCONTINUED | OUTPATIENT
Start: 2020-02-20 | End: 2020-03-03

## 2020-02-19 RX ADMIN — CYANOCOBALAMIN TAB 500 MCG 1000 MCG: 500 TAB at 17:34

## 2020-02-19 RX ADMIN — ATORVASTATIN CALCIUM 40 MG: 40 TABLET, FILM COATED ORAL at 17:34

## 2020-02-19 RX ADMIN — APIXABAN 5 MG: 5 TABLET, FILM COATED ORAL at 17:34

## 2020-02-19 RX ADMIN — PRIMIDONE 50 MG: 50 TABLET ORAL at 21:47

## 2020-02-19 RX ADMIN — DEXTROSE AND SODIUM CHLORIDE 50 ML/HR: 5; .9 INJECTION, SOLUTION INTRAVENOUS at 18:48

## 2020-02-19 RX ADMIN — IOHEXOL 85 ML: 350 INJECTION, SOLUTION INTRAVENOUS at 09:12

## 2020-02-19 RX ADMIN — MELATONIN 1000 UNITS: at 17:34

## 2020-02-19 RX ADMIN — ACETAMINOPHEN 650 MG: 325 TABLET ORAL at 17:34

## 2020-02-19 NOTE — ASSESSMENT & PLAN NOTE
Rate controlled  EKG 2/19:  Sinus rhythm with first-degree AV block  Right bundle branch block  Possible lateral infarct (cited on or before 16th June 2019)  When compared with EKG of 14th February 2020, questionable change in initial forces of lateral leads    Digoxin level 2/15 within normal limits  continue digoxin and Eliquis  Continue telemetry monitoring

## 2020-02-19 NOTE — ASSESSMENT & PLAN NOTE
Per daughter, at around 7am this morning, pt was unable to verbally respond or talk to daughter  Daughter describes that the pt's difficulty with speech seems to get worse with each episode  Pt recently admitted for similar problem, noting that she [facility] for dysarthria, which did spontaneously improve during admission and was d/c yesterday, 2/18/20  CTA 2/19: no acute intracranial CT abnormality, no significant change in multifocal high-grade stenosis of right ICA  Moderate multifocal atherosclerotic narrowing of right MCA similar to prior exam  Cervical carotid bifurcations and proximal ICAs atherosclerotic disease without significant stenosis (less than 50%)  Frothy secretions in the left locule of the sphenoid sinus, similar to prior exam   Multiple thyroid nodules, the largest in the inferior right lobe measures up to 1 6 cm  As recommended in prior exam correlation with prior thyroid workup and/or dedicated thyroid ultrasound is recommended    EEG 2/17: demonstrated no epileptiform activity or epileptogenic focus  Pt currently still aphasic, but able to nod head yes and know to questions  Consider speech consult to assess swallowing

## 2020-02-19 NOTE — ASSESSMENT & PLAN NOTE
Lab Results   Component Value Date    HGBA1C 5 8 02/05/2020       Recent Labs     02/17/20  2152 02/18/20  0646 02/18/20  1120 02/19/20  0855   POCGLU 202* 132 219* 120       Blood Sugar Average: Last 72 hrs:  (P) 120   Hemoglobin A1c acceptable  Hold metformin  SSI and Accu-Cheks t i d   With meals and at bedtime  Placed on ADA diet, pending speech recommendations

## 2020-02-19 NOTE — ASSESSMENT & PLAN NOTE
Letter written and routed to [place] to print and mail. Patient follows with neurologist as outpatient  Continue gabapentin 300mg PO qd  Give Tylenol 650mg PO q6hrs PRN  Stimulator in place

## 2020-02-19 NOTE — ASSESSMENT & PLAN NOTE
Patient has a history of hyponatremia found to be hyponatremic (133) on arrival to last admission on 02/14  Today 2/19: hyponatremic (133)  Provide gentle hydration with D5 NSS  Repeat BMP tonight

## 2020-02-19 NOTE — ASSESSMENT & PLAN NOTE
Patient diagnosed with SIRS on last admission 2/14, presenting with heart rate 104 +WBC 17 9  Negative workup on 02/16  Negative today 2/19

## 2020-02-19 NOTE — ASSESSMENT & PLAN NOTE
Dysarthria noted on last admission 2/14, neurology consulted and did 24-hour EEG monitoring 2/16  EEG monitoring 2/16-2/17: no focal epileptiform discharges or electrographic seizures    Removed 2/17  Unable to get MRI brain due to spine stimulator

## 2020-02-19 NOTE — ASSESSMENT & PLAN NOTE
Rate controlled  EKG 2/19: Sinus rhythm with 1st degree A-V block  Right bundle branch block  Possible Lateral infarct (cited on or before 16-JUN-2019)  When compared with ECG of 14-FEB-2020 22:10, questionable change in initial forces of Lateral leads    Digoxin level 2/15: wnl  Continue digoxin and Eliquis  Continue telemetry monitoring

## 2020-02-19 NOTE — ASSESSMENT & PLAN NOTE
Per daughter, at around 7:00 a m  this morning patient was unable to verbally respond or talk to daughter  Daughter describes that the patient's difficulty with speech seems to get worse with each episode  Patient recently admitted for similar problem noting that she arrived to [facility] on 02/14/2020 for dysarthria, which did spontaneously improve during admission and was discharged yesterday 02/18/2020  CTA 2/19:   · No acute intracranial CT abnormality  · No significant change in multifocal high-grade stenosis of right ICA  · Moderate multifocal atherosclerotic narrowing of right MCA similar to prior exam     · Cervical carotid bifurcations and proximal ICAs atherosclerotic disease without significant stenosis (less than 50%)  · Frothy secretions in the left locule of the sphenoid sinus similar to prior exam   Multiple thyroid nodules the largest in the inferior right lobe measures up to 1 6 cm  As recommended in prior exam correlation with prior thyroid workup and/or dedicated thyroid ultrasound is recommended  EEG monitoring 2/16-2/17: demonstrated no epileptiform activity or epileptogenic focus    Patient currently still aphasic but able to nod head yes and no to questioning  Per nursing, patient coughed when attempting to eat applesauce, but did take all home meds for tonight  Consult speech to assess swallowing, NPO status until speech recommendation

## 2020-02-19 NOTE — CONSULTS
Consultation - Neurology   Bibi Snyder 80 y o  female MRN: [id number]  Unit/Bed#: [facility] Encounter: [id number]      Assessment/Plan   1)  Left central facial droop and dysarthric speech, now with aphonia - appears to be acute on chronic presentation for patient  Unclear etiology for aphonia, but would not alter pt's current managment   -unable to obtain MRI brain secondary to spinal stimulator   -CTA head and neck demonstrates no acute intracranial abnormality, stable from prior examination earlier this month   -recommend continue Eliquis 5 mg PO BID   -supportive care and medication management per primary team   -PT/OT/speech   -no additional acute neurologic recommendations at this time, please call with questions      History of Present Illness     Reason for Consult / Principal Problem:  Stroke  Hx and PE limited by:  Severely hypophonic speech   HPI: Bibi Snyder is a 80 y o   female, recently discharged from [facility] on 02/18/2020, with AFib on Eliquis, chronic pain disorder with spinal stimulator in place, baseline ambulatory dysfunction, prior CVA, cervical dystonia managed by outpatient movement disorder team, as well as vascular risk factors who represents to the [facility] Emergency Department secondary to generalized weakness, aphasic episode, and transiently facial droop now resolved  A stroke alert was not initiated secondary to resolve symptoms  CTA head and neck was completed and was stable from previous examination earlier in the month  No tPA given secondary to non disabling deficits and patient on anticoagulation  Of note, patient's recent hospitalization, she did undergo video EEG monitoring secondary to recurrent intermittent episodes of speech disturbance and dysarthria  EEG monitoring demonstrated no epileptiform activity or epileptogenic focus  MRI brain was unable to be obtained secondary to patient's spinal cord stimulator  No AEDs initiated    Also of note, the patient was admitted in early February for same  On exam today, the patient is resting comfortably on the stretcher in no acute distress  The patient was able to briskly follow commands, as well as mouth repeated words, had no vocal verbal output  Did appear to have some difficulty with word finding when asked to open indeed questions  Did demonstrate left central facial droop  Patient with no lateralizing weakness or sensory deficit and extremities  Remainder of neurological exam is detailed below  Review systems was limited secondary to no focal verbal output, however patient grossly denies complaints      Inpatient consult to Neurology  Consult performed by: Echo Dickerson PA-C  Consult ordered by: Giovanni Valadez MD    Inpatient consult to Neurology  Consult performed by: Echo Dickerson PA-C  Consult ordered by: David Craig MD          Review of Systems   See HPI    Historical Information   Past Medical History:   Diagnosis Date    A-fib [facility])     Arthritis     Assistance needed for mobility     pt can stand with assistance and transfer    Chronic pain disorder     Diabetes mellitus ([facility] [address] )     NIDDM    Full dentures     History of transfusion     no adverse reaction    Hyperlipidemia     Irregular heart beat     Numbness and tingling of both feet     Skin abnormality     sacrum area - small red area, less than a dime size    Spinal stenosis     Stroke ([facility] [address] )     Unable to ambulate     Urinary incontinence     ipg stimulator x3 repakcements,battery exchange today 2/14/2018    Wears glasses     Wheelchair dependence      Past Surgical History:   Procedure Laterality Date    BACK SURGERY      fusion    BLADDER SUSPENSION      CARPAL TUNNEL RELEASE Bilateral     CHOLANGIOGRAM N/A 6/4/2018    Procedure: CHOLANGIOGRAM;  Surgeon: Bri Jain MD;  Location: [facility];  Service: General    CHOLECYSTECTOMY LAPAROSCOPIC N/A 6/4/2018    Procedure: CHOLECYSTECTOMY LAPAROSCOPIC;  Surgeon: Lilo Griffin MD;  [facility];  Service: General    COLONOSCOPY      DILATION AND CURETTAGE OF UTERUS      FRACTURE SURGERY Left     femur with hardware    HYSTERECTOMY      INSERTION / Ken Noman / REVISION NEUROSTIMULATOR      JOINT REPLACEMENT Bilateral     knees    SACRAL NERVE STIMULATOR PLACEMENT N/A 2/14/2018    Procedure: REMOVE AND REPLACE IPG;  Surgeon: Munir Castano MD;  Location: [facility];  Service: UroGynecology           TONSILLECTOMY       Social History   Social History     Substance and Sexual Activity   Alcohol Use Not Currently    Comment: very rare     Social History     Substance and Sexual Activity   Drug Use No     Social History     Tobacco Use   Smoking Status Never Smoker   Smokeless Tobacco Never Used     Family History: non-contributory    Review of previous medical records was completed  Meds/Allergies   Scheduled Meds:  Continuous Infusions:  No current facility-administered medications for this encounter  PRN Meds:  Allergies   Allergen Reactions    Bactrim [Sulfamethoxazole-Trimethoprim] Swelling     Swelling around eyes and red eyes    Aspirin Other (See Comments)     "feels like fist in my chest"    Ciprofloxacin     Nitrofurantoin     Other      "5mz/Tmp "  "1 60 mTab amme"     Per pt's allergy list        Objective   Vitals:Blood pressure 159/72, pulse 67, temperature 97 6 °F (36 4 °C), temperature source Oral, resp  rate 18, SpO2 99 %, not currently breastfeeding  ,There is no height or weight on file to calculate BMI  No intake or output data in the 24 hours ending 02/19/20 0935    Invasive Devices: Invasive Devices     Peripheral Intravenous Line            Peripheral IV 02/19/20 Right Antecubital less than 1 day                Physical Exam   Constitutional: No distress  Elderly appearing   HENT:   Head: Normocephalic     Right Ear: External ear normal    Left Ear: External ear normal    Mouth/Throat: Oropharynx is clear and moist  No oropharyngeal exudate  Eyes: Conjunctivae are normal  Right eye exhibits no discharge  Left eye exhibits no discharge  No scleral icterus  Neck: Normal range of motion  Neck supple  Pulmonary/Chest: Effort normal  No respiratory distress  Musculoskeletal: Normal range of motion  She exhibits no edema, tenderness or deformity  Skin: Skin is warm and dry  No rash noted  No erythema  No pallor  Psychiatric: She has a normal mood and affect  Her behavior is normal    Nursing note and vitals reviewed  Neurologic Exam     Mental Status     Patient able to state her name  Briskly followed 2 step commands  Able to repeat without error  Examination is limited secondary to no vocal verbal output     Cranial Nerves   Cranial nerves II through XII intact  With exception of left central facial droop and dysarthric speech     Motor Exam   Muscle bulk: normal  Overall muscle tone: normalMoves all extremities equally antigravity     Sensory Exam   Light touch normal      Gait, Coordination, and Reflexes     Gait  Gait: (Deferred for safety)    Tremor   Resting tremor: absent    Reflexes   Right plantar: equivocal  Left plantar: equivocal  Right ankle clonus: absent  Left ankle clonus: absent      Lab Results: I have personally reviewed pertinent reports       Recent Results (from the past 24 hour(s))   Fingerstick Glucose (POCT)    Collection Time: 02/18/20 11:20 AM   Result Value Ref Range    POC Glucose 219 (H) 65 - 140 mg/dl   Basic metabolic panel    Collection Time: 02/19/20  8:54 AM   Result Value Ref Range    Sodium 133 (L) 136 - 145 mmol/L    Potassium 4 7 3 5 - 5 3 mmol/L    Chloride 100 100 - 108 mmol/L    CO2 30 21 - 32 mmol/L    ANION GAP 3 (L) 4 - 13 mmol/L    BUN 20 5 - 25 mg/dL    Creatinine 0 92 0 60 - 1 30 mg/dL    Glucose 129 65 - 140 mg/dL    Calcium 9 9 8 3 - 10 1 mg/dL    eGFR 57 ml/min/1 73sq m   CBC and Platelet    Collection Time: 02/19/20  8:54 AM   Result Value Ref Range    WBC 9 00 4 31 - 10 16 Thousand/uL    RBC 4 21 3 81 - 5 12 Million/uL    Hemoglobin 12 8 11 5 - 15 4 g/dL    Hematocrit 39 0 34 8 - 46 1 %    MCV 93 82 - 98 fL    MCH 30 4 26 8 - 34 3 pg    MCHC 32 8 31 4 - 37 4 g/dL    RDW 12 3 11 6 - 15 1 %    Platelets 139 904 - 944 Thousands/uL    MPV 9 7 8 9 - 12 7 fL   Protime-INR    Collection Time: 02/19/20  8:54 AM   Result Value Ref Range    Protime 14 5 11 6 - 14 5 seconds    INR 1 17 0 84 - 1 19   APTT    Collection Time: 02/19/20  8:54 AM   Result Value Ref Range    PTT 33 23 - 37 seconds   Troponin I    Collection Time: 02/19/20  8:54 AM   Result Value Ref Range    Troponin I <0 02 <=0 04 ng/mL   Fingerstick Glucose (POCT)    Collection Time: 02/19/20  8:55 AM   Result Value Ref Range    POC Glucose 120 65 - 140 mg/dl   ]    Imaging Studies: I have personally reviewed pertinent reports  and I have personally reviewed pertinent films in PACS  EKG, Pathology, and Other Studies: I have personally reviewed pertinent reports      VTE Prophylaxis: Sequential compression device (Venodyne)  and Eliquis     Code Status: Prior  Advance Directive and Living Will:      Power of :    POLST:

## 2020-02-19 NOTE — H&P
H&P- Racheal Echeverria 1935, 80 y o  female MRN: [id number]    Unit/Bed#: [facility] 723-01 Encounter: [id number]    Primary Care Provider: Liv Enrique MD   Date and time admitted to hospital: 2/19/2020  8:30 AM        Facial droop  Assessment & Plan  Her daughter patient had a left-sided facial droop when she visited patient at around 7:00 a m  This morning  Left central facial droop noted today   Neurology consult today, per neurology appears to be acute on chronic recent presentation for patient, unclear etiology but would not alter patient's current management  Unable to obtain MRI brain secondary to spinal stimulator  CTA head and neck demonstrates no acute intracranial abnormality stable from prior examination earlier this month  continue Eliquis 5 mg p o  B i d  No additional acute neurologic recommendations at this time    Dysarthria  Assessment & Plan  Dysarthria noted on last admission 2/14, neurology consulted and did 24-hour EEG monitoring 2/16  EEG monitoring 2/16-2/17: no focal epileptiform discharges or electrographic seizures    Removed 2/17  Unable to get MRI brain due to spine stimulator    SIRS (systemic inflammatory response syndrome) ([facility] [address] )  Assessment & Plan  Patient diagnosed with SIRS on last admission 2/14, presenting with heart rate 104 +WBC 17 9  Negative workup on 02/16  Negative today 2/19      Weakness of both lower extremities  Assessment & Plan  Per daughter, this morning patient had difficulty standing up in wheelchair, more so than normal  Chronic bilateral weakness in lower extremities, no significant changes since last admission  Patient wheelchair-bound and needs assistance standing up and transferring in wheelchair  Continue fall precautions  Consult PT OT while admitted  Consider inpatient rehab stay upon discharge, of note daughter does not want patient to be in a long-term facility and has a goal of bringing patient back home with PT OT visiting at patient's home    Hyponatremia  Assessment & Plan  Patient has a history of hyponatremia found to be hyponatremic (133) on arrival to last admission on 02/14  Today 2/19: hyponatremic (133)  Provide gentle hydration with D5 NSS  Repeat BMP tonight      Cervical dystonia  Assessment & Plan  Patient follows up with neurologist outpatient  Pending speech clearance, continue gabapentin 300mg PO QD, give Tylenol 650 mg p o  Q 6 hours p r n  Stimulator in place    Essential hypertension  Assessment & Plan  BP controlled   continue spironolactone and lisinopril    Overactive bladder  Assessment & Plan   Pending speech clearance, continue oxybutynin 5mg PO    Spinal cord stimulator status  Assessment & Plan  Continue to follow-up with neurosurgery  MRI contraindicated    Paroxysmal atrial fibrillation [facility])  Assessment & Plan  Rate controlled  EKG 2/19:  Sinus rhythm with first-degree AV block  Right bundle branch block  Possible lateral infarct (cited on or before 16th June 2019)  When compared with EKG of 14th February 2020, questionable change in initial forces of lateral leads  Digoxin level 2/15 within normal limits  continue digoxin and Eliquis  Continue telemetry monitoring    Type 2 diabetes mellitus [facility])  Assessment & Plan  Lab Results   Component Value Date    HGBA1C 5 8 02/05/2020       Recent Labs     02/17/20  2152 02/18/20  0646 02/18/20  1120 02/19/20  0855   POCGLU 202* 132 219* 120       Blood Sugar Average: Last 72 hrs:  (P) 120   Hemoglobin A1c acceptable  Hold metformin  SSI and Accu-Cheks t i d  With meals and at bedtime  Placed on ADA diet, pending speech recommendations    Wheelchair dependence  Assessment & Plan  Patient wheelchair-bound and needs assistance with transferring from wheelchair    * Debby Cooney  Per daughter, at around 7:00 a m  this morning patient was unable to verbally respond or talk to daughter   Daughter describes that the patient's difficulty with speech seems to get worse with each episode  Patient recently admitted for similar problem noting that she arrived to [facility] on 02/14/2020 for dysarthria, which did spontaneously improve during admission and was discharged yesterday 02/18/2020  CTA 2/19:   · No acute intracranial CT abnormality  · No significant change in multifocal high-grade stenosis of right ICA  · Moderate multifocal atherosclerotic narrowing of right MCA similar to prior exam     · Cervical carotid bifurcations and proximal ICAs atherosclerotic disease without significant stenosis (less than 50%)  · Frothy secretions in the left locule of the sphenoid sinus similar to prior exam   Multiple thyroid nodules the largest in the inferior right lobe measures up to 1 6 cm  As recommended in prior exam correlation with prior thyroid workup and/or dedicated thyroid ultrasound is recommended  EEG monitoring 2/16-2/17: demonstrated no epileptiform activity or epileptogenic focus  Patient currently still aphasic but able to nod head yes and no to questioning  Per nursing, patient coughed when attempting to eat applesauce, but did take all home meds for tonight  Consult speech to assess swallowing, NPO status until speech recommendation    VTE Prophylaxis: Apixaban (Eliquis)  / sequential compression device   Code Status:  Full code per daughter  POLST: There is no POLST form on file for this patient (pre-hospital)  Discussion with family:  Daughter    Anticipated Length of Stay:  Patient will be admitted on an Inpatient basis with an anticipated length of stay of  more than 2 midnights  Justification for Hospital Stay:  Pending please    Total Time for Visit, including Counseling / Coordination of Care: 45 minutes  Greater than 50% of this total time spent on direct patient counseling and coordination of care  Chief Complaint: Aphasia, L-sided facial droop, and worsened B/L LE weakness x 11 hours ago    History of Present Illness:    Marce Gtz is an 80 y  o  female who presents with aphasia left-sided facial droop and worsened bilateral lower extremity weakness at 7:00 a m  this morning, per daughter  Daughter describes that when she visited the patient's home at 7:00 a m , she was unable to verbally respond to her daughter, noticed a left-sided facial droop, and had increased difficulty with standing up from wheelchair with assistance, brought into the ED today at around 8:45 a m  this morning  The speech difficulties and weakness are a chronic problem for the patient that have had recent exacerbations  Patient's daughter describes that the episodes of speech difficulties and weakness seem to get worse with each episode  Patient discharged yesterday 2/18, was admitted from 02/14 to 2/18 for dysarthria, weakness, and SIRS  SIRS resolved during last admission  Denies any fevers, chills, nausea, vomiting, chest pain, shortness of breath, abdominal pain, headache, visual disturbances, arm pain, jaw pain       Review of Systems:    Review of Systems    Past Medical and Surgical History:     Past Medical History:   Diagnosis Date    A-fib ([address] )     Arthritis     Assistance needed for mobility     pt can stand with assistance and transfer    Chronic pain disorder     Diabetes mellitus ([address] )     NIDDM    Full dentures     History of transfusion     no adverse reaction    Hyperlipidemia     Irregular heart beat     Numbness and tingling of both feet     Skin abnormality     sacrum area - small red area, less than a dime size    Spinal stenosis     Stroke ([address] )     Unable to ambulate     Urinary incontinence     ipg stimulator x3 repakcements,battery exchange today 2/14/2018    Wears glasses     Wheelchair dependence        Past Surgical History:   Procedure Laterality Date    BACK SURGERY      fusion    BLADDER SUSPENSION      CARPAL TUNNEL RELEASE Bilateral     CHOLANGIOGRAM N/A 6/4/2018    Procedure: CHOLANGIOGRAM;  Surgeon: Harish Bee MD;  Location: [facility];  Service: General    CHOLECYSTECTOMY LAPAROSCOPIC N/A 6/4/2018    Procedure: CHOLECYSTECTOMY LAPAROSCOPIC;  Surgeon: Fred Ferris MD;  Location: [facility];  Service: General    COLONOSCOPY      DILATION AND CURETTAGE OF UTERUS      FRACTURE SURGERY Left     femur with hardware    HYSTERECTOMY      INSERTION / Jamie Churches / REVISION NEUROSTIMULATOR      JOINT REPLACEMENT Bilateral     knees    SACRAL NERVE STIMULATOR PLACEMENT N/A 2/14/2018    Procedure: REMOVE AND REPLACE IPG;  Surgeon: Elinor Martini MD;  Location: [facility];  Service: UroGynecology           TONSILLECTOMY         Meds/Allergies:    Prior to Admission medications    Medication Sig Start Date End Date Taking?  Authorizing Provider   apixaban (ELIQUIS) 5 mg Take 1 tablet (5 mg total) by mouth 2 (two) times a day 6/18/19  Yes Debbie Curran PA-C   atorvastatin (LIPITOR) 40 mg tablet Take 1 tablet (40 mg total) by mouth every evening 6/14/19  Yes Debbie Curran PA-C   calcium gluconate 500 mg tablet Take 500 mg by mouth every evening   Yes Historical Provider, MD   cholecalciferol (VITAMIN D3) 1,000 units tablet Take 1,000 Units by mouth every evening   Yes Historical Provider, MD   cyanocobalamin (VITAMIN B-12) 1,000 mcg tablet Take 1,000 mcg by mouth every evening     Yes Historical Provider, MD   digoxin (LANOXIN) 0 125 mg tablet Take 1 tablet (125 mcg total) by mouth daily 6/19/19  Yes Debbie Curran PA-C   folic acid (FOLVITE) 1 mg tablet Take 1,000 mcg by mouth daily 6/28/19  Yes Historical Provider, MD   gabapentin (NEURONTIN) 300 mg capsule Take 300 mg by mouth daily   Yes Historical Provider, MD   lisinopril (ZESTRIL) 2 5 mg tablet Take 1 tablet (2 5 mg total) by mouth daily 6/18/19  Yes Debbie Curran PA-C   metFORMIN (GLUCOPHAGE) 1000 MG tablet Take 0 5 tablets (500 mg total) by mouth 2 (two) times a day with meals 6/16/19  Yes Anai Hunter PA-C   Mirabegron ER (MYRBETRIQ) 50 MG TB24 Take 50 mg by mouth daily     Yes Historical Provider, MD   primidone (MYSOLINE) 50 mg tablet Take by mouth 2 (two) times a day   Yes Historical Provider, MD   spironolactone (ALDACTONE) 50 mg tablet Take 50 mg by mouth daily   8/24/14  Yes Historical Provider, MD     I have reviewed home medications with patient personally  Allergies: Allergies   Allergen Reactions    Bactrim [Sulfamethoxazole-Trimethoprim] Swelling     Swelling around eyes and red eyes    Aspirin Other (See Comments)     "feels like fist in my chest"    Ciprofloxacin     Nitrofurantoin     Other      "5mz/Tmp "  "1 60 mTab amme"     Per pt's allergy list        Social History:     Marital Status: /Civil Union     Substance Use History:   Social History     Substance and Sexual Activity   Alcohol Use Not Currently    Comment: very rare     Social History     Tobacco Use   Smoking Status Never Smoker   Smokeless Tobacco Never Used     Social History     Substance and Sexual Activity   Drug Use No       Family History:    Family History   Problem Relation Age of Onset    No Known Problems Mother     No Known Problems Father        Physical Exam:     Vitals:   Blood Pressure: 167/66 (02/19/20 1630)  Pulse: 57 (02/19/20 1512)  Temperature: 97 6 °F (36 4 °C) (02/19/20 0837)  Temp Source: Oral (02/19/20 0837)  Respirations: 18 (02/19/20 1512)  Height: 5' 4" (162 6 cm) (02/19/20 1711)  Weight - Scale: 78 1 kg (172 lb 3 2 oz) (02/19/20 1512)  SpO2: 99 % (02/19/20 1512)    Physical Exam   Constitutional:   Seems chronically sick, no acute distress   HENT:   Head: Normocephalic and atraumatic  Eyes: EOM are normal    Neck: Normal range of motion  Neck supple  Cardiovascular: Normal rate, regular rhythm and normal heart sounds  Exam reveals no gallop and no friction rub  No murmur heard  Pulmonary/Chest: Effort normal and breath sounds normal  No respiratory distress  She has no wheezes  Abdominal: Soft   Bowel sounds are normal  She exhibits no distension  There is no tenderness  Musculoskeletal: She exhibits no edema  Neurological: She is alert  Aphasic, muscle strength 4/5 in both lower extremities   Skin: Skin is warm and dry  Psychiatric:   Unable to assess       Additional Data:     Lab Results: I have personally reviewed pertinent reports  Results from last 7 days   Lab Units 02/19/20  0854  02/14/20  2251   WBC Thousand/uL 9 00   < > 17 93*   HEMOGLOBIN g/dL 12 8   < > 13 6   HEMATOCRIT % 39 0   < > 41 4   PLATELETS Thousands/uL 195   < > 180   NEUTROS PCT %  --   --  85*   LYMPHS PCT %  --   --  6*   MONOS PCT %  --   --  7   EOS PCT %  --   --  1    < > = values in this interval not displayed  Results from last 7 days   Lab Units 02/19/20  0854   SODIUM mmol/L 133*   POTASSIUM mmol/L 4 7   CHLORIDE mmol/L 100   CO2 mmol/L 30   BUN mg/dL 20   CREATININE mg/dL 0 92   ANION GAP mmol/L 3*   CALCIUM mg/dL 9 9   GLUCOSE RANDOM mg/dL 129     Results from last 7 days   Lab Units 02/19/20  0854   INR  1 17     Results from last 7 days   Lab Units 02/19/20  1917 02/19/20  0855 02/18/20  1120 02/18/20  0646 02/17/20  2152 02/17/20  1614 02/17/20  1131 02/17/20  0647 02/16/20  2104 02/16/20  1624 02/16/20  1103 02/16/20  0716   POC GLUCOSE mg/dl 107 120 219* 132 202* 151* 183* 111 120 114 184* 104         Results from last 7 days   Lab Units 02/15/20  0147   LACTIC ACID mmol/L 1 7       Imaging: I have personally reviewed pertinent reports  CTA head and neck with and without contrast   Final Result by Rachell Hines MD (02/19 0950)      1  No acute intracranial CT abnormality  2   No significant change in multifocal high-grade stenosis of distal petrous, cavernous, and supraclinoid right ICA        3   Moderate multifocal atherosclerotic narrowing of M1 and proximal M2 branches of right MCA with asymmetric paucity of vasculature in the distal right MCA territory, similar to prior exam       4    Cervical carotid bifurcations and proximal ICAs atherosclerotic disease without significant stenosis (less than 50%)  5   Frothy secretions in the left locule of the sphenoid sinus, similar to prior exam       6   Multiple thyroid nodules, the largest in the inferior right lobe measures up to 1 6 cm  As recommended in prior exam correlation with prior thyroid workup and/or dedicated thyroid ultrasound is recommended  Workstation performed: [id number]             EKG, Pathology, and Other Studies Reviewed on Admission:   · EKG:     Allscripts / Epic Records Reviewed: Yes     ** Please Note: This note has been constructed using a voice recognition system   **

## 2020-02-19 NOTE — ED ATTENDING ATTESTATION
Favio Huggins MD, saw and evaluated the patient  All available labs and X-rays were ordered by me or the resident and have been reviewed by myself  I discussed the patient with the resident / non-physician and agree with the resident's / non-physician practitioner's findings and plan as documented in the resident's / non-physician practicitioner's note, except where noted  At this point, I agree with the current assessment done in the ED  I was present during key portions of all procedures performed unless otherwise stated  Chief Complaint   Patient presents with    Weakness - Generalized     Pt daughter states that pt was D/C from hospital yesterday and pt was non verbal this AM and wouldn't get up out of her chair and wouldn't respond with left facial droop and weakness  This is a 80 y o  female presenting for evaluation of aphasia  The patient was actually here on February 4th for very simialr  It was entertained that it could be due to seizure so had vEEG  She had a CTA done as well  IMPRESSION:  1   Multifocal high-grade stenosis of distal petrous, cavernous, and supraclinoid right ICA and associated moderate multifocal atherosclerotic narrowing of M1 and proximal M2 branches of right MCA with asymmetric attenuation of distal MCA territory   vasculature compared to the left  2   Bilateral carotid bifurcations and cervical internal carotid arteries atherosclerotic disease with less than 50% stenosis of proximal cervical ICAs  3   Primarily groundglass density expansile lesion centered at angle and body of right mandible is grossly stable, 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  4  Thyroid nodules, the largest in the inferior right thyroid lobe measures up to 1 6 cm     Correlate with prior thyroid workup    If not available, according to guidelines published in the February 2015 white paper on incidental thyroid nodules in the   Journal of the [employer] of Radiology Joe Morris), Because the nodule is greater than 1 5 cm in size, further characterization with thyroid ultrasound is recommended  The patient during admission couldn't have an MRI done as she had a pacer  Anyways, the patient did okay  While at home, the daughter (per daughter) has been a lot of difficult managing the patient  Today the patient was noted to have a facial droop (very transient)  The daughter notes that at home (since discharge and before) there's been aphasia (expressive) that is intermittent  While I am evaluating the patient she will only answer yes and no questions  She will answer these with head movements  She denies any fevers chills nausea vomiting chest pain shortness of breath belly pain back pain headache double vision blurry vision arm pain jaw pain  She does state that the speech expressive problems are new  She nods yes that it is new within the past 1 week  PE:  Vitals:    02/29/20 2206 03/01/20 0122 03/01/20 0703 03/01/20 1512   BP: 169/81 95/70 (!) 171/77 167/65   Pulse: 65 64 73 59   Resp:  17 20 16   Temp: (!) 97 3 °F (36 3 °C) 97 6 °F (36 4 °C) (!) 97 4 °F (36 3 °C) (!) 97 4 °F (36 3 °C)   TempSrc:       SpO2: 98% 98% 96% 96%   Weight:       Height:       General: VSS, NAD, awake, alert  Well-nourished, well-developed  Appears stated age  Speaking normally in full sentences  Head: Normocephalic, atraumatic, nontender  Eyes: PERRL, EOM-I  No diplopia  No hyphema  No subconjunctival hemorrhages  Symmetrical lids  ENT: Atraumatic external nose and ears  MMM  No malocclusion  No stridor  Normal phonation  No drooling  Normal swallowing  Neck: Symmetric, trachea midline  No JVD  CV: RRR  +S1/S2  No murmurs or gallops  Peripheral pulses +2 throughout  No chest wall tenderness  Lungs:   Unlabored No retractions  CTAB, lungs sounds equal bilateral    No tachypnea  Abd: +BS, soft, NT/ND    MSK:   FROM   Back:   No rashes  Skin: Dry, intact     Neuro:   Awake  Alert  Staring around room  Eyes open  followingcommands (thumbs up/down, moving extremities)   (poor effort) but bilaterally intact  B/l LE weakness but intact  Sensation intact throughout  No facial droop  EOMI  No nystagmus  Can't do proantor drift (she's not even attempting)  No droop of eyes  Psychiatric/Behavioral: Appropriate mood and affect   Exam: deferred  A:  - expressive aphasia  P:  - she definitely has expressive aphasia today  I think that this represents chronic disease thought after discussion with the daughter on the phone  It sounds like the the daughter can no longer care for the patient at home herself  Likely needs admission for placement in NH   - I didn't call a stroke alert b/c I think that this is chronic; it's unlikely to show something new however since the patient herself is stating that the aphasia is new (worsening?) will do imaging to look for infarct / clot / LVO  - 13 point ROS was performed and all are normal unless stated in the history above  - Nursing note reviewed  Vitals reviewed  - Orders placed by myself and/or advanced practitioner / resident     - Previous chart was reviewed  - No language barrier    - History obtained from patient  - There are no limitations to the history obtained  - Critical care time: Not applicable for this patient  Code Status: Level 3 - DNAR and DNI  Advance Directive and Living Will:      Power of :    POLST:      Final Diagnosis:  1  Weakness    2  Fatigue    3  Ambulatory dysfunction    4  Stroke-like symptoms    5  Oropharyngeal dysphagia        ED Course as of Mar 01 1545   Wed Feb 19, 2020   5189 EKG reviewed: looks sinus with artifact from her nerve stimulator  No acute STD/BEULAH           Medications   atorvastatin (LIPITOR) tablet 40 mg (40 mg Oral Not Given 2/29/20 2302)   cholecalciferol (VITAMIN D3) tablet 1,000 Units (1,000 Units Oral Not Given 2/29/20 2303)   cyanocobalamin (VITAMIN B-12) tablet 1,000 mcg (1,000 mcg Oral Not Given 2/29/20 2303)   digoxin (LANOXIN) tablet 125 mcg (125 mcg Oral Not Given 4/36/58 9907)   folic acid (FOLVITE) tablet 1,000 mcg (1,000 mcg Oral Not Given 2/28/20 0921)   oxybutynin (DITROPAN-XL) 24 hr tablet 5 mg (5 mg Oral Not Given 2/28/20 5833)   primidone (MYSOLINE) tablet 50 mg (50 mg Oral Not Given 2/29/20 2306)   spironolactone (ALDACTONE) tablet 50 mg (50 mg Oral Not Given 2/28/20 0923)   Labetalol HCl (NORMODYNE) injection 10 mg (10 mg Intravenous Given 2/27/20 2325)   acetaminophen (TYLENOL) rectal suppository 650 mg (650 mg Rectal Given 3/1/20 0409)   gabapentin (NEURONTIN) oral solution 300 mg (300 mg Oral Not Given 2/28/20 0921)   insulin lispro (HumaLOG) 100 units/mL subcutaneous injection 1-5 Units (1 Units Subcutaneous Not Given 3/1/20 1240)   cefepime (MAXIPIME) 2,000 mg in dextrose 5 % 50 mL IVPB (2,000 mg Intravenous New Bag 3/1/20 1438)   dextrose 5 % and sodium chloride 0 45 % infusion (75 mL/hr Intravenous New Bag 3/1/20 1540)   apixaban (ELIQUIS) tablet 5 mg (5 mg Oral Not Given 2/28/20 1823)   metoprolol (LOPRESSOR) injection 5 mg (5 mg Intravenous Given 3/1/20 0950)   morphine injection 2 mg (2 mg Intravenous Given 3/1/20 1052)   iohexol (OMNIPAQUE) 350 MG/ML injection (MULTI-DOSE) 85 mL (85 mL Intravenous Given 2/19/20 0912)   barium sulfate 98 % oral suspension 135 mL (135 mL Oral Given by Other 2/20/20 1553)   sodium chloride 0 9 % infusion (50 mL/hr Intravenous Handoff 2/23/20 1907)   vancomycin (VANCOCIN) 1,250 mg in sodium chloride 0 9 % 250 mL IVPB (0 mg Intravenous Stopped 2/23/20 1716)   ceFAZolin (ANCEF) IVPB (premix) 1,000 mg (1,000 mg Intravenous New Bag 3/1/20 0608)   potassium chloride 20 mEq IVPB (premix) (20 mEq Intravenous New Bag 2/29/20 1250)   bisacodyl (DULCOLAX) rectal suppository 10 mg (10 mg Rectal Given 2/29/20 2212)   bisacodyl (DULCOLAX) rectal suppository 10 mg (10 mg Rectal Given 3/1/20 1411)     XR chest portable   Final Result      Increased left lung consolidation and new right lung consolidation, likely pneumonia  It may be due to aspiration  Workstation performed: [id number]         CT head wo contrast   Final Result      Small to moderate amount of fluid in the left sphenoid sinus, consider acute sphenoid sinusitis in the appropriate clinical setting but otherwise no acute intracranial abnormality is seen  Other findings as above  Workstation performed: [id number]         XR abdomen 1 vw portable   Final Result      Feeding tube is in the stomach               Workstation performed: [id number]         XR chest portable   Final Result      Malpositioned feeding tube as above, subsequently properly repositioned  Partially imaged lung with left base atelectasis and/or pneumonia  Workstation performed: [id number]         XR chest portable   Final Result      Feeding tube in midesophagus  It may be advanced  Left base consolidation due to atelectasis and/or pneumonia  Workstation performed: [id number]         FL barium swallow video w speech   Final Result      CT head wo contrast   Final Result         1  Stable head CT with 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 redemonstrated  No acute intracranial hemorrhage or interval infarction identified  Workstation performed: [id number]         CTA head and neck with and without contrast   Final Result      1  No acute intracranial CT abnormality  2   No significant change in multifocal high-grade stenosis of distal petrous, cavernous, and supraclinoid right ICA  3   Moderate multifocal atherosclerotic narrowing of M1 and proximal M2 branches of right MCA with asymmetric paucity of vasculature in the distal right MCA territory, similar to prior exam       4    Cervical carotid bifurcations and proximal ICAs atherosclerotic disease without significant stenosis (less than 50%)        5   Frothy secretions in the left locule of the sphenoid sinus, similar to prior exam       6   Multiple thyroid nodules, the largest in the inferior right lobe measures up to 1 6 cm  As recommended in prior exam correlation with prior thyroid workup and/or dedicated thyroid ultrasound is recommended  Workstation performed: [id number]           Orders Placed This Encounter   Procedures    Blood culture    Blood culture    Urine culture    CTA head and neck with and without contrast    FL barium swallow video w speech    CT head wo contrast    XR abdomen 1 vw portable    XR chest portable    XR chest portable    CT head wo contrast    XR chest portable    Basic metabolic panel    CBC and Platelet    Protime-INR    APTT    Troponin I    Basic metabolic panel    Magnesium    CBC and differential    CBC and differential    Basic metabolic panel    Basic metabolic panel    CBC and differential    Urinalysis with microscopic    CBC and differential    Lactic acid, plasma    Procalcitonin    CBC and differential    Basic metabolic panel    Vancomycin, trough    Vancomycin, trough Draw level peripherally  Give dose immediately after trough (do NOT hold dose)  Call Pharmacy with any questions/concerns (Pharm Consult)   Vancomycin, trough Draw level peripherally  Give dose immediately after trough (do NOT hold dose)  Call Pharmacy with any questions/concerns (Pharm Consult)   Vancomycin, trough Please call pharmacy with results draw 30 min prior to vanco dose  Do not hold dose for results    Basic metabolic panel    Protime-INR    CBC    Basic metabolic panel    Basic metabolic panel    Diet NPO    Continuous cardiac monitoring    Continuous pulse oximetry    Weigh patient and record    Insert peripheral IV    Nursing dysphagia assessment    Nasal cannula oxygen    Fingerstick Glucose (POCT)    Nursing communication Continue IV as ordered     [facility] Notify admitting physician    Notify admitting physician on arrival    Apply SCD or Foot pumps    Up and OOB as tolerated    Apply SCD or Foot pumps    Keofed tube    Nursing Communication Please start TFs in AM after official XR read   Fingerstick Glucose (POCT)    Nursing Communication Okay to use Keofed for medications    Hypoglycemial Protocol    Insulin Subcutaneous Instruction    Insulin Subcutaneous Notify Physician    Fingerstick Glucose (POCT)    Neuro checks    Vital Signs    Specialty Bed    Wound care follow-up    Turn patient    Skin care    Elevate heels off of bed    Skin care    Urinary retention protocol    Level 3 - DNAR (Do Not Attempt Resuscitation) and DNI (Do Not Intubate)    Consult to Neurology    Inpatient consult to Neurology    Inpatient consult to Case Management    Inpatient consult to Neurology    Inpatient consult to [address] [place] [address] consult    Inpatient consult to gastroenterology    OT eval and treat    PT eval and treat    SPEECH bedside swallowing evaluation and treatment    EKG RESULTS    ECG 12 lead    ECG 12 lead    Inpatient Admission    Restraints non-violent    EGD Peg Tube Placement     Labs Reviewed   BASIC METABOLIC PANEL - Abnormal       Result Value Ref Range Status    Sodium 133 (*) 136 - 145 mmol/L Final    Potassium 4 7  3 5 - 5 3 mmol/L Final    Chloride 100  100 - 108 mmol/L Final    CO2 30  21 - 32 mmol/L Final    ANION GAP 3 (*) 4 - 13 mmol/L Final    BUN 20  5 - 25 mg/dL Final    Creatinine 0 92  0 60 - 1 30 mg/dL Final    Comment: Standardized to IDMS reference method    Glucose 129  65 - 140 mg/dL Final    Comment:   If the patient is fasting, the ADA then defines impaired fasting glucose as > 100 mg/dL and diabetes as > or equal to 123 mg/dL  Specimen collection should occur prior to Sulfasalazine administration due to the potential for falsely depressed results   Specimen collection should occur prior to Sulfapyridine administration due to the potential for falsely elevated results  Calcium 9 9  8 3 - 10 1 mg/dL Final    eGFR 57  ml/min/1 73sq m Final    Narrative:     National Kidney Disease Foundation guidelines for Chronic Kidney Disease (CKD):     Stage 1 with normal or high GFR (GFR > 90 mL/min/1 73 square meters)    Stage 2 Mild CKD (GFR = 60-89 mL/min/1 73 square meters)    Stage 3A Moderate CKD (GFR = 45-59 mL/min/1 73 square meters)    Stage 3B Moderate CKD (GFR = 30-44 mL/min/1 73 square meters)    Stage 4 Severe CKD (GFR = 15-29 mL/min/1 73 square meters)    Stage 5 End Stage CKD (GFR <15 mL/min/1 73 square meters)  Note: GFR calculation is accurate only with a steady state creatinine   CBC AND PLATELET - Normal    WBC 9 00  4 31 - 10 16 Thousand/uL Final    RBC 4 21  3 81 - 5 12 Million/uL Final    Hemoglobin 12 8  11 5 - 15 4 g/dL Final    Hematocrit 39 0  34 8 - 46 1 % Final    MCV 93  82 - 98 fL Final    MCH 30 4  26 8 - 34 3 pg Final    MCHC 32 8  31 4 - 37 4 g/dL Final    RDW 12 3  11 6 - 15 1 % Final    Platelets 793  524 - 390 Thousands/uL Final    MPV 9 7  8 9 - 12 7 fL Final   PROTIME-INR - Normal    Protime 14 5  11 6 - 14 5 seconds Final    INR 1 17  0 84 - 1 19 Final   APTT - Normal    PTT 33  23 - 37 seconds Final    Comment: Therapeutic Heparin Range =  60-90 seconds   TROPONIN I - Normal    Troponin I <0 02  <=0 04 ng/mL Final    Comment:   [employer] Chemistry analyzer 99% cutoff is > 0 04 ng/mL in network labs     o cTnI 99% cutoff is useful only when applied to patients in the clinical setting of myocardial ischemia   o cTnI 99% cutoff should be interpreted in the context of clinical history, ECG findings and possibly cardiac imaging to establish correct diagnosis  o cTnI 99% cutoff may be suggestive but clearly not indicative of a coronary event without the clinical setting of myocardial ischemia       POCT GLUCOSE - Normal    POC Glucose 120  65 - 140 mg/dl Final     Time reflects when diagnosis was documented in both MDM as applicable and the Disposition within this note     Time User Action Codes Description Comment    2/19/2020  1:51 PM Rj Hopkins [R53 1] Weakness     2/19/2020  1:51 PM [place] Crigler Add [R53 83] Fatigue     2/19/2020  1:51 PM Elie Crigler Add [R26 2] Ambulatory dysfunction     2/23/2020  2:02 PM Adry Maw Add [R29 90] Stroke-like symptoms     2/27/2020  9:25 AM Ciaran Diaz Add [R13 12] Oropharyngeal dysphagia       ED Disposition     ED Disposition Condition Date/Time Comment    Admit Stable Wed Feb 19, 2020  1:51 PM Case was discussed with CHANTE and the patient's admission status was agreed to be Admission Status: inpatient status to the service of Dr Guadalupe Corado   Follow-up Information     Follow up With Specialties Details Why Contact Info Additional Debby Schroeder Neurology [facility] Neurology Follow up The office will call you to schedule a follow-up appt  If you do not hear from them within 1 week after discharge, please call them to schedule   Felice Blunt [id number] [phone]  BPAW Neurology [facility], [address] [place] [address], [address] [facility]        Current Discharge Medication List      CONTINUE these medications which have NOT CHANGED    Details   apixaban (ELIQUIS) 5 mg Take 1 tablet (5 mg total) by mouth 2 (two) times a day  Qty: 60 tablet, Refills: 0    Associated Diagnoses: Chronic atrial fibrillation      atorvastatin (LIPITOR) 40 mg tablet Take 1 tablet (40 mg total) by mouth every evening  Qty: 30 tablet, Refills: 0    Associated Diagnoses: History of CVA (cerebrovascular accident)      calcium gluconate 500 mg tablet Take 500 mg by mouth every evening      cholecalciferol (VITAMIN D3) 1,000 units tablet Take 1,000 Units by mouth every evening      cyanocobalamin (VITAMIN B-12) 1,000 mcg tablet Take 1,000 mcg by mouth every evening        digoxin (LANOXIN) 0 125 mg tablet Take 1 tablet (125 mcg total) by mouth daily  Qty: 30 tablet, Refills: 0    Associated Diagnoses: Chronic atrial fibrillation      folic acid (FOLVITE) 1 mg tablet Take 1,000 mcg by mouth daily  Refills: 3      gabapentin (NEURONTIN) 300 mg capsule Take 300 mg by mouth daily      lisinopril (ZESTRIL) 2 5 mg tablet Take 1 tablet (2 5 mg total) by mouth daily  Qty: 30 tablet, Refills: 0    Associated Diagnoses: Essential hypertension      metFORMIN (GLUCOPHAGE) 1000 MG tablet Take 0 5 tablets (500 mg total) by mouth 2 (two) times a day with meals  Qty: 60 tablet, Refills: 0    Associated Diagnoses: Type 2 diabetes mellitus without complication, without long-term current use of insulin (HCC)      Mirabegron ER (MYRBETRIQ) 50 MG TB24 Take 50 mg by mouth daily        primidone (MYSOLINE) 50 mg tablet Take by mouth 2 (two) times a day      spironolactone (ALDACTONE) 50 mg tablet Take 50 mg by mouth daily             No discharge procedures on file  Prior to Admission Medications   Prescriptions Last Dose Informant Patient Reported? Taking?    Mirabegron ER (MYRBETRIQ) 50 MG TB24  Self Yes Yes   Sig: Take 50 mg by mouth daily     apixaban (ELIQUIS) 5 mg  Self No Yes   Sig: Take 1 tablet (5 mg total) by mouth 2 (two) times a day   atorvastatin (LIPITOR) 40 mg tablet  Self No Yes   Sig: Take 1 tablet (40 mg total) by mouth every evening   calcium gluconate 500 mg tablet  Self Yes Yes   Sig: Take 500 mg by mouth every evening   cholecalciferol (VITAMIN D3) 1,000 units tablet  Self Yes Yes   Sig: Take 1,000 Units by mouth every evening   cyanocobalamin (VITAMIN B-12) 1,000 mcg tablet  Self Yes Yes   Sig: Take 1,000 mcg by mouth every evening     digoxin (LANOXIN) 0 125 mg tablet  Self No Yes   Sig: Take 1 tablet (125 mcg total) by mouth daily   folic acid (FOLVITE) 1 mg tablet  Self Yes Yes   Sig: Take 1,000 mcg by mouth daily   gabapentin (NEURONTIN) 300 mg capsule  Self Yes Yes   Sig: Take 300 mg by mouth daily lisinopril (ZESTRIL) 2 5 mg tablet  Self No Yes   Sig: Take 1 tablet (2 5 mg total) by mouth daily   metFORMIN (GLUCOPHAGE) 1000 MG tablet  Self No Yes   Sig: Take 0 5 tablets (500 mg total) by mouth 2 (two) times a day with meals   primidone (MYSOLINE) 50 mg tablet  Self Yes Yes   Sig: Take by mouth 2 (two) times a day   spironolactone (ALDACTONE) 50 mg tablet  Self Yes Yes   Sig: Take 50 mg by mouth daily        Facility-Administered Medications: None       Portions of the record may have been created with voice recognition software  Occasional wrong word or "sound a like" substitutions may have occurred due to the inherent limitations of voice recognition software  Read the chart carefully and recognize, using context, where substitutions have occurred      Electronically signed by:  Priscila Miranda

## 2020-02-19 NOTE — ASSESSMENT & PLAN NOTE
Lab Results   Component Value Date    HGBA1C 5 8 02/05/2020       Recent Labs     02/17/20  2152 02/18/20  0646 02/18/20  1120 02/19/20  0855   POCGLU 202* 132 219* 120       Blood Sugar Average: Last 72 hrs:  (P) 120   Hgb A1c acceptable  Hold metformin  SSI and Accu-Cheks TID with meals and at bedtime  Place on ADA diet

## 2020-02-19 NOTE — ASSESSMENT & PLAN NOTE
Patient follows up with neurologist outpatient  Pending speech clearance, continue gabapentin 300mg PO QD, give Tylenol 650 mg p o  Q 6 hours p r n    Stimulator in place

## 2020-02-19 NOTE — ASSESSMENT & PLAN NOTE
Her daughter patient had a left-sided facial droop when she visited patient at around 7:00 a m  This morning  Left central facial droop noted today   Neurology consult today, per neurology appears to be acute on chronic recent presentation for patient, unclear etiology but would not alter patient's current management  Unable to obtain MRI brain secondary to spinal stimulator  CTA head and neck demonstrates no acute intracranial abnormality stable from prior examination earlier this month  continue Eliquis 5 mg p o  B i d    No additional acute neurologic recommendations at this time

## 2020-02-19 NOTE — PLAN OF CARE
Problem: Potential for Falls  Goal: Patient will remain free of falls  Description  INTERVENTIONS:  - Assess patient frequently for physical needs  -  Identify cognitive and physical deficits and behaviors that affect risk of falls    -  [place] fall precautions as indicated by assessment   - Educate patient/family on patient safety including physical limitations  - Instruct patient to call for assistance with activity based on assessment  - Modify environment to reduce risk of injury  - Consider OT/PT consult to assist with strengthening/mobility  Outcome: Progressing

## 2020-02-19 NOTE — ASSESSMENT & PLAN NOTE
Per daughter, this morning patient had difficulty standing up in wheelchair, more so than normal  Chronic bilateral weakness in lower extremities, no significant changes since last admission  Patient wheelchair-bound and needs assistance standing up and transferring in wheelchair  Continue fall precautions  Consult PT OT while admitted  Consider inpatient rehab stay upon discharge, of note daughter does not want patient to be in a long-term facility and has a goal of bringing patient back home with PT OT visiting at patient's home

## 2020-02-19 NOTE — ASSESSMENT & PLAN NOTE
Per daughter, pt had a L sided facial droop when she visited pt at around 7am this morning  L central facial droop noted today  Neurology consulted today, per neurology appears to be acute on chronic presentation for patient, unclear etiology but would not alter pt's current managment  Unable to obtain MRI brain secondary to spinal stimulator  CTA head and neck demonstrates no acute intracranial abnormality, stable from prior examination earlier this month  Continue Eliquis 5 mg PO BID  No additional acute neurologic recommendations at this time

## 2020-02-19 NOTE — ASSESSMENT & PLAN NOTE
Pt has a hx of hyponatremia, found to be hyponatremic on arrival to last admission on 2/14 at 133  Today 2/19 sodium at 133    Provide gentle hydration  Repeat BMP tonight

## 2020-02-19 NOTE — ASSESSMENT & PLAN NOTE
Per daughter, this morning pt had difficulty standing up in wheelchair    Chronic B/L weakness in LE, no significant changes since last admission  Pt wheelchair bound and needs assistance standing up and transferring in wheelchair  Continue fall precautions  Consult PT/OT while admitted

## 2020-02-19 NOTE — ASSESSMENT & PLAN NOTE
Dysarthria noted on last admission  Neurology consulted  EEG monitoring: Theta/delta background, frequent bifrontal triphasic waves, no focal epileptiform discharges nor electrographic seizures  -  Remove 2/17  Unable to get MRI brain due to spine stimulator

## 2020-02-20 ENCOUNTER — APPOINTMENT (INPATIENT)
Dept: RADIOLOGY | Facility: HOSPITAL | Age: 85
DRG: 853 | End: 2020-02-20
Payer: COMMERCIAL

## 2020-02-20 ENCOUNTER — APPOINTMENT (INPATIENT)
Dept: RADIOLOGY | Facility: HOSPITAL | Age: 85
DRG: 853 | End: 2020-02-20
Attending: INTERNAL MEDICINE
Payer: COMMERCIAL

## 2020-02-20 LAB
ANION GAP SERPL CALCULATED.3IONS-SCNC: 6 MMOL/L (ref 4–13)
BACTERIA BLD CULT: NORMAL
BASOPHILS # BLD AUTO: 0.07 THOUSANDS/ΜL (ref 0–0.1)
BASOPHILS NFR BLD AUTO: 1 % (ref 0–1)
BUN SERPL-MCNC: 17 MG/DL (ref 5–25)
CALCIUM SERPL-MCNC: 9.5 MG/DL (ref 8.3–10.1)
CHLORIDE SERPL-SCNC: 104 MMOL/L (ref 100–108)
CO2 SERPL-SCNC: 26 MMOL/L (ref 21–32)
CREAT SERPL-MCNC: 0.72 MG/DL (ref 0.6–1.3)
EOSINOPHIL # BLD AUTO: 0.11 THOUSAND/ΜL (ref 0–0.61)
EOSINOPHIL NFR BLD AUTO: 1 % (ref 0–6)
ERYTHROCYTE [DISTWIDTH] IN BLOOD BY AUTOMATED COUNT: 12.2 % (ref 11.6–15.1)
GFR SERPL CREATININE-BSD FRML MDRD: 77 ML/MIN/1.73SQ M
GLUCOSE SERPL-MCNC: 118 MG/DL (ref 65–140)
GLUCOSE SERPL-MCNC: 127 MG/DL (ref 65–140)
GLUCOSE SERPL-MCNC: 132 MG/DL (ref 65–140)
GLUCOSE SERPL-MCNC: 159 MG/DL (ref 65–140)
HCT VFR BLD AUTO: 36.2 % (ref 34.8–46.1)
HGB BLD-MCNC: 12.1 G/DL (ref 11.5–15.4)
IMM GRANULOCYTES # BLD AUTO: 0.02 THOUSAND/UL (ref 0–0.2)
IMM GRANULOCYTES NFR BLD AUTO: 0 % (ref 0–2)
LYMPHOCYTES # BLD AUTO: 1.25 THOUSANDS/ΜL (ref 0.6–4.47)
LYMPHOCYTES NFR BLD AUTO: 16 % (ref 14–44)
MAGNESIUM SERPL-MCNC: 2.2 MG/DL (ref 1.6–2.6)
MCH RBC QN AUTO: 31.2 PG (ref 26.8–34.3)
MCHC RBC AUTO-ENTMCNC: 33.4 G/DL (ref 31.4–37.4)
MCV RBC AUTO: 93 FL (ref 82–98)
MONOCYTES # BLD AUTO: 0.66 THOUSAND/ΜL (ref 0.17–1.22)
MONOCYTES NFR BLD AUTO: 9 % (ref 4–12)
NEUTROPHILS # BLD AUTO: 5.5 THOUSANDS/ΜL (ref 1.85–7.62)
NEUTS SEG NFR BLD AUTO: 73 % (ref 43–75)
NRBC BLD AUTO-RTO: 0 /100 WBCS
PLATELET # BLD AUTO: 172 THOUSANDS/UL (ref 149–390)
PMV BLD AUTO: 10.1 FL (ref 8.9–12.7)
POTASSIUM SERPL-SCNC: 4.1 MMOL/L (ref 3.5–5.3)
RBC # BLD AUTO: 3.88 MILLION/UL (ref 3.81–5.12)
SODIUM SERPL-SCNC: 136 MMOL/L (ref 136–145)
WBC # BLD AUTO: 7.61 THOUSAND/UL (ref 4.31–10.16)

## 2020-02-20 PROCEDURE — 85025 COMPLETE CBC W/AUTO DIFF WBC: CPT | Performed by: INTERNAL MEDICINE

## 2020-02-20 PROCEDURE — 99233 SBSQ HOSP IP/OBS HIGH 50: CPT | Performed by: INTERNAL MEDICINE

## 2020-02-20 PROCEDURE — 82948 REAGENT STRIP/BLOOD GLUCOSE: CPT

## 2020-02-20 PROCEDURE — 74230 X-RAY XM SWLNG FUNCJ C+: CPT

## 2020-02-20 PROCEDURE — 92611 MOTION FLUOROSCOPY/SWALLOW: CPT

## 2020-02-20 PROCEDURE — 70450 CT HEAD/BRAIN W/O DYE: CPT

## 2020-02-20 PROCEDURE — 99232 SBSQ HOSP IP/OBS MODERATE 35: CPT | Performed by: PHYSICIAN ASSISTANT

## 2020-02-20 PROCEDURE — 83735 ASSAY OF MAGNESIUM: CPT | Performed by: INTERNAL MEDICINE

## 2020-02-20 PROCEDURE — 92610 EVALUATE SWALLOWING FUNCTION: CPT

## 2020-02-20 PROCEDURE — 80048 BASIC METABOLIC PNL TOTAL CA: CPT | Performed by: INTERNAL MEDICINE

## 2020-02-20 RX ORDER — LABETALOL 20 MG/4 ML (5 MG/ML) INTRAVENOUS SYRINGE
10 EVERY 6 HOURS PRN
Status: DISCONTINUED | OUTPATIENT
Start: 2020-02-20 | End: 2020-03-12 | Stop reason: HOSPADM

## 2020-02-20 RX ADMIN — LISINOPRIL 2.5 MG: 2.5 TABLET ORAL at 09:49

## 2020-02-20 RX ADMIN — DIGOXIN 125 MCG: 125 TABLET ORAL at 09:48

## 2020-02-20 RX ADMIN — DEXTROSE AND SODIUM CHLORIDE 50 ML/HR: 5; .9 INJECTION, SOLUTION INTRAVENOUS at 13:23

## 2020-02-20 RX ADMIN — OXYBUTYNIN 5 MG: 5 TABLET, FILM COATED, EXTENDED RELEASE ORAL at 09:49

## 2020-02-20 RX ADMIN — APIXABAN 5 MG: 5 TABLET, FILM COATED ORAL at 09:48

## 2020-02-20 RX ADMIN — APIXABAN 5 MG: 5 TABLET, FILM COATED ORAL at 19:10

## 2020-02-20 RX ADMIN — FOLIC ACID 1000 MCG: 1 TABLET ORAL at 09:49

## 2020-02-20 RX ADMIN — GABAPENTIN 300 MG: 300 CAPSULE ORAL at 09:48

## 2020-02-20 RX ADMIN — SPIRONOLACTONE 50 MG: 50 TABLET ORAL at 09:48

## 2020-02-20 NOTE — PLAN OF CARE
Problem: Potential for Falls  Goal: Patient will remain free of falls  Description  INTERVENTIONS:  - Assess patient frequently for physical needs  -  Identify cognitive and physical deficits and behaviors that affect risk of falls    -  [place] fall precautions as indicated by assessment   - Educate patient/family on patient safety including physical limitations  - Instruct patient to call for assistance with activity based on assessment  - Modify environment to reduce risk of injury  - Consider OT/PT consult to assist with strengthening/mobility  Outcome: Progressing

## 2020-02-20 NOTE — UTILIZATION REVIEW
Initial Clinical Review    Admission: Date/Time/Statement: Admission Orders (From admission, onward)     Ordered        02/19/20 1351  Inpatient Admission  Once                   Orders Placed This Encounter   Procedures    Inpatient Admission     Standing Status:   Standing     Number of Occurrences:   1     Order Specific Question:   Admitting Physician     Answer:   Faustino Martin [[id number]]     Order Specific Question:   Level of Care     Answer:   Med Surg [16]     Order Specific Question:   Estimated length of stay     Answer:   More than 2 Midnights     Order Specific Question:   Certification     Answer:   I certify that inpatient services are medically necessary for this patient for a duration of greater than two midnights  See H&P and MD Progress Notes for additional information about the patient's course of treatment  ED Arrival Information     Expected Arrival Acuity Means of Arrival Escorted By Service Admission Type    - 2/19/2020 08:29 Emergent Ambulance [address] [facility] Emergency    Arrival Complaint    Non verbal        Chief Complaint   Patient presents with    Weakness - Generalized     Pt daughter states that pt was D/C from hospital yesterday and pt was non verbal this AM and wouldn't get up out of her chair and wouldn't respond with left facial droop and weakness  Assessment/Plan    80year old female presents to ed via ems for evaluation and treatment of aphasia, left sided facial droop and worsening bilateral lower extremity weakness at 7am today  Arrived in ed at 8:45 am   Speech difficulties and weakness are chronic and daughter states that are worsening  Patient was discharged on 2-18 from the hospital where she was evaluated and treated for dysarthria, weakness and SIRS  PMHX : Afib, DM, stroke, wheel chair dependent, spinal stimulator  No new findings on CTA    Physical examination significant for aphasia which is new from previous admission where patient initially had dysarthria which improved spontaneously  by discharge  Muscle strength  4/ 5 bilateral lower extremities  No medication required in ed  Admit to inpatient medical surgical for expressive aphasia  Plan to consult neurology,  Speech evaluation of swallowing,  NPO until speech recommendations received  MRI unable to be done due to spinal cord stimulator  PT/OT evaluations  Daughter's goal is discharge to her home with home therapy  Consult neurology     As noted below, patient is able to produce pantomime speech, but remains aphonic at the time my exam   She has no trouble with comprehension  She can follow simple commands readily  There may be resting facial asymmetry, but I am unimpressed with any volitional facial weakness  She does seem to a bit more bradykinesia affecting left upper extremity versus right upper extremity movements, but again without gross hemiparesis  CTA did not show any acute changes, but re-demonstrates suggestion less well-defined right-sided hemispheric branches than the left  There is calcific changes in the paraclinoid regions  I do not believe this represents any new infarction, but perhaps some of these recurrent episodes may be perfusion related in the face of vascular stenosis  I do not think there is any reason to change her prophylactic regimen, nor do I think she needs repeat EEG  Evidently this episodic phenomena, along with her general debility has become more than her daughter are can cope with    She may need placement           ED Triage Vitals [02/19/20 0837]   97 6 °F (36 4 °C) 67 18 159/72 99 %      Oral Monitor         No Pain       02/19/20 78 1 kg (172 lb 3 2 oz)     Additional Vital Signs:     Date/Time  Temp  Pulse  Resp  BP  MAP (mmHg)  SpO2  O2 Device   02/20/20 0948    87             02/20/20 07:57:18  99 °F (37 2 °C)    18  144/59  87       02/20/20 0000              None (Room air)   02/19/20 22:34:19     18  131/58  82       02/19/20 1630        167/66  100       02/19/20 1512    57  18  141/63    99 %     02/19/20 1345    57  18  135/65    99 %  None (Room air)   02/19/20 1242    58  18  119/55    97 %  None (Room air)   02/19/20 1144    55  16  132/61    99 %  None (Room air)   02/19/20 1054    58  16  154/67    97 %  None (Room air)   02/19/20 0945    59  18  139/66    95 %     02/19/20 0840              None (Room air)     Date and Time Eye Opening Best Verbal Response Best Motor Response Eric Coma Scale Score   02/20/20 0826 4 5 6 15   02/20/20 0000 4 2 6 12   02/19/20 1728 4 2 6 12   02/19/20 1359 4 1 6 11   02/19/20 1045 4 1 6 11   02/19/20 0945 4 1 6 11   02/19/20 0840 4 1 6 11       Pertinent Labs/Diagnostic Test Results:     Collection Time Result Time Vent R Atrial R NC Int  QRSD Int  QT Int  QTC Int  P Axis QRS Axis T Wave Ax    02/19/20 09:27:35 02/19/20 16:54:07 62 62 270 142 420 426 77 251 17       Sinus rhythm with 1st degree A-V block  Right bundle branch block  Possible Lateral infarct (cited on or before 16-JUN-2019)  Abnormal ECG  When compared with ECG of 14-FEB-2020 22:10,  Questionable change in initial forces of Lateral leads      CTA head and neck with and without contrast   Final (02/19 0950)      1  No acute intracranial CT abnormality  2   No significant change in multifocal high-grade stenosis of distal petrous, cavernous, and supraclinoid right ICA  3   Moderate multifocal atherosclerotic narrowing of M1 and proximal M2 branches of right MCA with asymmetric paucity of vasculature in the distal right MCA territory, similar to prior exam       4    Cervical carotid bifurcations and proximal ICAs atherosclerotic disease without significant stenosis (less than 50%)        5   Frothy secretions in the left locule of the sphenoid sinus, similar to prior exam       6   Multiple thyroid nodules, the largest in the inferior right lobe measures up to 1 6 cm   As recommended in prior exam correlation with prior thyroid workup and/or dedicated thyroid ultrasound is recommended           Results from last 7 days   Lab Units 02/20/20  0458 02/19/20  0854 02/16/20  0604 02/15/20  0457 02/14/20  2251   WBC Thousand/uL 7 61 9 00 7 34 13 62* 17 93*   HEMOGLOBIN g/dL 12 1 12 8 12 0 12 4 13 6   HEMATOCRIT % 36 2 39 0 37 1 37 3 41 4   PLATELETS Thousands/uL 172 195 154 205 180   NEUTROS ABS Thousands/µL 5 50  --   --   --  15 30*         Results from last 7 days   Lab Units 02/20/20  0458 02/19/20  0854 02/16/20  0604 02/15/20  0457 02/14/20  2250   SODIUM mmol/L 136 133* 135* 134* 133*   POTASSIUM mmol/L 4 1 4 7 4 8 4 3 4 9   CHLORIDE mmol/L 104 100 104 105 103   CO2 mmol/L 26 30 30 24 20*   ANION GAP mmol/L 6 3* 1* 5 10   BUN mg/dL 17 20 21 25 29*   CREATININE mg/dL 0 72 0 92 0 78 0 73 0 94   EGFR ml/min/1 73sq m 77 57 70 76 56   CALCIUM mg/dL 9 5 9 9 10 1 10 2* 10 2*   MAGNESIUM mg/dL 2 2  --   --   --   --          Results from last 7 days   Lab Units 02/19/20  1917 02/19/20  0855 02/18/20  1120 02/18/20  0646 02/17/20  2152 02/17/20  1614 02/17/20  1131 02/17/20  0647 02/16/20  2104 02/16/20  1624 02/16/20  1103 02/16/20  0716   POC GLUCOSE mg/dl 107 120 219* 132 202* 151* 183* 111 120 114 184* 104     Results from last 7 days   Lab Units 02/20/20  0458 02/19/20  0854 02/16/20  0604 02/15/20  0457 02/14/20  2250   GLUCOSE RANDOM mg/dL 118 129 105 109 142*       Results from last 7 days   Lab Units 02/19/20  0854   TROPONIN I ng/mL <0 02         Results from last 7 days   Lab Units 02/19/20  0854   PROTIME seconds 14 5   INR  1 17   PTT seconds 33     Results from last 7 days   Lab Units 02/14/20  2250   TSH 3RD GENERATON uIU/mL 1 360         Results from last 7 days   Lab Units 02/15/20  0147   LACTIC ACID mmol/L 1 7         Results from last 7 days   Lab Units 02/15/20  0457   DIGOXIN LVL ng/mL 1 6       Results from last 7 days   Lab Units 02/15/20  0019   CLARITY UA Clear   COLOR UA  Yellow  see results   SPEC GRAV UA  1 025   PH UA  6 0   GLUCOSE UA mg/dl Negative   KETONES UA mg/dl Negative   BLOOD UA  Trace*   PROTEIN UA mg/dl Negative   NITRITE UA  Negative   BILIRUBIN UA  Negative   UROBILINOGEN UA E U /dl 0 2   LEUKOCYTES UA  Negative   WBC UA /hpf None Seen   RBC UA /hpf 2-4*   BACTERIA UA /hpf None Seen   EPITHELIAL CELLS WET PREP /hpf None Seen     Results from last 7 days   Lab Units 02/15/20  0003   INFLUENZA A PCR  None Detected   INFLUENZA B PCR  None Detected   RSV PCR  None Detected               Results from last 7 days   Lab Units 02/15/20  0157 02/15/20  0147   BLOOD CULTURE  Staphylococcus coagulase negative* No Growth After 4 Days     GRAM STAIN RESULT  Gram positive cocci in clusters*  --                ED Treatment:   Medication Administration from 02/19/2020 0829 to 02/19/2020 1546   None      Past Medical History:   Diagnosis    A-fib ([facility] [facility] )    Arthritis    Assistance needed for mobility    pt can stand with assistance and transfer    Chronic pain disorder    Diabetes mellitus ([facility] )    NIDDM    Full dentures    History of transfusion    no adverse reaction    Hyperlipidemia    Irregular heart beat    Numbness and tingling of both feet    Skin abnormality    sacrum area - small red area, less than a dime size    Spinal stenosis    Stroke ([facility] [address] )    Unable to ambulate    Urinary incontinence    ipg stimulator x3 repakcements,battery exchange today 2/14/2018    Wears glasses    Wheelchair dependence     Present on Admission:   Type 2 diabetes mellitus (HCC)   Paroxysmal atrial fibrillation (HCC)   Essential hypertension   Dysarthria   Weakness of both lower extremities   Cervical dystonia   Overactive bladder   Aphasia      Admitting Diagnosis:     Fatigue [R53 83]  Weakness [R53 1]  Ambulatory dysfunction [R26 2]    Age/Sex: 80 y o  female     Admission Orders:    Scheduled Medications:    Medications:  apixaban 5 mg Oral BID atorvastatin 40 mg Oral QPM   cholecalciferol 1,000 Units Oral QPM   vitamin B-12 1,000 mcg Oral QPM   digoxin 125 mcg Oral Daily   folic acid 7,185 mcg Oral Daily   gabapentin 300 mg Oral Daily   lisinopril 2 5 mg Oral Daily   oxybutynin 5 mg Oral Daily   primidone 50 mg Oral Q12H STEPHANIE   spironolactone 50 mg Oral Daily     Continuous IV Infusions:    dextrose 5 % and sodium chloride 0 9 % 50 mL/hr Intravenous Continuous     PRN Meds:    acetaminophen 650 mg Oral Q6H PRN       IP CONSULT TO NEUROLOGY  IP CONSULT TO NEUROLOGY  IP CONSULT TO CASE MANAGEMENT    Network Utilization Review Department  [email or web address]  ATTENTION: Please call with any questions or concerns to [phone] and carefully listen to the prompts so that you are directed to the right person  All voicemails are confidential   Melida Muss all requests for admission clinical reviews, approved or denied determinations and any other requests to dedicated fax number below belonging to the campus where the patient is receiving treatment   List of dedicated fax numbers for the Facilities:  [address] [place] [address] DENIALS (Administrative/Medical Necessity) [phone]   [address] (Maternity/NICU/Pediatrics) [phone]   Mina Russell [phone]     [address] [phone]   Sharifa Motta [phone]   Valeri Palmer [phone]   [address] [place] [address] [phone]   [facility]  [phone]   [address] [facility]  [address] [address] [address] [facility] [phone]

## 2020-02-20 NOTE — PROGRESS NOTES
Patient daughter Shruthi Cuello, is present and showing frustration over not being updated on mothers care despite numerous requests  I have told her to reach out to case management tomorrow as they should have a status if the doctor does not call   ty

## 2020-02-20 NOTE — PROGRESS NOTES
Progress Note - Bijan Corona 1935, 80 y o  female MRN: [id number]    Unit/Bed#: [facility] 723-01 Encounter: [id number]    Primary Care Provider: Bishnu Tanner MD   Date and time admitted to hospital: 2/19/2020  8:30 AM        * Aphasia  Assessment & Plan  EEG monitoring 2/16-2/17: demonstrated no epileptiform activity or epileptogenic focus  Patient currently still aphasic but able to nod head yes and no to questioning  Per nursing, patient coughed when attempting to eat applesauce, but did take all home meds for tonight  Consult speech to assess swallowing, NPO status until speech recommendation    Dysarthria  Assessment & Plan  Dysarthria noted on last admission 2/14, neurology consulted and did 24-hour EEG monitoring 2/16  EEG monitoring 2/16-2/17: no focal epileptiform discharges or electrographic seizures  Removed 2/17  Unable to get MRI brain due to spine stimulator    Essential hypertension  Assessment & Plan  Monitor blood pressure with routine vitals  continue spironolactone and lisinopril at home dosing    Paroxysmal atrial fibrillation [facility])  Assessment & Plan  Rate controlled  EKG 2/19:  Sinus rhythm with first-degree AV block  Right bundle branch block  Possible lateral infarct (cited on or before 16th June 2019)  When compared with EKG of 14th February 2020, questionable change in initial forces of lateral leads  Digoxin level 2/15 within normal limits  continue digoxin and Eliquis  Continue telemetry monitoring    Type 2 diabetes mellitus [facility])  Assessment & Plan  Lab Results   Component Value Date    HGBA1C 5 8 02/05/2020       Recent Labs     02/18/20  1120 02/19/20  0855 02/19/20  1917 02/20/20  1152   POCGLU 219* 120 107 159*       Blood Sugar Average: Last 72 hrs:  (P) 580 [id number]     Hold metformin  SSI and Accu-Cheks t i d   With meals and at bedtime  Placed on ADA diet, pending speech recommendations        VTE Pharmacologic Prophylaxis:   Pharmacologic: Apixaban (Eliquis)  Mechanical VTE Prophylaxis in Place: Yes    Patient Centered Rounds:      Discussions with Specialists or Other Care Team Provider:      Education and Discussions with Family / Patient:      Time Spent for Care: 30 minutes  More than 50% of total time spent on counseling and coordination of care as described above  Current Length of Stay: 1 day(s)    Current Patient Status: Inpatient   Certification Statement: The patient will continue to require additional inpatient hospital stay due to Speech difficulty    Discharge Plan:     Code Status: Level 1 - Full Code      Subjective: This a m  Patient able to speak although with soft voice  Appropriate in her responses  She denies pain trouble breathing  Objective:     Vitals:   Temp (24hrs), Av 4 °F (37 4 °C), Min:99 °F (37 2 °C), Max:99 8 °F (37 7 °C)    Temp:  [99 °F (37 2 °C)-99 8 °F (37 7 °C)] 99 8 °F (37 7 °C)  HR:  [57-87] 87  Resp:  [17-18] 17  BP: (119-167)/(55-66) 139/57  SpO2:  [97 %-99 %] 99 %  Body mass index is 29 56 kg/m²  Input and Output Summary (last 24 hours): Intake/Output Summary (Last 24 hours) at 2020 1232  Last data filed at 2020 0951  Gross per 24 hour   Intake    Output 675 ml   Net -675 ml       Physical Exam:     Physical Exam   Constitutional: She appears well-developed and well-nourished  No distress  HENT:   Head: Normocephalic and atraumatic  Right Ear: External ear normal    Left Ear: External ear normal    Nose: Nose normal    Eyes: Conjunctivae are normal  Right eye exhibits no discharge  Left eye exhibits no discharge  No scleral icterus  Neck: Normal range of motion  No JVD present  No tracheal deviation present  No thyromegaly present  Cardiovascular: Normal rate, regular rhythm and intact distal pulses  Exam reveals no gallop and no friction rub  Murmur (2/6) heard  Pulmonary/Chest: Breath sounds normal  No stridor  No respiratory distress  She has no wheezes  She has no rales  Abdominal: Soft  Bowel sounds are normal  She exhibits no distension  There is no tenderness  There is no rebound and no guarding  Musculoskeletal: Normal range of motion  She exhibits no edema, tenderness or deformity  Neurological: She is alert  She has normal reflexes  She exhibits normal muscle tone  Coordination normal    Skin: Skin is warm and dry  No rash noted  She is not diaphoretic  No erythema  No pallor  Psychiatric: She has a normal mood and affect  Her behavior is normal  Judgment and thought content normal    Nursing note and vitals reviewed  Additional Data:     Labs:    Results from last 7 days   Lab Units 02/20/20  0458   WBC Thousand/uL 7 61   HEMOGLOBIN g/dL 12 1   HEMATOCRIT % 36 2   PLATELETS Thousands/uL 172   NEUTROS PCT % 73   LYMPHS PCT % 16   MONOS PCT % 9   EOS PCT % 1     Results from last 7 days   Lab Units 02/20/20  0458   SODIUM mmol/L 136   POTASSIUM mmol/L 4 1   CHLORIDE mmol/L 104   CO2 mmol/L 26   BUN mg/dL 17   CREATININE mg/dL 0 72   ANION GAP mmol/L 6   CALCIUM mg/dL 9 5   GLUCOSE RANDOM mg/dL 118     Results from last 7 days   Lab Units 02/19/20  0854   INR  1 17     Results from last 7 days   Lab Units 02/20/20  1152 02/19/20  1917 02/19/20  0855 02/18/20  1120 02/18/20  0646 02/17/20  2152 02/17/20  1614 02/17/20  1131 02/17/20  0647 02/16/20  2104 02/16/20  1624 02/16/20  1103   POC GLUCOSE mg/dl 159* 107 120 219* 132 202* 151* 183* 111 120 114 184*         Results from last 7 days   Lab Units 02/15/20  0147   LACTIC ACID mmol/L 1 7           * I Have Reviewed All Lab Data Listed Above  * Additional Pertinent Lab Tests Reviewed:  [facility] Admission Reviewed    Imaging:    Imaging Reports Reviewed Today Include:    Imaging Personally Reviewed by Myself Includes:       Recent Cultures (last 7 days):     Results from last 7 days   Lab Units 02/15/20  0157 02/15/20  0147   BLOOD CULTURE  Staphylococcus coagulase negative* No Growth After 5 Days    GRAM STAIN RESULT  Gram positive cocci in clusters*  --        Last 24 Hours Medication List:     Current Facility-Administered Medications:  acetaminophen 650 mg Oral Q6H PRN Candi Hermosillo MD    apixaban 5 mg Oral BID Jayde Cuba MD    atorvastatin 40 mg Oral QPM Jayde Cuba MD    cholecalciferol 1,000 Units Oral QPM Jayde Cuba MD    vitamin B-12 1,000 mcg Oral QPM Jayde Cuba MD    dextrose 5 % and sodium chloride 0 9 % 50 mL/hr Intravenous Continuous Jayde Cuba MD Last Rate: 50 mL/hr (02/19/20 1848)   digoxin 125 mcg Oral Daily Jayde Cuba MD    folic acid 3,464 mcg Oral Daily Jayde Cuba MD    gabapentin 300 mg Oral Daily Jayde Cuba MD    lisinopril 2 5 mg Oral Daily Jayde Cuba MD    oxybutynin 5 mg Oral Daily Jayde Cuba MD    primidone 50 mg Oral Q12H [facility] Jayde Cuba MD    spironolactone 50 mg Oral Daily Jayde Cuba MD         Today, Patient Was Seen By: Matias Linares MD    ** Please Note: Dictation voice to text software may have been used in the creation of this document   **

## 2020-02-20 NOTE — SPEECH THERAPY NOTE
Speech Language/Pathology  Speech/Language Pathology  Assessment    Patient Name: Gigi Valentine  BJYBG'H Date: 2/20/2020     Problem List  Principal Problem:    Aphasia  Active Problems:    Wheelchair dependence    Type 2 diabetes mellitus (HCC)    Paroxysmal atrial fibrillation (HCC)    Spinal cord stimulator status    Overactive bladder    Essential hypertension    Cervical dystonia    Hyponatremia    Weakness of both lower extremities    SIRS (systemic inflammatory response syndrome) (HCC)    Dysarthria    Facial droop    Past Medical History  Past Medical History:   Diagnosis Date    [facility]fib ([facility] )     Arthritis     Assistance needed for mobility     pt can stand with assistance and transfer    Chronic pain disorder     Diabetes mellitus ([facility] )     NIDDM    Full dentures     History of transfusion     no adverse reaction    Hyperlipidemia     Irregular heart beat     Numbness and tingling of both feet     Skin abnormality     sacrum area - small red area, less than a dime size    Spinal stenosis     Stroke ([facility] )     Unable to ambulate     Urinary incontinence     ipg stimulator x3 repakcements,battery exchange today 2/14/2018    Wears glasses     Wheelchair dependence      Past Surgical History  Past Surgical History:   Procedure Laterality Date    BACK SURGERY      fusion    BLADDER SUSPENSION      CARPAL TUNNEL RELEASE Bilateral     CHOLANGIOGRAM N/A 6/4/2018    Procedure: CHOLANGIOGRAM;  Surgeon: Harish Bee MD;  [facility];  Service: General    CHOLECYSTECTOMY LAPAROSCOPIC N/A 6/4/2018    Procedure: CHOLECYSTECTOMY LAPAROSCOPIC;  Surgeon: Harish Bee MD;  [facility];  Service: General    COLONOSCOPY      DILATION AND CURETTAGE OF UTERUS      FRACTURE SURGERY Left     femur with hardware    HYSTERECTOMY      INSERTION / PLACEMENT / REVISION NEUROSTIMULATOR      JOINT REPLACEMENT Bilateral     knees    SACRAL NERVE STIMULATOR PLACEMENT N/A 2/14/2018 Procedure: REMOVE AND REPLACE IPG;  Surgeon: Bobby Robbins MD;  [facility];  Service: UroGynecology           TONSILLECTOMY       Per H&P:  Tony Moralez is an 80 y o  female who presents with aphasia left-sided facial droop and worsened bilateral lower extremity weakness at 7:00 a m  this morning, per daughter  Daughter describes that when she visited the patient's home at 7:00 a m , she was unable to verbally respond to her daughter, noticed a left-sided facial droop, and had increased difficulty with standing up from wheelchair with assistance, brought into the ED today at around 8:45 a m  this morning  The speech difficulties and weakness are a chronic problem for the patient that have had recent exacerbations  Patient's daughter describes that the episodes of speech difficulties and weakness seem to get worse with each episode  Patient discharged yesterday 2/18, was admitted from 02/14 to 2/18 for dysarthria, weakness, and SIRS  SIRS resolved during last admission  Denies any fevers, chills, nausea, vomiting, chest pain, shortness of breath, abdominal pain, headache, visual disturbances, arm pain, jaw pain  Bedside Swallow Evaluation:    Summary:  Pt presents w/ suspected at least mild pharyngeal dysphagia  Oral stage events are prolonged but effective  Pt w/ fairly prompt swallow w/ adequate rise to palpation  Swallows are very audible and pt coughed intermittently throughout po trials  Difficult to interpret coughing due to inconsistency  ? Reduced airway protection putting pt at risk for aspiration  Pt w/ mod/severe dysphonia, strained, weak, short breath groups  Pt would benefit from VBS to further assess swallow and potential aspiration  Pt known from previous admit, this it change in status for pt  Recommendations:  Diet: NPO, ok for meds   Meds: Crushed in puree  Positioning:Upright  Strategies: Pt to take PO/Meds only when fully alert and upright  Oral care:  At least 2x/day Therapy Prognosis: Fair   Prognosis considerations: Advanced age, inconsistent clinical s/s    Frequency: 2-3x/wk     Goal(s):  Pt will tolerate least restrictive diet w/out s/s aspiration or oral/pharyngeal difficulties  Patient's goal: "ice cream"     Consider consult w/:  Rehab  Neurology  Nutrition    Reason for consult:  R/o aspiration  Determine safest and least restrictive diet  Change in mental status  Nursing reported cough w/ PO  Family reported cough w/ PO    Precautions:  Fall    Current diet:  NPO     Premorbid diet[de-identified]  Regular w/ thin     Previous VBS:  -    O2 requirement:  RA    Voice/Speech:  Strained, weak, poor breath support    Social:  Home w/ family     Follows commands:  Yes for oral mech                           Cognitive Status:  A&Ax3    Oral mech exam:  Dentition: upper and lower dentures   Labial strength and ROM: mild L facial droop   Lingual strength and ROM: wfl   Mandibular strength and ROM: wfl   Secretion management: wfl   Volitional cough: unable, weak cough observed spontaneously   Volitional swallow: present     Items administered:  Puree, soft solid, hard solid, honey thick liquid, nectar thick liquid, thin liquids  Liquids were taken by tsp, straw/cup       Oral stage:  Lip closure: complete   Mastication: labored, prolonged, but effective   Bolus formation: wfl   Bolus control: wfl   Transfer: wfl   Oral residue: none   Pocketing: none     Pharyngeal stage:  Swallow promptness: fairly prompt   Laryngeal rise: wfl to palpation   Wet voice: inconsistently noted w/ po   Throat clear: none   Cough: x3 intermittently between po trials   Secondary swallows: multiple w/ all material  Audible swallows: loud dumping of liquids     Esophageal stage:  No s/s reported    Results d/w:  Pt, nursing

## 2020-02-20 NOTE — ED PROVIDER NOTES
History  Chief Complaint   Patient presents with    Weakness - Generalized     Pt daughter states that pt was D/C from hospital yesterday and pt was non verbal this AM and wouldn't get up out of her chair and wouldn't respond with left facial droop and weakness  Patient is a 80-year-old woman with recent admissions for evaluation of altered mental status, aphasia that presents with aphasia and left-sided facial droop  Patient was discharged yesterday with neurologic clearance for these complaints  It was felt like there may be some psychiatric component to her symptoms  Daughter is taking care the patient home and noticed a left-sided facial droop that has since resolved this morning  She then called 911  Patient is largely nonverbal on my exam though she mouth words to me  She also follows commands briskly  This seems to be similar to her presentation in her most recent admission  She currently denies any pain to me at this time  No known head trauma over the past 12 hours since discharge per her daughter  She denies any nausea, vomiting,, dyspnea  Prior to Admission Medications   Prescriptions Last Dose Informant Patient Reported? Taking?    Mirabegron ER (MYRBETRIQ) 50 MG TB24  Self Yes Yes   Sig: Take 50 mg by mouth daily     apixaban (ELIQUIS) 5 mg  Self No Yes   Sig: Take 1 tablet (5 mg total) by mouth 2 (two) times a day   atorvastatin (LIPITOR) 40 mg tablet  Self No Yes   Sig: Take 1 tablet (40 mg total) by mouth every evening   calcium gluconate 500 mg tablet  Self Yes Yes   Sig: Take 500 mg by mouth every evening   cholecalciferol (VITAMIN D3) 1,000 units tablet  Self Yes Yes   Sig: Take 1,000 Units by mouth every evening   cyanocobalamin (VITAMIN B-12) 1,000 mcg tablet  Self Yes Yes   Sig: Take 1,000 mcg by mouth every evening     digoxin (LANOXIN) 0 125 mg tablet  Self No Yes   Sig: Take 1 tablet (125 mcg total) by mouth daily   folic acid (FOLVITE) 1 mg tablet  Self Yes Yes Sig: Take 1,000 mcg by mouth daily   gabapentin (NEURONTIN) 300 mg capsule  Self Yes Yes   Sig: Take 300 mg by mouth daily   lisinopril (ZESTRIL) 2 5 mg tablet  Self No Yes   Sig: Take 1 tablet (2 5 mg total) by mouth daily   metFORMIN (GLUCOPHAGE) 1000 MG tablet  Self No Yes   Sig: Take 0 5 tablets (500 mg total) by mouth 2 (two) times a day with meals   primidone (MYSOLINE) 50 mg tablet  Self Yes Yes   Sig: Take by mouth 2 (two) times a day   spironolactone (ALDACTONE) 50 mg tablet  Self Yes Yes   Sig: Take 50 mg by mouth daily        Facility-Administered Medications: None       Past Medical History:   Diagnosis Date    A-fib ([place] [id number] )     Arthritis     Assistance needed for mobility     pt can stand with assistance and transfer    Chronic pain disorder     Diabetes mellitus ([facility] )     NIDDM    Full dentures     History of transfusion     no adverse reaction    Hyperlipidemia     Irregular heart beat     Numbness and tingling of both feet     Skin abnormality     sacrum area - small red area, less than a dime size    Spinal stenosis     Stroke ([facility] )     Unable to ambulate     Urinary incontinence     ipg stimulator x3 repakcements,battery exchange today 2/14/2018    Wears glasses     Wheelchair dependence        Past Surgical History:   Procedure Laterality Date    BACK SURGERY      fusion    BLADDER SUSPENSION      CARPAL TUNNEL RELEASE Bilateral     CHOLANGIOGRAM N/A 6/4/2018    Procedure: CHOLANGIOGRAM;  Surgeon: Lamar Salvador MD;  [facility];  Service: General    CHOLECYSTECTOMY LAPAROSCOPIC N/A 6/4/2018    Procedure: CHOLECYSTECTOMY LAPAROSCOPIC;  Surgeon: Lamar Salvador MD;  [facility];  Service: General    COLONOSCOPY      DILATION AND CURETTAGE OF UTERUS      FRACTURE SURGERY Left     femur with hardware    HYSTERECTOMY      INSERTION / PLACEMENT / REVISION NEUROSTIMULATOR      JOINT REPLACEMENT Bilateral     knees    SACRAL NERVE STIMULATOR PLACEMENT N/A 2/14/2018    Procedure: REMOVE AND REPLACE IPG;  Surgeon: Payam eWeks MD;  [facility];  Service: UroGynecology           TONSILLECTOMY         Family History   Problem Relation Age of Onset    No Known Problems Mother     No Known Problems Father      I have reviewed and agree with the history as documented  Social History     Tobacco Use    Smoking status: Never Smoker    Smokeless tobacco: Never Used   Substance Use Topics    Alcohol use: Not Currently     Comment: very rare    Drug use: No        Review of Systems   Constitutional: Negative for chills, diaphoresis, fatigue and fever  HENT: Negative for facial swelling, sore throat and trouble swallowing  Respiratory: Negative for cough, chest tightness, shortness of breath and wheezing  Cardiovascular: Negative for chest pain  Gastrointestinal: Negative for abdominal distention, abdominal pain, diarrhea, nausea and vomiting  Genitourinary: Negative for dysuria  Musculoskeletal: Negative for back pain, neck pain and neck stiffness  Skin: Negative for color change, pallor, rash and wound  Neurological: Negative for weakness, light-headedness and numbness  Psychiatric/Behavioral: Negative for agitation  All other systems reviewed and are negative  Physical Exam  ED Triage Vitals [02/19/20 0837]   Temperature Pulse Respirations Blood Pressure SpO2   97 6 °F (36 4 °C) 67 18 159/72 99 %      Temp Source Heart Rate Source Patient Position - Orthostatic VS BP Location FiO2 (%)   Oral Monitor Sitting Right arm --      Pain Score       No Pain             Orthostatic Vital Signs  Vitals:    02/20/20 1603 02/20/20 2300 02/21/20 0700 02/21/20 1500   BP: 151/67 129/58 151/66 128/58   Pulse: 75 74 68 71   Patient Position - Orthostatic VS: Lying Lying  Lying       Physical Exam   Constitutional: She is oriented to person, place, and time  She appears well-developed and well-nourished  No distress  HENT:   Head: Normocephalic  Eyes: Pupils are equal, round, and reactive to light  Neck: Normal range of motion  Neck supple  Cardiovascular: Normal rate, regular rhythm, normal heart sounds and intact distal pulses  Pulmonary/Chest: Effort normal and breath sounds normal    Abdominal: Soft  Bowel sounds are normal  She exhibits no distension  There is no tenderness  There is no guarding  Musculoskeletal: Normal range of motion  She exhibits no edema, tenderness or deformity  Neurological: She is alert and oriented to person, place, and time  No cranial nerve deficit or sensory deficit  Alert  Nonverbal   Patient with 5/5 strength in her upper extremities  3/5 strength bilateral lower extremities-likely chronic  No facial droop noted on exam   Skin: Skin is warm and dry  Capillary refill takes less than 2 seconds  Psychiatric: She has a normal mood and affect  Her behavior is normal  Judgment and thought content normal    Vitals reviewed        ED Medications  Medications   apixaban (ELIQUIS) tablet 5 mg (0 mg Oral Hold 2/21/20 0851)   atorvastatin (LIPITOR) tablet 40 mg (40 mg Oral Not Given 2/20/20 1914)   cholecalciferol (VITAMIN D3) tablet 1,000 Units (1,000 Units Oral Not Given 2/20/20 1915)   cyanocobalamin (VITAMIN B-12) tablet 1,000 mcg (1,000 mcg Oral Not Given 2/20/20 1915)   digoxin (LANOXIN) tablet 125 mcg (0 mcg Oral Hold 8/59/62 4233)   folic acid (FOLVITE) tablet 1,000 mcg (0 mcg Oral Hold 2/21/20 1018)   gabapentin (NEURONTIN) capsule 300 mg (0 mg Oral Hold 2/21/20 1018)   lisinopril (ZESTRIL) tablet 2 5 mg (0 mg Oral Hold 2/21/20 1018)   oxybutynin (DITROPAN-XL) 24 hr tablet 5 mg (0 mg Oral Hold 2/21/20 1018)   primidone (MYSOLINE) tablet 50 mg (0 mg Oral Hold 2/21/20 0851)   spironolactone (ALDACTONE) tablet 50 mg (0 mg Oral Hold 2/21/20 1019)   Labetalol HCl (NORMODYNE) injection 10 mg (has no administration in time range)   acetaminophen (TYLENOL) rectal suppository 650 mg (650 mg Rectal Given 2/21/20 1445) iohexol (OMNIPAQUE) 350 MG/ML injection (MULTI-DOSE) 85 mL (85 mL Intravenous Given 2/19/20 0912)   barium sulfate 98 % oral suspension 135 mL (135 mL Oral Given by Other 2/20/20 1553)       Diagnostic Studies  Results Reviewed     Procedure Component Value Units Date/Time    Basic metabolic panel [[id number]]  (Abnormal) Collected:  02/19/20 0854    Lab Status:  Final result Specimen:  Blood from Arm, Right Updated:  02/19/20 0930     Sodium 133 mmol/L      Potassium 4 7 mmol/L      Chloride 100 mmol/L      CO2 30 mmol/L      ANION GAP 3 mmol/L      BUN 20 mg/dL      Creatinine 0 92 mg/dL      Glucose 129 mg/dL      Calcium 9 9 mg/dL      eGFR 57 ml/min/1 73sq m     Narrative:       [place] guidelines for Chronic Kidney Disease (CKD):     Stage 1 with normal or high GFR (GFR > 90 mL/min/1 73 square meters)    Stage 2 Mild CKD (GFR = 60-89 mL/min/1 73 square meters)    Stage 3A Moderate CKD (GFR = 45-59 mL/min/1 73 square meters)    Stage 3B Moderate CKD (GFR = 30-44 mL/min/1 73 square meters)    Stage 4 Severe CKD (GFR = 15-29 mL/min/1 73 square meters)    Stage 5 End Stage CKD (GFR <15 mL/min/1 73 square meters)  Note: GFR calculation is accurate only with a steady state creatinine    Troponin I [[id number]]  (Normal) Collected:  02/19/20 0854    Lab Status:  Final result Specimen:  Blood from Arm, Right Updated:  02/19/20 0923     Troponin I <0 02 ng/mL     Protime-INR [[id number]]  (Normal) Collected:  02/19/20 0854    Lab Status:  Final result Specimen:  Blood from Arm, Right Updated:  02/19/20 0915     Protime 14 5 seconds      INR 1 17    APTT [[id number]]  (Normal) Collected:  02/19/20 0854    Lab Status:  Final result Specimen:  Blood from Arm, Right Updated:  02/19/20 0915     PTT 33 seconds     CBC and Platelet [[id number]]  (Normal) Collected:  02/19/20 0854    Lab Status:  Final result Specimen:  Blood from Arm, Right Updated:  02/19/20 0902     WBC 9 00 Thousand/uL RBC 4 21 Million/uL      Hemoglobin 12 8 g/dL      Hematocrit 39 0 %      MCV 93 fL      MCH 30 4 pg      MCHC 32 8 g/dL      RDW 12 3 %      Platelets 515 Thousands/uL      MPV 9 7 fL     Fingerstick Glucose (POCT) [[id number]]  (Normal) Collected:  02/19/20 0855    Lab Status:  Final result Updated:  02/19/20 0855     POC Glucose 120 mg/dl                  FL barium swallow video w speech   Final Result by ELIZABETH, VINH (02/20 8753)      CT head wo contrast   Final Result by Tracey Barragan MD (02/20 8600)         1  Stable head CT with 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 redemonstrated  No acute intracranial hemorrhage or interval infarction identified  Workstation performed: [id number]         CTA head and neck with and without contrast   Final Result by Abdullahi Samano MD (02/19 0950)      1  No acute intracranial CT abnormality  2   No significant change in multifocal high-grade stenosis of distal petrous, cavernous, and supraclinoid right ICA  3   Moderate multifocal atherosclerotic narrowing of M1 and proximal M2 branches of right MCA with asymmetric paucity of vasculature in the distal right MCA territory, similar to prior exam       4    Cervical carotid bifurcations and proximal ICAs atherosclerotic disease without significant stenosis (less than 50%)  5   Frothy secretions in the left locule of the sphenoid sinus, similar to prior exam       6   Multiple thyroid nodules, the largest in the inferior right lobe measures up to 1 6 cm  As recommended in prior exam correlation with prior thyroid workup and/or dedicated thyroid ultrasound is recommended                  Workstation performed: [id number]         XR chest portable    (Results Pending)   XR abdomen 1 view kub    (Results Pending)         Procedures  Procedures      ED Course           Identification of Seniors at Risk      Most Recent Value   (ISAR) Identification of Seniors at Risk   Before the illness or injury that brought you to the Emergency, did you need someone to help you on a regular basis? 1 Filed at: 02/19/2020 0841   In the last 24 hours, have you needed more help than usual?  1 Filed at: 02/19/2020 4293   Have you been hospitalized for one or more nights during the past 6 months? 1 Filed at: 02/19/2020 0841   In general, do you see well?  0 Filed at: 02/19/2020 0841   In general, do you have serious problems with your memory? 0 Filed at: 02/19/2020 8681   Do you take more than three different medications every day? 1 Filed at: 02/19/2020 0841   ISAR Score  4 Filed at: 02/19/2020 7388                          MDM  Number of Diagnoses or Management Options  Ambulatory dysfunction:   Fatigue:   Weakness:   Diagnosis management comments: Patient is an 80-year-old female who presents for evaluation of resolved facial droop and aphasia  The symptoms seem to be chronic in nature  Patient had CT/CTA and stroke alert pathway workup which was negative  Neurology evaluated the patient and determine that there is no further neurological interventions at this time  I discussed the case with the patient's daughter who said that she could no longer take care the patient and that she would like the patient be placed in a rehab  Patient will be admitted for rehab placement          Disposition  Final diagnoses:   Weakness   Fatigue   Ambulatory dysfunction     Time reflects when diagnosis was documented in both MDM as applicable and the Disposition within this note     Time User Action Codes Description Comment    2/19/2020  1:51 PM [facility], [address] [R53 1] Weakness     2/19/2020  1:51 PM Eliazar Corrigan Add [R53 83] Fatigue     2/19/2020  1:51 PM Eliazar Watson [R26 2] Ambulatory dysfunction       ED Disposition     ED Disposition Condition Date/Time Comment    Admit Stable Wed Feb 19, 2020  1:51 PM Case was discussed with CHANTE and the patient's admission status was agreed to be Admission Status: inpatient status to the service of Dr Oksana Rodrigues   Follow-up Information     Follow up With Specialties Details Why Contact Info Additional Debby Schroeder Neurology [facility] Neurology Follow up The office will call you to schedule a follow-up appt  If you do not hear from them within 1 week after discharge, please call them to schedule   Felice Blunt [address] [address] [facility] Neurology [facility], [address], [place], [address] [address]          Current Discharge Medication List      CONTINUE these medications which have NOT CHANGED    Details   apixaban (ELIQUIS) 5 mg Take 1 tablet (5 mg total) by mouth 2 (two) times a day  Qty: 60 tablet, Refills: 0    Associated Diagnoses: Chronic atrial fibrillation      atorvastatin (LIPITOR) 40 mg tablet Take 1 tablet (40 mg total) by mouth every evening  Qty: 30 tablet, Refills: 0    Associated Diagnoses: History of CVA (cerebrovascular accident)      calcium gluconate 500 mg tablet Take 500 mg by mouth every evening      cholecalciferol (VITAMIN D3) 1,000 units tablet Take 1,000 Units by mouth every evening      cyanocobalamin (VITAMIN B-12) 1,000 mcg tablet Take 1,000 mcg by mouth every evening        digoxin (LANOXIN) 0 125 mg tablet Take 1 tablet (125 mcg total) by mouth daily  Qty: 30 tablet, Refills: 0    Associated Diagnoses: Chronic atrial fibrillation      folic acid (FOLVITE) 1 mg tablet Take 1,000 mcg by mouth daily  Refills: 3      gabapentin (NEURONTIN) 300 mg capsule Take 300 mg by mouth daily      lisinopril (ZESTRIL) 2 5 mg tablet Take 1 tablet (2 5 mg total) by mouth daily  Qty: 30 tablet, Refills: 0    Associated Diagnoses: Essential hypertension      metFORMIN (GLUCOPHAGE) 1000 MG tablet Take 0 5 tablets (500 mg total) by mouth 2 (two) times a day with meals  Qty: 60 tablet, Refills: 0    Associated Diagnoses: Type 2 diabetes mellitus without complication, without long-term current use of insulin (HCC)      Mirabegron ER (MYRBETRIQ) 50 MG TB24 Take 50 mg by mouth daily        primidone (MYSOLINE) 50 mg tablet Take by mouth 2 (two) times a day      spironolactone (ALDACTONE) 50 mg tablet Take 50 mg by mouth daily             No discharge procedures on file  ED Provider  Attending physically available and evaluated Glenjeanette Carrascoivanna GARDNER managed the patient along with the ED Attending      Electronically Signed by         Zhou Molina MD  02/21/20 4652

## 2020-02-20 NOTE — PROCEDURES
Video Swallow Study      Patient Name: Hanh Meza  [id number] Date: 2/20/2020        Past Medical History  Past Medical History:   Diagnosis Date    A-fib [facility])     Arthritis     Assistance needed for mobility     pt can stand with assistance and transfer    Chronic pain disorder     Diabetes mellitus ([facility] [address] )     NIDDM    Full dentures     History of transfusion     no adverse reaction    Hyperlipidemia     Irregular heart beat     Numbness and tingling of both feet     Skin abnormality     sacrum area - small red area, less than a dime size    Spinal stenosis     Stroke ([facility] [address] )     Unable to ambulate     Urinary incontinence     ipg stimulator x3 repakcements,battery exchange today 2/14/2018    Wears glasses     Wheelchair dependence         Past Surgical History  Past Surgical History:   Procedure Laterality Date    BACK SURGERY      fusion    BLADDER SUSPENSION      CARPAL TUNNEL RELEASE Bilateral     CHOLANGIOGRAM N/A 6/4/2018    Procedure: CHOLANGIOGRAM;  Surgeon: Kelly Mckee MD;  [facility];  Service: General    CHOLECYSTECTOMY LAPAROSCOPIC N/A 6/4/2018    Procedure: CHOLECYSTECTOMY LAPAROSCOPIC;  Surgeon: Kelly Mckee MD;  [facility];  Service: General    COLONOSCOPY      DILATION AND CURETTAGE OF UTERUS      FRACTURE SURGERY Left     femur with hardware    HYSTERECTOMY      INSERTION / Janith Pink / REVISION NEUROSTIMULATOR      JOINT REPLACEMENT Bilateral     knees    SACRAL NERVE STIMULATOR PLACEMENT N/A 2/14/2018    Procedure: REMOVE AND REPLACE IPG;  Surgeon: Xiang Noble MD;  [facility];  Service: UroGynecology           TONSILLECTOMY          Video Barium Swallow Study    Summary:  Images are on PACS for review  Pt presents w/ mod/severe oropharyngeal dysphagia claudio by poor oral manipulation, swallow delay, and severe pharyngeal retention   Pt w/ decreased mentation and verbalization and increased overt drooling/lethargy from this morning  Pt status altered from previous encounters  Deep pharyngeal suction needed to clear secretions prior to po trials  Limited trials of puree and honey thick administered  Pt w/ poor oral manipulation/control, gravity transfers, and severe pharyngeal retention  Pt needed pressure from an empty spoon to initiate a severely delayed swallow  Polaris aspiration from overflow as well as during the swallow  Pt not appropriate for po at this time  Pt may need a repeat VBS pending further imaging and change in status  Recommendations:  Diet: NPO  Meds: Non-oral  Oral care: At least 2x/day  F/u ST tx: yes  Therapy Prognosis: Guarded  Prognosis considerations: Change in status, advanced age, unknown etiology of s/s  Results reviewed with: pt, nursing  If a dedicated assessment of the esophagus is desired, consider esophagram/barium swallow or EGD  Patient's goal: none stated    Goals:  Pt will tolerate least restrictive diet w/out s/s aspiration or oral/pharyngeal difficulties  Previous VBS:  -    Current Diet:  NPO      Premorbid diet:  Regular w/ thin     Dentition:  Upper and lower dentures, noted to fall out during the video    O2 requirement:  RA    Oral mech:  Strength and ROM: L facial droop and tongue deviation, noted to be worse than this morning     Vocal Quality/Speech:  Strained primitive vocalizations    Cognitive status:  Awake, oriented but somewhat lethargic, increase lethargy from this morning    Consistencies administered: Barium laden applesauce, honey thick by tsp  Pt was seated laterally at 90 degrees       Oral stage:  Lip closure: complete   Mastication: NA   Bolus formation: oral holding, no true formation   Bolus control: reduced  Transfer: gravity   Residue: mod base of tongue     Pharyngeal stage:  Swallow promptness: mod/severe delayed   Spill to valleculae: w/ puree and honey thick   Spill to pyriforms: w/ puree, overflow from valleculae  Epiglottic inversion: present but reduced   Laryngeal excursion: adequate superior and anterior movent   Pharyngeal constriction: delayed, mild weak   Vallecular retention: severe w/ limited trials   Pyriform retention: severe w/ limited trials  PPW coating: mod/severe  Osteophytes: none   CP prominence: none   Retropulsion from prominence: none   Transient penetration: none   Epiglottic undercoat: w/ honey thick by tsp   Penetration: none   Aspiration: deep w/ puree and honey   Strategies: cued mult swallows not successful in clearing pharyngeal retention   Response to aspiration: no sensory response     Screening of Esophageal stage:  Not assessed today

## 2020-02-20 NOTE — ASSESSMENT & PLAN NOTE
EEG monitoring 2/16-2/17: demonstrated no epileptiform activity or epileptogenic focus    Patient currently still aphasic but able to nod head yes and no to questioning  Per nursing, patient coughed when attempting to eat applesauce, but did take all home meds for tonight  Consult speech to assess swallowing, NPO status until speech recommendation

## 2020-02-20 NOTE — PROGRESS NOTES
Progress Note - Neurology   Melisa Rebolledo 80 y o  female [id number]  Unit/Bed#: /-01    Assessment:  Melisa Rebolledo is a 80 y o  female with a history of Afib on Eliquis, prior stroke, cervical dystonia, chronic pain with spinal stimulator in place who represented to the ED with recurrent episode of generalized weakness, aphonia, and transient facial droop  vEEG from prior hospitalization non-epileptic and repeat CTA this admission is stable  Exam today is improved with patient demonstrating hypophonia, though annunciation is limited; however she does demonstrate some left sided hemiparesis  Symptoms may be fluctuating  Unable to obtain MRI due to spinal stimulator; however, in the setting of her L sided weakness reasonable to proceed with repeat CTH to evaluate for any evolving ischemia  If negative, may be related to perfusional component in the setting multifocal high-grade stenosis combined with possibly a functional component  Results  CTA  1  No acute intracranial CT abnormality  2   No significant change in multifocal high-grade stenosis of distal petrous, cavernous, and supraclinoid right ICA  3   Moderate multifocal atherosclerotic narrowing of M1 and proximal M2 branches of right MCA with asymmetric paucity of vasculature in the distal right MCA territory, similar to prior exam   4    Cervical carotid bifurcations and proximal ICAs atherosclerotic disease without significant stenosis (less than 50%)  5   Frothy secretions in the left locule of the sphenoid sinus, similar to prior exam   6   Multiple thyroid nodules, the largest in the inferior right lobe measures up to 1 6 cm  As recommended in prior exam correlation with prior thyroid workup and/or dedicated thyroid ultrasound is recommended  Subjective: When asked, patient states she is not in any pain and does not need anything  Upon my arrival patient supine in bed looking upward  No family at bedside      Past Medical History:   Diagnosis Date    [facility]fib ([facility] )     Arthritis     Assistance needed for mobility     pt can stand with assistance and transfer    Chronic pain disorder     Diabetes mellitus ([facility] )     NIDDM    Full dentures     History of transfusion     no adverse reaction    Hyperlipidemia     Irregular heart beat     Numbness and tingling of both feet     Skin abnormality     sacrum area - small red area, less than a dime size    Spinal stenosis     Stroke ([facility] )     Unable to ambulate     Urinary incontinence     ipg stimulator x3 repakcements,battery exchange today 2/14/2018    Wears glasses     Wheelchair dependence      Past Surgical History:   Procedure Laterality Date    BACK SURGERY      fusion    BLADDER SUSPENSION      CARPAL TUNNEL RELEASE Bilateral     CHOLANGIOGRAM N/A 6/4/2018    Procedure: CHOLANGIOGRAM;  Surgeon: Danielle Simon MD;  [facility];  Service: General    CHOLECYSTECTOMY LAPAROSCOPIC N/A 6/4/2018    Procedure: CHOLECYSTECTOMY LAPAROSCOPIC;  Surgeon: Danielle Simon MD;  [facility];  Service: General    COLONOSCOPY      DILATION AND CURETTAGE OF UTERUS      FRACTURE SURGERY Left     femur with hardware    HYSTERECTOMY      INSERTION / Harlene Overall / REVISION NEUROSTIMULATOR      JOINT REPLACEMENT Bilateral     knees    SACRAL NERVE STIMULATOR PLACEMENT N/A 2/14/2018    Procedure: REMOVE AND REPLACE IPG;  Surgeon: Vicky Florence MD;  [facility];  Service: UroGynecology           TONSILLECTOMY       Family History   Problem Relation Age of Onset    No Known Problems Mother     No Known Problems Father      Social History     Socioeconomic History    Marital status: /Civil Union     Spouse name: None    Number of children: None    Years of education: None    Highest education level: None   Occupational History    None   Social Needs    Financial resource strain: None    Food insecurity:     Worry: None     Inability: None    Transportation needs:     Medical: None     Non-medical: None   Tobacco Use    Smoking status: Never Smoker    Smokeless tobacco: Never Used   Substance and Sexual Activity    Alcohol use: Not Currently     Comment: very rare    Drug use: No    Sexual activity: None   Lifestyle    Physical activity:     Days per week: None     Minutes per session: None    Stress: None   Relationships    Social connections:     Talks on phone: None     Gets together: None     Attends Nondenominational service: None     Active member of club or organization: None     Attends meetings of clubs or organizations: None     Relationship status: None    Intimate partner violence:     Fear of current or ex partner: None     Emotionally abused: None     Physically abused: None     Forced sexual activity: None   Other Topics Concern    None   Social History Narrative    None       Medications: Reviewed in detail by me  ROS: Review of Systems A 12 point ROS was completed and other than the above mentioned complaints in the HPI, all remaining systems were negative  Vitals: /57   Pulse 87   Temp 99 8 °F (37 7 °C)   Resp 17   Ht 5' 4" (1 626 m)   Wt 78 1 kg (172 lb 3 2 oz)   SpO2 99%   BMI 29 56 kg/m²     Physical Exam:   Physical Exam   Constitutional: She appears well-developed and well-nourished  No distress  Elderly female   HENT:   Head: Normocephalic and atraumatic  Eyes: Pupils are equal, round, and reactive to light  Conjunctivae and EOM are normal  Right eye exhibits no discharge  Left eye exhibits no discharge  No scleral icterus  Neck: Normal range of motion  Neck supple  Cardiovascular: Normal rate and regular rhythm  Pulmonary/Chest: Effort normal and breath sounds normal  No respiratory distress  Musculoskeletal: Normal range of motion  She exhibits no edema, tenderness or deformity  Lymphadenopathy:     She has no cervical adenopathy  Neurological: She is alert  Skin: Skin is warm and dry   No rash noted  No erythema  Neurologic Exam     Mental Status   Follows 1 step commands  Level of consciousness: alert  Knowledge: good  Able to name object  Oriented to person, place and month  Speech is hypophonic  Cranial Nerves     CN II   Visual acuity: (able to count fingers)    CN III, IV, VI   Pupils are equal, round, and reactive to light  Extraocular motions are normal    Nystagmus: none   Diplopia: none  Conjugate gaze: present    CN XII   Tongue deviation: left  Subtle L facial weakness with attempt at smile     Motor Exam   Muscle bulk: normal  Overall muscle tone: normal  Right arm pronator drift: absent  Left arm pronator drift: present  RUE strength full, LUE deltoid 4- and biceps/triceps/ 4+    RLE  Iliopsoas 3  Knee flexion 4-  Dorsiflexion 5-    LLE  Iliopsoas 2+  Knee flexion trace  Dorsiflexion 5-     Sensory Exam   Light touch normal        Labs: Reviewed in detail by me  Imaging: I have personally reviewed pertinent imaging and PACS reports  VTE Prophylaxis: Eliquis    Total time spent today 15 minutes  Greater than 50% of total time was spent with the patient and / or family counseling and / or coordination of care   A description of the counseling / coordination of care: speaking with patient, neurologist attending about symptoms and improvements

## 2020-02-20 NOTE — ASSESSMENT & PLAN NOTE
Lab Results   Component Value Date    HGBA1C 5 8 02/05/2020       Recent Labs     02/18/20  1120 02/19/20  0855 02/19/20  1917 02/20/20  1152   POCGLU 219* 120 107 159*       Blood Sugar Average: Last 72 hrs:  (P) 844 [id number]     Hold metformin  SSI and Accu-Cheks t i d   With meals and at bedtime  Placed on ADA diet, pending speech recommendations

## 2020-02-21 PROBLEM — R13.12 OROPHARYNGEAL DYSPHAGIA: Status: ACTIVE | Noted: 2020-02-21

## 2020-02-21 LAB
ANION GAP SERPL CALCULATED.3IONS-SCNC: 6 MMOL/L (ref 4–13)
BASOPHILS # BLD AUTO: 0.04 THOUSANDS/ΜL (ref 0–0.1)
BASOPHILS NFR BLD AUTO: 1 % (ref 0–1)
BUN SERPL-MCNC: 14 MG/DL (ref 5–25)
CALCIUM SERPL-MCNC: 8.8 MG/DL (ref 8.3–10.1)
CHLORIDE SERPL-SCNC: 106 MMOL/L (ref 100–108)
CO2 SERPL-SCNC: 25 MMOL/L (ref 21–32)
CREAT SERPL-MCNC: 0.68 MG/DL (ref 0.6–1.3)
EOSINOPHIL # BLD AUTO: 0.02 THOUSAND/ΜL (ref 0–0.61)
EOSINOPHIL NFR BLD AUTO: 0 % (ref 0–6)
ERYTHROCYTE [DISTWIDTH] IN BLOOD BY AUTOMATED COUNT: 12.3 % (ref 11.6–15.1)
GFR SERPL CREATININE-BSD FRML MDRD: 81 ML/MIN/1.73SQ M
GLUCOSE SERPL-MCNC: 129 MG/DL (ref 65–140)
GLUCOSE SERPL-MCNC: 149 MG/DL (ref 65–140)
HCT VFR BLD AUTO: 34.5 % (ref 34.8–46.1)
HGB BLD-MCNC: 11.3 G/DL (ref 11.5–15.4)
IMM GRANULOCYTES # BLD AUTO: 0.03 THOUSAND/UL (ref 0–0.2)
IMM GRANULOCYTES NFR BLD AUTO: 0 % (ref 0–2)
LYMPHOCYTES # BLD AUTO: 0.98 THOUSANDS/ΜL (ref 0.6–4.47)
LYMPHOCYTES NFR BLD AUTO: 13 % (ref 14–44)
MCH RBC QN AUTO: 30.4 PG (ref 26.8–34.3)
MCHC RBC AUTO-ENTMCNC: 32.8 G/DL (ref 31.4–37.4)
MCV RBC AUTO: 93 FL (ref 82–98)
MONOCYTES # BLD AUTO: 0.85 THOUSAND/ΜL (ref 0.17–1.22)
MONOCYTES NFR BLD AUTO: 12 % (ref 4–12)
NEUTROPHILS # BLD AUTO: 5.41 THOUSANDS/ΜL (ref 1.85–7.62)
NEUTS SEG NFR BLD AUTO: 74 % (ref 43–75)
NRBC BLD AUTO-RTO: 0 /100 WBCS
PLATELET # BLD AUTO: 140 THOUSANDS/UL (ref 149–390)
PMV BLD AUTO: 9.9 FL (ref 8.9–12.7)
POTASSIUM SERPL-SCNC: 3.8 MMOL/L (ref 3.5–5.3)
RBC # BLD AUTO: 3.72 MILLION/UL (ref 3.81–5.12)
SODIUM SERPL-SCNC: 137 MMOL/L (ref 136–145)
WBC # BLD AUTO: 7.33 THOUSAND/UL (ref 4.31–10.16)

## 2020-02-21 PROCEDURE — 80048 BASIC METABOLIC PNL TOTAL CA: CPT | Performed by: INTERNAL MEDICINE

## 2020-02-21 PROCEDURE — 92526 ORAL FUNCTION THERAPY: CPT

## 2020-02-21 PROCEDURE — 99232 SBSQ HOSP IP/OBS MODERATE 35: CPT | Performed by: PHYSICIAN ASSISTANT

## 2020-02-21 PROCEDURE — 82948 REAGENT STRIP/BLOOD GLUCOSE: CPT

## 2020-02-21 PROCEDURE — 85025 COMPLETE CBC W/AUTO DIFF WBC: CPT | Performed by: INTERNAL MEDICINE

## 2020-02-21 PROCEDURE — 95718 EEG PHYS/QHP 2-12 HR W/VEEG: CPT | Performed by: PSYCHIATRY & NEUROLOGY

## 2020-02-21 PROCEDURE — 95720 EEG PHY/QHP EA INCR W/VEEG: CPT | Performed by: PSYCHIATRY & NEUROLOGY

## 2020-02-21 RX ORDER — ACETAMINOPHEN 650 MG/1
650 SUPPOSITORY RECTAL EVERY 4 HOURS PRN
Status: DISCONTINUED | OUTPATIENT
Start: 2020-02-21 | End: 2020-03-12 | Stop reason: HOSPADM

## 2020-02-21 RX ADMIN — DEXTROSE AND SODIUM CHLORIDE 50 ML/HR: 5; .9 INJECTION, SOLUTION INTRAVENOUS at 13:32

## 2020-02-21 RX ADMIN — ACETAMINOPHEN 650 MG: 325 TABLET ORAL at 08:58

## 2020-02-21 RX ADMIN — ACETAMINOPHEN 650 MG: 325 TABLET ORAL at 01:57

## 2020-02-21 RX ADMIN — ACETAMINOPHEN 650 MG: 650 SUPPOSITORY RECTAL at 14:45

## 2020-02-21 NOTE — PROGRESS NOTES
Administered PRN Tylenol by crushing in pudding, patient had difficulty swallowing at first, but was able to tolerate and finish swallowing

## 2020-02-21 NOTE — PLAN OF CARE
Problem: Potential for Falls  Goal: Patient will remain free of falls  Description  INTERVENTIONS:  - Assess patient frequently for physical needs  -  Identify cognitive and physical deficits and behaviors that affect risk of falls    -  [place] fall precautions as indicated by assessment   - Educate patient/family on patient safety including physical limitations  - Instruct patient to call for assistance with activity based on assessment  - Modify environment to reduce risk of injury  - Consider OT/PT consult to assist with strengthening/mobility  Outcome: Progressing     Problem: Prexisting or High Potential for Compromised Skin Integrity  Goal: Skin integrity is maintained or improved  Description  INTERVENTIONS:  - Identify patients at risk for skin breakdown  - Assess and monitor skin integrity  - Assess and monitor nutrition and hydration status  - Monitor labs   - Assess for incontinence   - Turn and reposition patient  - Assist with mobility/ambulation  - Relieve pressure over bony prominences  - Avoid friction and shearing  - Provide appropriate hygiene as needed including keeping skin clean and dry  - Evaluate need for skin moisturizer/barrier cream  - Collaborate with interdisciplinary team   - Patient/family teaching  - Consider wound care consult   Outcome: Progressing     Problem: DISCHARGE PLANNING  Goal: Discharge to home or other facility with appropriate resources  Description  INTERVENTIONS:  - Identify barriers to discharge w/patient and caregiver  - Arrange for needed discharge resources and transportation as appropriate  - Identify discharge learning needs (meds, wound care, etc )  - Arrange for interpretive services to assist at discharge as needed  - Refer to Case Management Department for coordinating discharge planning if the patient needs post-hospital services based on physician/advanced practitioner order or complex needs related to functional status, cognitive ability, or social support system  Outcome: Progressing     Problem: NEUROSENSORY - ADULT  Goal: Achieves stable or improved neurological status  Description  INTERVENTIONS  - Monitor and report changes in neurological status  - Monitor vital signs such as temperature, blood pressure, glucose, and any other labs ordered   - Initiate measures to prevent increased intracranial pressure  - Monitor for seizure activity and implement precautions if appropriate      Outcome: Progressing  Goal: Achieves maximal functionality and self care  Description  INTERVENTIONS  - Monitor swallowing and airway patency with patient fatigue and changes in neurological status  - Encourage and assist patient to increase activity and self care     - Encourage visually impaired, hearing impaired and aphasic patients to use assistive/communication devices  Outcome: Progressing

## 2020-02-21 NOTE — ASSESSMENT & PLAN NOTE
· Patient with cough during swallow  Also with low-grade fever suggestive of possible aspiration  · Patient has failed speech evaluation and is NPO  · Long discussion with patient's family regarding nutritional status  Will place scale fed and initiate tube feeds through the weekend  If no clinical improvement inpatient status family will determine next phase of care

## 2020-02-21 NOTE — PROGRESS NOTES
Progress Note - Isai Govea 1935, 80 y o  female MRN: [id number]  Unit/Bed#: -01 Encounter: [id number]  Primary Care Provider: Navya Machado MD   Date and time admitted to hospital: 2/19/2020  8:30 AM    * Aphasia  Assessment & Plan  · EEG monitoring 2/16-2/17: demonstrated no epileptiform activity or epileptogenic focus  · Patient currently still aphasic but able to nod head yes and no to questioning  · Repeat CT scan of head yesterday does not demonstrate any acute findings  · Patient failed speech evaluation today and is NPO  Oropharyngeal dysphagia  Assessment & Plan  · Patient with cough during swallow  Also with low-grade fever suggestive of possible aspiration  · Patient has failed speech evaluation and is NPO  · Long discussion with patient's family regarding nutritional status  Will place scale fed and initiate tube feeds through the weekend  If no clinical improvement inpatient status family will determine next phase of care  Facial droop  Assessment & Plan  · Her daughter patient had a left-sided facial droop when she visited patient   · Neurology consulted  Repeat imaging does not demonstrate any acute findings  No further recommendations from their standpoint  · Unable to obtain MRI brain secondary to spinal stimulator  · CTA head and neck demonstrates no acute intracranial abnormality stable from prior examination earlier this month  · Currently NPO so Eliquis is on hold    Essential hypertension  Assessment & Plan  · Monitor blood pressure with routine vitals  · Hold spironolactone and lisinopril given NPO status  · Add p r n  Hydralazine    Paroxysmal atrial fibrillation (HCC)  Assessment & Plan  · Rate controlled  · EKG 2/19:  Sinus rhythm with first-degree AV block  Right bundle branch block  Possible lateral infarct (cited on or before 16th June 2019)    When compared with EKG of 14th February 2020, questionable change in initial forces of lateral leads   · Patient currently NPO therefore digoxin and Eliquis will be on hold  Resume when able  VTE Pharmacologic Prophylaxis:   Pharmacologic: Start subcu heparin  Mechanical: Mechanical VTE prophylaxis in place  Patient Centered Rounds: I have performed bedside rounds with nursing staff today  Discussions with Specialists or Other Care Team Provider:  Neurology  Education and Discussions with Family / Patient:  Long discussion with patient's daughter and niece who are at the bedside  Time Spent for Care: 45 minutes  More than 50% of total time spent on counseling and coordination of care as described above  Current Length of Stay: 2 day(s)  Current Patient Status: Inpatient   Certification Statement: The patient will continue to require additional inpatient hospital stay due to Continued neurologic monitoring  Discharge Plan:  Patient is not medically stable for discharge at this time  Will monitor patient on KO fed tube feeds through the weekend  Code Status: Level 1 - Full Code    Subjective:   Patient continues to be aphasic at this time  She does nod her head to answer questions  When I ask if she has any pain she uses her right hand to indicate neck pain  Otherwise she is unable to follow commands  She continues to have a left facial droop  Objective:   Vitals:   Temp (24hrs), Av 7 °F (37 6 °C), Min:98 8 °F (37 1 °C), Max:100 8 °F (38 2 °C)    Temp:  [98 8 °F (37 1 °C)-100 8 °F (38 2 °C)] 100 8 °F (38 2 °C)  HR:  [68-74] 71  Resp:  [18] 18  BP: (128-151)/(58-66) 128/58  SpO2:  [90 %-94 %] 90 %  Body mass index is 29 56 kg/m²  Input and Output Summary (last 24 hours): Intake/Output Summary (Last 24 hours) at 2020 1653  Last data filed at 2020 1255  Gross per 24 hour   Intake 0 ml   Output    Net 0 ml       Physical Exam:     Physical Exam   HENT:   Head: Normocephalic and atraumatic     Mouth/Throat: Oropharynx is clear and moist and mucous membranes are normal    Eyes: No scleral icterus  Cardiovascular: Normal rate and regular rhythm  No murmur heard  Pulmonary/Chest: Breath sounds normal  She has no wheezes  She has no rales  She exhibits no tenderness  Abdominal: Soft  Bowel sounds are normal  She exhibits no distension  There is no tenderness  Musculoskeletal: She exhibits no edema  Neurological:   Left facial weakness  Left upper extremity weakness  Skin: Skin is warm and dry  No rash noted  Psychiatric: She is noncommunicative  Vitals reviewed  Additional Data:   Labs:  Results from last 7 days   Lab Units 02/21/20  0447   WBC Thousand/uL 7 33   HEMOGLOBIN g/dL 11 3*   HEMATOCRIT % 34 5*   PLATELETS Thousands/uL 140*   NEUTROS PCT % 74   LYMPHS PCT % 13*   MONOS PCT % 12   EOS PCT % 0     Results from last 7 days   Lab Units 02/21/20  0447   POTASSIUM mmol/L 3 8   CHLORIDE mmol/L 106   CO2 mmol/L 25   BUN mg/dL 14   CREATININE mg/dL 0 68   CALCIUM mg/dL 8 8     Results from last 7 days   Lab Units 02/19/20  0854   INR  1 17       * I Have Reviewed All Lab Data Listed Above  * Additional Pertinent Lab Tests Reviewed: All Labs Within Last 24 Hours Reviewed    Imaging:    Imaging Reports Reviewed Today Include:  None new    Cultures:   Blood Culture:   Lab Results   Component Value Date    BLOODCX Staphylococcus coagulase negative (A) 02/15/2020    BLOODCX No Growth After 5 Days  02/15/2020    BLOODCX No Growth After 5 Days  05/31/2018    BLOODCX No Growth After 5 Days   05/31/2018     Urine Culture: No results found for: URINECX  Sputum Culture: No components found for: SPUTUMCX  Wound Culture: No results found for: WOUNDCULT    Last 24 Hours Medication List:     Current Facility-Administered Medications:  acetaminophen 650 mg Rectal Q4H PRN Michele Luciano PA-C   apixaban 5 mg Oral BID Alex Andrea MD   atorvastatin 40 mg Oral QPM Alex Andrea MD   cholecalciferol 1,000 Units Oral QPM Alex Andrea MD   vitamin B-12 1,000 mcg Oral QPM Sara Martin MD   digoxin 125 mcg Oral Daily Sara Martin MD   folic acid 3,275 mcg Oral Daily Sara Martin MD   gabapentin 300 mg Oral Daily Candi Hermosillo MD   Labetalol HCl 10 mg Intravenous Q6H PRN Poppy Vasquez PA-C   lisinopril 2 5 mg Oral Daily Sara Martin MD   oxybutynin 5 mg Oral Daily Sara Martin MD   primidone 50 mg Oral Q12H Mary Jane Isidro MD   spironolactone 50 mg Oral Daily Sara Martin MD        Today, Patient Was Seen By: Asif Wood PA-C    ** Please Note: Dragon 360 Dictation voice to text software may have been used in the creation of this document   **

## 2020-02-21 NOTE — SPEECH THERAPY NOTE
Speech Language/Pathology    Speech/Language Pathology Progress Note    Patient Name: Malvin AUGUSTIN Date: 2/21/2020    Subjective:  Pt awake in bed  Limited verbal output  Pt was given crushed meds in pudding by nsbeka this morning  Current Diet: NPO    Objective:  Pt seen for ongoing dx dysphagia tx  Pt has moist cough she cannot clear independently  Deep pharyngeal suction needed upon arrival and throughout session  Pt given tsps ht and puree  Pt took 2 oz pudding and 3 tsps ht w/ overt coughing and need for suction  Pt w/ oral holding, delayed audible swallow  Laryngeal excursion is reduced to palpation  Pt attempts mult swallows  Vocal quality is strained but not overtly wet  Suspect pharyngeal retention following puree and ht w/ ongoing wet coughing and wet voicing post trials  Assessment:  Pt not appropriate for po at this time  Pt w/ at least mod oropharyngeal dysphagia w/ high risk for aspiration w/ all material  ? Etiology for such changes       Plan/Recommendations:  Strict NPO   Non-oral meds   Oral care at least 2-4x/day  ST f/u for ongoing trials

## 2020-02-21 NOTE — PROGRESS NOTES
Called ICU nurse to place Keofed tube, pt not able to swallowed and medication and oral food  medication will hold for now and wait for tube placement

## 2020-02-21 NOTE — SOCIAL WORK
CM spoke with daughter Regis Howe to introduce Cm services and complete assessment  Regis Howe confirmed open that was completed on 2/05/2020  Pt reports emergency contacts are as follows and verbalized agreement with staff calling them as needed:  1  Silvina Crane [phone] or [phone]  2  Susan Miranda reported the following:    Pt lives with her daughters Mayela Mott and Regis Howe [place] with ramp to enter  Pt has 1st fl setup  Pt has a sit to stand, electric chair, shower chair, and a recliner chair  Pt does not ambulate  Pt had caregivers daily from 8 am to 8 pm through the waiver program/Care[facility] of [facility], but recently switched companies and they have not been out to see pt  Pt was recently receiving outpt therapy  Pt's PCP is Dr Johana Brannon  Pt preferred pharmacy is [facility] in [facility]  No hx of MH or D&A treatment  [facility]  No hx of [facility]  CM reviewed d/c planning process including the following: identifying help at home, patient preference for d/c planning needs, Discharge Lounge, [facility] Meds to Bed program, availability of treatment team to discuss questions or concerns patient and/or family may have regarding understanding medications and recognizing signs and symptoms once discharged  CM also encouraged patient to follow up with all recommended appointments after discharge  Patient advised of importance for patient and family to participate in managing patient's medical well being

## 2020-02-21 NOTE — ASSESSMENT & PLAN NOTE
· Her daughter patient had a left-sided facial droop when she visited patient   · Neurology consulted  Repeat imaging does not demonstrate any acute findings  No further recommendations from their standpoint    · Unable to obtain MRI brain secondary to spinal stimulator  · CTA head and neck demonstrates no acute intracranial abnormality stable from prior examination earlier this month  · Currently NPO so Eliquis is on hold

## 2020-02-21 NOTE — PROGRESS NOTES
Notified [facility] team that pt did not able to swallowed any medication and speech therapist concern as well  Hold medication

## 2020-02-21 NOTE — ASSESSMENT & PLAN NOTE
· Rate controlled  · EKG 2/19:  Sinus rhythm with first-degree AV block  Right bundle branch block  Possible lateral infarct (cited on or before 16th June 2019)  When compared with EKG of 14th February 2020, questionable change in initial forces of lateral leads  · Patient currently NPO therefore digoxin and Eliquis will be on hold  Resume when able

## 2020-02-21 NOTE — PROGRESS NOTES
Notified SLIM of patient's mild temp, instructed by SLIM to give PO Tylenol with small sips of water

## 2020-02-21 NOTE — NUTRITION
02/21/20 1504   Recommendations/Interventions   Nutrition Recommendations Tube Feeding Recommendation provided  (If unable to safely advance to PO diet, rec initiate TF of Jevity 1 2 @ 20ml/hr, advance 10ml q 4hrs to goal: 54ml/hr (1296ml), 150ml water flushes q 4hrs  Provides 1555kcal, 72gPRO, 51gFAT, 220gCHO, 1950ml water   Monitor weight and electrolytes )

## 2020-02-21 NOTE — PROGRESS NOTES
Called pt's daughter that pt will get transfer to P7 room 710 due to pt need more closely monitor  RN leave a voice mail

## 2020-02-21 NOTE — ASSESSMENT & PLAN NOTE
· Monitor blood pressure with routine vitals  · Hold spironolactone and lisinopril given NPO status  · Add p r n   Hydralazine

## 2020-02-21 NOTE — ASSESSMENT & PLAN NOTE
· EEG monitoring 2/16-2/17: demonstrated no epileptiform activity or epileptogenic focus  · Patient currently still aphasic but able to nod head yes and no to questioning  · Repeat CT scan of head yesterday does not demonstrate any acute findings  · Patient failed speech evaluation today and is NPO

## 2020-02-22 ENCOUNTER — APPOINTMENT (INPATIENT)
Dept: RADIOLOGY | Facility: HOSPITAL | Age: 85
DRG: 853 | End: 2020-02-22
Payer: COMMERCIAL

## 2020-02-22 LAB
AMORPH URATE CRY URNS QL MICRO: ABNORMAL /HPF
ANION GAP SERPL CALCULATED.3IONS-SCNC: 9 MMOL/L (ref 4–13)
ANISOCYTOSIS BLD QL SMEAR: PRESENT
BACTERIA UR QL AUTO: ABNORMAL /HPF
BASOPHILS # BLD MANUAL: 0 THOUSAND/UL (ref 0–0.1)
BASOPHILS NFR MAR MANUAL: 0 % (ref 0–1)
BILIRUB UR QL STRIP: NEGATIVE
BUN SERPL-MCNC: 14 MG/DL (ref 5–25)
CALCIUM SERPL-MCNC: 9.3 MG/DL (ref 8.3–10.1)
CHLORIDE SERPL-SCNC: 107 MMOL/L (ref 100–108)
CLARITY UR: ABNORMAL
CO2 SERPL-SCNC: 22 MMOL/L (ref 21–32)
COLOR UR: ABNORMAL
CREAT SERPL-MCNC: 0.64 MG/DL (ref 0.6–1.3)
EOSINOPHIL # BLD MANUAL: 0 THOUSAND/UL (ref 0–0.4)
EOSINOPHIL NFR BLD MANUAL: 0 % (ref 0–6)
ERYTHROCYTE [DISTWIDTH] IN BLOOD BY AUTOMATED COUNT: 12.4 % (ref 11.6–15.1)
GFR SERPL CREATININE-BSD FRML MDRD: 82 ML/MIN/1.73SQ M
GLUCOSE SERPL-MCNC: 151 MG/DL (ref 65–140)
GLUCOSE SERPL-MCNC: 151 MG/DL (ref 65–140)
GLUCOSE SERPL-MCNC: 157 MG/DL (ref 65–140)
GLUCOSE SERPL-MCNC: 164 MG/DL (ref 65–140)
GLUCOSE SERPL-MCNC: 168 MG/DL (ref 65–140)
GLUCOSE UR STRIP-MCNC: NEGATIVE MG/DL
HCT VFR BLD AUTO: 39.7 % (ref 34.8–46.1)
HGB BLD-MCNC: 12.9 G/DL (ref 11.5–15.4)
HGB UR QL STRIP.AUTO: ABNORMAL
KETONES UR STRIP-MCNC: ABNORMAL MG/DL
LEUKOCYTE ESTERASE UR QL STRIP: ABNORMAL
LYMPHOCYTES # BLD AUTO: 0 % (ref 14–44)
LYMPHOCYTES # BLD AUTO: 0 THOUSAND/UL (ref 0.6–4.47)
MCH RBC QN AUTO: 30.6 PG (ref 26.8–34.3)
MCHC RBC AUTO-ENTMCNC: 32.5 G/DL (ref 31.4–37.4)
MCV RBC AUTO: 94 FL (ref 82–98)
METAMYELOCYTES NFR BLD MANUAL: 2 % (ref 0–1)
MONOCYTES # BLD AUTO: 0.37 THOUSAND/UL (ref 0–1.22)
MONOCYTES NFR BLD: 3 % (ref 4–12)
NEUTROPHILS # BLD MANUAL: 11.33 THOUSAND/UL (ref 1.85–7.62)
NEUTS BAND NFR BLD MANUAL: 7 % (ref 0–8)
NEUTS SEG NFR BLD AUTO: 86 % (ref 43–75)
NITRITE UR QL STRIP: POSITIVE
NON-SQ EPI CELLS URNS QL MICRO: ABNORMAL /HPF
NRBC BLD AUTO-RTO: 0 /100 WBCS
PH UR STRIP.AUTO: 6 [PH]
PLATELET # BLD AUTO: 154 THOUSANDS/UL (ref 149–390)
PLATELET BLD QL SMEAR: ADEQUATE
PMV BLD AUTO: 10 FL (ref 8.9–12.7)
POTASSIUM SERPL-SCNC: 3.5 MMOL/L (ref 3.5–5.3)
PROT UR STRIP-MCNC: ABNORMAL MG/DL
RBC # BLD AUTO: 4.22 MILLION/UL (ref 3.81–5.12)
RBC #/AREA URNS AUTO: ABNORMAL /HPF
RBC MORPH BLD: PRESENT
SODIUM SERPL-SCNC: 138 MMOL/L (ref 136–145)
SP GR UR STRIP.AUTO: 1.03 (ref 1–1.03)
UROBILINOGEN UR QL STRIP.AUTO: 1 E.U./DL
VARIANT LYMPHS # BLD AUTO: 2 %
WBC # BLD AUTO: 12.18 THOUSAND/UL (ref 4.31–10.16)
WBC #/AREA URNS AUTO: ABNORMAL /HPF

## 2020-02-22 PROCEDURE — 99233 SBSQ HOSP IP/OBS HIGH 50: CPT | Performed by: INTERNAL MEDICINE

## 2020-02-22 PROCEDURE — 74018 RADEX ABDOMEN 1 VIEW: CPT

## 2020-02-22 PROCEDURE — 85027 COMPLETE CBC AUTOMATED: CPT | Performed by: PHYSICIAN ASSISTANT

## 2020-02-22 PROCEDURE — 71045 X-RAY EXAM CHEST 1 VIEW: CPT

## 2020-02-22 PROCEDURE — 87077 CULTURE AEROBIC IDENTIFY: CPT | Performed by: PHYSICIAN ASSISTANT

## 2020-02-22 PROCEDURE — 87086 URINE CULTURE/COLONY COUNT: CPT | Performed by: PHYSICIAN ASSISTANT

## 2020-02-22 PROCEDURE — 87186 SC STD MICRODIL/AGAR DIL: CPT | Performed by: PHYSICIAN ASSISTANT

## 2020-02-22 PROCEDURE — 82948 REAGENT STRIP/BLOOD GLUCOSE: CPT

## 2020-02-22 PROCEDURE — 81001 URINALYSIS AUTO W/SCOPE: CPT | Performed by: INTERNAL MEDICINE

## 2020-02-22 PROCEDURE — 80048 BASIC METABOLIC PNL TOTAL CA: CPT | Performed by: PHYSICIAN ASSISTANT

## 2020-02-22 PROCEDURE — 85007 BL SMEAR W/DIFF WBC COUNT: CPT | Performed by: PHYSICIAN ASSISTANT

## 2020-02-22 RX ORDER — GABAPENTIN 250 MG/5ML
300 SOLUTION ORAL DAILY
Status: DISCONTINUED | OUTPATIENT
Start: 2020-02-22 | End: 2020-03-03

## 2020-02-22 RX ADMIN — APIXABAN 5 MG: 5 TABLET, FILM COATED ORAL at 10:16

## 2020-02-22 RX ADMIN — SPIRONOLACTONE 50 MG: 50 TABLET ORAL at 10:10

## 2020-02-22 RX ADMIN — DIGOXIN 125 MCG: 125 TABLET ORAL at 10:09

## 2020-02-22 RX ADMIN — GABAPENTIN 300 MG: 250 SOLUTION ORAL at 13:32

## 2020-02-22 RX ADMIN — PRIMIDONE 50 MG: 50 TABLET ORAL at 22:24

## 2020-02-22 RX ADMIN — MELATONIN 1000 UNITS: at 17:43

## 2020-02-22 RX ADMIN — INSULIN LISPRO 1 UNITS: 100 INJECTION, SOLUTION INTRAVENOUS; SUBCUTANEOUS at 17:42

## 2020-02-22 RX ADMIN — ACETAMINOPHEN 650 MG: 650 SUPPOSITORY RECTAL at 10:38

## 2020-02-22 RX ADMIN — CYANOCOBALAMIN TAB 500 MCG 1000 MCG: 500 TAB at 17:43

## 2020-02-22 RX ADMIN — LISINOPRIL 2.5 MG: 2.5 TABLET ORAL at 10:09

## 2020-02-22 RX ADMIN — APIXABAN 5 MG: 5 TABLET, FILM COATED ORAL at 17:43

## 2020-02-22 RX ADMIN — INSULIN LISPRO 1 UNITS: 100 INJECTION, SOLUTION INTRAVENOUS; SUBCUTANEOUS at 13:30

## 2020-02-22 RX ADMIN — PRIMIDONE 50 MG: 50 TABLET ORAL at 13:33

## 2020-02-22 RX ADMIN — ATORVASTATIN CALCIUM 40 MG: 40 TABLET, FILM COATED ORAL at 17:43

## 2020-02-22 NOTE — SPEECH THERAPY NOTE
Speech Language Pathology    No Treatment/ Missed Visit Note    Swallow reassessment attempted this day  Patient is very lethargic with decreased response, dobhoff tube in place for feedings  Patient displayed open mouth posture with mildly labored respirations and secretions audible in pharynx upon phonation  Spoke with RN re: patient is not a candidate for skilled ST/ safe PO trials at this time  Suggest consideration of comfort care measures vs  long term means of nutritional support  Will continue to follow      Jv Wahl MA, [id number] [facility]  Speech Language Pathologist

## 2020-02-22 NOTE — PLAN OF CARE
Problem: Potential for Falls  Goal: Patient will remain free of falls  Description  INTERVENTIONS:  - Assess patient frequently for physical needs  -  Identify cognitive and physical deficits and behaviors that affect risk of falls    -  [place] fall precautions as indicated by assessment   - Educate patient/family on patient safety including physical limitations  - Instruct patient to call for assistance with activity based on assessment  - Modify environment to reduce risk of injury  - Consider OT/PT consult to assist with strengthening/mobility  Outcome: Progressing     Problem: Prexisting or High Potential for Compromised Skin Integrity  Goal: Skin integrity is maintained or improved  Description  INTERVENTIONS:  - Identify patients at risk for skin breakdown  - Assess and monitor skin integrity  - Assess and monitor nutrition and hydration status  - Monitor labs   - Assess for incontinence   - Turn and reposition patient  - Assist with mobility/ambulation  - Relieve pressure over bony prominences  - Avoid friction and shearing  - Provide appropriate hygiene as needed including keeping skin clean and dry  - Evaluate need for skin moisturizer/barrier cream  - Collaborate with interdisciplinary team   - Patient/family teaching  - Consider wound care consult   Outcome: Progressing     Problem: DISCHARGE PLANNING  Goal: Discharge to home or other facility with appropriate resources  Description  INTERVENTIONS:  - Identify barriers to discharge w/patient and caregiver  - Arrange for needed discharge resources and transportation as appropriate  - Identify discharge learning needs (meds, wound care, etc )  - Arrange for interpretive services to assist at discharge as needed  - Refer to Case Management Department for coordinating discharge planning if the patient needs post-hospital services based on physician/advanced practitioner order or complex needs related to functional status, cognitive ability, or social support system  Outcome: Progressing     Problem: NEUROSENSORY - ADULT  Goal: Achieves stable or improved neurological status  Description  INTERVENTIONS  - Monitor and report changes in neurological status  - Monitor vital signs such as temperature, blood pressure, glucose, and any other labs ordered   - Initiate measures to prevent increased intracranial pressure  - Monitor for seizure activity and implement precautions if appropriate      Outcome: Progressing  Goal: Achieves maximal functionality and self care  Description  INTERVENTIONS  - Monitor swallowing and airway patency with patient fatigue and changes in neurological status  - Encourage and assist patient to increase activity and self care  - Encourage visually impaired, hearing impaired and aphasic patients to use assistive/communication devices  Outcome: Progressing     Problem: Nutrition/Hydration-ADULT  Goal: Nutrient/Hydration intake appropriate for improving, restoring or maintaining nutritional needs  Description  Monitor and assess patient's nutrition/hydration status for malnutrition  Collaborate with interdisciplinary team and initiate plan and interventions as ordered  Monitor patient's weight and dietary intake as ordered or per policy  Utilize nutrition screening tool and intervene as necessary  Determine patient's food preferences and provide high-protein, high-caloric foods as appropriate       INTERVENTIONS:  - Monitor oral intake, urinary output, labs, and treatment plans  - Assess nutrition and hydration status and recommend course of action  - Evaluate amount of meals eaten  - Assist patient with eating if necessary   - Allow adequate time for meals  - Recommend/ encourage appropriate diets, oral nutritional supplements, and vitamin/mineral supplements  - Order, calculate, and assess calorie counts as needed  - Recommend, monitor, and adjust tube feedings and TPN/PPN based on assessed needs  - Assess need for intravenous fluids  - Provide specific nutrition/hydration education as appropriate  - Include patient/family/caregiver in decisions related to nutrition  Outcome: Progressing

## 2020-02-22 NOTE — PROGRESS NOTES
Progress Note - Azra Dior 1935, 80 y o  female MRN: [id number]    Unit/Bed#: [facility] 710-01 Encounter: [id number]    Primary Care Provider: Sabrina Canavan, MD   Date and time admitted to hospital: 2/19/2020  8:30 AM        * Aphasia  Assessment & Plan  EEG monitoring 2/16-2/17: demonstrated no epileptiform activity or epileptogenic focus  Patient currently still aphasic but able to nod head yes and no to questioning  Repeat CT scan of head yesterday does not demonstrate any acute findings  Patient failed speech evaluation and is NPO  Tube feeds starting through [facility] today    Oropharyngeal dysphagia  Assessment & Plan  Initiating tube feeds  Patient remains NPO secondary to failing swallow evaluation      Dysarthria  Assessment & Plan  Dysarthria noted on last admission 2/14, neurology consulted and did 24-hour EEG monitoring 2/16  EEG monitoring 2/16-2/17: no focal epileptiform discharges or electrographic seizures  Removed 2/17  Unable to get MRI brain due to spine stimulator    Hyponatremia  Assessment & Plan  Patient has a history of hyponatremia found to be hyponatremic (133) on arrival to last admission on 02/14  Today 2/19: hyponatremic (133)  Provide gentle hydration with D5 NSS  Repeat BMP tonight      Essential hypertension  Assessment & Plan  Monitor blood pressure with routine vitals  Hold spironolactone and lisinopril given NPO status  Continue p r n  Hydralazine    Paroxysmal atrial fibrillation [facility])  Assessment & Plan  Rate controlled  EKG 2/19:  Sinus rhythm with first-degree AV block  Right bundle branch block  Possible lateral infarct (cited on or before 16th June 2019)  When compared with EKG of 14th February 2020, questionable change in initial forces of lateral leads  Patient currently NPO therefore digoxin and Eliquis will be on hold  Resume when able      Type 2 diabetes mellitus [facility])  Assessment & Plan  Lab Results   Component Value Date    HGBA1C 5 8 02/05/2020 Recent Labs     20  1721 20  2358 20  0639 20  0729   POCGLU 127 132 149* 164*       Blood Sugar Average: Last 72 hrs:  (P) 136 [id number]     Hold metformin  SSI and Accu-Cheks t i d  With meals and at bedtime  Placed on ADA diet, pending speech recommendations        VTE Pharmacologic Prophylaxis:   Pharmacologic: Apixaban (Eliquis)  Mechanical VTE Prophylaxis in Place: Yes    Patient Centered Rounds: I have performed bedside rounds with nursing staff today  Discussions with Specialists or Other Care Team Provider:      Education and Discussions with Family / Patient:      Time Spent for Care: 30 minutes  More than 50% of total time spent on counseling and coordination of care as described above  Current Length of Stay: 3 day(s)    Current Patient Status: Inpatient   Certification Statement: The patient will continue to require additional inpatient hospital stay due to Initiation of tube feeds    Discharge Plan:      Code Status: Level 1 - Full Code      Subjective:   Patient with confusion required restraints overnight for interference with medical care, unable to obtain review of systems from patient this am    Objective:     Vitals:   Temp (24hrs), Av 2 °F (37 3 °C), Min:98 4 °F (36 9 °C), Max:100 8 °F (38 2 °C)    Temp:  [98 4 °F (36 9 °C)-100 8 °F (38 2 °C)] 99 1 °F (37 3 °C)  HR:  [] 100  Resp:  [18-20] 18  BP: (128-162)/(47-66) 162/66  SpO2:  [90 %-96 %] 92 %  Body mass index is 30 84 kg/m²  Input and Output Summary (last 24 hours): Intake/Output Summary (Last 24 hours) at 2020 0956  Last data filed at 2020 1659  Gross per 24 hour   Intake 0 ml   Output    Net 0 ml       Physical Exam:     Physical Exam   Constitutional: She appears well-developed and well-nourished  No distress  HENT:   Head: Normocephalic and atraumatic     Right Ear: External ear normal    Left Ear: External ear normal    Nose: Nose normal    Mouth/Throat: No oropharyngeal exudate (Mucous membranes dry)  Eyes: Conjunctivae are normal  Right eye exhibits no discharge  Left eye exhibits no discharge  No scleral icterus  Neck: No JVD present  No tracheal deviation present  No thyromegaly present  Cardiovascular: Normal rate, regular rhythm and intact distal pulses  Exam reveals no gallop and no friction rub  Murmur ( 2/6) heard  Pulmonary/Chest: Effort normal  No stridor  No respiratory distress  She has no wheezes  She has no rales  Reduced breath sounds at the bilateral bases   Abdominal: Soft  Bowel sounds are normal  She exhibits no distension  There is no tenderness  There is no rebound and no guarding  Musculoskeletal: Normal range of motion  She exhibits no edema, tenderness or deformity  Neurological: She is alert  She has normal reflexes  She exhibits normal muscle tone  Coordination normal    Skin: Skin is warm and dry  No rash noted  She is not diaphoretic  No erythema  No pallor  Psychiatric: She has a normal mood and affect  Her behavior is normal    Nursing note and vitals reviewed        Additional Data:     Labs:    Results from last 7 days   Lab Units 02/22/20  0612 02/21/20  0447   WBC Thousand/uL 12 18* 7 33   HEMOGLOBIN g/dL 12 9 11 3*   HEMATOCRIT % 39 7 34 5*   PLATELETS Thousands/uL 154 140*   BANDS PCT % 7  --    NEUTROS PCT %  --  74   LYMPHS PCT %  --  13*   LYMPHO PCT % 0*  --    MONOS PCT %  --  12   MONO PCT % 3*  --    EOS PCT % 0 0     Results from last 7 days   Lab Units 02/22/20  0612   SODIUM mmol/L 138   POTASSIUM mmol/L 3 5   CHLORIDE mmol/L 107   CO2 mmol/L 22   BUN mg/dL 14   CREATININE mg/dL 0 64   ANION GAP mmol/L 9   CALCIUM mg/dL 9 3   GLUCOSE RANDOM mg/dL 157*     Results from last 7 days   Lab Units 02/19/20  0854   INR  1 17     Results from last 7 days   Lab Units 02/22/20  0729 02/21/20  5332 02/20/20  2358 02/20/20  1721 02/20/20  1152 02/19/20  1917 02/19/20  0855 02/18/20  1120 02/18/20  0646 02/17/20  2152 02/17/20  1614 02/17/20  1131   POC GLUCOSE mg/dl 164* 149* 132 127 159* 107 120 219* 132 202* 151* 183*                   * I Have Reviewed All Lab Data Listed Above  * Additional Pertinent Lab Tests Reviewed: Zan 66 Admission Reviewed    Imaging:    Imaging Reports Reviewed Today Include:    Imaging Personally Reviewed by Myself Includes:       Recent Cultures (last 7 days):           Last 24 Hours Medication List:     Current Facility-Administered Medications:  acetaminophen 650 mg Rectal Q4H PRN Aundrea Chung PA-C   apixaban 5 mg Oral BID Karley Ferguson MD   atorvastatin 40 mg Oral QPM Karley Ferguson MD   cholecalciferol 1,000 Units Oral QPM Karley Ferguson MD   vitamin B-12 1,000 mcg Oral QPM Karley Ferguson MD   digoxin 125 mcg Oral Daily Karley Ferguson MD   folic acid 9,000 mcg Oral Daily Karley Ferguson MD   gabapentin 300 mg Oral Daily Karley Ferguson MD   Labetalol HCl 10 mg Intravenous Q6H PRN Poppy Vasquez PA-C   lisinopril 2 5 mg Oral Daily Candi Hermosillo MD   oxybutynin 5 mg Oral Daily Karley Ferguson MD   primidone 50 mg Oral Q12H Barbara Vilchis MD   spironolactone 50 mg Oral Daily Karley Ferguson MD        Today, Patient Was Seen By: Mane Silva MD    ** Please Note: Dictation voice to text software may have been used in the creation of this document   **

## 2020-02-22 NOTE — ASSESSMENT & PLAN NOTE
EEG monitoring 2/16-2/17: demonstrated no epileptiform activity or epileptogenic focus  Patient currently still aphasic but able to nod head yes and no to questioning  Repeat CT scan of head yesterday does not demonstrate any acute findings    Patient failed speech evaluation and is NPO  Tube feeds starting through [facility] today

## 2020-02-22 NOTE — ASSESSMENT & PLAN NOTE
Lab Results   Component Value Date    HGBA1C 5 8 02/05/2020       Recent Labs     02/20/20  1721 02/20/20  2358 02/21/20  0639 02/22/20  0729   POCGLU 127 132 149* 164*       Blood Sugar Average: Last 72 hrs:  (P) 136 [id number]     Hold metformin  SSI and Accu-Cheks t i d   With meals and at bedtime  Placed on ADA diet, pending speech recommendations

## 2020-02-22 NOTE — ASSESSMENT & PLAN NOTE
Rate controlled  EKG 2/19:  Sinus rhythm with first-degree AV block  Right bundle branch block  Possible lateral infarct (cited on or before 16th June 2019)  When compared with EKG of 14th February 2020, questionable change in initial forces of lateral leads  Patient currently NPO therefore digoxin and Eliquis will be on hold  Resume when able

## 2020-02-22 NOTE — PHYSICAL THERAPY NOTE
Physical Therapy Cancellation Note   Patient Name: Teresita MORGAN Date: 2/22/2020        PT orders received, chart reviewed  Spoke with RN who reports pt is not appropriate for therapy at this time  PT will continue to follow and see as medically appropriate and able       Kayden Cruz, PT

## 2020-02-22 NOTE — OCCUPATIONAL THERAPY NOTE
Occupational Therapy Cancellation Note        Patient Name: Bijan Corona  GFXZA'E Date: 2/22/2020      OT orders received, chart reviewed  Spoke with RN who reports pt is not appropriate for therapy at this time  OT will continue to follow and see as medically appropriate and able       SALIMA Win, OTR/L

## 2020-02-22 NOTE — ASSESSMENT & PLAN NOTE
Monitor blood pressure with routine vitals  Hold spironolactone and lisinopril given NPO status  Continue p r n   Hydralazine

## 2020-02-23 ENCOUNTER — APPOINTMENT (INPATIENT)
Dept: RADIOLOGY | Facility: HOSPITAL | Age: 85
DRG: 853 | End: 2020-02-23
Payer: COMMERCIAL

## 2020-02-23 PROBLEM — N39.0 UTI (URINARY TRACT INFECTION): Status: ACTIVE | Noted: 2020-02-23

## 2020-02-23 PROBLEM — J18.9 PNEUMONIA: Status: ACTIVE | Noted: 2020-02-23

## 2020-02-23 LAB
ERYTHROCYTE [DISTWIDTH] IN BLOOD BY AUTOMATED COUNT: 12.5 % (ref 11.6–15.1)
GLUCOSE SERPL-MCNC: 129 MG/DL (ref 65–140)
GLUCOSE SERPL-MCNC: 135 MG/DL (ref 65–140)
GLUCOSE SERPL-MCNC: 195 MG/DL (ref 65–140)
GLUCOSE SERPL-MCNC: 239 MG/DL (ref 65–140)
GLUCOSE SERPL-MCNC: 266 MG/DL (ref 65–140)
HCT VFR BLD AUTO: 38.2 % (ref 34.8–46.1)
HGB BLD-MCNC: 12.6 G/DL (ref 11.5–15.4)
LACTATE SERPL-SCNC: 1.8 MMOL/L (ref 0.5–2)
MCH RBC QN AUTO: 31 PG (ref 26.8–34.3)
MCHC RBC AUTO-ENTMCNC: 33 G/DL (ref 31.4–37.4)
MCV RBC AUTO: 94 FL (ref 82–98)
NRBC BLD AUTO-RTO: 0 /100 WBCS
PLATELET # BLD AUTO: 157 THOUSANDS/UL (ref 149–390)
PMV BLD AUTO: 10.6 FL (ref 8.9–12.7)
PROCALCITONIN SERPL-MCNC: 0.58 NG/ML
RBC # BLD AUTO: 4.07 MILLION/UL (ref 3.81–5.12)
WBC # BLD AUTO: 14.03 THOUSAND/UL (ref 4.31–10.16)

## 2020-02-23 PROCEDURE — 85027 COMPLETE CBC AUTOMATED: CPT | Performed by: INTERNAL MEDICINE

## 2020-02-23 PROCEDURE — 82948 REAGENT STRIP/BLOOD GLUCOSE: CPT

## 2020-02-23 PROCEDURE — 70450 CT HEAD/BRAIN W/O DYE: CPT

## 2020-02-23 PROCEDURE — 87040 BLOOD CULTURE FOR BACTERIA: CPT | Performed by: PHYSICIAN ASSISTANT

## 2020-02-23 PROCEDURE — 84145 PROCALCITONIN (PCT): CPT | Performed by: PHYSICIAN ASSISTANT

## 2020-02-23 PROCEDURE — 99233 SBSQ HOSP IP/OBS HIGH 50: CPT | Performed by: INTERNAL MEDICINE

## 2020-02-23 PROCEDURE — 71045 X-RAY EXAM CHEST 1 VIEW: CPT

## 2020-02-23 PROCEDURE — 99231 SBSQ HOSP IP/OBS SF/LOW 25: CPT | Performed by: PHYSICIAN ASSISTANT

## 2020-02-23 PROCEDURE — 83605 ASSAY OF LACTIC ACID: CPT | Performed by: PHYSICIAN ASSISTANT

## 2020-02-23 RX ORDER — SODIUM CHLORIDE 9 MG/ML
50 INJECTION, SOLUTION INTRAVENOUS ONCE
Status: COMPLETED | OUTPATIENT
Start: 2020-02-23 | End: 2020-02-23

## 2020-02-23 RX ORDER — VANCOMYCIN HYDROCHLORIDE 1 G/200ML
15 INJECTION, SOLUTION INTRAVENOUS EVERY 12 HOURS
Status: DISCONTINUED | OUTPATIENT
Start: 2020-02-24 | End: 2020-02-25 | Stop reason: SDUPTHER

## 2020-02-23 RX ADMIN — INSULIN LISPRO 2 UNITS: 100 INJECTION, SOLUTION INTRAVENOUS; SUBCUTANEOUS at 01:20

## 2020-02-23 RX ADMIN — INSULIN LISPRO 2 UNITS: 100 INJECTION, SOLUTION INTRAVENOUS; SUBCUTANEOUS at 06:29

## 2020-02-23 RX ADMIN — INSULIN LISPRO 1 UNITS: 100 INJECTION, SOLUTION INTRAVENOUS; SUBCUTANEOUS at 13:07

## 2020-02-23 RX ADMIN — APIXABAN 5 MG: 5 TABLET, FILM COATED ORAL at 17:45

## 2020-02-23 RX ADMIN — PRIMIDONE 50 MG: 50 TABLET ORAL at 23:03

## 2020-02-23 RX ADMIN — FOLIC ACID 1000 MCG: 1 TABLET ORAL at 08:57

## 2020-02-23 RX ADMIN — ATORVASTATIN CALCIUM 40 MG: 40 TABLET, FILM COATED ORAL at 17:45

## 2020-02-23 RX ADMIN — APIXABAN 5 MG: 5 TABLET, FILM COATED ORAL at 08:57

## 2020-02-23 RX ADMIN — CEFEPIME HYDROCHLORIDE 2000 MG: 2 INJECTION, POWDER, FOR SOLUTION INTRAVENOUS at 15:06

## 2020-02-23 RX ADMIN — METRONIDAZOLE 500 MG: 500 INJECTION, SOLUTION INTRAVENOUS at 14:30

## 2020-02-23 RX ADMIN — CEFTRIAXONE SODIUM 1000 MG: 10 INJECTION, POWDER, FOR SOLUTION INTRAVENOUS at 01:21

## 2020-02-23 RX ADMIN — GABAPENTIN 300 MG: 250 SOLUTION ORAL at 08:57

## 2020-02-23 RX ADMIN — VANCOMYCIN HYDROCHLORIDE 1250 MG: 10 INJECTION, POWDER, LYOPHILIZED, FOR SOLUTION INTRAVENOUS at 15:46

## 2020-02-23 RX ADMIN — ACETAMINOPHEN 650 MG: 650 SUPPOSITORY RECTAL at 19:28

## 2020-02-23 RX ADMIN — SPIRONOLACTONE 50 MG: 50 TABLET ORAL at 08:57

## 2020-02-23 RX ADMIN — METRONIDAZOLE 500 MG: 500 INJECTION, SOLUTION INTRAVENOUS at 23:03

## 2020-02-23 RX ADMIN — PRIMIDONE 50 MG: 50 TABLET ORAL at 09:07

## 2020-02-23 RX ADMIN — SODIUM CHLORIDE 50 ML/HR: 0.9 INJECTION, SOLUTION INTRAVENOUS at 14:17

## 2020-02-23 RX ADMIN — ACETAMINOPHEN 650 MG: 650 SUPPOSITORY RECTAL at 08:39

## 2020-02-23 RX ADMIN — DIGOXIN 125 MCG: 125 TABLET ORAL at 08:57

## 2020-02-23 NOTE — PHYSICAL THERAPY NOTE
PHYSICAL THERAPY Cancellation NOTE          Patient Name: Glen Vargas  ZCDML'I Date: 2/23/2020     Orders received and chart reviewed  Spoke to RN who reports Pt  Awaiting family meeting to discuss goals of care tomorrow  Pt  With declining respiratory status  Will Cancel Pt session at this time  Will continue to follow and attempt to see when medically appropriate  Thank you for the consultation       Nicole Mata, PT, DPT 2/23/2020

## 2020-02-23 NOTE — ASSESSMENT & PLAN NOTE
Currently rate controlled  EKG 2/19:  Sinus rhythm with first-degree AV block  Right bundle branch block  Possible lateral infarct (cited on or before 16th June 2019)  When compared with EKG of 14th February 2020, questionable change in initial forces of lateral leads  Continue therapeutic anticoagulation with Eliquis  Continue rate control with digoxin  Basic metabolic panel in a m   To monitor creatinine and electrolytes

## 2020-02-23 NOTE — ASSESSMENT & PLAN NOTE
HC AP versus aspiration  Hold tube feeds  Gentle IV hydration  Monitor ins and outs and fluid status, clinically currently euvolemic  IV vancomycin cefepime and Flagyl  CBC in a m  To monitor leukocytosis  Monitor routine vitals for febrile episodes  P r n  Antipyretic  O2 sat monitoring with supplemental O2 p r n   To maintain saturation 90% or greater by pulse oximetry  Supportive care

## 2020-02-23 NOTE — PLAN OF CARE
Problem: Potential for Falls  Goal: Patient will remain free of falls  Description  INTERVENTIONS:  - Assess patient frequently for physical needs  -  Identify cognitive and physical deficits and behaviors that affect risk of falls    -  [place] fall precautions as indicated by assessment   - Educate patient/family on patient safety including physical limitations  - Instruct patient to call for assistance with activity based on assessment  - Modify environment to reduce risk of injury  - Consider OT/PT consult to assist with strengthening/mobility  Outcome: Progressing     Problem: Prexisting or High Potential for Compromised Skin Integrity  Goal: Skin integrity is maintained or improved  Description  INTERVENTIONS:  - Identify patients at risk for skin breakdown  - Assess and monitor skin integrity  - Assess and monitor nutrition and hydration status  - Monitor labs   - Assess for incontinence   - Turn and reposition patient  - Assist with mobility/ambulation  - Relieve pressure over bony prominences  - Avoid friction and shearing  - Provide appropriate hygiene as needed including keeping skin clean and dry  - Evaluate need for skin moisturizer/barrier cream  - Collaborate with interdisciplinary team   - Patient/family teaching  - Consider wound care consult   Outcome: Progressing     Problem: DISCHARGE PLANNING  Goal: Discharge to home or other facility with appropriate resources  Description  INTERVENTIONS:  - Identify barriers to discharge w/patient and caregiver  - Arrange for needed discharge resources and transportation as appropriate  - Identify discharge learning needs (meds, wound care, etc )  - Arrange for interpretive services to assist at discharge as needed  - Refer to Case Management Department for coordinating discharge planning if the patient needs post-hospital services based on physician/advanced practitioner order or complex needs related to functional status, cognitive ability, or social support system  Outcome: Progressing     Problem: NEUROSENSORY - ADULT  Goal: Achieves stable or improved neurological status  Description  INTERVENTIONS  - Monitor and report changes in neurological status  - Monitor vital signs such as temperature, blood pressure, glucose, and any other labs ordered   - Initiate measures to prevent increased intracranial pressure  - Monitor for seizure activity and implement precautions if appropriate      Outcome: Progressing  Goal: Achieves maximal functionality and self care  Description  INTERVENTIONS  - Monitor swallowing and airway patency with patient fatigue and changes in neurological status  - Encourage and assist patient to increase activity and self care  - Encourage visually impaired, hearing impaired and aphasic patients to use assistive/communication devices  Outcome: Progressing     Problem: Nutrition/Hydration-ADULT  Goal: Nutrient/Hydration intake appropriate for improving, restoring or maintaining nutritional needs  Description  Monitor and assess patient's nutrition/hydration status for malnutrition  Collaborate with interdisciplinary team and initiate plan and interventions as ordered  Monitor patient's weight and dietary intake as ordered or per policy  Utilize nutrition screening tool and intervene as necessary  Determine patient's food preferences and provide high-protein, high-caloric foods as appropriate       INTERVENTIONS:  - Monitor oral intake, urinary output, labs, and treatment plans  - Assess nutrition and hydration status and recommend course of action  - Evaluate amount of meals eaten  - Assist patient with eating if necessary   - Allow adequate time for meals  - Recommend/ encourage appropriate diets, oral nutritional supplements, and vitamin/mineral supplements  - Order, calculate, and assess calorie counts as needed  - Recommend, monitor, and adjust tube feedings and TPN/PPN based on assessed needs  - Assess need for intravenous fluids  - Provide specific nutrition/hydration education as appropriate  - Include patient/family/caregiver in decisions related to nutrition  Outcome: Progressing     Problem: CONFUSION/THOUGHT DISTURBANCE  Goal: Thought disturbances (confusion, delirium, depression, dementia or psychosis) are managed to maintain or return to baseline mental status and functional level  Description  INTERVENTIONS:  - Assess for possible contributors to  thought disturbance, including but not limited to medications, infection, impaired vision or hearing, underlying metabolic abnormalities, dehydration, respiratory compromise,  psychiatric diagnoses and notify attending PHYSICAN/AP  - Monitor and intervene to maintain adequate nutrition, hydration, elimination, sleep and activity  - Decrease environmental stimuli, including noise as appropriate  - Provide frequent contacts to provide refocusing, direction and reassurance as needed  Approach patient calmly with eye contact and at their level    - [place] high risk fall precautions, aspiration precautions and other safety measures, as indicated  - If delirium suspected, notify physician/AP of change in condition and request immediate in-person evaluation  - Pursue consults as appropriate including Geriatric (campus dependent), OT for cognitive evaluation/activity planning, psychiatric, pastoral care, etc   Outcome: Progressing     Problem: SAFETY,RESTRAINT: NV/NON-SELF DESTRUCTIVE BEHAVIOR  Goal: Remains free of harm/injury (restraint for non violent/non self-detsructive behavior)  Description  INTERVENTIONS:  - Instruct patient/family regarding restraint use   - Assess and monitor physiologic and psychological status   - Provide interventions and comfort measures to meet assessed patient needs   - Identify and implement measures to help patient regain control  - Assess readiness for release of restraint   Outcome: Progressing  Goal: Returns to optimal restraint-free functioning  Description  INTERVENTIONS:  - Assess the patient's behavior and symptoms that indicate continued need for restraint  - Identify and implement measures to help patient regain control  - Assess readiness for release of restraint   Outcome: Progressing

## 2020-02-23 NOTE — ASSESSMENT & PLAN NOTE
CBC in a m    Follow-up urine cultures  Patient received Rocephin 1 dose, Rocephin has been changed to cefepime for treatment of concurrent HC AP

## 2020-02-23 NOTE — PROGRESS NOTES
Spoke with Dr Norma Piña about patient declining status  Pt is less alert today and has declining respiratory status  Per Dr Norma Piña OK to hold tube feeds at this time, Chest xray ordered  Will continue to monitor

## 2020-02-23 NOTE — PROGRESS NOTES
Vancomycin IV Pharmacy-to-Dose Consultation    Clare Ralph is a 80 y o  female who is currently receiving Vancomycin IV with management by the Pharmacy Consult service  Relevant clinical data and objective history reviewed:  Temp Readings from Last 3 Encounters:   02/23/20 99 4 °F (37 4 °C)   02/18/20 97 7 °F (36 5 °C) (Oral)   02/05/20 97 8 °F (36 6 °C)     /63   Pulse 95   Temp 99 4 °F (37 4 °C)   Resp 18   Ht 5' 4" (1 626 m)   Wt 81 5 kg (179 lb 10 8 oz)   SpO2 91%   BMI 30 84 kg/m²     I/O last 3 completed shifts:   In: 1046 [NG/GT:300; Feedings:746]  Out: 404 [CFCJB:003]    Lab Results   Component Value Date/Time    BUN 14 02/22/2020 06:12 AM    BUN 18 12/17/2014 05:50 AM    WBC 14 03 (H) 02/23/2020 05:33 AM    WBC 8 66 12/19/2014 06:36 AM    HGB 12 6 02/23/2020 05:33 AM    HGB 10 1 (L) 12/19/2014 06:36 AM    HCT 38 2 02/23/2020 05:33 AM    HCT 31 5 (L) 12/19/2014 06:36 AM    MCV 94 02/23/2020 05:33 AM    MCV 94 12/19/2014 06:36 AM     02/23/2020 05:33 AM     12/19/2014 06:36 AM     Creatinine   Date Value Ref Range Status   02/22/2020 0 64 0 60 - 1 30 mg/dL Final     Comment:     Standardized to IDMS reference method   02/21/2020 0 68 0 60 - 1 30 mg/dL Final     Comment:     Standardized to IDMS reference method   12/17/2014 0 75 0 60 - 1 30 mg/dL Final     Comment:     Standardized to IDMS reference method   12/12/2014 0 87 0 60 - 1 30 mg/dL Final     Comment:     Standardized to IDMS reference method     Vancomycin Assessment:  Indication: Pneumonia    Micro: 2/23 BC x2 - pending  Procalcitonin: 0 58  Renal Function: SCr= 0 64; calculated CrCl ~ 55-60 mL/min  Potential Nephrotoxicity Factors:  Medications: diuretics  Patient-Factors: elderly, dehydration  Days of Therapy: 1  Current Dose: 1250 mg q12h (last dose: 1250 mg x1 loading dose)  Goal Trough: 15-20 (appropriate for most indications)   Goal AUC(24h): 400-600    Vancomycin Plan:  New Dosing: change to 1250 mg x1 followed by 1000 mg q12h (next dose: standing order will start 12 hours after loading dose)  Predicted Trough / AUC: 18 5/644  Next Level: will be timed prior to 3rd or 4th dose    Pharmacy will continue to follow closely for s/sx of nephrotoxicity, infusion reactions and appropriateness of therapy  BMP and CBC will be ordered per protocol  We will continue to follow the patient's culture results and clinical progress daily       John Weldon, Ernesto  Clinical Pharmacist, Emergency Medicine

## 2020-02-23 NOTE — ASSESSMENT & PLAN NOTE
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

## 2020-02-23 NOTE — ASSESSMENT & PLAN NOTE
Lab Results   Component Value Date    HGBA1C 5 8 02/05/2020       Recent Labs     02/22/20  1738 02/23/20  0036 02/23/20  0531 02/23/20  1256   POCGLU 151* 239* 266* 195*       Blood Sugar Average: Last 72 hrs:  (P) 172 [id number]     Hold metformin  BGMs with sliding scale coverage q 6 hours while NPO

## 2020-02-23 NOTE — PROGRESS NOTES
Was called by nurse regarding worsening neuro exam @ [phone]  Per RN when she went to do neuro assessment on patient for first time this shift, patient was unable to move her LUE/LLE  RN has never had this patient before but per RN who cared for Ms  Nancy Mosher last night, she was able to move left side and was making attempts to grab at [facility] with left hand  It is unclear how long left side has been flaccid  Rest of her exam is at baseline including aphasia, L facial droop, and R sided weakness  On review of nursing neuro exam documentation, +movement against gravity on L @ 9 AM this morning  On exam, VSS, SBP 160s  She appears somewhat lethargic but is able to follow most simple commands and move her RUE/RLE with limited strength  Aphasic although she does attempt to verbalize yes/no  +left facial droop with left facial weakness  LUE/LLE are flaccid, no w/d to painful stimuli  Will obtain CTH wo contrast  D/w neurology on call  D/w RN, will notify me with changes  -per neuro allow permissive htn up to -170  Will d/c lisinopril 2 5 mg for now  Pt was also noted to be febrile today/tonight, with UA obtained +nitrites/bacteria  Will obtain labs, UCx, and start ceftriaxone

## 2020-02-23 NOTE — PROGRESS NOTES
Progress Note - Oliver Salazar 1935, 80 y o  female MRN: [id number]    Unit/Bed#: [facility] 710-01 Encounter: [id number]    Primary Care Provider: Angelito Johnson MD   Date and time admitted to hospital: 2/19/2020  8:30 AM        UTI (urinary tract infection)  Assessment & Plan  CBC in a m  Follow-up urine cultures  Patient received Rocephin 1 dose, Rocephin has been changed to cefepime for treatment of concurrent HC AP    Pneumonia  Assessment & Plan  HC AP versus aspiration  Hold tube feeds  Gentle IV hydration  Monitor ins and outs and fluid status, clinically currently euvolemic  IV vancomycin cefepime and Flagyl  CBC in a m  To monitor leukocytosis  Monitor routine vitals for febrile episodes  P r n  Antipyretic  O2 sat monitoring with supplemental O2 p r n  To maintain saturation 90% or greater by pulse oximetry  Supportive care    Stroke-like symptoms  Assessment & Plan  No radiographic evidence of CVA  Neurology re-evaluated patient in light of worsening weakness, likely mechanism is hypoperfusion secondary to 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  Patient's daughter informed by phone all questions answered  Palliative care consult placed      Essential hypertension  Assessment & Plan  Monitor blood pressure with routine vitals  Hold spironolactone and lisinopril given NPO status  Continue p r n  Hydralazine    Paroxysmal atrial fibrillation (HCC)  Assessment & Plan  Currently rate controlled  EKG 2/19:  Sinus rhythm with first-degree AV block  Right bundle branch block  Possible lateral infarct (cited on or before 16th June 2019)  When compared with EKG of 14th February 2020, questionable change in initial forces of lateral leads  Continue therapeutic anticoagulation with Eliquis  Continue rate control with digoxin  Basic metabolic panel in a m   To monitor creatinine and electrolytes      Type 2 diabetes mellitus [facility])  Assessment & Plan  Lab Results   Component Value Date HGBA1C 5 8 2020       Recent Labs     20  1738 20  0036 20  0531 20  1256   POCGLU 151* 239* 266* 195*       Blood Sugar Average: Last 72 hrs:  (P) 172 [id number]     Hold metformin  BGMs with sliding scale coverage q 6 hours while NPO          VTE Pharmacologic Prophylaxis:   Pharmacologic: Apixaban (Eliquis)  Mechanical VTE Prophylaxis in Place: Yes    Patient Centered Rounds: I have performed bedside rounds with nursing staff today  Discussions with Specialists or Other Care Team Provider:  Case discussed with neurology     Education and Discussions with Family / Patient:  Patient's daughter contacted by phone, active problems and plans reviewed and all questions answered    Time Spent for Care: 30 minutes  More than 50% of total time spent on counseling and coordination of care as described above  Current Length of Stay: 4 day(s)    Current Patient Status: Inpatient   Certification Statement: The patient will continue to require additional inpatient hospital stay due to UTI, HC AP, focal weakness, AMS    Discharge Plan:      Code Status: Level 1 - Full Code      Subjective:   Overnight the nursing staff picked up worsening focal weakness left upper lower extremity, patient herself is unable to provide review of systems due to AMS    Objective:     Vitals:   Temp (24hrs), Av 4 °F (38 °C), Min:97 8 °F (36 6 °C), Max:101 7 °F (38 7 °C)    Temp:  [97 8 °F (36 6 °C)-101 7 °F (38 7 °C)] 99 4 °F (37 4 °C)  HR:  [] 95  Resp:  [18-25] 18  BP: (149-172)/(62-64) 159/63  SpO2:  [90 %-96 %] 91 %  Body mass index is 30 84 kg/m²  Input and Output Summary (last 24 hours): Intake/Output Summary (Last 24 hours) at 2020 1413  Last data filed at 2020 1300  Gross per 24 hour   Intake 1046 ml   Output 404 ml   Net 642 ml       Physical Exam:     Physical Exam   Constitutional: She appears well-developed and well-nourished  No distress     HENT:   Head: Normocephalic and atraumatic  Right Ear: External ear normal    Left Ear: External ear normal    Nose: Nose normal    Mouth/Throat: No oropharyngeal exudate (Mucous membranes dry)  Eyes: Conjunctivae are normal  Right eye exhibits no discharge  Left eye exhibits no discharge  No scleral icterus  Neck: No JVD present  No tracheal deviation present  No thyromegaly present  Cardiovascular: Normal rate, regular rhythm and intact distal pulses  Exam reveals no gallop and no friction rub  Murmur ( 2/6) heard  Pulmonary/Chest: Effort normal  No stridor  No respiratory distress  She has no wheezes  She has no rales  Reduced breath sounds bilateral bases, coarse breath sounds in all lung fields to transmitted upper airway sounds   Abdominal: Soft  Bowel sounds are normal  She exhibits no distension  There is no tenderness  There is no rebound and no guarding  Musculoskeletal: She exhibits no edema, tenderness or deformity  Neurological: She has normal reflexes  She exhibits normal muscle tone  Somnolent, altered   Skin: Skin is warm and dry  No rash noted  She is not diaphoretic  No erythema  No pallor  Nursing note and vitals reviewed          Additional Data:     Labs:    Results from last 7 days   Lab Units 02/23/20  0533 02/22/20  0612 02/21/20  0447   WBC Thousand/uL 14 03* 12 18* 7 33   HEMOGLOBIN g/dL 12 6 12 9 11 3*   HEMATOCRIT % 38 2 39 7 34 5*   PLATELETS Thousands/uL 157 154 140*   BANDS PCT %  --  7  --    NEUTROS PCT %  --   --  74   LYMPHS PCT %  --   --  13*   LYMPHO PCT %  --  0*  --    MONOS PCT %  --   --  12   MONO PCT %  --  3*  --    EOS PCT %  --  0 0     Results from last 7 days   Lab Units 02/22/20  0612   SODIUM mmol/L 138   POTASSIUM mmol/L 3 5   CHLORIDE mmol/L 107   CO2 mmol/L 22   BUN mg/dL 14   CREATININE mg/dL 0 64   ANION GAP mmol/L 9   CALCIUM mg/dL 9 3   GLUCOSE RANDOM mg/dL 157*     Results from last 7 days   Lab Units 02/19/20  0854   INR  1 17     Results from last 7 days   Lab Units 02/23/20  1256 02/23/20  0531 02/23/20  0036 02/22/20  1738 02/22/20  1325 02/22/20  1114 02/22/20  0729 02/21/20  0639 02/20/20  2358 02/20/20  1721 02/20/20  1152 02/19/20  1917   POC GLUCOSE mg/dl 195* 266* 239* 151* 151* 168* 164* 149* 132 127 159* 107         Results from last 7 days   Lab Units 02/23/20  0533 02/23/20  0110   LACTIC ACID mmol/L  --  1 8   PROCALCITONIN ng/ml 0 58*  --            * I Have Reviewed All Lab Data Listed Above  * Additional Pertinent Lab Tests Reviewed: Zan 66 Admission Reviewed    Imaging:    Imaging Reports Reviewed Today Include:  Chest x-ray, CT head without contrast  Imaging Personally Reviewed by Myself Includes:       Recent Cultures (last 7 days):     Results from last 7 days   Lab Units 02/23/20  0108   BLOOD CULTURE  Received in Microbiology Lab  Culture in Progress  Received in Microbiology Lab  Culture in Progress         Last 24 Hours Medication List:     Current Facility-Administered Medications:  acetaminophen 650 mg Rectal Q4H PRN Petra Kevin PA-C   apixaban 5 mg Oral BID Karson Garnica MD   atorvastatin 40 mg Oral QPM Karson Garnica MD   cefepime 2,000 mg Intravenous Q12H Mary Beth Calzada MD   cholecalciferol 1,000 Units Oral QPM Karson Garnica MD   vitamin B-12 1,000 mcg Oral QPM Karson Garnica MD   digoxin 125 mcg Oral Daily Karson Garnica MD   folic acid 0,712 mcg Oral Daily Karson Garnica MD   gabapentin 300 mg Oral Daily Mary Beth Calzada MD   insulin lispro 1-5 Units Subcutaneous Q6H Sandra Griffiths MD   Labetalol HCl 10 mg Intravenous Q6H PRN Poppy Vasquez PA-C   metroNIDAZOLE 500 mg Intravenous Q8H Mary Beth Calzada MD   oxybutynin 5 mg Oral Daily Karson Garnica MD   primidone 50 mg Oral Q12H Anuja Allan MD   sodium chloride 50 mL/hr Intravenous Once Mary Beth Calzada MD   spironolactone 50 mg Oral Daily Karson Garnica MD   vancomycin 15 mg/kg Intravenous Q12H Ramsey Thapa MD        Today, Patient Was Seen By: Ramsey Thapa MD    ** Please Note: Dictation voice to text software may have been used in the creation of this document   **

## 2020-02-24 PROBLEM — K85.10 GALLSTONE PANCREATITIS: Chronic | Status: ACTIVE | Noted: 2018-06-01

## 2020-02-24 LAB
ANION GAP SERPL CALCULATED.3IONS-SCNC: 6 MMOL/L (ref 4–13)
BUN SERPL-MCNC: 24 MG/DL (ref 5–25)
CALCIUM SERPL-MCNC: 9.5 MG/DL (ref 8.3–10.1)
CHLORIDE SERPL-SCNC: 109 MMOL/L (ref 100–108)
CO2 SERPL-SCNC: 25 MMOL/L (ref 21–32)
CREAT SERPL-MCNC: 0.82 MG/DL (ref 0.6–1.3)
ERYTHROCYTE [DISTWIDTH] IN BLOOD BY AUTOMATED COUNT: 12.6 % (ref 11.6–15.1)
GFR SERPL CREATININE-BSD FRML MDRD: 66 ML/MIN/1.73SQ M
GLUCOSE SERPL-MCNC: 122 MG/DL (ref 65–140)
GLUCOSE SERPL-MCNC: 131 MG/DL (ref 65–140)
GLUCOSE SERPL-MCNC: 135 MG/DL (ref 65–140)
GLUCOSE SERPL-MCNC: 142 MG/DL (ref 65–140)
GLUCOSE SERPL-MCNC: 149 MG/DL (ref 65–140)
HCT VFR BLD AUTO: 37.9 % (ref 34.8–46.1)
HGB BLD-MCNC: 12.1 G/DL (ref 11.5–15.4)
MCH RBC QN AUTO: 30.3 PG (ref 26.8–34.3)
MCHC RBC AUTO-ENTMCNC: 31.9 G/DL (ref 31.4–37.4)
MCV RBC AUTO: 95 FL (ref 82–98)
NRBC BLD AUTO-RTO: 0 /100 WBCS
PLATELET # BLD AUTO: 102 THOUSANDS/UL (ref 149–390)
PMV BLD AUTO: 12.3 FL (ref 8.9–12.7)
POTASSIUM SERPL-SCNC: 4 MMOL/L (ref 3.5–5.3)
RBC # BLD AUTO: 3.99 MILLION/UL (ref 3.81–5.12)
SODIUM SERPL-SCNC: 140 MMOL/L (ref 136–145)
VANCOMYCIN TROUGH SERPL-MCNC: 4 UG/ML (ref 10–20)
WBC # BLD AUTO: 9.12 THOUSAND/UL (ref 4.31–10.16)

## 2020-02-24 PROCEDURE — 99233 SBSQ HOSP IP/OBS HIGH 50: CPT | Performed by: INTERNAL MEDICINE

## 2020-02-24 PROCEDURE — 80048 BASIC METABOLIC PNL TOTAL CA: CPT | Performed by: INTERNAL MEDICINE

## 2020-02-24 PROCEDURE — 99223 1ST HOSP IP/OBS HIGH 75: CPT | Performed by: INTERNAL MEDICINE

## 2020-02-24 PROCEDURE — 97167 OT EVAL HIGH COMPLEX 60 MIN: CPT

## 2020-02-24 PROCEDURE — 97163 PT EVAL HIGH COMPLEX 45 MIN: CPT

## 2020-02-24 PROCEDURE — 80202 ASSAY OF VANCOMYCIN: CPT | Performed by: INTERNAL MEDICINE

## 2020-02-24 PROCEDURE — 85025 COMPLETE CBC W/AUTO DIFF WBC: CPT | Performed by: INTERNAL MEDICINE

## 2020-02-24 PROCEDURE — 99232 SBSQ HOSP IP/OBS MODERATE 35: CPT | Performed by: PSYCHIATRY & NEUROLOGY

## 2020-02-24 PROCEDURE — 82948 REAGENT STRIP/BLOOD GLUCOSE: CPT

## 2020-02-24 RX ADMIN — MELATONIN 1000 UNITS: at 18:02

## 2020-02-24 RX ADMIN — ATORVASTATIN CALCIUM 40 MG: 40 TABLET, FILM COATED ORAL at 18:02

## 2020-02-24 RX ADMIN — CEFEPIME HYDROCHLORIDE 2000 MG: 2 INJECTION, POWDER, FOR SOLUTION INTRAVENOUS at 16:08

## 2020-02-24 RX ADMIN — VANCOMYCIN HYDROCHLORIDE 1000 MG: 1 INJECTION, SOLUTION INTRAVENOUS at 18:02

## 2020-02-24 RX ADMIN — CEFEPIME HYDROCHLORIDE 2000 MG: 2 INJECTION, POWDER, FOR SOLUTION INTRAVENOUS at 04:13

## 2020-02-24 RX ADMIN — METRONIDAZOLE 500 MG: 500 INJECTION, SOLUTION INTRAVENOUS at 14:42

## 2020-02-24 RX ADMIN — DIGOXIN 125 MCG: 125 TABLET ORAL at 11:49

## 2020-02-24 RX ADMIN — PRIMIDONE 50 MG: 50 TABLET ORAL at 11:53

## 2020-02-24 RX ADMIN — VANCOMYCIN HYDROCHLORIDE 1000 MG: 1 INJECTION, SOLUTION INTRAVENOUS at 05:36

## 2020-02-24 RX ADMIN — APIXABAN 5 MG: 5 TABLET, FILM COATED ORAL at 18:02

## 2020-02-24 RX ADMIN — CYANOCOBALAMIN TAB 500 MCG 1000 MCG: 500 TAB at 18:02

## 2020-02-24 RX ADMIN — SPIRONOLACTONE 50 MG: 50 TABLET ORAL at 11:39

## 2020-02-24 RX ADMIN — GABAPENTIN 300 MG: 250 SOLUTION ORAL at 11:51

## 2020-02-24 RX ADMIN — DIGOXIN 125 MCG: 125 TABLET ORAL at 11:51

## 2020-02-24 RX ADMIN — PRIMIDONE 50 MG: 50 TABLET ORAL at 22:13

## 2020-02-24 RX ADMIN — METRONIDAZOLE 500 MG: 500 INJECTION, SOLUTION INTRAVENOUS at 07:21

## 2020-02-24 RX ADMIN — APIXABAN 5 MG: 5 TABLET, FILM COATED ORAL at 11:39

## 2020-02-24 RX ADMIN — METRONIDAZOLE 500 MG: 500 INJECTION, SOLUTION INTRAVENOUS at 22:13

## 2020-02-24 NOTE — ASSESSMENT & PLAN NOTE
Lab Results   Component Value Date    HGBA1C 5 8 02/05/2020       Recent Labs     02/23/20  2304 02/24/20  0011 02/24/20  0622 02/24/20  1157   POCGLU 135 135 142* 131       Blood Sugar Average: Last 72 hrs:  (P) 165 [id number]     Hold metformin  BGMs with sliding scale coverage q 6 hours while on tube feeds

## 2020-02-24 NOTE — PLAN OF CARE
Problem: OCCUPATIONAL THERAPY ADULT  Goal: Performs self-care activities at highest level of function for planned discharge setting  See evaluation for individualized goals  Description  Treatment Interventions: ADL retraining, Visual perceptual retraining, Functional transfer training, UE strengthening/ROM, Endurance training, Cognitive reorientation, Patient/family training, Equipment evaluation/education, Compensatory technique education, Continued evaluation, Activityengagement          See flowsheet documentation for full assessment, interventions and recommendations  Note:   Limitation: Decreased ADL status, Decreased UE ROM, Decreased UE strength, Decreased endurance, Decreased Safe judgement during ADL, Decreased cognition, Decreased sensation, Decreased fine motor control, Visual deficit, Decreased self-care trans, Decreased high-level ADLs  Prognosis: Fair  Assessment: Pt is an 80year old female seen for initial OT evaluation s/p admission to [facility] with L facial droop, aphasia, and b/l weakness  Etiology thought to be hypoperfusion secondary to intracranial and ICA 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  Additional activities include: UTI, oropharyngeal dysphagia, dysarthria, weakness of b/l LEs, HTN, paroxysmal atrial fibrillation, and DM  Pt with active OT orders and cleared by RN for OT evaluation  Pt resides with her daughter in a [address] with ramped entrance and FF set up  Pt has HHAs 12 hours/day  Pt typically requires assistance with ADLs, IADLs, and functional mobility  Pt is currently demonstrating the following occupational deficits: maxA-totalA with ADLs and bed mobility  Factors currently limiting pt's independence in these areas include: generalized weakness L>R, trunk/postural support, balance, functional mobility, cognitive deficits, endurance, activity tolerance, and unsupportive home environment  From an OT standpoint, recommend STR upon d/c    Pt is to continue to benefit from skilled occupational therapy services while in the hospital to maximize functioning and independence with daily activities  See below for OT goals to be addressed 3-5x/wk       OT Discharge Recommendation: Short Term Rehab  OT - OK to Discharge: Yes(to STR when medically stable)

## 2020-02-24 NOTE — PLAN OF CARE
Problem: PHYSICAL THERAPY ADULT  Goal: Performs mobility at highest level of function for planned discharge setting  See evaluation for individualized goals  Description  Treatment/Interventions: Functional transfer training, LE strengthening/ROM, Therapeutic exercise, Endurance training, Cognitive reorientation, Patient/family training, Bed mobility, Gait training  Equipment Recommended: Teena Prudent, Wheelchair       See flowsheet documentation for full assessment, interventions and recommendations  Note:   Prognosis: Guarded  Problem List: Decreased strength, Decreased endurance, Impaired balance, Decreased mobility, Decreased coordination, Decreased cognition, Impaired judgement, Decreased safety awareness, Obesity, Pain  Assessment: Pt is 80 y o  female seen for high complexity PT evaluation s/p admission to [facility] on [facility]/19/20 with left sided facial droop, aphasia, worsened bilateral weakness  CT (-) for acute intracranial CT abnormality; EEG monitoring (-) for epileptiform activity  Etiology thought to be hypoperfusion 2* intracranial and ICA stenoses which is non operable  Pt with active PT eval/treat orders with up and OOB as tolerated  Comorbidities affecting pt's physical performance at time of assessment include: UTI, oropharyngeal dysphagia, dysarthria, stroke like symptoms, essential HTN, paroxysmal a fib, type 2 DM  PTA, pt is primarily WC bound, uses a sit to stand for transfers however, receiving OOPT for functional transfers and ambulation  Pt resides with her daughter in a [place] with ramp and 1st floor set up  Pt has daily aides from 8 am to 8 pm  Upon evaluation, pt with minimal verbalized and required increased time to process/respond all questions/commands  Pt demonstrated bilateral weakness (L > R)  Pt required max A x 2 for supine <> sit transfers  Tolerated sitting EOB for approx 5 minutes, required max A x 1 to maintain static sitting balance   Deferred further mobility 2* poor static sitting balance  Patient's decreased mobility level and increased fall risk is secondary to deficits in BLE weakness, pain, sitting tolerance, endurance, trunk/neck control, posture, endurance, and sitting balance  Current clinical presentation is unstable/unpredictable seen in pt's presentation of ongoing medical management, increased reliance on assistance compared to PLOF, impaired judgement/safety awareness, and significant PMH  Pt to benefit from continued PT tx to address deficits as defined above and maximize level of functional independent mobility and consistency  From PT/mobility standpoint, recommendation at time of d/c would be STR pending progress in order to facilitate return to PLOF  PT to see for OOB mobility as appropriate  Barriers to Discharge: Decreased caregiver support     Recommendation: Post acute IP rehab     PT - OK to Discharge: Yes(to rehab once medically stable)    See flowsheet documentation for full assessment

## 2020-02-24 NOTE — ASSESSMENT & PLAN NOTE
Dysarthria noted on last admission 2/14, neurology consulted and did 24-hour EEG monitoring 2/16  EEG monitoring 2/16-2/17: no focal epileptiform discharges or electrographic seizures    Removed 2/17  Unable to get MRI brain due to spine stimulator  Neurology following

## 2020-02-24 NOTE — PROGRESS NOTES
Progress Note - Neurology   Licha Chu 80 y o  female [id number]  Unit/Bed#: [facility] 710/[facility] 710-01    Assessment:  Licha Chu is a 80 y o  female with atrial fibrillation on Eliquis, prior stroke, cervical dystonia, and chronic pain, who re-presented to the ED this admission with a recurrent episode of generalized weakness, aphonia, and transient facial droop  She was recently admitted for similar symptoms and underwent video EEG monitoring and extensive imaging, which were unremarkable  She recently developed left hemiparesis and decreased responsiveness  Initial CTH was negative  Cannot entirely exclude the possibility of acute CVA due to change in neurologic evaluation, possibly in the setting of multifocal high-grade stenosis of the right M1 and proximal M2 branches, but presentation may be due to toxic metabolic encephalopathy  Plan:  - Cannot obtain MRI due to spinal stimulator    - Repeat CTH was considered to re-evaluate for acute CVA, which if positive, would   Likely would add Aspirin 81 mg daily  Patient reports she does not want to take aspirin daily, so will defer CTH  Discussed with family at bedside    - Medical management and supportive care per primary team  Correction of any metabolic or infectious disturbances  Subjective:   Neurology was contacted yesterday by the primary team due to unresponsiveness  She grimace but did not localize to right-sided stimulation in her left side was flaccid  Neurology did not recommend any intervention at that time  The patient was noted to be febrile yesterday and UA was suspicious for UTI  She was initiated on antibiotics  Her exam was considered to be at baseline other than her left hemiparesis  Her CT head was unremarkable  Since yesterday, the patient's blood pressures have been stable  Yesterday evening, she did exhibit a low-grade temperature 100 6°, but has been afebrile since    She continues on Eliquis  She is on cefepime, which was initiated yesterday afternoon  She is also on vancomycin  At time of neurologic evaluation, the patient is lethargic but fully oriented  She reports that she feels weak and is coughing  Pooling of her secretions is evident on exam, as noted by gurgling         Past Medical History:   Diagnosis Date    [facility])     Arthritis     Assistance needed for mobility     pt can stand with assistance and transfer    Chronic pain disorder     Diabetes mellitus ([facility] [address] )     NIDDM    Full dentures     History of transfusion     no adverse reaction    Hyperlipidemia     Irregular heart beat     Numbness and tingling of both feet     Skin abnormality     sacrum area - small red area, less than a dime size    Spinal stenosis     Stroke ([facility] [address] )     Unable to ambulate     Urinary incontinence     ipg stimulator x3 repakcements,battery exchange today 2/14/2018    Wears glasses     Wheelchair dependence      Past Surgical History:   Procedure Laterality Date    BACK SURGERY      fusion    BLADDER SUSPENSION      CARPAL TUNNEL RELEASE Bilateral     CHOLANGIOGRAM N/A 6/4/2018    Procedure: CHOLANGIOGRAM;  Surgeon: Alessio Lewis MD;  [facility];  Service: General    CHOLECYSTECTOMY LAPAROSCOPIC N/A 6/4/2018    Procedure: CHOLECYSTECTOMY LAPAROSCOPIC;  Surgeon: Alessio Lewis MD;  [facility];  Service: General    COLONOSCOPY      DILATION AND CURETTAGE OF UTERUS      FRACTURE SURGERY Left     femur with hardware    HYSTERECTOMY      INSERTION / Bobo Sutton / REVISION NEUROSTIMULATOR      JOINT REPLACEMENT Bilateral     knees    SACRAL NERVE STIMULATOR PLACEMENT N/A 2/14/2018    Procedure: REMOVE AND REPLACE IPG;  Surgeon: Virginia Paez MD;  [facility];  Service: UroGynecology           TONSILLECTOMY       Family History   Problem Relation Age of Onset    No Known Problems Mother     No Known Problems Father      Social History Socioeconomic History    Marital status: /Civil Union     Spouse name: None    Number of children: None    Years of education: None    Highest education level: None   Occupational History    None   Social Needs    Financial resource strain: None    Food insecurity:     Worry: None     Inability: None    Transportation needs:     Medical: None     Non-medical: None   Tobacco Use    Smoking status: Never Smoker    Smokeless tobacco: Never Used   Substance and Sexual Activity    Alcohol use: Not Currently     Comment: very rare    Drug use: No    Sexual activity: None   Lifestyle    Physical activity:     Days per week: None     Minutes per session: None    Stress: None   Relationships    Social connections:     Talks on phone: None     Gets together: None     Attends Hoahaoism service: None     Active member of club or organization: None     Attends meetings of clubs or organizations: None     Relationship status: None    Intimate partner violence:     Fear of current or ex partner: None     Emotionally abused: None     Physically abused: None     Forced sexual activity: None   Other Topics Concern    None   Social History Narrative    None         Medications:   All current active meds have been reviewed and current meds:  Scheduled Meds:  Current Facility-Administered Medications:  acetaminophen 650 mg Rectal Q4H PRN Jose Alberto Nino PA-C    apixaban 5 mg Oral BID Timi Noyola MD    atorvastatin 40 mg Oral QPM Timi Noyola MD    cefepime 2,000 mg Intravenous Q12H Marline Juárez MD Last Rate: 2,000 mg (02/24/20 0413)   cholecalciferol 1,000 Units Oral QPM Timi Noyola MD    vitamin B-12 1,000 mcg Oral QPM Timi Noyola MD    digoxin 125 mcg Oral Daily Timi Noyola MD    folic acid 2,452 mcg Oral Daily Timi Noyola MD    gabapentin 300 mg Oral Daily Marline Juárez MD    insulin lispro 1-5 Units Subcutaneous Q6H Baljinder Null MD    Labetalol HCl 10 mg Intravenous Q6H PRN Poppy Vasquez PA-C    metroNIDAZOLE 500 mg Intravenous Q8H Matias Linares MD Last Rate: 500 mg (02/24/20 0721)   oxybutynin 5 mg Oral Daily Jayde Cuba MD    primidone 50 mg Oral Q12H Irene Cantu MD    spironolactone 50 mg Oral Daily Jayde Cuba MD    vancomycin 15 mg/kg (Adjusted) Intravenous Q12H Matias Linares MD Last Rate: 1,000 mg (02/24/20 0536)     Continuous Infusions:   PRN Meds:   acetaminophen    Labetalol HCl       ROS:   Review of Systems   Unable to perform ROS: Mental status change             Vitals:   /64   Pulse 80   Temp 99 2 °F (37 3 °C)   Resp 20   Ht 5' 4" (1 626 m)   Wt 81 5 kg (179 lb 10 8 oz)   SpO2 98%   BMI 30 84 kg/m²     Physical Exam:   Physical Exam   Constitutional:   Acutely ill-appearing   HENT:   Head: Normocephalic and atraumatic  Right Ear: External ear normal    Left Ear: External ear normal    Nose: Nose normal    Mouth/Throat: Oropharynx is clear and moist    Keofed tube in place  Eyes: Pupils are equal, round, and reactive to light  Conjunctivae are normal  Right eye exhibits no discharge  Left eye exhibits no discharge  No scleral icterus  Somewhat fixed gaze, but patient is responsive throughout examination  Blinks to threat  Cardiovascular: Normal rate  Pulmonary/Chest: Effort normal  No respiratory distress  Musculoskeletal: She exhibits no edema, tenderness or deformity  Slightly decreased ROM throughout due to generalized weakness  Neurological: Finger-nose-finger test: Unable to assess at this time    Lethargic, but oriented x3   Skin: Skin is warm and dry  No rash noted  No erythema  Pressure pads at bilateral heels  Psychiatric:   Flat affect  Nursing note and vitals reviewed  Neurologic Exam     Mental Status   Patient is awake  She is lethargic  She is oriented to person, place, month, year, birthday  She follows simple commands without difficulty    No left right confusion noted  Speech is dysarthric, but understandable  Able to name simple objects  Cranial Nerves     CN II   Visual acuity: (Blinks to threat)    CN III, IV, VI   Pupils are equal, round, and reactive to light  Left pupil: Shape: regular  Conjugate gaze: present    CN V   Facial sensation intact  CN VII   Facial expression full, symmetric  CN VIII   Hearing: impaired  Somewhat of a fixed gaze  Limited spontaneous eye movements noted  Tongue appears midline, although patient unable to fully protrude  Motor Exam Age appropriate strength throughout, slightly decreased on left compared to right  Cannot maintain right or left UE antigravity at the deltoid  4/5 with right elbow flexion/extension  Decreased on the left, approximately 2+/5 as patient cannot quite maintain antigravity   strength 4/5 on right, 3/5 on left  Cannot elevate bilateral lower extremities off bed (appears to be baseline according to older neuro examination)  Can wiggle toes much more vigorously on the right and requires increased stimulation to do so on the left  Sensory Exam   Unreliable given patient's current mental status but does acknowledge light touch throughout  Gait, Coordination, and Reflexes     Gait  Gait: (Deferred)    Coordination   Finger-nose-finger test: Unable to assess at this time     Tremor   Resting tremor: absent  Intention tremor: absent  Action tremor: absent          Labs: I have personally reviewed pertinent reports     Recent Results (from the past 24 hour(s))   Fingerstick Glucose (POCT)    Collection Time: 02/23/20 12:56 PM   Result Value Ref Range    POC Glucose 195 (H) 65 - 140 mg/dl   Fingerstick Glucose (POCT)    Collection Time: 02/23/20  5:36 PM   Result Value Ref Range    POC Glucose 129 65 - 140 mg/dl   Fingerstick Glucose (POCT)    Collection Time: 02/23/20 11:04 PM   Result Value Ref Range    POC Glucose 135 65 - 140 mg/dl   Fingerstick Glucose (POCT)    Collection Time: 02/24/20 12:11 AM   Result Value Ref Range    POC Glucose 135 65 - 140 mg/dl   Fingerstick Glucose (POCT)    Collection Time: 02/24/20  6:22 AM   Result Value Ref Range    POC Glucose 142 (H) 65 - 140 mg/dl   CBC and differential    Collection Time: 02/24/20  8:49 AM   Result Value Ref Range    WBC 9 12 4 31 - 10 16 Thousand/uL    RBC 3 99 3 81 - 5 12 Million/uL    Hemoglobin 12 1 11 5 - 15 4 g/dL    Hematocrit 37 9 34 8 - 46 1 %    MCV 95 82 - 98 fL    MCH 30 3 26 8 - 34 3 pg    MCHC 31 9 31 4 - 37 4 g/dL    RDW 12 6 11 6 - 15 1 %    MPV 12 3 8 9 - 12 7 fL    Platelets 960 (L) 786 - 390 Thousands/uL    nRBC 0 /100 WBCs   Basic metabolic panel    Collection Time: 02/24/20  8:49 AM   Result Value Ref Range    Sodium 140 136 - 145 mmol/L    Potassium 4 0 3 5 - 5 3 mmol/L    Chloride 109 (H) 100 - 108 mmol/L    CO2 25 21 - 32 mmol/L    ANION GAP 6 4 - 13 mmol/L    BUN 24 5 - 25 mg/dL    Creatinine 0 82 0 60 - 1 30 mg/dL    Glucose 149 (H) 65 - 140 mg/dL    Calcium 9 5 8 3 - 10 1 mg/dL    eGFR 66 ml/min/1 73sq m       Imaging: I have personally reviewed pertinent imaging in PACS, including CT,  and I have personally reviewed PACS reports  EKG, Pathology, and Other Studies: I have personally reviewed pertinent reports         VTE Prophylaxis:  Eliquis

## 2020-02-24 NOTE — UTILIZATION REVIEW
Continued Stay Review    Date:  2-24-20                    Current Patient Class: inpatient  Current Level of Care: medical surgical     HPI:84 y o  female initially admitted on 2-19-20   For aphasia     Assessment/Plan:     Continue iv cefepime for urinary tract infection and  New right lung consolidation likely pneumonia due to aspiration  Continue tube feeds  It appears the patient is aspirating in absence of gag reflex  Infectious disease met with daughter today  Palliative care consulted  Palliative care spoke with daughter on phone  Plan for follow up meeting on 2-25 to finalize code status and make decision on tube feeding  Pertinent Labs/Diagnostic Results:     XR chest portable   Final (02/23 1143)      Increased left lung consolidation and new right lung consolidation, likely pneumonia  It may be due to aspiration  CT head wo contrast   Final 02/23 0256)      Small to moderate amount of fluid in the left sphenoid sinus, consider acute sphenoid sinusitis in the appropriate clinical setting but otherwise no acute intracranial abnormality is seen  XR abdomen 1 vw portable   Final  (02/22 0934)      Feeding tube is in the stomach            XR chest portable   Arlen (02/22 0937)      Malpositioned feeding tube as above, subsequently properly repositioned  Partially imaged lung with left base atelectasis and/or pneumonia  XR chest portable   Final (02/22 0932)      Feeding tube in midesophagus  It may be advanced  Left base consolidation due to atelectasis and/or pneumonia          Results from last 7 days   Lab Units 02/24/20  0849 02/23/20  0533 02/22/20  0612 02/21/20  0447 02/20/20  0458   WBC Thousand/uL 9 12 14 03* 12 18* 7 33 7 61   HEMOGLOBIN g/dL 12 1 12 6 12 9 11 3* 12 1   HEMATOCRIT % 37 9 38 2 39 7 34 5* 36 2   PLATELETS Thousands/uL 102* 157 154 140* 172   NEUTROS ABS Thousands/µL  --   --   --  5 41 5 50   BANDS PCT %  --   --  7  -- --          Results from last 7 days   Lab Units 02/24/20  0849 02/22/20  0612 02/21/20  0447 02/20/20  0458 02/19/20  0854   SODIUM mmol/L 140 138 137 136 133*   POTASSIUM mmol/L 4 0 3 5 3 8 4 1 4 7   CHLORIDE mmol/L 109* 107 106 104 100   CO2 mmol/L 25 22 25 26 30   ANION GAP mmol/L 6 9 6 6 3*   BUN mg/dL 24 14 14 17 20   CREATININE mg/dL 0 82 0 64 0 68 0 72 0 92   EGFR ml/min/1 73sq m 66 82 81 77 57   CALCIUM mg/dL 9 5 9 3 8 8 9 5 9 9   MAGNESIUM mg/dL  --   --   --  2 2  --          Results from last 7 days   Lab Units 02/24/20  1157 02/24/20  0622 02/24/20  0011 02/23/20  2304 02/23/20  1736 02/23/20  1256 02/23/20  0531 02/23/20  0036 02/22/20  1738 02/22/20  1325 02/22/20  1114 02/22/20  0729   POC GLUCOSE mg/dl 131 142* 135 135 129 195* 266* 239* 151* 151* 168* 164*     Results from last 7 days   Lab Units 02/24/20  0849 02/22/20  0612 02/21/20  0447 02/20/20  0458 02/19/20  0854   GLUCOSE RANDOM mg/dL 149* 157* 129 118 129       Results from last 7 days   Lab Units 02/19/20  0854   TROPONIN I ng/mL <0 02         Results from last 7 days   Lab Units 02/19/20  0854   PROTIME seconds 14 5   INR  1 17   PTT seconds 33         Results from last 7 days   Lab Units 02/23/20  0533   PROCALCITONIN ng/ml 0 58*     Results from last 7 days   Lab Units 02/23/20  0110   LACTIC ACID mmol/L 1 8       Results from last 7 days   Lab Units 02/22/20  1848   CLARITY UA  Turbid   COLOR UA  Dk Yellow   SPEC GRAV UA  1 028   PH UA  6 0   GLUCOSE UA mg/dl Negative   KETONES UA mg/dl Trace*   BLOOD UA  Small*   PROTEIN UA mg/dl 100 (2+)*   NITRITE UA  Positive*   BILIRUBIN UA  Negative   UROBILINOGEN UA E U /dl 1 0   LEUKOCYTES UA  Small*   WBC UA /hpf 30-50*   RBC UA /hpf 4-10*   BACTERIA UA /hpf Innumerable*   EPITHELIAL CELLS WET PREP /hpf Occasional         Results from last 7 days   Lab Units 02/23/20  0108 02/22/20  1848   BLOOD CULTURE  No Growth at 24 hrs    No Growth at 24 hrs   --    URINE CULTURE   --  70,000-79,000 cfu/ml Escherichia coli*  70,000-79,000 cfu/ml Aerococcus urinae*               Vital Signs:    Vitals:    02/23/20 2200 02/24/20 0326 02/24/20 0806 02/24/20 1137   BP:  142/67 143/64 157/62   Pulse: 88 80 80 80   Resp:  19 20    Temp: 98 4 °F (36 9 °C) 98 3 °F (36 8 °C) 99 2 °F (37 3 °C)    TempSrc:       SpO2: 93% 96% 98% 95%   Weight:       Height:         Medications:   Scheduled Medications:    Medications:  apixaban 5 mg Oral BID   atorvastatin 40 mg Oral QPM   cefepime 2,000 mg Intravenous Q12H   cholecalciferol 1,000 Units Oral QPM   vitamin B-12 1,000 mcg Oral QPM   digoxin 125 mcg Oral Daily   folic acid 6,209 mcg Oral Daily   gabapentin 300 mg Oral Daily   insulin lispro 1-5 Units Subcutaneous Q6H [address]   metroNIDAZOLE 500 mg Intravenous Q8H   oxybutynin 5 mg Oral Daily   primidone 50 mg Oral Q12H STEPHANIE   spironolactone 50 mg Oral Daily   vancomycin 15 mg/kg (Adjusted) Intravenous Q12H     Continuous IV Infusions:     PRN Meds:    acetaminophen 650 mg Rectal Q4H PRN   Labetalol HCl 10 mg Intravenous Q6H PRN       Discharge Plan: to be determined     Network Utilization Review Department  [email or web address]  ATTENTION: Please call with any questions or concerns to [phone] and carefully listen to the prompts so that you are directed to the right person  All voicemails are confidential   Wilmer Cheng all requests for admission clinical reviews, approved or denied determinations and any other requests to dedicated fax number below belonging to the campus where the patient is receiving treatment   List of dedicated fax numbers for the Facilities:  FACILITY NAME UR FAX NUMBER   ADMISSION DENIALS (Administrative/Medical Necessity) [phone]   [address] (Maternity/NICU/Pediatrics) [phone]   [facility] [place] [phone]   Vinicio Lynn [phone]   Artemio Kingsley [phone]   Michelle Snell   [address] [place] [address] [facility] [phone]   [place] [address] [phone]   [address] [place] [address] [phone]

## 2020-02-24 NOTE — PROGRESS NOTES
Progress Note - Ahmet Valenzuela 1935, 80 y o  female MRN: [id number]    Unit/Bed#: [facility] 710-01 Encounter: [id number]    Primary Care Provider: Oracio Wade MD   Date and time admitted to hospital: 2/19/2020  8:30 AM        UTI (urinary tract infection)  Assessment & Plan  CBC in a m  Follow-up urine cultures  Patient received Rocephin 1 dose, Rocephin has been changed to cefepime for treatment of concurrent HCAP  Blood cultures negative times 24 hours followed to final result      Oropharyngeal dysphagia  Assessment & Plan  Continue tube feeds  Patient's mentation improving today to consider re-evaluation by speech if patient's mentation continues to improve  It appears the patient is aspirating or tube feeds, absence of gag reflex as well-palliative care consult id met with daughter today  Appreciate their assistance await their input  Dysarthria  Assessment & Plan  Dysarthria noted on last admission 2/14, neurology consulted and did 24-hour EEG monitoring 2/16  EEG monitoring 2/16-2/17: no focal epileptiform discharges or electrographic seizures    Removed 2/17  Unable to get MRI brain due to spine stimulator  Neurology following    Weakness of both lower extremities  Assessment & Plan  Per daughter,patient had difficulty standing up in wheelchair, more so than normal  Chronic bilateral weakness in lower extremities, no significant changes since last admission  Patient wheelchair-bound and needs assistance standing up and transferring in wheelchair  Continue fall precautions  Consult PT OT while admitted  Consider inpatient rehab stay upon discharge daughter is amenable to this    Stroke-like symptoms  Assessment & Plan  No radiographic evidence of CVA  Neurology re-evaluated patient in light of worsening weakness, likely mechanism is hypoperfusion secondary to 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  Patient's daughter informed by phone all questions answered  Palliative care consult placed, await their input  Neurology following appreciate their assistance no new studies at this time      Essential hypertension  Assessment & Plan  Monitor blood pressure with routine vitals  Hold spironolactone and lisinopril given NPO status  Continue p r n  Hydralazine    Paroxysmal atrial fibrillation (HCC)  Assessment & Plan  Currently rate controlled  EKG 2/19:  Sinus rhythm with first-degree AV block  Right bundle branch block  Possible lateral infarct (cited on or before 16th June 2019)  When compared with EKG of 14th February 2020, questionable change in initial forces of lateral leads  Continue therapeutic anticoagulation with Eliquis  Continue rate control with digoxin        Type 2 diabetes mellitus [facility])  Assessment & Plan  Lab Results   Component Value Date    HGBA1C 5 8 02/05/2020       Recent Labs     02/23/20  2304 02/24/20  0011 02/24/20  0622 02/24/20  1157   POCGLU 135 135 142* 131       Blood Sugar Average: Last 72 hrs:  (P) 165 [id number]     Hold metformin  BGMs with sliding scale coverage q 6 hours while on tube feeds        VTE Pharmacologic Prophylaxis:   Pharmacologic: Apixaban (Eliquis)  Mechanical VTE Prophylaxis in Place: Yes    Patient Centered Rounds: I have performed bedside rounds with nursing staff today  Discussions with Specialists or Other Care Team Provider:      Education and Discussions with Family / Patient:  Discussed with patient's daughter at bedside all questions answered    Time Spent for Care: 30 minutes  More than 50% of total time spent on counseling and coordination of care as described above      Current Length of Stay: 5 day(s)    Current Patient Status: Inpatient   Certification Statement: The patient will continue to require additional inpatient hospital stay due to Bibasilar pneumonia, altered mental status    Discharge Plan:  Barriers to discharge include present NG tube for tube feeds, likely aspiration pneumonia, altered mental status, poor functional status likely necessitating discharge to rehabilitation    Code Status: Level 1 - Full Code      Subjective:   Patient more alert today, answering questions appropriately with one-word answers  Her only complaint this a m  Is that her neck is sore patient repositioned and reports this has resolved  Objective:     Vitals:   Temp (24hrs), Av 1 °F (37 3 °C), Min:98 3 °F (36 8 °C), Max:100 6 °F (38 1 °C)    Temp:  [98 3 °F (36 8 °C)-100 6 °F (38 1 °C)] 99 2 °F (37 3 °C)  HR:  [80-99] 80  Resp:  [19-20] 20  BP: (138-157)/(58-67) 157/62  SpO2:  [91 %-98 %] 95 %  Body mass index is 30 84 kg/m²  Input and Output Summary (last 24 hours): Intake/Output Summary (Last 24 hours) at 2020 1302  Last data filed at 2020 1052  Gross per 24 hour   Intake 636 72 ml   Output 521 ml   Net 115 72 ml       Physical Exam:     Physical Exam   Constitutional: She appears well-developed and well-nourished  No distress  HENT:   Head: Normocephalic and atraumatic  Right Ear: External ear normal    Left Ear: External ear normal    Nose: Nose normal    Mouth/Throat: No oropharyngeal exudate (Mucous membranes dry)  NG tube in place   Eyes: Conjunctivae are normal  Right eye exhibits no discharge  Left eye exhibits no discharge  No scleral icterus  Neck: No JVD present  No tracheal deviation present  No thyromegaly present  Cardiovascular: Normal rate, regular rhythm and intact distal pulses  Exam reveals no gallop and no friction rub  Murmur () heard  Pulmonary/Chest: Effort normal  No stridor  No respiratory distress  She has no wheezes  She has no rales  Rhonchorous breath sounds in all lung fields   Abdominal: Soft  Bowel sounds are normal  She exhibits no distension  There is no tenderness  There is no rebound and no guarding  Musculoskeletal: Normal range of motion  She exhibits no edema, tenderness or deformity  Neurological: She is alert  She has normal reflexes   She exhibits normal muscle tone  Skin: Skin is warm and dry  No rash noted  She is not diaphoretic  No erythema  No pallor  Nursing note and vitals reviewed  Additional Data:     Labs:    Results from last 7 days   Lab Units 02/24/20  0849  02/22/20  0612 02/21/20  0447   WBC Thousand/uL 9 12   < > 12 18* 7 33   HEMOGLOBIN g/dL 12 1   < > 12 9 11 3*   HEMATOCRIT % 37 9   < > 39 7 34 5*   PLATELETS Thousands/uL 102*   < > 154 140*   BANDS PCT %  --   --  7  --    NEUTROS PCT %  --   --   --  74   LYMPHS PCT %  --   --   --  13*   LYMPHO PCT %  --   --  0*  --    MONOS PCT %  --   --   --  12   MONO PCT %  --   --  3*  --    EOS PCT %  --   --  0 0    < > = values in this interval not displayed  Results from last 7 days   Lab Units 02/24/20  0849   SODIUM mmol/L 140   POTASSIUM mmol/L 4 0   CHLORIDE mmol/L 109*   CO2 mmol/L 25   BUN mg/dL 24   CREATININE mg/dL 0 82   ANION GAP mmol/L 6   CALCIUM mg/dL 9 5   GLUCOSE RANDOM mg/dL 149*     Results from last 7 days   Lab Units 02/19/20  0854   INR  1 17     Results from last 7 days   Lab Units 02/24/20  1157 02/24/20  0622 02/24/20  0011 02/23/20  2304 02/23/20  1736 02/23/20  1256 02/23/20  0531 02/23/20  0036 02/22/20  1738 02/22/20  1325 02/22/20  1114 02/22/20  0729   POC GLUCOSE mg/dl 131 142* 135 135 129 195* 266* 239* 151* 151* 168* 164*         Results from last 7 days   Lab Units 02/23/20  0533 02/23/20  0110   LACTIC ACID mmol/L  --  1 8   PROCALCITONIN ng/ml 0 58*  --            * I Have Reviewed All Lab Data Listed Above  * Additional Pertinent Lab Tests Reviewed: [address] Admission Reviewed    Imaging:    Imaging Reports Reviewed Today Include:    Imaging Personally Reviewed by Myself Includes:       Recent Cultures (last 7 days):     Results from last 7 days   Lab Units 02/23/20  0108 02/22/20  1848   BLOOD CULTURE  No Growth at 24 hrs    No Growth at 24 hrs   --    URINE CULTURE   --  70,000-79,000 cfu/ml Escherichia coli* 70,000-79,000 cfu/ml Aerococcus urinae*       Last 24 Hours Medication List:     Current Facility-Administered Medications:  acetaminophen 650 mg Rectal Q4H PRN Leandrew Schilder, PA-C    apixaban 5 mg Oral BID Yvette Delgado MD    atorvastatin 40 mg Oral QPM Yvette Delgado MD    cefepime 2,000 mg Intravenous Q12H Jonh Morgan MD Last Rate: 2,000 mg (02/24/20 0413)   cholecalciferol 1,000 Units Oral QPM Yvette Delgado MD    vitamin B-12 1,000 mcg Oral QPM Yvette Delgado MD    digoxin 125 mcg Oral Daily Yvette Delgado MD    folic acid 1,701 mcg Oral Daily Yvette Delgado MD    gabapentin 300 mg Oral Daily Jonh Morgan MD    insulin lispro 1-5 Units Subcutaneous Q6H Debi Ayon MD    Labetalol HCl 10 mg Intravenous Q6H PRN Poppy Vasquez PA-C    metroNIDAZOLE 500 mg Intravenous Q8H Jonh Morgan MD Last Rate: 500 mg (02/24/20 0721)   oxybutynin 5 mg Oral Daily Yvette Delgado MD    primidone 50 mg Oral Q12H [facility] Yvette Delgado MD    spironolactone 50 mg Oral Daily Yvette Delgado MD    vancomycin 15 mg/kg (Adjusted) Intravenous Q12H Jonh Morgan MD Last Rate: 1,000 mg (02/24/20 0536)        Today, Patient Was Seen By: Jonh Morgan MD    ** Please Note: Dictation voice to text software may have been used in the creation of this document   **

## 2020-02-24 NOTE — ASSESSMENT & PLAN NOTE
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

## 2020-02-24 NOTE — CONSULTS
Consultation - Palliative Care   Sami Weaver 80 y o  female MRN: [id number]  Unit/Bed#: [facility] 710-01 Encounter: [id number]      Assessment/Plan     Assessment:  Patient Active Problem List   Diagnosis    Wheelchair dependence    Type 2 diabetes mellitus ([address] )    Paroxysmal atrial fibrillation (HCC)    Acute cholecystitis    Elevated troponin    Gallstone pancreatitis    Generalized weakness    Urinary incontinence    Spinal cord stimulator status    Overactive bladder    Essential hypertension    Cervical dystonia    History of stroke    Hyperlipidemia    Calcaneal spur of foot, left    Osteopenia of left foot    Stroke-like symptoms    Hyponatremia    Thyroid nodule    Weakness of both lower extremities    SIRS (systemic inflammatory response syndrome) (HCC)    Dysarthria    Slurred speech    Aphasia    Facial droop    Oropharyngeal dysphagia    Pneumonia    UTI (urinary tract infection)       Plan:  1  Symptoms appeared controlled at this time- no changes  2  Goals-  PALLIATIVE AND SUPPORTIVE CARE FAMILY CONFERENCE:    Time of Meetin:00    Participants:   Jackie Campoverde- daughter (on phone)  Katelyn Arriaga- daughter and her spouse  Faith Keating, [address] Wang,     Patient Participation: no    Meeting Location: [address]    Advanced Directive of POLST available: no    A family meeting was held for Ms Ariane Yung  This meeting was necessary for determine the appropriate course of treatment      Topics of Discussion:  -Reviewed current medical condition and addressed questions/concerns   -Discussed limits on care including DNR and DNI status  -Discussed concerns about her swallowing capability and decision point that is forthcoming of whether or not to proceed with PEG  -Discussed risks/beneftis of PEG placement  -Discussed VANDANA at EOL  -Discussed hospice cares    Other Content of Meeting:  -Time allowed for all questions/concerns to be addressed to their satisfaction  -Time allowed for supportive listening    PLAN:  Follow up meeting on 2020 at 11 am to finalize code status and decision about PEG placement    Time Involved in Meetin minutes, beginning at approximately  11 and ending at approximately 1145  History of Present Illness   Physician Requesting Consult: Suleiman Harris MD  Reason for Consult / Principal Problem: goals of care, family meeting  Hx and PE limited by: patient aphasic  HPI: Isai Govea is a 80y o  year old female who presented on  with aphasia, left facial droop and worsening bilateral lower extremity weakness  Patient has known afib, chronic pain, prior CVA and dystonia  She had a prior admission for similar in early  February  Patient remains aphasic and is requiring keofed for enteral nutrition  She has had episodes of confusion and agitation and has needed restraints for medical care to be delivered  Patient with worsening exam on  and neurology has no further interventions to offer at this juncture  Patient is able to say yes/no appropriately and denies pain to me  She sounds like she is chronically aspirating on her secretions  Her daughter- Savanna Henderson is at bedside with her spouse  Family care conference held       Inpatient consult to Palliative Care  Consult performed by: June Tovar DO  Consult ordered by: Suleiamn Harris MD          Review of Systems   Unable to perform ROS: Patient nonverbal       Historical Information   Past Medical History:   Diagnosis Date    A-fib [facility])     Arthritis     Assistance needed for mobility     pt can stand with assistance and transfer    Chronic pain disorder     Diabetes mellitus [address] [address]     NIDDM    Full dentures     History of transfusion     no adverse reaction    Hyperlipidemia     Irregular heart beat     Numbness and tingling of both feet     Skin abnormality     sacrum area - small red area, less than a dime size    Spinal stenosis     Stroke ([facility] [address] )     Unable to ambulate     Urinary incontinence     ipg stimulator x3 repakcements,battery exchange today 2/14/2018    Wears glasses     Wheelchair dependence      Past Surgical History:   Procedure Laterality Date    BACK SURGERY      fusion    BLADDER SUSPENSION      CARPAL TUNNEL RELEASE Bilateral     CHOLANGIOGRAM N/A 6/4/2018    Procedure: CHOLANGIOGRAM;  Surgeon: Fred Ferris MD;  [facility];  Service: [address] [place] [address] N/A 6/4/2018    Procedure: Suze Formosa;  Surgeon: Fred Ferris MD;  [facility];  Service: General    COLONOSCOPY      DILATION AND CURETTAGE OF UTERUS      FRACTURE SURGERY Left     femur with hardware    HYSTERECTOMY      INSERTION / Jamie Churches / REVISION NEUROSTIMULATOR      JOINT REPLACEMENT Bilateral     knees    SACRAL NERVE STIMULATOR PLACEMENT N/A 2/14/2018    Procedure: REMOVE AND REPLACE IPG;  Surgeon: Elinor Martini MD;  [facility];  Service: UroGynecology           TONSILLECTOMY       Social History     Socioeconomic History    Marital status: /Civil Union     Spouse name: None    Number of children: None    Years of education: None    Highest education level: None   Occupational History    None   Social Needs    Financial resource strain: None    Food insecurity:     Worry: None     Inability: None    Transportation needs:     Medical: None     Non-medical: None   Tobacco Use    Smoking status: Never Smoker    Smokeless tobacco: Never Used   Substance and Sexual Activity    Alcohol use: Not Currently     Comment: very rare    Drug use: No    Sexual activity: None   Lifestyle    Physical activity:     Days per week: None     Minutes per session: None    Stress: None   Relationships    Social connections:     Talks on phone: None     Gets together: None     Attends [employer] service: None     Active member of club or organization: None     Attends meetings of clubs or organizations: None Relationship status: None    Intimate partner violence:     Fear of current or ex partner: None     Emotionally abused: None     Physically abused: None     Forced sexual activity: None   Other Topics Concern    None   Social History Narrative    None     Family History   Problem Relation Age of Onset    No Known Problems Mother     No Known Problems Father        Meds/Allergies   all current active meds have been reviewed and current meds:   Current Facility-Administered Medications   Medication Dose Route Frequency    acetaminophen (TYLENOL) rectal suppository 650 mg  650 mg Rectal Q4H PRN    apixaban (ELIQUIS) tablet 5 mg  5 mg Oral BID    atorvastatin (LIPITOR) tablet 40 mg  40 mg Oral QPM    cefepime (MAXIPIME) 2,000 mg in dextrose 5 % 50 mL IVPB  2,000 mg Intravenous Q12H    cholecalciferol (VITAMIN D3) tablet 1,000 Units  1,000 Units Oral QPM    cyanocobalamin (VITAMIN B-12) tablet 1,000 mcg  1,000 mcg Oral QPM    digoxin (LANOXIN) tablet 125 mcg  125 mcg Oral Daily    folic acid (FOLVITE) tablet 1,000 mcg  1,000 mcg Oral Daily    gabapentin (NEURONTIN) oral solution 300 mg  300 mg Oral Daily    insulin lispro (HumaLOG) 100 units/mL subcutaneous injection 1-5 Units  1-5 Units Subcutaneous Q6H [address]    Labetalol HCl (NORMODYNE) injection 10 mg  10 mg Intravenous Q6H PRN    metroNIDAZOLE (FLAGYL) IVPB (premix) 500 mg  500 mg Intravenous Q8H    oxybutynin (DITROPAN-XL) 24 hr tablet 5 mg  5 mg Oral Daily    primidone (MYSOLINE) tablet 50 mg  50 mg Oral Q12H STEPHANIE    spironolactone (ALDACTONE) tablet 50 mg  50 mg Oral Daily    vancomycin (VANCOCIN) IVPB (premix) 1,000 mg  15 mg/kg (Adjusted) Intravenous Q12H       Palliative Care Medications: NA    Allergies   Allergen Reactions    Bactrim [Sulfamethoxazole-Trimethoprim] Swelling     Swelling around eyes and red eyes    Aspirin Other (See Comments)     "feels like fist in my chest"    Ciprofloxacin     Nitrofurantoin     Other      "5mz/Tmp "  "1 60 mTab amme"     Per pt's allergy list        Objective     Physical Exam   Constitutional: She appears well-nourished  No distress  Chronically ill appearing   HENT:   Head: Normocephalic and atraumatic  Right Ear: External ear normal    Left Ear: External ear normal    Nose: Nose normal    Mouth/Throat: Oropharynx is clear and moist    keofed in place   Eyes: EOM are normal  Right eye exhibits no discharge  Left eye exhibits no discharge  No scleral icterus  Neck: Neck supple  No JVD present  No tracheal deviation present  Cardiovascular: Intact distal pulses  No murmur heard  Irregularly irregular   Pulmonary/Chest: Effort normal  No respiratory distress  She has no wheezes  She has rales  +secretions    Abdominal: Bowel sounds are normal  She exhibits no distension and no mass  There is no tenderness  Musculoskeletal: She exhibits no edema, tenderness or deformity  Neurological: She is alert  She displays abnormal reflex  A cranial nerve deficit is present  Coordination abnormal    Skin: Skin is warm and dry  She is not diaphoretic  Nursing note and vitals reviewed  Lab Results: I have personally reviewed pertinent labs  , CBC: No results found for: WBC, HGB, HCT, MCV, PLT, ADJUSTEDWBC, MCH, MCHC, RDW, MPV, NRBC, CMP: No results found for: SODIUM, K, CL, CO2, ANIONGAP, BUN, CREATININE, GLUCOSE, CALCIUM, AST, ALT, ALKPHOS, PROT, BILITOT, EGFR  Imaging Studies: I have personally reviewed pertinent reports  EKG, Pathology, and Other Studies: I have personally reviewed pertinent reports  Code Status: Level 1 - Full Code  Advance Directive and Living Will:      Power of :    POLST:      Counseling / Coordination of Care  Total floor / unit time spent today 75+ minutes  Greater than 50% of total time was spent with the patient and / or family counseling and / or coordination of care   A description of the counseling / coordination of care: chart review, medication review, symptom assessment, goals of care, supportive listening

## 2020-02-24 NOTE — ASSESSMENT & PLAN NOTE
Per daughter,patient had difficulty standing up in wheelchair, more so than normal  Chronic bilateral weakness in lower extremities, no significant changes since last admission  Patient wheelchair-bound and needs assistance standing up and transferring in wheelchair  Continue fall precautions  Consult PT OT while admitted  Consider inpatient rehab stay upon discharge daughter is amenable to this

## 2020-02-24 NOTE — ASSESSMENT & PLAN NOTE
CBC in a m    Follow-up urine cultures  Patient received Rocephin 1 dose, Rocephin has been changed to cefepime for treatment of concurrent HCAP  Blood cultures negative times 24 hours followed to final result

## 2020-02-24 NOTE — QUICK NOTE
Does note that this patient suffered from sepsis as result of aspiration pneumonia and urinary tract infection, currently being treated with broad-spectrum antibiotics blood cultures and hemodynamic monitoring  She also suffered acute metabolic encephalopathy secondary to these infections which is improving with treatment of the same

## 2020-02-24 NOTE — ASSESSMENT & PLAN NOTE
Continue tube feeds  Patient's mentation improving today to consider re-evaluation by speech if patient's mentation continues to improve  It appears the patient is aspirating or tube feeds, absence of gag reflex as well-palliative care consult id met with daughter today  Appreciate their assistance await their input

## 2020-02-24 NOTE — RESTORATIVE TECHNICIAN NOTE
Restorative Specialist Mobility Note                      Repositioned: Other (Comment)(Rep /sat pt upright in bed  Bed alarm on  B/L wrist restraints and NC O2 on 2L   Pt callbell, phone/tray within reach )       [employer] BS, Restorative Technician, [employer]

## 2020-02-24 NOTE — PHYSICAL THERAPY NOTE
Physical Therapy Evaluation  Patient Name: Sam Issa    OWNATALIEM'U Date: 2/24/2020     Problem List  Principal Problem:    Aphasia  Active Problems:    Wheelchair dependence    Paroxysmal atrial fibrillation ([facility] )    Spinal cord stimulator status    Overactive bladder    Essential hypertension    Cervical dystonia    Stroke-like symptoms    Weakness of both lower extremities    SIRS (systemic inflammatory response syndrome) (HCC)    Dysarthria    Facial droop    Oropharyngeal dysphagia    Pneumonia    UTI (urinary tract infection)       Past Medical History  Past Medical History:   Diagnosis Date    A-fib ([facility] )     Arthritis     Assistance needed for mobility     pt can stand with assistance and transfer    Chronic pain disorder     Diabetes mellitus ([facility] )     NIDDM    Full dentures     History of transfusion     no adverse reaction    Hyperlipidemia     Irregular heart beat     Numbness and tingling of both feet     Skin abnormality     sacrum area - small red area, less than a dime size    Spinal stenosis     Stroke ([facility] )     Unable to ambulate     Urinary incontinence     ipg stimulator x3 repakcements,battery exchange today 2/14/2018    Wears glasses     Wheelchair dependence         Past Surgical History  Past Surgical History:   Procedure Laterality Date    BACK SURGERY      fusion    BLADDER SUSPENSION      CARPAL TUNNEL RELEASE Bilateral     CHOLANGIOGRAM N/A 6/4/2018    Procedure: CHOLANGIOGRAM;  Surgeon: Clif Swan MD;  [facility];  Service: General    CHOLECYSTECTOMY LAPAROSCOPIC N/A 6/4/2018    Procedure: CHOLECYSTECTOMY LAPAROSCOPIC;  Surgeon: Clif Swan MD;  [facility];  Service: General    COLONOSCOPY      DILATION AND CURETTAGE OF UTERUS      FRACTURE SURGERY Left     femur with hardware    HYSTERECTOMY      INSERTION / PLACEMENT / REVISION NEUROSTIMULATOR      JOINT REPLACEMENT Bilateral knees    SACRAL NERVE STIMULATOR PLACEMENT N/A 2/14/2018    Procedure: REMOVE AND REPLACE IPG;  Surgeon: Tevin Villafuerte MD;  [facility];  Service: UroGynecology           TONSILLECTOMY             02/24/20 0841   Note Type   Note type Eval only   Pain Assessment   Pain Assessment FLACC   Pain Rating: FLACC (Rest) - Face 0   Pain Rating: FLACC (Rest) - Legs 0   Pain Rating: FLACC (Rest) - Activity 0   Pain Rating: FLACC (Rest) - Cry 0   Pain Rating: FLACC (Rest) - Consolability 0   Score: FLACC (Rest) 0   Pain Rating: FLACC (Activity) - Face 1   Pain Rating: FLACC (Activity) - Legs 1   Pain Rating: FLACC (Activity) - Activity 1   Pain Rating: FLACC (Activity) - Cry 1   Pain Rating: FLACC (Activity) - Consolability 1   Score: FLACC (Activity) 5   Home Living   Type of Home House  (Ramped entrance)   Home Layout Two level;Ramped entrance   [address] [facility] [facility]; Wheelchair-manual   Additional Comments Pt resides in [facility] with daughter; Ramped entrance and first floor set up  Pt has daily aides from 8am to 8pm  Pt is primarily ROMMEL Ross [address] bound; uses a sit to stand lift for transfers  Up until recently, pt has been going to [facility] to work on functional transfers and ambulation  Prior Function   Level of [place] Independent with ADLs and functional mobility   Lives With Daughter   Receives Help From Family;Personal care attendant   ADL Assistance Needs assistance   IADLs Needs assistance   Falls in the last 6 months 1 to 4   Restrictions/Precautions   Weight Bearing Precautions Per Order No   Other Precautions Fall Risk;Multiple lines;Telemetry; Chair Alarm; Bed Alarm  Edgardo Montelongo)   Cognition   Overall Cognitive Status Impaired   Arousal/Participation Cooperative   Orientation Level Oriented to person;Oriented to place  (Oriented to year)   Memory Decreased recall of precautions;Decreased recall of recent events   Following Commands Follows one step commands with increased time or repetition   Comments Pt presents with increased lethargy however able to arouse with VC  Pt with minimal verbalized and increased time to process/respond to all commands and questions    RLE Assessment   RLE Assessment X   Strength RLE   R Hip Flexion 3/5   R Knee Flexion 3/5   R Ankle Plantar Flexion 3/5   R Ankle Dorsiflexion 3/5   LLE Assessment   LLE Assessment X   Strength LLE   L Hip Flexion 3-/5   L Knee Extension 3-/5   L Ankle Dorsiflexion 3-/5   Coordination   Movements are Fluid and Coordinated 0   Coordination and Movement Description Decreased initiation and retropulsion   Bed Mobility   Supine to Sit 2  Maximal assistance   Additional items Assist x 2; Increased time required;Verbal cues;LE management   Sit to Supine 2  Maximal assistance   Additional items Assist x 3; Increased time required;Verbal cues   Additional Comments Pt went from max A x 2 for BLE management and UB support  HOB elevated for ease  Required increased to complete  Pt tolerated sitting EOB for approx 5-8 minutes; required max A x 1 to maintain static sitting balance due to retropulsion poisition  Required VC and tactile cues for upright head/neck posture  Transfers   Sit to Stand Unable to assess   Additional Comments Not appropriate at this time due to poor sitting balance   Balance   Static Sitting Poor -   Dynamic Sitting Poor -   Endurance Deficit   Endurance Deficit Yes   Endurance Deficit Description fatigue, weakness   Activity Tolerance   Activity Tolerance Patient limited by fatigue;Patient limited by pain   Medical Staff Made Aware THOMAS Perry   Nurse Made Aware Yes, per RN patient reports patient is appropriate for PT evaluation and OOB mobility   Assessment   Prognosis Guarded   Problem List Decreased strength;Decreased endurance; Impaired balance;Decreased mobility; Decreased coordination;Decreased cognition; Impaired judgement;Decreased safety awareness; Obesity;Pain   Assessment Pt is 80 y o  female seen for high complexity PT evaluation s/p admission to [facility] on 2/19/20 with left sided facial droop, aphasia, worsened bilateral weakness  CT (-) for acute intracranial CT abnormality; EEG monitoring (-) for epileptiform activity  Etiology thought to be hypoperfusion 2* intracranial and ICA stenoses which is non operable  Pt with active PT eval/treat orders with up and OOB as tolerated  Comorbidities affecting pt's physical performance at time of assessment include: UTI, oropharyngeal dysphagia, dysarthria, stroke like symptoms, essential HTN, paroxysmal a fib, type 2 DM  PTA, pt is primarily WC bound, uses a sit to stand for transfers however, receiving OOPT for functional transfers and ambulation  Pt resides with her daughter in a [facility] with ramp and 1st floor set up  Pt has daily aides from 8 am to 8 pm  Upon evaluation, pt with minimal verbalized and required increased time to process/respond all questions/commands  Pt demonstrated bilateral weakness (L > R)  Pt required max A x 2 for supine <> sit transfers  Tolerated sitting EOB for approx 5 minutes, required max A x 1 to maintain static sitting balance  Deferred further mobility 2* poor static sitting balance  Patient's decreased mobility level and increased fall risk is secondary to deficits in BLE weakness, pain, sitting tolerance, endurance, trunk/neck control, posture, endurance, and sitting balance  Current clinical presentation is unstable/unpredictable seen in pt's presentation of ongoing medical management, increased reliance on assistance compared to PLOF, impaired judgement/safety awareness, and significant PMH  Pt to benefit from continued PT tx to address deficits as defined above and maximize level of functional independent mobility and consistency  From PT/mobility standpoint, recommendation at time of d/c would be STR pending progress in order to facilitate return to PLOF  PT to see for OOB mobility as appropriate     Barriers to Discharge Decreased caregiver support   Goals   Patient Goals none state 2* cognition   STG Expiration Date 03/09/20   Short Term Goal #1 In 1-2 weeks, the patient will complete the following 1) Perform all aspects of bed mobility with min A x 1  2) Increase sitting tolerance to 10 minutes  3) Maintain static sitting balance with close supervision 4) Participate in therapy program consisting of strengthening, balance, and ROM  5) Increase BLE strength by 1/2 to 1 MMT grade  6) PT to see for OOB mobility when appropriate   Plan   Treatment/Interventions Functional transfer training;LE strengthening/ROM; Therapeutic exercise; Endurance training;Cognitive reorientation;Patient/family training;Bed mobility;Gait training   PT Frequency   2-3x/wk   Recommendation   Recommendation Post acute IP rehab   Equipment Recommended Walker; Wheelchair   PT - OK to Discharge Yes  (to rehab once medically stable)   Modified Quebeck Scale   Modified Cee Scale 5   Barthel Index   Feeding 0   Bathing 0   Grooming Score 0   Dressing Score 5   Bladder Score 0   Bowels Score 0   Toilet Use Score 5   Transfers (Bed/Chair) Score 5   Mobility (Level Surface) Score 0   Stairs Score 0   Barthel Index Score 15       Thea Hancock PT, DPT

## 2020-02-24 NOTE — SOCIAL WORK
LSW joined Dr Bianka Vazquez to meet with patient's daughter Marlena Landrum (via phone), daughter Wilian León, and son-in-law  Please refer to Dr Bianka Vazquez note for specifics  Counseling / Coordination of Care  Total floor / unit time spent today 45 minutes  Greater than 50% of total time was spent with the patient and / or family counseling and / or coordination of care   A description of the counseling / coordination of care: family meeting

## 2020-02-24 NOTE — OCCUPATIONAL THERAPY NOTE
Occupational Therapy Evaluation     Patient Name: Alan Caraballo  NXLPM'U Date: 2/24/2020  Problem List  Principal Problem:    Aphasia  Active Problems:    Wheelchair dependence    Paroxysmal atrial fibrillation ([facility] [address] )    Spinal cord stimulator status    Overactive bladder    Essential hypertension    Cervical dystonia    Stroke-like symptoms    Weakness of both lower extremities    SIRS (systemic inflammatory response syndrome) (HCC)    Dysarthria    Facial droop    Oropharyngeal dysphagia    Pneumonia    UTI (urinary tract infection)    Past Medical History  Past Medical History:   Diagnosis Date    A-fib ([facility] [address] )     Arthritis     Assistance needed for mobility     pt can stand with assistance and transfer    Chronic pain disorder     Diabetes mellitus ([facility] [address] )     NIDDM    Full dentures     History of transfusion     no adverse reaction    Hyperlipidemia     Irregular heart beat     Numbness and tingling of both feet     Skin abnormality     sacrum area - small red area, less than a dime size    Spinal stenosis     Stroke ([facility] [address] )     Unable to ambulate     Urinary incontinence     ipg stimulator x3 repakcements,battery exchange today 2/14/2018    Wears glasses     Wheelchair dependence      Past Surgical History  Past Surgical History:   Procedure Laterality Date    BACK SURGERY      fusion    BLADDER SUSPENSION      CARPAL TUNNEL RELEASE Bilateral     CHOLANGIOGRAM N/A 6/4/2018    Procedure: CHOLANGIOGRAM;  Surgeon: Danielle Simon MD;  [facility];  Service: General    CHOLECYSTECTOMY LAPAROSCOPIC N/A 6/4/2018    Procedure: CHOLECYSTECTOMY LAPAROSCOPIC;  Surgeon: Danielle Simon MD;  [facility];  Service: General    COLONOSCOPY      DILATION AND CURETTAGE OF UTERUS      FRACTURE SURGERY Left     femur with hardware    HYSTERECTOMY      INSERTION / PLACEMENT / REVISION NEUROSTIMULATOR      JOINT REPLACEMENT Bilateral     knees    SACRAL NERVE STIMULATOR PLACEMENT N/A 2/14/2018    Procedure: REMOVE AND REPLACE IPG;  Surgeon: Guillermo Barreto MD;  [facility];  Service: UroGynecology           TONSILLECTOMY          02/24/20 0840   Note Type   Note type Eval only   Restrictions/Precautions   Weight Bearing Precautions Per Order No   Other Precautions Cognitive; Chair Alarm; Bed Alarm;Multiple lines;Telemetry; Fall Risk;Aspiration   Pain Assessment   Pain Assessment FLACC   Pain Rating: FLACC (Rest) - Face 0   Pain Rating: FLACC (Rest) - Legs 0   Pain Rating: FLACC (Rest) - Activity 0   Pain Rating: FLACC (Rest) - Cry 0   Pain Rating: FLACC (Rest) - Consolability 0   Score: FLACC (Rest) 0   Pain Rating: FLACC (Activity) - Face 1   Pain Rating: FLACC (Activity) - Legs 1   Pain Rating: FLACC (Activity) - Activity 1   Pain Rating: FLACC (Activity) - Cry 1   Pain Rating: FLACC (Activity) - Consolability 1   Score: FLACC (Activity) 5   Home Living   Type of Home House   Home Layout Two level; Able to live on main level with bedroom/bathroom   Bathroom Toilet   (commode)   [address] [facility] Wheelchair-electric  (STS lift)   Additional Comments Pt is a questionable/poor historian due to cognitive deficits  Information obtained from recent OT evaluation  Pt resides with her daughter in a [address] with ramped entrance and FF set up  Pt has HHAs 12hrs/day daily  Pt was going to OP therapy and reports they have been working on standing, walking, and strengthening   Prior Function   Level of [place] Needs assistance with ADLs and functional mobility; Needs assistance with IADLs   Lives With Daughter   Receives Help From Family   ADL Assistance Needs assistance   IADLs Needs assistance   Vocational Retired   Comments Per discussion with CM, daughter reports that caring for her mother at home since last admission has been very difficult and she's considering placement during this admit   Lifestyle   Autonomy Pt reports that she was able to feed herself once food was cut, completes grooming I'ly  Pt required assistance with dressing and bathing  Pt typically stands with STS lift and assist   Pt has a pwc, however unable to operate it I'ly  Reciprocal Relationships supportive daughter, but difficulty caring for pt at home   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   Intrinsic Gratification liked being active outside     Juan [address] ([address]) X   ADL   Eating Assistance 2  Maximal Assistance   Grooming Assistance 2  [address] 1  Total Assistance   LB Bathing Assistance 1  Total Assistance   UB Dressing Assistance 1  Total Assistance   LB Dressing Assistance 1  Total Assistance   Toileting Assistance  1  Total Assistance   Additional Comments Pt grasps washcloth presented directly in front of her and initiates bringing to face with cues  Pt washes ~30 percent of face and then terminates task, requiring assistance for thoroughness  Pt noted to attend to b/l sides of face equally   Bed Mobility   Supine to Sit 2  Maximal assistance   Additional items Assist x 2   Sit to Supine 2  Maximal assistance   Additional items Assist x 2   Additional Comments Pt sat EOB for ~5 minutes with maxA posteriorly  R head turn noted and pt retropulsive   Balance   Static Sitting Poor -   Dynamic Sitting Poor -   Activity Tolerance   Activity Tolerance Patient limited by fatigue   Medical Staff Made Aware PT, CM   Nurse Made Aware Pt cleared by RN for OT evaluation   RUE Assessment   RUE Assessment   (2+/5 grossly)   LUE Assessment   LUE Assessment   (2/5 grossly)   Hand Function   Gross Motor Coordination Impaired   Sensation   Light Touch Partial deficits in the LUE   Vision - Complex Assessment   Ocular Range of Motion WFL   Head Position Head turned  (to R)   Cognition   Overall Cognitive Status Impaired   Arousal/Participation Alert; Responsive; Cooperative   Attention Attends with cues to redirect   Orientation Level Oriented to place;Oriented to person;Oriented to time  (oriented to general time and place)   Memory Decreased recall of recent events;Decreased recall of precautions;Decreased short term memory   Following Commands Follows one step commands with increased time or repetition   Comments Pt is pleasant and cooperative  Flat affect and requires significantly increased time for processing   Assessment   Limitation Decreased ADL status; Decreased UE ROM; Decreased UE strength;Decreased endurance;Decreased Safe judgement during ADL;Decreased cognition;Decreased sensation;Decreased fine motor control;Visual deficit; Decreased self-care trans;Decreased high-level ADLs   Prognosis Fair   Assessment Pt is an 80year old female seen for initial OT evaluation s/p admission to \[facility]\"" with L facial droop, aphasia, and b/l weakness  Etiology thought to be hypoperfusion secondary to intracranial and ICA 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  Additional activities include: UTI, oropharyngeal dysphagia, dysarthria, weakness of b/l LEs, HTN, paroxysmal atrial fibrillation, and DM  Pt with active OT orders and cleared by RN for OT evaluation  Pt resides with her daughter in a [address] with [address]amped entrance and FF set up  Pt has HHAs 12 hours/day  Pt typically requires assistance with ADLs, IADLs, and functional mobility  Pt is currently demonstrating the following occupational deficits: maxA-totalA with ADLs and bed mobility  Factors currently limiting pt's independence in these areas include: generalized weakness L>R, trunk/postural support, balance, functional mobility, cognitive deficits, endurance, activity tolerance, and unsupportive home environment  From an OT standpoint, recommend STR upon d/c  Pt is to continue to benefit from skilled occupational therapy services while in the hospital to maximize functioning and independence with daily activities  See below for OT goals to be addressed 3-5x/wk     Goals   Patient Goals none stated at this time   Plan   Treatment Interventions ADL retraining;Visual perceptual retraining;Functional transfer training;UE strengthening/ROM; Endurance training;Cognitive reorientation;Patient/family training;Equipment evaluation/education; Compensatory technique education;Continued evaluation; Activityengagement   Goal Expiration Date 03/04/20   OT Treatment Day 1   OT Frequency 3-5x/wk   Recommendation   OT Discharge Recommendation Short Term Rehab   OT - OK to Discharge Yes  (to STR when medically stable)   Barthel Index   Feeding 0   Bathing 0   Grooming Score 0   Dressing Score 0   Bladder Score 0   Bowels Score 0   Toilet Use Score 0   Transfers (Bed/Chair) Score 5   Mobility (Level Surface) Score 0   Stairs Score 0   Barthel Index Score 5     Goals:    Pt be oriented x4 for all OT sessions with use of environmental cues as appropriate  Pt will attend to a functional activity for at least 10 minutes with no more than 2 cues for attention/redirection  Pt will improve activity tolerance to G for min 30 min txment sessions for increase engagement in functional tasks  Pt will complete UB self care tasks w/ Eunice w/ use of DME/AD as appropriate  Pt will complete LB self care tasks w/ modA with use of DME/AD as appropriate  Pt will demonstrate G carryover of pt/caregiver education and training as appropriate w/o cues w/ good tolerance to increase safety during functional tasks     Pt will maintain sitting upright I'ly for at least 10 minutes while completing a dynamic functional activity with good balance and endurance  Pt will engage in depression screen/leisure interest checklist w/ mod I and G participation to monitor s/s depression and ID 3 positive coping strategies to A w/ emotional regulation and management      Pt will engage in ongoing  assessments, screens, and activities t/o fx'l I/ADL/leisure tasks w/ G participation and mod I to A w/ adaptation and accomodations or rule out visual perceptual impairments    Salvador Herrera, MOT, OTR/L

## 2020-02-24 NOTE — ASSESSMENT & PLAN NOTE
Currently rate controlled  EKG 2/19:  Sinus rhythm with first-degree AV block  Right bundle branch block  Possible lateral infarct (cited on or before 16th June 2019)  When compared with EKG of 14th February 2020, questionable change in initial forces of lateral leads    Continue therapeutic anticoagulation with Eliquis  Continue rate control with digoxin

## 2020-02-25 LAB
BACTERIA UR CULT: ABNORMAL
BACTERIA UR CULT: ABNORMAL
GLUCOSE SERPL-MCNC: 117 MG/DL (ref 65–140)
GLUCOSE SERPL-MCNC: 119 MG/DL (ref 65–140)
GLUCOSE SERPL-MCNC: 125 MG/DL (ref 65–140)
GLUCOSE SERPL-MCNC: 138 MG/DL (ref 65–140)
VANCOMYCIN TROUGH SERPL-MCNC: 10 UG/ML (ref 10–20)

## 2020-02-25 PROCEDURE — 92526 ORAL FUNCTION THERAPY: CPT

## 2020-02-25 PROCEDURE — 80202 ASSAY OF VANCOMYCIN: CPT | Performed by: INTERNAL MEDICINE

## 2020-02-25 PROCEDURE — 82948 REAGENT STRIP/BLOOD GLUCOSE: CPT

## 2020-02-25 PROCEDURE — 99233 SBSQ HOSP IP/OBS HIGH 50: CPT | Performed by: INTERNAL MEDICINE

## 2020-02-25 RX ADMIN — DIGOXIN 125 MCG: 125 TABLET ORAL at 09:09

## 2020-02-25 RX ADMIN — PRIMIDONE 50 MG: 50 TABLET ORAL at 21:38

## 2020-02-25 RX ADMIN — CEFEPIME HYDROCHLORIDE 2000 MG: 2 INJECTION, POWDER, FOR SOLUTION INTRAVENOUS at 14:45

## 2020-02-25 RX ADMIN — PRIMIDONE 50 MG: 50 TABLET ORAL at 09:10

## 2020-02-25 RX ADMIN — CYANOCOBALAMIN TAB 500 MCG 1000 MCG: 500 TAB at 16:55

## 2020-02-25 RX ADMIN — MELATONIN 1000 UNITS: at 16:55

## 2020-02-25 RX ADMIN — METRONIDAZOLE 500 MG: 500 INJECTION, SOLUTION INTRAVENOUS at 09:08

## 2020-02-25 RX ADMIN — ATORVASTATIN CALCIUM 40 MG: 40 TABLET, FILM COATED ORAL at 16:55

## 2020-02-25 RX ADMIN — GABAPENTIN 300 MG: 250 SOLUTION ORAL at 09:10

## 2020-02-25 RX ADMIN — SPIRONOLACTONE 50 MG: 50 TABLET ORAL at 09:09

## 2020-02-25 RX ADMIN — OXYBUTYNIN 5 MG: 5 TABLET, FILM COATED, EXTENDED RELEASE ORAL at 09:09

## 2020-02-25 RX ADMIN — VANCOMYCIN HYDROCHLORIDE 1500 MG: 10 INJECTION, POWDER, LYOPHILIZED, FOR SOLUTION INTRAVENOUS at 17:05

## 2020-02-25 RX ADMIN — APIXABAN 5 MG: 5 TABLET, FILM COATED ORAL at 09:09

## 2020-02-25 RX ADMIN — METRONIDAZOLE 500 MG: 500 INJECTION, SOLUTION INTRAVENOUS at 14:16

## 2020-02-25 RX ADMIN — FOLIC ACID 1000 MCG: 1 TABLET ORAL at 09:08

## 2020-02-25 RX ADMIN — METRONIDAZOLE 500 MG: 500 INJECTION, SOLUTION INTRAVENOUS at 21:28

## 2020-02-25 RX ADMIN — APIXABAN 5 MG: 5 TABLET, FILM COATED ORAL at 16:55

## 2020-02-25 RX ADMIN — VANCOMYCIN HYDROCHLORIDE 1000 MG: 1 INJECTION, SOLUTION INTRAVENOUS at 06:12

## 2020-02-25 RX ADMIN — CEFEPIME HYDROCHLORIDE 2000 MG: 2 INJECTION, POWDER, FOR SOLUTION INTRAVENOUS at 02:36

## 2020-02-25 NOTE — SOCIAL WORK
Long conversation with patient's two daughters via phone  At this time the daughters would like to change patient's code status to DNR/DNI  They do intend to visit their mother later this evening  They understand that as time progresses they will need to make further decisions regarding possible interventions  LSW will continue to support the family via phone as requested by the family  Counseling / Coordination of Care  Total floor / unit time spent today 40 minutes  Greater than 50% of total time was spent with the patient and / or family counseling and / or coordination of care   A description of the counseling / coordination of care: support

## 2020-02-25 NOTE — QUICK NOTE
Reviewed chart, patient on stroke pathway  Attempted to meet with patient to discuss follow up care, stroke education, and discharge planning  Explained neurovascular nurse navigator role  Patient not participating in conversation  Acknowledges me in room  No family currently present  Left stroke education binder and my card at bedside  Rounded with RN Steven, palliative care meeting to be today to determine PEG vs more comfort route  Will follow up

## 2020-02-25 NOTE — PROGRESS NOTES
Vancomycin IV Pharmacy-to-Dose Consultation    Zoe Elizondo is a 80 y o  female who is currently receiving Vancomycin IV with management by the Pharmacy Consult service  Relevant clinical data and objective history reviewed:  Temp Readings from Last 3 Encounters:   02/25/20 (!) 97 3 °F (36 3 °C)   02/18/20 97 7 °F (36 5 °C) (Oral)   02/05/20 97 8 °F (36 6 °C)     /63   Pulse 73   Temp (!) 97 3 °F (36 3 °C)   Resp 18   Ht 5' 4" (1 626 m)   Wt 81 5 kg (179 lb 10 8 oz)   SpO2 97%   BMI 30 84 kg/m²     I/O last 3 completed shifts:   In: 236 7 [I V :236 7]  Out: 71 [Urine:71]    Lab Results   Component Value Date/Time    BUN 24 02/24/2020 08:49 AM    BUN 18 12/17/2014 05:50 AM    WBC 9 12 02/24/2020 08:49 AM    WBC 8 66 12/19/2014 06:36 AM    HGB 12 1 02/24/2020 08:49 AM    HGB 10 1 (L) 12/19/2014 06:36 AM    HCT 37 9 02/24/2020 08:49 AM    HCT 31 5 (L) 12/19/2014 06:36 AM    MCV 95 02/24/2020 08:49 AM    MCV 94 12/19/2014 06:36 AM     (L) 02/24/2020 08:49 AM     12/19/2014 06:36 AM     Creatinine   Date Value Ref Range Status   02/24/2020 0 82 0 60 - 1 30 mg/dL Final     Comment:     Standardized to IDMS reference method   02/22/2020 0 64 0 60 - 1 30 mg/dL Final     Comment:     Standardized to IDMS reference method   12/17/2014 0 75 0 60 - 1 30 mg/dL Final     Comment:     Standardized to IDMS reference method   12/12/2014 0 87 0 60 - 1 30 mg/dL Final     Comment:     Standardized to IDMS reference method     Vancomycin Tr   Date Value Ref Range Status   02/25/2020 10 0 10 0 - 20 0 ug/mL Final   02/24/2020 4 0 (L) 10 0 - 20 0 ug/mL Final         Vancomycin Assessment:  Indication: Pneumonia    Micro:  BC x2, no growth x 48hrs   Procalcitonin:  2/24 0 82  Renal Function: monitoring  Potential Nephrotoxicity Factors:  Medications:  diuretics   Patient-Factors: elderly  Days of Therapy: 3  Current Dose: 1000mg q12 hrs (dose prior to level: ; last dose:0600 2/25 )  Goal Trough: 15-20 (appropriate for most indications)   Goal AUC(24h): 400-600  Last Level:  2/25 0600 was 10  Pharmacokinetic Analysis: will increase dose based on vanco PK calculator to 1500mg q12hrs      Vancomycin Plan:  New Dosing: change [id number] q12hrs  (next dose: 1800 2/25)  Predicted Trough / AUC: 15 3/571  Next Level: prior to 3rd dose of new order at 1730 2/26  Renal Function Monitoring: will adjust based on next level      Pharmacy will continue to follow closely for s/sx of nephrotoxicity, infusion reactions and appropriateness of therapy  BMP and CBC will be ordered per protocol  We will continue to follow the patient's culture results and clinical progress daily    Thanks  Danyelle Coreas, Pharmacist

## 2020-02-25 NOTE — SPEECH THERAPY NOTE
Speech Language/Pathology    Speech/Language Pathology Progress Note    Patient Name: Isai Govea  [id number] Date: 2/25/2020      Subjective:  Pt asleep but easily waken  Keofeed in place, but nsg reprts pt was not tolerating tube feed and it was stopped  Voicing is gurgly w/ pt unable to clear or manage secretions  Current Diet:   NPO    Objective:  Pt seen for ongoing dx dysphagia tx  Pt inappropriate for po trials  Pt given honey thick dipped toothette for comfort  Pt able to achieve weak closure around the toothette and some suction  No swallow initiated to the stimulus  Suction needed immediately following  Thorough oral care performed to conclude session  Assessment:  Pt continues w/ severe oropharyngeal dysphagia and poor secretion management  Pt not appropriate for po at this time       Plan/Recommendations:  Continue NPO  ST f/u as able

## 2020-02-25 NOTE — ASSESSMENT & PLAN NOTE
EEG monitoring 2/16-2/17: demonstrated no epileptiform activity or epileptogenic focus  Patient currently still aphasic but able to nod head yes and no to questioning  Repeat CT scan of head does not demonstrate any acute findings    Patient failed speech evaluation and is NPO  Tube feeds through [employer]

## 2020-02-25 NOTE — ASSESSMENT & PLAN NOTE
Per daughter,patient had difficulty standing up in wheelchair, more so than normal  Chronic bilateral weakness in lower extremities, no significant changes since last admission  Patient wheelchair-bound and needs assistance standing up and transferring in wheelchair  Continue fall precautions  Consult PT OT

## 2020-02-25 NOTE — PLAN OF CARE
Problem: Potential for Falls  Goal: Patient will remain free of falls  Description  INTERVENTIONS:  - Assess patient frequently for physical needs  -  Identify cognitive and physical deficits and behaviors that affect risk of falls    -  [place] fall precautions as indicated by assessment   - Educate patient/family on patient safety including physical limitations  - Instruct patient to call for assistance with activity based on assessment  - Modify environment to reduce risk of injury  - Consider OT/PT consult to assist with strengthening/mobility  Outcome: Progressing     Problem: Prexisting or High Potential for Compromised Skin Integrity  Goal: Skin integrity is maintained or improved  Description  INTERVENTIONS:  - Identify patients at risk for skin breakdown  - Assess and monitor skin integrity  - Assess and monitor nutrition and hydration status  - Monitor labs   - Assess for incontinence   - Turn and reposition patient  - Assist with mobility/ambulation  - Relieve pressure over bony prominences  - Avoid friction and shearing  - Provide appropriate hygiene as needed including keeping skin clean and dry  - Evaluate need for skin moisturizer/barrier cream  - Collaborate with interdisciplinary team   - Patient/family teaching  - Consider wound care consult   Outcome: Progressing     Problem: DISCHARGE PLANNING  Goal: Discharge to home or other facility with appropriate resources  Description  INTERVENTIONS:  - Identify barriers to discharge w/patient and caregiver  - Arrange for needed discharge resources and transportation as appropriate  - Identify discharge learning needs (meds, wound care, etc )  - Arrange for interpretive services to assist at discharge as needed  - Refer to Case Management Department for coordinating discharge planning if the patient needs post-hospital services based on physician/advanced practitioner order or complex needs related to functional status, cognitive ability, or social support system  Outcome: Progressing     Problem: NEUROSENSORY - ADULT  Goal: Achieves stable or improved neurological status  Description  INTERVENTIONS  - Monitor and report changes in neurological status  - Monitor vital signs such as temperature, blood pressure, glucose, and any other labs ordered   - Initiate measures to prevent increased intracranial pressure  - Monitor for seizure activity and implement precautions if appropriate      Outcome: Progressing  Goal: Achieves maximal functionality and self care  Description  INTERVENTIONS  - Monitor swallowing and airway patency with patient fatigue and changes in neurological status  - Encourage and assist patient to increase activity and self care  - Encourage visually impaired, hearing impaired and aphasic patients to use assistive/communication devices  Outcome: Progressing     Problem: Nutrition/Hydration-ADULT  Goal: Nutrient/Hydration intake appropriate for improving, restoring or maintaining nutritional needs  Description  Monitor and assess patient's nutrition/hydration status for malnutrition  Collaborate with interdisciplinary team and initiate plan and interventions as ordered  Monitor patient's weight and dietary intake as ordered or per policy  Utilize nutrition screening tool and intervene as necessary  Determine patient's food preferences and provide high-protein, high-caloric foods as appropriate       INTERVENTIONS:  - Monitor oral intake, urinary output, labs, and treatment plans  - Assess nutrition and hydration status and recommend course of action  - Evaluate amount of meals eaten  - Assist patient with eating if necessary   - Allow adequate time for meals  - Recommend/ encourage appropriate diets, oral nutritional supplements, and vitamin/mineral supplements  - Order, calculate, and assess calorie counts as needed  - Recommend, monitor, and adjust tube feedings and TPN/PPN based on assessed needs  - Assess need for intravenous fluids  - Provide specific nutrition/hydration education as appropriate  - Include patient/family/caregiver in decisions related to nutrition  Outcome: Progressing     Problem: CONFUSION/THOUGHT DISTURBANCE  Goal: Thought disturbances (confusion, delirium, depression, dementia or psychosis) are managed to maintain or return to baseline mental status and functional level  Description  INTERVENTIONS:  - Assess for possible contributors to  thought disturbance, including but not limited to medications, infection, impaired vision or hearing, underlying metabolic abnormalities, dehydration, respiratory compromise,  psychiatric diagnoses and notify attending PHYSICAN/AP  - Monitor and intervene to maintain adequate nutrition, hydration, elimination, sleep and activity  - Decrease environmental stimuli, including noise as appropriate  - Provide frequent contacts to provide refocusing, direction and reassurance as needed  Approach patient calmly with eye contact and at their level    - [place] high risk fall precautions, aspiration precautions and other safety measures, as indicated  - If delirium suspected, notify physician/AP of change in condition and request immediate in-person evaluation  - Pursue consults as appropriate including Geriatric (campus dependent), OT for cognitive evaluation/activity planning, psychiatric, pastoral care, etc   Outcome: Progressing     Problem: SAFETY,RESTRAINT: NV/NON-SELF DESTRUCTIVE BEHAVIOR  Goal: Remains free of harm/injury (restraint for non violent/non self-detsructive behavior)  Description  INTERVENTIONS:  - Instruct patient/family regarding restraint use   - Assess and monitor physiologic and psychological status   - Provide interventions and comfort measures to meet assessed patient needs   - Identify and implement measures to help patient regain control  - Assess readiness for release of restraint   Outcome: Progressing  Goal: Returns to optimal restraint-free functioning  Description  INTERVENTIONS:  - Assess the patient's behavior and symptoms that indicate continued need for restraint  - Identify and implement measures to help patient regain control  - Assess readiness for release of restraint   Outcome: Progressing

## 2020-02-25 NOTE — PROGRESS NOTES
Progress note - Palliative and Supportive Care   Zoe Elizondo 80 y o  female [id number]    Assessment:   -   Patient Active Problem List   Diagnosis    Wheelchair dependence    Type 2 diabetes mellitus ([address] )    Paroxysmal atrial fibrillation (HCC)    Acute cholecystitis    Elevated troponin    Gallstone pancreatitis    Generalized weakness    Urinary incontinence    Spinal cord stimulator status    Overactive bladder    Essential hypertension    Cervical dystonia    History of stroke    Hyperlipidemia    Calcaneal spur of foot, left    Osteopenia of left foot    Stroke-like symptoms    Hyponatremia    Thyroid nodule    Weakness of both lower extremities    SIRS (systemic inflammatory response syndrome) (HCC)    Dysarthria    Slurred speech    Aphasia    Facial droop    Oropharyngeal dysphagia    Pneumonia    UTI (urinary tract infection)         Plan:  1  Symptom management - no changes   -     2  Goals - Per multiple conversations with family members yesterday, decision to transition to Level 3  Will arrange follow up discussion to discuss comfort cares as she is still inappropriate for any PO given level of alertness and significant dysphagia  Interval history:       Patient wakens to voice  Continues to sound like she is aspirating  TF remain on hold       MEDICATIONS / ALLERGIES:     all current active meds have been reviewed and current meds:   Current Facility-Administered Medications   Medication Dose Route Frequency    acetaminophen (TYLENOL) rectal suppository 650 mg  650 mg Rectal Q4H PRN    apixaban (ELIQUIS) tablet 5 mg  5 mg Oral BID    atorvastatin (LIPITOR) tablet 40 mg  40 mg Oral QPM    cefepime (MAXIPIME) 2,000 mg in dextrose 5 % 50 mL IVPB  2,000 mg Intravenous Q12H    cholecalciferol (VITAMIN D3) tablet 1,000 Units  1,000 Units Oral QPM    cyanocobalamin (VITAMIN B-12) tablet 1,000 mcg  1,000 mcg Oral QPM    dextrose 5 % and sodium chloride 0 45 % infusion 75 mL/hr Intravenous Continuous    digoxin (LANOXIN) tablet 125 mcg  125 mcg Oral Daily    folic acid (FOLVITE) tablet 1,000 mcg  1,000 mcg Oral Daily    gabapentin (NEURONTIN) oral solution 300 mg  300 mg Oral Daily    insulin lispro (HumaLOG) 100 units/mL subcutaneous injection 1-5 Units  1-5 Units Subcutaneous Q6H [address]    Labetalol HCl (NORMODYNE) injection 10 mg  10 mg Intravenous Q6H PRN    metroNIDAZOLE (FLAGYL) IVPB (premix) 500 mg  500 mg Intravenous Q8H    oxybutynin (DITROPAN-XL) 24 hr tablet 5 mg  5 mg Oral Daily    primidone (MYSOLINE) tablet 50 mg  50 mg Oral Q12H [address]    spironolactone (ALDACTONE) tablet 50 mg  50 mg Oral Daily    vancomycin (VANCOCIN) 1,500 mg in sodium chloride 0 9 % 250 mL IVPB  1,500 mg Intravenous Q12H       Allergies   Allergen Reactions    Bactrim [Sulfamethoxazole-Trimethoprim] Swelling     Swelling around eyes and red eyes    Aspirin Other (See Comments)     "feels like fist in my chest"    Ciprofloxacin     Nitrofurantoin     Other      "5mz/Tmp "  "1 60 mTab amme"     Per pt's allergy list        OBJECTIVE:    Physical Exam  Physical Exam   Constitutional: No distress  HENT:   Head: Normocephalic and atraumatic  Right Ear: External ear normal    Left Ear: External ear normal    Eyes: Right eye exhibits no discharge  Left eye exhibits no discharge  Cardiovascular: Normal rate and intact distal pulses  Pulmonary/Chest: Effort normal  No respiratory distress  Abdominal: Soft  She exhibits no distension  Musculoskeletal: She exhibits no edema  Skin: There is pallor  Nursing note and vitals reviewed  Lab Results: I have personally reviewed pertinent labs  , CBC: No results found for: WBC, HGB, HCT, MCV, PLT, ADJUSTEDWBC, MCH, MCHC, RDW, MPV, NRBC, CMP: No results found for: SODIUM, K, CL, CO2, ANIONGAP, BUN, CREATININE, GLUCOSE, CALCIUM, AST, ALT, ALKPHOS, PROT, BILITOT, EGFR  Imaging Studies: reviewed  EKG, Pathology, and Other Studies: reviewed    Counseling / Coordination of Care  Total floor / unit time spent today 25+ minutes  Greater than 50% of total time was spent with the patient and / or family counseling and / or coordination of care   A description of the counseling / coordination of care: supportive listening, goals of care

## 2020-02-25 NOTE — ASSESSMENT & PLAN NOTE
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

## 2020-02-25 NOTE — WOUND OSTOMY CARE
Progress Note - Wound   Ricky Arauz 80 y o  female MRN: [id number]  Unit/Bed#: [facility] 710-01 Encounter: [id number]        Assessment:   Patient seen during skin prevalence study  80year old female presented to the hospital with generalized weakness and facial droop  Patient is dependent for all ADLs, keofed in place for nutrition  Incontinent of bowel and bladder--purewick in use for urinary incontinence management  Bilateral heels are intact with preventative foam dressings in place  Blanchable erythema and purple hyperpigmentation to bilateral buttocks and posterior thighs  1   Hospital acquired deep tissue injury to right buttock--epidermis currently intact with non-blanchable tan and deep maroon pigmentation  Mulu-wound with blanchable erythema and hyperpigmentation  No induration noted  See flowsheet and media for wound details  Skin/Wound Care Plans:  1-Hydraguard lotion to bilateral sacrum, buttock and heels TID and PRN with incontinence care  2-Elevate heels off of bed with pillows or foam wedges to offload pressure  3-Offloading air cushion in chair when out of bed  4-Moisturize skin daily with skin nourishing cream   5-Turn/reposition q2h or when medically stable for pressure re-distribution on skin--use positioning wedges  6-P500 low air-loss mattress  Wound care team to follow  Plan of care reviewed with primary RN  Patient care manager aware of hospital acquired wound  Wound 06/12/19 Pressure Injury Buttocks Right;Lateral (Active)   Wound Image   2/25/2020 12:56 PM   Wound Description Other (Comment); Jacob 2/25/2020 12:56 PM   Staging Deep Tissue Injury 2/25/2020 12:56 PM   Mulu-wound Assessment Hyperpigmented;Fragile 2/25/2020 12:56 PM   Wound Length (cm) 1 5 cm 2/25/2020 12:56 PM   Wound Width (cm) 0 7 cm 2/25/2020 12:56 PM   Wound Depth (cm) 0 2/25/2020 12:56 PM   Calculated Wound Area (cm^2) 1 05 cm^2 2/25/2020 12:56 PM   Calculated Wound Volume (cm^3) 0 cm^3 2/25/2020 12:56 PM   Drainage Amount None 2/25/2020 12:56 PM   Non-staged Wound Description Not applicable 0/27/3096 57:95 PM   Treatments Cleansed 2/25/2020 12:56 PM   Dressing Moisture barrier 2/25/2020 12:56 PM   Patient Tolerance Tolerated well 2/25/2020 12:56 PM     Jamie WOODRUFFN, RN, [facility]

## 2020-02-25 NOTE — DISCHARGE INSTR - OTHER ORDERS
Skin/Wound Care Plans:  1-Hydraguard lotion to bilateral sacrum, buttock and heels three times daily and as needed with incontinence care  2-Elevate heels off of bed with pillows or foam wedges to offload pressure  3-Offloading air cushion in chair when out of bed  4-Moisturize skin daily with lotion  5-Turn/reposition every 2 hours for pressure re-distribution on skin--use positioning wedges  6-Low air-loss mattress

## 2020-02-25 NOTE — ASSESSMENT & PLAN NOTE
Urine cultures pansensitive E coli  Patient received Rocephin 1 dose, Rocephin has been changed to cefepime for treatment of concurrent HCAP  Blood cultures negative for 48 hours

## 2020-02-25 NOTE — ASSESSMENT & PLAN NOTE
HCAP versus aspiration  IV vancomycin cefepime and Flagyl  Monitor fever curve  P r n  Antipyretic  O2 sat monitoring with supplemental O2 p r n   To maintain saturation 90% or greater by pulse oximetry  Supportive care

## 2020-02-25 NOTE — ASSESSMENT & PLAN NOTE
Continue tube feeds  Patient's mentation improving today to consider re-evaluation by speech if patient's mentation continues to improve  It appears the patient is aspirating on tube feeds, absence of gag reflex as well  Appreciate palliative care's assistance

## 2020-02-25 NOTE — PROGRESS NOTES
Progress Note - Isai Govea 1935, 80 y o  female MRN: [id number]    Unit/Bed#: [facility] 710-01 Encounter: [id number]    Primary Care Provider: Navya Machado MD   Date and time admitted to hospital: 2/19/2020  8:30 AM        UTI (urinary tract infection)  Assessment & Plan  Urine cultures pansensitive E coli  Patient received Rocephin 1 dose, Rocephin has been changed to cefepime for treatment of concurrent HCAP  Blood cultures negative for 48 hours      Pneumonia  Assessment & Plan  HCAP versus aspiration  IV vancomycin cefepime and Flagyl  Monitor fever curve  P r n  Antipyretic  O2 sat monitoring with supplemental O2 p r n  To maintain saturation 90% or greater by pulse oximetry  Supportive care    Oropharyngeal dysphagia  Assessment & Plan  Continue tube feeds  Patient's mentation improving today to consider re-evaluation by speech if patient's mentation continues to improve  It appears the patient is aspirating on tube feeds, absence of gag reflex as well  Appreciate palliative care's assistance        Dysarthria  Assessment & Plan  Dysarthria noted on last admission 2/14, neurology consulted and did 24-hour EEG monitoring 2/16  EEG monitoring 2/16-2/17: no focal epileptiform discharges or electrographic seizures    Removed 2/17  Unable to get MRI brain due to spine stimulator  Neurology following    Weakness of both lower extremities  Assessment & Plan  Per daughter,patient had difficulty standing up in wheelchair, more so than normal  Chronic bilateral weakness in lower extremities, no significant changes since last admission  Patient wheelchair-bound and needs assistance standing up and transferring in wheelchair  Continue fall precautions  Consult PT OT    Stroke-like symptoms  Assessment & Plan  No radiographic evidence of CVA  Neurology re-evaluated patient in light of worsening weakness, likely mechanism is hypoperfusion secondary to 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  Palliative care consult placed, to have meeting with family today but was rescheduled as family never showed  Neurology following appreciate their assistance no new studies at this time      Essential hypertension  Assessment & Plan  Monitor blood pressure with routine vitals  Hold spironolactone and lisinopril given NPO status  Continue p r n  Hydralazine    Paroxysmal atrial fibrillation (HCC)  Assessment & Plan  Currently rate controlled  EKG 2/19:  Sinus rhythm with first-degree AV block  Right bundle branch block  Possible lateral infarct (cited on or before 16th June 2019)  When compared with EKG of 14th February 2020, questionable change in initial forces of lateral leads  Continue therapeutic anticoagulation with Eliquis  Continue rate control with digoxin        * Aphasia  Assessment & Plan  EEG monitoring 2/16-2/17: demonstrated no epileptiform activity or epileptogenic focus  Patient currently still aphasic but able to nod head yes and no to questioning  Repeat CT scan of head does not demonstrate any acute findings  Patient failed speech evaluation and is NPO  Tube feeds through [facility]      VTE Pharmacologic Prophylaxis:   Pharmacologic: Apixaban (Eliquis)  Mechanical VTE Prophylaxis in Place: Yes    Patient Centered Rounds: I have performed bedside rounds with nursing staff today  Discussions with Specialists or Other Care Team Provider:  None    Education and Discussions with Family / Patient:  Patient, attempted to call family but no answer    Time Spent for Care: 45 minutes  More than 50% of total time spent on counseling and coordination of care as described above      Current Length of Stay: 6 day(s)    Current Patient Status: Inpatient   Certification Statement: The patient will continue to require additional inpatient hospital stay due to IV antibiotics, awaiting decision for PEG versus palliative    Discharge Plan:  Pending hospital course    Code Status: Level 1 - Full Code      Subjective:   No acute overnight events  This morning patient is minimally responsive verbally  She has no complaints  Objective:     Vitals:   Temp (24hrs), Av 1 °F (36 7 °C), Min:97 3 °F (36 3 °C), Max:98 9 °F (37 2 °C)    Temp:  [97 3 °F (36 3 °C)-98 9 °F (37 2 °C)] 98 9 °F (37 2 °C)  HR:  [69-84] 75  Resp:  [16-20] 20  BP: (147-158)/(60-63) 148/60  SpO2:  [95 %-97 %] 96 %  Body mass index is 30 84 kg/m²  Input and Output Summary (last 24 hours): Intake/Output Summary (Last 24 hours) at 2020 1747  Last data filed at 2020 1100  Gross per 24 hour   Intake 0 ml   Output 400 ml   Net -400 ml       Physical Exam:     Physical Exam   Constitutional:   Chronically ill-appearing, frail   HENT:   Head: Normocephalic and atraumatic  Mouth/Throat: Oropharynx is clear and moist    NGT   Eyes: Pupils are equal, round, and reactive to light  Conjunctivae are normal    Neck: Neck supple  No JVD present  Cardiovascular: Normal rate, regular rhythm, normal heart sounds and intact distal pulses  Pulmonary/Chest:   Course breath sounds bilaterally   Abdominal: Soft  Bowel sounds are normal    Musculoskeletal: She exhibits no edema or tenderness  Neurological:   Lethargic, oriented to person   Skin: Skin is warm and dry  Capillary refill takes 2 to 3 seconds  Psychiatric:   Flap depressed affect   Nursing note and vitals reviewed  Additional Data:     Labs:    Results from last 7 days   Lab Units 20  0849  20  0612 20  0447   WBC Thousand/uL 9 12   < > 12 18* 7 33   HEMOGLOBIN g/dL 12 1   < > 12 9 11 3*   HEMATOCRIT % 37 9   < > 39 7 34 5*   PLATELETS Thousands/uL 102*   < > 154 140*   BANDS PCT %  --   --  7  --    NEUTROS PCT %  --   --   --  74   LYMPHS PCT %  --   --   --  13*   LYMPHO PCT %  --   --  0*  --    MONOS PCT %  --   --   --  12   MONO PCT %  --   --  3*  --    EOS PCT %  --   --  0 0    < > = values in this interval not displayed       Results from last 7 days   Lab Units 02/24/20  0849   SODIUM mmol/L 140   POTASSIUM mmol/L 4 0   CHLORIDE mmol/L 109*   CO2 mmol/L 25   BUN mg/dL 24   CREATININE mg/dL 0 82   ANION GAP mmol/L 6   CALCIUM mg/dL 9 5   GLUCOSE RANDOM mg/dL 149*     Results from last 7 days   Lab Units 02/19/20  0854   INR  1 17     Results from last 7 days   Lab Units 02/25/20  1702 02/25/20  1238 02/25/20  0607 02/25/20  0040 02/24/20  1640 02/24/20  1157 02/24/20  0622 02/24/20  0011 02/23/20  2304 02/23/20  1736 02/23/20  1256 02/23/20  0531   POC GLUCOSE mg/dl 117 138 125 119 122 131 142* 135 135 129 195* 266*         Results from last 7 days   Lab Units 02/23/20  0533 02/23/20  0110   LACTIC ACID mmol/L  --  1 8   PROCALCITONIN ng/ml 0 58*  --            * I Have Reviewed All Lab Data Listed Above  * Additional Pertinent Lab Tests Reviewed: All Labs Within Last 24 Hours Reviewed    Imaging:    Imaging Reports Reviewed Today Include:  None  Imaging Personally Reviewed by Myself Includes:  None    Recent Cultures (last 7 days):     Results from last 7 days   Lab Units 02/23/20  0108 02/22/20  1848   BLOOD CULTURE  No Growth at 48 hrs  No Growth at 48 hrs    --    URINE CULTURE   --  70,000-79,000 cfu/ml Escherichia coli*  70,000-79,000 cfu/ml Aerococcus urinae*       Last 24 Hours Medication List:     Current Facility-Administered Medications:  acetaminophen 650 mg Rectal Q4H PRN Alphonso Gimenez PA-C    apixaban 5 mg Oral BID Rosa Oliva MD    atorvastatin 40 mg Oral QPM Candi Hermosillo MD    cefepime 2,000 mg Intravenous Q12H Eugenio Nicole MD Last Rate: 2,000 mg (02/25/20 1445)   cholecalciferol 1,000 Units Oral QPM Rosa Oliva MD    vitamin B-12 1,000 mcg Oral QPM Rosa Oliva MD    digoxin 125 mcg Oral Daily Rosa Oliva MD    folic acid 8,525 mcg Oral Daily Rosa Oliva MD    gabapentin 300 mg Oral Daily Eugenio Nicole MD    insulin lispro 1-5 Units Subcutaneous Q6H Janeth Meyers MD Labetalol HCl 10 mg Intravenous Q6H PRN Poppy Vasquez PA-C    metroNIDAZOLE 500 mg Intravenous Q8H Ivelisse Busch MD Last Rate: 500 mg (02/25/20 1416)   oxybutynin 5 mg Oral Daily Ramo Fowler MD    primidone 50 mg Oral Q12H [address] Ramo Fowler MD    spironolactone 50 mg Oral Daily Ramo Fowler MD    vancomycin 1,500 mg Intravenous Q12H Fran Kuhn MD Last Rate: 1,500 mg (02/25/20 1705)        Today, Patient Was Seen By: Fran Kuhn MD    ** Please Note: Dictation voice to text software may have been used in the creation of this document   **

## 2020-02-26 LAB
GLUCOSE SERPL-MCNC: 112 MG/DL (ref 65–140)
GLUCOSE SERPL-MCNC: 149 MG/DL (ref 65–140)
GLUCOSE SERPL-MCNC: 92 MG/DL (ref 65–140)
GLUCOSE SERPL-MCNC: 96 MG/DL (ref 65–140)
GLUCOSE SERPL-MCNC: 96 MG/DL (ref 65–140)
VANCOMYCIN TROUGH SERPL-MCNC: 20 UG/ML (ref 10–20)

## 2020-02-26 PROCEDURE — 80202 ASSAY OF VANCOMYCIN: CPT | Performed by: INTERNAL MEDICINE

## 2020-02-26 PROCEDURE — 82948 REAGENT STRIP/BLOOD GLUCOSE: CPT

## 2020-02-26 PROCEDURE — 92526 ORAL FUNCTION THERAPY: CPT

## 2020-02-26 PROCEDURE — 99233 SBSQ HOSP IP/OBS HIGH 50: CPT | Performed by: INTERNAL MEDICINE

## 2020-02-26 PROCEDURE — 99232 SBSQ HOSP IP/OBS MODERATE 35: CPT | Performed by: INTERNAL MEDICINE

## 2020-02-26 RX ORDER — DEXTROSE AND SODIUM CHLORIDE 5; .45 G/100ML; G/100ML
75 INJECTION, SOLUTION INTRAVENOUS CONTINUOUS
Status: DISCONTINUED | OUTPATIENT
Start: 2020-02-26 | End: 2020-03-02

## 2020-02-26 RX ADMIN — MELATONIN 1000 UNITS: at 18:52

## 2020-02-26 RX ADMIN — DIGOXIN 125 MCG: 125 TABLET ORAL at 09:24

## 2020-02-26 RX ADMIN — CEFEPIME HYDROCHLORIDE 2000 MG: 2 INJECTION, POWDER, FOR SOLUTION INTRAVENOUS at 02:20

## 2020-02-26 RX ADMIN — APIXABAN 5 MG: 5 TABLET, FILM COATED ORAL at 18:52

## 2020-02-26 RX ADMIN — FOLIC ACID 1000 MCG: 1 TABLET ORAL at 09:24

## 2020-02-26 RX ADMIN — CEFEPIME HYDROCHLORIDE 2000 MG: 2 INJECTION, POWDER, FOR SOLUTION INTRAVENOUS at 15:30

## 2020-02-26 RX ADMIN — CYANOCOBALAMIN TAB 500 MCG 1000 MCG: 500 TAB at 18:52

## 2020-02-26 RX ADMIN — OXYBUTYNIN 5 MG: 5 TABLET, FILM COATED, EXTENDED RELEASE ORAL at 09:25

## 2020-02-26 RX ADMIN — DEXTROSE AND SODIUM CHLORIDE 75 ML/HR: 5; .45 INJECTION, SOLUTION INTRAVENOUS at 11:39

## 2020-02-26 RX ADMIN — METRONIDAZOLE 500 MG: 500 INJECTION, SOLUTION INTRAVENOUS at 05:41

## 2020-02-26 RX ADMIN — APIXABAN 5 MG: 5 TABLET, FILM COATED ORAL at 09:26

## 2020-02-26 RX ADMIN — SPIRONOLACTONE 50 MG: 50 TABLET ORAL at 09:25

## 2020-02-26 RX ADMIN — PRIMIDONE 50 MG: 50 TABLET ORAL at 09:30

## 2020-02-26 RX ADMIN — GABAPENTIN 300 MG: 250 SOLUTION ORAL at 09:27

## 2020-02-26 RX ADMIN — ACETAMINOPHEN 650 MG: 650 SUPPOSITORY RECTAL at 06:00

## 2020-02-26 RX ADMIN — METRONIDAZOLE 500 MG: 500 INJECTION, SOLUTION INTRAVENOUS at 16:10

## 2020-02-26 RX ADMIN — PRIMIDONE 50 MG: 50 TABLET ORAL at 23:37

## 2020-02-26 RX ADMIN — ATORVASTATIN CALCIUM 40 MG: 40 TABLET, FILM COATED ORAL at 18:52

## 2020-02-26 RX ADMIN — VANCOMYCIN HYDROCHLORIDE 1500 MG: 10 INJECTION, POWDER, LYOPHILIZED, FOR SOLUTION INTRAVENOUS at 06:46

## 2020-02-26 RX ADMIN — VANCOMYCIN HYDROCHLORIDE 1250 MG: 1 INJECTION, POWDER, LYOPHILIZED, FOR SOLUTION INTRAVENOUS at 23:41

## 2020-02-26 NOTE — RESTORATIVE TECHNICIAN NOTE
Restorative Specialist Mobility Note                      Repositioned: Other (Comment)(Rep /sat pt upright in bed  Bed alarm on  NC O2 on 2L   Pt callbell, phone/tray within reach )       Tracey WOODRUFF, Restorative Technician, [employer]

## 2020-02-26 NOTE — SPEECH THERAPY NOTE
Speech Language/Pathology    Speech/Language Pathology Progress Note    Patient Name: Gigi Valentine  FWOKT'R Date: 2/26/2020     Subjective:  Pt awake in bed, asking for food  Pauleen Nuñez in place, but not attached for feeding  Pt w/ improved voicing, volume, and facial symmetry  "It's so good"    Current Diet:  NPO    Objective:  Pt seen for ongoing dx dysphagia tx  Pt administered tsps puree  Retrieval, manipulation and transfer are weak  Swallow is delayed, but present  Pt coughed inconsistently during trials  Pt took ~5 tsps pudding  Yellow material w/ dark, dried blood began regurgitating through [facility] which was not hooked up  Material drained onto towel  Pt began strongly coughing and material continued to drain out  Pt orally suctioned  Nsg notified and came into room, session concluded  Assessment:  Pt w/ severe dysphagia, unable to tolerate po at this time  Pt w/ possible refluxed material draining from keofeed following limited trials  Risk for bottom-up aspiration      Plan/Recommendations:  NPO  Oral care 2-4x/day  ST f/u for ongoing trials

## 2020-02-26 NOTE — PROGRESS NOTES
Vancomycin Assessment    Scotty Higginbotham is a 80 y o  female who is currently receiving vancomycin 1500mg q12 for Pneumonia     Relevant clinical data and objective history reviewed:  Creatinine   Date Value Ref Range Status   02/24/2020 0 82 0 60 - 1 30 mg/dL Final     Comment:     Standardized to IDMS reference method   02/22/2020 0 64 0 60 - 1 30 mg/dL Final     Comment:     Standardized to IDMS reference method   02/21/2020 0 68 0 60 - 1 30 mg/dL Final     Comment:     Standardized to IDMS reference method   12/17/2014 0 75 0 60 - 1 30 mg/dL Final     Comment:     Standardized to IDMS reference method   12/12/2014 0 87 0 60 - 1 30 mg/dL Final     Comment:     Standardized to IDMS reference method   12/11/2014 1 09 0 60 - 1 30 mg/dL Final     Comment:     Standardized to IDMS reference method     /68   Pulse 61   Temp 97 8 °F (36 6 °C)   Resp 22   Ht 5' 4" (1 626 m)   Wt 81 5 kg (179 lb 10 8 oz)   SpO2 100%   BMI 30 84 kg/m²   I/O last 3 completed shifts: In: 0   Out: 400 [Urine:400]  Lab Results   Component Value Date/Time    BUN 24 02/24/2020 08:49 AM    BUN 18 12/17/2014 05:50 AM    WBC 9 12 02/24/2020 08:49 AM    WBC 8 66 12/19/2014 06:36 AM    HGB 12 1 02/24/2020 08:49 AM    HGB 10 1 (L) 12/19/2014 06:36 AM    HCT 37 9 02/24/2020 08:49 AM    HCT 31 5 (L) 12/19/2014 06:36 AM    MCV 95 02/24/2020 08:49 AM    MCV 94 12/19/2014 06:36 AM     (L) 02/24/2020 08:49 AM     12/19/2014 06:36 AM     Temp Readings from Last 3 Encounters:   02/26/20 97 8 °F (36 6 °C)   02/18/20 97 7 °F (36 5 °C) (Oral)   02/05/20 97 8 °F (36 6 °C)     Vancomycin Days of Therapy: 4    Assessment/Plan  The patient is currently on vancomycin utilizing scheduled dosing  Baseline risks associated with therapy include: pre-existing renal impairment and advanced age    The patient is receiving 1500mg q12 with the most recent vancomycin level being not at steady-state and supratherapeutic (extrapolated trough 25) based on a goal of 15-20 (appropriate for most indications) ; therefore, after clinical evaluation will be changed to 1250mg q12   Pharmacy will continue to follow closely for s/sx of nephrotoxicity, infusion reactions and appropriateness of therapy  BMP and CBC will be ordered per protocol  Plan for trough as patient approaches steady state, prior to the 4th  dose at approximately 730am 2/28  Pharmacy will continue to follow the patients culture results and clinical progress daily      Rachelle Pham, Pharmacist

## 2020-02-27 LAB
ANION GAP SERPL CALCULATED.3IONS-SCNC: 6 MMOL/L (ref 4–13)
BUN SERPL-MCNC: 12 MG/DL (ref 5–25)
CALCIUM SERPL-MCNC: 8.7 MG/DL (ref 8.3–10.1)
CHLORIDE SERPL-SCNC: 110 MMOL/L (ref 100–108)
CO2 SERPL-SCNC: 24 MMOL/L (ref 21–32)
CREAT SERPL-MCNC: 0.42 MG/DL (ref 0.6–1.3)
GFR SERPL CREATININE-BSD FRML MDRD: 94 ML/MIN/1.73SQ M
GLUCOSE SERPL-MCNC: 125 MG/DL (ref 65–140)
GLUCOSE SERPL-MCNC: 142 MG/DL (ref 65–140)
GLUCOSE SERPL-MCNC: 168 MG/DL (ref 65–140)
GLUCOSE SERPL-MCNC: 171 MG/DL (ref 65–140)
POTASSIUM SERPL-SCNC: 3.3 MMOL/L (ref 3.5–5.3)
SODIUM SERPL-SCNC: 140 MMOL/L (ref 136–145)

## 2020-02-27 PROCEDURE — 92526 ORAL FUNCTION THERAPY: CPT

## 2020-02-27 PROCEDURE — 82948 REAGENT STRIP/BLOOD GLUCOSE: CPT

## 2020-02-27 PROCEDURE — 80048 BASIC METABOLIC PNL TOTAL CA: CPT | Performed by: INTERNAL MEDICINE

## 2020-02-27 PROCEDURE — 99233 SBSQ HOSP IP/OBS HIGH 50: CPT | Performed by: INTERNAL MEDICINE

## 2020-02-27 PROCEDURE — 97535 SELF CARE MNGMENT TRAINING: CPT

## 2020-02-27 PROCEDURE — 99221 1ST HOSP IP/OBS SF/LOW 40: CPT | Performed by: INTERNAL MEDICINE

## 2020-02-27 PROCEDURE — 97530 THERAPEUTIC ACTIVITIES: CPT

## 2020-02-27 RX ADMIN — CEFEPIME HYDROCHLORIDE 2000 MG: 2 INJECTION, POWDER, FOR SOLUTION INTRAVENOUS at 02:51

## 2020-02-27 RX ADMIN — DEXTROSE AND SODIUM CHLORIDE 75 ML/HR: 5; .45 INJECTION, SOLUTION INTRAVENOUS at 08:33

## 2020-02-27 RX ADMIN — METRONIDAZOLE 500 MG: 500 INJECTION, SOLUTION INTRAVENOUS at 13:22

## 2020-02-27 RX ADMIN — METRONIDAZOLE 500 MG: 500 INJECTION, SOLUTION INTRAVENOUS at 01:20

## 2020-02-27 RX ADMIN — LABETALOL 20 MG/4 ML (5 MG/ML) INTRAVENOUS SYRINGE 10 MG: at 23:25

## 2020-02-27 RX ADMIN — VANCOMYCIN HYDROCHLORIDE 1250 MG: 1 INJECTION, POWDER, LYOPHILIZED, FOR SOLUTION INTRAVENOUS at 08:34

## 2020-02-27 RX ADMIN — CEFEPIME HYDROCHLORIDE 2000 MG: 2 INJECTION, POWDER, FOR SOLUTION INTRAVENOUS at 14:09

## 2020-02-27 RX ADMIN — VANCOMYCIN HYDROCHLORIDE 1250 MG: 1 INJECTION, POWDER, LYOPHILIZED, FOR SOLUTION INTRAVENOUS at 20:51

## 2020-02-27 RX ADMIN — METRONIDAZOLE 500 MG: 500 INJECTION, SOLUTION INTRAVENOUS at 05:53

## 2020-02-27 RX ADMIN — MORPHINE SULFATE 0.5 MG: 2 INJECTION, SOLUTION INTRAMUSCULAR; INTRAVENOUS at 13:14

## 2020-02-27 RX ADMIN — DEXTROSE AND SODIUM CHLORIDE 75 ML/HR: 5; .45 INJECTION, SOLUTION INTRAVENOUS at 20:50

## 2020-02-27 RX ADMIN — METRONIDAZOLE 500 MG: 500 INJECTION, SOLUTION INTRAVENOUS at 23:23

## 2020-02-27 RX ADMIN — ACETAMINOPHEN 650 MG: 650 SUPPOSITORY RECTAL at 08:34

## 2020-02-27 NOTE — CONSULTS
[address] [id number] [address] Gastroenterology   Earl Pryor 80 y o  female MRN: [id number]  Unit/Bed#: [facility] 710-01 Encounter: [id number]    Code Status: Level 3 - DNAR and DNI  POA:      Reason for Admission / Principal Problem: Stroke like symptoms  Reason for Consult: oropharyngeal dysphagia     Impression:    A/P:  80 y o  female with a past medical history of AFib on Eliquis, type 2 diabetes, hypertension presented with aphasia, weakness- speech evaluated patient and she has severe oropharyngeal dysphagia  GI consulted for PEG placement    Oropharyngeal dysphagia  · Patient underwent video barium swallow which showed severe oropharyngeal dysphagia with severe pharyngeal retention and aspiration  · Patient has been having episodes of coughing after eating prior to admission  · Per discussion with patient she is unsure if she wants a feeding tube  · Per discussion with daughter it was explained to her that the complications of the procedure are bleeding, perforation, infection and risk of aspiration, she would like to talk to her sister before making a final decision  · Appreciate palliative care recommendations  · Will continue to follow up with daughter and if they do make a final decision by tomorrow, can plan to hold Eliquis for 48 hours and have the procedure done on Monday     Afib on eliquis  · Last dose of Eliquis received on 02/26 in the evening  · If patient and daughter agree for PEG tube placement, Eliquis will have to be held for 48 hours prior to the procedure- can likely plan the procedure on Monday if they want to go ahead    Aspiration pneumonia secondary to dysphagia   · Patient is on antibiotics per primary team     HPI: Earl Pryor is a 80 y o  female with a past medical history of AFib on Eliquis, type 2 diabetes, hypertension presented with aphasia, weakness   Neurology did a work up and a stroke was ruled out however they think her symptoms could be from high grade stenosis of right M1 and M2 branches  Patient was evaluated for dysphagia and she underwent a barium swallow which showed severe oropharyngeal dysphagia with severe pharyngeal retention and aspiration  Per patient's daughter patient has been coughing after meals since a while  Speech continued to follow the patient and they donot think she is appropriate for a diet  Patient had a keofed which she pulled out today  Patient denies any abdominal pain, fevers, chills, nausea, vomiting  History obtained from patient and daughter over the phone     PMH:  Past Medical History:   Diagnosis Date    A-fib [facility])     Arthritis     Assistance needed for mobility     pt can stand with assistance and transfer    Chronic pain disorder     Diabetes mellitus ([facility] [address] )     NIDDM    Full dentures     History of transfusion     no adverse reaction    Hyperlipidemia     Irregular heart beat     Numbness and tingling of both feet     Skin abnormality     sacrum area - small red area, less than a dime size    Spinal stenosis     Stroke ([facility] [address] )     Unable to ambulate     Urinary incontinence     ipg stimulator x3 repakcements,battery exchange today 2/14/2018    Wears glasses     Wheelchair dependence         Allergies:    Allergies   Allergen Reactions    Bactrim [Sulfamethoxazole-Trimethoprim] Swelling     Swelling around eyes and red eyes    Aspirin Other (See Comments)     "feels like fist in my chest"    Ciprofloxacin     Nitrofurantoin     Other      "5mz/Tmp "  "1 60 mTab amme"     Per pt's allergy list          Social History:   Social History     Tobacco Use   Smoking Status Never Smoker   Smokeless Tobacco Never Used      Social History     Substance and Sexual Activity   Alcohol Use Not Currently    Comment: very rare      Social History     Substance and Sexual Activity   Drug Use No        Home Medications:   Prior to Admission medications    Medication Sig Start Date End Date Taking? Authorizing Provider   apixaban (ELIQUIS) 5 mg Take 1 tablet (5 mg total) by mouth 2 (two) times a day 6/18/19  Yes Meggan Gupta PA-C   atorvastatin (LIPITOR) 40 mg tablet Take 1 tablet (40 mg total) by mouth every evening 6/14/19  Yes Meggan Gupta PA-C   calcium gluconate 500 mg tablet Take 500 mg by mouth every evening   Yes Historical Provider, MD   cholecalciferol (VITAMIN D3) 1,000 units tablet Take 1,000 Units by mouth every evening   Yes Historical Provider, MD   cyanocobalamin (VITAMIN B-12) 1,000 mcg tablet Take 1,000 mcg by mouth every evening     Yes Historical Provider, MD   digoxin (LANOXIN) 0 125 mg tablet Take 1 tablet (125 mcg total) by mouth daily 6/19/19  Yes Meggan Gupta PA-C   folic acid (FOLVITE) 1 mg tablet Take 1,000 mcg by mouth daily 6/28/19  Yes Historical Provider, MD   gabapentin (NEURONTIN) 300 mg capsule Take 300 mg by mouth daily   Yes Historical Provider, MD   lisinopril (ZESTRIL) 2 5 mg tablet Take 1 tablet (2 5 mg total) by mouth daily 6/18/19  Yes Meggan Gupta PA-C   metFORMIN (GLUCOPHAGE) 1000 MG tablet Take 0 5 tablets (500 mg total) by mouth 2 (two) times a day with meals 6/16/19  Yes Meggan Gupta PA-C   Mirabegron ER (MYRBETRIQ) 50 MG TB24 Take 50 mg by mouth daily     Yes Historical Provider, MD   primidone (MYSOLINE) 50 mg tablet Take by mouth 2 (two) times a day   Yes Historical Provider, MD   spironolactone (ALDACTONE) 50 mg tablet Take 50 mg by mouth daily   8/24/14  Yes Historical Provider, MD       ROS:   Review of Systems   Gastrointestinal: Negative for abdominal distention, abdominal pain, anal bleeding, blood in stool, constipation, diarrhea, nausea, rectal pain and vomiting  Musculoskeletal: Positive for arthralgias  Neurological: Positive for speech difficulty  All other systems reviewed and are negative        Vitals:   Vitals:    02/26/20 2145 02/27/20 0500 02/27/20 0758 02/27/20 0759   BP: (!) 181/77 152/64 (!) 185/71 (!) 185/71   Pulse: 72  76 67   Resp: 21  16 20   Temp: 99 2 °F (37 3 °C)  98 2 °F (36 8 °C) 98 2 °F (36 8 °C)   TempSrc:       SpO2: 95%  91% 91%   Weight:       Height:         Temp  Min: 97 3 °F (36 3 °C)  Max: 101 7 °F (38 7 °C)  IBW: 54 7 kg  Body mass index is 30 84 kg/m²  PHYSICAL EXAM:  Physical Exam   Constitutional: She is oriented to person, place, and time  She appears well-developed and well-nourished  HENT:   Head: Normocephalic and atraumatic  Eyes: Pupils are equal, round, and reactive to light  Cardiovascular: Normal rate, regular rhythm and normal heart sounds  Pulmonary/Chest: Effort normal    Coarse breath sounds     Abdominal: Soft  Bowel sounds are normal  She exhibits no distension  There is no tenderness  Neurological: She is oriented to person, place, and time  Dysarthria present   Skin: Skin is warm  Labs:   Results from last 7 days   Lab Units 02/24/20  0849 02/23/20  0533 02/22/20  0612 02/21/20  0447   WBC Thousand/uL 9 12 14 03* 12 18* 7 33   HEMOGLOBIN g/dL 12 1 12 6 12 9 11 3*   HEMATOCRIT % 37 9 38 2 39 7 34 5*   PLATELETS Thousands/uL 102* 157 154 140*   NEUTROS PCT %  --   --   --  74   MONOS PCT %  --   --   --  12   MONO PCT %  --   --  3*  --      Results from last 7 days   Lab Units 02/24/20  0849 02/22/20  0612 02/21/20  0447   POTASSIUM mmol/L 4 0 3 5 3 8   CHLORIDE mmol/L 109* 107 106   CO2 mmol/L 25 22 25   BUN mg/dL 24 14 14   CREATININE mg/dL 0 82 0 64 0 68   CALCIUM mg/dL 9 5 9 3 8 8         No results found for: PHOS       No results found for: TROPONINT  ABG:No results found for: PHART, SNU2LSL, PO2ART, KXK6MXA, Q3LXTWZJ, BEART, SOURCE    Imaging: I have personally reviewed pertinent reports  EKG: This was personally reviewed by myself  Micro:  Blood Culture:   Lab Results   Component Value Date    BLOODCX No Growth After 4 Days  02/23/2020    BLOODCX No Growth After 4 Days   02/23/2020    BLOODCX Staphylococcus coagulase negative (A) 02/15/2020    BLOODCX No Growth After 5 Days  02/15/2020    BLOODCX No Growth After 5 Days  05/31/2018    BLOODCX No Growth After 5 Days   05/31/2018     Urine Culture:   Lab Results   Component Value Date    URINECX 70,000-79,000 cfu/ml Escherichia coli (A) 02/22/2020    URINECX 70,000-79,000 cfu/ml Aerococcus urinae (A) 02/22/2020     Sputum Culture: No components found for: SPUTUMCX  Wound Culure: No results found for: Annetta Jones MD  2/27/2020 10:46 AM

## 2020-02-27 NOTE — PLAN OF CARE
Problem: PHYSICAL THERAPY ADULT  Goal: Performs mobility at highest level of function for planned discharge setting  See evaluation for individualized goals  Description  Treatment/Interventions: Functional transfer training, LE strengthening/ROM, Therapeutic exercise, Endurance training, Cognitive reorientation, Patient/family training, Bed mobility, Gait training  Equipment Recommended: Rohit Rodriguez, Wheelchair       See flowsheet documentation for full assessment, interventions and recommendations  Outcome: Progressing  Note:   Prognosis: Guarded  Problem List: Decreased strength, Decreased endurance, Decreased mobility, Impaired balance, Impaired judgement, Obesity, Pain  Assessment: Patient seen for physical therapy session with a focus on sitting tolerance, there-ex, sitting balance, and bed mobility  Pt received supine in bed and agreeable to PT session  Patient is currently limited by activity tolerance and endurance level  Pt present with cervical tightness and limited rotation towards the left  Performed there-ex to improve cervical ROM  Despite generalized weakness, patient participate well in therapy but requires frequent rest breaks  Increased sitting tolerance to 15 minutes today  Pt to benefit from continued inpatient PT services to improve functional mobility  Will plan to perform OOB mobility when appropriate  Recommend STR upon discharge once medically stable  Barriers to Discharge: Decreased caregiver support     Recommendation: Post acute IP rehab     PT - OK to Discharge: Yes(to rehab once medically stable)    See flowsheet documentation for full assessment

## 2020-02-27 NOTE — PROGRESS NOTES
Vancomycin IV Pharmacy-to-Dose Consultation    Wolf Savage is a 80 y o  female who is currently receiving Vancomycin IV with management by the Pharmacy Consult service  Assessment/Plan:  The patient was reviewed  Renal function and serum creatinine have significantly improved  There are no signs or symptoms of nephrotoxicity and/or infusion reactions documented in the chart  Based on todays assessment, continue current vancomycin (day # 5) dosing of 1250mg q12hrs, with a plan for trough to be drawn at 0730 on 2/28 as scheduled  Will evaluate for dose adjustment based on improved renal function  We will continue to follow the patients culture results and clinical progress daily      Anabella Cash, Pharmacist

## 2020-02-27 NOTE — PLAN OF CARE
Problem: Potential for Falls  Goal: Patient will remain free of falls  Description  INTERVENTIONS:  - Assess patient frequently for physical needs  -  Identify cognitive and physical deficits and behaviors that affect risk of falls    -  [place] fall precautions as indicated by assessment   - Educate patient/family on patient safety including physical limitations  - Instruct patient to call for assistance with activity based on assessment  - Modify environment to reduce risk of injury  - Consider OT/PT consult to assist with strengthening/mobility  Outcome: Progressing

## 2020-02-27 NOTE — ASSESSMENT & PLAN NOTE
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

## 2020-02-27 NOTE — ASSESSMENT & PLAN NOTE
Continue tube feeds  Patient's mentation improving today to consider re-evaluation by speech if patient's mentation continues to improve  It appears the patient is aspirating on tube feeds, absence of gag reflex as well  Appreciate palliative care's assistance  Speech reevaluated today and patient is still unable to tolerate PO, will remain NPO and tube feeds to remain held  D5-1/2 NS for hydration

## 2020-02-27 NOTE — ASSESSMENT & PLAN NOTE
Monitor blood pressure with routine vitals  Hold spironolactone and lisinopril given NPO status  Continue p r n  labetalol

## 2020-02-27 NOTE — SOCIAL WORK
LSW contacted daughter via phone to discuss when she intends to visit next  Savanna Cap will probably be at the hospital later this afternoon  Savanna Henderson and Aris mahajan spoke at length yesterday about the potential peg tube placement  Per Iraidaa Cap, when discussed with Sandy Jama she chose to have the tube placed  Savanna Henderson and Aris mahajan would like to honor Shirley's wishes and have Sandy Jama evaluated for a potential Peg placement if patient continues to have poor PO intake  Counseling / Coordination of Care  Total floor / unit time spent today 15 minutes  Greater than 50% of total time was spent with the patient and / or family counseling and / or coordination of care   A description of the counseling / coordination of care: support

## 2020-02-27 NOTE — OCCUPATIONAL THERAPY NOTE
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   Intrinsic Gratification liked being active outside   Pain Assessment   Pain Assessment No/denies pain   Pain Score No Pain   ADL   Where Assessed Supine, bed   Grooming Assistance 2  Maximal Assistance   Grooming Deficit Wash/dry face   Bed Mobility   Supine to Sit 2  Maximal assistance   Additional items Assist x 2   Sit to Supine 2  Maximal assistance   Additional items Assist x 2   Cognition   Overall Cognitive Status Impaired   Arousal/Participation Alert; Responsive; Cooperative   Attention Attends with cues to redirect   Orientation Level Oriented X4   Memory Decreased recall of precautions;Decreased recall of recent events   Following Commands Follows one step commands with increased time or repetition   Assessment   Assessment Pt participated in occupational therapy with focus on activity tolerance,  bed mob, unsupported sitting balance and tolerance for pt engagement in functional self-care task/light grooming task  Pt cleared by RN/Enoch for pt participation in therapy  Pt received HOB raised/supine pt sitting upright and agreeable to therapy following pt Identifiers confirmed  Pt unable to report her goal today 2* pt cognitive impairments Pt requires assist for bed mob and unsupported sitting 2nd pt deconditioning  Pt will require post acute rehab services to continue to address pt noted deficits which currently impair pt ADL and functional mob   Pt bed alarm active post session all needs within reach      Plan   Treatment Interventions ADL retraining   Goal Expiration Date 03/04/20   OT Treatment Day 2   OT Frequency 3-5x/wk   Recommendation   OT Discharge Recommendation Short Term Rehab   Barthel Index   Feeding 0   Bathing 0   Grooming Score 0   Dressing Score 5   Bladder Score 0   Bowels Score 0   Toilet Use Score 5   Transfers (Bed/Chair) Score 5   Mobility (Level Surface) Score 0   Stairs Score 0   Barthel Index Score 15   Modified Cee Scale   Modified [place] Scale 5     Brian Speak  COY/L

## 2020-02-27 NOTE — PHYSICAL THERAPY NOTE
Physical Therapy Treatment Note   Patient Name: Clare Ralph    SYKRG'V Date: 2/27/2020     Problem List  Principal Problem:    Aphasia  Active Problems:    Wheelchair dependence    Paroxysmal atrial fibrillation ([facility] [address] )    Spinal cord stimulator status    Overactive bladder    Essential hypertension    Cervical dystonia    Stroke-like symptoms    Weakness of both lower extremities    SIRS (systemic inflammatory response syndrome) (HCC)    Dysarthria    Facial droop    Oropharyngeal dysphagia    Pneumonia    UTI (urinary tract infection)       Past Medical History  Past Medical History:   Diagnosis Date    A-fib ([facility] [address] )     Arthritis     Assistance needed for mobility     pt can stand with assistance and transfer    Chronic pain disorder     Diabetes mellitus ([facility] [address] )     NIDDM    Full dentures     History of transfusion     no adverse reaction    Hyperlipidemia     Irregular heart beat     Numbness and tingling of both feet     Skin abnormality     sacrum area - small red area, less than a dime size    Spinal stenosis     Stroke ([facility] [address] )     Unable to ambulate     Urinary incontinence     ipg stimulator x3 repakcements,battery exchange today 2/14/2018    Wears glasses     Wheelchair dependence         Past Surgical History  Past Surgical History:   Procedure Laterality Date    BACK SURGERY      fusion    BLADDER SUSPENSION      CARPAL TUNNEL RELEASE Bilateral     CHOLANGIOGRAM N/A 6/4/2018    Procedure: CHOLANGIOGRAM;  Surgeon: Bri Jain MD;  [facility]: [facility];  Service: General    CHOLECYSTECTOMY LAPAROSCOPIC N/A 6/4/2018    Procedure: CHOLECYSTECTOMY LAPAROSCOPIC;  Surgeon: Bri Jain MD;  Location[facility];  Service: General    COLONOSCOPY      DILATION AND CURETTAGE OF UTERUS      FRACTURE SURGERY Left     femur with hardware    HYSTERECTOMY      INSERTION / PLACEMENT / REVISION NEUROSTIMULATOR      JOINT REPLACEMENT Bilateral     knees    SACRAL NERVE STIMULATOR PLACEMENT N/A 2/14/2018    Procedure: REMOVE AND REPLACE IPG;  Surgeon: Elinor Martini MD;  Location: [facility];  Service: UroGynecology           TONSILLECTOMY             02/27/20 1445   Pain Assessment   Pain Assessment No/denies pain   Pain Score No Pain   Restrictions/Precautions   Weight Bearing Precautions Per Order No   Other Precautions Fall Risk; Chair Alarm;Cognitive; Bed Alarm;Multiple lines;Telemetry   General   Chart Reviewed Yes   Cognition   Overall Cognitive Status Impaired   Arousal/Participation Alert; Responsive   Attention Attends with cues to redirect   Orientation Level Oriented X4  ((General time))   Memory Decreased recall of precautions;Decreased recall of recent events   Following Commands Follows one step commands without difficulty   Bed Mobility   Rolling R 2  Maximal assistance   Additional items Assist x 1; Increased time required;Verbal cues   Rolling L 2  Maximal assistance   Additional items Assist x 1; Increased time required;Verbal cues   Supine to Sit 2  Maximal assistance   Additional items Assist x 2; Increased time required;Verbal cues   Sit to Supine 2  Maximal assistance   Additional items Assist x 2; Increased time required;Verbal cues   Additional Comments Tolerated sitting EOB for approx 15 minutes, able to grasp bedrail with LUE  Maintain static/dynamic sitting balance with mod A x 1  Balance   Static Sitting Poor -   Dynamic Sitting Poor -   Endurance Deficit   Endurance Deficit Yes   Endurance Deficit Description fatigue, weakness   Activity Tolerance   Activity Tolerance Patient limited by fatigue;Patient limited by pain   Medical Staff Made Aware Rose Marie Ferguson   Nurse Made Aware Yes, Enoch   Exercises   Ankle Pumps Supine;15 reps;AROM;AAROM; Right;Left  (AAROM LLE )   UE Exercise   (Cervical rotation to both side x10 )   Balance training  Tolerated sitting balance for approx 15 minutes while perform static/dynamic sitting balance, required mod A x 1  Reaching with RUE out of MARYANNE x10, able to initate weight shifting towards each side, required mod A x 1 to maintain balance  Assessment   Prognosis Guarded   Problem List Decreased strength;Decreased endurance;Decreased mobility; Impaired balance; Impaired judgement;Obesity;Pain   Assessment Patient seen for physical therapy session with a focus on sitting tolerance, there-ex, sitting balance, and bed mobility  Pt received supine in bed and agreeable to PT session  Patient is currently limited by activity tolerance and endurance level  Pt present with cervical tightness and limited rotation towards the left  Performed there-ex to improve cervical ROM  Despite generalized weakness, patient participate well in therapy but requires frequent rest breaks  Increased sitting tolerance to 15 minutes today  Pt to benefit from continued inpatient PT services to improve functional mobility  Will plan to perform OOB mobility when appropriate  Recommend STR upon discharge once medically stable  Barriers to Discharge Decreased caregiver support   Goals   Patient Goals To see her pet dog    STG Expiration Date 03/09/20   PT Treatment Day 1   Plan   Treatment/Interventions Functional transfer training;LE strengthening/ROM; Endurance training; Therapeutic exercise;Patient/family training;Bed mobility   PT Frequency 2-3x/wk   Recommendation   Recommendation Post acute IP rehab   Equipment Recommended Walker; Wheelchair  (hospital bed)   PT - OK to Discharge Yes  (to rehab once medically stable)       María Alvarado PT, DPT

## 2020-02-27 NOTE — PLAN OF CARE
Problem: OCCUPATIONAL THERAPY ADULT  Goal: Performs self-care activities at highest level of function for planned discharge setting  See evaluation for individualized goals  Description  Treatment Interventions: ADL retraining, Visual perceptual retraining, Functional transfer training, UE strengthening/ROM, Endurance training, Cognitive reorientation, Patient/family training, Equipment evaluation/education, Compensatory technique education, Continued evaluation, Activityengagement          See flowsheet documentation for full assessment, interventions and recommendations  Outcome: Progressing  Note:   Limitation: Decreased ADL status, Decreased UE ROM, Decreased UE strength, Decreased endurance, Decreased Safe judgement during ADL, Decreased cognition, Decreased sensation, Decreased fine motor control, Visual deficit, Decreased self-care trans, Decreased high-level ADLs  Prognosis: Fair  Assessment: Pt participated in occupational therapy with focus on activity tolerance,  bed mob, unsupported sitting balance and tolerance for pt engagement in functional self-care task/light grooming task  Pt cleared by RN/Enoch for pt participation in therapy  Pt received HOB raised/supine pt sitting upright and agreeable to therapy following pt Identifiers confirmed  Pt unable to report her goal today 2* pt cognitive impairments Pt requires assist for bed mob and unsupported sitting 2nd pt deconditioning  Pt will require post acute rehab services to continue to address pt noted deficits which currently impair pt ADL and functional mob  Pt bed alarm active post session all needs within reach        OT Discharge Recommendation: Short Term Rehab  OT - OK to Discharge: Yes(to STR when medically stable)

## 2020-02-27 NOTE — SPEECH THERAPY NOTE
Speech Language/Pathology    Speech/Language Pathology Progress Note    Patient Name: Ricky Arauz  BZOGW'N Date: 2/27/2020    Subjective:  Pt awake and alert in bed, talkative w/ SLP  Pt w/ periods of head tremors at rest  Nsg reports pt pulled keofeed last night  Current Diet:   NPO    Objective:  Pt seen for ongoing dx dysphagia tx  Oral care performed  Pt able to achieve suck on toothette, no swallow initiated  Pt given ice chips  Poor oral control of ice, "it fell down " Significant coughing following ice chips  Pt attempted to speak while coughing, wet vocal quality present  Pt did not appear to be aware of or recover in a timely manner from coughing episode  She continued to attempt to speak despite ongoing cough  Pt continued w/ wet cough after SLP left the room  Assessment:  Pt w/ severe oropharyngeal dysphagia claudio by reduced bolus control and suspected aspiration w/ even small quantities  Pt not safe for po at this time  Speech remains dysarthric, but pt is communicative and coherent  ? etiology of ongoing issues & changes  Plan/Recommendations:  Continue NPO -?longer term alternate means of nutrition    Oral care 2-4x/day  ST f/u for ongoing trials

## 2020-02-27 NOTE — ASSESSMENT & PLAN NOTE
EEG monitoring 2/16-2/17: demonstrated no epileptiform activity or epileptogenic focus  Patient currently still aphasic but able to nod head yes and no to questioning  Repeat CT scan of head does not demonstrate any acute findings    Patient failed speech evaluation and is NPO  Hold tube feeds due to aspiration

## 2020-02-27 NOTE — PROGRESS NOTES
Progress Note - Racheal Echeverria 1935, 80 y o  female MRN: [id number]    Unit/Bed#: [facility] 710-01 Encounter: [id number]    Primary Care Provider: Liv Enrique MD   Date and time admitted to hospital: 2/19/2020  8:30 AM        UTI (urinary tract infection)  Assessment & Plan  Urine cultures pansensitive E coli  Patient received Rocephin 1 dose, Rocephin has been changed to cefepime for treatment of concurrent HCAP  Blood cultures negative for 48 hours      Pneumonia  Assessment & Plan  HCAP versus aspiration  IV vancomycin cefepime and Flagyl  Monitor fever curve  P r n  Antipyretic  O2 sat monitoring with supplemental O2 p r n  To maintain saturation 90% or greater by pulse oximetry  Supportive care    Oropharyngeal dysphagia  Assessment & Plan  Continue tube feeds  Patient's mentation improving today to consider re-evaluation by speech if patient's mentation continues to improve  It appears the patient is aspirating on tube feeds, absence of gag reflex as well  Appreciate palliative care's assistance  Speech reevaluated today and patient is still unable to tolerate PO, will remain NPO and tube feeds to remain held  D5-1/2 NS for hydration        Dysarthria  Assessment & Plan  Dysarthria noted on last admission 2/14, neurology consulted and did 24-hour EEG monitoring 2/16  EEG monitoring 2/16-2/17: no focal epileptiform discharges or electrographic seizures    Removed 2/17  Unable to get MRI brain due to spine stimulator  Neurology following    Weakness of both lower extremities  Assessment & Plan  Per daughter,patient had difficulty standing up in wheelchair, more so than normal  Chronic bilateral weakness in lower extremities, no significant changes since last admission  Patient wheelchair-bound and needs assistance standing up and transferring in wheelchair  Continue fall precautions  Consult PT OT    Stroke-like symptoms  Assessment & Plan  No radiographic evidence of CVA  Neurology re-evaluated patient in light of worsening weakness, likely mechanism is hypoperfusion secondary to 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  Palliative care consult placed, appreciate input  NPO  Hold tube feeds    Essential hypertension  Assessment & Plan  Monitor blood pressure with routine vitals  Hold spironolactone and lisinopril given NPO status  Continue p r n  labetalol    Paroxysmal atrial fibrillation ([address] )  Assessment & Plan  Currently rate controlled  EKG 2/19:  Sinus rhythm with first-degree AV block  Right bundle branch block  Possible lateral infarct (cited on or before 16th June 2019)  When compared with EKG of 14th February 2020, questionable change in initial forces of lateral leads  Continue therapeutic anticoagulation with Eliquis  Continue rate control with digoxin        * Aphasia  Assessment & Plan  EEG monitoring 2/16-2/17: demonstrated no epileptiform activity or epileptogenic focus  Patient currently still aphasic but able to nod head yes and no to questioning  Repeat CT scan of head does not demonstrate any acute findings  Patient failed speech evaluation and is NPO  Hold tube feeds due to aspiration      VTE Pharmacologic Prophylaxis:   Pharmacologic: Apixaban (Eliquis)  Mechanical VTE Prophylaxis in Place: Yes    Patient Centered Rounds: I have performed bedside rounds with nursing staff today  Discussions with Specialists or Other Care Team Provider: none    Education and Discussions with Family / Patient: patient's daughters, discussed goals of care and next steps    Time Spent for Care: 45 minutes  More than 50% of total time spent on counseling and coordination of care as described above      Current Length of Stay: 7 day(s)    Current Patient Status: Inpatient   Certification Statement: The patient will continue to require additional inpatient hospital stay due to need for nutrition access    Discharge Plan: pending hospital course    Code Status: Level 3 - DNAR and DNI      Subjective:   No acute overnight events  This morning patient is asking to eat by mouth  She has failed speech eval again  Discussed goals of care with Velma Meckel and Jomar Hatch who would like to pursue PEG at this time  Objective:     Vitals:   Temp (24hrs), Av 3 °F (36 8 °C), Min:97 8 °F (36 6 °C), Max:98 6 °F (37 °C)    Temp:  [97 8 °F (36 6 °C)-98 6 °F (37 °C)] 97 8 °F (36 6 °C)  HR:  [61-71] 61  Resp:  [20-22] 22  BP: (143-157)/(54-68) 157/68  SpO2:  [96 %-100 %] 100 %  Body mass index is 30 84 kg/m²  Input and Output Summary (last 24 hours): Intake/Output Summary (Last 24 hours) at 2020  Last data filed at 2020 1100  Gross per 24 hour   Intake 0 ml   Output    Net 0 ml       Physical Exam:     Physical Exam   Constitutional: She is oriented to person, place, and time  Ill appearing, frail, cachetic   HENT:   Head: Normocephalic and atraumatic  Dry oral mucosa, NGT   Eyes: Pupils are equal, round, and reactive to light  Conjunctivae are normal    Neck: Neck supple  No JVD present  Cardiovascular: Normal rate, regular rhythm, normal heart sounds and intact distal pulses  Pulmonary/Chest:   Coarse breath sounds throughout   Abdominal: Soft  Bowel sounds are normal    Musculoskeletal: She exhibits no edema or tenderness  Neurological: She is alert and oriented to person, place, and time  Skin: Skin is warm and dry  Capillary refill takes 2 to 3 seconds  Psychiatric:   Flat depressed affect   Nursing note and vitals reviewed        Additional Data:     Labs:    Results from last 7 days   Lab Units 20  0849  20  0612 20  0447   WBC Thousand/uL 9 12   < > 12 18* 7 33   HEMOGLOBIN g/dL 12 1   < > 12 9 11 3*   HEMATOCRIT % 37 9   < > 39 7 34 5*   PLATELETS Thousands/uL 102*   < > 154 140*   BANDS PCT %  --   --  7  --    NEUTROS PCT %  --   --   --  74   LYMPHS PCT %  --   --   --  13*   LYMPHO PCT %  --   --  0*  --    MONOS PCT %  --   --   --  12   MONO PCT %  --   --  3*  --    EOS PCT %  --   --  0 0    < > = values in this interval not displayed  Results from last 7 days   Lab Units 02/24/20  0849   SODIUM mmol/L 140   POTASSIUM mmol/L 4 0   CHLORIDE mmol/L 109*   CO2 mmol/L 25   BUN mg/dL 24   CREATININE mg/dL 0 82   ANION GAP mmol/L 6   CALCIUM mg/dL 9 5   GLUCOSE RANDOM mg/dL 149*         Results from last 7 days   Lab Units 02/26/20  1850 02/26/20  1107 02/26/20  0551 02/26/20  0015 02/25/20  1702 02/25/20  1238 02/25/20  0607 02/25/20  0040 02/24/20  1640 02/24/20  1157 02/24/20  0622 02/24/20  0011   POC GLUCOSE mg/dl 112 92 96 96 117 138 125 119 122 131 142* 135         Results from last 7 days   Lab Units 02/23/20  0533 02/23/20  0110   LACTIC ACID mmol/L  --  1 8   PROCALCITONIN ng/ml 0 58*  --            * I Have Reviewed All Lab Data Listed Above  * Additional Pertinent Lab Tests Reviewed: No New Labs Available For Today    Imaging:    Imaging Reports Reviewed Today Include: none  Imaging Personally Reviewed by Myself Includes:  none    Recent Cultures (last 7 days):     Results from last 7 days   Lab Units 02/23/20  0108 02/22/20  1848   BLOOD CULTURE  No Growth at 72 hrs    No Growth at 72 hrs   --    URINE CULTURE   --  70,000-79,000 cfu/ml Escherichia coli*  70,000-79,000 cfu/ml Aerococcus urinae*       Last 24 Hours Medication List:     Current Facility-Administered Medications:  acetaminophen 650 mg Rectal Q4H PRN Meera Zimmer PA-C    apixaban 5 mg Oral BID Umberto Alvarez MD    atorvastatin 40 mg Oral QPM Candi Hermosillo MD    cefepime 2,000 mg Intravenous Q12H Kierra Huston MD Last Rate: 2,000 mg (02/26/20 1530)   cholecalciferol 1,000 Units Oral QPM Candi Hermosillo MD    vitamin B-12 1,000 mcg Oral QPM Umberto Alvarez MD    dextrose 5 % and sodium chloride 0 45 % 75 mL/hr Intravenous Continuous Ne Ramirez MD Last Rate: 75 mL/hr (02/26/20 1139)   digoxin 125 mcg Oral Daily Umberto Alvarez MD    folic acid 9,433 mcg Oral Daily Karley Ferguson MD    gabapentin 300 mg Oral Daily Mane Silva MD    insulin lispro 1-5 Units Subcutaneous Q6H Pooja Cazares MD    Labetalol HCl 10 mg Intravenous Q6H PRN Poppy Vasquez PA-C    metroNIDAZOLE 500 mg Intravenous Q8H Mane Silva MD Last Rate: 500 mg (02/26/20 1610)   oxybutynin 5 mg Oral Daily Karley Ferguson MD    primidone 50 mg Oral Q12H [address] Karley Ferguson MD    spironolactone 50 mg Oral Daily Karley Ferguson MD    vancomycin 1,250 mg Intravenous Q12H Mely Garzon MD         Today, Patient Was Seen By: Mely Garzon MD    ** Please Note: Dictation voice to text software may have been used in the creation of this document   **

## 2020-02-28 ENCOUNTER — TELEPHONE (OUTPATIENT)
Dept: NEUROLOGY | Facility: CLINIC | Age: 85
End: 2020-02-28

## 2020-02-28 LAB
BACTERIA BLD CULT: NORMAL
BACTERIA BLD CULT: NORMAL
GLUCOSE SERPL-MCNC: 153 MG/DL (ref 65–140)
GLUCOSE SERPL-MCNC: 159 MG/DL (ref 65–140)
GLUCOSE SERPL-MCNC: 161 MG/DL (ref 65–140)
GLUCOSE SERPL-MCNC: 166 MG/DL (ref 65–140)
VANCOMYCIN TROUGH SERPL-MCNC: 23.2 UG/ML (ref 10–20)

## 2020-02-28 PROCEDURE — 82948 REAGENT STRIP/BLOOD GLUCOSE: CPT

## 2020-02-28 PROCEDURE — 80202 ASSAY OF VANCOMYCIN: CPT | Performed by: INTERNAL MEDICINE

## 2020-02-28 PROCEDURE — 92526 ORAL FUNCTION THERAPY: CPT

## 2020-02-28 PROCEDURE — 0DH63UZ INSERTION OF FEEDING DEVICE INTO STOMACH, PERCUTANEOUS APPROACH: ICD-10-PCS | Performed by: INTERNAL MEDICINE

## 2020-02-28 PROCEDURE — 99233 SBSQ HOSP IP/OBS HIGH 50: CPT | Performed by: INTERNAL MEDICINE

## 2020-02-28 RX ORDER — CEFAZOLIN SODIUM 1 G/50ML
1000 SOLUTION INTRAVENOUS ONCE
Status: COMPLETED | OUTPATIENT
Start: 2020-03-01 | End: 2020-03-02

## 2020-02-28 RX ORDER — CEFAZOLIN SODIUM 1 G/50ML
1000 SOLUTION INTRAVENOUS ONCE
Status: DISCONTINUED | OUTPATIENT
Start: 2020-03-01 | End: 2020-02-28

## 2020-02-28 RX ADMIN — METRONIDAZOLE 500 MG: 500 INJECTION, SOLUTION INTRAVENOUS at 05:58

## 2020-02-28 RX ADMIN — DEXTROSE AND SODIUM CHLORIDE 75 ML/HR: 5; .45 INJECTION, SOLUTION INTRAVENOUS at 10:40

## 2020-02-28 RX ADMIN — INSULIN LISPRO 1 UNITS: 100 INJECTION, SOLUTION INTRAVENOUS; SUBCUTANEOUS at 06:07

## 2020-02-28 RX ADMIN — INSULIN LISPRO 1 UNITS: 100 INJECTION, SOLUTION INTRAVENOUS; SUBCUTANEOUS at 18:24

## 2020-02-28 RX ADMIN — CEFEPIME HYDROCHLORIDE 2000 MG: 2 INJECTION, POWDER, FOR SOLUTION INTRAVENOUS at 15:11

## 2020-02-28 RX ADMIN — VANCOMYCIN HYDROCHLORIDE 1250 MG: 1 INJECTION, POWDER, LYOPHILIZED, FOR SOLUTION INTRAVENOUS at 09:11

## 2020-02-28 RX ADMIN — CEFEPIME HYDROCHLORIDE 2000 MG: 2 INJECTION, POWDER, FOR SOLUTION INTRAVENOUS at 02:11

## 2020-02-28 RX ADMIN — INSULIN LISPRO 1 UNITS: 100 INJECTION, SOLUTION INTRAVENOUS; SUBCUTANEOUS at 00:32

## 2020-02-28 NOTE — SPEECH THERAPY NOTE
Speech Language/Pathology    Speech/Language Pathology Progress Note    Patient Name: Silverio VELAZQUEZ'S Date: 2/28/2020    Subjective:  Pt awake, alert in bed  Repositioned pt to be more optimal for ice chip trials and oral care  Nsg reports family meetings are discussing longer term alternate means of nutrition  "Oh I love my ice!"    Current Diet:  NPO    Objective:  Pt seen for ongoing dx dysphagia tx  Pt w/ stronger, clearer voicing than in previous sessions  3 ice chips administered  Pt attempts to speak while ice is melting, resulting in wet vocal quality, coughing x1 after 3rd chip  When cued to focus on swallow and not talk, pt is able to tolerate small quantities of ice chips  Pt given oral care w/ wet swab, able to create weak suck around swab, lingual pump to transfer  Pt able to initiate and complete 1 weak swallow  Other attempts not successfully in completing a swallow  Assessment:  Pt continues w/ severe oropharyngeal dysphagia claudio by reduced oral and pharyngeal movements  Speech remains dysarthric but is generally coherent  Plan/Recommendations:  Continue NPO -?longer term alternate means of nutrition   Nsg possible PEG tube placement on Monday  Oral care 2-4x/day  ST f/u for ongoing trials

## 2020-02-28 NOTE — ASSESSMENT & PLAN NOTE
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

## 2020-02-28 NOTE — NUTRITION
02/27/20 1920   Recommendations/Interventions   Interventions Diet: continued as ordered;Obtain current weight  (ST continues to follow pt  Family in discussion regarding PEG placement for pt  RD follow up pending EN support decision - )   Nutrition Recommendations Continue diet as ordered  (If PEG decided, suggest checking Mag, Phosphorus & K+ as pt at risk for refeeding syndrome  If PEG placed suggest start Jevity 1 2 at 20ml/hr gradually increase by 10ml/hr Q4hrs to goal 54ml/hr = 1555 kcal with 72gmPro  Add 150ml free H20 flushes Q 6hrs  )

## 2020-02-28 NOTE — ASSESSMENT & PLAN NOTE
Per daughter,patient had difficulty standing up in wheelchair, more so than normal  Chronic bilateral weakness in lower extremities, no significant changes since last admission  Patient wheelchair-bound and needs assistance standing up and transferring in wheelchair  Continue fall precautions  PT OT

## 2020-02-28 NOTE — TELEPHONE ENCOUNTER
Type Date User Summary Attachment   General 02/24/2020  2:05 PM Rodrigo Riggs care coordination  -   Note    Botox- authorization #: [id number]- 2nd visit- valid from 11/18/2019 until 5/18/2020   Please use [facility]      Thank you,     Luz Maria Mott

## 2020-02-28 NOTE — PROGRESS NOTES
Progress Note - [facility] 1935, 80 y o  female MRN: [id number]    Unit/Bed#: [facility] 710-01 Encounter: [id number]    Primary Care Provider: Daren Cummins MD   Date and time admitted to hospital: 2/19/2020  8:30 AM        UTI (urinary tract infection)  Assessment & Plan  Urine cultures pansensitive E coli  Patient received Rocephin 1 dose, Rocephin has been changed to cefepime for treatment of concurrent HCAP  Blood cultures negative for 48 hours      Pneumonia  Assessment & Plan  HCAP versus aspiration  IV vancomycin and Flagyl discontinued  Continue with cefepime for 2 more days to complete 7 day antibiotic course  Monitor fever curve  P r n  Antipyretic  O2 sat monitoring with supplemental O2 p r n  To maintain saturation 90% or greater by pulse oximetry  Supportive care    Oropharyngeal dysphagia  Assessment & Plan  Continue tube feeds  Patient's mentation improving today to consider re-evaluation by speech if patient's mentation continues to improve  It appears the patient is aspirating on tube feeds, absence of gag reflex as well  Appreciate palliative care's assistance  Speech reevaluated today and patient is still unable to tolerate PO, will remain NPO  D5-1/2 NS for hydration  GI consulted as per family's request for PEG tube placement-plans for PEG tube placement on Monday        Dysarthria  Assessment & Plan  Dysarthria noted on last admission 2/14, neurology consulted and did 24-hour EEG monitoring 2/16  EEG monitoring 2/16-2/17: no focal epileptiform discharges or electrographic seizures    Removed 2/17  Unable to get MRI brain due to spine stimulator  Neurology following    Weakness of both lower extremities  Assessment & Plan  Per daughter,patient had difficulty standing up in wheelchair, more so than normal  Chronic bilateral weakness in lower extremities, no significant changes since last admission  Patient wheelchair-bound and needs assistance standing up and transferring in wheelchair  Continue fall precautions  PT OT    Stroke-like symptoms  Assessment & Plan  No radiographic evidence of CVA  Neurology re-evaluated patient in light of worsening weakness, likely mechanism is hypoperfusion secondary to 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  Palliative care consult placed, appreciate input  NPO  PT/OT  Plans for PEG placement on Monday    Essential hypertension  Assessment & Plan  Monitor blood pressure with routine vitals  Hold spironolactone and lisinopril given NPO status  Continue p r n  labetalol    Paroxysmal atrial fibrillation ([address] )  Assessment & Plan  Currently rate controlled  EKG 2/19:  Sinus rhythm with first-degree AV block  Right bundle branch block  Possible lateral infarct (cited on or before 16th June 2019)  When compared with EKG of 14th February 2020, questionable change in initial forces of lateral leads  Continue rate control with digoxin  Hold Eliquis due to PEG placement on Monday      * Aphasia  Assessment & Plan  EEG monitoring 2/16-2/17: demonstrated no epileptiform activity or epileptogenic focus  Repeat CT scan of head does not demonstrate any acute findings  Patient failed speech evaluation and is NPO  Patient's speech continues to improve but slow with expression and difficult to understand      VTE Pharmacologic Prophylaxis:   Pharmacologic: Being held for PEG placement  Mechanical VTE Prophylaxis in Place: Yes    Patient Centered Rounds: I have performed bedside rounds with nursing staff today  Discussions with Specialists or Other Care Team Provider:  GI    Education and Discussions with Family / Patient:  Discussed treatment plan with patient and patient's daughter Keshia Moore    Time Spent for Care: 45 minutes  More than 50% of total time spent on counseling and coordination of care as described above      Current Length of Stay: 9 day(s)    Current Patient Status: Inpatient   Certification Statement: The patient will continue to require additional inpatient hospital stay due to PEG placement on Monday    Discharge Plan:  Pending hospital course    Code Status: Level 3 - DNAR and DNI      Subjective:   No acute overnight events  This morning patient is stating that she does not want PEG tube  I discussed this with her daughter Maida Dozier, who states that she feels her mother does want the PEG tube and they will proceed with PEG tube placement on Monday  Objective:     Vitals:   Temp (24hrs), Av 3 °F (36 8 °C), Min:97 5 °F (36 4 °C), Max:98 9 °F (37 2 °C)    Temp:  [97 5 °F (36 4 °C)-98 9 °F (37 2 °C)] 97 5 °F (36 4 °C)  HR:  [68-82] 70  Resp:  [20-21] 20  BP: ()/(44-82) 72/44  SpO2:  [87 %-95 %] 95 %  Body mass index is 30 84 kg/m²  Input and Output Summary (last 24 hours): Intake/Output Summary (Last 24 hours) at 2020 1746  Last data filed at 2020 2245  Gross per 24 hour   Intake 757 5 ml   Output 93 ml   Net 664 5 ml       Physical Exam:     Physical Exam   Constitutional: She is oriented to person, place, and time  Chronically ill appearing, cachectic   HENT:   Head: Normocephalic and atraumatic  Mouth/Throat: Oropharynx is clear and moist    Eyes: Pupils are equal, round, and reactive to light  Conjunctivae are normal    Neck: Neck supple  No JVD present  Cardiovascular: Normal rate, regular rhythm, normal heart sounds and intact distal pulses  Pulmonary/Chest: Effort normal    Course breath sounds bilaterally   Abdominal: Soft  Bowel sounds are normal    Musculoskeletal: She exhibits no edema or tenderness  Neurological: She is alert and oriented to person, place, and time  Skin: Skin is warm and dry  Capillary refill takes less than 2 seconds  Psychiatric:   Flat depressed affect   Nursing note and vitals reviewed        Additional Data:     Labs:    Results from last 7 days   Lab Units 20  0849  20  0612   WBC Thousand/uL 9 12   < > 12 18*   HEMOGLOBIN g/dL 12 1   < > 12 9 HEMATOCRIT % 37 9   < > 39 7   PLATELETS Thousands/uL 102*   < > 154   BANDS PCT %  --   --  7   LYMPHO PCT %  --   --  0*   MONO PCT %  --   --  3*   EOS PCT %  --   --  0    < > = values in this interval not displayed  Results from last 7 days   Lab Units 02/27/20  1101   SODIUM mmol/L 140   POTASSIUM mmol/L 3 3*   CHLORIDE mmol/L 110*   CO2 mmol/L 24   BUN mg/dL 12   CREATININE mg/dL 0 42*   ANION GAP mmol/L 6   CALCIUM mg/dL 8 7   GLUCOSE RANDOM mg/dL 168*         Results from last 7 days   Lab Units 02/28/20  1646 02/28/20  0616 02/27/20  2358 02/27/20  1821 02/27/20  0655 02/27/20  0559 02/26/20  2354 02/26/20  1850 02/26/20  1107 02/26/20  0551 02/26/20  0015 02/25/20  1702   POC GLUCOSE mg/dl 159* 166* 161* 171* 142* 125 149* 112 92 96 96 117         Results from last 7 days   Lab Units 02/23/20  0533 02/23/20  0110   LACTIC ACID mmol/L  --  1 8   PROCALCITONIN ng/ml 0 58*  --            * I Have Reviewed All Lab Data Listed Above  * Additional Pertinent Lab Tests Reviewed: No New Labs Available For Today    Imaging:    Imaging Reports Reviewed Today Include:  None  Imaging Personally Reviewed by Myself Includes:  None    Recent Cultures (last 7 days):     Results from last 7 days   Lab Units 02/23/20  0108 02/22/20  1848   BLOOD CULTURE  No Growth After 5 Days  No Growth After 5 Days    --    URINE CULTURE   --  70,000-79,000 cfu/ml Escherichia coli*  70,000-79,000 cfu/ml Aerococcus urinae*       Last 24 Hours Medication List:     Current Facility-Administered Medications:  acetaminophen 650 mg Rectal Q4H PRN Meear Zimmer PA-C    apixaban 5 mg Oral BID Airam Mendez MD    atorvastatin 40 mg Oral QPM Umberto Alvarez MD    [START ON 3/1/2020] cefazolin 1,000 mg Intravenous Once Airam Mendez MD    cefepime 2,000 mg Intravenous Q12H Kierra Huston MD Last Rate: 2,000 mg (02/28/20 1511)   cholecalciferol 1,000 Units Oral QPM Candi Hermosillo MD    vitamin B-12 1,000 mcg Oral QPM Candi MD [place]    dextrose 5 % and sodium chloride 0 45 % 75 mL/hr Intravenous Continuous Alysia Claros MD Last Rate: 75 mL/hr (02/28/20 1040)   digoxin 125 mcg Oral Daily Kevon Elizalde, MD    folic acid 1,795 mcg Oral Daily Kevon Elizalde, MD    gabapentin 300 mg Oral Daily Delonte Castro MD    insulin lispro 1-5 Units Subcutaneous Q6H Asaf Smith MD    Labetalol HCl 10 mg Intravenous Q6H PRN Poppy Vasquez PA-C    morphine injection 0 5 mg Intravenous Q6H PRN Alysia Claros MD    oxybutynin 5 mg Oral Daily Kevon Elizalde MD    primidone 50 mg Oral Q12H Ferny Almanza MD    spironolactone 50 mg Oral Daily Kevon Elizalde MD         Today, Patient Was Seen By: Alysia Claros MD    ** Please Note: Dictation voice to text software may have been used in the creation of this document   **

## 2020-02-28 NOTE — SOCIAL WORK
Received in care coordination rounds with Dr Fady Marsh; awaiting determination from pt and family for possible plan for PEG placement  Will continue to follow and assist with dc plan

## 2020-02-28 NOTE — PROGRESS NOTES
Progress Note - [facility] 1935, 80 y o  female MRN: [id number]    Unit/Bed#: [facility] 710-01 Encounter: [id number]    Primary Care Provider: Jan Theodore MD   Date and time admitted to hospital: 2/19/2020  8:30 AM        UTI (urinary tract infection)  Assessment & Plan  Urine cultures pansensitive E coli  Patient received Rocephin 1 dose, Rocephin has been changed to cefepime for treatment of concurrent HCAP  Blood cultures negative for 48 hours      Pneumonia  Assessment & Plan  HCAP versus aspiration  IV vancomycin cefepime and Flagyl  Monitor fever curve  P r n  Antipyretic  O2 sat monitoring with supplemental O2 p r n   To maintain saturation 90% or greater by pulse oximetry  Supportive care    Oropharyngeal dysphagia  Assessment & Plan  Continue tube feeds  Patient's mentation improving today to consider re-evaluation by speech if patient's mentation continues to improve  It appears the patient is aspirating on tube feeds, absence of gag reflex as well  Appreciate palliative care's assistance  Speech reevaluated today and patient is still unable to tolerate PO, will remain NPO  D5-1/2 NS for hydration  GI consulted as per family's request for PEG tube placement- family currently deciding on whether not they want PEG tube        Weakness of both lower extremities  Assessment & Plan  Per daughter,patient had difficulty standing up in wheelchair, more so than normal  Chronic bilateral weakness in lower extremities, no significant changes since last admission  Patient wheelchair-bound and needs assistance standing up and transferring in wheelchair  Continue fall precautions  Consult PT OT    Stroke-like symptoms  Assessment & Plan  No radiographic evidence of CVA  Neurology re-evaluated patient in light of worsening weakness, likely mechanism is hypoperfusion secondary to 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  Palliative care consult placed, appreciate input  NPO    Essential hypertension  Assessment & Plan  Monitor blood pressure with routine vitals  Hold spironolactone and lisinopril given NPO status  Continue p r n  labetalol    Paroxysmal atrial fibrillation ([address] )  Assessment & Plan  Currently rate controlled  EKG :  Sinus rhythm with first-degree AV block  Right bundle branch block  Possible lateral infarct (cited on or before 2019)  When compared with EKG of 2020, questionable change in initial forces of lateral leads  Continue therapeutic anticoagulation with Eliquis  Continue rate control with digoxin        * Aphasia  Assessment & Plan  EEG monitoring -: demonstrated no epileptiform activity or epileptogenic focus  Patient currently still aphasic but able to nod head yes and no to questioning  Repeat CT scan of head does not demonstrate any acute findings  Patient failed speech evaluation and is NPO  Hold tube feeds due to aspiration      VTE Pharmacologic Prophylaxis:   Pharmacologic: Apixaban (Eliquis)  Mechanical VTE Prophylaxis in Place: Yes    Patient Centered Rounds: I have performed bedside rounds with nursing staff today  Discussions with Specialists or Other Care Team Provider: GI    Education and Discussions with Family / Patient: patient's daughter    Time Spent for Care: 45 minutes  More than 50% of total time spent on counseling and coordination of care as described above  Current Length of Stay: 8 day(s)    Current Patient Status: Inpatient   Certification Statement: The patient will continue to require additional inpatient hospital stay due to Needing nutrition access    Discharge Plan:  Pending hospital course    Code Status: Level 3 - DNAR and DNI      Subjective:   No acute overnight events  This morning patient has no complaints      Objective:     Vitals:   Temp (24hrs), Av 5 °F (36 9 °C), Min:98 2 °F (36 8 °C), Max:99 2 °F (37 3 °C)    Temp:  [98 2 °F (36 8 °C)-99 2 °F (37 3 °C)] 98 2 °F (36 8 °C)  HR: [67-76] 69  Resp:  [16-21] 20  BP: (152-185)/(64-77) 173/69  SpO2:  [91 %-97 %] 97 %  Body mass index is 30 84 kg/m²  Input and Output Summary (last 24 hours): Intake/Output Summary (Last 24 hours) at 2/27/2020 1939  Last data filed at 2/27/2020 1839  Gross per 24 hour   Intake 2672 5 ml   Output 709 ml   Net 1963 5 ml       Physical Exam:     Physical Exam   Constitutional: She is oriented to person, place, and time  Ill-appearing, cachectic   HENT:   Head: Normocephalic and atraumatic  Dry mucosa   Eyes: Pupils are equal, round, and reactive to light  Conjunctivae are normal    Neck: Neck supple  No JVD present  Cardiovascular: Normal rate, regular rhythm, normal heart sounds and intact distal pulses  Pulmonary/Chest: Effort normal    Course breath sounds bilaterally   Abdominal: Soft  Bowel sounds are normal    Musculoskeletal: She exhibits no edema or tenderness  Neurological: She is alert and oriented to person, place, and time  Skin: Skin is warm and dry  Capillary refill takes 2 to 3 seconds  Psychiatric:   Flat depressed affect   Nursing note and vitals reviewed  Additional Data:     Labs:    Results from last 7 days   Lab Units 02/24/20  0849  02/22/20  0612 02/21/20  0447   WBC Thousand/uL 9 12   < > 12 18* 7 33   HEMOGLOBIN g/dL 12 1   < > 12 9 11 3*   HEMATOCRIT % 37 9   < > 39 7 34 5*   PLATELETS Thousands/uL 102*   < > 154 140*   BANDS PCT %  --   --  7  --    NEUTROS PCT %  --   --   --  74   LYMPHS PCT %  --   --   --  13*   LYMPHO PCT %  --   --  0*  --    MONOS PCT %  --   --   --  12   MONO PCT %  --   --  3*  --    EOS PCT %  --   --  0 0    < > = values in this interval not displayed       Results from last 7 days   Lab Units 02/27/20  1101   SODIUM mmol/L 140   POTASSIUM mmol/L 3 3*   CHLORIDE mmol/L 110*   CO2 mmol/L 24   BUN mg/dL 12   CREATININE mg/dL 0 42*   ANION GAP mmol/L 6   CALCIUM mg/dL 8 7   GLUCOSE RANDOM mg/dL 168*         Results from last 7 days   Lab Units 02/27/20  1821 02/27/20  0655 02/27/20  0559 02/26/20  2354 02/26/20  1850 02/26/20  1107 02/26/20  0551 02/26/20  0015 02/25/20  1702 02/25/20  1238 02/25/20  0607 02/25/20  0040   POC GLUCOSE mg/dl 171* 142* 125 149* 112 92 96 96 117 138 125 119         Results from last 7 days   Lab Units 02/23/20  0533 02/23/20  0110   LACTIC ACID mmol/L  --  1 8   PROCALCITONIN ng/ml 0 58*  --            * I Have Reviewed All Lab Data Listed Above  * Additional Pertinent Lab Tests Reviewed: No New Labs Available For Today    Imaging:    Imaging Reports Reviewed Today Include:  None  Imaging Personally Reviewed by Myself Includes:  None    Recent Cultures (last 7 days):     Results from last 7 days   Lab Units 02/23/20  0108 02/22/20  1848   BLOOD CULTURE  No Growth After 4 Days  No Growth After 4 Days    --    URINE CULTURE   --  70,000-79,000 cfu/ml Escherichia coli*  70,000-79,000 cfu/ml Aerococcus urinae*       Last 24 Hours Medication List:     Current Facility-Administered Medications:  acetaminophen 650 mg Rectal Q4H PRN Perlita Archibald PA-C    apixaban 5 mg Oral BID Avery Rivero MD    atorvastatin 40 mg Oral QPM Avery Rivero MD    cefepime 2,000 mg Intravenous Q12H Alejandro Beverly MD Last Rate: Stopped (02/27/20 1439)   cholecalciferol 1,000 Units Oral QPM Avery Rivero MD    vitamin B-12 1,000 mcg Oral QPM Avery Rivero MD    dextrose 5 % and sodium chloride 0 45 % 75 mL/hr Intravenous Continuous Lilo Alford MD Last Rate: 75 mL/hr (02/27/20 1928)   digoxin 125 mcg Oral Daily Avery Rivero MD    folic acid 0,730 mcg Oral Daily Avery Rivero MD    gabapentin 300 mg Oral Daily Alejandro Beverly MD    insulin lispro 1-5 Units Subcutaneous Q6H [address] Alejandro Beverly MD    Labetalol HCl 10 mg Intravenous Q6H PRN Poppy Vasquez PA-C    metroNIDAZOLE 500 mg Intravenous Q8H Alejandro Beverly MD Last Rate: Stopped (02/27/20 1352)   morphine injection 0 5 mg Intravenous Q6H PRN Maritza Ashley Dotson MD    oxybutynin 5 mg Oral Daily Cherelle Bryan MD    primidone 50 mg Oral Q12H [address] Cherelle Bryan MD    spironolactone 50 mg Oral Daily Cherelle Bryan MD    vancomycin 1,250 mg Intravenous Q12H Hunter Elizondo MD Last Rate: Stopped (02/27/20 1000)        Today, Patient Was Seen By: Hunter Elizondo MD    ** Please Note: Dictation voice to text software may have been used in the creation of this document   **

## 2020-02-28 NOTE — ASSESSMENT & PLAN NOTE
EEG monitoring 2/16-2/17: demonstrated no epileptiform activity or epileptogenic focus  Repeat CT scan of head does not demonstrate any acute findings    Patient failed speech evaluation and is NPO  Patient's speech continues to improve but slow with expression and difficult to understand

## 2020-02-28 NOTE — RESTORATIVE TECHNICIAN NOTE
Restorative Specialist Mobility Note                      Repositioned: Other (Comment)(Rep /sat pt upright in bed  Bed alarm on   Pt callbell, phone/tray within reach )          Martínez WOODRUFF, Restorative Technician, [employer]

## 2020-02-28 NOTE — CONSULTS
The patient's Vancomycin therapy has been completed / discontinued  Thank you for allowing us to take part in this patient's care  Pharmacy will sign-off now; please call or re-consult if there are any questions       Thanks  Verito Elizabeth

## 2020-02-28 NOTE — ASSESSMENT & PLAN NOTE
Continue tube feeds  Patient's mentation improving today to consider re-evaluation by speech if patient's mentation continues to improve  It appears the patient is aspirating on tube feeds, absence of gag reflex as well  Appreciate palliative care's assistance  Speech reevaluated today and patient is still unable to tolerate PO, will remain NPO  D5-1/2 NS for hydration  GI consulted as per family's request for PEG tube placement-plans for PEG tube placement on Monday

## 2020-02-28 NOTE — ASSESSMENT & PLAN NOTE
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

## 2020-02-28 NOTE — ASSESSMENT & PLAN NOTE
Currently rate controlled  EKG 2/19:  Sinus rhythm with first-degree AV block  Right bundle branch block  Possible lateral infarct (cited on or before 16th June 2019)  When compared with EKG of 14th February 2020, questionable change in initial forces of lateral leads    Continue rate control with digoxin  Hold Eliquis due to PEG placement on Monday

## 2020-02-28 NOTE — ASSESSMENT & PLAN NOTE
Continue tube feeds  Patient's mentation improving today to consider re-evaluation by speech if patient's mentation continues to improve  It appears the patient is aspirating on tube feeds, absence of gag reflex as well  Appreciate palliative care's assistance  Speech reevaluated today and patient is still unable to tolerate PO, will remain NPO  D5-1/2 NS for hydration  GI consulted as per family's request for PEG tube placement- family currently deciding on whether not they want PEG tube

## 2020-02-28 NOTE — ASSESSMENT & PLAN NOTE
HCAP versus aspiration  IV vancomycin and Flagyl discontinued  Continue with cefepime for 2 more days to complete 7 day antibiotic course  Monitor fever curve  P r n  Antipyretic  O2 sat monitoring with supplemental O2 p r n   To maintain saturation 90% or greater by pulse oximetry  Supportive care

## 2020-02-29 PROBLEM — R65.10 SIRS (SYSTEMIC INFLAMMATORY RESPONSE SYNDROME) (HCC): Status: RESOLVED | Noted: 2020-02-15 | Resolved: 2020-02-29

## 2020-02-29 LAB
ANION GAP SERPL CALCULATED.3IONS-SCNC: 6 MMOL/L (ref 4–13)
BUN SERPL-MCNC: 10 MG/DL (ref 5–25)
CALCIUM SERPL-MCNC: 8.6 MG/DL (ref 8.3–10.1)
CHLORIDE SERPL-SCNC: 112 MMOL/L (ref 100–108)
CO2 SERPL-SCNC: 24 MMOL/L (ref 21–32)
CREAT SERPL-MCNC: 0.5 MG/DL (ref 0.6–1.3)
ERYTHROCYTE [DISTWIDTH] IN BLOOD BY AUTOMATED COUNT: 12.9 % (ref 11.6–15.1)
GFR SERPL CREATININE-BSD FRML MDRD: 89 ML/MIN/1.73SQ M
GLUCOSE SERPL-MCNC: 117 MG/DL (ref 65–140)
GLUCOSE SERPL-MCNC: 125 MG/DL (ref 65–140)
GLUCOSE SERPL-MCNC: 150 MG/DL (ref 65–140)
GLUCOSE SERPL-MCNC: 159 MG/DL (ref 65–140)
GLUCOSE SERPL-MCNC: 160 MG/DL (ref 65–140)
HCT VFR BLD AUTO: 35.6 % (ref 34.8–46.1)
HGB BLD-MCNC: 11.8 G/DL (ref 11.5–15.4)
INR PPP: 1.58 (ref 0.84–1.19)
MCH RBC QN AUTO: 30.7 PG (ref 26.8–34.3)
MCHC RBC AUTO-ENTMCNC: 33.1 G/DL (ref 31.4–37.4)
MCV RBC AUTO: 93 FL (ref 82–98)
PLATELET # BLD AUTO: 132 THOUSANDS/UL (ref 149–390)
PMV BLD AUTO: 10.8 FL (ref 8.9–12.7)
POTASSIUM SERPL-SCNC: 3.1 MMOL/L (ref 3.5–5.3)
PROTHROMBIN TIME: 18.4 SECONDS (ref 11.6–14.5)
RBC # BLD AUTO: 3.84 MILLION/UL (ref 3.81–5.12)
SODIUM SERPL-SCNC: 142 MMOL/L (ref 136–145)
WBC # BLD AUTO: 3.11 THOUSAND/UL (ref 4.31–10.16)

## 2020-02-29 PROCEDURE — 97110 THERAPEUTIC EXERCISES: CPT

## 2020-02-29 PROCEDURE — 80048 BASIC METABOLIC PNL TOTAL CA: CPT | Performed by: INTERNAL MEDICINE

## 2020-02-29 PROCEDURE — 85610 PROTHROMBIN TIME: CPT | Performed by: INTERNAL MEDICINE

## 2020-02-29 PROCEDURE — 99233 SBSQ HOSP IP/OBS HIGH 50: CPT | Performed by: INTERNAL MEDICINE

## 2020-02-29 PROCEDURE — 82948 REAGENT STRIP/BLOOD GLUCOSE: CPT

## 2020-02-29 PROCEDURE — 85027 COMPLETE CBC AUTOMATED: CPT | Performed by: INTERNAL MEDICINE

## 2020-02-29 RX ORDER — BISACODYL 10 MG
10 SUPPOSITORY, RECTAL RECTAL ONCE AS NEEDED
Status: COMPLETED | OUTPATIENT
Start: 2020-02-29 | End: 2020-02-29

## 2020-02-29 RX ORDER — POTASSIUM CHLORIDE 14.9 MG/ML
20 INJECTION INTRAVENOUS
Status: COMPLETED | OUTPATIENT
Start: 2020-02-29 | End: 2020-02-29

## 2020-02-29 RX ORDER — METOPROLOL TARTRATE 5 MG/5ML
5 INJECTION INTRAVENOUS EVERY 8 HOURS
Status: DISCONTINUED | OUTPATIENT
Start: 2020-02-29 | End: 2020-03-03

## 2020-02-29 RX ADMIN — METOPROLOL TARTRATE 5 MG: 5 INJECTION INTRAVENOUS at 10:39

## 2020-02-29 RX ADMIN — CEFEPIME HYDROCHLORIDE 2000 MG: 2 INJECTION, POWDER, FOR SOLUTION INTRAVENOUS at 02:19

## 2020-02-29 RX ADMIN — MORPHINE SULFATE 0.5 MG: 2 INJECTION, SOLUTION INTRAMUSCULAR; INTRAVENOUS at 13:01

## 2020-02-29 RX ADMIN — DEXTROSE AND SODIUM CHLORIDE 75 ML/HR: 5; .45 INJECTION, SOLUTION INTRAVENOUS at 02:23

## 2020-02-29 RX ADMIN — POTASSIUM CHLORIDE 20 MEQ: 14.9 INJECTION, SOLUTION INTRAVENOUS at 12:50

## 2020-02-29 RX ADMIN — METOPROLOL TARTRATE 5 MG: 5 INJECTION INTRAVENOUS at 18:56

## 2020-02-29 RX ADMIN — CEFEPIME HYDROCHLORIDE 2000 MG: 2 INJECTION, POWDER, FOR SOLUTION INTRAVENOUS at 15:46

## 2020-02-29 RX ADMIN — MORPHINE SULFATE 1 MG: 2 INJECTION, SOLUTION INTRAMUSCULAR; INTRAVENOUS at 16:54

## 2020-02-29 RX ADMIN — BISACODYL 10 MG: 10 SUPPOSITORY RECTAL at 22:12

## 2020-02-29 RX ADMIN — POTASSIUM CHLORIDE 20 MEQ: 14.9 INJECTION, SOLUTION INTRAVENOUS at 10:36

## 2020-02-29 RX ADMIN — MORPHINE SULFATE 1 MG: 2 INJECTION, SOLUTION INTRAMUSCULAR; INTRAVENOUS at 23:59

## 2020-02-29 RX ADMIN — DEXTROSE AND SODIUM CHLORIDE 75 ML/HR: 5; .45 INJECTION, SOLUTION INTRAVENOUS at 23:22

## 2020-02-29 RX ADMIN — INSULIN LISPRO 1 UNITS: 100 INJECTION, SOLUTION INTRAVENOUS; SUBCUTANEOUS at 00:39

## 2020-02-29 RX ADMIN — INSULIN LISPRO 1 UNITS: 100 INJECTION, SOLUTION INTRAVENOUS; SUBCUTANEOUS at 05:25

## 2020-02-29 NOTE — PLAN OF CARE
Problem: PHYSICAL THERAPY ADULT  Goal: Performs mobility at highest level of function for planned discharge setting  See evaluation for individualized goals  Description  Treatment/Interventions: Functional transfer training, LE strengthening/ROM, Therapeutic exercise, Endurance training, Cognitive reorientation, Patient/family training, Bed mobility, Gait training  Equipment Recommended: Efra Skinner, Wheelchair       See flowsheet documentation for full assessment, interventions and recommendations  Outcome: Not Progressing  Note:   Prognosis: Guarded  Problem List: Decreased strength, Decreased endurance, Impaired balance, Decreased mobility, Decreased coordination, Decreased cognition, Decreased safety awareness, Impaired judgement, Pain  Assessment: Patient seen for physical therapy session with a focus on sitting tolerance, there-ex, sitting balance, and bed mobility  Pt received supine in bed and agreeable to PT session  Patient is currently limited by activity tolerance and endurance level  Pt present with cervical tightness and limited rotation towards the left  Performed there-ex to improve cervical ROM  Despite generalized weakness, patient participate well in therapy but requires frequent rest breaks  Increased sitting tolerance to 15 minutes today  Pt to benefit from continued inpatient PT services to improve functional mobility  Will plan to perform OOB mobility when appropriate  Recommend STR upon discharge once medically stable  Barriers to Discharge: Decreased caregiver support     Recommendation: Post acute IP rehab     PT - OK to Discharge: Yes(to rehab once medically stable)    See flowsheet documentation for full assessment

## 2020-02-29 NOTE — ASSESSMENT & PLAN NOTE
Dysarthria noted on last admission 2/14, neurology consulted and did 24-hour EEG monitoring 2/16  EEG monitoring 2/16-2/17: no focal epileptiform discharges or electrographic seizures    Removed 2/17  Unable to get MRI brain due to spine stimulator  Neurology has signed off appreciate input

## 2020-02-29 NOTE — ASSESSMENT & PLAN NOTE
Monitor blood pressure with routine vitals  Hold spironolactone and lisinopril given NPO status  Continue p r n   Labetalol  Started on scheduled Lopressor

## 2020-02-29 NOTE — PROGRESS NOTES
Progress Note - Sam Issa 1935, 80 y o  female MRN: [id number]    Unit/Bed#: [facility] 710-01 Encounter: [id number]    Primary Care Provider: Yani Yang MD   Date and time admitted to hospital: 2/19/2020  8:30 AM        UTI (urinary tract infection)  Assessment & Plan  Urine cultures pansensitive E coli  Patient received Rocephin 1 dose, Rocephin has been changed to cefepime for treatment of concurrent HCAP  Blood cultures negative for 48 hours      Pneumonia  Assessment & Plan  HCAP versus aspiration  IV vancomycin and Flagyl discontinued  Continue with cefepime for 1 more day to complete 7 day antibiotic course  Monitor fever curve  P r n  Antipyretic  O2 sat monitoring with supplemental O2 p r n  To maintain saturation 90% or greater by pulse oximetry  Supportive care    Oropharyngeal dysphagia  Assessment & Plan  Continue tube feeds  Patient's mentation improving today to consider re-evaluation by speech if patient's mentation continues to improve  It appears the patient is aspirating on tube feeds, absence of gag reflex as well  Appreciate palliative care's assistance  Speech reevaluated and patient is still unable to tolerate PO, will remain NPO  D5-1/2 NS for hydration  GI consulted as per family's request for PEG tube placement-plans for PEG tube placement on Monday        Dysarthria  Assessment & Plan  Dysarthria noted on last admission 2/14, neurology consulted and did 24-hour EEG monitoring 2/16  EEG monitoring 2/16-2/17: no focal epileptiform discharges or electrographic seizures    Removed 2/17  Unable to get MRI brain due to spine stimulator  Neurology has signed off appreciate input    Weakness of both lower extremities  Assessment & Plan  Per daughter,patient had difficulty standing up in wheelchair, more so than normal  Chronic bilateral weakness in lower extremities, no significant changes since last admission  Patient wheelchair-bound and needs assistance standing up and transferring in wheelchair  Continue fall precautions  PT OT    Stroke-like symptoms  Assessment & Plan  No radiographic evidence of CVA  Neurology re-evaluated patient in light of worsening weakness, likely mechanism is hypoperfusion secondary to 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  Palliative care consult placed, appreciate input  NPO  PT/OT  Plans for PEG placement on Monday    Essential hypertension  Assessment & Plan  Monitor blood pressure with routine vitals  Hold spironolactone and lisinopril given NPO status  Continue p r n  Labetalol  Started on scheduled Lopressor    Paroxysmal atrial fibrillation [facility])  Assessment & Plan  Currently rate controlled  EKG 2/19:  Sinus rhythm with first-degree AV block  Right bundle branch block  Possible lateral infarct (cited on or before 16th June 2019)  When compared with EKG of 14th February 2020, questionable change in initial forces of lateral leads  Continue rate control with digoxin  Hold Eliquis due to PEG placement on Monday      * Aphasia  Assessment & Plan  EEG monitoring 2/16-2/17: demonstrated no epileptiform activity or epileptogenic focus  Repeat CT scan of head does not demonstrate any acute findings  Patient failed speech evaluation and is NPO  Patient's speech continues to improve but slow with expression and difficult to understand    SIRS (systemic inflammatory response syndrome) (HCC)resolved as of 2/29/2020  Assessment & Plan  Patient diagnosed with SIRS on last admission 2/14, presenting with heart rate 104 +WBC 17 9  Negative workup on 02/16  Negative today 2/19        VTE Pharmacologic Prophylaxis:   Pharmacologic: Held due to PEG placed on Monday  Mechanical VTE Prophylaxis in Place: Yes    Patient Centered Rounds: I have performed bedside rounds with nursing staff today      Discussions with Specialists or Other Care Team Provider:  None    Education and Discussions with Family / Patient:  Discussed treatment plan with patient    Time Spent for Care: 45 minutes  More than 50% of total time spent on counseling and coordination of care as described above  Current Length of Stay: 10 day(s)    Current Patient Status: Inpatient   Certification Statement: The patient will continue to require additional inpatient hospital stay due to PEG placement on Monday    Discharge Plan:  Rehab    Code Status: Level 3 - DNAR and DNI      Subjective:   No acute overnight events  This morning patient states she is in a lot of pain  She was found to have 600 mL of urine in her bladder but then voided on her own  Objective:     Vitals:   Temp (24hrs), Av 8 °F (36 6 °C), Min:97 5 °F (36 4 °C), Max:98 1 °F (36 7 °C)    Temp:  [97 5 °F (36 4 °C)-98 1 °F (36 7 °C)] 97 8 °F (36 6 °C)  HR:  [63-72] 72  Resp:  [18] 18  BP: ()/(44-99) 156/78  SpO2:  [95 %-100 %] 96 %  Body mass index is 30 84 kg/m²  Input and Output Summary (last 24 hours): Intake/Output Summary (Last 24 hours) at 2020 1236  Last data filed at 2020 1230  Gross per 24 hour   Intake 0 ml   Output 203 ml   Net -203 ml       Physical Exam:     Physical Exam   Constitutional: She is oriented to person, place, and time  Chronically ill-appearing, cachectic   HENT:   Head: Normocephalic and atraumatic  Mouth/Throat: Oropharynx is clear and moist    Eyes: Pupils are equal, round, and reactive to light  Conjunctivae are normal    Neck: Neck supple  No JVD present  Cardiovascular: Normal rate, regular rhythm, normal heart sounds and intact distal pulses  Pulmonary/Chest: Effort normal    Course breath sounds bilaterally   Abdominal: Soft  Bowel sounds are normal    Musculoskeletal: She exhibits no edema or tenderness  Neurological: She is alert and oriented to person, place, and time  Skin: Skin is warm and dry  Capillary refill takes less than 2 seconds  Psychiatric:   Flat depressed affect   Nursing note and vitals reviewed        Additional Data: Labs:    Results from last 7 days   Lab Units 02/29/20  0526   WBC Thousand/uL 3 11*   HEMOGLOBIN g/dL 11 8   HEMATOCRIT % 35 6   PLATELETS Thousands/uL 132*     Results from last 7 days   Lab Units 02/29/20  0526   SODIUM mmol/L 142   POTASSIUM mmol/L 3 1*   CHLORIDE mmol/L 112*   CO2 mmol/L 24   BUN mg/dL 10   CREATININE mg/dL 0 50*   ANION GAP mmol/L 6   CALCIUM mg/dL 8 6   GLUCOSE RANDOM mg/dL 160*     Results from last 7 days   Lab Units 02/29/20  0526   INR  1 58*     Results from last 7 days   Lab Units 02/29/20  0521 02/29/20  0006 02/28/20  2147 02/28/20  1646 02/28/20  0616 02/27/20  2358 02/27/20  1821 02/27/20  0655 02/27/20  0559 02/26/20  2354 02/26/20  1850 02/26/20  1107   POC GLUCOSE mg/dl 150* 159* 153* 159* 166* 161* 171* 142* 125 149* 112 92         Results from last 7 days   Lab Units 02/23/20  0533 02/23/20  0110   LACTIC ACID mmol/L  --  1 8   PROCALCITONIN ng/ml 0 58*  --            * I Have Reviewed All Lab Data Listed Above  * Additional Pertinent Lab Tests Reviewed: All Labs Within Last 24 Hours Reviewed    Imaging:    Imaging Reports Reviewed Today Include: n/a  Imaging Personally Reviewed by Myself Includes:  n/a    Recent Cultures (last 7 days):     Results from last 7 days   Lab Units 02/23/20  0108 02/22/20  1848   BLOOD CULTURE  No Growth After 5 Days  No Growth After 5 Days    --    URINE CULTURE   --  70,000-79,000 cfu/ml Escherichia coli*  70,000-79,000 cfu/ml Aerococcus urinae*       Last 24 Hours Medication List:     Current Facility-Administered Medications:  acetaminophen 650 mg Rectal Q4H PRN Nereyda Apodaca PA-C    atorvastatin 40 mg Oral QPM Chris Myrick MD    [START ON 3/1/2020] cefazolin 1,000 mg Intravenous Once Bora Marin MD    cefepime 2,000 mg Intravenous Q12H Lazarus Lather, MD Last Rate: 2,000 mg (02/29/20 0219)   cholecalciferol 1,000 Units Oral QPM Candi Hermosillo MD    vitamin B-12 1,000 mcg Oral QPM Chris Myrick MD    dextrose 5 % and sodium chloride 0 45 % 75 mL/hr Intravenous Continuous Hunter Elizondo MD Last Rate: 75 mL/hr (02/29/20 0223)   digoxin 125 mcg Oral Daily Cherelle Bryan MD    folic acid 4,969 mcg Oral Daily Cherelle Bryan MD    gabapentin 300 mg Oral Daily Minda Brittle, MD    insulin lispro 1-5 Units Subcutaneous Q6H Bibi Esparza MD    Labetalol HCl 10 mg Intravenous Q6H PRN Poppy Vasquez PA-C    metoprolol 5 mg Intravenous Donte Henry MD    morphine injection 0 5 mg Intravenous Q6H PRN Hunter Elizondo MD    oxybutynin 5 mg Oral Daily Cherelle Bryan MD    potassium chloride 20 mEq Intravenous Q2H Hunter Elizondo MD Last Rate: 20 mEq (02/29/20 1036)   primidone 50 mg Oral Q12H Masha Santana MD    spironolactone 50 mg Oral Daily Cherelle Bryan MD         Today, Patient Was Seen By: Hunter Elizondo MD    ** Please Note: Dictation voice to text software may have been used in the creation of this document   **

## 2020-02-29 NOTE — SPEECH THERAPY NOTE
Speech Language/Pathology  Pt will receive PEG Monday 3/2/2020  Please continue to offer oral care during the day for pt's comfort

## 2020-02-29 NOTE — ASSESSMENT & PLAN NOTE
Continue tube feeds  Patient's mentation improving today to consider re-evaluation by speech if patient's mentation continues to improve  It appears the patient is aspirating on tube feeds, absence of gag reflex as well  Appreciate palliative care's assistance  Speech reevaluated and patient is still unable to tolerate PO, will remain NPO  D5-1/2 NS for hydration  GI consulted as per family's request for PEG tube placement-plans for PEG tube placement on Monday

## 2020-02-29 NOTE — ASSESSMENT & PLAN NOTE
HCAP versus aspiration  IV vancomycin and Flagyl discontinued  Continue with cefepime for 1 more day to complete 7 day antibiotic course  Monitor fever curve  P r n  Antipyretic  O2 sat monitoring with supplemental O2 p r n   To maintain saturation 90% or greater by pulse oximetry  Supportive care

## 2020-02-29 NOTE — PHYSICAL THERAPY NOTE
Physical Therapy Treatment Note       02/29/20 1400   Pain Assessment   Pain Assessment 0-10   Pain Score 7   Pain Type Acute pain   Pain Location   (L UE > B LEs)   Patient's Stated Pain Goal No pain   Hospital Pain Intervention(s) Repositioned   Response to Interventions worse w/ any mvmt   Restrictions/Precautions   Weight Bearing Precautions Per Order No   Other Precautions Cognitive; Chair Alarm; Bed Alarm;Multiple lines; Fall Risk;Pain;Telemetry;O2   General   Chart Reviewed Yes   Family/Caregiver Present No   Cognition   Overall Cognitive Status Impaired   Arousal/Participation Lethargic;Cooperative;Responsive   Attention Attends with cues to redirect   Orientation Level Oriented to person;Oriented to place   Memory Unable to assess   Following Commands Follows one step commands without difficulty   Subjective   Subjective states she is tired and is having pain L UE > LEs  Nurse made aware of request for pain meds p session   Bed Mobility   Rolling R 2  Maximal assistance   Additional items Assist x 1  (to reposition)   Additional Comments time spent p session to reposition   Endurance Deficit   Endurance Deficit Yes   Endurance Deficit Description fatigue, weakness, cog, pain   Activity Tolerance   Activity Tolerance Patient limited by fatigue;Patient limited by pain;Treatment limited secondary to medical complications (Comment)   Nurse Made Aware yes   Exercises   Balance training  supine aarom all 4 ext x several reps, dependant on pain  time spent p session to reposition each extremity to comfort   Assessment   Prognosis Guarded   Problem List Decreased strength;Decreased endurance; Impaired balance;Decreased mobility; Decreased coordination;Decreased cognition;Decreased safety awareness; Impaired judgement;Pain   Goals   Patient Goals to feel better   STG Expiration Date 03/09/20   PT Treatment Day 2   Plan   Treatment/Interventions Functional transfer training;LE strengthening/ROM; Therapeutic exercise; Endurance training;Patient/family training;Equipment eval/education; Bed mobility   Progress Slow progress, medical status limitations   PT Frequency 2-3x/wk  (2-3x/wk)   Recommendation   Recommendation Post acute IP rehab   Equipment Recommended Wheelchair;Walker   PT - OK to Discharge   (when stable to rehab)   Dagoberto Yip PT, DPT CSRS

## 2020-02-29 NOTE — PROGRESS NOTES
Pt sleeping in bed, easily aroused   Denies any discomfort at this time, pt repositioned in bed  call bell within reach, will continue to monitor

## 2020-03-01 LAB
GLUCOSE SERPL-MCNC: 109 MG/DL (ref 65–140)
GLUCOSE SERPL-MCNC: 124 MG/DL (ref 65–140)
GLUCOSE SERPL-MCNC: 138 MG/DL (ref 65–140)
GLUCOSE SERPL-MCNC: 139 MG/DL (ref 65–140)

## 2020-03-01 PROCEDURE — 82948 REAGENT STRIP/BLOOD GLUCOSE: CPT

## 2020-03-01 PROCEDURE — 99233 SBSQ HOSP IP/OBS HIGH 50: CPT | Performed by: INTERNAL MEDICINE

## 2020-03-01 RX ORDER — BISACODYL 10 MG
10 SUPPOSITORY, RECTAL RECTAL ONCE
Status: COMPLETED | OUTPATIENT
Start: 2020-03-01 | End: 2020-03-01

## 2020-03-01 RX ADMIN — DEXTROSE AND SODIUM CHLORIDE 75 ML/HR: 5; .45 INJECTION, SOLUTION INTRAVENOUS at 15:40

## 2020-03-01 RX ADMIN — METOPROLOL TARTRATE 5 MG: 5 INJECTION INTRAVENOUS at 01:25

## 2020-03-01 RX ADMIN — METOPROLOL TARTRATE 5 MG: 5 INJECTION INTRAVENOUS at 17:38

## 2020-03-01 RX ADMIN — MORPHINE SULFATE 2 MG: 2 INJECTION, SOLUTION INTRAMUSCULAR; INTRAVENOUS at 10:52

## 2020-03-01 RX ADMIN — MORPHINE SULFATE 2 MG: 2 INJECTION, SOLUTION INTRAMUSCULAR; INTRAVENOUS at 22:04

## 2020-03-01 RX ADMIN — CEFEPIME HYDROCHLORIDE 2000 MG: 2 INJECTION, POWDER, FOR SOLUTION INTRAVENOUS at 14:38

## 2020-03-01 RX ADMIN — CEFEPIME HYDROCHLORIDE 2000 MG: 2 INJECTION, POWDER, FOR SOLUTION INTRAVENOUS at 01:51

## 2020-03-01 RX ADMIN — CEFAZOLIN SODIUM 1000 MG: 1 SOLUTION INTRAVENOUS at 06:08

## 2020-03-01 RX ADMIN — METOPROLOL TARTRATE 5 MG: 5 INJECTION INTRAVENOUS at 09:50

## 2020-03-01 RX ADMIN — ACETAMINOPHEN 650 MG: 650 SUPPOSITORY RECTAL at 04:09

## 2020-03-01 RX ADMIN — BISACODYL 10 MG: 10 SUPPOSITORY RECTAL at 14:11

## 2020-03-01 NOTE — ASSESSMENT & PLAN NOTE
Monitor blood pressure with routine vitals  Hold spironolactone and lisinopril given NPO status  Continue p r n   Labetalol  Continue scheduled Lopressor

## 2020-03-01 NOTE — PROGRESS NOTES
Progress Note - Bijan Corona 1935, 80 y o  female MRN: [id number]    Unit/Bed#: [facility] 710-01 Encounter: [id number]    Primary Care Provider: Bishnu Tanner MD   Date and time admitted to hospital: 2/19/2020  8:30 AM        UTI (urinary tract infection)  Assessment & Plan  Urine cultures pansensitive E coli  Blood cultures negative for 48 hours  Completed 7 days of Cefepime for concurrent HCAP    Pneumonia  Assessment & Plan  HCAP versus aspiration  IV vancomycin and Flagyl discontinued  D/c cefepime- completed 7 day antibiotic course  Monitor fever curve  P r n  Antipyretic  O2 sat monitoring with supplemental O2 p r n  To maintain saturation 90% or greater by pulse oximetry  Supportive care    Oropharyngeal dysphagia  Assessment & Plan  Continue tube feeds  Patient's mentation improving today to consider re-evaluation by speech if patient's mentation continues to improve  It appears the patient is aspirating on tube feeds, absence of gag reflex as well  Appreciate palliative care's assistance  Speech reevaluated and patient is still unable to tolerate PO, will remain NPO  D5-1/2 NS for hydration  GI consulted as per family's request for PEG tube placement-plans for PEG tube placement on Monday        Dysarthria  Assessment & Plan  Dysarthria noted on last admission 2/14, neurology consulted and did 24-hour EEG monitoring 2/16  EEG monitoring 2/16-2/17: no focal epileptiform discharges or electrographic seizures    Removed 2/17  Unable to get MRI brain due to spine stimulator  Neurology has signed off appreciate input    Weakness of both lower extremities  Assessment & Plan  Per daughter,patient had difficulty standing up in wheelchair, more so than normal  Chronic bilateral weakness in lower extremities, no significant changes since last admission  Patient wheelchair-bound and needs assistance standing up and transferring in wheelchair  Continue fall precautions  PT OT    Stroke-like symptoms  Assessment & Plan  No radiographic evidence of CVA  Neurology re-evaluated patient in light of worsening weakness, likely mechanism is hypoperfusion secondary to 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  Palliative care consult placed, appreciate input  NPO  PT/OT  Plans for PEG placement on Monday    Essential hypertension  Assessment & Plan  Monitor blood pressure with routine vitals  Hold spironolactone and lisinopril given NPO status  Continue p r n  Labetalol  Continue scheduled Lopressor    Paroxysmal atrial fibrillation [facility])  Assessment & Plan  Currently rate controlled  EKG 2/19:  Sinus rhythm with first-degree AV block  Right bundle branch block  Possible lateral infarct (cited on or before 16th June 2019)  When compared with EKG of 14th February 2020, questionable change in initial forces of lateral leads  Continue rate control with digoxin  Hold Eliquis due to PEG placement on Monday      * Aphasia  Assessment & Plan  EEG monitoring 2/16-2/17: demonstrated no epileptiform activity or epileptogenic focus  Repeat CT scan of head does not demonstrate any acute findings  Patient failed speech evaluation and is NPO  Patient's speech continues to improve but slow with expression and difficult to understand      VTE Pharmacologic Prophylaxis:   Pharmacologic: Pharmacologic VTE Prophylaxis contraindicated due to held for PEG  Mechanical VTE Prophylaxis in Place: Yes    Patient Centered Rounds: I have performed bedside rounds with nursing staff today  Discussions with Specialists or Other Care Team Provider: none    Education and Discussions with Family / Patient: discussed plan with patient and patient's daughter en over the phone    Time Spent for Care: 45 minutes  More than 50% of total time spent on counseling and coordination of care as described above      Current Length of Stay: 11 day(s)    Current Patient Status: Inpatient   Certification Statement: The patient will continue to require additional inpatient hospital stay due to peg placment    Discharge Plan: rehab    Code Status: Level 3 - DNAR and DNI      Subjective:   No acute overnight events  This morning patient complains of constipation  Objective:     Vitals:   Temp (24hrs), Av 4 °F (36 3 °C), Min:97 3 °F (36 3 °C), Max:97 6 °F (36 4 °C)    Temp:  [97 3 °F (36 3 °C)-97 6 °F (36 4 °C)] 97 4 °F (36 3 °C)  HR:  [57-73] 73  Resp:  [17-20] 20  BP: ()/(70-81) 171/77  SpO2:  [96 %-100 %] 96 %  Body mass index is 30 84 kg/m²  Input and Output Summary (last 24 hours): Intake/Output Summary (Last 24 hours) at 3/1/2020 1430  Last data filed at 3/1/2020 1100  Gross per 24 hour   Intake 3952 5 ml   Output 800 ml   Net 3152 5 ml       Physical Exam:     Physical Exam   Constitutional: She is oriented to person, place, and time  Chronically ill appearing, cachetic   HENT:   Head: Normocephalic and atraumatic  Mouth/Throat: Oropharynx is clear and moist    Eyes: Pupils are equal, round, and reactive to light  Conjunctivae are normal    Neck: Neck supple  No JVD present  Cardiovascular: Normal rate, regular rhythm, normal heart sounds and intact distal pulses  Pulmonary/Chest: Effort normal and breath sounds normal    Abdominal: Soft  Bowel sounds are normal    Musculoskeletal: She exhibits no edema or tenderness  Neurological: She is alert and oriented to person, place, and time  Skin: Skin is warm and dry  Capillary refill takes less than 2 seconds  Psychiatric: She has a normal mood and affect  Her behavior is normal  Judgment and thought content normal    Nursing note and vitals reviewed        Additional Data:     Labs:    Results from last 7 days   Lab Units 20  0526   WBC Thousand/uL 3 11*   HEMOGLOBIN g/dL 11 8   HEMATOCRIT % 35 6   PLATELETS Thousands/uL 132*     Results from last 7 days   Lab Units 20  0526   SODIUM mmol/L 142   POTASSIUM mmol/L 3 1*   CHLORIDE mmol/L 112*   CO2 mmol/L 24   BUN mg/dL 10   CREATININE mg/dL 0 50*   ANION GAP mmol/L 6   CALCIUM mg/dL 8 6   GLUCOSE RANDOM mg/dL 160*     Results from last 7 days   Lab Units 02/29/20  0526   INR  1 58*     Results from last 7 days   Lab Units 03/01/20  1046 03/01/20  0614 03/01/20  0043 02/29/20  2050 02/29/20  1505 02/29/20  0521 02/29/20  0006 02/28/20  2147 02/28/20  1646 02/28/20  0616 02/27/20  2358 02/27/20  1821   POC GLUCOSE mg/dl 139 138 124 117 125 150* 159* 153* 159* 166* 161* 171*                   * I Have Reviewed All Lab Data Listed Above    * Additional Pertinent Lab Tests Reviewed: No New Labs Available For Today    Imaging:    Imaging Reports Reviewed Today Include: none  Imaging Personally Reviewed by Myself Includes:  none    Recent Cultures (last 7 days):           Last 24 Hours Medication List:     Current Facility-Administered Medications:  acetaminophen 650 mg Rectal Q4H PRN Therese Doran PA-C    atorvastatin 40 mg Oral QPM Jared Allen MD    cefepime 2,000 mg Intravenous Q12H Delphine Boas, MD Last Rate: 2,000 mg (03/01/20 0151)   cholecalciferol 1,000 Units Oral QPM Jared Allen MD    vitamin B-12 1,000 mcg Oral QPM Jared Allen MD    dextrose 5 % and sodium chloride 0 45 % 75 mL/hr Intravenous Continuous Fidelia Pillai MD Last Rate: 75 mL/hr (02/29/20 2322)   digoxin 125 mcg Oral Daily Jared Allen MD    folic acid 5,857 mcg Oral Daily Jared Allen MD    gabapentin 300 mg Oral Daily Delphine Boas, MD    insulin lispro 1-5 Units Subcutaneous Q6H Jazz Jain MD    Labetalol HCl 10 mg Intravenous Q6H PRN Poppy Vasquez PA-C    metoprolol 5 mg Intravenous Nicole Lau MD    morphine injection 2 mg Intravenous Q6H PRN Fidelia Pillai MD    oxybutynin 5 mg Oral Daily Jared Allen MD    primidone 50 mg Oral Q12H Randee Maki MD    spironolactone 50 mg Oral Daily Jared Allen MD         Today, Patient Was Seen By: Fidelia Pillai MD    ** Please Note: Dictation voice to text software may have been used in the creation of this document   **

## 2020-03-01 NOTE — ASSESSMENT & PLAN NOTE
Urine cultures pansensitive E coli  Blood cultures negative for 48 hours  Completed 7 days of Cefepime for concurrent HCAP

## 2020-03-01 NOTE — ASSESSMENT & PLAN NOTE
HCAP versus aspiration  IV vancomycin and Flagyl discontinued  D/c cefepime- completed 7 day antibiotic course  Monitor fever curve  P r n  Antipyretic  O2 sat monitoring with supplemental O2 p r n   To maintain saturation 90% or greater by pulse oximetry  Supportive care

## 2020-03-02 ENCOUNTER — APPOINTMENT (INPATIENT)
Dept: GASTROENTEROLOGY | Facility: HOSPITAL | Age: 85
DRG: 853 | End: 2020-03-02
Payer: COMMERCIAL

## 2020-03-02 ENCOUNTER — ANESTHESIA EVENT (INPATIENT)
Dept: GASTROENTEROLOGY | Facility: HOSPITAL | Age: 85
DRG: 853 | End: 2020-03-02
Payer: COMMERCIAL

## 2020-03-02 ENCOUNTER — ANESTHESIA (INPATIENT)
Dept: GASTROENTEROLOGY | Facility: HOSPITAL | Age: 85
DRG: 853 | End: 2020-03-02
Payer: COMMERCIAL

## 2020-03-02 PROBLEM — J18.9 PNEUMONIA: Status: RESOLVED | Noted: 2020-02-23 | Resolved: 2020-03-02

## 2020-03-02 PROBLEM — N39.0 UTI (URINARY TRACT INFECTION): Status: RESOLVED | Noted: 2020-02-23 | Resolved: 2020-03-02

## 2020-03-02 LAB
ANION GAP SERPL CALCULATED.3IONS-SCNC: 5 MMOL/L (ref 4–13)
BUN SERPL-MCNC: 8 MG/DL (ref 5–25)
CALCIUM SERPL-MCNC: 8.5 MG/DL (ref 8.3–10.1)
CHLORIDE SERPL-SCNC: 112 MMOL/L (ref 100–108)
CO2 SERPL-SCNC: 27 MMOL/L (ref 21–32)
CREAT SERPL-MCNC: 0.58 MG/DL (ref 0.6–1.3)
GFR SERPL CREATININE-BSD FRML MDRD: 85 ML/MIN/1.73SQ M
GLUCOSE SERPL-MCNC: 157 MG/DL (ref 65–140)
GLUCOSE SERPL-MCNC: 81 MG/DL (ref 65–140)
GLUCOSE SERPL-MCNC: 84 MG/DL (ref 65–140)
GLUCOSE SERPL-MCNC: 94 MG/DL (ref 65–140)
GLUCOSE SERPL-MCNC: 96 MG/DL (ref 65–140)
GLUCOSE SERPL-MCNC: 97 MG/DL (ref 65–140)
POTASSIUM SERPL-SCNC: 3.1 MMOL/L (ref 3.5–5.3)
SODIUM SERPL-SCNC: 144 MMOL/L (ref 136–145)

## 2020-03-02 PROCEDURE — 82948 REAGENT STRIP/BLOOD GLUCOSE: CPT

## 2020-03-02 PROCEDURE — 43246 EGD PLACE GASTROSTOMY TUBE: CPT | Performed by: INTERNAL MEDICINE

## 2020-03-02 PROCEDURE — 80048 BASIC METABOLIC PNL TOTAL CA: CPT | Performed by: INTERNAL MEDICINE

## 2020-03-02 PROCEDURE — 99232 SBSQ HOSP IP/OBS MODERATE 35: CPT | Performed by: INTERNAL MEDICINE

## 2020-03-02 RX ORDER — PROPOFOL 10 MG/ML
INJECTION, EMULSION INTRAVENOUS AS NEEDED
Status: DISCONTINUED | OUTPATIENT
Start: 2020-03-02 | End: 2020-03-02 | Stop reason: SURG

## 2020-03-02 RX ORDER — DEXTROSE AND SODIUM CHLORIDE 5; .45 G/100ML; G/100ML
50 INJECTION, SOLUTION INTRAVENOUS CONTINUOUS
Status: DISCONTINUED | OUTPATIENT
Start: 2020-03-02 | End: 2020-03-03

## 2020-03-02 RX ORDER — HYDROMORPHONE HCL/PF 1 MG/ML
0.5 SYRINGE (ML) INJECTION EVERY 4 HOURS PRN
Status: DISCONTINUED | OUTPATIENT
Start: 2020-03-02 | End: 2020-03-03

## 2020-03-02 RX ORDER — SODIUM CHLORIDE 9 MG/ML
INJECTION, SOLUTION INTRAVENOUS CONTINUOUS PRN
Status: DISCONTINUED | OUTPATIENT
Start: 2020-03-02 | End: 2020-03-02 | Stop reason: SURG

## 2020-03-02 RX ORDER — PROPOFOL 10 MG/ML
INJECTION, EMULSION INTRAVENOUS CONTINUOUS PRN
Status: DISCONTINUED | OUTPATIENT
Start: 2020-03-02 | End: 2020-03-02 | Stop reason: SURG

## 2020-03-02 RX ORDER — POTASSIUM CHLORIDE 14.9 MG/ML
20 INJECTION INTRAVENOUS
Status: COMPLETED | OUTPATIENT
Start: 2020-03-02 | End: 2020-03-03

## 2020-03-02 RX ORDER — LIDOCAINE HYDROCHLORIDE 10 MG/ML
INJECTION, SOLUTION EPIDURAL; INFILTRATION; INTRACAUDAL; PERINEURAL AS NEEDED
Status: DISCONTINUED | OUTPATIENT
Start: 2020-03-02 | End: 2020-03-02 | Stop reason: SURG

## 2020-03-02 RX ORDER — FENTANYL CITRATE 50 UG/ML
INJECTION, SOLUTION INTRAMUSCULAR; INTRAVENOUS AS NEEDED
Status: DISCONTINUED | OUTPATIENT
Start: 2020-03-02 | End: 2020-03-02 | Stop reason: SURG

## 2020-03-02 RX ADMIN — HYDROMORPHONE HYDROCHLORIDE 0.5 MG: 1 INJECTION, SOLUTION INTRAMUSCULAR; INTRAVENOUS; SUBCUTANEOUS at 17:28

## 2020-03-02 RX ADMIN — MORPHINE SULFATE 2 MG: 2 INJECTION, SOLUTION INTRAMUSCULAR; INTRAVENOUS at 12:13

## 2020-03-02 RX ADMIN — LIDOCAINE HYDROCHLORIDE 50 MG: 10 INJECTION, SOLUTION EPIDURAL; INFILTRATION; INTRACAUDAL; PERINEURAL at 10:18

## 2020-03-02 RX ADMIN — DEXTROSE AND SODIUM CHLORIDE 50 ML/HR: 5; .45 INJECTION, SOLUTION INTRAVENOUS at 18:06

## 2020-03-02 RX ADMIN — METOPROLOL TARTRATE 5 MG: 5 INJECTION INTRAVENOUS at 16:16

## 2020-03-02 RX ADMIN — PROPOFOL 30 MG: 10 INJECTION, EMULSION INTRAVENOUS at 10:18

## 2020-03-02 RX ADMIN — POTASSIUM CHLORIDE 20 MEQ: 14.9 INJECTION, SOLUTION INTRAVENOUS at 21:40

## 2020-03-02 RX ADMIN — METOPROLOL TARTRATE 5 MG: 5 INJECTION INTRAVENOUS at 08:35

## 2020-03-02 RX ADMIN — POTASSIUM CHLORIDE 20 MEQ: 14.9 INJECTION, SOLUTION INTRAVENOUS at 15:35

## 2020-03-02 RX ADMIN — LABETALOL 20 MG/4 ML (5 MG/ML) INTRAVENOUS SYRINGE 10 MG: at 17:24

## 2020-03-02 RX ADMIN — METOPROLOL TARTRATE 5 MG: 5 INJECTION INTRAVENOUS at 01:59

## 2020-03-02 RX ADMIN — SODIUM CHLORIDE: 0.9 INJECTION, SOLUTION INTRAVENOUS at 10:09

## 2020-03-02 RX ADMIN — FENTANYL CITRATE 12.5 MCG: 50 INJECTION, SOLUTION INTRAMUSCULAR; INTRAVENOUS at 10:18

## 2020-03-02 RX ADMIN — FENTANYL CITRATE 12.5 MCG: 50 INJECTION, SOLUTION INTRAMUSCULAR; INTRAVENOUS at 10:15

## 2020-03-02 RX ADMIN — PROPOFOL 150 MCG/KG/MIN: 10 INJECTION, EMULSION INTRAVENOUS at 10:18

## 2020-03-02 RX ADMIN — POTASSIUM CHLORIDE 20 MEQ: 14.9 INJECTION, SOLUTION INTRAVENOUS at 13:14

## 2020-03-02 RX ADMIN — CEFEPIME HYDROCHLORIDE 2000 MG: 2 INJECTION, POWDER, FOR SOLUTION INTRAVENOUS at 02:00

## 2020-03-02 RX ADMIN — INSULIN LISPRO 1 UNITS: 100 INJECTION, SOLUTION INTRAVENOUS; SUBCUTANEOUS at 01:58

## 2020-03-02 RX ADMIN — POTASSIUM CHLORIDE 20 MEQ: 14.9 INJECTION, SOLUTION INTRAVENOUS at 18:25

## 2020-03-02 NOTE — PROGRESS NOTES
Progress Note - Chiquis Vinson 1935, 80 y o  female MRN: [id number]    Unit/Bed#: [facility] 710-01 Encounter: [id number]    Primary Care Provider: Ammy Edwards MD   Date and time admitted to hospital: 2/19/2020  8:30 AM        Oropharyngeal dysphagia  Assessment & Plan  Continue tube feeds  Patient's mentation improving today to consider re-evaluation by speech if patient's mentation continues to improve  It appears the patient is aspirating on tube feeds, absence of gag reflex as well  Appreciate palliative care's assistance  Speech reevaluated and patient is still unable to tolerate PO, will remain NPO  D5-1/2 NS for hydration  GI consulted as per family's request for PEG tube placement-plans for PEG tube placement on Monday        Weakness of both lower extremities  Assessment & Plan  Per daughter,patient had difficulty standing up in wheelchair, more so than normal  Chronic bilateral weakness in lower extremities, no significant changes since last admission  Patient wheelchair-bound and needs assistance standing up and transferring in wheelchair  Continue fall precautions  PT OT    Stroke-like symptoms  Assessment & Plan  No radiographic evidence of CVA  Neurology re-evaluated patient in light of worsening weakness, likely mechanism is hypoperfusion secondary to 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  Palliative care consult placed, appreciate input  NPO  PT/OT  Plans for PEG placement on Monday    Essential hypertension  Assessment & Plan  Monitor blood pressure with routine vitals  Hold spironolactone and lisinopril given NPO status  Continue p r n  Labetalol  Continue scheduled Lopressor    Paroxysmal atrial fibrillation [facility])  Assessment & Plan  Currently rate controlled  EKG 2/19:  Sinus rhythm with first-degree AV block  Right bundle branch block  Possible lateral infarct (cited on or before 16th June 2019)    When compared with EKG of 14th February 2020, questionable change in initial forces of lateral leads  Continue rate control with digoxin  Hold Eliquis due to PEG placement on Monday, will resume likely 3/3      * Aphasia  Assessment & Plan  EEG monitoring 2/16-2/17: demonstrated no epileptiform activity or epileptogenic focus  Repeat CT scan of head does not demonstrate any acute findings  Patient failed speech evaluation and is NPO  Patient's speech continues to improve    UTI (urinary tract infection)resolved as of 3/2/2020  Assessment & Plan  Urine cultures pansensitive E coli  Blood cultures negative for 48 hours  Completed 7 days of Cefepime for concurrent HCAP  Resolved     Pneumoniaresolved as of 3/2/2020  Assessment & Plan  HCAP versus aspiration  IV vancomycin and Flagyl discontinued  D/c cefepime- completed 7 day antibiotic course  Monitor fever curve  P r n  Antipyretic  O2 sat monitoring with supplemental O2 p r n  To maintain saturation 90% or greater by pulse oximetry  Supportive care  resolved      VTE Pharmacologic Prophylaxis:   Pharmacologic: Pharmacologic VTE Prophylaxis contraindicated due to PEG placement  Mechanical VTE Prophylaxis in Place: Yes    Patient Centered Rounds: I have performed bedside rounds with nursing staff today  Discussions with Specialists or Other Care Team Provider: none    Education and Discussions with Family / Patient: discussed with patient    Time Spent for Care: 30 minutes  More than 50% of total time spent on counseling and coordination of care as described above  Current Length of Stay: 12 day(s)    Current Patient Status: Inpatient   Certification Statement: The patient will continue to require additional inpatient hospital stay due to Pending placement    Discharge Plan:  Rehab    Code Status: Level 3 - DNAR and DNI      Subjective:   No acute overnight events  This morning patient went for PEG placement    Patient was stating that she does not want to have a PEG although her daughters have said that she has told them that she has wanted this  Objective:     Vitals:   Temp (24hrs), Av 5 °F (36 4 °C), Min:97 3 °F (36 3 °C), Max:97 6 °F (36 4 °C)    Temp:  [97 3 °F (36 3 °C)-97 6 °F (36 4 °C)] 97 3 °F (36 3 °C)  HR:  [49-64] 62  Resp:  [16-18] 18  BP: (128-181)/(65-78) 157/70  SpO2:  [94 %-100 %] 96 %  Body mass index is 30 73 kg/m²  Input and Output Summary (last 24 hours): Intake/Output Summary (Last 24 hours) at 3/2/2020 1611  Last data filed at 3/2/2020 1200  Gross per 24 hour   Intake 2260 ml   Output 138 ml   Net 2122 ml       Physical Exam:     Physical Exam   Constitutional: She is oriented to person, place, and time  Chronically ill-appearing, cachectic   HENT:   Head: Normocephalic and atraumatic  Mouth/Throat: Oropharynx is clear and moist    Eyes: Pupils are equal, round, and reactive to light  Conjunctivae are normal    Neck: Neck supple  No JVD present  Cardiovascular: Normal rate, regular rhythm, normal heart sounds and intact distal pulses  Pulmonary/Chest: Effort normal and breath sounds normal    Abdominal: Soft  Bowel sounds are normal    PEG, dressings clean dry and intact   Musculoskeletal: She exhibits no edema or tenderness  Neurological: She is alert and oriented to person, place, and time  Skin: Skin is warm and dry  Capillary refill takes less than 2 seconds  Psychiatric: She has a normal mood and affect  Her behavior is normal  Judgment and thought content normal    Nursing note and vitals reviewed        Additional Data:     Labs:    Results from last 7 days   Lab Units 20  0526   WBC Thousand/uL 3 11*   HEMOGLOBIN g/dL 11 8   HEMATOCRIT % 35 6   PLATELETS Thousands/uL 132*     Results from last 7 days   Lab Units 20  0514   SODIUM mmol/L 144   POTASSIUM mmol/L 3 1*   CHLORIDE mmol/L 112*   CO2 mmol/L 27   BUN mg/dL 8   CREATININE mg/dL 0 58*   ANION GAP mmol/L 5   CALCIUM mg/dL 8 5   GLUCOSE RANDOM mg/dL 96     Results from last 7 days   Lab Units 20  0517 INR  1 58*     Results from last 7 days   Lab Units 03/02/20  1209 03/02/20  1893 03/02/20  0018 03/01/20  1819 03/01/20  1046 03/01/20  3202 03/01/20  0043 02/29/20  2050 02/29/20  1505 02/29/20  0521 02/29/20  0006 02/28/20  2147   POC GLUCOSE mg/dl 97 94 157* 109 139 138 124 117 125 150* 159* 153*                   * I Have Reviewed All Lab Data Listed Above  * Additional Pertinent Lab Tests Reviewed: All Labs Within Last 24 Hours Reviewed    Imaging:    Imaging Reports Reviewed Today Include:  None  Imaging Personally Reviewed by Myself Includes:  None    Recent Cultures (last 7 days):           Last 24 Hours Medication List:     Current Facility-Administered Medications:  acetaminophen 650 mg Rectal Q4H PRN Michele Luciano PA-C    atorvastatin 40 mg Oral QPM Alex Andrea MD    cholecalciferol 1,000 Units Oral QPM Alex Andrea MD    vitamin B-12 1,000 mcg Oral QPM Alex Andrea MD    digoxin 125 mcg Oral Daily Alex Andrea MD    folic acid 9,000 mcg Oral Daily Alex Andrea MD    gabapentin 300 mg Oral Daily Jazmín Evans MD    insulin lispro 1-5 Units Subcutaneous Q6H Jean Arriaza MD    Labetalol HCl 10 mg Intravenous Q6H PRN Poppy Vasquez PA-C    metoprolol 5 mg Intravenous Minal Leroy MD    morphine injection 2 mg Intravenous Q6H PRN Azra Shetes MD    oxybutynin 5 mg Oral Daily Alex Andrea MD    potassium chloride 20 mEq Intravenous Q3H Azra Sheets MD Last Rate: 20 mEq (03/02/20 1535)   primidone 50 mg Oral Q12H Lamin Castro MD    spironolactone 50 mg Oral Daily Alex Andrea MD         Today, Patient Was Seen By: Azra Sheets MD    ** Please Note: Dictation voice to text software may have been used in the creation of this document   **

## 2020-03-02 NOTE — ASSESSMENT & PLAN NOTE
HCAP versus aspiration  IV vancomycin and Flagyl discontinued  D/c cefepime- completed 7 day antibiotic course  Monitor fever curve  P r n  Antipyretic  O2 sat monitoring with supplemental O2 p r n   To maintain saturation 90% or greater by pulse oximetry  Supportive care  resolved

## 2020-03-02 NOTE — ASSESSMENT & PLAN NOTE
Urine cultures pansensitive E coli  Blood cultures negative for 48 hours  Completed 7 days of Cefepime for concurrent HCAP  Resolved

## 2020-03-02 NOTE — ASSESSMENT & PLAN NOTE
Currently rate controlled  EKG 2/19:  Sinus rhythm with first-degree AV block  Right bundle branch block  Possible lateral infarct (cited on or before 16th June 2019)  When compared with EKG of 14th February 2020, questionable change in initial forces of lateral leads    Continue rate control with digoxin  Hold Eliquis due to PEG placement on Monday, will resume likely 3/3

## 2020-03-02 NOTE — SOCIAL WORK
Pt discussed with SLIM for care coordination  Pt going for peg today & will need rehab  TC to both daughter's Anselmo Close whom asked Cm to call her sister Jennine Skiff  Per Jennine Skiff, she requested referrals to [facility]  List left at bedside for backup choices  PASSR uploaded into [employer]  A post acute care recommendation was made by your care team for [facility]  Discussed Freedom of Choice with caregiver  List of facilities given to caregiver via TC  caregiver aware the list is custom filtered for them by zip code location and that [facility] post acute providers are designated

## 2020-03-02 NOTE — ANESTHESIA PREPROCEDURE EVALUATION
Review of Systems/Medical History  Patient summary reviewed  Chart reviewed  No history of anesthetic complications     Cardiovascular  Exercise tolerance (METS): good,  Hyperlipidemia, Hypertension , Dysrhythmias , atrial fibrillation,    Pulmonary  Negative pulmonary ROS        GI/Hepatic  Dysphagia,          Negative  ROS   Comment: Urinary incontinence- ipg stimulator in place     Endo/Other  Negative endo/other ROS Diabetes type 2 Oral agent, History of thyroid disease (thyroid nodule) ,      GYN    Hysterectomy,        Hematology   Musculoskeletal  Negative musculoskeletal ROS TMJ problem,   Comment: Left leg fx  Unable to walk  Spinal stenosis Arthritis     Neurology    TIA, CVA , residual symptoms,   Comment: TIA x 2 plus CVA Psychology   Negative psychology ROS          ECHO (Feb 2020):  Size was normal   Systolic function was normal  Ejection fraction was estimated to be 60 %  There were no regional wall motion abnormalities  There was mild concentric hypertrophy  Features were consistent with a pseudonormal left ventricular filling pattern, with concomitant abnormal relaxation and increased filling pressure (grade 2 diastolic dysfunction)      RIGHT VENTRICLE:  The size was normal   Systolic function was normal      LEFT ATRIUM:  The atrium was mildly dilated      MITRAL VALVE:  There was mild annular calcification  There was trace regurgitation      TRICUSPID VALVE:  There was mild regurgitation  Pulmonary artery systolic pressure was mildly increased  Physical Exam    Airway    Mallampati score: III  TM Distance: <3 FB  Neck ROM: full     Dental   lower dentures and upper dentures,     Cardiovascular  Rhythm: irregular, Rate: abnormal,     Pulmonary  Breath sounds clear to auscultation,     Other Findings        Anesthesia Plan  ASA Score- 3     Anesthesia Type- IV sedation with anesthesia with ASA Monitors     Additional Monitors:   Airway Plan:         Plan Factors- Patient instructed to abstain from smoking on day of procedure  Patient did not smoke on day of surgery  Induction- intravenous  Postoperative Plan-     Informed Consent- Anesthetic plan and risks discussed with daughter (consent obtained via phone from daughter Devonte Mckeon)  I personally reviewed this patient with the CRNA  Discussed and agreed on the Anesthesia Plan with the CRNA  Michelle Posey

## 2020-03-02 NOTE — UTILIZATION REVIEW
Continued Stay Review    Date: 2/29/20                        Current Patient Class: IP    Current Level of Care: MS    HPI:84 y o  female initially admitted on 2/19 with aphasia, E coli UTI, Pneumonia r/t aspiration     Assessment/Plan:     Aphasia with Pneumonia - remains on IV antibiotics for 1 more day to complete 7 days, blood cultures negative at 48 hr, tube feeds, improving mentation and will have ST re-eval need to continue with tube feeds, does have absence of gag reflex - for PEG placement 3/2 Dysarthria - no focal seizures, cannot get MRI r/t spine stimulator  Per neuro pt has hypoperfusion r/t intracranal and ICA 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 - PT/OT  PAF - rate controlled, continue Dig, hold Eliquis for PEG on 3/2        Pertinent Labs/Diagnostic Results:   Results from last 7 days   Lab Units 02/29/20  0526   WBC Thousand/uL 3 11*   HEMOGLOBIN g/dL 11 8   HEMATOCRIT % 35 6   PLATELETS Thousands/uL 132*     Results from last 7 days   Lab Units 03/02/20  0514 02/29/20  0526 02/27/20  1101   SODIUM mmol/L 144 142 140   POTASSIUM mmol/L 3 1* 3 1* 3 3*   CHLORIDE mmol/L 112* 112* 110*   CO2 mmol/L 27 24 24   ANION GAP mmol/L 5 6 6   BUN mg/dL 8 10 12   CREATININE mg/dL 0 58* 0 50* 0 42*   EGFR ml/min/1 73sq m 85 89 94   CALCIUM mg/dL 8 5 8 6 8 7     Results from last 7 days   Lab Units 03/02/20  1209 03/02/20  8762 03/02/20  0018 03/01/20  1819 03/01/20  1046 03/01/20  0614 03/01/20  0043 02/29/20  2050 02/29/20  1505 02/29/20  0521 02/29/20  0006 02/28/20  2147   POC GLUCOSE mg/dl 97 94 157* 109 139 138 124 117 125 150* 159* 153*     Results from last 7 days   Lab Units 03/02/20  0514 02/29/20  0526 02/27/20  1101   GLUCOSE RANDOM mg/dL 96 160* 168*     Results from last 7 days   Lab Units 02/29/20  0526   PROTIME seconds 18 4*   INR  1 58*     Vital Signs:   02/29/20 22:06:56  97 3 °F (36 3 °C)Abnormal   65    169/81  110  98 %   02/29/20 2100    63        98 %   02/29/20 1900    57        100 %   02/29/20 07:22:11  97 8 °F (36 6 °C)  72  18  156/78  104  96 %   02/29/20 05:20:04    71    164/78  107  97 %   02/29/20 03:15:56    63    178/80Abnormal   113  97 %   02/29/20 0000            100 %     Medications:   Scheduled Medications:    Medications:  atorvastatin 40 mg Oral QPM   cholecalciferol 1,000 Units Oral QPM   vitamin B-12 1,000 mcg Oral QPM   digoxin 125 mcg Oral Daily   folic acid 5,294 mcg Oral Daily   gabapentin 300 mg Oral Daily   insulin lispro 1-5 Units Subcutaneous Q6H [address]   metoprolol 5 mg Intravenous Q8H   oxybutynin 5 mg Oral Daily   potassium chloride 20 mEq Intravenous Q3H   primidone 50 mg Oral Q12H STEPHANIE   spironolactone 50 mg Oral Daily     Continuous IV Infusions:     PRN Meds:    acetaminophen 650 mg Rectal Q4H PRN    Labetalol HCl 10 mg Intravenous Q6H PRN    Morphine  1 mg  IV  PRN  X 3    morphine injection 2 mg Intravenous Q6H PRN         Discharge Plan: TBD     Network Utilization Review Department  [email or web address]  ATTENTION: Please call with any questions or concerns to [phone] and carefully listen to the prompts so that you are directed to the right person  All voicemails are confidential   Samantha Canales all requests for admission clinical reviews, approved or denied determinations and any other requests to dedicated fax number below belonging to the campus where the patient is receiving treatment   List of dedicated fax numbers for the Facilities:  FACILITY NAME UR FAX NUMBER   ADMISSION DENIALS (Administrative/Medical Necessity) [phone]   [address] [place] [address] (Maternity/NICU/Pediatrics) [phone]   [facility] [phone]     [address] [phone]   [facility] [phone]   [facility] [place] [address] [facility] [place] [place] [address] [address] [address] [place] [phone]   [place] [address] [phone]

## 2020-03-02 NOTE — ASSESSMENT & PLAN NOTE
EEG monitoring 2/16-2/17: demonstrated no epileptiform activity or epileptogenic focus  Repeat CT scan of head does not demonstrate any acute findings    Patient failed speech evaluation and is NPO  Patient's speech continues to improve

## 2020-03-02 NOTE — TELEPHONE ENCOUNTER
75 [address] Beltran- spoke with Padmaja Rosen- I advised her I was calling to initiate a refill for the patient's Botox prescription  Refill request initiated  Order marked as STAT  Will do a status check in 2 days

## 2020-03-02 NOTE — ANESTHESIA POSTPROCEDURE EVALUATION
Post-Op Assessment Note    CV Status:  Stable  Pain Score: 0    Pain management: adequate     Mental Status:  Alert   Hydration Status:  Stable   PONV Controlled:  None   Airway Patency:  Patent   Post Op Vitals Reviewed: Yes      Staff: CRNA           BP  162/66   Temp     Pulse 70   Resp   12   SpO2   96 6l mask

## 2020-03-02 NOTE — PLAN OF CARE
Problem: Potential for Falls  Goal: Patient will remain free of falls  Description  INTERVENTIONS:  - Assess patient frequently for physical needs  -  Identify cognitive and physical deficits and behaviors that affect risk of falls    -  [place] fall precautions as indicated by assessment   - Educate patient/family on patient safety including physical limitations  - Instruct patient to call for assistance with activity based on assessment  - Modify environment to reduce risk of injury  - Consider OT/PT consult to assist with strengthening/mobility  Outcome: Progressing     Problem: Prexisting or High Potential for Compromised Skin Integrity  Goal: Skin integrity is maintained or improved  Description  INTERVENTIONS:  - Identify patients at risk for skin breakdown  - Assess and monitor skin integrity  - Assess and monitor nutrition and hydration status  - Monitor labs   - Assess for incontinence   - Turn and reposition patient  - Assist with mobility/ambulation  - Relieve pressure over bony prominences  - Avoid friction and shearing  - Provide appropriate hygiene as needed including keeping skin clean and dry  - Evaluate need for skin moisturizer/barrier cream  - Collaborate with interdisciplinary team   - Patient/family teaching  - Consider wound care consult   Outcome: Progressing     Problem: DISCHARGE PLANNING  Goal: Discharge to home or other facility with appropriate resources  Description  INTERVENTIONS:  - Identify barriers to discharge w/patient and caregiver  - Arrange for needed discharge resources and transportation as appropriate  - Identify discharge learning needs (meds, wound care, etc )  - Arrange for interpretive services to assist at discharge as needed  - Refer to Case Management Department for coordinating discharge planning if the patient needs post-hospital services based on physician/advanced practitioner order or complex needs related to functional status, cognitive ability, or social support system  Outcome: Progressing     Problem: NEUROSENSORY - ADULT  Goal: Achieves stable or improved neurological status  Description  INTERVENTIONS  - Monitor and report changes in neurological status  - Monitor vital signs such as temperature, blood pressure, glucose, and any other labs ordered   - Initiate measures to prevent increased intracranial pressure  - Monitor for seizure activity and implement precautions if appropriate      Outcome: Progressing  Goal: Achieves maximal functionality and self care  Description  INTERVENTIONS  - Monitor swallowing and airway patency with patient fatigue and changes in neurological status  - Encourage and assist patient to increase activity and self care  - Encourage visually impaired, hearing impaired and aphasic patients to use assistive/communication devices  Outcome: Progressing     Problem: Nutrition/Hydration-ADULT  Goal: Nutrient/Hydration intake appropriate for improving, restoring or maintaining nutritional needs  Description  Monitor and assess patient's nutrition/hydration status for malnutrition  Collaborate with interdisciplinary team and initiate plan and interventions as ordered  Monitor patient's weight and dietary intake as ordered or per policy  Utilize nutrition screening tool and intervene as necessary  Determine patient's food preferences and provide high-protein, high-caloric foods as appropriate       INTERVENTIONS:  - Monitor oral intake, urinary output, labs, and treatment plans  - Assess nutrition and hydration status and recommend course of action  - Evaluate amount of meals eaten  - Assist patient with eating if necessary   - Allow adequate time for meals  - Recommend/ encourage appropriate diets, oral nutritional supplements, and vitamin/mineral supplements  - Order, calculate, and assess calorie counts as needed  - Recommend, monitor, and adjust tube feedings and TPN/PPN based on assessed needs  - Assess need for intravenous fluids  - Provide specific nutrition/hydration education as appropriate  - Include patient/family/caregiver in decisions related to nutrition  Outcome: Progressing     Problem: CONFUSION/THOUGHT DISTURBANCE  Goal: Thought disturbances (confusion, delirium, depression, dementia or psychosis) are managed to maintain or return to baseline mental status and functional level  Description  INTERVENTIONS:  - Assess for possible contributors to  thought disturbance, including but not limited to medications, infection, impaired vision or hearing, underlying metabolic abnormalities, dehydration, respiratory compromise,  psychiatric diagnoses and notify attending PHYSICAN/AP  - Monitor and intervene to maintain adequate nutrition, hydration, elimination, sleep and activity  - Decrease environmental stimuli, including noise as appropriate  - Provide frequent contacts to provide refocusing, direction and reassurance as needed  Approach patient calmly with eye contact and at their level    - [place] high risk fall precautions, aspiration precautions and other safety measures, as indicated  - If delirium suspected, notify physician/AP of change in condition and request immediate in-person evaluation  - Pursue consults as appropriate including Geriatric (campus dependent), OT for cognitive evaluation/activity planning, psychiatric, pastoral care, etc   Outcome: Progressing     Problem: SAFETY,RESTRAINT: NV/NON-SELF DESTRUCTIVE BEHAVIOR  Goal: Remains free of harm/injury (restraint for non violent/non self-detsructive behavior)  Description  INTERVENTIONS:  - Instruct patient/family regarding restraint use   - Assess and monitor physiologic and psychological status   - Provide interventions and comfort measures to meet assessed patient needs   - Identify and implement measures to help patient regain control  - Assess readiness for release of restraint   Outcome: Progressing  Goal: Returns to optimal restraint-free functioning  Description  INTERVENTIONS:  - Assess the patient's behavior and symptoms that indicate continued need for restraint  - Identify and implement measures to help patient regain control  - Assess readiness for release of restraint   Outcome: Progressing

## 2020-03-03 LAB
ANION GAP SERPL CALCULATED.3IONS-SCNC: 6 MMOL/L (ref 4–13)
BUN SERPL-MCNC: 8 MG/DL (ref 5–25)
CALCIUM SERPL-MCNC: 8.4 MG/DL (ref 8.3–10.1)
CHLORIDE SERPL-SCNC: 114 MMOL/L (ref 100–108)
CO2 SERPL-SCNC: 23 MMOL/L (ref 21–32)
CREAT SERPL-MCNC: 0.43 MG/DL (ref 0.6–1.3)
ERYTHROCYTE [DISTWIDTH] IN BLOOD BY AUTOMATED COUNT: 12.8 % (ref 11.6–15.1)
GFR SERPL CREATININE-BSD FRML MDRD: 94 ML/MIN/1.73SQ M
GLUCOSE SERPL-MCNC: 109 MG/DL (ref 65–140)
GLUCOSE SERPL-MCNC: 112 MG/DL (ref 65–140)
GLUCOSE SERPL-MCNC: 125 MG/DL (ref 65–140)
GLUCOSE SERPL-MCNC: 128 MG/DL (ref 65–140)
HCT VFR BLD AUTO: 33.2 % (ref 34.8–46.1)
HGB BLD-MCNC: 10.7 G/DL (ref 11.5–15.4)
MCH RBC QN AUTO: 29.8 PG (ref 26.8–34.3)
MCHC RBC AUTO-ENTMCNC: 32.2 G/DL (ref 31.4–37.4)
MCV RBC AUTO: 93 FL (ref 82–98)
PLATELET # BLD AUTO: 232 THOUSANDS/UL (ref 149–390)
PMV BLD AUTO: 10 FL (ref 8.9–12.7)
POTASSIUM SERPL-SCNC: 4 MMOL/L (ref 3.5–5.3)
RBC # BLD AUTO: 3.59 MILLION/UL (ref 3.81–5.12)
SODIUM SERPL-SCNC: 143 MMOL/L (ref 136–145)
WBC # BLD AUTO: 6.31 THOUSAND/UL (ref 4.31–10.16)

## 2020-03-03 PROCEDURE — 99232 SBSQ HOSP IP/OBS MODERATE 35: CPT | Performed by: INTERNAL MEDICINE

## 2020-03-03 PROCEDURE — 99233 SBSQ HOSP IP/OBS HIGH 50: CPT | Performed by: INTERNAL MEDICINE

## 2020-03-03 PROCEDURE — 80048 BASIC METABOLIC PNL TOTAL CA: CPT | Performed by: INTERNAL MEDICINE

## 2020-03-03 PROCEDURE — 82948 REAGENT STRIP/BLOOD GLUCOSE: CPT

## 2020-03-03 PROCEDURE — 85027 COMPLETE CBC AUTOMATED: CPT | Performed by: INTERNAL MEDICINE

## 2020-03-03 RX ORDER — ATORVASTATIN CALCIUM 40 MG/1
40 TABLET, FILM COATED ORAL EVERY EVENING
Status: DISCONTINUED | OUTPATIENT
Start: 2020-03-03 | End: 2020-03-12 | Stop reason: HOSPADM

## 2020-03-03 RX ORDER — MELATONIN
1000 EVERY EVENING
Status: DISCONTINUED | OUTPATIENT
Start: 2020-03-03 | End: 2020-03-12 | Stop reason: HOSPADM

## 2020-03-03 RX ORDER — SPIRONOLACTONE 50 MG/1
50 TABLET, FILM COATED ORAL DAILY
Status: DISCONTINUED | OUTPATIENT
Start: 2020-03-04 | End: 2020-03-07

## 2020-03-03 RX ORDER — BISACODYL 10 MG
10 SUPPOSITORY, RECTAL RECTAL DAILY PRN
Status: DISCONTINUED | OUTPATIENT
Start: 2020-03-03 | End: 2020-03-12 | Stop reason: HOSPADM

## 2020-03-03 RX ORDER — FOLIC ACID 1 MG/1
1000 TABLET ORAL DAILY
Status: DISCONTINUED | OUTPATIENT
Start: 2020-03-04 | End: 2020-03-12 | Stop reason: HOSPADM

## 2020-03-03 RX ORDER — DIGOXIN 125 MCG
125 TABLET ORAL DAILY
Status: DISCONTINUED | OUTPATIENT
Start: 2020-03-04 | End: 2020-03-12 | Stop reason: HOSPADM

## 2020-03-03 RX ORDER — PRIMIDONE 50 MG/1
50 TABLET ORAL EVERY 12 HOURS SCHEDULED
Status: DISCONTINUED | OUTPATIENT
Start: 2020-03-03 | End: 2020-03-12 | Stop reason: HOSPADM

## 2020-03-03 RX ORDER — GABAPENTIN 250 MG/5ML
300 SOLUTION ORAL DAILY
Status: DISCONTINUED | OUTPATIENT
Start: 2020-03-04 | End: 2020-03-12 | Stop reason: HOSPADM

## 2020-03-03 RX ORDER — HYDROMORPHONE HCL/PF 1 MG/ML
0.5 SYRINGE (ML) INJECTION EVERY 4 HOURS PRN
Status: DISCONTINUED | OUTPATIENT
Start: 2020-03-03 | End: 2020-03-12 | Stop reason: HOSPADM

## 2020-03-03 RX ADMIN — PRIMIDONE 50 MG: 50 TABLET ORAL at 08:57

## 2020-03-03 RX ADMIN — SPIRONOLACTONE 50 MG: 50 TABLET ORAL at 08:58

## 2020-03-03 RX ADMIN — METOPROLOL TARTRATE 5 MG: 5 INJECTION INTRAVENOUS at 02:29

## 2020-03-03 RX ADMIN — BISACODYL 10 MG: 10 SUPPOSITORY RECTAL at 15:35

## 2020-03-03 RX ADMIN — HYDROMORPHONE HYDROCHLORIDE 0.5 MG: 1 INJECTION, SOLUTION INTRAMUSCULAR; INTRAVENOUS; SUBCUTANEOUS at 15:35

## 2020-03-03 RX ADMIN — METOPROLOL TARTRATE 5 MG: 5 INJECTION INTRAVENOUS at 08:58

## 2020-03-03 RX ADMIN — MORPHINE SULFATE 2 MG: 2 INJECTION, SOLUTION INTRAMUSCULAR; INTRAVENOUS at 07:54

## 2020-03-03 RX ADMIN — FOLIC ACID 1000 MCG: 1 TABLET ORAL at 08:57

## 2020-03-03 RX ADMIN — MELATONIN 1000 UNITS: at 17:39

## 2020-03-03 RX ADMIN — METOPROLOL TARTRATE 12.5 MG: 25 TABLET ORAL at 22:03

## 2020-03-03 RX ADMIN — PRIMIDONE 50 MG: 50 TABLET ORAL at 22:03

## 2020-03-03 RX ADMIN — ATORVASTATIN CALCIUM 40 MG: 40 TABLET, FILM COATED ORAL at 17:39

## 2020-03-03 RX ADMIN — DEXTROSE AND SODIUM CHLORIDE 50 ML/HR: 5; .45 INJECTION, SOLUTION INTRAVENOUS at 12:37

## 2020-03-03 RX ADMIN — CYANOCOBALAMIN TAB 500 MCG 1000 MCG: 500 TAB at 17:39

## 2020-03-03 RX ADMIN — APIXABAN 5 MG: 5 TABLET, FILM COATED ORAL at 17:39

## 2020-03-03 RX ADMIN — DIGOXIN 125 MCG: 125 TABLET ORAL at 08:57

## 2020-03-03 RX ADMIN — LABETALOL 20 MG/4 ML (5 MG/ML) INTRAVENOUS SYRINGE 10 MG: at 17:37

## 2020-03-03 NOTE — TREATMENT PLAN
Progress Note - Palliative & Supportive Care     Chart reviewed, patient underwent successful PEG placement yesterday  Symptoms are currently controlled and goals are aligned  Tod Nash will sign off at this time  Please notify on-call provider if any further questions/concerns      Pool Bond, DO  Palliative and Supportive Care  [phone]

## 2020-03-03 NOTE — PROGRESS NOTES
Progress Note - Silverio Pardo 1935, 80 y o  female MRN: [id number]    Unit/Bed#: [facility] 710-01 Encounter: [id number]    Primary Care Provider: Ann Rodríguez MD   Date and time admitted to hospital: 2/19/2020  8:30 AM        Oropharyngeal dysphagia  Assessment & Plan  Continue tube feeds  Patient's mentation improving today to consider re-evaluation by speech if patient's mentation continues to improve  It appears the patient is aspirating on tube feeds, absence of gag reflex as well  Appreciate palliative care's assistance  Speech reevaluated and patient is still unable to tolerate PO, will remain NPO  DC IV fluids  Status post PEG placement 3/2, started tube feeds 3/3        Weakness of both lower extremities  Assessment & Plan  Per daughter,patient had difficulty standing up in wheelchair, more so than normal  Chronic bilateral weakness in lower extremities, no significant changes since last admission  Patient wheelchair-bound and needs assistance standing up and transferring in wheelchair  Continue fall precautions  PT OT    Stroke-like symptoms  Assessment & Plan  No radiographic evidence of CVA  Neurology re-evaluated patient in light of worsening weakness, likely mechanism is hypoperfusion secondary to 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  Palliative care consult placed, appreciate input  NPO  PT/OT  Status post PEG placement 3/2  Plans for discharge to rehab    Essential hypertension  Assessment & Plan  Monitor blood pressure with routine vitals  Start Lopressor and spironolactone    Paroxysmal atrial fibrillation ([address] )  Assessment & Plan  Currently rate controlled  EKG 2/19:  Sinus rhythm with first-degree AV block  Right bundle branch block  Possible lateral infarct (cited on or before 16th June 2019)  When compared with EKG of 14th February 2020, questionable change in initial forces of lateral leads    Continue rate control with digoxin  Resumed Eliquis 3/3      * Aphasia  Assessment & Plan  EEG monitoring -: demonstrated no epileptiform activity or epileptogenic focus  Repeat CT scan of head does not demonstrate any acute findings  Patient failed speech evaluation and is NPO  Patient's speech continues to improve      VTE Pharmacologic Prophylaxis:   Pharmacologic: Apixaban (Eliquis)  Mechanical VTE Prophylaxis in Place: Yes    Patient Centered Rounds: I have performed bedside rounds with nursing staff today  Discussions with Specialists or Other Care Team Provider: GI    Education and Discussions with Family / Patient: discussed with patient, offered to call daughters but she refused    Time Spent for Care: 45 minutes  More than 50% of total time spent on counseling and coordination of care as described above  Current Length of Stay: 13 day(s)    Current Patient Status: Inpatient   Certification Statement: The patient will continue to require additional inpatient hospital stay due to awaiting placement    Discharge Plan: rehab    Code Status: Level 3 - DNAR and DNI      Subjective:   No acute overnight events  This morning patient was cleared to start on her tube feeds via PEG  Patient is complaining abdominal pain which has been constant for last few days  She continues to state that she did not want PEG tube only her daughters did  Objective:     Vitals:   Temp (24hrs), Av 7 °F (36 5 °C), Min:97 5 °F (36 4 °C), Max:97 8 °F (36 6 °C)    Temp:  [97 5 °F (36 4 °C)-97 8 °F (36 6 °C)] 97 5 °F (36 4 °C)  HR:  [61-73] 72  Resp:  [18-20] 20  BP: (161-198)/(71-81) 198/81  SpO2:  [96 %-100 %] 96 %  Body mass index is 30 73 kg/m²  Input and Output Summary (last 24 hours): Intake/Output Summary (Last 24 hours) at 3/3/2020 1625  Last data filed at 3/3/2020 1237  Gross per 24 hour   Intake 1020 ml   Output 401 ml   Net 619 ml       Physical Exam:     Physical Exam   Constitutional: She is oriented to person, place, and time     Chronically ill-appearing   HENT:   Head: Normocephalic and atraumatic  Mouth/Throat: Oropharynx is clear and moist    Eyes: Pupils are equal, round, and reactive to light  Conjunctivae are normal    Neck: Neck supple  No JVD present  Cardiovascular: Normal rate, regular rhythm, normal heart sounds and intact distal pulses  Pulmonary/Chest: Effort normal and breath sounds normal    Abdominal: Soft  Bowel sounds are normal  She exhibits no distension  There is no tenderness  PEG   Musculoskeletal: She exhibits no edema or tenderness  Neurological: She is alert and oriented to person, place, and time  Skin: Skin is warm and dry  Capillary refill takes less than 2 seconds  Psychiatric:   Flat and depressed affect   Nursing note and vitals reviewed  Additional Data:     Labs:    Results from last 7 days   Lab Units 03/03/20  0536   WBC Thousand/uL 6 31   HEMOGLOBIN g/dL 10 7*   HEMATOCRIT % 33 2*   PLATELETS Thousands/uL 232     Results from last 7 days   Lab Units 03/03/20  0536   SODIUM mmol/L 143   POTASSIUM mmol/L 4 0   CHLORIDE mmol/L 114*   CO2 mmol/L 23   BUN mg/dL 8   CREATININE mg/dL 0 43*   ANION GAP mmol/L 6   CALCIUM mg/dL 8 4   GLUCOSE RANDOM mg/dL 112     Results from last 7 days   Lab Units 02/29/20  0526   INR  1 58*     Results from last 7 days   Lab Units 03/03/20  1240 03/03/20  0700 03/02/20  2350 03/02/20  1731 03/02/20  1209 03/02/20  0638 03/02/20  0018 03/01/20  1819 03/01/20  1046 03/01/20  0614 03/01/20  0043 02/29/20  2050   POC GLUCOSE mg/dl 128 109 84 81 97 94 157* 109 139 138 124 117                   * I Have Reviewed All Lab Data Listed Above  * Additional Pertinent Lab Tests Reviewed:  All Labs Within Last 24 Hours Reviewed    Imaging:    Imaging Reports Reviewed Today Include: n/a  Imaging Personally Reviewed by Myself Includes:  n/a    Recent Cultures (last 7 days):           Last 24 Hours Medication List:     Current Facility-Administered Medications:  acetaminophen 650 mg Rectal Q4H PRN Maddy Stafford PA-C   apixaban 5 mg Per PEG Tube BID Silvana Fallon MD   atorvastatin 40 mg Per PEG Tube QPM Silvana Fallon MD   bisacodyl 10 mg Rectal Daily PRN Silvana Fallon MD   cholecalciferol 1,000 Units Per PEG Tube QPM Silvana Fallon MD   vitamin B-12 1,000 mcg Per PEG Tube QPM Silvana Fallon MD   [START ON 3/4/2020] digoxin 125 mcg Per PEG Tube Daily MD Julio Mcconnell Prey ON 4/7/5573] folic acid 2,465 mcg Per PEG Tube Daily MD Julio Mcconnell ON 3/4/2020] gabapentin 300 mg Per PEG Tube Daily Silvana Fallon MD   HYDROmorphone 0 5 mg Intravenous Q4H PRN Silvana Fallon MD   insulin lispro 1-5 Units Subcutaneous Q6H [address] Sherrell Johnson MD   Labetalol HCl 10 mg Intravenous Q6H PRN Poppy Vasquez PA-C   metoprolol tartrate 12 5 mg Per PEG Tube Q12H [address] Silvana Fallon MD   morphine injection 2 mg Intravenous Q6H PRN Silvana Fallon MD   primidone 50 mg Per PEG Tube Q12H MD Julio Raphael Prey ON 3/4/2020] spironolactone 50 mg Per Azeem Stai Daily Silvana Fallon MD        Today, Patient Was Seen By: Silvana Fallon MD    ** Please Note: Dictation voice to text software may have been used in the creation of this document   **

## 2020-03-03 NOTE — UTILIZATION REVIEW
Continued Stay Review    Date:3/3/2020                        Current Patient Class: inpatient  Current Level of Care: m/s    HPI:84 y o  female initially admitted on 2/19/2020 with facial droop and worsening bl le weakness, aphasia      Assessment/Plan: peg tube placed on 3/2/2020 and diet started today jevity 1 2 genevieve continuous 54;130 water q6/ the plan is to increase the feed 10 ml every 4 hours until it is at 54 --speech reeval and patient unable to april po  Skin care bid and prn  paf rate controlled  Monitor fever curve   Maintain 02 sat above 90%    Pertinent Labs/Diagnostic Results:   Results from last 7 days   Lab Units 03/03/20  0536 02/29/20  0526   WBC Thousand/uL 6 31 3 11*   HEMOGLOBIN g/dL 10 7* 11 8   HEMATOCRIT % 33 2* 35 6   PLATELETS Thousands/uL 232 132*         Results from last 7 days   Lab Units 03/03/20  0536 03/02/20  0514 02/29/20  0526 02/27/20  1101   SODIUM mmol/L 143 144 142 140   POTASSIUM mmol/L 4 0 3 1* 3 1* 3 3*   CHLORIDE mmol/L 114* 112* 112* 110*   CO2 mmol/L 23 27 24 24   ANION GAP mmol/L 6 5 6 6   BUN mg/dL 8 8 10 12   CREATININE mg/dL 0 43* 0 58* 0 50* 0 42*   EGFR ml/min/1 73sq m 94 85 89 94   CALCIUM mg/dL 8 4 8 5 8 6 8 7         Results from last 7 days   Lab Units 03/03/20  1240 03/03/20  0700 03/02/20  2350 03/02/20  1731 03/02/20  1209 03/02/20  4709 03/02/20  0018 03/01/20  1819 03/01/20  1046 03/01/20  0614 03/01/20  0043 02/29/20  2050   POC GLUCOSE mg/dl 128 109 84 81 97 94 157* 109 139 138 124 117     Results from last 7 days   Lab Units 03/03/20  0536 03/02/20  0514 02/29/20  0526 02/27/20  1101   GLUCOSE RANDOM mg/dL 112 96 160* 168*     Results from last 7 days   Lab Units 02/29/20  0526   PROTIME seconds 18 4*   INR  1 58*               Vital Signs:   03/03/20 15:33:36    72  20  198/81Abnormal   120  96 %     03/03/20 07:50:46  97 5 °F (36 4 °C)  71        97 %     03/03/20 0732    70  18  161/76  104  97 %     03/03/20 02:27:55    73    174/72Abnormal 106  97 %     03/02/20 21:08:20  97 8 °F (36 6 °C)  70  20  173/71Abnormal   105  97 %     03/02/20 2000              Nasal cannula   03/02/20 18:14:14    61    174/74Abnormal   107  100 %     03/02/20 17:22:19    61    184/76Abnormal   112  100 %     03/02/20 16:13:17  98 °F (36 7 °C)  59  16  187/74Abnormal   112  100 %     03/02/20 16:11:42  98 °F (36 7 °C)  58        100 %     03/02/20 1105    62  18  157/70    96 %  Nasal cannula   03/02/20 1050    59  18  162/66    95 %           Medications:   Scheduled Medications:    Medications:  apixaban 2 5 mg Per PEG Tube BID   atorvastatin 40 mg Per PEG Tube QPM   cholecalciferol 1,000 Units Per PEG Tube QPM   vitamin B-12 1,000 mcg Per PEG Tube QPM   [START ON 3/4/2020] digoxin 125 mcg Per PEG Tube Daily   [START ON 3/2/4561] folic acid 8,856 mcg Per PEG Tube Daily   [START ON 3/4/2020] gabapentin 300 mg Per PEG Tube Daily   insulin lispro 1-5 Units Subcutaneous Q6H STEPHANIE   metoprolol tartrate 12 5 mg Per PEG Tube Q12H [facility]   primidone 50 mg Per PEG Tube Q12H [facility]   [START ON 3/4/2020] spironolactone 50 mg Per G Tube Daily     Continuous IV Infusions:     PRN Meds:    acetaminophen 650 mg Rectal Q4H PRN   bisacodyl 10 mg Rectal Daily PRNx1   HYDROmorphone 0 5 mg Intravenous Q4H PRN x1   Labetalol HCl 10 mg Intravenous Q6H PRN   morphine injection 2 mg Intravenous Q6H PRN x1       Discharge Plan: tbd--[facility]    Network Utilization Review Department  [email or web address]  ATTENTION: Please call with any questions or concerns to [phone] and carefully listen to the prompts so that you are directed to the right person  All voicemails are confidential   Chrystine Can all requests for admission clinical reviews, approved or denied determinations and any other requests to dedicated fax number below belonging to the campus where the patient is receiving treatment   List of dedicated fax numbers for the Facilities:  FACILITY NAME UR FAX NUMBER   ADMISSION DENIALS (Administrative/Medical Necessity) [phone]   PARENT CHILD HEALTH (Maternity/NICU/Pediatrics) [phone]     [facility] Aleksandra [phone]   [facility] [phone]   Joel Cosby [phone]   Kaitlinjesus Mcfarlandnicholas [address] [phone]   [facility]  [phone]   [address] [place] [address], [address]  [address] [facility] [address] [facility] [phone]

## 2020-03-03 NOTE — NUTRITION
03/02/20 1927   Recommendations/Interventions   Nutrition Recommendations Initiate EN/PN;Lab - consider order (specify)  (Suggest check Mag, Phosphorus& replete K+ as pt at risk for refeeding syndrome, Once GI approves PEG use suggest start Jevity 1 2 at 10ml/hr gradually increase by 10ml/hr Q4hrs to goal 54ml/hr = 1555 kcal with 72gmPro   Add 130ml free H20 flushes Q6hrs  )

## 2020-03-03 NOTE — PHYSICAL THERAPY NOTE
Physical Therapy Cancellation Note    Per discussion with NSG, patient with increased pain with all mobility and resistive to all mobility  Patient is currently not appropriate to be seen per NSG  PT to continue follow and will re-attempt once patient is medically/clinically appropriate        Shayy Oconnor PT, DPT

## 2020-03-03 NOTE — ASSESSMENT & PLAN NOTE
Currently rate controlled  EKG 2/19:  Sinus rhythm with first-degree AV block  Right bundle branch block  Possible lateral infarct (cited on or before 16th June 2019)  When compared with EKG of 14th February 2020, questionable change in initial forces of lateral leads    Continue rate control with digoxin  Resumed Eliquis 3/3

## 2020-03-03 NOTE — WOUND OSTOMY CARE
Progress Note - Wound   Zoe Elizondo 80 y o  female MRN: [id number]  Unit/Bed#: [facility] 710-01 Encounter: [id number]        Assessment:   Patient seen this morning for continued skin and wound care  She is on alternating air mattress needing extensive assist of two for turns, she is incontinent of bowel and bladder needing ongoing incontinence care  No family member at bed side, patient agreeable for re-assessment  Findings  1-R buttock DTI fully evolved with residual wound left to mid R buttock  Scar tissue present, lateral aspect of wound with residual purple discoloration, non blanchable, measuring as documented  Sacrum, L buttock and heels remains intact, patient is grossly incontinent of urine, family refusing use of Purewick for incontinence management  Plan:   1-Calazime to sacrum, buttock, perineum TID and PRN  2-Continue offloading pressure to skin with turns, elevation of heels and use of cushion in chair  3-Continue moisturizing skin daily with skin nourishing cream   4-Continue with P 500 alternating air mattress to provide optimal skin support surface  Vitals: Blood pressure 161/76, pulse 71, temperature 97 5 °F (36 4 °C), resp  rate 18, height 5' 4" (1 626 m), weight 81 2 kg (179 lb), SpO2 97 %, not currently breastfeeding  ,Body mass index is 30 73 kg/m²          Wound 02/25/20 Pressure Injury Buttocks Right (Active)   Wound Image   3/3/2020 12:33 PM   Wound Description Light purple 3/3/2020 12:33 PM   Staging Deep Tissue Injury 3/3/2020 12:33 PM   Mulu-wound Assessment Intact 3/3/2020 12:33 PM   Wound Length (cm) 1 cm 3/3/2020 12:33 PM   Wound Width (cm) 0 5 cm 3/3/2020 12:33 PM   Calculated Wound Area (cm^2) 0 5 cm^2 3/3/2020 12:33 PM   Treatments Cleansed;Site care 3/3/2020 12:33 PM   Dressing Protective barrier 3/3/2020 12:33 PM   Patient Tolerance Tolerated well 3/3/2020 12:33 PM         Our skin care recommendations remains as nursing orders, wound care to continue following, please call ext  [phone] or [phone] with questions and concerns          Adalid Alexandra, RN, BSN, [facility]

## 2020-03-03 NOTE — ASSESSMENT & PLAN NOTE
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

## 2020-03-03 NOTE — ASSESSMENT & PLAN NOTE
Continue tube feeds  Patient's mentation improving today to consider re-evaluation by speech if patient's mentation continues to improve  It appears the patient is aspirating on tube feeds, absence of gag reflex as well  Appreciate palliative care's assistance  Speech reevaluated and patient is still unable to tolerate PO, will remain NPO  DC IV fluids  Status post PEG placement 3/2, started tube feeds 3/3

## 2020-03-03 NOTE — PLAN OF CARE
Problem: Potential for Falls  Goal: Patient will remain free of falls  Description  INTERVENTIONS:  - Assess patient frequently for physical needs  -  Identify cognitive and physical deficits and behaviors that affect risk of falls    -  Hooker fall precautions as indicated by assessment   - Educate patient/family on patient safety including physical limitations  - Instruct patient to call for assistance with activity based on assessment  - Modify environment to reduce risk of injury  - Consider OT/PT consult to assist with strengthening/mobility  Outcome: Progressing     Problem: Prexisting or High Potential for Compromised Skin Integrity  Goal: Skin integrity is maintained or improved  Description  INTERVENTIONS:  - Identify patients at risk for skin breakdown  - Assess and monitor skin integrity  - Assess and monitor nutrition and hydration status  - Monitor labs   - Assess for incontinence   - Turn and reposition patient  - Assist with mobility/ambulation  - Relieve pressure over bony prominences  - Avoid friction and shearing  - Provide appropriate hygiene as needed including keeping skin clean and dry  - Evaluate need for skin moisturizer/barrier cream  - Collaborate with interdisciplinary team   - Patient/family teaching  - Consider wound care consult   Outcome: Progressing     Problem: DISCHARGE PLANNING  Goal: Discharge to home or other facility with appropriate resources  Description  INTERVENTIONS:  - Identify barriers to discharge w/patient and caregiver  - Arrange for needed discharge resources and transportation as appropriate  - Identify discharge learning needs (meds, wound care, etc )  - Arrange for interpretive services to assist at discharge as needed  - Refer to Case Management Department for coordinating discharge planning if the patient needs post-hospital services based on physician/advanced practitioner order or complex needs related to functional status, cognitive ability, or social support system  Outcome: Progressing     Problem: NEUROSENSORY - ADULT  Goal: Achieves stable or improved neurological status  Description  INTERVENTIONS  - Monitor and report changes in neurological status  - Monitor vital signs such as temperature, blood pressure, glucose, and any other labs ordered   - Initiate measures to prevent increased intracranial pressure  - Monitor for seizure activity and implement precautions if appropriate      Outcome: Progressing  Goal: Achieves maximal functionality and self care  Description  INTERVENTIONS  - Monitor swallowing and airway patency with patient fatigue and changes in neurological status  - Encourage and assist patient to increase activity and self care  - Encourage visually impaired, hearing impaired and aphasic patients to use assistive/communication devices  Outcome: Progressing     Problem: Nutrition/Hydration-ADULT  Goal: Nutrient/Hydration intake appropriate for improving, restoring or maintaining nutritional needs  Description  Monitor and assess patient's nutrition/hydration status for malnutrition  Collaborate with interdisciplinary team and initiate plan and interventions as ordered  Monitor patient's weight and dietary intake as ordered or per policy  Utilize nutrition screening tool and intervene as necessary  Determine patient's food preferences and provide high-protein, high-caloric foods as appropriate       INTERVENTIONS:  - Monitor oral intake, urinary output, labs, and treatment plans  - Assess nutrition and hydration status and recommend course of action  - Evaluate amount of meals eaten  - Assist patient with eating if necessary   - Allow adequate time for meals  - Recommend/ encourage appropriate diets, oral nutritional supplements, and vitamin/mineral supplements  - Order, calculate, and assess calorie counts as needed  - Recommend, monitor, and adjust tube feedings and TPN/PPN based on assessed needs  - Assess need for intravenous fluids  - Provide specific nutrition/hydration education as appropriate  - Include patient/family/caregiver in decisions related to nutrition  Outcome: Progressing     Problem: CONFUSION/THOUGHT DISTURBANCE  Goal: Thought disturbances (confusion, delirium, depression, dementia or psychosis) are managed to maintain or return to baseline mental status and functional level  Description  INTERVENTIONS:  - Assess for possible contributors to  thought disturbance, including but not limited to medications, infection, impaired vision or hearing, underlying metabolic abnormalities, dehydration, respiratory compromise,  psychiatric diagnoses and notify attending PHYSICAN/AP  - Monitor and intervene to maintain adequate nutrition, hydration, elimination, sleep and activity  - Decrease environmental stimuli, including noise as appropriate  - Provide frequent contacts to provide refocusing, direction and reassurance as needed  Approach patient calmly with eye contact and at their level    - Barry high risk fall precautions, aspiration precautions and other safety measures, as indicated  - If delirium suspected, notify physician/AP of change in condition and request immediate in-person evaluation  - Pursue consults as appropriate including Geriatric (campus dependent), OT for cognitive evaluation/activity planning, psychiatric, pastoral care, etc   Outcome: Progressing     Problem: SAFETY,RESTRAINT: NV/NON-SELF DESTRUCTIVE BEHAVIOR  Goal: Remains free of harm/injury (restraint for non violent/non self-detsructive behavior)  Description  INTERVENTIONS:  - Instruct patient/family regarding restraint use   - Assess and monitor physiologic and psychological status   - Provide interventions and comfort measures to meet assessed patient needs   - Identify and implement measures to help patient regain control  - Assess readiness for release of restraint   Outcome: Progressing  Goal: Returns to optimal restraint-free functioning  Description  INTERVENTIONS:  - Assess the patient's behavior and symptoms that indicate continued need for restraint  - Identify and implement measures to help patient regain control  - Assess readiness for release of restraint   Outcome: Progressing

## 2020-03-03 NOTE — PROGRESS NOTES
Progress Note- Malvin Victoria 80 y o  female MRN: [id number]  Unit/Bed#: [facility] 710-01 Encounter: [id number]    Assessment and Plan:  Malvin Victoria is a 80 y o  old female PMH:  Afib on Eliquis, type 2 diabetes, hypertension, worsening aphasia who presented and completed PEG tube placement on 03/02/2020  #PEG Tube Placement  Patient underwent successful PEG tube placement yesterday  Peg tube flushed appropriately with bumper exhibiting 360 degree mobility  No evidence of erythema, discomfort, abdominal pain endorsed by patient  Patient's abdomen was soft, nondistended, nontender on exam    Patient safely able to use G-tube for administration of medications    -safe to start tube feeds for patient through G-tube  -dietician/nutrition consult for tube feeds    GI team will sign off at this time, please do not hesitate to contact us with any further questions or concerns about the patient at any point     ______________________________________________________________________    Subjective:     Patient in mild discomfort during adjustment PEG tube  No significant tenderness on deep palpation and abdominal quadrants       Medication Administration - last 24 hours from 03/02/2020 1009 to 03/03/2020 1009       Date/Time Order Dose Route Action Action by     03/02/2020 1720 atorvastatin (LIPITOR) tablet 40 mg 40 mg Oral Not Given Beto Meeks RN     03/02/2020 1720 cholecalciferol (VITAMIN D3) tablet 1,000 Units 1,000 Units Oral Not Given Beto Meeks RN     03/02/2020 1720 cyanocobalamin (VITAMIN B-12) tablet 1,000 mcg 1,000 mcg Oral Not Given Beto Meeks RN     03/03/2020 3324 digoxin (LANOXIN) tablet 125 mcg 125 mcg Oral Given Beto Meeks RN     48/53/6557 4653 folic acid (FOLVITE) tablet 1,000 mcg 1,000 mcg Oral Given Beto Meeks RN     03/03/2020 0857 oxybutynin (DITROPAN-XL) 24 hr tablet 5 mg 5 mg Oral Not Given Beto Meeks RN     03/03/2020 7716 primidone (MYSOLINE) tablet 50 mg 50 mg Oral Given Michelle Jovel RN     03/02/2020 2050 primidone (MYSOLINE) tablet 50 mg 50 mg Oral Not Given [address]     03/03/2020 6513 spironolactone (ALDACTONE) tablet 50 mg 50 mg Oral Given Michelle Jovel RN     03/02/2020 1724 Labetalol HCl (NORMODYNE) injection 10 mg 10 mg Intravenous Given Michelle Jovel RN     03/03/2020 0857 gabapentin (NEURONTIN) oral solution 300 mg 300 mg Oral Not Given Michelle Jovel RN     03/03/2020 5603 insulin lispro (HumaLOG) 100 units/mL subcutaneous injection 1-5 Units 1 Units Subcutaneous Not Given KORY WHITE, RN     03/03/2020 0035 insulin lispro (HumaLOG) 100 units/mL subcutaneous injection 1-5 Units 1 Units Subcutaneous Not Given KORY WHITE RN     03/02/2020 1733 insulin lispro (HumaLOG) 100 units/mL subcutaneous injection 1-5 Units 1 Units Subcutaneous Not Given Michelle Jovel RN     03/02/2020 1212 insulin lispro (HumaLOG) 100 units/mL subcutaneous injection 1-5 Units 1 Units Subcutaneous Not Given Michelle Jovel RN     03/03/2020 0858 metoprolol (LOPRESSOR) injection 5 mg 5 mg Intravenous Given Michelle Jovel RN     03/03/2020 0229 metoprolol (LOPRESSOR) injection 5 mg 5 mg Intravenous Given Nata Yeung RN     03/02/2020 1616 metoprolol (LOPRESSOR) injection 5 mg 5 mg Intravenous Given Michelle Jovel RN     03/03/2020 0754 morphine injection 2 mg 2 mg Intravenous Given Michelle Jovel RN     03/02/2020 1213 morphine injection 2 mg 2 mg Intravenous Given Michelle Jovel RN     03/02/2020 2140 potassium chloride 20 mEq IVPB (premix) 20 mEq Intravenous [facility]     03/02/2020 1825 potassium chloride 20 mEq IVPB (premix) 20 mEq Intravenous [address] Michelle Jovel RN     03/02/2020 1823 potassium chloride 20 mEq IVPB (premix) 0 mEq Intravenous Stopped Michelle Jovel RN     03/02/2020 1600 potassium chloride 20 mEq IVPB (premix)   Intravenous Canceled Entry [facility], Pharmacist     03/02/2020 1535 potassium chloride 20 mEq IVPB (premix) 20 mEq Intravenous [address] [place]     03/02/2020 1534 potassium chloride 20 mEq IVPB (premix) 0 mEq Intravenous Stopped Angelica Baugh RN     03/02/2020 1314 potassium chloride 20 mEq IVPB (premix) 20 mEq Intravenous [address] Angelica Baugh RN     03/02/2020 1036 potassium chloride 20 mEq IVPB (premix)   Intravenous Canceled Entry Angelica Baugh RN     03/02/2020 1728 HYDROmorphone (DILAUDID) injection 0 5 mg 0 5 mg Intravenous Given Angelica Baugh RN     03/02/2020 1806 dextrose 5 % and sodium chloride 0 45 % infusion 50 mL/hr Intravenous New Bag Angelica Baugh RN          Objective:     Vitals: Blood pressure 161/76, pulse 71, temperature 97 5 °F (36 4 °C), resp  rate 18, height 5' 4" (1 626 m), weight 81 2 kg (179 lb), SpO2 97 %, not currently breastfeeding  ,Body mass index is 30 73 kg/m²  Intake/Output Summary (Last 24 hours) at 3/3/2020 1009  Last data filed at 3/3/2020 0706  Gross per 24 hour   Intake 477 5 ml   Output 539 ml   Net -61 5 ml       Physical Exam:   General Appearance:   Alert, cooperative, no distress   HEENT:   Normocephalic, atraumatic, anicteric  Neck:  Supple, symmetrical, trachea midline   Lungs:   Clear to auscultation bilaterally; no rales, rhonchi or wheezing; respirations unlabored    Heart[de-identified]   Regular rate and rhythm; no murmur, rub, or gallop  Abdomen:    peg tube site clean dry intact  Flushes appropriately    No blood a PEG tube site   Genitalia:   Deferred    Rectal:   Deferred    Extremities:  No cyanosis, clubbing or edema    Pulses:  2+ and symmetric all extremities    Skin:  No jaundice, rashes, or lesions    Lymph nodes:  No palpable cervical lymphadenopathy        Invasive Devices     Peripheral Intravenous Line            Peripheral IV 03/02/20 Left Forearm less than 1 day          Drain            Gastrostomy/Enterostomy Percutaneous endoscopic gastrostomy (PEG) 20 Fr  LUQ less than 1 day                Lab Results:  No results displayed because visit has over 200 results  Imaging Studies: I have personally reviewed pertinent imaging studies        ---------------------------------------------------  Note Electronically Signed By:    MD Bill Nunez 73 Gastroenterology Fellow PGY-4  PI #: [id number]

## 2020-03-04 ENCOUNTER — APPOINTMENT (INPATIENT)
Dept: RADIOLOGY | Facility: HOSPITAL | Age: 85
DRG: 853 | End: 2020-03-04
Payer: COMMERCIAL

## 2020-03-04 ENCOUNTER — APPOINTMENT (INPATIENT)
Dept: RADIOLOGY | Facility: HOSPITAL | Age: 85
DRG: 853 | End: 2020-03-04
Attending: INTERNAL MEDICINE
Payer: COMMERCIAL

## 2020-03-04 PROBLEM — J90 PLEURAL EFFUSION, LEFT: Status: ACTIVE | Noted: 2020-03-04

## 2020-03-04 LAB
ALBUMIN SERPL BCP-MCNC: 1.7 G/DL (ref 3.5–5)
ALP SERPL-CCNC: 90 U/L (ref 46–116)
ALT SERPL W P-5'-P-CCNC: 17 U/L (ref 12–78)
ANION GAP SERPL CALCULATED.3IONS-SCNC: 4 MMOL/L (ref 4–13)
AST SERPL W P-5'-P-CCNC: 24 U/L (ref 5–45)
BILIRUB SERPL-MCNC: 0.42 MG/DL (ref 0.2–1)
BUN SERPL-MCNC: 8 MG/DL (ref 5–25)
CALCIUM SERPL-MCNC: 8.5 MG/DL (ref 8.3–10.1)
CHLORIDE SERPL-SCNC: 111 MMOL/L (ref 100–108)
CO2 SERPL-SCNC: 27 MMOL/L (ref 21–32)
CREAT SERPL-MCNC: 0.43 MG/DL (ref 0.6–1.3)
ERYTHROCYTE [DISTWIDTH] IN BLOOD BY AUTOMATED COUNT: 12.7 % (ref 11.6–15.1)
GFR SERPL CREATININE-BSD FRML MDRD: 94 ML/MIN/1.73SQ M
GLUCOSE SERPL-MCNC: 115 MG/DL (ref 65–140)
GLUCOSE SERPL-MCNC: 143 MG/DL (ref 65–140)
GLUCOSE SERPL-MCNC: 148 MG/DL (ref 65–140)
GLUCOSE SERPL-MCNC: 169 MG/DL (ref 65–140)
GLUCOSE SERPL-MCNC: 185 MG/DL (ref 65–140)
HCT VFR BLD AUTO: 31.5 % (ref 34.8–46.1)
HGB BLD-MCNC: 10.1 G/DL (ref 11.5–15.4)
MAGNESIUM SERPL-MCNC: 1.8 MG/DL (ref 1.6–2.6)
MCH RBC QN AUTO: 29.7 PG (ref 26.8–34.3)
MCHC RBC AUTO-ENTMCNC: 32.1 G/DL (ref 31.4–37.4)
MCV RBC AUTO: 93 FL (ref 82–98)
PHOSPHATE SERPL-MCNC: 2.5 MG/DL (ref 2.3–4.1)
PLATELET # BLD AUTO: 225 THOUSANDS/UL (ref 149–390)
PMV BLD AUTO: 9.9 FL (ref 8.9–12.7)
POTASSIUM SERPL-SCNC: 3.7 MMOL/L (ref 3.5–5.3)
PROT SERPL-MCNC: 5.6 G/DL (ref 6.4–8.2)
RBC # BLD AUTO: 3.4 MILLION/UL (ref 3.81–5.12)
SODIUM SERPL-SCNC: 142 MMOL/L (ref 136–145)
WBC # BLD AUTO: 6.59 THOUSAND/UL (ref 4.31–10.16)

## 2020-03-04 PROCEDURE — 71046 X-RAY EXAM CHEST 2 VIEWS: CPT

## 2020-03-04 PROCEDURE — 99222 1ST HOSP IP/OBS MODERATE 55: CPT | Performed by: INTERNAL MEDICINE

## 2020-03-04 PROCEDURE — 74018 RADEX ABDOMEN 1 VIEW: CPT

## 2020-03-04 PROCEDURE — 80053 COMPREHEN METABOLIC PANEL: CPT | Performed by: INTERNAL MEDICINE

## 2020-03-04 PROCEDURE — 84100 ASSAY OF PHOSPHORUS: CPT | Performed by: INTERNAL MEDICINE

## 2020-03-04 PROCEDURE — 97535 SELF CARE MNGMENT TRAINING: CPT

## 2020-03-04 PROCEDURE — 83735 ASSAY OF MAGNESIUM: CPT | Performed by: INTERNAL MEDICINE

## 2020-03-04 PROCEDURE — 97530 THERAPEUTIC ACTIVITIES: CPT

## 2020-03-04 PROCEDURE — 82948 REAGENT STRIP/BLOOD GLUCOSE: CPT

## 2020-03-04 PROCEDURE — 99233 SBSQ HOSP IP/OBS HIGH 50: CPT | Performed by: INTERNAL MEDICINE

## 2020-03-04 PROCEDURE — 85027 COMPLETE CBC AUTOMATED: CPT | Performed by: INTERNAL MEDICINE

## 2020-03-04 PROCEDURE — 71250 CT THORAX DX C-: CPT

## 2020-03-04 RX ORDER — FUROSEMIDE 10 MG/ML
40 INJECTION INTRAMUSCULAR; INTRAVENOUS ONCE
Status: COMPLETED | OUTPATIENT
Start: 2020-03-04 | End: 2020-03-04

## 2020-03-04 RX ORDER — POLYETHYLENE GLYCOL 3350 17 G/17G
17 POWDER, FOR SOLUTION ORAL DAILY
Status: DISCONTINUED | OUTPATIENT
Start: 2020-03-04 | End: 2020-03-12 | Stop reason: HOSPADM

## 2020-03-04 RX ADMIN — INSULIN LISPRO 1 UNITS: 100 INJECTION, SOLUTION INTRAVENOUS; SUBCUTANEOUS at 12:07

## 2020-03-04 RX ADMIN — LABETALOL 20 MG/4 ML (5 MG/ML) INTRAVENOUS SYRINGE 10 MG: at 12:23

## 2020-03-04 RX ADMIN — HYDROMORPHONE HYDROCHLORIDE 0.5 MG: 1 INJECTION, SOLUTION INTRAMUSCULAR; INTRAVENOUS; SUBCUTANEOUS at 16:09

## 2020-03-04 RX ADMIN — MELATONIN 1000 UNITS: at 18:28

## 2020-03-04 RX ADMIN — POLYETHYLENE GLYCOL 3350 17 G: 17 POWDER, FOR SOLUTION ORAL at 10:59

## 2020-03-04 RX ADMIN — METOPROLOL TARTRATE 25 MG: 25 TABLET, FILM COATED ORAL at 20:20

## 2020-03-04 RX ADMIN — METOPROLOL TARTRATE 12.5 MG: 25 TABLET ORAL at 09:20

## 2020-03-04 RX ADMIN — FOLIC ACID 1000 MCG: 1 TABLET ORAL at 09:20

## 2020-03-04 RX ADMIN — DIGOXIN 125 MCG: 125 TABLET ORAL at 09:20

## 2020-03-04 RX ADMIN — FUROSEMIDE 40 MG: 10 INJECTION, SOLUTION INTRAMUSCULAR; INTRAVENOUS at 14:44

## 2020-03-04 RX ADMIN — HYDROMORPHONE HYDROCHLORIDE 0.5 MG: 1 INJECTION, SOLUTION INTRAMUSCULAR; INTRAVENOUS; SUBCUTANEOUS at 20:20

## 2020-03-04 RX ADMIN — CYANOCOBALAMIN TAB 500 MCG 1000 MCG: 500 TAB at 18:28

## 2020-03-04 RX ADMIN — GABAPENTIN 300 MG: 250 SOLUTION ORAL at 09:22

## 2020-03-04 RX ADMIN — APIXABAN 5 MG: 5 TABLET, FILM COATED ORAL at 18:28

## 2020-03-04 RX ADMIN — ATORVASTATIN CALCIUM 40 MG: 40 TABLET, FILM COATED ORAL at 18:28

## 2020-03-04 RX ADMIN — SPIRONOLACTONE 50 MG: 50 TABLET ORAL at 09:20

## 2020-03-04 RX ADMIN — APIXABAN 5 MG: 5 TABLET, FILM COATED ORAL at 09:21

## 2020-03-04 RX ADMIN — METOPROLOL TARTRATE 12.5 MG: 25 TABLET ORAL at 10:59

## 2020-03-04 RX ADMIN — BISACODYL 10 MG: 10 SUPPOSITORY RECTAL at 09:25

## 2020-03-04 RX ADMIN — INSULIN LISPRO 1 UNITS: 100 INJECTION, SOLUTION INTRAVENOUS; SUBCUTANEOUS at 18:27

## 2020-03-04 RX ADMIN — PRIMIDONE 50 MG: 50 TABLET ORAL at 20:20

## 2020-03-04 RX ADMIN — PRIMIDONE 50 MG: 50 TABLET ORAL at 09:20

## 2020-03-04 NOTE — CONSULTS
Consultation - Pulmonary Medicine   Silverio Pardo 80 y o  female MRN: [id number]  Unit/Bed#: [facility] 710-01 Encounter: [id number]      Assessment/Plan:    1  Acute pulmonary insufficiency likely secondary to large left pleural effusion      -  currently 2 L- 92%-  patient does not wear home O2      -  will continue to maintain saturations greater than 88%      -  will need to initiate aggressive pulmonary toileting:  Deep breathing cough, OOB as tolerated, IS Q 1 hr    2  Abnormal chest x-ray       -  large left lobe opacity   differentials include:  Aspiration pneumonia vs effusion vs mucous plugging       -  patient will likely need thoracentesis vs bronchoscopy, discussed consent with daughter Mikhail Alexis, she did report daughter Leonel Tobar would be able to give consent       -  will order procalcitonin repeat CBC       -  Hold feedings        -  will order chest CT       -  initiate aspiration precaution    - BUTCH- [place] [phone]    3  Suspected Acute on chronic grade 2 diastolic CHF in the setting of atrial fibrillation       -  Echo      EF 65%                         proBNP       -  continue Eliquis, digoxin, metoprolol, spironolactone    4  Stroke-like symptoms with B/L LE weakness and dysphagia       -  neurology following-  imaging negative for acute CVA       -  weakness likely secondary to intercranial and ICA stenosis       -  patient had PEG tube placed 3/3/2020, feedings initiate 3/4/2020    History of Present Illness   Physician Requesting Consult: Payal Roth MD  Reason for Consult / Principal Problem:  Pleural effusion  Hx and PE limited by:  Aphasia  HPI: Silverio Pardo is a 80 y o   female who presented to [facility]  with complaints of left-sided facial droop  Patient's past medical history of AFib on Eliquis, arthritis, diabetes, panic disorder, and spinal stenosis  Patient was initially a stroke alert however imaging was unremarkable for acute CVA process    As patient stayed in the hospital her weakness began to increase in severity in which is likely secondary to hypoperfusion secondary to intracranial and ICA stenosis  Patient also began developing oropharyngeal dysphagia in which speech had several evaluations and patient has absent gag reflex  Patient had PEG tube placed on 3/2/2020 and initiated 2 feedings on 3/3/2020  On 3/4/2020 patient began stating that she was feeling short of breath, primary team obtain chest x-ray which shows almost complete opacification of left lung field  Pulmonary was consulted for large left pleural effusion  Patient was unable to provide history as she has aphasic  Significant overnight events reported that patient began gesturing that she was having shortness of breath and chest pain in which imaging showed left lung opacification  Currently attempting to make contact with patient's daughter to determine patient wishes to pursue thoracentesis vs medical management  Patient currently looks comfortable has scattered rhonchi throughout lung field is only requiring 2 L nasal cannula with no accessory muscle use or noted tachypnea  From a pulmonary standpoint, patient does not have a pulmonologist   Patient has been a lifelong nonsmoker  She denies ever being diagnosed with COPD or asthma  Patient is not maintained on any maintenance inhalers or oxygen therapies  Patient is not maintained on any medications to treat GERD, seasonal allergies, or postnasal drip  Patient denies any occupational exposures as she worked packing meat at [employer]  She reports having 1 pet  She denies having any difficulty sleeping  She currently denies any acute exposures to dust, mold, asbestos, or silica  Inpatient consult to Pulmonology  Consult performed by: VALERI Purdy  Consult ordered by: Army Antonio MD          Review of Systems   Constitutional: Positive for appetite change  Negative for activity change     HENT: Negative for congestion and postnasal drip  Respiratory: Positive for cough and shortness of breath  Negative for apnea, choking, chest tightness, wheezing and stridor  Cardiovascular: Negative for chest pain, palpitations and leg swelling  Gastrointestinal: Negative for abdominal distention and abdominal pain  Genitourinary: Negative for difficulty urinating and dyspareunia  Musculoskeletal: Negative for arthralgias and back pain  Skin: Negative for color change and pallor  Neurological: Negative for dizziness and facial asymmetry  Psychiatric/Behavioral: Negative for agitation and behavioral problems         Historical Information   Past Medical History:   Diagnosis Date    A-fib [facility])     Arthritis     Assistance needed for mobility     pt can stand with assistance and transfer    Chronic pain disorder     Diabetes mellitus ([facility] [address] )     NIDDM    Full dentures     History of transfusion     no adverse reaction    Hyperlipidemia     Irregular heart beat     Numbness and tingling of both feet     Skin abnormality     sacrum area - small red area, less than a dime size    Spinal stenosis     Stroke ([facility] [address] )     Unable to ambulate     Urinary incontinence     ipg stimulator x3 repakcements,battery exchange today 2/14/2018    Wears glasses     Wheelchair dependence      Past Surgical History:   Procedure Laterality Date    BACK SURGERY      fusion    BLADDER SUSPENSION      CARPAL TUNNEL RELEASE Bilateral     CHOLANGIOGRAM N/A 6/4/2018    Procedure: CHOLANGIOGRAM;  Surgeon: Kelly Mckee MD;  [facility];  Service: General    CHOLECYSTECTOMY LAPAROSCOPIC N/A 6/4/2018    Procedure: CHOLECYSTECTOMY LAPAROSCOPIC;  Surgeon: Kelly Mckee MD;  [facility];  Service: General    COLONOSCOPY      DILATION AND CURETTAGE OF UTERUS      FRACTURE SURGERY Left     femur with hardware    HYSTERECTOMY      INSERTION / PLACEMENT / REVISION NEUROSTIMULATOR      JOINT REPLACEMENT Bilateral     knees  SACRAL NERVE STIMULATOR PLACEMENT N/A 2/14/2018    Procedure: REMOVE AND REPLACE IPG;  Surgeon: Khloe Strauss MD;  [facility];  Service: UroGynecology           TONSILLECTOMY       Social History   Social History     Substance and Sexual Activity   Alcohol Use Not Currently    Comment: very rare     Social History     Substance and Sexual Activity   Drug Use No     Social History     Tobacco Use   Smoking Status Never Smoker   Smokeless Tobacco Never Used     E-Cigarette/Vaping    E-Cigarette Use Never User      E-Cigarette/Vaping Substances     Occupational History:  [employer]    Family History:   Family History   Problem Relation Age of Onset    No Known Problems Mother     No Known Problems Father        Meds/Allergies   pertinent pulmonary meds have been reviewed    Allergies   Allergen Reactions    Bactrim [Sulfamethoxazole-Trimethoprim] Swelling     Swelling around eyes and red eyes    Aspirin Other (See Comments)     "feels like fist in my chest"    Ciprofloxacin     Nitrofurantoin     Other      "5mz/Tmp "  "1 60 mTab amme"     Per pt's allergy list        Objective   Vitals: Blood pressure (!) 175/82, pulse 75, temperature 98 3 °F (36 8 °C), resp  rate 16, height 5' 4" (1 626 m), weight 81 2 kg (179 lb), SpO2 97 %, not currently breastfeeding  2L,Body mass index is 30 73 kg/m²  Intake/Output Summary (Last 24 hours) at 3/4/2020 1456  Last data filed at 3/3/2020 1836  Gross per 24 hour   Intake 268 33 ml   Output    Net 268 33 ml     Invasive Devices     Peripheral Intravenous Line            Peripheral IV 03/02/20 Left Forearm 2 days          Drain            Gastrostomy/Enterostomy Percutaneous endoscopic gastrostomy (PEG) 20 Fr  LUQ 2 days                Physical Exam   Constitutional: She is oriented to person, place, and time  She appears well-developed and well-nourished  No distress  HENT:   Head: Normocephalic and atraumatic  Neck: Normal range of motion   Neck supple  No tracheal deviation present  No thyromegaly present  Cardiovascular: Normal rate, regular rhythm and normal heart sounds  Exam reveals no gallop and no friction rub  No murmur heard  Pulmonary/Chest: No accessory muscle usage or stridor  No tachypnea and no bradypnea  No respiratory distress  She has decreased breath sounds in the left upper field, the left middle field and the left lower field  She has no wheezes  She has no rhonchi  She has no rales  She exhibits no tenderness  Significantly diminished aeration throughout complete left lung field, scattered rhonchi and wheezing throughout lung fields   Abdominal: Soft  Bowel sounds are normal  She exhibits no distension  There is no tenderness  Musculoskeletal: Normal range of motion  She exhibits no edema or deformity  Neurological: She is alert and oriented to person, place, and time  Skin: Skin is warm and dry  She is not diaphoretic  No erythema  No pallor  Psychiatric: She has a normal mood and affect   Her behavior is normal        Lab Results:   I have personally reviewed pertinent lab results CBC:   Lab Results   Component Value Date    WBC 6 59 03/04/2020    HGB 10 1 (L) 03/04/2020    HCT 31 5 (L) 03/04/2020    MCV 93 03/04/2020     03/04/2020    MCH 29 7 03/04/2020    MCHC 32 1 03/04/2020    RDW 12 7 03/04/2020    MPV 9 9 03/04/2020   , CMP:   Lab Results   Component Value Date    SODIUM 142 03/04/2020    K 3 7 03/04/2020     (H) 03/04/2020    CO2 27 03/04/2020    BUN 8 03/04/2020    CREATININE 0 43 (L) 03/04/2020    CALCIUM 8 5 03/04/2020    AST 24 03/04/2020    ALT 17 03/04/2020    ALKPHOS 90 03/04/2020    EGFR 94 03/04/2020       Imaging Studies: I have personally reviewed pertinent films in PACS     Chest x-ray 03/04/2020-near complete opacification of left felipe thorax     EKG, Pathology, and Other Studies: I have personally reviewed pertinent films in PACS     Echo grade 2 diastolic dysfunction EF 85% mitral valve mild calcification tricuspid valve mild regurg     Pulmonary Results (PFTs, PSG): I have personally reviewed pertinent films in PACS     No PFTs recorded     VTE Prophylaxis: Sequential compression device (Venodyne)     Code Status: Level 3 - DNAR and DNI    None    Portions of the record may have been created with voice recognition software  Occasional wrong word or "sound a like" substitutions may have occurred due to the inherent limitations of voice recognition software  Read the chart carefully and recognize, using context, where substitutions have occurred

## 2020-03-04 NOTE — ASSESSMENT & PLAN NOTE
No radiographic evidence of CVA  Neurology re-evaluated patient in light of worsening weakness, likely mechanism is hypoperfusion secondary to 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  Neurology consulted, appreciate input  NPO  PT/OT  Status post PEG placement 3/2  Plans for discharge to rehab

## 2020-03-04 NOTE — PHYSICAL THERAPY NOTE
Physical Therapy Treatment Note    Patient Name: Hanh Meza    USGZQ'F Date: 3/4/2020     Problem List  Principal Problem:    Aphasia  Active Problems:    Wheelchair dependence    Paroxysmal atrial fibrillation ([facility] [address] )    Spinal cord stimulator status    Overactive bladder    Essential hypertension    Cervical dystonia    Stroke-like symptoms    Weakness of both lower extremities    Dysarthria    Facial droop    Oropharyngeal dysphagia       Past Medical History  Past Medical History:   Diagnosis Date    A-fib ([facility] [address] )     Arthritis     Assistance needed for mobility     pt can stand with assistance and transfer    Chronic pain disorder     Diabetes mellitus ([facility] [address] )     NIDDM    Full dentures     History of transfusion     no adverse reaction    Hyperlipidemia     Irregular heart beat     Numbness and tingling of both feet     Skin abnormality     sacrum area - small red area, less than a dime size    Spinal stenosis     [facility]     Unable to ambulate     Urinary incontinence     ipg stimulator x3 repakcements,battery exchange today 2/14/2018    Wears glasses     Wheelchair dependence         Past Surgical History  Past Surgical History:   Procedure Laterality Date    BACK SURGERY      fusion    BLADDER SUSPENSION      CARPAL TUNNEL RELEASE Bilateral     CHOLANGIOGRAM N/A 6/4/2018    Procedure: CHOLANGIOGRAM;  Surgeon: Kelly Mckee MD;  [facility];  Service: General    CHOLECYSTECTOMY LAPAROSCOPIC N/A 6/4/2018    Procedure: CHOLECYSTECTOMY LAPAROSCOPIC;  Surgeon: Kelly Mckee MD;  [facility];  Service: General    COLONOSCOPY      DILATION AND CURETTAGE OF UTERUS      FRACTURE SURGERY Left     femur with hardware    HYSTERECTOMY      INSERTION / Janith Pink / REVISION NEUROSTIMULATOR      JOINT REPLACEMENT Bilateral     knees    SACRAL NERVE STIMULATOR PLACEMENT N/A 2/14/2018    Procedure: REMOVE AND REPLACE IPG;  Surgeon: Munir Castano MD;  [facility];  Service: UroGynecology           TONSILLECTOMY             03/04/20 1011   Pain Assessment   Pain Assessment FLACC   Pain Rating: FLACC (Rest) - Face 0   Pain Rating: FLACC (Rest) - Legs 0   Pain Rating: FLACC (Rest) - Activity 0   Pain Rating: FLACC (Rest) - Cry 1   Pain Rating: FLACC (Rest) - Consolability 0   Score: FLACC (Rest) 1   Pain Rating: FLACC (Activity) - Face 1   Pain Rating: FLACC (Activity) - Legs 0   Pain Rating: FLACC (Activity) - Activity 1   Pain Rating: FLACC (Activity) - Cry 1   Pain Rating: FLACC (Activity) - Consolability 1   Score: FLACC (Activity) 4   Restrictions/Precautions   Weight Bearing Precautions Per Order No   Other Precautions Cognitive; Chair Alarm; Bed Alarm; Fall Risk;Telemetry;Multiple lines  (Peg )   General   Chart Reviewed Yes   Family/Caregiver Present No   Cognition   Arousal/Participation Lethargic   Attention Attends with cues to redirect   Orientation Level Oriented to person;Oriented to place;Oriented to time   Memory Unable to assess   Following Commands Follows one step commands with increased time or repetition   Comments Presents with increased lethargy initally  Pt agreeable to PT session    Subjective   Subjective States that she wants to go home  Asked to sit up today   Bed Mobility   Rolling R 2  Maximal assistance   Additional items Assist x 1; Increased time required; Impulsive   Rolling L 2  Maximal assistance  (Initiates with RUE )   Additional items Assist x 1; Increased time required;Verbal cues   Supine to Sit 2  Maximal assistance   Additional items Assist x 2; Increased time required;Verbal cues   Sit to Supine 2  Maximal assistance   Additional items Assist x 2; Increased time required;Verbal cues   Additional Comments Pt went from supine <> sit with max A x 2 for UB support and BLE management  Pt able to initate bed mobility by moving RUE and RLE   Tolerated sitting EOB for approx 15 minutes, required mod A x 1 to maintain static/dynamic sitting balance  VC for upright posture  Transfers   Sit to Stand Unable to assess   Additional Comments Not appropriate at this time   Balance   Static Sitting Poor   Dynamic Sitting Poor -   Endurance Deficit   Endurance Deficit Yes   Endurance Deficit Description fatigue, weakness, medical status   Activity Tolerance   Activity Tolerance Patient limited by fatigue   Medical Staff Made Aware Miko Stapleton   Nurse Made Aware Yes, TAYLOR Woodruff   Exercises   Knee AROM Long Arc Quad Sitting;10 reps;Right;AROM   Ankle Pumps Supine;AAROM;10 reps;Right;Left   Balance training  Static/dynamic sitting balance for 15 minutes, required mod A x 1 to maintain balance, L lateral/posterior lean noted  Able to intiate weight shift laterally and forward with mod A   Assessment   Prognosis Guarded   Problem List Decreased strength;Decreased endurance; Impaired balance;Decreased mobility; Decreased safety awareness; Impaired judgement;Decreased cognition;Pain;Decreased skin integrity   Assessment Patient seen for physical therapy session with a focus on bed mobility, static/dynamic sitting balance, activity tolerance, and there-ex  Pt received supine in bed and agreeable to PT session, cleared by RN for PT session  Pt continued to state "get me out of here t/o session" and asked to sit at EOB  Reports pain in the abdominal however, able to tolerate all bed mobility and sitting balance  Pt required max A x 2 for sit <> supine transfer, able to initiate by moving RLE and RUE  Pt requires mod A x 1 for static/dynamic sitting balance, noted L lateral posterior lean  Pt able to initiate weight shifting, requires mod A to complete  Tolerated sitting EOB for approx 15 minutes  Returned back to bed with max A x 2  Therapy progression limted by pain, fatigue, medical status, and weakness  Concluded session with patient supine in bed  Recommend STR upon discharge once medically stable      Barriers to Discharge Decreased caregiver support   Goals   Patient Goals To go home   STG Expiration Date 03/09/20   PT Treatment Day 3   Plan   Treatment/Interventions LE strengthening/ROM; Endurance training;Patient/family training;Bed mobility; Therapeutic exercise   Progress Slow progress, medical status limitations   PT Frequency   (2-3x/wk)   Recommendation   Recommendation Post acute IP rehab   Equipment Recommended Walker; Wheelchair   PT - OK to Discharge Yes  (to rehab once medically stable)       Beatriz River PT, DPT

## 2020-03-04 NOTE — PLAN OF CARE
Problem: PHYSICAL THERAPY ADULT  Goal: Performs mobility at highest level of function for planned discharge setting  See evaluation for individualized goals  Description  Treatment/Interventions: Functional transfer training, LE strengthening/ROM, Therapeutic exercise, Endurance training, Cognitive reorientation, Patient/family training, Bed mobility, Gait training  Equipment Recommended: Isma Barbour, Wheelchair       See flowsheet documentation for full assessment, interventions and recommendations  Note:   Prognosis: Guarded  Problem List: Decreased strength, Decreased endurance, Impaired balance, Decreased mobility, Decreased safety awareness, Impaired judgement, Decreased cognition, Pain, Decreased skin integrity  Assessment: Patient seen for physical therapy session with a focus on bed mobility, static/dynamic sitting balance, activity tolerance, and there-ex  Pt received supine in bed and agreeable to PT session, cleared by RN for PT session  Pt continued to state "get me out of here t/o session" and asked to sit at EOB  Reports pain in the abdominal however, able to tolerate all bed mobility and sitting balance  Pt required max A x 2 for sit <> supine transfer, able to initiate by moving RLE and RUE  Pt requires mod A x 1 for static/dynamic sitting balance, noted L lateral posterior lean  Pt able to initiate weight shifting, requires mod A to complete  Tolerated sitting EOB for approx 15 minutes  Returned back to bed with max A x 2  Therapy progression limted by pain, fatigue, medical status, and weakness  Concluded session with patient supine in bed  Recommend STR upon discharge once medically stable  Barriers to Discharge: Decreased caregiver support     Recommendation: Post acute IP rehab     PT - OK to Discharge: Yes(to rehab once medically stable)    See flowsheet documentation for full assessment

## 2020-03-04 NOTE — ASSESSMENT & PLAN NOTE
EEG monitoring 2/16-2/17: demonstrated no epileptiform activity or epileptogenic focus  Repeat CT scan of head does not demonstrate any acute findings    Patient failed speech evaluation and is NPO  Patient's speech continues to improve  Family wishes for patient to go to rehab when patient is medically cleared

## 2020-03-04 NOTE — ASSESSMENT & PLAN NOTE
Currently rate controlled  EKG 2/19:  Sinus rhythm with first-degree AV block  Right bundle branch block  Possible lateral infarct (cited on or before 16th June 2019)  When compared with EKG of 14th February 2020, questionable change in initial forces of lateral leads    Continue rate control with digoxin  Continue eliquis

## 2020-03-04 NOTE — ASSESSMENT & PLAN NOTE
This morning patient complained of feeling short of breath despite appearing comfortable and maintaining O2 saturations of more than 95% on 2 L nasal cannula  - chest x-ray obtained  Chest x-ray shows large left sided pleural effusion  Likely due to fluid overload versus aspiration pneumonia  Lasix 40 mg IV given  Pulmonary consulted, appreciate input- considering thoracentesis versus bronchoscopy  Keep head of bed more than 45°  CT chest ordered

## 2020-03-04 NOTE — OCCUPATIONAL THERAPY NOTE
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   Intrinsic Gratification liked being active outside   Pain Assessment   Pain Assessment FLACC   Pain Rating: FLACC (Rest) - Face 0   Pain Rating: FLACC (Rest) - Legs 0   Pain Rating: FLACC (Rest) - Activity 0   Pain Rating: FLACC (Rest) - Cry 0   Pain Rating: FLACC (Rest) - Consolability 0   Score: FLACC (Rest) 0   Pain Rating: FLACC (Activity) - Face 1   Pain Rating: FLACC (Activity) - Legs 0   Pain Rating: FLACC (Activity) - Activity 0   Pain Rating: FLACC (Activity) - Cry 1   Pain Rating: FLACC (Activity) - Consolability 0   Score: FLACC (Activity) 2   ADL   Where Assessed Edge of bed   Grooming Assistance 2  Maximal Assistance   Grooming Deficit Wash/dry face;Brushing hair   Bed Mobility   Rolling R 2  Maximal assistance   Additional items Assist x 1   Rolling L 2  Maximal assistance   Additional items Assist x 1   Supine to Sit 2  Maximal assistance   Additional items Assist x 2   Sit to Supine 2  Maximal assistance   Additional items Assist x 2   Cognition   Overall Cognitive Status Impaired   Arousal/Participation Lethargic   Attention Attends with cues to redirect   Orientation Level Oriented to person;Oriented to place;Oriented to time   Memory Unable to assess   Following Commands Follows one step commands with increased time or repetition   Assessment   Assessment Pt participated in occupational therapy with focus on activity tolerance, AROM/AAROM/SROM and PROM to pt   LUE felipe-paresis for decreased swelling and increased function, bed mob, unsupported sitting balance and tolerance for pt engagement in functional self-care task/grooming tasks and pt michael activity   Pt cleared by TAYLOR/Kacy for pt participation in therapy  Pt received HOB raised/supine pt sitting upright and agreeable to therapy following pt Identifiers confirmed  Pt unable to report her goal today 2* pt poor activity tolerance  Pt reported abdominal pain/surgery site indicated  She requires assist x 2 for bed mob and assist for unsupported sitting balance 2* pt deconditioning /decreased strength  Pt able to use to R UE to reach for playing cards and match to mat on her tray table  Pt will require post acute care rehab services to continue to address pt above noted deficits which currently impair pt ADL and functional mob  Pt bed alarm active post session all needs within reach      Plan   Treatment Interventions ADL retraining   Goal Expiration Date 03/04/20   OT Treatment Day 3   OT Frequency 3-5x/wk   Recommendation   OT Discharge Recommendation Short Term Rehab   Barthel Index   Feeding 0   Bathing 0   Grooming Score 0   Dressing Score 5   Bladder Score 0   Bowels Score 0   Toilet Use Score 5   Transfers (Bed/Chair) Score 5   Mobility (Level Surface) Score 0   Stairs Score 0   Barthel Index Score 15   Modified Bondurant Scale   Modified Cee Scale 5       Lucía COY/NATALYA

## 2020-03-04 NOTE — PROGRESS NOTES
Progress Note - Melisa Rebolledo 1935, 80 y o  female MRN: [id number]    Unit/Bed#: [facility] 710-01 Encounter: [id number]    Primary Care Provider: Maria C Freitas MD   Date and time admitted to hospital: 2/19/2020  8:30 AM        Pleural effusion, left  Assessment & Plan  This morning patient complained of feeling short of breath despite appearing comfortable and maintaining O2 saturations of more than 95% on 2 L nasal cannula  - chest x-ray obtained  Chest x-ray shows large left sided pleural effusion  Likely due to fluid overload versus aspiration pneumonia  Lasix 40 mg IV given  Pulmonary consulted, appreciate input- considering thoracentesis versus bronchoscopy  Keep head of bed more than 45°  CT chest ordered    Oropharyngeal dysphagia  Assessment & Plan  Continue tube feeds  Patient's mentation improving today to consider re-evaluation by speech if patient's mentation continues to improve  It appears the patient is aspirating on tube feeds, absence of gag reflex as well  Appreciate palliative care's assistance  Speech reevaluated and patient is still unable to tolerate PO, will remain NPO  DC IV fluids  Status post PEG placement 3/2, started tube feeds 3/3- tolerating tube feeds well        Stroke-like symptoms  Assessment & Plan  No radiographic evidence of CVA  Neurology re-evaluated patient in light of worsening weakness, likely mechanism is hypoperfusion secondary to 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  Neurology consulted, appreciate input  NPO  PT/OT  Status post PEG placement 3/2  Plans for discharge to rehab    Essential hypertension  Assessment & Plan  Monitor blood pressure with routine vitals  Continue Lopressor and spironolactone    Paroxysmal atrial fibrillation ([address] )  Assessment & Plan  Currently rate controlled  EKG 2/19:  Sinus rhythm with first-degree AV block  Right bundle branch block  Possible lateral infarct (cited on or before 16th June 2019)    When compared with EKG of 2020, questionable change in initial forces of lateral leads  Continue rate control with digoxin  Continue eliquis       * Aphasia  Assessment & Plan  EEG monitoring -: demonstrated no epileptiform activity or epileptogenic focus  Repeat CT scan of head does not demonstrate any acute findings  Patient failed speech evaluation and is NPO  Patient's speech continues to improve  Family wishes for patient to go to rehab when patient is medically cleared      VTE Pharmacologic Prophylaxis:   Pharmacologic: Apixaban (Eliquis)  Mechanical VTE Prophylaxis in Place: Yes    Patient Centered Rounds: I have performed bedside rounds with nursing staff today  Discussions with Specialists or Other Care Team Provider: pulmonary    Education and Discussions with Family / Patient: discussed with patient and patient's daughter over the phone    Time Spent for Care: 45 minutes  More than 50% of total time spent on counseling and coordination of care as described above  Current Length of Stay: 14 day(s)    Current Patient Status: Inpatient   Certification Statement: The patient will continue to require additional inpatient hospital stay due to L pleural effusion    Discharge Plan:  Rehab    Code Status: Level 3 - DNAR and DNI      Subjective:   No acute overnight events  This morning patient complains of feeling short of breath and abdominal pain  Chest x-ray and abdominal x-ray obtained  Findings discussed with daughter Adry Ulrich  Objective:     Vitals:   Temp (24hrs), Av 8 °F (36 6 °C), Min:97 2 °F (36 2 °C), Max:98 3 °F (36 8 °C)    Temp:  [97 2 °F (36 2 °C)-98 3 °F (36 8 °C)] 97 2 °F (36 2 °C)  HR:  [69-84] 84  Resp:  [13-16] 16  BP: (120-211)/(61-91) 154/84  SpO2:  [92 %-97 %] 92 %  Body mass index is 30 73 kg/m²  Input and Output Summary (last 24 hours):        Intake/Output Summary (Last 24 hours) at 3/4/2020 1819  Last data filed at 3/4/2020 1701  Gross per 24 hour   Intake 897 ml   Output 583 ml   Net 314 ml       Physical Exam:     Physical Exam   Constitutional: She is oriented to person, place, and time  She appears well-developed and well-nourished  HENT:   Head: Normocephalic and atraumatic  Mouth/Throat: Oropharynx is clear and moist    Eyes: Pupils are equal, round, and reactive to light  Conjunctivae are normal    Neck: Neck supple  No JVD present  Cardiovascular: Normal rate, regular rhythm, normal heart sounds and intact distal pulses  Pulmonary/Chest: Effort normal    Coarse breath sounds b/l   Abdominal: Soft  Bowel sounds are normal    Musculoskeletal: She exhibits no edema or tenderness  Neurological: She is alert and oriented to person, place, and time  Skin: Skin is warm and dry  Capillary refill takes 2 to 3 seconds  Psychiatric:   Depressed and flat affect   Nursing note and vitals reviewed  Additional Data:     Labs:    Results from last 7 days   Lab Units 03/04/20  0505   WBC Thousand/uL 6 59   HEMOGLOBIN g/dL 10 1*   HEMATOCRIT % 31 5*   PLATELETS Thousands/uL 225     Results from last 7 days   Lab Units 03/04/20  0505   SODIUM mmol/L 142   POTASSIUM mmol/L 3 7   CHLORIDE mmol/L 111*   CO2 mmol/L 27   BUN mg/dL 8   CREATININE mg/dL 0 43*   ANION GAP mmol/L 4   CALCIUM mg/dL 8 5   ALBUMIN g/dL 1 7*   TOTAL BILIRUBIN mg/dL 0 42   ALK PHOS U/L 90   ALT U/L 17   AST U/L 24   GLUCOSE RANDOM mg/dL 143*     Results from last 7 days   Lab Units 02/29/20  0526   INR  1 58*     Results from last 7 days   Lab Units 03/04/20  1201 03/04/20  0603 03/04/20  0021 03/03/20  1719 03/03/20  1240 03/03/20  0700 03/02/20  2350 03/02/20  1731 03/02/20  1209 03/02/20  0638 03/02/20  0018 03/01/20  1819   POC GLUCOSE mg/dl 169* 148* 115 125 128 109 84 81 97 94 157* 109                   * I Have Reviewed All Lab Data Listed Above  * Additional Pertinent Lab Tests Reviewed:  All Labs Within Last 24 Hours Reviewed    Imaging:    Imaging Reports Reviewed Today Include: Chest x-ray, KUB  Imaging Personally Reviewed by Myself Includes:  Chest x-ray, KUB    Recent Cultures (last 7 days):           Last 24 Hours Medication List:     Current Facility-Administered Medications:  acetaminophen 650 mg Rectal Q4H PRN Dari Roberts PA-C   apixaban 5 mg Per PEG Tube BID Kenan Estes MD   atorvastatin 40 mg Per PEG Tube QPM Kenan Estes MD   bisacodyl 10 mg Rectal Daily PRN Kenan Estes MD   cholecalciferol 1,000 Units Per PEG Tube QPM Kenan Estes MD   vitamin B-12 1,000 mcg Per PEG Tube QPM Kenan Estes MD   digoxin 125 mcg Per PEG Tube Daily Kenan Estes MD   folic acid 9,245 mcg Per PEG Tube Daily Kenan Estes MD   gabapentin 300 mg Per PEG Tube Daily Kenan Estes MD   HYDROmorphone 0 5 mg Intravenous Q4H PRN Kenan Estes MD   insulin lispro 1-5 Units Subcutaneous Q6H [facility] Nanci Cheung MD   Labetalol HCl 10 mg Intravenous Q6H PRN Poppy Vasquez PA-C   metoprolol tartrate 25 mg Per PEG Tube Q12H [facility] Kenan Estes MD   morphine injection 2 mg Intravenous Q6H PRN Kenan Estes MD   polyethylene glycol 17 g Oral Daily Kenan Estes MD   primidone 50 mg Per PEG Tube Q12H Jose Mark MD   spironolactone 50 mg Per [place] [employer] Daily Kenan Estes MD        Today, Patient Was Seen By: Kenan Estes MD    ** Please Note: Dictation voice to text software may have been used in the creation of this document   **

## 2020-03-04 NOTE — PLAN OF CARE
Problem: OCCUPATIONAL THERAPY ADULT  Goal: Performs self-care activities at highest level of function for planned discharge setting  See evaluation for individualized goals  Description  Treatment Interventions: ADL retraining, Visual perceptual retraining, Functional transfer training, UE strengthening/ROM, Endurance training, Cognitive reorientation, Patient/family training, Equipment evaluation/education, Compensatory technique education, Continued evaluation, Activityengagement          See flowsheet documentation for full assessment, interventions and recommendations  Outcome: Progressing  Note:   Limitation: Decreased ADL status, Decreased UE ROM, Decreased UE strength, Decreased endurance, Decreased Safe judgement during ADL, Decreased cognition, Decreased sensation, Decreased fine motor control, Visual deficit, Decreased self-care trans, Decreased high-level ADLs  Prognosis: Fair  Assessment: Pt participated in occupational therapy with focus on activity tolerance, AROM/AAROM/SROM and PROM to pt   LUE felipe-paresis for decreased swelling and increased function, bed mob, unsupported sitting balance and tolerance for pt engagement in functional self-care task/grooming tasks and pt michael activity   Pt cleared by TAYLOR/Kacy for pt participation in therapy  Pt received HOB raised/supine pt sitting upright and agreeable to therapy following pt Identifiers confirmed  Pt unable to report her goal today 2* pt poor activity tolerance  Pt reported abdominal pain/surgery site indicated  She requires assist x 2 for bed mob and assist for unsupported sitting balance 2* pt deconditioning /decreased strength  Pt able to use to R UE to reach for playing cards and match to mat on her tray table  Pt will require post acute care rehab services to continue to address pt above noted deficits which currently impair pt ADL and functional mob  Pt bed alarm active post session all needs within reach        OT Discharge Recommendation: Short Term Rehab  OT - OK to Discharge: Yes(to STR when medically stable)

## 2020-03-04 NOTE — SOCIAL WORK
Received a call back from pt's daughter Jordan Ribera to discuss dc plan  Jordan Ribera prefers additional referrals to [facility], [facility], and [facility] referrals sent

## 2020-03-04 NOTE — ASSESSMENT & PLAN NOTE
Continue tube feeds  Patient's mentation improving today to consider re-evaluation by speech if patient's mentation continues to improve  It appears the patient is aspirating on tube feeds, absence of gag reflex as well  Appreciate palliative care's assistance  Speech reevaluated and patient is still unable to tolerate PO, will remain NPO  DC IV fluids  Status post PEG placement 3/2, started tube feeds 3/3- tolerating tube feeds well

## 2020-03-04 NOTE — SOCIAL WORK
Message left for pt's daughter Gerri Rosales to discuss rehab preference  CM called and spoke to pt's daughter Shane Hoaxiomara to discuss same  Shane Henderson states Gerri Rosales is visiting today and will contact CM to discuss dc plan

## 2020-03-05 ENCOUNTER — APPOINTMENT (INPATIENT)
Dept: GASTROENTEROLOGY | Facility: HOSPITAL | Age: 85
DRG: 853 | End: 2020-03-05
Payer: COMMERCIAL

## 2020-03-05 ENCOUNTER — ANESTHESIA EVENT (INPATIENT)
Dept: GASTROENTEROLOGY | Facility: HOSPITAL | Age: 85
DRG: 853 | End: 2020-03-05
Payer: COMMERCIAL

## 2020-03-05 ENCOUNTER — ANESTHESIA (INPATIENT)
Dept: GASTROENTEROLOGY | Facility: HOSPITAL | Age: 85
DRG: 853 | End: 2020-03-05
Payer: COMMERCIAL

## 2020-03-05 PROBLEM — R93.89 ABNORMAL CT OF THE CHEST: Status: ACTIVE | Noted: 2020-03-05

## 2020-03-05 PROBLEM — Z71.89 GOALS OF CARE, COUNSELING/DISCUSSION: Status: ACTIVE | Noted: 2020-03-05

## 2020-03-05 LAB
BASOPHILS # BLD AUTO: 0.05 THOUSANDS/ΜL (ref 0–0.1)
BASOPHILS NFR BLD AUTO: 1 % (ref 0–1)
EOSINOPHIL # BLD AUTO: 0.15 THOUSAND/ΜL (ref 0–0.61)
EOSINOPHIL NFR BLD AUTO: 2 % (ref 0–6)
ERYTHROCYTE [DISTWIDTH] IN BLOOD BY AUTOMATED COUNT: 12.8 % (ref 11.6–15.1)
GLUCOSE SERPL-MCNC: 101 MG/DL (ref 65–140)
GLUCOSE SERPL-MCNC: 102 MG/DL (ref 65–140)
GLUCOSE SERPL-MCNC: 117 MG/DL (ref 65–140)
GLUCOSE SERPL-MCNC: 84 MG/DL (ref 65–140)
GLUCOSE SERPL-MCNC: 98 MG/DL (ref 65–140)
HCT VFR BLD AUTO: 33.2 % (ref 34.8–46.1)
HGB BLD-MCNC: 10.6 G/DL (ref 11.5–15.4)
IMM GRANULOCYTES # BLD AUTO: 0.05 THOUSAND/UL (ref 0–0.2)
IMM GRANULOCYTES NFR BLD AUTO: 1 % (ref 0–2)
LYMPHOCYTES # BLD AUTO: 1.55 THOUSANDS/ΜL (ref 0.6–4.47)
LYMPHOCYTES NFR BLD AUTO: 21 % (ref 14–44)
MCH RBC QN AUTO: 30.2 PG (ref 26.8–34.3)
MCHC RBC AUTO-ENTMCNC: 31.9 G/DL (ref 31.4–37.4)
MCV RBC AUTO: 95 FL (ref 82–98)
MONOCYTES # BLD AUTO: 0.67 THOUSAND/ΜL (ref 0.17–1.22)
MONOCYTES NFR BLD AUTO: 9 % (ref 4–12)
NEUTROPHILS # BLD AUTO: 4.95 THOUSANDS/ΜL (ref 1.85–7.62)
NEUTS SEG NFR BLD AUTO: 66 % (ref 43–75)
NRBC BLD AUTO-RTO: 0 /100 WBCS
PLATELET # BLD AUTO: 240 THOUSANDS/UL (ref 149–390)
PMV BLD AUTO: 10.1 FL (ref 8.9–12.7)
PROCALCITONIN SERPL-MCNC: 0.05 NG/ML
RBC # BLD AUTO: 3.51 MILLION/UL (ref 3.81–5.12)
WBC # BLD AUTO: 7.42 THOUSAND/UL (ref 4.31–10.16)

## 2020-03-05 PROCEDURE — 82948 REAGENT STRIP/BLOOD GLUCOSE: CPT

## 2020-03-05 PROCEDURE — 0WCQ8ZZ EXTIRPATION OF MATTER FROM RESPIRATORY TRACT, VIA NATURAL OR ARTIFICIAL OPENING ENDOSCOPIC: ICD-10-PCS | Performed by: INTERNAL MEDICINE

## 2020-03-05 PROCEDURE — 84145 PROCALCITONIN (PCT): CPT | Performed by: NURSE PRACTITIONER

## 2020-03-05 PROCEDURE — 92526 ORAL FUNCTION THERAPY: CPT

## 2020-03-05 PROCEDURE — 99232 SBSQ HOSP IP/OBS MODERATE 35: CPT | Performed by: INTERNAL MEDICINE

## 2020-03-05 PROCEDURE — 87070 CULTURE OTHR SPECIMN AEROBIC: CPT | Performed by: INTERNAL MEDICINE

## 2020-03-05 PROCEDURE — 99233 SBSQ HOSP IP/OBS HIGH 50: CPT | Performed by: INTERNAL MEDICINE

## 2020-03-05 PROCEDURE — 85025 COMPLETE CBC W/AUTO DIFF WBC: CPT | Performed by: NURSE PRACTITIONER

## 2020-03-05 PROCEDURE — 87106 FUNGI IDENTIFICATION YEAST: CPT | Performed by: INTERNAL MEDICINE

## 2020-03-05 PROCEDURE — 31645 BRNCHSC W/THER ASPIR 1ST: CPT | Performed by: INTERNAL MEDICINE

## 2020-03-05 RX ORDER — SODIUM CHLORIDE 9 MG/ML
INJECTION, SOLUTION INTRAVENOUS CONTINUOUS PRN
Status: DISCONTINUED | OUTPATIENT
Start: 2020-03-05 | End: 2020-03-06 | Stop reason: SURG

## 2020-03-05 RX ORDER — PROPOFOL 10 MG/ML
INJECTION, EMULSION INTRAVENOUS AS NEEDED
Status: DISCONTINUED | OUTPATIENT
Start: 2020-03-05 | End: 2020-03-06 | Stop reason: SURG

## 2020-03-05 RX ORDER — LABETALOL 20 MG/4 ML (5 MG/ML) INTRAVENOUS SYRINGE
AS NEEDED
Status: DISCONTINUED | OUTPATIENT
Start: 2020-03-05 | End: 2020-03-06 | Stop reason: SURG

## 2020-03-05 RX ORDER — KETAMINE HYDROCHLORIDE 50 MG/ML
INJECTION, SOLUTION, CONCENTRATE INTRAMUSCULAR; INTRAVENOUS AS NEEDED
Status: DISCONTINUED | OUTPATIENT
Start: 2020-03-05 | End: 2020-03-06 | Stop reason: SURG

## 2020-03-05 RX ADMIN — FOLIC ACID 1000 MCG: 1 TABLET ORAL at 08:46

## 2020-03-05 RX ADMIN — PRIMIDONE 50 MG: 50 TABLET ORAL at 08:46

## 2020-03-05 RX ADMIN — PROPOFOL 40 MG: 10 INJECTION, EMULSION INTRAVENOUS at 14:47

## 2020-03-05 RX ADMIN — LABETALOL 20 MG/4 ML (5 MG/ML) INTRAVENOUS SYRINGE 5 MG: at 15:04

## 2020-03-05 RX ADMIN — GABAPENTIN 300 MG: 250 SOLUTION ORAL at 08:50

## 2020-03-05 RX ADMIN — METOPROLOL TARTRATE 25 MG: 25 TABLET, FILM COATED ORAL at 08:46

## 2020-03-05 RX ADMIN — LABETALOL 20 MG/4 ML (5 MG/ML) INTRAVENOUS SYRINGE 5 MG: at 14:59

## 2020-03-05 RX ADMIN — KETAMINE HYDROCHLORIDE 10 MG: 50 INJECTION, SOLUTION INTRAMUSCULAR; INTRAVENOUS at 14:49

## 2020-03-05 RX ADMIN — DIGOXIN 125 MCG: 125 TABLET ORAL at 08:46

## 2020-03-05 RX ADMIN — SODIUM CHLORIDE: 0.9 INJECTION, SOLUTION INTRAVENOUS at 14:40

## 2020-03-05 RX ADMIN — ACETAMINOPHEN 650 MG: 650 SUPPOSITORY RECTAL at 21:33

## 2020-03-05 RX ADMIN — METOPROLOL TARTRATE 25 MG: 25 TABLET, FILM COATED ORAL at 21:32

## 2020-03-05 RX ADMIN — SPIRONOLACTONE 50 MG: 50 TABLET ORAL at 08:46

## 2020-03-05 RX ADMIN — APIXABAN 5 MG: 5 TABLET, FILM COATED ORAL at 08:45

## 2020-03-05 RX ADMIN — KETAMINE HYDROCHLORIDE 10 MG: 50 INJECTION, SOLUTION INTRAMUSCULAR; INTRAVENOUS at 14:59

## 2020-03-05 RX ADMIN — PROPOFOL 20 MG: 10 INJECTION, EMULSION INTRAVENOUS at 14:55

## 2020-03-05 RX ADMIN — KETAMINE HYDROCHLORIDE 10 MG: 50 INJECTION, SOLUTION INTRAMUSCULAR; INTRAVENOUS at 14:47

## 2020-03-05 RX ADMIN — PROPOFOL 20 MG: 10 INJECTION, EMULSION INTRAVENOUS at 14:52

## 2020-03-05 RX ADMIN — PROPOFOL 20 MG: 10 INJECTION, EMULSION INTRAVENOUS at 14:57

## 2020-03-05 RX ADMIN — HYDROMORPHONE HYDROCHLORIDE 0.5 MG: 1 INJECTION, SOLUTION INTRAMUSCULAR; INTRAVENOUS; SUBCUTANEOUS at 18:43

## 2020-03-05 RX ADMIN — PRIMIDONE 50 MG: 50 TABLET ORAL at 21:32

## 2020-03-05 RX ADMIN — PROPOFOL 20 MG: 10 INJECTION, EMULSION INTRAVENOUS at 15:04

## 2020-03-05 RX ADMIN — HYDROMORPHONE HYDROCHLORIDE 0.5 MG: 1 INJECTION, SOLUTION INTRAMUSCULAR; INTRAVENOUS; SUBCUTANEOUS at 22:43

## 2020-03-05 NOTE — PROGRESS NOTES
Progress note - Palliative and Supportive Care   Sherry Erickson 80 y o  female [id number]    Assessment:   -   Patient Active Problem List   Diagnosis    Wheelchair dependence    Type 2 diabetes mellitus ([address] )    Paroxysmal atrial fibrillation (HCC)    Acute cholecystitis    Elevated troponin    Gallstone pancreatitis    Generalized weakness    Urinary incontinence    Spinal cord stimulator status    Overactive bladder    Essential hypertension    Cervical dystonia    History of stroke    Hyperlipidemia    Calcaneal spur of foot, left    Osteopenia of left foot    Stroke-like symptoms    Hyponatremia    Thyroid nodule    Weakness of both lower extremities    Dysarthria    Slurred speech    Aphasia    Facial droop    Oropharyngeal dysphagia    Pleural effusion, left         Plan:  1  Symptom management - NA   -     2  Goals - Family meeting arranged for tomorrow late morning (after 1030 AM)   -     Code Status: DNR/DNI - Level 3   Decisional apparatus:  Patient is not competent on my exam today  If competence is lost, patient's substitute decision maker would default to daughters by [place]  Advance Directive / Living Will / POLST:  no    Interval history:       Discussed case with Dr Riley Muhammad, given failure to improve he requests palliative support to continue  Patient opens eyes and shakes her head "yes" for a few questions  No family at bedside       MEDICATIONS / ALLERGIES:     all current active meds have been reviewed and current meds:   Current Facility-Administered Medications   Medication Dose Route Frequency    acetaminophen (TYLENOL) rectal suppository 650 mg  650 mg Rectal Q4H PRN    apixaban (ELIQUIS) tablet 5 mg  5 mg Per PEG Tube BID    atorvastatin (LIPITOR) tablet 40 mg  40 mg Per PEG Tube QPM    bisacodyl (DULCOLAX) rectal suppository 10 mg  10 mg Rectal Daily PRN    cholecalciferol (VITAMIN D3) tablet 1,000 Units  1,000 Units Per PEG Tube QPM    cyanocobalamin (VITAMIN B-12) tablet 1,000 mcg  1,000 mcg Per PEG Tube QPM    digoxin (LANOXIN) tablet 125 mcg  125 mcg Per PEG Tube Daily    folic acid (FOLVITE) tablet 1,000 mcg  1,000 mcg Per PEG Tube Daily    gabapentin (NEURONTIN) oral solution 300 mg  300 mg Per PEG Tube Daily    HYDROmorphone (DILAUDID) injection 0 5 mg  0 5 mg Intravenous Q4H PRN    insulin lispro (HumaLOG) 100 units/mL subcutaneous injection 1-5 Units  1-5 Units Subcutaneous Q6H [address]    Labetalol HCl (NORMODYNE) injection 10 mg  10 mg Intravenous Q6H PRN    metoprolol tartrate (LOPRESSOR) tablet 25 mg  25 mg Per PEG Tube Q12H [address]    morphine injection 2 mg  2 mg Intravenous Q6H PRN    polyethylene glycol (MIRALAX) packet 17 g  17 g Oral Daily    primidone (MYSOLINE) tablet 50 mg  50 mg Per PEG Tube Q12H [address]    spironolactone (ALDACTONE) tablet 50 mg  50 mg Per G Tube Daily       Allergies   Allergen Reactions    Bactrim [Sulfamethoxazole-Trimethoprim] Swelling     Swelling around eyes and red eyes    Aspirin Other (See Comments)     "feels like fist in my chest"    Ciprofloxacin     Nitrofurantoin     Other      "5mz/Tmp "  "1 60 mTab amme"     Per pt's allergy list        OBJECTIVE:    Physical Exam  Physical Exam   Constitutional: No distress  HENT:   Head: Normocephalic and atraumatic  Right Ear: External ear normal    Left Ear: External ear normal    Eyes: Right eye exhibits no discharge  Left eye exhibits no discharge  Cardiovascular: Normal rate and intact distal pulses  Pulmonary/Chest:   Decreased breath sounds   Abdominal: Soft  She exhibits no distension  Musculoskeletal: She exhibits no edema  Neurological: She is alert  A cranial nerve deficit is present  Coordination abnormal    Skin: There is pallor  Nursing note and vitals reviewed  Lab Results:   I have personally reviewed pertinent labs  , CBC:   Lab Results   Component Value Date    WBC 7 42 03/05/2020    HGB 10 6 (L) 03/05/2020 HCT 33 2 (L) 03/05/2020    MCV 95 03/05/2020     03/05/2020    MCH 30 2 03/05/2020    MCHC 31 9 03/05/2020    RDW 12 8 03/05/2020    MPV 10 1 03/05/2020    NRBC 0 03/05/2020   , CMP: No results found for: SODIUM, K, CL, CO2, ANIONGAP, BUN, CREATININE, GLUCOSE, CALCIUM, AST, ALT, ALKPHOS, PROT, BILITOT, EGFR  Imaging Studies: reviewed  EKG, Pathology, and Other Studies: reviewed    Counseling / Coordination of Care  Total floor / unit time spent today 15+ minutes  Greater than 50% of total time was spent with the patient and / or family counseling and / or coordination of care  A description of the counseling / coordination of care: re-introduction of services, supportive listening

## 2020-03-05 NOTE — ANESTHESIA PREPROCEDURE EVALUATION
Ef65%  On 2LNC satting >94%    Review of Systems/Medical History  Patient summary reviewed  Chart reviewed  No history of anesthetic complications     Cardiovascular  Exercise tolerance (METS): good,  Hyperlipidemia, Hypertension , Dysrhythmias , atrial fibrillation,    Pulmonary  Negative pulmonary ROS        GI/Hepatic  Dysphagia,          Negative  ROS   Comment: Urinary incontinence- ipg stimulator in place     Endo/Other  Negative endo/other ROS Diabetes type 2 Oral agent, History of thyroid disease (thyroid nodule) ,      GYN    Hysterectomy,        Hematology  Negative hematology ROS      Musculoskeletal  Negative musculoskeletal ROS TMJ problem,   Comment: Left leg fx  Unable to walk  Spinal stenosis Arthritis     Neurology    TIA, CVA , residual symptoms,   Comment: TIA x 2 plus CVA Psychology   Negative psychology ROS              Physical Exam    Airway    Mallampati score: III  TM Distance: <3 FB  Neck ROM: full     Dental   upper dentures and lower dentures,     Cardiovascular  Rhythm: irregular, Rate: normal,     Pulmonary  Decreased breath sounds,     Other Findings        Anesthesia Plan  ASA Score- 3     Anesthesia Type- IV sedation with anesthesia with ASA Monitors  Additional Monitors:   Airway Plan:     Comment: Per patient, appropriately NPO, denies active CP/SOB/wheezing/symptoms related to heartburn/nausea/vomiting    Phone consent  Plan Factors-  Patient did not smoke on day of surgery  Induction- intravenous  Postoperative Plan-     Informed Consent- Anesthetic plan and risks discussed with patient and daughter  I personally reviewed this patient with the CRNA  Discussed and agreed on the Anesthesia Plan with the CRNA  Jasbir Hopper

## 2020-03-05 NOTE — ASSESSMENT & PLAN NOTE
Overall guarded prognosis given multiple comorbidities  Discussed with palliative care, pulmonary  Discussed with the patient, called and updated daughter Maggy Allen in detail

## 2020-03-05 NOTE — SPEECH THERAPY NOTE
Speech Language/Pathology    Speech/Language Pathology Progress Note    Patient Name: Melisa Rebolledo  [id number] Date: 3/5/2020     Subjective:  "I ate my pudding now where is my ice cream"  Current Diet: NPO, PEG tube feeding  PEG received 3/2/2020  Objective:  Pt seen for ongoing dx dysphagia tx  Pt leaning L, needs max repositioning  CT Chest-1  Near complete collapse of the left lung secondary to mucoid impaction    2   Bilateral pleural effusions    3   Groundglass opacities in the right lung including an 8 mm focal nodular opacity  This is also likely related to acute illness though short interval follow-up CT following acute illness is recommended to exclude a true nodule  Pt very anxious to eat, trial something  Fair retrieval from the tsp  Noted potential loss w/ loud, audible spill prior to noting any swallow initiation  Pt w/ immed strong swallow but then  reduced laryngeal rise  No wet voicing  Noted delayed wet coughing following trials  Pt with strong desire to keep attempting trials but trials were ceased given the ongoing wet coughing  Assessment:  Pt now with PEG for 1* nutrition  Pt would like to eat something even for pleasure    Would benefit from a VBS for full assessment & safety    Plan/Recommendations:  Continue NPO w/ peg  Oral care  VBS tomorrow w/ xray availability

## 2020-03-05 NOTE — PROGRESS NOTES
Progress Note - Marina Brownlee 1935, 80 y o  female MRN: [id number]    Unit/Bed#: [facility] 710-01 Encounter: [id number]    Primary Care Provider: Luis F cMlaughlin MD   Date and time admitted to hospital: 2/19/2020  8:30 AM        * Aphasia  Assessment & Plan  EEG monitoring 2/16-2/17: demonstrated no epileptiform activity or epileptogenic focus  Repeat CT scan of head does not demonstrate any acute findings  Neurology input noted    Goals of care, counseling/discussion  Assessment & Plan  Overall guarded prognosis given multiple comorbidities  Discussed with palliative care, pulmonary  Discussed with the patient, called and updated daughter Jacobo Counter in detail    Abnormal CT of the chest  Assessment & Plan  CT chest reveals left bronchial secretions/mucus plugging  Bilateral pleural effusions  Discussed with pulmonary for possible bronchoscopy per Pulmonary    Pleural effusion, left  Assessment & Plan  Monitor  Discussed with Pulmonary    Oropharyngeal dysphagia  Assessment & Plan  Patient is now status post PEG tube placement  However patient remains at risk for aspiration  Discussed with pulmonary  Speech therapy following  Aspiration precautions        Dysarthria  Assessment & Plan  Dysarthria noted on last admission 2/14, neurology consulted and did 24-hour EEG monitoring 2/16  EEG monitoring 2/16-2/17: no focal epileptiform discharges or electrographic seizures  Removed 2/17  Unable to get MRI brain due to spine stimulator  Neurology input noted    Stroke-like symptoms  Assessment & Plan  Continue atorvastatin, Eliquis  Neurology input noted    Cervical dystonia  Assessment & Plan  Patient follows up with neurologist outpatient  continue gabapentin 300mg PO QD, give Tylenol 650 mg p o  Q 6 hours p r n    Stimulator in place    Essential hypertension  Assessment & Plan  Monitor blood pressure   Continue Lopressor and spironolactone    Paroxysmal atrial fibrillation [facility])  Assessment & Plan  Continue metoprolol, digoxin  Eliquis anticoagulation      Pneumoniaresolved as of 3/2/2020  Assessment & Plan  HCAP versus aspiration  Completed antibiotics  Aspiration precautions      VTE Pharmacologic Prophylaxis:   Pharmacologic: Apixaban (Eliquis)  Mechanical VTE Prophylaxis in Place: Yes    Patient Centered Rounds: I have performed bedside rounds with nursing staff today  Discussions with Specialists or Other Care Team Provider:  Pulmonary, palliative care    Education and Discussions with Family / Patient:  Discussed with the patient, updated daughter Juan Pablo Hernández in detail, overall guarded prognosis explained    Time Spent for Care: 45 minutes  More than 50% of total time spent on counseling and coordination of care as described above  Current Length of Stay: 15 day(s)    Current Patient Status: Inpatient   Certification Statement: The patient will continue to require additional inpatient hospital stay due to As mentioned    Discharge Plan:  Awaiting clinical and symptomatic improvement, family meeting tomorrow    Code Status: Level 3 - DNAR and DNI      Subjective:     Comfortably sitting up in bed  Reports feeling tired  History chart labs medications reviewed      Objective:     Vitals:   Temp (24hrs), Av 6 °F (36 4 °C), Min:97 2 °F (36 2 °C), Max:97 9 °F (36 6 °C)    Temp:  [97 2 °F (36 2 °C)-97 9 °F (36 6 °C)] 97 8 °F (36 6 °C)  HR:  [59-79] 79  Resp:  [17-20] 17  BP: (151-181)/(71-99) 151/99  SpO2:  [93 %-96 %] 93 %  Body mass index is 30 73 kg/m²  Input and Output Summary (last 24 hours):        Intake/Output Summary (Last 24 hours) at 3/5/2020 1521  Last data filed at 3/5/2020 1510  Gross per 24 hour   Intake 967 ml   Output 583 ml   Net 384 ml       Physical Exam:     Physical Exam    Comfortably sitting up in bed  Neck supple  Lungs diminished breath sounds bilateral  Heart sounds S1-S2 noted  Abdomen soft  Awake  Dysarthria noted  Pulses noted  No rash    Additional Data:     Labs:    Results from last 7 days   Lab Units 03/05/20  0527   WBC Thousand/uL 7 42   HEMOGLOBIN g/dL 10 6*   HEMATOCRIT % 33 2*   PLATELETS Thousands/uL 240   NEUTROS PCT % 66   LYMPHS PCT % 21   MONOS PCT % 9   EOS PCT % 2     Results from last 7 days   Lab Units 03/04/20  0505   SODIUM mmol/L 142   POTASSIUM mmol/L 3 7   CHLORIDE mmol/L 111*   CO2 mmol/L 27   BUN mg/dL 8   CREATININE mg/dL 0 43*   ANION GAP mmol/L 4   CALCIUM mg/dL 8 5   ALBUMIN g/dL 1 7*   TOTAL BILIRUBIN mg/dL 0 42   ALK PHOS U/L 90   ALT U/L 17   AST U/L 24   GLUCOSE RANDOM mg/dL 143*     Results from last 7 days   Lab Units 02/29/20  0526   INR  1 58*     Results from last 7 days   Lab Units 03/05/20  1204 03/05/20  0657 03/05/20  0326 03/05/20  0008 03/04/20  1818 03/04/20  1201 03/04/20  0603 03/04/20  0021 03/03/20  1719 03/03/20  1240 03/03/20  0700 03/02/20  2350   POC GLUCOSE mg/dl 117 98 102 84 185* 169* 148* 115 125 128 109 84         Results from last 7 days   Lab Units 03/05/20  0527   PROCALCITONIN ng/ml 0 05           * I Have Reviewed All Lab Data Listed Above  * Additional Pertinent Lab Tests Reviewed:  All Labs Within Last 24 Hours Reviewed    Imaging:    Imaging Reports Reviewed Today Include:  Imaging studies noted  Imaging Personally Reviewed by Myself Includes:     Recent Cultures (last 7 days):           Last 24 Hours Medication List:     Current Facility-Administered Medications:  acetaminophen 650 mg Rectal Q4H PRN Bernabe Rosen PA-C   apixaban 5 mg Per PEG Tube BID Aayush Baeza MD   atorvastatin 40 mg Per PEG Tube QPM Aayush Baeza MD   bisacodyl 10 mg Rectal Daily PRN Aayush Baeza MD   cholecalciferol 1,000 Units Per PEG Tube QPM Aayush Baeza MD   vitamin B-12 1,000 mcg Per PEG Tube QPM Aayush Baeza MD   digoxin 125 mcg Per PEG Tube Daily Aayush Baeza MD   folic acid 0,863 mcg Per PEG Tube Daily Aayush Baeza MD   gabapentin 300 mg Per PEG Tube Daily Aayush Baeza MD   HYDROmorphone 0 5 mg Intravenous Q4H PRN Aayush Baeza MD insulin lispro 1-5 Units Subcutaneous Q6H Jean Arriaza MD   Labetalol HCl 10 mg Intravenous Q6H PRN Poppy Vasquez PA-C   metoprolol tartrate 25 mg Per PEG Tube Q12H [address] Azra Sheets MD   morphine injection 2 mg Intravenous Q6H PRN Azra Sheets MD   polyethylene glycol 17 g Oral Daily Azra Sheets MD   primidone 50 mg Per PEG Tube Q12H [address] Azra Sheets MD   spironolactone 50 mg Per G Tube Daily Azra Sheets MD     Facility-Administered Medications Ordered in Other Encounters:  ketamine  Intravenous PRN Ailyn Field, CRNA   Labetalol HCl  Intravenous PRN Ailyn Field, CRNA   propofol  Intravenous PRN Ailyn Field, CRNA   sodium chloride   Continuous PRN Ailyn Field, CRNA        Today, Patient Was Seen By: Jose A Mazariegos MD    ** Please Note: Dictation voice to text software may have been used in the creation of this document   **

## 2020-03-05 NOTE — PLAN OF CARE
Problem: Potential for Falls  Goal: Patient will remain free of falls  Description  INTERVENTIONS:  - Assess patient frequently for physical needs  -  Identify cognitive and physical deficits and behaviors that affect risk of falls  -  [place] fall precautions as indicated by assessment   - Educate patient/family on patient safety including physical limitations  - Instruct patient to call for assistance with activity based on assessment  - Modify environment to reduce risk of injury  - Consider OT/PT consult to assist with strengthening/mobility  Outcome: Progressing     Problem: Potential for Falls  Goal: Patient will remain free of falls  Description  INTERVENTIONS:  - Assess patient frequently for physical needs  -  Identify cognitive and physical deficits and behaviors that affect risk of falls    -  [place] fall precautions as indicated by assessment   - Educate patient/family on patient safety including physical limitations  - Instruct patient to call for assistance with activity based on assessment  - Modify environment to reduce risk of injury  - Consider OT/PT consult to assist with strengthening/mobility  Outcome: Progressing     Problem: Prexisting or High Potential for Compromised Skin Integrity  Goal: Skin integrity is maintained or improved  Description  INTERVENTIONS:  - Identify patients at risk for skin breakdown  - Assess and monitor skin integrity  - Assess and monitor nutrition and hydration status  - Monitor labs   - Assess for incontinence   - Turn and reposition patient  - Assist with mobility/ambulation  - Relieve pressure over bony prominences  - Avoid friction and shearing  - Provide appropriate hygiene as needed including keeping skin clean and dry  - Evaluate need for skin moisturizer/barrier cream  - Collaborate with interdisciplinary team   - Patient/family teaching  - Consider wound care consult   Outcome: Progressing     Problem: DISCHARGE PLANNING  Goal: Discharge to home or other facility with appropriate resources  Description  INTERVENTIONS:  - Identify barriers to discharge w/patient and caregiver  - Arrange for needed discharge resources and transportation as appropriate  - Identify discharge learning needs (meds, wound care, etc )  - Arrange for interpretive services to assist at discharge as needed  - Refer to Case Management Department for coordinating discharge planning if the patient needs post-hospital services based on physician/advanced practitioner order or complex needs related to functional status, cognitive ability, or social support system  Outcome: Progressing     Problem: NEUROSENSORY - ADULT  Goal: Achieves stable or improved neurological status  Description  INTERVENTIONS  - Monitor and report changes in neurological status  - Monitor vital signs such as temperature, blood pressure, glucose, and any other labs ordered   - Initiate measures to prevent increased intracranial pressure  - Monitor for seizure activity and implement precautions if appropriate      Outcome: Progressing  Goal: Achieves maximal functionality and self care  Description  INTERVENTIONS  - Monitor swallowing and airway patency with patient fatigue and changes in neurological status  - Encourage and assist patient to increase activity and self care  - Encourage visually impaired, hearing impaired and aphasic patients to use assistive/communication devices  Outcome: Progressing     Problem: Nutrition/Hydration-ADULT  Goal: Nutrient/Hydration intake appropriate for improving, restoring or maintaining nutritional needs  Description  Monitor and assess patient's nutrition/hydration status for malnutrition  Collaborate with interdisciplinary team and initiate plan and interventions as ordered  Monitor patient's weight and dietary intake as ordered or per policy  Utilize nutrition screening tool and intervene as necessary   Determine patient's food preferences and provide high-protein, high-caloric foods as appropriate  INTERVENTIONS:  - Monitor oral intake, urinary output, labs, and treatment plans  - Assess nutrition and hydration status and recommend course of action  - Evaluate amount of meals eaten  - Assist patient with eating if necessary   - Allow adequate time for meals  - Recommend/ encourage appropriate diets, oral nutritional supplements, and vitamin/mineral supplements  - Order, calculate, and assess calorie counts as needed  - Recommend, monitor, and adjust tube feedings and TPN/PPN based on assessed needs  - Assess need for intravenous fluids  - Provide specific nutrition/hydration education as appropriate  - Include patient/family/caregiver in decisions related to nutrition  Outcome: Progressing     Problem: CONFUSION/THOUGHT DISTURBANCE  Goal: Thought disturbances (confusion, delirium, depression, dementia or psychosis) are managed to maintain or return to baseline mental status and functional level  Description  INTERVENTIONS:  - Assess for possible contributors to  thought disturbance, including but not limited to medications, infection, impaired vision or hearing, underlying metabolic abnormalities, dehydration, respiratory compromise,  psychiatric diagnoses and notify attending PHYSICAN/AP  - Monitor and intervene to maintain adequate nutrition, hydration, elimination, sleep and activity  - Decrease environmental stimuli, including noise as appropriate  - Provide frequent contacts to provide refocusing, direction and reassurance as needed  Approach patient calmly with eye contact and at their level    - [place] high risk fall precautions, aspiration precautions and other safety measures, as indicated  - If delirium suspected, notify physician/AP of change in condition and request immediate in-person evaluation  - Pursue consults as appropriate including Geriatric (campus dependent), OT for cognitive evaluation/activity planning, psychiatric, pastoral care, etc   Outcome: Progressing     Problem: SAFETY,RESTRAINT: NV/NON-SELF DESTRUCTIVE BEHAVIOR  Goal: Remains free of harm/injury (restraint for non violent/non self-detsructive behavior)  Description  INTERVENTIONS:  - Instruct patient/family regarding restraint use   - Assess and monitor physiologic and psychological status   - Provide interventions and comfort measures to meet assessed patient needs   - Identify and implement measures to help patient regain control  - Assess readiness for release of restraint   Outcome: Progressing  Goal: Returns to optimal restraint-free functioning  Description  INTERVENTIONS:  - Assess the patient's behavior and symptoms that indicate continued need for restraint  - Identify and implement measures to help patient regain control  - Assess readiness for release of restraint   Outcome: Progressing

## 2020-03-05 NOTE — SOCIAL WORK
SKINNYW left a message for Maribeth Redmond to attempt to schedule a meeting for Friday - awaiting a call back with availability  SKINNYW spoke with Luna Merino and she will be available via phone after 10:30 on Friday  Osóscar Redmond will also be available at 10:30 on Friday  Counseling / Coordination of Care  Total floor / unit time spent today 20 minutes  Greater than 50% of total time was spent with the patient and / or family counseling and / or coordination of care   A description of the counseling / coordination of care: coordination

## 2020-03-05 NOTE — ASSESSMENT & PLAN NOTE
Patient is now status post PEG tube placement  However patient remains at risk for aspiration  Discussed with pulmonary  Speech therapy following  Aspiration precautions

## 2020-03-05 NOTE — ASSESSMENT & PLAN NOTE
CT chest reveals left bronchial secretions/mucus plugging  Bilateral pleural effusions  Discussed with pulmonary for possible bronchoscopy per Pulmonary

## 2020-03-05 NOTE — ASSESSMENT & PLAN NOTE
Patient follows up with neurologist outpatient  continue gabapentin 300mg PO QD, give Tylenol 650 mg p o  Q 6 hours p r n    Stimulator in place

## 2020-03-05 NOTE — ASSESSMENT & PLAN NOTE
EEG monitoring 2/16-2/17: demonstrated no epileptiform activity or epileptogenic focus  Repeat CT scan of head does not demonstrate any acute findings    Neurology input noted

## 2020-03-05 NOTE — ASSESSMENT & PLAN NOTE
Dysarthria noted on last admission 2/14, neurology consulted and did 24-hour EEG monitoring 2/16  EEG monitoring 2/16-2/17: no focal epileptiform discharges or electrographic seizures    Removed 2/17  Unable to get MRI brain due to spine stimulator  Neurology input noted

## 2020-03-06 ENCOUNTER — APPOINTMENT (INPATIENT)
Dept: RADIOLOGY | Facility: HOSPITAL | Age: 85
DRG: 853 | End: 2020-03-06
Payer: COMMERCIAL

## 2020-03-06 ENCOUNTER — APPOINTMENT (INPATIENT)
Dept: RADIOLOGY | Facility: HOSPITAL | Age: 85
DRG: 853 | End: 2020-03-06
Attending: INTERNAL MEDICINE
Payer: COMMERCIAL

## 2020-03-06 PROBLEM — E44.0 MODERATE PROTEIN-CALORIE MALNUTRITION (HCC): Status: ACTIVE | Noted: 2020-03-06

## 2020-03-06 LAB
GLUCOSE SERPL-MCNC: 105 MG/DL (ref 65–140)
GLUCOSE SERPL-MCNC: 64 MG/DL (ref 65–140)
GLUCOSE SERPL-MCNC: 67 MG/DL (ref 65–140)
GLUCOSE SERPL-MCNC: 74 MG/DL (ref 65–140)
GLUCOSE SERPL-MCNC: 83 MG/DL (ref 65–140)

## 2020-03-06 PROCEDURE — 99232 SBSQ HOSP IP/OBS MODERATE 35: CPT | Performed by: INTERNAL MEDICINE

## 2020-03-06 PROCEDURE — 71045 X-RAY EXAM CHEST 1 VIEW: CPT

## 2020-03-06 PROCEDURE — 92526 ORAL FUNCTION THERAPY: CPT

## 2020-03-06 PROCEDURE — 99356 PR PROLONGED SVC I/P OR OBS SETTING 1ST HOUR: CPT | Performed by: INTERNAL MEDICINE

## 2020-03-06 PROCEDURE — 82948 REAGENT STRIP/BLOOD GLUCOSE: CPT

## 2020-03-06 PROCEDURE — 92611 MOTION FLUOROSCOPY/SWALLOW: CPT

## 2020-03-06 PROCEDURE — 74230 X-RAY XM SWLNG FUNCJ C+: CPT

## 2020-03-06 RX ORDER — DEXTROSE AND SODIUM CHLORIDE 5; .9 G/100ML; G/100ML
50 INJECTION, SOLUTION INTRAVENOUS CONTINUOUS
Status: DISPENSED | OUTPATIENT
Start: 2020-03-06 | End: 2020-03-06

## 2020-03-06 RX ADMIN — MORPHINE SULFATE 2 MG: 2 INJECTION, SOLUTION INTRAMUSCULAR; INTRAVENOUS at 01:40

## 2020-03-06 RX ADMIN — SPIRONOLACTONE 50 MG: 50 TABLET ORAL at 08:44

## 2020-03-06 RX ADMIN — APIXABAN 5 MG: 5 TABLET, FILM COATED ORAL at 16:48

## 2020-03-06 RX ADMIN — MELATONIN 1000 UNITS: at 16:49

## 2020-03-06 RX ADMIN — DIGOXIN 125 MCG: 125 TABLET ORAL at 08:44

## 2020-03-06 RX ADMIN — ATORVASTATIN CALCIUM 40 MG: 40 TABLET, FILM COATED ORAL at 17:01

## 2020-03-06 RX ADMIN — METOPROLOL TARTRATE 25 MG: 25 TABLET, FILM COATED ORAL at 20:44

## 2020-03-06 RX ADMIN — PRIMIDONE 50 MG: 50 TABLET ORAL at 20:45

## 2020-03-06 RX ADMIN — ACETAMINOPHEN 650 MG: 650 SUPPOSITORY RECTAL at 05:24

## 2020-03-06 RX ADMIN — HYDROMORPHONE HYDROCHLORIDE 0.5 MG: 1 INJECTION, SOLUTION INTRAMUSCULAR; INTRAVENOUS; SUBCUTANEOUS at 13:23

## 2020-03-06 RX ADMIN — HYDROMORPHONE HYDROCHLORIDE 0.5 MG: 1 INJECTION, SOLUTION INTRAMUSCULAR; INTRAVENOUS; SUBCUTANEOUS at 03:36

## 2020-03-06 RX ADMIN — PRIMIDONE 50 MG: 50 TABLET ORAL at 08:47

## 2020-03-06 RX ADMIN — GABAPENTIN 300 MG: 250 SOLUTION ORAL at 08:49

## 2020-03-06 RX ADMIN — MORPHINE SULFATE 2 MG: 2 INJECTION, SOLUTION INTRAMUSCULAR; INTRAVENOUS at 08:44

## 2020-03-06 RX ADMIN — DEXTROSE AND SODIUM CHLORIDE 50 ML/HR: 5; .9 INJECTION, SOLUTION INTRAVENOUS at 03:37

## 2020-03-06 RX ADMIN — METOPROLOL TARTRATE 25 MG: 25 TABLET, FILM COATED ORAL at 08:44

## 2020-03-06 RX ADMIN — FOLIC ACID 1000 MCG: 1 TABLET ORAL at 08:44

## 2020-03-06 RX ADMIN — CYANOCOBALAMIN TAB 500 MCG 1000 MCG: 500 TAB at 17:01

## 2020-03-06 NOTE — ANESTHESIA POSTPROCEDURE EVALUATION
Post-Op Assessment Note    CV Status:  Stable  Pain Score: 0    Pain management: adequate     Mental Status:  Awake and sleepy   Hydration Status:  Euvolemic   PONV Controlled:  Controlled   Airway Patency:  Patent   Post Op Vitals Reviewed: Yes      Staff: Anesthesiologist, CRNA

## 2020-03-06 NOTE — PROGRESS NOTES
Progress Note - Palliative & Supportive Care  Malvin Victoria  80 y o   female  PPHP 710/PPHP 710-   MRN: [id number]  Encounter: [id number]     ASSESSMENT:    Patient Active Problem List   Diagnosis    Wheelchair dependence    Type 2 diabetes mellitus ([facility] [address] )    Paroxysmal atrial fibrillation (HCC)    Acute cholecystitis    Elevated troponin    Gallstone pancreatitis    Generalized weakness    Urinary incontinence    Spinal cord stimulator status    Overactive bladder    Essential hypertension    Cervical dystonia    History of stroke    Hyperlipidemia    Calcaneal spur of foot, left    Osteopenia of left foot    Stroke-like symptoms    Hyponatremia    Thyroid nodule    Weakness of both lower extremities    Dysarthria    Slurred speech    Aphasia    Facial droop    Oropharyngeal dysphagia    Pleural effusion, left    Abnormal CT of the chest    Goals of care, counseling/discussion     PLAN:    1  Symptom management:   Continue gabapentin via PEG   Continue bowel regimen   Continue PRN Tylenol   Continue PRN hydromorphone IV  2  Goals: per family meeting:    Time of Meetinh  Participants: dtbelgica Zacarias, dtr Maira Pena (via phone), grandson; SLIM, [facility] attending + SKINNYW, Pulmonology attending, RN  Patient Participation: No  Patient Support System: family  Meeting Location: conference room    Advanced Directive available: no    A family meeting was held for goals of care  This meeting was necessary for determine the appropriate course of treatment  Topics of Discussion:  [facility] course reviewed w/ medical team  Family understanding assessed (adequate)   Confirmed LOC3 status   Discussed need for recent bronchoscopy and recurrent pleural effusion, with high likelihood of rapid recurrence   Discussed need for ongoing/repeat bronchoscopy vs tracheostomy w/ frequent (or near-continuous) aggressive suctioning     Discussed high likelihood of ongoing and recurrent aspiration d/t patient's inability to protect her airway   Discussed likelihood of needing mechanical ventilation if the patient continued to decline   Discussed comfort care as an option, and likely prognosis with and without comfort care   Attempted to work with family to use substituted judgment  Both daughters stated that the patient had said "she wants to be with our dad"  PLAN:   Continue LOC3 status   Family did not wish to make a decision about continuing aggressive interventions, repeat bronchoscopy, tracheostomy, etc    PSC returned to bedside in the afternoon and the family wishes to continue with aggressive interventions for now, and did not wish to decide about tracheostomy at this time  They feel that the patient has improved, has improved mental function, was able to swallow (did relatively well on today's VBS stuady) and eat ice cream    They wish to take things "day by day" for now  Time Involved in Meetin minutes, beginning at approximately 1100h and ending at approximately 1150h  Code status: Level 3 - DNAR and DNI   Decisional apparatus:  Patient does not have capacity to make medical decisions on my exam today  If such capacity is lost, patient's substitute decision maker would default to her adult daughters Leonel Amadou and Mikhail Alexis by [place] Act 169  Advance Directive / Living Will / POLST:  None in EMR  We appreciate the opportunity to participate in this patient's care  We will continue to follow  Please do not hesitate to contact our on-call provider through our clinic answering service at [phone] should you have acute symptom control concerns  INTERVAL HISTORY:    Patient seen in the AM (sleeping comfortably) after family meeting; later in the afternoon, patient awake in upright position (supported by bed and pillows)  Appears comfortable, did not reply to questions about pain       Review of Systems   Unable to perform ROS: Other (Patient minimally conversant ) MEDICATIONS / ALLERGIES:  all current active meds have been reviewed and current meds:   Current Facility-Administered Medications   Medication Dose Route Frequency    acetaminophen (TYLENOL) rectal suppository 650 mg  650 mg Rectal Q4H PRN    apixaban (ELIQUIS) tablet 5 mg  5 mg Per PEG Tube BID    atorvastatin (LIPITOR) tablet 40 mg  40 mg Per PEG Tube QPM    bisacodyl (DULCOLAX) rectal suppository 10 mg  10 mg Rectal Daily PRN    cholecalciferol (VITAMIN D3) tablet 1,000 Units  1,000 Units Per PEG Tube QPM    cyanocobalamin (VITAMIN B-12) tablet 1,000 mcg  1,000 mcg Per PEG Tube QPM    digoxin (LANOXIN) tablet 125 mcg  125 mcg Per PEG Tube Daily    folic acid (FOLVITE) tablet 1,000 mcg  1,000 mcg Per PEG Tube Daily    gabapentin (NEURONTIN) oral solution 300 mg  300 mg Per PEG Tube Daily    HYDROmorphone (DILAUDID) injection 0 5 mg  0 5 mg Intravenous Q4H PRN    insulin lispro (HumaLOG) 100 units/mL subcutaneous injection 1-5 Units  1-5 Units Subcutaneous Q6H STEPHANIE    Labetalol HCl (NORMODYNE) injection 10 mg  10 mg Intravenous Q6H PRN    metoprolol tartrate (LOPRESSOR) tablet 25 mg  25 mg Per PEG Tube Q12H [address]    polyethylene glycol (MIRALAX) packet 17 g  17 g Oral Daily    primidone (MYSOLINE) tablet 50 mg  50 mg Per PEG Tube Q12H [address]    spironolactone (ALDACTONE) tablet 50 mg  50 mg Per G Tube Daily       Allergies   Allergen Reactions    Bactrim [Sulfamethoxazole-Trimethoprim] Swelling     Swelling around eyes and red eyes    Aspirin Other (See Comments)     "feels like fist in my chest"    Ciprofloxacin     Nitrofurantoin     Other      "5mz/Tmp "  "1 60 mTab amme"     Per pt's allergy list        OBJECTIVE:  /59   Pulse 68   Temp 97 7 °F (36 5 °C)   Resp 20   Ht 5' 4" (1 626 m)   Wt 81 2 kg (179 lb)   SpO2 95%   BMI 30 73 kg/m²   Physical Exam:  Constitutional: Pale, frail, chronically ill-appearing elderly female  In no acute physical or emotional distress  Head: Normocephalic and atraumatic  Eyes: EOM are normal  No ocular discharge  No scleral icterus  Neck: No visible adenopathy or masses  Respiratory: Effort normal  No stridor  No respiratory distress  Gastrointestinal: No abdominal distension  Musculoskeletal: No edema  Neurological: Awake  Eyes spontaneously open; will make eye contact w/ effort  Inattentive  Minimally communicative but will answer simple "yes/no" questions  Skin: Dry, no diaphoresis  Pallor noted  Psychiatric: Unable to assess in detail; no agitation noted  Lab Results:   I have personally reviewed pertinent labs  , CBC: No results found for: WBC, HGB, HCT, MCV, PLT, ADJUSTEDWBC, MCH, MCHC, RDW, MPV, NRBC, CMP: No results found for: SODIUM, K, CL, CO2, ANIONGAP, BUN, CREATININE, GLUCOSE, CALCIUM, AST, ALT, ALKPHOS, PROT, BILITOT, EGFR  Imaging Studies: I have personally reviewed pertinent reports  EKG, Pathology, and Other Studies: I have personally reviewed pertinent reports  Counseling / Coordination of Care: Total floor / unit time spent today 75+ minutes  Greater than 50% of total time was spent with the patient and / or family counseling and / or coordination of care  A description of the counseling / coordination of care: symptom assessment and management, medication review and adjustment, psychosocial support, chart review, imaging review, lab review, family meeting, coordination w/ Pulmonology, SLIM, [address], RN      MD [address] and Supportive Care

## 2020-03-06 NOTE — PLAN OF CARE
Problem: Potential for Falls  Goal: Patient will remain free of falls  Description  INTERVENTIONS:  - Assess patient frequently for physical needs  -  Identify cognitive and physical deficits and behaviors that affect risk of falls  -  Warrens fall precautions as indicated by assessment   - Educate patient/family on patient safety including physical limitations  - Instruct patient to call for assistance with activity based on assessment  - Modify environment to reduce risk of injury  - Consider OT/PT consult to assist with strengthening/mobility  Outcome: Progressing     Problem: Potential for Falls  Goal: Patient will remain free of falls  Description  INTERVENTIONS:  - Assess patient frequently for physical needs  -  Identify cognitive and physical deficits and behaviors that affect risk of falls    -  [place] fall precautions as indicated by assessment   - Educate patient/family on patient safety including physical limitations  - Instruct patient to call for assistance with activity based on assessment  - Modify environment to reduce risk of injury  - Consider OT/PT consult to assist with strengthening/mobility  Outcome: Progressing

## 2020-03-06 NOTE — ASSESSMENT & PLAN NOTE
Malnutrition Findings:   Malnutrition type: Acute illness(<75% energy intake versus needs for > 7days due to aspiration risk;dysphagia & PEG placed- held after 24hrs  NPO>7days  Mild fat loss somewhat hollow orbitals& mild muscle loss slight depression temples  Treat with PEG when tolerated&future VBS  )  Degree of Malnutrition: Malnutrition of moderate degree    BMI Findings: Body mass index is 30 73 kg/m²

## 2020-03-06 NOTE — OCCUPATIONAL THERAPY NOTE
Occupational Therapy Cancellation Note  Attempted to see patient for OT re-evaluation, however pt off the floor  OT to continue to follow and re-attempt as time permits      [address], MOT, OTR/L

## 2020-03-06 NOTE — PROCEDURES
Video Swallow Study      Patient Name: Clare Ralph  URFJW'X Date: 3/6/2020        Past Medical History  Past Medical History:   Diagnosis Date    A-fib [facility])     Arthritis     Assistance needed for mobility     pt can stand with assistance and transfer    Chronic pain disorder     Diabetes mellitus ([facility] [address] )     NIDDM    Full dentures     History of transfusion     no adverse reaction    Hyperlipidemia     Irregular heart beat     Numbness and tingling of both feet     Skin abnormality     sacrum area - small red area, less than a dime size    Spinal stenosis     Stroke ([facility] [address] )     Unable to ambulate     Urinary incontinence     ipg stimulator x3 repakcements,battery exchange today 2/14/2018    Wears glasses     Wheelchair dependence         Past Surgical History  Past Surgical History:   Procedure Laterality Date    BACK SURGERY      fusion    BLADDER SUSPENSION      CARPAL TUNNEL RELEASE Bilateral     CHOLANGIOGRAM N/A 6/4/2018    Procedure: CHOLANGIOGRAM;  Surgeon: Bri Jain MD;  [facility]: [facility];  Service: General    CHOLECYSTECTOMY LAPAROSCOPIC N/A 6/4/2018    Procedure: CHOLECYSTECTOMY LAPAROSCOPIC;  Surgeon: Bri Jain MD;  [facility];  Service: General    COLONOSCOPY      DILATION AND CURETTAGE OF UTERUS      FRACTURE SURGERY Left     femur with hardware    HYSTERECTOMY      INSERTION / Carly Ton / REVISION NEUROSTIMULATOR      JOINT REPLACEMENT Bilateral     knees    SACRAL NERVE STIMULATOR PLACEMENT N/A 2/14/2018    Procedure: REMOVE AND REPLACE IPG;  Surgeon: Romina Bro MD;  Location[facility];  Service: UroGynecology           TONSILLECTOMY         Video Barium Swallow Study    Summary:  Images are on PACS for review  Pt presents w/ improved swallow function since her previous vbs   Mild/mod oropharyngeal dysphagia w/ reduced transfer, mild spill, reduced anterior hyolaryngeal movement & mild valleculae residue w/ need for 2* swallow to clear  Pt w/ no penetration or aspiration this study  Recommendations:  Speech will begin pleasure feeds w/ the pt bedside in hopes to advance to a diet  Will need to make sure she remains fully awake, alert & appropriate  If a dedicated assessment of the esophagus is desired, consider esophagram/barium swallow or EGD  Patient's goal: vanilla ice cream         Goals:  Pt will tolerate least restrictive diet w/out s/s aspiration or oral/pharyngeal difficulties  Previous VBS:  2/20/20:  Pt presents w/ mod/severe oropharyngeal dysphagia claudio by poor oral manipulation, swallow delay, and severe pharyngeal retention  Pt w/ decreased mentation and verbalization and increased overt drooling/lethargy from this morning  Pt status altered from previous encounters  Deep pharyngeal suction needed to clear secretions prior to po trials  Limited trials of puree and honey thick administered  Pt w/ poor oral manipulation/control, gravity transfers, and severe pharyngeal retention  Pt needed pressure from an empty spoon to initiate a severely delayed swallow  Roseann Stakes aspiration from overflow as well as during the swallow  Pt not appropriate for po at this time  Pt may need a repeat VBS pending further imaging and change in status      Recommendations:  Diet: NPO  Meds: Non-oral      Current Diet:  PEG, non-oral     Premorbid diet:  Regular w/ thin     Dentition:  Upper and lower dentures, not present for study    O2 requirement:  RA    Oral mech:  Strength and ROM: wfl    Vocal Quality/Speech:  Low volume, weak     Cognitive status:  A&Ox3    Consistencies administered: Barium laden applesauce, banana honey thick, nectar thick, thin liquids  Liquids were administered by tsp, cup and straw  Pt was seated laterally at 90 degrees       Oral stage:    Lip closure: wfl  Mastication: somewhat mashing, pt is edentulous at the moment  Bolus formation: mildly reduced  Bolus control: mild loss prior to the swallow  Transfer:fair  Residue: mild post lingual residue    Pharyngeal stage:    Swallow promptness: mild delay  Spill to valleculae: w/ all   Spill to pyriforms: with thinner liquids & w/ the soft solid  Epiglottic inversion: mildly reduced   Laryngeal excursion: decreased anterior displacement  Pharyngeal constriction: fair, improved since last vbs  Vallecular retention: noted consistently at least mild w/ all material  Pyriform retention: once the pt swallows it is clear  PPW coating:-  Osteophytes: noted curve in the cervical spine, no gross osteophytes  CP prominence: -  Retropulsion from prominence:-  Transient penetration: none   Epiglottic undercoat: minimal initially   Penetration: none   Aspiration: none   Strategies: mult swallows to clear the valleculae      Screening of Esophageal stage:  Noted dysmotility throughout, esophagus somewhat full w/ minimal amt

## 2020-03-06 NOTE — SOCIAL WORK
LSW joined other providers, patient's two daughters (1 via phone), son-in-law, and grandson  Medical providers gave a medical status update  The family requested time to discuss the information as a family  Palliative requested that the family touch base with the medicine team prior to leaving the hospital today  The family will discuss and follow-up later today  Counseling / Coordination of Care  Total floor / unit time spent today 60 minutes  Greater than 50% of total time was spent with the patient and / or family counseling and / or coordination of care   A description of the counseling / coordination of care: family meeting

## 2020-03-06 NOTE — TELEPHONE ENCOUNTER
[address]- spoke with Melisa Saravia- she states the patient's Botox order is currently in insurance verification  An e-mail has been sent to the insurance team to expedite the order  Order is marked as STAT  Will do a status check in 2 days

## 2020-03-06 NOTE — ASSESSMENT & PLAN NOTE
CT chest reveals left bronchial secretions/mucus plugging  Status post bronchoscopy  Bilateral pleural effusions  Discussed with pulmonary

## 2020-03-06 NOTE — PROGRESS NOTES
Progress Note - Pulmonary   Sami Weaver 80 y o  female MRN: [id number]  Unit/Bed#: [facility] 710-01 Encounter: [id number]    Assessment/Plan:    1  Acute pulmonary insufficiency likely secondary to large left pleural effusion       -  currently on 2 L- 98%- patient does not wear home O2       -  will continue to maintain saturations greater than 88%       -  continue to encourage pulmonary toileting:  Deep breathing, OOB as tolerated, IS q 1 hr    2  Abnormal chest x-ray s/p bronchoscopy 3/5/2020      -  Bronchoscopy- significant thick mucus plugging removed copious amounts of washing       -  unfortunately repeat imaging this a m  Shows reoccurred plugging       -  family meeting held today to discuss possibility of tracheostomy vs comfort care given that patient continues to aspirate 2 feeding and her own secretions       -  continue aspiration precaution, patient remains NPO    3  Suspected Acute on chronic grade 2 diastolic CHF in the setting of atrial fibrillation       -  Echo      EF 65%                         proBNP       -  will continue Eliquis, digoxin, metoprolol, spry like from    4  Stroke-like symptoms with bilateral lower extremity weakness and dysphagia       -  neurology following- imaging negative for acute CVA       -  weakness likely secondary to intracranial and ICA stenosis       -  patient had PEG tube placed 03/03/2020- tube feedings have been on hold since 03/04/2020      -  outpatient pulmonary follow-up on an as-needed basis         Chief Complaint:    "I want ice cream"    Subjective:    Jorgito Sat was comfortably lying in her bed  She was significantly more alert than the previous days  She was able to identify her family and she did report that she was hungry for ice cream   She did state that she is no longer coughing and she feels comfortable in bed  No significant overnight events reported    Patient currently denies any fever, chills, hemoptysis, headaches, night sweats, pleuritic chest pain, or palpitations  Objective:    Vitals: Blood pressure 154/61, pulse 56, temperature 97 7 °F (36 5 °C), resp  rate 20, height 5' 4" (1 626 m), weight 81 2 kg (179 lb), SpO2 98 %, not currently breastfeeding  2L,Body mass index is 30 73 kg/m²  Intake/Output Summary (Last 24 hours) at 3/6/2020 1243  Last data filed at 3/6/2020 0844  Gross per 24 hour   Intake 100 ml   Output 395 ml   Net -295 ml       Invasive Devices     Peripheral Intravenous Line            Peripheral IV 03/05/20 Right Forearm less than 1 day          Drain            Gastrostomy/Enterostomy Percutaneous endoscopic gastrostomy (PEG) 20 Fr  LUQ 4 days                Physical Exam:     Physical Exam   Constitutional: She is oriented to person, place, and time  She appears well-developed and well-nourished  HENT:   Head: Normocephalic and atraumatic  Neck: Normal range of motion  Neck supple  No tracheal deviation present  No thyromegaly present  Cardiovascular: Normal rate, regular rhythm and normal heart sounds  Exam reveals no gallop and no friction rub  No murmur heard  Pulmonary/Chest: Effort normal  No accessory muscle usage or stridor  No tachypnea and no bradypnea  No respiratory distress  She has decreased breath sounds  She has no wheezes  She has no rales  She exhibits no tenderness  Significantly diminished aeration throughout entire left lung field   Abdominal: Soft  Bowel sounds are normal  She exhibits no distension  There is no tenderness  Musculoskeletal: Normal range of motion  She exhibits no edema or deformity  Neurological: She is alert and oriented to person, place, and time  Skin: Skin is warm and dry  No erythema  No pallor  Psychiatric: She has a normal mood and affect  Her behavior is normal        Labs:  I have personally reviewed pertinent lab results 3/5/2020    Imaging and other studies: I have personally reviewed pertinent films in PACS     Chest x-ray 03/04/2020-near complete opacification of left felipe thorax

## 2020-03-06 NOTE — ASSESSMENT & PLAN NOTE
Overall guarded prognosis given multiple comorbidities  Discussed with palliative care, pulmonary  Overall guarded prognosis explained to the family at the multi disciplinary meeting

## 2020-03-06 NOTE — SPEECH THERAPY NOTE
Speech Language/Pathology    Speech/Language Pathology Progress Note    Patient Name: Alan Caraballo  GQSMM'E Date: 3/6/2020    Subjective:  Pt awake, alert in bed, excited to see SLP     "My little ice cream girl"    Current Diet:  NPO, pleasure feeds w/ speech    Objective:  Pt seen for ongoing dysphagia tx following VBS this morning  Pt seen w/ pleasure feed vanilla ice cream and ice water  Pt able to orally manage presented material w/out difficulty  Swallow mildly delayed, multiple swallows for ice chips  Pt w/ coughing x1 delayed after ice cream  Family arrived, findings of VBS were described  Family reported tube feeding had been stopped because it was not being tolerated, very concerned about her means of nutrition and swallowing function  Lengthy discussion held explaining that although no aspiration was viewed during the VBS, she remains at risk and bottom-up aspiration cannot be eliminated through oral or tube feeding  Family is agreeable to beginning a diet  Education provided about aspiration and reflux precautions for safest swallow to begin diet  Assessment:  Pt needs a means of nutrition, given findings of VBS and bedside tx sessions pt appropriate to cautiously begin oral diet of puree w/ thin   Discussed possible nocturnal feeds w/ strict reflux precautions w/ pt - upright in bed or OOB for all po & feedings as well as when not taking any po (PEG or po)    Plan/Recommendations:  Puree w/ thin   Full feed by staff  Meds through PEG  Oral care 2-4x/day  ST f/u for ongoing tx

## 2020-03-06 NOTE — PROGRESS NOTES
Progress Note - Silverio Pardo 1935, 80 y o  female MRN: [id number]    Unit/Bed#: [facility] 710-01 Encounter: [id number]    Primary Care Provider: Ann Rodríguez MD   Date and time admitted to hospital: 2/19/2020  8:30 AM        * Aphasia  Assessment & Plan  EEG monitoring 2/16-2/17: demonstrated no epileptiform activity or epileptogenic focus  Repeat CT scan of head does not demonstrate any acute findings  Neurology input noted    Moderate protein-calorie malnutrition ([address] )  Assessment & Plan  Malnutrition Findings:   Malnutrition type: Acute illness(<75% energy intake versus needs for > 7days due to aspiration risk;dysphagia & PEG placed- held after 24hrs  NPO>7days  Mild fat loss somewhat hollow orbitals& mild muscle loss slight depression temples  Treat with PEG when tolerated&future VBS  )  Degree of Malnutrition: Malnutrition of moderate degree    BMI Findings: Body mass index is 30 73 kg/m²  Goals of care, counseling/discussion  Assessment & Plan  Overall guarded prognosis given multiple comorbidities  Discussed with palliative care, pulmonary  Overall guarded prognosis explained to the family at the multi disciplinary meeting    Abnormal CT of the chest  Assessment & Plan  CT chest reveals left bronchial secretions/mucus plugging  Status post bronchoscopy  Bilateral pleural effusions  Discussed with pulmonary    Pleural effusion, left  Assessment & Plan  Monitor  Discussed with Pulmonary    Oropharyngeal dysphagia  Assessment & Plan  Patient is now status post PEG tube placement  However patient remains at risk for aspiration  Discussed with pulmonary  Speech therapy following  Aspiration precautions        Dysarthria  Assessment & Plan  Dysarthria noted on last admission 2/14, neurology consulted and did 24-hour EEG monitoring 2/16  EEG monitoring 2/16-2/17: no focal epileptiform discharges or electrographic seizures    Removed 2/17  Unable to get MRI brain due to spine stimulator  Neurology input noted    Stroke-like symptoms  Assessment & Plan  Continue atorvastatin, Eliquis  Neurology input noted    Cervical dystonia  Assessment & Plan  Patient follows up with neurologist outpatient  continue gabapentin 300mg PO QD, give Tylenol 650 mg p o  Q 6 hours p r n  Stimulator in place    Essential hypertension  Assessment & Plan  Monitor blood pressure   Continue Lopressor and spironolactone    Paroxysmal atrial fibrillation (HCC)  Assessment & Plan  Continue metoprolol, digoxin  Eliquis anticoagulation      Pneumoniaresolved as of 3/2/2020  Assessment & Plan  HCAP versus aspiration  Completed antibiotics  Aspiration precautions      VTE Pharmacologic Prophylaxis:   Pharmacologic: Apixaban (Eliquis)  Mechanical VTE Prophylaxis in Place: Yes    Patient Centered Rounds: I have performed bedside rounds with nursing staff today  Discussions with Specialists or Other Care Team Provider:  Pulmonary, palliative care    Education and Discussions with Family / Patient:  Discussed with the family in detail, overall guarded prognosis explained    Time Spent for Care: 30 minutes  More than 50% of total time spent on counseling and coordination of care as described above  Current Length of Stay: 16 day(s)    Current Patient Status: Inpatient   Certification Statement: The patient will continue to require additional inpatient hospital stay due to as mentioned    Discharge Plan: awaiting clinical and symptomatic improvement    Code Status: Level 3 - DNAR and DNI      Subjective:     Comfortably sitting up in bed  Reports feeling tired    Objective:     Vitals:   Temp (24hrs), Av 8 °F (36 6 °C), Min:97 7 °F (36 5 °C), Max:98 °F (36 7 °C)    Temp:  [97 7 °F (36 5 °C)-98 °F (36 7 °C)] 97 7 °F (36 5 °C)  HR:  [56-71] 68  Resp:  [16-20] 20  BP: (138-154)/(59-68) 149/59  SpO2:  [94 %-98 %] 95 %  Body mass index is 30 73 kg/m²  Input and Output Summary (last 24 hours):        Intake/Output Summary (Last 24 hours) at 3/6/2020 1553  Last data filed at 3/6/2020 1200  Gross per 24 hour   Intake 0 ml   Output 395 ml   Net -395 ml       Physical Exam:     Physical Exam    Comfortably sitting up in bed  Neck supple  Lungs diminished breath sounds bilaterally  Heart sounds S1-S2 noted  Abdomen soft  Awake obeys simple commands  Pulses noted  No rash    Additional Data:     Labs:    Results from last 7 days   Lab Units 03/05/20  0527   WBC Thousand/uL 7 42   HEMOGLOBIN g/dL 10 6*   HEMATOCRIT % 33 2*   PLATELETS Thousands/uL 240   NEUTROS PCT % 66   LYMPHS PCT % 21   MONOS PCT % 9   EOS PCT % 2     Results from last 7 days   Lab Units 03/04/20  0505   SODIUM mmol/L 142   POTASSIUM mmol/L 3 7   CHLORIDE mmol/L 111*   CO2 mmol/L 27   BUN mg/dL 8   CREATININE mg/dL 0 43*   ANION GAP mmol/L 4   CALCIUM mg/dL 8 5   ALBUMIN g/dL 1 7*   TOTAL BILIRUBIN mg/dL 0 42   ALK PHOS U/L 90   ALT U/L 17   AST U/L 24   GLUCOSE RANDOM mg/dL 143*     Results from last 7 days   Lab Units 02/29/20  0526   INR  1 58*     Results from last 7 days   Lab Units 03/06/20  1210 03/06/20  0518 03/06/20  0320 03/06/20  0034 03/05/20  1826 03/05/20  1204 03/05/20  0657 03/05/20  0326 03/05/20  0008 03/04/20  1818 03/04/20  1201 03/04/20  0603   POC GLUCOSE mg/dl 105 74 64* 67 101 117 98 102 84 185* 169* 148*         Results from last 7 days   Lab Units 03/05/20  0527   PROCALCITONIN ng/ml 0 05           * I Have Reviewed All Lab Data Listed Above  * Additional Pertinent Lab Tests Reviewed:  All Labs Within Last 24 Hours Reviewed    Imaging:    Imaging Reports Reviewed Today Include:   Imaging Personally Reviewed by Myself Includes:     Recent Cultures (last 7 days):     Results from last 7 days   Lab Units 03/05/20  1518   GRAM STAIN RESULT  No Polys or Bacteria seen       Last 24 Hours Medication List:     Current Facility-Administered Medications:  acetaminophen 650 mg Rectal Q4H PRN Keyana Aguilera PA-C   apixaban 5 mg Per PEG Tube BID Kat Leak, MD   atorvastatin 40 mg Per PEG Tube QPM Kat Leak, MD   bisacodyl 10 mg Rectal Daily PRN Kat Leak, MD   cholecalciferol 1,000 Units Per PEG Tube QPM Kat Leak, MD   vitamin B-12 1,000 mcg Per PEG Tube QPM Kat Leak, MD   digoxin 125 mcg Per PEG Tube Daily Kat Leak, MD   folic acid 7,826 mcg Per PEG Tube Daily Kat Leak, MD   gabapentin 300 mg Per PEG Tube Daily Kat Leak, MD   HYDROmorphone 0 5 mg Intravenous Q4H PRN Kat Leak, MD   insulin lispro 1-5 Units Subcutaneous Q6H [address] Adrienne Stock MD   Labetalol HCl 10 mg Intravenous Q6H PRN Poppy Vasquez PA-C   metoprolol tartrate 25 mg Per PEG Tube Q12H [address] Kat Calle, MD   polyethylene glycol 17 g Oral Daily Kat Leak, MD   primidone 50 mg Per PEG Tube Q12H Wilford Siemens, MD   spironolactone 50 mg Per [place] Daily Kat Calle, MD        Today, Patient Was Seen By: Cheryl Fang MD    ** Please Note: Dictation voice to text software may have been used in the creation of this document   **

## 2020-03-07 LAB
GLUCOSE SERPL-MCNC: 113 MG/DL (ref 65–140)
GLUCOSE SERPL-MCNC: 120 MG/DL (ref 65–140)
GLUCOSE SERPL-MCNC: 68 MG/DL (ref 65–140)
GLUCOSE SERPL-MCNC: 73 MG/DL (ref 65–140)
GLUCOSE SERPL-MCNC: 77 MG/DL (ref 65–140)

## 2020-03-07 PROCEDURE — 82948 REAGENT STRIP/BLOOD GLUCOSE: CPT

## 2020-03-07 PROCEDURE — 99232 SBSQ HOSP IP/OBS MODERATE 35: CPT | Performed by: INTERNAL MEDICINE

## 2020-03-07 RX ORDER — HYDRALAZINE HYDROCHLORIDE 20 MG/ML
5 INJECTION INTRAMUSCULAR; INTRAVENOUS EVERY 6 HOURS PRN
Status: DISCONTINUED | OUTPATIENT
Start: 2020-03-07 | End: 2020-03-07

## 2020-03-07 RX ORDER — DEXTROSE MONOHYDRATE 50 MG/ML
40 INJECTION, SOLUTION INTRAVENOUS CONTINUOUS
Status: DISCONTINUED | OUTPATIENT
Start: 2020-03-07 | End: 2020-03-11

## 2020-03-07 RX ORDER — AMLODIPINE BESYLATE 5 MG/1
5 TABLET ORAL DAILY
Status: DISCONTINUED | OUTPATIENT
Start: 2020-03-07 | End: 2020-03-12 | Stop reason: HOSPADM

## 2020-03-07 RX ADMIN — HYDROMORPHONE HYDROCHLORIDE 0.5 MG: 1 INJECTION, SOLUTION INTRAMUSCULAR; INTRAVENOUS; SUBCUTANEOUS at 16:51

## 2020-03-07 RX ADMIN — APIXABAN 5 MG: 5 TABLET, FILM COATED ORAL at 08:39

## 2020-03-07 RX ADMIN — ATORVASTATIN CALCIUM 40 MG: 40 TABLET, FILM COATED ORAL at 17:01

## 2020-03-07 RX ADMIN — HYDROMORPHONE HYDROCHLORIDE 0.5 MG: 1 INJECTION, SOLUTION INTRAMUSCULAR; INTRAVENOUS; SUBCUTANEOUS at 08:45

## 2020-03-07 RX ADMIN — LABETALOL 20 MG/4 ML (5 MG/ML) INTRAVENOUS SYRINGE 10 MG: at 11:23

## 2020-03-07 RX ADMIN — FOLIC ACID 1000 MCG: 1 TABLET ORAL at 08:39

## 2020-03-07 RX ADMIN — DIGOXIN 125 MCG: 125 TABLET ORAL at 08:41

## 2020-03-07 RX ADMIN — CYANOCOBALAMIN TAB 500 MCG 1000 MCG: 500 TAB at 17:01

## 2020-03-07 RX ADMIN — GABAPENTIN 300 MG: 250 SOLUTION ORAL at 08:44

## 2020-03-07 RX ADMIN — POLYETHYLENE GLYCOL 3350 17 G: 17 POWDER, FOR SOLUTION ORAL at 08:38

## 2020-03-07 RX ADMIN — METOPROLOL TARTRATE 25 MG: 25 TABLET, FILM COATED ORAL at 21:28

## 2020-03-07 RX ADMIN — HYDROMORPHONE HYDROCHLORIDE 0.5 MG: 1 INJECTION, SOLUTION INTRAMUSCULAR; INTRAVENOUS; SUBCUTANEOUS at 21:28

## 2020-03-07 RX ADMIN — SPIRONOLACTONE 50 MG: 50 TABLET ORAL at 08:40

## 2020-03-07 RX ADMIN — HYDROMORPHONE HYDROCHLORIDE 0.5 MG: 1 INJECTION, SOLUTION INTRAMUSCULAR; INTRAVENOUS; SUBCUTANEOUS at 12:27

## 2020-03-07 RX ADMIN — MELATONIN 1000 UNITS: at 16:51

## 2020-03-07 RX ADMIN — METOPROLOL TARTRATE 25 MG: 25 TABLET, FILM COATED ORAL at 08:40

## 2020-03-07 RX ADMIN — PRIMIDONE 50 MG: 50 TABLET ORAL at 08:41

## 2020-03-07 RX ADMIN — PRIMIDONE 50 MG: 50 TABLET ORAL at 21:28

## 2020-03-07 RX ADMIN — HYDROMORPHONE HYDROCHLORIDE 0.5 MG: 1 INJECTION, SOLUTION INTRAMUSCULAR; INTRAVENOUS; SUBCUTANEOUS at 02:45

## 2020-03-07 RX ADMIN — DEXTROSE 50 ML/HR: 5 SOLUTION INTRAVENOUS at 11:58

## 2020-03-07 RX ADMIN — APIXABAN 5 MG: 5 TABLET, FILM COATED ORAL at 17:01

## 2020-03-07 NOTE — PROGRESS NOTES
Progress Note - Gigi Valentine 1935, 80 y o  female MRN: [id number]    Unit/Bed#: [facility] 710-01 Encounter: [id number]    Primary Care Provider: Gavin Farmer MD   Date and time admitted to hospital: 2/19/2020  8:30 AM        * Aphasia  Assessment & Plan  EEG monitoring 2/16-2/17: demonstrated no epileptiform activity or epileptogenic focus  Repeat CT scan of head does not demonstrate any acute findings  Neurology input noted    Moderate protein-calorie malnutrition ([address] )  Assessment & Plan  Malnutrition Findings:   Malnutrition type: Acute illness(<75% energy intake versus needs for > 7days due to aspiration risk;dysphagia & PEG placed- held after 24hrs  NPO>7days  Mild fat loss somewhat hollow orbitals& mild muscle loss slight depression temples  Treat with PEG when tolerated&future VBS  )  Degree of Malnutrition: Malnutrition of moderate degree    BMI Findings: Body mass index is 30 73 kg/m²  Goals of care, counseling/discussion  Assessment & Plan  Overall guarded prognosis given multiple comorbidities  Discussed with palliative care, pulmonary  Overall guarded prognosis explained to the family at the multi disciplinary meeting    Abnormal CT of the chest  Assessment & Plan  CT chest reveals left bronchial secretions/mucus plugging  Status post bronchoscopy  Bilateral pleural effusions  Pulmonary following    Pleural effusion, left  Assessment & Plan  Monitor  Discussed with Pulmonary    Oropharyngeal dysphagia  Assessment & Plan  Patient is now status post PEG tube placement  However patient remains at risk for aspiration  Speech therapy following  Aspiration precautions        Dysarthria  Assessment & Plan  Dysarthria noted on last admission 2/14, neurology consulted and did 24-hour EEG monitoring 2/16  EEG monitoring 2/16-2/17: no focal epileptiform discharges or electrographic seizures    Removed 2/17  Unable to get MRI brain due to spine stimulator  Neurology input noted    Stroke-like symptoms  Assessment & Plan  Continue atorvastatin, Eliquis  Neurology input noted    Cervical dystonia  Assessment & Plan  Patient follows up with neurologist outpatient  continue gabapentin 300mg PO QD, give Tylenol 650 mg p o  Q 6 hours p r n  Stimulator in place    Essential hypertension  Assessment & Plan  Monitor blood pressure   Continue Lopressor, amlodipine  IV labetalol p r n  Avoid hypotension    Paroxysmal atrial fibrillation (HCC)  Assessment & Plan  Continue metoprolol, digoxin  Eliquis anticoagulation      Pneumoniaresolved as of 3/2/2020  Assessment & Plan  HCAP versus aspiration  Completed antibiotics  Aspiration precautions      VTE Pharmacologic Prophylaxis:   Pharmacologic: Apixaban (Eliquis)  Mechanical VTE Prophylaxis in Place: Yes    Patient Centered Rounds: I have performed bedside rounds with nursing staff today  Discussions with Specialists or Other Care Team Provider:     Education and Discussions with Family / Patient:  Patient, called and left a message for daughter over phone    Time Spent for Care: 30 minutes  More than 50% of total time spent on counseling and coordination of care as described above  Current Length of Stay: 17 day(s)    Current Patient Status: Inpatient   Certification Statement: The patient will continue to require additional inpatient hospital stay due to As mentioned    Discharge Plan:  Awaiting clinical and symptomatic improvement    Code Status: Level 3 - DNAR and DNI      Subjective:     Comfortably in bed  Reports feeling tired      Objective:     Vitals:   Temp (24hrs), Av 6 °F (36 4 °C), Min:96 9 °F (36 1 °C), Max:97 9 °F (36 6 °C)    Temp:  [96 9 °F (36 1 °C)-97 9 °F (36 6 °C)] 96 9 °F (36 1 °C)  HR:  [56-68] 56  Resp:  [16] 16  BP: (149-185)/() 185/72  SpO2:  [95 %-100 %] 100 %  Body mass index is 30 73 kg/m²  Input and Output Summary (last 24 hours):        Intake/Output Summary (Last 24 hours) at 3/7/2020 Drew Collins [id number] filed at 3/7/2020 0200  Gross per 24 hour   Intake 0 ml   Output 192 ml   Net -192 ml       Physical Exam:     Physical Exam    Comfortably in bed  Neck supple  Lungs diminished breath sounds on the left side, crackles noted  Heart sounds S1-S2 noted  Abdomen soft  Awake obeys simple commands  Pulses noted  No rash      Additional Data:     Labs:    Results from last 7 days   Lab Units 03/05/20  0527   WBC Thousand/uL 7 42   HEMOGLOBIN g/dL 10 6*   HEMATOCRIT % 33 2*   PLATELETS Thousands/uL 240   NEUTROS PCT % 66   LYMPHS PCT % 21   MONOS PCT % 9   EOS PCT % 2     Results from last 7 days   Lab Units 03/04/20  0505   SODIUM mmol/L 142   POTASSIUM mmol/L 3 7   CHLORIDE mmol/L 111*   CO2 mmol/L 27   BUN mg/dL 8   CREATININE mg/dL 0 43*   ANION GAP mmol/L 4   CALCIUM mg/dL 8 5   ALBUMIN g/dL 1 7*   TOTAL BILIRUBIN mg/dL 0 42   ALK PHOS U/L 90   ALT U/L 17   AST U/L 24   GLUCOSE RANDOM mg/dL 143*         Results from last 7 days   Lab Units 03/07/20  0540 03/07/20  0055 03/06/20  1836 03/06/20  1210 03/06/20  0518 03/06/20  0320 03/06/20  0034 03/05/20  1826 03/05/20  1204 03/05/20  0657 03/05/20  0326 03/05/20  0008   POC GLUCOSE mg/dl 68 73 83 105 74 64* 67 101 117 98 102 84         Results from last 7 days   Lab Units 03/05/20  0527   PROCALCITONIN ng/ml 0 05           * I Have Reviewed All Lab Data Listed Above  * Additional Pertinent Lab Tests Reviewed:  All Labs Within Last 24 Hours Reviewed    Imaging:    Imaging Reports Reviewed Today Include:   Imaging Personally Reviewed by Myself Includes:      Recent Cultures (last 7 days):     Results from last 7 days   Lab Units 03/05/20  1518   GRAM STAIN RESULT  No Polys or Bacteria seen       Last 24 Hours Medication List:     Current Facility-Administered Medications:  acetaminophen 650 mg Rectal Q4H PRN Meriam Rom PAJOCELINE   amLODIPine 5 mg Per PEG Tube Daily Opal Bryson MD   apixaban 5 mg Per PEG Tube BID Hunter Elizondo MD   atorvastatin 40 mg Per PEG Tube QPM Army Antonio MD   bisacodyl 10 mg Rectal Daily PRN Army Antonio MD   cholecalciferol 1,000 Units Per PEG Tube QPM Army Antonio MD   vitamin B-12 1,000 mcg Per PEG Tube QPM Army Antonio MD   dextrose 50 mL/hr Intravenous Continuous Marisol Arroyo MD   digoxin 125 mcg Per PEG Tube Daily Army Antonio MD   folic acid 7,839 mcg Per PEG Tube Daily Army Antonio MD   gabapentin 300 mg Per PEG Tube Daily Army Antonio MD   HYDROmorphone 0 5 mg Intravenous Q4H PRN Army Antonio MD   insulin lispro 1-5 Units Subcutaneous Q6H [address] Leeanna Bob MD   Labetalol HCl 10 mg Intravenous Q6H PRN Poppy Vasquez PA-C   metoprolol tartrate 25 mg Per PEG Tube Q12H [address] Army Antonio MD   polyethylene glycol 17 g Oral Daily Army Antonio MD   primidone 50 mg Per PEG Tube Q12H [address] Army Antonio MD        Today, Patient Was Seen By: Marisol Arroyo MD    ** Please Note: Dictation voice to text software may have been used in the creation of this document   **

## 2020-03-07 NOTE — ASSESSMENT & PLAN NOTE
CT chest reveals left bronchial secretions/mucus plugging  Status post bronchoscopy  Bilateral pleural effusions  Pulmonary following

## 2020-03-07 NOTE — ASSESSMENT & PLAN NOTE
Patient is now status post PEG tube placement  However patient remains at risk for aspiration  Speech therapy following  Aspiration precautions

## 2020-03-07 NOTE — PLAN OF CARE
Problem: Potential for Falls  Goal: Patient will remain free of falls  Description  INTERVENTIONS:  - Assess patient frequently for physical needs  -  Identify cognitive and physical deficits and behaviors that affect risk of falls    -  [place] fall precautions as indicated by assessment   - Educate patient/family on patient safety including physical limitations  - Instruct patient to call for assistance with activity based on assessment  - Modify environment to reduce risk of injury  - Consider OT/PT consult to assist with strengthening/mobility  Outcome: Progressing     Problem: Prexisting or High Potential for Compromised Skin Integrity  Goal: Skin integrity is maintained or improved  Description  INTERVENTIONS:  - Identify patients at risk for skin breakdown  - Assess and monitor skin integrity  - Assess and monitor nutrition and hydration status  - Monitor labs   - Assess for incontinence   - Turn and reposition patient  - Assist with mobility/ambulation  - Relieve pressure over bony prominences  - Avoid friction and shearing  - Provide appropriate hygiene as needed including keeping skin clean and dry  - Evaluate need for skin moisturizer/barrier cream  - Collaborate with interdisciplinary team   - Patient/family teaching  - Consider wound care consult   Outcome: Progressing     Problem: DISCHARGE PLANNING  Goal: Discharge to home or other facility with appropriate resources  Description  INTERVENTIONS:  - Identify barriers to discharge w/patient and caregiver  - Arrange for needed discharge resources and transportation as appropriate  - Identify discharge learning needs (meds, wound care, etc )  - Arrange for interpretive services to assist at discharge as needed  - Refer to Case Management Department for coordinating discharge planning if the patient needs post-hospital services based on physician/advanced practitioner order or complex needs related to functional status, cognitive ability, or social support system  Outcome: Progressing     Problem: NEUROSENSORY - ADULT  Goal: Achieves stable or improved neurological status  Description  INTERVENTIONS  - Monitor and report changes in neurological status  - Monitor vital signs such as temperature, blood pressure, glucose, and any other labs ordered   - Initiate measures to prevent increased intracranial pressure  - Monitor for seizure activity and implement precautions if appropriate      Outcome: Progressing  Goal: Achieves maximal functionality and self care  Description  INTERVENTIONS  - Monitor swallowing and airway patency with patient fatigue and changes in neurological status  - Encourage and assist patient to increase activity and self care  - Encourage visually impaired, hearing impaired and aphasic patients to use assistive/communication devices  Outcome: Progressing     Problem: Nutrition/Hydration-ADULT  Goal: Nutrient/Hydration intake appropriate for improving, restoring or maintaining nutritional needs  Description  Monitor and assess patient's nutrition/hydration status for malnutrition  Collaborate with interdisciplinary team and initiate plan and interventions as ordered  Monitor patient's weight and dietary intake as ordered or per policy  Utilize nutrition screening tool and intervene as necessary  Determine patient's food preferences and provide high-protein, high-caloric foods as appropriate       INTERVENTIONS:  - Monitor oral intake, urinary output, labs, and treatment plans  - Assess nutrition and hydration status and recommend course of action  - Evaluate amount of meals eaten  - Assist patient with eating if necessary   - Allow adequate time for meals  - Recommend/ encourage appropriate diets, oral nutritional supplements, and vitamin/mineral supplements  - Order, calculate, and assess calorie counts as needed  - Recommend, monitor, and adjust tube feedings and TPN/PPN based on assessed needs  - Assess need for intravenous fluids  - Provide specific nutrition/hydration education as appropriate  - Include patient/family/caregiver in decisions related to nutrition  Outcome: Progressing     Problem: CONFUSION/THOUGHT DISTURBANCE  Goal: Thought disturbances (confusion, delirium, depression, dementia or psychosis) are managed to maintain or return to baseline mental status and functional level  Description  INTERVENTIONS:  - Assess for possible contributors to  thought disturbance, including but not limited to medications, infection, impaired vision or hearing, underlying metabolic abnormalities, dehydration, respiratory compromise,  psychiatric diagnoses and notify attending PHYSICAN/AP  - Monitor and intervene to maintain adequate nutrition, hydration, elimination, sleep and activity  - Decrease environmental stimuli, including noise as appropriate  - Provide frequent contacts to provide refocusing, direction and reassurance as needed  Approach patient calmly with eye contact and at their level    - [place] high risk fall precautions, aspiration precautions and other safety measures, as indicated  - If delirium suspected, notify physician/AP of change in condition and request immediate in-person evaluation  - Pursue consults as appropriate including Geriatric (campus dependent), OT for cognitive evaluation/activity planning, psychiatric, pastoral care, etc   Outcome: Progressing     Problem: SAFETY,RESTRAINT: NV/NON-SELF DESTRUCTIVE BEHAVIOR  Goal: Remains free of harm/injury (restraint for non violent/non self-detsructive behavior)  Description  INTERVENTIONS:  - Instruct patient/family regarding restraint use   - Assess and monitor physiologic and psychological status   - Provide interventions and comfort measures to meet assessed patient needs   - Identify and implement measures to help patient regain control  - Assess readiness for release of restraint   Outcome: Progressing  Goal: Returns to optimal restraint-free functioning  Description  INTERVENTIONS:  - Assess the patient's behavior and symptoms that indicate continued need for restraint  - Identify and implement measures to help patient regain control  - Assess readiness for release of restraint   Outcome: Progressing

## 2020-03-08 LAB
ANION GAP SERPL CALCULATED.3IONS-SCNC: 5 MMOL/L (ref 4–13)
BACTERIA BRONCH AEROBE CULT: ABNORMAL
BUN SERPL-MCNC: 11 MG/DL (ref 5–25)
CALCIUM SERPL-MCNC: 8.4 MG/DL (ref 8.3–10.1)
CHLORIDE SERPL-SCNC: 105 MMOL/L (ref 100–108)
CO2 SERPL-SCNC: 30 MMOL/L (ref 21–32)
CREAT SERPL-MCNC: 0.38 MG/DL (ref 0.6–1.3)
GFR SERPL CREATININE-BSD FRML MDRD: 98 ML/MIN/1.73SQ M
GLUCOSE SERPL-MCNC: 112 MG/DL (ref 65–140)
GLUCOSE SERPL-MCNC: 113 MG/DL (ref 65–140)
GLUCOSE SERPL-MCNC: 129 MG/DL (ref 65–140)
GLUCOSE SERPL-MCNC: 99 MG/DL (ref 65–140)
GRAM STN SPEC: ABNORMAL
POTASSIUM SERPL-SCNC: 3.7 MMOL/L (ref 3.5–5.3)
SODIUM SERPL-SCNC: 140 MMOL/L (ref 136–145)

## 2020-03-08 PROCEDURE — 80048 BASIC METABOLIC PNL TOTAL CA: CPT | Performed by: INTERNAL MEDICINE

## 2020-03-08 PROCEDURE — 99232 SBSQ HOSP IP/OBS MODERATE 35: CPT | Performed by: INTERNAL MEDICINE

## 2020-03-08 PROCEDURE — 82948 REAGENT STRIP/BLOOD GLUCOSE: CPT

## 2020-03-08 RX ADMIN — HYDROMORPHONE HYDROCHLORIDE 0.5 MG: 1 INJECTION, SOLUTION INTRAMUSCULAR; INTRAVENOUS; SUBCUTANEOUS at 18:04

## 2020-03-08 RX ADMIN — GABAPENTIN 300 MG: 250 SOLUTION ORAL at 13:19

## 2020-03-08 RX ADMIN — METOPROLOL TARTRATE 25 MG: 25 TABLET, FILM COATED ORAL at 08:30

## 2020-03-08 RX ADMIN — DIGOXIN 125 MCG: 125 TABLET ORAL at 08:30

## 2020-03-08 RX ADMIN — METOPROLOL TARTRATE 25 MG: 25 TABLET, FILM COATED ORAL at 22:00

## 2020-03-08 RX ADMIN — PRIMIDONE 50 MG: 50 TABLET ORAL at 13:18

## 2020-03-08 RX ADMIN — HYDROMORPHONE HYDROCHLORIDE 0.5 MG: 1 INJECTION, SOLUTION INTRAMUSCULAR; INTRAVENOUS; SUBCUTANEOUS at 22:07

## 2020-03-08 RX ADMIN — CYANOCOBALAMIN TAB 500 MCG 1000 MCG: 500 TAB at 18:06

## 2020-03-08 RX ADMIN — FOLIC ACID 1000 MCG: 1 TABLET ORAL at 08:30

## 2020-03-08 RX ADMIN — ATORVASTATIN CALCIUM 40 MG: 40 TABLET, FILM COATED ORAL at 18:06

## 2020-03-08 RX ADMIN — APIXABAN 5 MG: 5 TABLET, FILM COATED ORAL at 18:06

## 2020-03-08 RX ADMIN — POLYETHYLENE GLYCOL 3350 17 G: 17 POWDER, FOR SOLUTION ORAL at 08:30

## 2020-03-08 RX ADMIN — HYDROMORPHONE HYDROCHLORIDE 0.5 MG: 1 INJECTION, SOLUTION INTRAMUSCULAR; INTRAVENOUS; SUBCUTANEOUS at 13:19

## 2020-03-08 RX ADMIN — APIXABAN 5 MG: 5 TABLET, FILM COATED ORAL at 08:30

## 2020-03-08 RX ADMIN — PRIMIDONE 50 MG: 50 TABLET ORAL at 22:00

## 2020-03-08 RX ADMIN — HYDROMORPHONE HYDROCHLORIDE 0.5 MG: 1 INJECTION, SOLUTION INTRAMUSCULAR; INTRAVENOUS; SUBCUTANEOUS at 06:53

## 2020-03-08 RX ADMIN — MELATONIN 1000 UNITS: at 18:06

## 2020-03-08 RX ADMIN — AMLODIPINE BESYLATE 5 MG: 5 TABLET ORAL at 08:30

## 2020-03-08 NOTE — PROGRESS NOTES
Progress Note - Ricky Arauz 1935, 80 y o  female MRN: [id number]    Unit/Bed#: [facility] 710-01 Encounter: [id number]    Primary Care Provider: Ofelia Willis MD   Date and time admitted to hospital: 2/19/2020  8:30 AM        * Aphasia  Assessment & Plan  EEG monitoring 2/16-2/17: demonstrated no epileptiform activity or epileptogenic focus  Repeat CT scan of head does not demonstrate any acute findings  Neurology input noted    Moderate protein-calorie malnutrition ([address] )  Assessment & Plan  Malnutrition Findings:   Malnutrition type: Acute illness(<75% energy intake versus needs for > 7days due to aspiration risk;dysphagia & PEG placed- held after 24hrs  NPO>7days  Mild fat loss somewhat hollow orbitals& mild muscle loss slight depression temples  Treat with PEG when tolerated&future VBS  )  Degree of Malnutrition: Malnutrition of moderate degree    BMI Findings: Body mass index is 30 73 kg/m²  Goals of care, counseling/discussion  Assessment & Plan  Overall guarded prognosis given multiple comorbidities  Discussed with palliative care, pulmonary  Overall guarded prognosis explained to the family at the multi disciplinary meeting    Abnormal CT of the chest  Assessment & Plan  CT chest reveals left bronchial secretions/mucus plugging  Status post bronchoscopy  Bilateral pleural effusions      Pleural effusion, left  Assessment & Plan  Monitor      Oropharyngeal dysphagia  Assessment & Plan  Patient is now status post PEG tube placement  However patient remains at risk for aspiration  Speech therapy following  Aspiration precautions        Dysarthria  Assessment & Plan  Dysarthria noted on last admission 2/14, neurology consulted and did 24-hour EEG monitoring 2/16  EEG monitoring 2/16-2/17: no focal epileptiform discharges or electrographic seizures    Removed 2/17  Unable to get MRI brain due to spine stimulator  Neurology input noted    Stroke-like symptoms  Assessment & Plan  Continue atorvastatin, Eliquis  Neurology input noted    Cervical dystonia  Assessment & Plan  Patient follows up with neurologist outpatient  continue gabapentin 300mg PO QD, give Tylenol 650 mg p o  Q 6 hours p r n  Stimulator in place    Essential hypertension  Assessment & Plan  Monitor blood pressure   Continue Lopressor, amlodipine  IV labetalol p r n  Avoid hypotension    Paroxysmal atrial fibrillation (HCC)  Assessment & Plan  Continue metoprolol, digoxin  Eliquis anticoagulation      Pneumoniaresolved as of 3/2/2020  Assessment & Plan  HCAP versus aspiration  Completed antibiotics  Aspiration precautions        VTE Pharmacologic Prophylaxis:   Pharmacologic: Apixaban (Eliquis)  Mechanical VTE Prophylaxis in Place: Yes    Patient Centered Rounds: I have performed bedside rounds with nursing staff today  Discussions with Specialists or Other Care Team Provider:     Education and Discussions with Family / Patient: pt, updated daughter Ewelina Schrader     Time Spent for Care: 30 minutes  More than 50% of total time spent on counseling and coordination of care as described above  Current Length of Stay: 18 day(s)    Current Patient Status: Inpatient   Certification Statement: The patient will continue to require additional inpatient hospital stay due to as mentioned    Discharge Plan:  Pending placement case management following    Code Status: Level 3 - DNAR and DNI      Subjective:     Comfortably sitting up in bed  Reports feeling hungry requesting p o  Intake  Discussed with RN - p o  Intake with supervision, aspiration precautions        Objective:     Vitals:   Temp (24hrs), Av 1 °F (36 7 °C), Min:97 °F (36 1 °C), Max:98 8 °F (37 1 °C)    Temp:  [97 °F (36 1 °C)-98 8 °F (37 1 °C)] 97 °F (36 1 °C)  HR:  [59-73] 62  Resp:  [18-19] 18  BP: (136-164)/(49-67) 164/67  SpO2:  [93 %-99 %] 99 %  Body mass index is 30 73 kg/m²  Input and Output Summary (last 24 hours):        Intake/Output Summary (Last 24 hours) at 3/8/2020 1205  Last data filed at 3/8/2020 0801  Gross per 24 hour   Intake 120 ml   Output 1138 ml   Net -1018 ml       Physical Exam:     Physical Exam    Comfortably sitting up in bed  Neck supple  Lungs diminished breath sounds bilateral  Heart sounds S1-S2 noted  Abdomen soft  Awake obeys simple commands  Dysarthria noted  No rash  Pulses noted    Additional Data:     Labs:    Results from last 7 days   Lab Units 03/05/20  0527   WBC Thousand/uL 7 42   HEMOGLOBIN g/dL 10 6*   HEMATOCRIT % 33 2*   PLATELETS Thousands/uL 240   NEUTROS PCT % 66   LYMPHS PCT % 21   MONOS PCT % 9   EOS PCT % 2     Results from last 7 days   Lab Units 03/08/20  0517 03/04/20  0505   SODIUM mmol/L 140 142   POTASSIUM mmol/L 3 7 3 7   CHLORIDE mmol/L 105 111*   CO2 mmol/L 30 27   BUN mg/dL 11 8   CREATININE mg/dL 0 38* 0 43*   ANION GAP mmol/L 5 4   CALCIUM mg/dL 8 4 8 5   ALBUMIN g/dL  --  1 7*   TOTAL BILIRUBIN mg/dL  --  0 42   ALK PHOS U/L  --  90   ALT U/L  --  17   AST U/L  --  24   GLUCOSE RANDOM mg/dL 112 143*         Results from last 7 days   Lab Units 03/08/20  1202 03/08/20  0550 03/07/20  2343 03/07/20  1809 03/07/20  1223 03/07/20  0540 03/07/20  0055 03/06/20  1836 03/06/20  1210 03/06/20  0518 03/06/20  0320 03/06/20  0034   POC GLUCOSE mg/dl 129 99 113 120 77 68 73 83 105 74 64* 67         Results from last 7 days   Lab Units 03/05/20  0527   PROCALCITONIN ng/ml 0 05           * I Have Reviewed All Lab Data Listed Above  * Additional Pertinent Lab Tests Reviewed:  All Labs Within Last 24 Hours Reviewed    Imaging:    Imaging Reports Reviewed Today Include:   Imaging Personally Reviewed by Myself Includes:      Recent Cultures (last 7 days):     Results from last 7 days   Lab Units 03/05/20  1518   GRAM STAIN RESULT  No Polys or Bacteria seen       Last 24 Hours Medication List:     Current Facility-Administered Medications:  acetaminophen 650 mg Rectal Q4H PRN Cooper Ayala PA-C    amLODIPine 5 mg Per PEG Tube Daily Cheryl Fang MD    apixaban 5 mg Per PEG Tube BID Kat Leak, MD    atorvastatin 40 mg Per PEG Tube QPM Kat Leak, MD    bisacodyl 10 mg Rectal Daily PRN Kat Leak, MD    cholecalciferol 1,000 Units Per PEG Tube QPM Kat Leak, MD    vitamin B-12 1,000 mcg Per PEG Tube QPM Kat Leak, MD    dextrose 50 mL/hr Intravenous Continuous Cheryl Fang MD Last Rate: 50 mL/hr (03/07/20 1158)   digoxin 125 mcg Per PEG Tube Daily Kat Leak, MD    folic acid 2,342 mcg Per PEG Tube Daily Kat Leak, MD    gabapentin 300 mg Per PEG Tube Daily Kat Leak, MD    HYDROmorphone 0 5 mg Intravenous Q4H PRN Kat Leak, MD    insulin lispro 1-5 Units Subcutaneous Q6H [facility] Adrienne Stock MD    Labetalol HCl 10 mg Intravenous Q6H PRN Poppy Vasquez PA-C    metoprolol tartrate 25 mg Per PEG Tube Q12H [facility] Kat Calle, MD    polyethylene glycol 17 g Oral Daily Kat Leak, MD    primidone 50 mg Per PEG Tube Q12H [facility] Kat Calle MD         Today, Patient Was Seen By: Cheryl Fang MD    ** Please Note: Dictation voice to text software may have been used in the creation of this document   **

## 2020-03-08 NOTE — ASSESSMENT & PLAN NOTE
CT chest reveals left bronchial secretions/mucus plugging  Status post bronchoscopy  Bilateral pleural effusions

## 2020-03-08 NOTE — PLAN OF CARE
Problem: Potential for Falls  Goal: Patient will remain free of falls  Description  INTERVENTIONS:  - Assess patient frequently for physical needs  -  Identify cognitive and physical deficits and behaviors that affect risk of falls    -  [place] fall precautions as indicated by assessment   - Educate patient/family on patient safety including physical limitations  - Instruct patient to call for assistance with activity based on assessment  - Modify environment to reduce risk of injury  - Consider OT/PT consult to assist with strengthening/mobility  Outcome: Progressing     Problem: Prexisting or High Potential for Compromised Skin Integrity  Goal: Skin integrity is maintained or improved  Description  INTERVENTIONS:  - Identify patients at risk for skin breakdown  - Assess and monitor skin integrity  - Assess and monitor nutrition and hydration status  - Monitor labs   - Assess for incontinence   - Turn and reposition patient  - Assist with mobility/ambulation  - Relieve pressure over bony prominences  - Avoid friction and shearing  - Provide appropriate hygiene as needed including keeping skin clean and dry  - Evaluate need for skin moisturizer/barrier cream  - Collaborate with interdisciplinary team   - Patient/family teaching  - Consider wound care consult   Outcome: Progressing     Problem: DISCHARGE PLANNING  Goal: Discharge to home or other facility with appropriate resources  Description  INTERVENTIONS:  - Identify barriers to discharge w/patient and caregiver  - Arrange for needed discharge resources and transportation as appropriate  - Identify discharge learning needs (meds, wound care, etc )  - Arrange for interpretive services to assist at discharge as needed  - Refer to Case Management Department for coordinating discharge planning if the patient needs post-hospital services based on physician/advanced practitioner order or complex needs related to functional status, cognitive ability, or social support system  Outcome: Progressing     Problem: NEUROSENSORY - ADULT  Goal: Achieves stable or improved neurological status  Description  INTERVENTIONS  - Monitor and report changes in neurological status  - Monitor vital signs such as temperature, blood pressure, glucose, and any other labs ordered   - Initiate measures to prevent increased intracranial pressure  - Monitor for seizure activity and implement precautions if appropriate      Outcome: Progressing  Goal: Achieves maximal functionality and self care  Description  INTERVENTIONS  - Monitor swallowing and airway patency with patient fatigue and changes in neurological status  - Encourage and assist patient to increase activity and self care  - Encourage visually impaired, hearing impaired and aphasic patients to use assistive/communication devices  Outcome: Progressing     Problem: Nutrition/Hydration-ADULT  Goal: Nutrient/Hydration intake appropriate for improving, restoring or maintaining nutritional needs  Description  Monitor and assess patient's nutrition/hydration status for malnutrition  Collaborate with interdisciplinary team and initiate plan and interventions as ordered  Monitor patient's weight and dietary intake as ordered or per policy  Utilize nutrition screening tool and intervene as necessary  Determine patient's food preferences and provide high-protein, high-caloric foods as appropriate       INTERVENTIONS:  - Monitor oral intake, urinary output, labs, and treatment plans  - Assess nutrition and hydration status and recommend course of action  - Evaluate amount of meals eaten  - Assist patient with eating if necessary   - Allow adequate time for meals  - Recommend/ encourage appropriate diets, oral nutritional supplements, and vitamin/mineral supplements  - Order, calculate, and assess calorie counts as needed  - Recommend, monitor, and adjust tube feedings and TPN/PPN based on assessed needs  - Assess need for intravenous fluids  - Provide specific nutrition/hydration education as appropriate  - Include patient/family/caregiver in decisions related to nutrition  Outcome: Progressing     Problem: CONFUSION/THOUGHT DISTURBANCE  Goal: Thought disturbances (confusion, delirium, depression, dementia or psychosis) are managed to maintain or return to baseline mental status and functional level  Description  INTERVENTIONS:  - Assess for possible contributors to  thought disturbance, including but not limited to medications, infection, impaired vision or hearing, underlying metabolic abnormalities, dehydration, respiratory compromise,  psychiatric diagnoses and notify attending PHYSICAN/AP  - Monitor and intervene to maintain adequate nutrition, hydration, elimination, sleep and activity  - Decrease environmental stimuli, including noise as appropriate  - Provide frequent contacts to provide refocusing, direction and reassurance as needed  Approach patient calmly with eye contact and at their level    - [place] high risk fall precautions, aspiration precautions and other safety measures, as indicated  - If delirium suspected, notify physician/AP of change in condition and request immediate in-person evaluation  - Pursue consults as appropriate including Geriatric (campus dependent), OT for cognitive evaluation/activity planning, psychiatric, pastoral care, etc   Outcome: Progressing     Problem: SAFETY,RESTRAINT: NV/NON-SELF DESTRUCTIVE BEHAVIOR  Goal: Remains free of harm/injury (restraint for non violent/non self-detsructive behavior)  Description  INTERVENTIONS:  - Instruct patient/family regarding restraint use   - Assess and monitor physiologic and psychological status   - Provide interventions and comfort measures to meet assessed patient needs   - Identify and implement measures to help patient regain control  - Assess readiness for release of restraint   Outcome: Progressing  Goal: Returns to optimal restraint-free functioning  Description  INTERVENTIONS:  - Assess the patient's behavior and symptoms that indicate continued need for restraint  - Identify and implement measures to help patient regain control  - Assess readiness for release of restraint   Outcome: Progressing

## 2020-03-09 LAB
GLUCOSE SERPL-MCNC: 102 MG/DL (ref 65–140)
GLUCOSE SERPL-MCNC: 110 MG/DL (ref 65–140)
GLUCOSE SERPL-MCNC: 174 MG/DL (ref 65–140)
GLUCOSE SERPL-MCNC: 92 MG/DL (ref 65–140)

## 2020-03-09 PROCEDURE — 97112 NEUROMUSCULAR REEDUCATION: CPT

## 2020-03-09 PROCEDURE — 97168 OT RE-EVAL EST PLAN CARE: CPT

## 2020-03-09 PROCEDURE — 82948 REAGENT STRIP/BLOOD GLUCOSE: CPT

## 2020-03-09 PROCEDURE — 97530 THERAPEUTIC ACTIVITIES: CPT

## 2020-03-09 PROCEDURE — 99232 SBSQ HOSP IP/OBS MODERATE 35: CPT | Performed by: INTERNAL MEDICINE

## 2020-03-09 RX ADMIN — AMLODIPINE BESYLATE 5 MG: 5 TABLET ORAL at 11:18

## 2020-03-09 RX ADMIN — ATORVASTATIN CALCIUM 40 MG: 40 TABLET, FILM COATED ORAL at 19:04

## 2020-03-09 RX ADMIN — POLYETHYLENE GLYCOL 3350 17 G: 17 POWDER, FOR SOLUTION ORAL at 11:14

## 2020-03-09 RX ADMIN — DEXTROSE 40 ML/HR: 5 SOLUTION INTRAVENOUS at 16:01

## 2020-03-09 RX ADMIN — MELATONIN 1000 UNITS: at 19:03

## 2020-03-09 RX ADMIN — PRIMIDONE 50 MG: 50 TABLET ORAL at 11:20

## 2020-03-09 RX ADMIN — HYDROMORPHONE HYDROCHLORIDE 0.5 MG: 1 INJECTION, SOLUTION INTRAMUSCULAR; INTRAVENOUS; SUBCUTANEOUS at 07:38

## 2020-03-09 RX ADMIN — CYANOCOBALAMIN TAB 500 MCG 1000 MCG: 500 TAB at 19:03

## 2020-03-09 RX ADMIN — METOPROLOL TARTRATE 25 MG: 25 TABLET, FILM COATED ORAL at 11:18

## 2020-03-09 RX ADMIN — DIGOXIN 125 MCG: 125 TABLET ORAL at 11:14

## 2020-03-09 RX ADMIN — APIXABAN 5 MG: 5 TABLET, FILM COATED ORAL at 11:14

## 2020-03-09 RX ADMIN — HYDROMORPHONE HYDROCHLORIDE 0.5 MG: 1 INJECTION, SOLUTION INTRAMUSCULAR; INTRAVENOUS; SUBCUTANEOUS at 11:18

## 2020-03-09 RX ADMIN — FOLIC ACID 1000 MCG: 1 TABLET ORAL at 11:14

## 2020-03-09 RX ADMIN — METOPROLOL TARTRATE 25 MG: 25 TABLET, FILM COATED ORAL at 22:22

## 2020-03-09 RX ADMIN — APIXABAN 5 MG: 5 TABLET, FILM COATED ORAL at 19:04

## 2020-03-09 RX ADMIN — GABAPENTIN 300 MG: 250 SOLUTION ORAL at 12:15

## 2020-03-09 RX ADMIN — PRIMIDONE 50 MG: 50 TABLET ORAL at 22:21

## 2020-03-09 NOTE — SOCIAL WORK
CM was informed by Dr Glenna Cunningham that pt is a tentative dc for today  CM called pt's daughter John Aid to discuss rehab preference  John Aid prefers [facility] or [facility]  Updated clinical sent to [facility] and [facility] via [address] [place] [address]

## 2020-03-09 NOTE — PROGRESS NOTES
Met with patient to discuss follow up care, stroke education, and discharge planning  Explained neurovascular nurse navigator role  Patient states she previously lived with daughters Kamryn Garcia and Abhilash Yang and son-in-law Patti Ramos  Family assists with medications,     Patient has stroke education binder  Provided her my card  Reviewed information and disease process with her  She verbalizes understanding of stroke type, symptoms, personal risk factors and management  Reeducated medications and resources  Patient verbalizes understanding of teaching  Plan at this time is for patient to be discharged to rehab when medically cleared, CM coordinating  Answered all her questions  Family not present at this time to discuss their questions  Patient does not have any additional questions or concerns at this time  Will follow up after discharge

## 2020-03-09 NOTE — PROGRESS NOTES
Progress Note - Zoe Elizondo 1935, 80 y o  female MRN: [id number]    Unit/Bed#: [facility] 710-01 Encounter: [id number]    Primary Care Provider: Sundeep Oquendo MD   Date and time admitted to hospital: 2/19/2020  8:30 AM        * Aphasia  Assessment & Plan  EEG monitoring 2/16-2/17: demonstrated no epileptiform activity or epileptogenic focus  Repeat CT scan of head does not demonstrate any acute findings  Neurology input noted    Moderate protein-calorie malnutrition ([address] )  Assessment & Plan  Malnutrition Findings:   Malnutrition type: Acute illness(<75% energy intake versus needs for > 7days due to aspiration risk;dysphagia & PEG placed- held after 24hrs  NPO>7days  Mild fat loss somewhat hollow orbitals& mild muscle loss slight depression temples  Treat with PEG when tolerated&future VBS  )  Degree of Malnutrition: Malnutrition of moderate degree    BMI Findings: Body mass index is 30 73 kg/m²         Goals of care, counseling/discussion  Assessment & Plan  Overall guarded prognosis given multiple comorbidities  Discussed with palliative care, pulmonary  Overall guarded prognosis explained to the family at the multi disciplinary meeting  Discussed with daughter Jazz Esparza at bedside and with Alexandro Hendricks over phone in detail, overall guarded prognosis explained  Discussed with palliative care and pulmonary    Abnormal CT of the chest  Assessment & Plan  CT chest reveals left bronchial secretions/mucus plugging  Status post bronchoscopy  Bilateral pleural effusions      Pleural effusion, left  Assessment & Plan  Monitor      Oropharyngeal dysphagia  Assessment & Plan  Patient is now status post PEG tube placement  However patient remains at risk for aspiration  Speech therapy following  Aspiration precautions        Dysarthria  Assessment & Plan  Dysarthria noted on last admission 2/14, neurology consulted and did 24-hour EEG monitoring 2/16  EEG monitoring 2/16-2/17: no focal epileptiform discharges or electrographic seizures  Removed   Unable to get MRI brain due to spine stimulator  Neurology input noted    Stroke-like symptoms  Assessment & Plan  Continue atorvastatin, Eliquis  Neurology input noted    Cervical dystonia  Assessment & Plan  Patient follows up with neurologist outpatient  continue gabapentin 300mg PO QD, give Tylenol 650 mg p o  Q 6 hours p r n  Stimulator in place    Essential hypertension  Assessment & Plan  Monitor blood pressure   Continue Lopressor, amlodipine  IV labetalol p r n  Avoid hypotension    Paroxysmal atrial fibrillation (HCC)  Assessment & Plan  Continue metoprolol, digoxin  Eliquis anticoagulation      Pneumoniaresolved as of 3/2/2020  Assessment & Plan  HCAP versus aspiration  Completed antibiotics  Aspiration precautions        VTE Pharmacologic Prophylaxis:   Pharmacologic: Apixaban (Eliquis)  Mechanical VTE Prophylaxis in Place: Yes    Patient Centered Rounds: I have performed bedside rounds with nursing staff today  Discussions with Specialists or Other Care Team Provider:  Pulmonary, palliative care    Education and Discussions with Family / Patient: pt, discussed with garth Archer at bedside and [place] over phone, overall guarded prognosis explained    Time Spent for Care: 30 minutes  More than 50% of total time spent on counseling and coordination of care as described above      Current Length of Stay: 19 day(s)    Current Patient Status: Inpatient   Certification Statement: The patient will continue to require additional inpatient hospital stay due to As mentioned    Discharge Plan:  Awaiting clinical and symptomatic improvement, palliative care inputs    Code Status: Level 3 - DNAR and DNI      Subjective:     "I have difficulty breathing"  Comfortably in bed  Daughter at bedside reports patient with cough    Objective:     Vitals:   Temp (24hrs), Av 9 °F (36 1 °C), Min:96 6 °F (35 9 °C), Max:97 3 °F (36 3 °C)    Temp:  [96 6 °F (35 9 °C)-97 3 °F (36 3 °C)] 96 9 °F (36 1 °C)  HR:  [56-70] 66  Resp:  [16] 16  BP: (126-158)/(55-66) 157/64  SpO2:  [95 %-99 %] 98 %  Body mass index is 30 73 kg/m²  Input and Output Summary (last 24 hours): Intake/Output Summary (Last 24 hours) at 3/9/2020 1321  Last data filed at 3/9/2020 1100  Gross per 24 hour   Intake 220 ml   Output    Net 220 ml       Physical Exam:     Physical Exam    Comfortably in bed  Neck supple  Lungs diminished breath sounds bilaterally  Heart sounds S1-S2 noted  Abdomen soft  Awake obeys simple commands  Dysarthria noted  Pulses noted  No rash    Additional Data:     Labs:    Results from last 7 days   Lab Units 03/05/20  0527   WBC Thousand/uL 7 42   HEMOGLOBIN g/dL 10 6*   HEMATOCRIT % 33 2*   PLATELETS Thousands/uL 240   NEUTROS PCT % 66   LYMPHS PCT % 21   MONOS PCT % 9   EOS PCT % 2     Results from last 7 days   Lab Units 03/08/20  0517 03/04/20  0505   SODIUM mmol/L 140 142   POTASSIUM mmol/L 3 7 3 7   CHLORIDE mmol/L 105 111*   CO2 mmol/L 30 27   BUN mg/dL 11 8   CREATININE mg/dL 0 38* 0 43*   ANION GAP mmol/L 5 4   CALCIUM mg/dL 8 4 8 5   ALBUMIN g/dL  --  1 7*   TOTAL BILIRUBIN mg/dL  --  0 42   ALK PHOS U/L  --  90   ALT U/L  --  17   AST U/L  --  24   GLUCOSE RANDOM mg/dL 112 143*         Results from last 7 days   Lab Units 03/09/20  1229 03/09/20  0639 03/09/20  0023 03/08/20  1816 03/08/20  1202 03/08/20  0550 03/07/20  2343 03/07/20  1809 03/07/20  1223 03/07/20  0540 03/07/20  0055 03/06/20  1836   POC GLUCOSE mg/dl 174* 102 110 113 129 99 113 120 77 68 73 83         Results from last 7 days   Lab Units 03/05/20  0527   PROCALCITONIN ng/ml 0 05           * I Have Reviewed All Lab Data Listed Above  * Additional Pertinent Lab Tests Reviewed:  All Labs Within Last 24 Hours Reviewed    Imaging:    Imaging Reports Reviewed Today Include:   Imaging Personally Reviewed by Myself Includes:    Recent Cultures (last 7 days):     Results from last 7 days   Lab Units 03/05/20  5243   Joe Grimm STAIN RESULT  No Polys or Bacteria seen       Last 24 Hours Medication List:     Current Facility-Administered Medications:  acetaminophen 650 mg Rectal Q4H PRN Lonnie Hoffman PA-C    amLODIPine 5 mg Per PEG Tube Daily Mellisa Hawley MD    apixaban 5 mg Per PEG Tube BID Jag Abbott MD    atorvastatin 40 mg Per PEG Tube QPM Jag Abbott MD    bisacodyl 10 mg Rectal Daily PRN Jag Abbott MD    cholecalciferol 1,000 Units Per PEG Tube QPM Jag Abbott MD    vitamin B-12 1,000 mcg Per PEG Tube QPM Jag Abbott MD    dextrose 40 mL/hr Intravenous Continuous Mellisa Hawley MD Last Rate: 40 mL/hr (03/08/20 1309)   digoxin 125 mcg Per PEG Tube Daily Jag Abbott MD    folic acid 5,785 mcg Per PEG Tube Daily Jag Abbott MD    gabapentin 300 mg Per PEG Tube Daily Jag Abbott MD    HYDROmorphone 0 5 mg Intravenous Q4H PRN Jag Abbott MD    insulin lispro 1-5 Units Subcutaneous Q6H [facility] Luana Alexander MD    Labetalol HCl 10 mg Intravenous Q6H PRN Poppy Vasquez PA-C    metoprolol tartrate 25 mg Per PEG Tube Q12H [facility] Jag Abbott MD    polyethylene glycol 17 g Oral Daily Jag Abbott MD    primidone 50 mg Per PEG Tube Q12H [facility] Jag Abbott MD         Today, Patient Was Seen By: Mellisa Hawley MD    ** Please Note: Dictation voice to text software may have been used in the creation of this document   **

## 2020-03-09 NOTE — PHYSICAL THERAPY NOTE
Physical Therapy Treatment Note   Patient Name: Racheal Echeverria    UOKYF'H Date: 3/9/2020     Problem List  Principal Problem:    Aphasia  Active Problems:    Wheelchair dependence    Paroxysmal atrial fibrillation ([facility] )    Spinal cord stimulator status    Overactive bladder    Essential hypertension    Cervical dystonia    Stroke-like symptoms    Weakness of both lower extremities    Dysarthria    Facial droop    Oropharyngeal dysphagia    Pleural effusion, left    Abnormal CT of the chest    Goals of care, counseling/discussion    Moderate protein-calorie malnutrition (HCC)       Past Medical History  Past Medical History:   Diagnosis Date    [facility]fib [facility]     Arthritis     Assistance needed for mobility     pt can stand with assistance and transfer    Chronic pain disorder     Diabetes mellitus ([facility] )     NIDDM    Full dentures     History of transfusion     no adverse reaction    Hyperlipidemia     Irregular heart beat     Numbness and tingling of both feet     Skin abnormality     sacrum area - small red area, less than a dime size    Spinal stenosis     Stroke [facility]     Unable to ambulate     Urinary incontinence     ipg stimulator x3 repakcements,battery exchange today 2/14/2018    Wears glasses     Wheelchair dependence         Past Surgical History  Past Surgical History:   Procedure Laterality Date    BACK SURGERY      fusion    BLADDER SUSPENSION      CARPAL TUNNEL RELEASE Bilateral     CHOLANGIOGRAM N/A 6/4/2018    Procedure: CHOLANGIOGRAM;  Surgeon: Sofiya Soriano MD;  [facility];  Service: General    CHOLECYSTECTOMY LAPAROSCOPIC N/A 6/4/2018    Procedure: CHOLECYSTECTOMY LAPAROSCOPIC;  Surgeon: Sofiya Soriano MD;  [facility];  Service: General    COLONOSCOPY      DILATION AND CURETTAGE OF UTERUS      FRACTURE SURGERY Left     femur with hardware    HYSTERECTOMY      Mancel Divine / Sarahann [place] / Lyndle Heaps NEUROSTIMULATOR      JOINT REPLACEMENT Bilateral     knees    SACRAL NERVE STIMULATOR PLACEMENT N/A 2/14/2018    Procedure: REMOVE AND REPLACE IPG;  Surgeon: Teivn Villafuerte MD;  [facility];  Service: UroGynecology           TONSILLECTOMY        03/09/20 5341   Pain Assessment   Pain Assessment 0-10   Pain Score 9   Pain Type Acute pain   Pain Location Abdomen   Hospital Pain Intervention(s) Ambulation/increased activity; Emotional support;Distraction   Response to Interventions Tolerated with seated OOB   Restrictions/Precautions   Weight Bearing Precautions Per Order No   Other Precautions Cognitive; Chair Alarm; Bed Alarm;Telemetry; Fall Risk;Multiple lines;Pain   Cognition   Overall Cognitive Status Impaired   Arousal/Participation Alert; Responsive   Attention Attends with cues to redirect   Orientation Level Oriented to person;Oriented to place   Memory Decreased recall of precautions   Following Commands Follows one step commands with increased time or repetition   Comments Pt is pleasant and motiviated  However fatigues easily with activity  Bed Mobility   Supine to Sit 2  Maximal assistance   Additional items Assist x 2; Increased time required;Verbal cues   Sit to Supine 2  Maximal assistance   Additional items Assist x 2; Increased time required;Verbal cues   Additional Comments Required max A x 2 for BLE management and UB support  Able to initiate moving RLE off the EOB  Increased time required to complete  Transfers   Additional Comments Not appropriate at this time; Concluded session with patient in chair posistion in bed (alarm on), if tolerates well, may perform slide transfer to chair   RN notified   Balance   Static Sitting Poor   Dynamic Sitting Poor -   Endurance Deficit   Endurance Deficit Yes   Endurance Deficit Description fatigue   Activity Tolerance   Activity Tolerance Patient limited by fatigue;Patient limited by pain   Medical Staff Made Aware THOMAS Perry   Nurse Made Aware Yes, RN [facility]   Exercises   Balance training  Static/dynamic sitting balance approx ~15-20 minutes; required VC and physical assistance for upright head/trunk posture  Only able to tolerate upright head posture for approx 30 seconds before lowering it down into neck flexion  Performed approx ~8 weight shifts onto RUE, able to initate movement, but mod A x 1 to maintain balance and complete weight shifting  Weight shifting performed for facilitation of RUE strength  Assessment   Prognosis Fair   Problem List Decreased strength;Decreased endurance; Impaired balance;Decreased mobility; Decreased cognition;Decreased coordination;Pain   Assessment Patient seen for physical therapy session with a focus on bed mobility, sitting balance, activity tolerance, and education  Pt received supine in bed and agreeable to PT session  Pt grateful with sitting EOB  Patient's daughter present during session  Improvement noted in volume of speech and participation  Pt wishes to sit OOB (pt normally uses a sit to stand lift for OOB mobility  Positioned patient in seated position in bed, RN notified ; if tolerates well plan to do slide transfer next session into recliner  Tolerated sitting EOB approx 15 minutes, required constant VC for upright head posture and upright trunk position (mild retropulsion lean)  Improved sitting balance when LUE is positioned on top two wedges  Performed lateral weight shift to the left, able to initiate approx ~10% of weight onto RUE and back to mid-line; requires physical assistance to complete weight shift and maintain balance  Recommend STR upon discharge once medically stable  Goals remain appropriate with extended time frame  Barriers to Discharge Decreased caregiver support; Inaccessible home environment   Goals   Patient Goals To sit in a chair   STG Expiration Date 03/23/20   Plan   Treatment/Interventions Functional transfer training;LE strengthening/ROM; Therapeutic exercise; Endurance training;Cognitive reorientation;Patient/family training;Bed mobility   Progress Slow progress, decreased activity tolerance   PT Frequency 2-3x/wk   Recommendation   Recommendation Post acute IP rehab   Equipment Recommended Walker; Wheelchair  (hospital bed, abraham lift)   PT - OK to Discharge Yes  (to reahb once medically stable)       Era Velazquez PT, DPT

## 2020-03-09 NOTE — PLAN OF CARE
Problem: PHYSICAL THERAPY ADULT  Goal: Performs mobility at highest level of function for planned discharge setting  See evaluation for individualized goals  Description  Treatment/Interventions: Functional transfer training, LE strengthening/ROM, Therapeutic exercise, Endurance training, Cognitive reorientation, Patient/family training, Bed mobility, Gait training  Equipment Recommended: Rosa Gregory, Wheelchair       See flowsheet documentation for full assessment, interventions and recommendations  Outcome: Progressing  Note:   Prognosis: Fair  Problem List: Decreased strength, Decreased endurance, Impaired balance, Decreased mobility, Decreased cognition, Decreased coordination, Pain  Assessment: Patient seen for physical therapy session with a focus on bed mobility, sitting balance, activity tolerance, and education  Pt received supine in bed and agreeable to PT session  Pt grateful with sitting EOB  Patient's daughter present during session  Improvement noted in volume of speech and participation  Pt wishes to sit OOB (pt normally uses a sit to stand lift for OOB mobility  Positioned patient in seated position in bed, RN notified ; if tolerates well plan to do slide transfer next session into recliner  Tolerated sitting EOB approx 15 minutes, required constant VC for upright head posture and upright trunk position (mild retropulsion lean)  Improved sitting balance when LUE is positioned on top two wedges  Performed lateral weight shift to the left, able to initiate approx ~10% of weight onto RUE and back to mid-line; requires physical assistance to complete weight shift and maintain balance  Recommend STR upon discharge once medically stable  Goals remain appropriate with extended time frame     Barriers to Discharge: Decreased caregiver support, Inaccessible home environment     Recommendation: Post acute IP rehab     PT - OK to Discharge: Yes(to reahb once medically stable)    See flowsheet documentation for full assessment

## 2020-03-09 NOTE — PLAN OF CARE
Problem: Potential for Falls  Goal: Patient will remain free of falls  Description  INTERVENTIONS:  - Assess patient frequently for physical needs  -  Identify cognitive and physical deficits and behaviors that affect risk of falls    -  [place] fall precautions as indicated by assessment   - Educate patient/family on patient safety including physical limitations  - Instruct patient to call for assistance with activity based on assessment  - Modify environment to reduce risk of injury  - Consider OT/PT consult to assist with strengthening/mobility  Outcome: Progressing     Problem: Prexisting or High Potential for Compromised Skin Integrity  Goal: Skin integrity is maintained or improved  Description  INTERVENTIONS:  - Identify patients at risk for skin breakdown  - Assess and monitor skin integrity  - Assess and monitor nutrition and hydration status  - Monitor labs   - Assess for incontinence   - Turn and reposition patient  - Assist with mobility/ambulation  - Relieve pressure over bony prominences  - Avoid friction and shearing  - Provide appropriate hygiene as needed including keeping skin clean and dry  - Evaluate need for skin moisturizer/barrier cream  - Collaborate with interdisciplinary team   - Patient/family teaching  - Consider wound care consult   Outcome: Progressing     Problem: DISCHARGE PLANNING  Goal: Discharge to home or other facility with appropriate resources  Description  INTERVENTIONS:  - Identify barriers to discharge w/patient and caregiver  - Arrange for needed discharge resources and transportation as appropriate  - Identify discharge learning needs (meds, wound care, etc )  - Arrange for interpretive services to assist at discharge as needed  - Refer to Case Management Department for coordinating discharge planning if the patient needs post-hospital services based on physician/advanced practitioner order or complex needs related to functional status, cognitive ability, or social support system  Outcome: Progressing     Problem: NEUROSENSORY - ADULT  Goal: Achieves stable or improved neurological status  Description  INTERVENTIONS  - Monitor and report changes in neurological status  - Monitor vital signs such as temperature, blood pressure, glucose, and any other labs ordered   - Initiate measures to prevent increased intracranial pressure  - Monitor for seizure activity and implement precautions if appropriate      Outcome: Progressing  Goal: Achieves maximal functionality and self care  Description  INTERVENTIONS  - Monitor swallowing and airway patency with patient fatigue and changes in neurological status  - Encourage and assist patient to increase activity and self care  - Encourage visually impaired, hearing impaired and aphasic patients to use assistive/communication devices  Outcome: Progressing     Problem: Nutrition/Hydration-ADULT  Goal: Nutrient/Hydration intake appropriate for improving, restoring or maintaining nutritional needs  Description  Monitor and assess patient's nutrition/hydration status for malnutrition  Collaborate with interdisciplinary team and initiate plan and interventions as ordered  Monitor patient's weight and dietary intake as ordered or per policy  Utilize nutrition screening tool and intervene as necessary  Determine patient's food preferences and provide high-protein, high-caloric foods as appropriate       INTERVENTIONS:  - Monitor oral intake, urinary output, labs, and treatment plans  - Assess nutrition and hydration status and recommend course of action  - Evaluate amount of meals eaten  - Assist patient with eating if necessary   - Allow adequate time for meals  - Recommend/ encourage appropriate diets, oral nutritional supplements, and vitamin/mineral supplements  - Order, calculate, and assess calorie counts as needed  - Recommend, monitor, and adjust tube feedings and TPN/PPN based on assessed needs  - Assess need for intravenous fluids  - Provide specific nutrition/hydration education as appropriate  - Include patient/family/caregiver in decisions related to nutrition  Outcome: Progressing     Problem: CONFUSION/THOUGHT DISTURBANCE  Goal: Thought disturbances (confusion, delirium, depression, dementia or psychosis) are managed to maintain or return to baseline mental status and functional level  Description  INTERVENTIONS:  - Assess for possible contributors to  thought disturbance, including but not limited to medications, infection, impaired vision or hearing, underlying metabolic abnormalities, dehydration, respiratory compromise,  psychiatric diagnoses and notify attending PHYSICAN/AP  - Monitor and intervene to maintain adequate nutrition, hydration, elimination, sleep and activity  - Decrease environmental stimuli, including noise as appropriate  - Provide frequent contacts to provide refocusing, direction and reassurance as needed  Approach patient calmly with eye contact and at their level    - [place] high risk fall precautions, aspiration precautions and other safety measures, as indicated  - If delirium suspected, notify physician/AP of change in condition and request immediate in-person evaluation  - Pursue consults as appropriate including Geriatric (campus dependent), OT for cognitive evaluation/activity planning, psychiatric, pastoral care, etc   Outcome: Progressing     Problem: SAFETY,RESTRAINT: NV/NON-SELF DESTRUCTIVE BEHAVIOR  Goal: Remains free of harm/injury (restraint for non violent/non self-detsructive behavior)  Description  INTERVENTIONS:  - Instruct patient/family regarding restraint use   - Assess and monitor physiologic and psychological status   - Provide interventions and comfort measures to meet assessed patient needs   - Identify and implement measures to help patient regain control  - Assess readiness for release of restraint   Outcome: Progressing  Goal: Returns to optimal restraint-free functioning  Description  INTERVENTIONS:  - Assess the patient's behavior and symptoms that indicate continued need for restraint  - Identify and implement measures to help patient regain control  - Assess readiness for release of restraint   Outcome: Progressing

## 2020-03-09 NOTE — TELEPHONE ENCOUNTER
[facility]- she states that the patient's [facility] [employer] [facility] insurance has termed as of 12/31/19  She states unless the patient has other pharmacy benefit information they would be unable to fill [facility] through [facility] because they are not contracted to fill with [facility]  Called and spoke with the patient's daughter- she states he mom is currently in the hospital  Her Botox appointment has been canceled for now and will be rescheduled once she is better  She is asking me to hold off on ordering Botox until that appointment is rescheduled  She states the patient has been in and out of the hospital since 2/4/20  She is not aware if she got new pharmacy benefit insurance but will check with her sister  I provided her with the direct line and phone number to call me back if she is able to obtain that information  75 Jessica Beltran- spoke with Lorraine Somers I advised him that the patient is currently in the hospital and needed to cancel her appointment  Patient's Botox order is on hold for now  Will call to initiate the order once she is better and an appointment has been scheduled

## 2020-03-09 NOTE — UTILIZATION REVIEW
Continued Stay Review    Date:           3-8-20             Current Patient Class: inpatient  Current Level of Care: medical surgical     HPI:84 y o  female initially admitted on 1-19-10  For  Aphasia, protein calorie malnutrition s/p peg     Assessment/Plan:   Discussion with daughters about overall guarded prognosis  Palliative care concepts continue  PT and OT continue to treat for functional deficits  Continue iv fluids         03/07/20 0700 - 03/08/20 0659 03/08/20 0700 - 03/09/20 0659 03/09/20 0700 - 03/10/20 0659   Intake (ml) 1240 1240 1100   Output (ml) 383 755 572   Net (ml) 857 485 528       Pertinent Labs/Diagnostic Results:     Results from last 7 days   Lab Units 03/05/20  0527 03/04/20  0505 03/03/20  0536   WBC Thousand/uL 7 42 6 59 6 31   HEMOGLOBIN g/dL 10 6* 10 1* 10 7*   HEMATOCRIT % 33 2* 31 5* 33 2*   PLATELETS Thousands/uL 240 225 232   NEUTROS ABS Thousands/µL 4 95  --   --          Results from last 7 days   Lab Units 03/08/20  0517 03/04/20  0505 03/03/20  0536   SODIUM mmol/L 140 142 143   POTASSIUM mmol/L 3 7 3 7 4 0   CHLORIDE mmol/L 105 111* 114*   CO2 mmol/L 30 27 23   ANION GAP mmol/L 5 4 6   BUN mg/dL 11 8 8   CREATININE mg/dL 0 38* 0 43* 0 43*   EGFR ml/min/1 73sq m 98 94 94   CALCIUM mg/dL 8 4 8 5 8 4   MAGNESIUM mg/dL  --  1 8  --    PHOSPHORUS mg/dL  --  2 5  --      Results from last 7 days   Lab Units 03/04/20  0505   AST U/L 24   ALT U/L 17   ALK PHOS U/L 90   TOTAL PROTEIN g/dL 5 6*   ALBUMIN g/dL 1 7*   TOTAL BILIRUBIN mg/dL 0 42     Results from last 7 days   Lab Units 03/09/20  1229 03/09/20  0639 03/09/20  0023 03/08/20  1816 03/08/20  1202 03/08/20  0550 03/07/20  2343 03/07/20  1809 03/07/20  1223 03/07/20  0540 03/07/20  0055 03/06/20  1836   POC GLUCOSE mg/dl 174* 102 110 113 129 99 113 120 77 68 73 83     Results from last 7 days   Lab Units 03/08/20  0517 03/04/20  0505 03/03/20  0536   GLUCOSE RANDOM mg/dL 112 143* 112       Results from last 7 days   Lab Units 03/05/20  0527   PROCALCITONIN ng/ml 0 05         Results from last 7 days   Lab Units 03/05/20  1518   GRAM STAIN RESULT  No Polys or Bacteria seen               Vital Signs:       03/08/20 21:42:12  96 8 °F (36 °C)  Abnormal   70  16  126/60  82  95 %       03/08/20 14:57:45  96 6 °F (35 9 °C)  Abnormal   62  16  158/66  97  98 %       03/08/20 08:28:54  97 °F (36 1 °C)  Abnormal   62    164/67  99  99 %                 Medications:   Scheduled Medications:    Medications:  amLODIPine 5 mg Per PEG Tube Daily   apixaban 5 mg Per PEG Tube BID   atorvastatin 40 mg Per PEG Tube QPM   cholecalciferol 1,000 Units Per PEG Tube QPM   vitamin B-12 1,000 mcg Per PEG Tube QPM   digoxin 125 mcg Per PEG Tube Daily   folic acid 8,600 mcg Per PEG Tube Daily   gabapentin 300 mg Per PEG Tube Daily   insulin lispro 1-5 Units Subcutaneous Q6H [facility]   metoprolol tartrate 25 mg Per PEG Tube Q12H [facility]   polyethylene glycol 17 g Oral Daily   primidone 50 mg Per PEG Tube Q12H [facility]     Continuous IV Infusions:    dextrose 40 mL/hr Intravenous Continuous     PRN Meds:    acetaminophen 650 mg Rectal Q4H PRN   bisacodyl 10 mg Rectal Daily PRN   HYDROmorphone 0 5 mg Intravenous Q4H PRN   Labetalol HCl 10 mg Intravenous Q6H PRN       Discharge Plan: to be determined     Network Utilization Review Department  [email or web address]  ATTENTION: Please call with any questions or concerns to [phone] and carefully listen to the prompts so that you are directed to the right person  All voicemails are confidential   Asia Chen all requests for admission clinical reviews, approved or denied determinations and any other requests to dedicated fax number below belonging to the campus where the patient is receiving treatment   List of dedicated fax numbers for the Facilities:  FACILITY NAME UR FAX NUMBER   ADMISSION DENIALS (Administrative/Medical Necessity) [phone]   [address] (Maternity/NICU/Pediatrics) [place] [phone]   [facility] [phone]   Alexandro Hodge [phone]   [facility] [place] [address] [phone]   [facility]  [phone]   [address] [facility]  [address] [facility] [address] [facility] [phone]

## 2020-03-09 NOTE — OCCUPATIONAL THERAPY NOTE
Occupational Therapy Re-Evaluation     Patient Name: Ahmet PEREZ Date: 3/9/2020  Problem List  Principal Problem:    Aphasia  Active Problems:    Wheelchair dependence    Paroxysmal atrial fibrillation ([facility] [address] )    Spinal cord stimulator status    Overactive bladder    Essential hypertension    Cervical dystonia    Stroke-like symptoms    Weakness of both lower extremities    Dysarthria    Facial droop    Oropharyngeal dysphagia    Pleural effusion, left    Abnormal CT of the chest    Goals of care, counseling/discussion    Moderate protein-calorie malnutrition (HCC)    Past Medical History  Past Medical History:   Diagnosis Date    A[facility]fib [facility] )     Arthritis     Assistance needed for mobility     pt can stand with assistance and transfer    Chronic pain disorder     Diabetes mellitus ([facility] )     NIDDM    Full dentures     History of transfusion     no adverse reaction    Hyperlipidemia     Irregular heart beat     Numbness and tingling of both feet     Skin abnormality     sacrum area - small red area, less than a dime size    Spinal stenosis     Stroke ([facility] )     Unable to ambulate     Urinary incontinence     ipg stimulator x3 repakcements,battery exchange today 2/14/2018    Wears glasses     Wheelchair dependence      Past Surgical History  Past Surgical History:   Procedure Laterality Date    BACK SURGERY      fusion    BLADDER SUSPENSION      CARPAL TUNNEL RELEASE Bilateral     CHOLANGIOGRAM N/A 6/4/2018    Procedure: CHOLANGIOGRAM;  Surgeon: Fani Taylor MD;  [facility];  Service: General    CHOLECYSTECTOMY LAPAROSCOPIC N/A 6/4/2018    Procedure: CHOLECYSTECTOMY LAPAROSCOPIC;  Surgeon: Fani Taylor MD;  [facility];  Service: General    COLONOSCOPY      DILATION AND CURETTAGE OF UTERUS      FRACTURE SURGERY Left     femur with hardware    HYSTERECTOMY      INSERTION / PLACEMENT / REVISION NEUROSTIMULATOR      JOINT REPLACEMENT Bilateral     knees  SACRAL NERVE STIMULATOR PLACEMENT N/A 2/14/2018    Procedure: REMOVE AND REPLACE IPG;  Surgeon: Sallie Fang MD;  [facility];  Service: UroGynecology           TONSILLECTOMY           03/09/20 1400   Note Type   Note type   (Re-eval/treat)   Restrictions/Precautions   Weight Bearing Precautions Per Order No   Other Precautions Cognitive; Chair Alarm; Bed Alarm;Multiple lines;Telemetry; Fall Risk;O2;Visual impairment;Aspiration   Pain Assessment   Pain Assessment 0-10   Pain Score 9   Pain Type Acute pain   Pain Location Abdomen   Pain Orientation Bilateral   Hospital Pain Intervention(s) Distraction; Emotional support; Ambulation/increased activity   Home Living   Additional Comments see initial OT evaluation   Prior Function   Comments see initial OT evaluation   Lifestyle   Autonomy Pt reports that she was able to feed herself once food was cut, completes grooming I'ly  Pt required assistance with dressing and bathing  Pt typically stands with STS lift and assist   Pt has a pwc, however unable to operate it I'ly  Reciprocal Relationships supportive daughter, but difficulty caring for pt at home   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   Intrinsic Gratification liked being active outside   [id number] "It feels so good to sit up  I want to sit in a big chair"  L UE: able to move all fingers into gross grasp with increased time, very weak  Elbow 2-/5 and spastic, shoulder 2-/5   ADL   Eating Assistance 2  Maximal Assistance   Grooming Assistance 4  Minimal Assistance   Grooming Deficit Brushing hair   UB Bathing Assistance 3  Moderate Assistance   LB Bathing Assistance 2  Maximal Assistance   UB Dressing Assistance 2  Maximal Assistance   LB Dressing Assistance 1  Total Assistance   LB Dressing Deficit Don/doff L sock; Don/doff R sock   Toileting Assistance  1  Total Assistance   Bed Mobility   Supine to Sit 2  Maximal assistance Additional items Assist x 2; Increased time required;LE management;Verbal cues   Sit to Supine 2  Maximal assistance   Additional items Assist x 2; Increased time required;Verbal cues;LE management   Transfers   Additional Comments not appropriate at this time  May be appropriate to slide to recliner tomorrow pending tolerance of chair position in bed today   Balance   Static Sitting Poor   Dynamic Sitting Poor -   Activity Tolerance   Activity Tolerance Patient limited by fatigue   Medical Staff Made Aware PT Thai Mendez CM   Nurse Made Aware Pt cleared by TAYLOR Mcdonald for OT session   RUE Assessment   RUE Assessment   (4/5 grossly)   LUE Assessment   LUE Assessment   (see subjective)   Hand Function   Gross Motor Coordination   (impaired b/l)   Sensation   Light Touch Partial deficits in the LUE;Partial deficits in the LLE   Vision-Basic Assessment   Current Vision Wears glasses only for reading   Vision - Complex Assessment   Additional Comments Suspect possible L visual field cut/homonymous hemianopsia vs L sided neglect  Plan to complete formal vision assessment during future OT session  Perception   Inattention/Neglect Cues to attend left visual field;Cues to attend to left side of body;Cues to maintain midline in sitting   Cognition   Overall Cognitive Status Impaired   Arousal/Participation Alert; Responsive   Attention Attends with cues to redirect   Memory Decreased recall of precautions   Following Commands Follows one step commands with increased time or repetition   Comments Pt is pleasant and motivated  Requires cues for attention (increasingly so as pt fatigues)  Pt is increasingly verbal but cognitively fatigues quickly   Assessment   Limitation Decreased ADL status; Decreased UE ROM; Decreased UE strength;Decreased Safe judgement during ADL;Decreased cognition;Decreased endurance;Decreased sensation;Visual deficit; Decreased fine motor control;Decreased self-care trans;Decreased high-level ADLs; Non-func L UE   Prognosis Fair   Assessment Pt is seen for OT re-evaluation 3/9/20 due to goals expiring  See initial OT evaluation for home set-up and PLOF  Upon initial OT evaluation, pt was functioning at a maxA-totalA level with ADLs and bed mobility  Pt has made significant progress during her OT POC, and is currently demonstrating the following occupational deficits: eating with maxA, grooming with Eunice, UB bathing with modA, LB bathing with maxA, UB dressing with maxA, LB dressing with totalA, toileting with totalA, bed mobility with maxAx2  Pt has made significant improvements in neck/truck postural strength, alertness, improved cognition, improved dynamic sitting balance and increased endurance that are not yet reflected in her scores, but are setting a good foundation for continued functional improvement  Pt continues to function below her baseline, and is to continue to benefit from skilled occupational therapy services while in the hospital to maximize functioning and independence with daily activities  See below for OT goals to be addressed 3-5x/wk  Goals   Patient Goals to see her dog Caden (dtr brought vet papers and therapist gave papers to TAYLOR Bai at this date)   Plan   Treatment Interventions ADL retraining;Visual perceptual retraining;Functional transfer training;UE strengthening/ROM; Endurance training;Cognitive reorientation;Patient/family training;Equipment evaluation/education; Neuromuscular reeducation; Fine motor coordination activities; Compensatory technique education;Continued evaluation; Activityengagement   Goal Expiration Date 03/25/20   OT Treatment Day 4   OT Frequency 3-5x/wk   Additional Treatment Session   Start Time 1330   End Time 9885   Treatment Assessment Pt is seen for OT session following OT re-evaluation including interventions to: facilitate WBing through hemiparetic L UE, improve trunk/postrual strength, increase endurance, improve dynamic sitting balance, increase attention to L side, facilitate dynamic reaching across midline, and cognitive reorientation  Pt is motivated to participate and requesting to sit in a recliner at this date  Spoke with pt and pt's daughter about trialing chair position in bed today to determine if patient will be appropriate and safe to sit in a recliner  Pt sat EOB for ~25 minutes while completing a dynamic table top activity  Initially pt's L elbow propped on 2 wedges and pt sits with CGA and cues for neck positioning (head down)  Pt is able to lift her head into neutral for ~10 seconds at a time, however fades with fatigue  Pt able to separate red and black game pieces with increase time and min cues for attention  Pt reaches across midline with R UE and laterally leans to the L (onto L UE propped on elbow with 1 wedge) to retrieve connect 4 pieces presented to her on the L  Pt noted to require max VCs and visual cues to locate items presented on the L, and minimal visual tracking to the L  Pt also noted to demonstrate difficulty coordinating movements with R UE  Pt to benefit from line bisection activity and formal vision screen during future OT sessions  Pt requires min-modA to maintain upright positioning when propped with 1 wedge, and increasing assistance as she fatigues  Pt is able to grasp styrofoam cup and bring to mouth to drink from straw with increased time (overshoots mouth but able to self correct)  Pt left in chair position in bed with all needs within reach, bed alarm activated, L UE elevated on pillow and pt's neck/trunk positioned in neutral   Spoke with RN about trialing chair position for ~15 min, and recommended positioning (L UE elevated, trunk/neck in neutral with pillows, and tray table to R so pt can reach her items)       Additional Treatment Day 5   Recommendation   OT Discharge Recommendation Short Term Rehab   OT - OK to Discharge Yes  (to STR when medically stable)   Barthel Index   Feeding 0   Bathing 0   Grooming Score 0   Dressing Score 0   Bladder Score 0   Bowels Score 0   Toilet Use Score 0   Transfers (Bed/Chair) Score 5   Mobility (Level Surface) Score 0   Stairs Score 0   Barthel Index Score 5     Goals:    Pt will attend to a functional activity for at least 20 minutes with no more than 2 cues for attention/redirection  Pt will improve activity tolerance to G for min 60 min txment sessions for increase engagement in functional tasks  Pt will complete UB self care tasks w/ SBA w/ use of DME/AD/one handed compensatory strategies as appropriate  Pt will demonstrate G carryover of pt/caregiver education and training as appropriate w/o cues w/ good tolerance to increase safety during functional tasks     Pt will maintain sitting upright with SBA for at least 10 minutes while completing a dynamic functional activity with good balance and endurance  Pt will engage in depression screen/leisure interest checklist w/ mod I and G participation to monitor s/s depression and ID 3 positive coping strategies to A w/ emotional regulation and management      Pt will tolerate mod manual NDT facilitation t/o hemibody during fx'l I/ADL/leisure tasks w/ mod I to improve fx'l engagement increase neuroplastic recovery    Pt will engage in ongoing  assessments, screens, and activities t/o fx'l I/ADL/leisure tasks w/ G participation and mod I to A w/ adaptation and accomodations or rule out visual perceptual impairments    Salvador Herrera, MOT, OTR/L

## 2020-03-09 NOTE — ASSESSMENT & PLAN NOTE
Overall guarded prognosis given multiple comorbidities  Discussed with palliative care, pulmonary  Overall guarded prognosis explained to the family at the multi disciplinary meeting  Discussed with daughter Marina Horne at bedside and with Michael Rosario over phone in detail, overall guarded prognosis explained  Discussed with palliative care and pulmonary

## 2020-03-09 NOTE — PLAN OF CARE
Problem: OCCUPATIONAL THERAPY ADULT  Goal: Performs self-care activities at highest level of function for planned discharge setting  See evaluation for individualized goals  Description  Treatment Interventions: ADL retraining, Visual perceptual retraining, Functional transfer training, UE strengthening/ROM, Endurance training, Cognitive reorientation, Patient/family training, Equipment evaluation/education, Compensatory technique education, Continued evaluation, Activityengagement          See flowsheet documentation for full assessment, interventions and recommendations  Note:   Limitation: Decreased ADL status, Decreased UE ROM, Decreased UE strength, Decreased Safe judgement during ADL, Decreased cognition, Decreased endurance, Decreased sensation, Visual deficit, Decreased fine motor control, Decreased self-care trans, Decreased high-level ADLs, Non-func L UE  Prognosis: Fair  Assessment: Pt is seen for OT re-evaluation 3/9/20 due to goals expiring  See initial OT evaluation for home set-up and PLOF  Upon initial OT evaluation, pt was functioning at a maxA-totalA level with ADLs and bed mobility  Pt has made significant progress during her OT POC, and is currently demonstrating the following occupational deficits: eating with maxA, grooming with Eunice, UB bathing with modA, LB bathing with maxA, UB dressing with maxA, LB dressing with totalA, toileting with totalA, bed mobility with maxAx2  Pt has made significant improvements in neck/truck postural strength, alertness, improved cognition, improved dynamic sitting balance and increased endurance that are not yet reflected in her scores, but are setting a good foundation for continued functional improvement  Pt continues to function below her baseline, and is to continue to benefit from skilled occupational therapy services while in the hospital to maximize functioning and independence with daily activities    See below for OT goals to be addressed 3-5x/wk       OT Discharge Recommendation: Short Term Rehab  OT - OK to Discharge: Yes(to STR when medically stable)

## 2020-03-09 NOTE — TELEPHONE ENCOUNTER
[address] Beltran- spoke with Sang Serum- she states the patient's Botox order is still in insurance verification  She states this is still in with the pharmacy team no one from [facility] has worked on this order yet

## 2020-03-10 LAB
GLUCOSE SERPL-MCNC: 111 MG/DL (ref 65–140)
GLUCOSE SERPL-MCNC: 132 MG/DL (ref 65–140)
GLUCOSE SERPL-MCNC: 159 MG/DL (ref 65–140)
GLUCOSE SERPL-MCNC: 172 MG/DL (ref 65–140)
GLUCOSE SERPL-MCNC: 187 MG/DL (ref 65–140)

## 2020-03-10 PROCEDURE — 99232 SBSQ HOSP IP/OBS MODERATE 35: CPT | Performed by: INTERNAL MEDICINE

## 2020-03-10 PROCEDURE — 97530 THERAPEUTIC ACTIVITIES: CPT

## 2020-03-10 PROCEDURE — 97535 SELF CARE MNGMENT TRAINING: CPT

## 2020-03-10 PROCEDURE — 82948 REAGENT STRIP/BLOOD GLUCOSE: CPT

## 2020-03-10 RX ADMIN — PRIMIDONE 50 MG: 50 TABLET ORAL at 08:54

## 2020-03-10 RX ADMIN — INSULIN LISPRO 1 UNITS: 100 INJECTION, SOLUTION INTRAVENOUS; SUBCUTANEOUS at 18:01

## 2020-03-10 RX ADMIN — APIXABAN 5 MG: 5 TABLET, FILM COATED ORAL at 08:55

## 2020-03-10 RX ADMIN — ATORVASTATIN CALCIUM 40 MG: 40 TABLET, FILM COATED ORAL at 18:01

## 2020-03-10 RX ADMIN — MELATONIN 1000 UNITS: at 18:01

## 2020-03-10 RX ADMIN — POLYETHYLENE GLYCOL 3350 17 G: 17 POWDER, FOR SOLUTION ORAL at 08:53

## 2020-03-10 RX ADMIN — PRIMIDONE 50 MG: 50 TABLET ORAL at 21:33

## 2020-03-10 RX ADMIN — METOPROLOL TARTRATE 25 MG: 25 TABLET, FILM COATED ORAL at 21:33

## 2020-03-10 RX ADMIN — DIGOXIN 125 MCG: 125 TABLET ORAL at 08:55

## 2020-03-10 RX ADMIN — GABAPENTIN 300 MG: 250 SOLUTION ORAL at 08:54

## 2020-03-10 RX ADMIN — METOPROLOL TARTRATE 25 MG: 25 TABLET, FILM COATED ORAL at 08:55

## 2020-03-10 RX ADMIN — HYDROMORPHONE HYDROCHLORIDE 0.5 MG: 1 INJECTION, SOLUTION INTRAMUSCULAR; INTRAVENOUS; SUBCUTANEOUS at 21:33

## 2020-03-10 RX ADMIN — FOLIC ACID 1000 MCG: 1 TABLET ORAL at 08:55

## 2020-03-10 RX ADMIN — APIXABAN 5 MG: 5 TABLET, FILM COATED ORAL at 18:01

## 2020-03-10 RX ADMIN — CYANOCOBALAMIN TAB 500 MCG 1000 MCG: 500 TAB at 18:01

## 2020-03-10 RX ADMIN — AMLODIPINE BESYLATE 5 MG: 5 TABLET ORAL at 08:55

## 2020-03-10 NOTE — PROGRESS NOTES
Progress Note - Cha Powers 1935, 80 y o  female MRN: [id number]    Unit/Bed#: [facility] 710-01 Encounter: [id number]    Primary Care Provider: Connor Nick MD   Date and time admitted to hospital: 2/19/2020  8:30 AM        Moderate protein-calorie malnutrition ([address] )  Assessment & Plan  Malnutrition Findings:   Malnutrition type: Acute illness(<75% energy intake versus needs for > 7days due to aspiration risk;dysphagia & PEG placed- held after 24hrs  NPO>7days  Mild fat loss somewhat hollow orbitals& mild muscle loss slight depression temples  Treat with PEG when tolerated&future VBS  )  Degree of Malnutrition: Malnutrition of moderate degree    BMI Findings: Body mass index is 30 73 kg/m²  Goals of care, counseling/discussion  Assessment & Plan  Overall guarded prognosis given multiple comorbidities  Discussed with palliative care, pulmonary  Overall guarded prognosis explained to the family at the multi disciplinary meeting  Discussed with daughter Pao Tobar at bedside and with Regis Sessions over phone in detail, overall guarded prognosis explained  Discussed with palliative care and pulmonary  Still patient is aspirating, family does not want to go with tube feeding and plan for discharge    Abnormal CT of the chest  Assessment & Plan  CT chest reveals left bronchial secretions/mucus plugging  Status post bronchoscopy  Bilateral pleural effusions      Pleural effusion, left  Assessment & Plan  Monitor      Oropharyngeal dysphagia  Assessment & Plan  Patient is now status post PEG tube placement  However patient remains at risk for aspiration  Speech therapy following  Aspiration precautions        Dysarthria  Assessment & Plan  Dysarthria noted on last admission 2/14, neurology consulted and did 24-hour EEG monitoring 2/16  EEG monitoring 2/16-2/17: no focal epileptiform discharges or electrographic seizures    Removed 2/17  Unable to get MRI brain due to spine stimulator  Neurology input noted    Weakness of both lower extremities  Assessment & Plan  Per daughter,patient had difficulty standing up in wheelchair, more so than normal  Chronic bilateral weakness in lower extremities, no significant changes since last admission  Patient wheelchair-bound and needs assistance standing up and transferring in wheelchair  Continue fall precautions  PT OT    Stroke-like symptoms  Assessment & Plan  Continue atorvastatin, Eliquis  Neurology input noted    Cervical dystonia  Assessment & Plan  Patient follows up with neurologist outpatient  continue gabapentin 300mg PO QD, give Tylenol 650 mg p o  Q 6 hours p r n  Stimulator in place    Essential hypertension  Assessment & Plan  Monitor blood pressure   Continue Lopressor, amlodipine  IV labetalol p r n  Avoid hypotension    Paroxysmal atrial fibrillation (HCC)  Assessment & Plan  Continue metoprolol, digoxin  Eliquis anticoagulation      Wheelchair dependence  Assessment & Plan  Patient wheelchair-bound and needs assistance with transferring from wheelchair    * [facility]  EEG monitoring 2/16-2/17: demonstrated no epileptiform activity or epileptogenic focus  Repeat CT scan of head does not demonstrate any acute findings  Neurology input noted           VTE Pharmacologic Prophylaxis:   Pharmacologic: Apixaban (Eliquis)  Mechanical VTE Prophylaxis in Place: Yes    Patient Centered Rounds: I have performed bedside rounds with nursing staff today  Discussions with Specialists or Other Care Team Provider:  Pulmonary and palliative care regarding discharge  Education and Discussions with Family / Patient: patient     Time Spent for Care: 45 minutes  More than 50% of total time spent on counseling and coordination of care as described above      Current Length of Stay: 20 day(s)    Current Patient Status: Inpatient   Certification Statement: The patient will continue to require additional inpatient hospital stay due to      Discharge Plan: possible   Code Status: Level 3 - DNAR and DNI      Subjective:   Patient is doing well  Eating today  She has no questions  Objective:     Vitals:   Temp (24hrs), Av 6 °F (36 4 °C), Min:97 °F (36 1 °C), Max:98 °F (36 7 °C)    Temp:  [97 °F (36 1 °C)-98 °F (36 7 °C)] 97 °F (36 1 °C)  HR:  [68-73] 68  Resp:  [15-16] 15  BP: (105-168)/(60-79) 105/79  SpO2:  [93 %-97 %] 94 %  Body mass index is 30 73 kg/m²  Input and Output Summary (last 24 hours): Intake/Output Summary (Last 24 hours) at 3/10/2020 1909  Last data filed at 3/10/2020 1700  Gross per 24 hour   Intake 830 ml   Output 2300 ml   Net -1470 ml       Physical Exam:     Physical Exam   Constitutional: She is oriented to person, place, and time  She appears well-developed and well-nourished  HENT:   Head: Normocephalic and atraumatic  Right Ear: External ear normal    Left Ear: External ear normal    Nose: Nose normal    Mouth/Throat: Oropharynx is clear and moist  No oropharyngeal exudate  Eyes: Pupils are equal, round, and reactive to light  Conjunctivae and EOM are normal  Right eye exhibits no discharge  Left eye exhibits no discharge  No scleral icterus  Neck: Normal range of motion  Neck supple  No JVD present  No tracheal deviation present  No thyromegaly present  Cardiovascular: Normal rate, regular rhythm, normal heart sounds and intact distal pulses  Exam reveals no gallop and no friction rub  No murmur heard  Pulmonary/Chest: Effort normal and breath sounds normal  No stridor  She has no wheezes  She has no rales  She exhibits no tenderness  Abdominal: Soft  Bowel sounds are normal  She exhibits no distension and no mass  There is no tenderness  There is no rebound and no guarding  No hernia  Musculoskeletal: Normal range of motion  She exhibits no edema, tenderness or deformity  Lymphadenopathy:     She has no cervical adenopathy  Neurological: She is alert and oriented to person, place, and time   She displays normal reflexes  No cranial nerve deficit or sensory deficit  She exhibits normal muscle tone  Coordination normal    Skin: Skin is warm and dry  No rash noted  No erythema  No pallor  Psychiatric: She has a normal mood and affect  Her behavior is normal  Judgment and thought content normal    Nursing note and vitals reviewed  Additional Data:     Labs:    Results from last 7 days   Lab Units 03/05/20  0527   WBC Thousand/uL 7 42   HEMOGLOBIN g/dL 10 6*   HEMATOCRIT % 33 2*   PLATELETS Thousands/uL 240   NEUTROS PCT % 66   LYMPHS PCT % 21   MONOS PCT % 9   EOS PCT % 2     Results from last 7 days   Lab Units 03/08/20  0517 03/04/20  0505   POTASSIUM mmol/L 3 7 3 7   CHLORIDE mmol/L 105 111*   CO2 mmol/L 30 27   BUN mg/dL 11 8   CREATININE mg/dL 0 38* 0 43*   CALCIUM mg/dL 8 4 8 5   ALK PHOS U/L  --  90   ALT U/L  --  17   AST U/L  --  24           * I Have Reviewed All Lab Data Listed Above  * Additional Pertinent Lab Tests Reviewed:  [facility] Admission Reviewed    Imaging:    Imaging Reports Reviewed Today Include:    Imaging Personally Reviewed by Myself Includes:     Recent Cultures (last 7 days):     Results from last 7 days   Lab Units 03/05/20  1518   GRAM STAIN RESULT  No Polys or Bacteria seen       Last 24 Hours Medication List:     Current Facility-Administered Medications:  acetaminophen 650 mg Rectal Q4H PRN Michele Luciano PA-C    amLODIPine 5 mg Per PEG Tube Daily Jose A Mazariegos MD    apixaban 5 mg Per PEG Tube BID Azra Sheets MD    atorvastatin 40 mg Per PEG Tube QPM Azra Sheets MD    bisacodyl 10 mg Rectal Daily PRN Azra Sheets MD    cholecalciferol 1,000 Units Per PEG Tube QPM Azra Sheets MD    vitamin B-12 1,000 mcg Per PEG Tube QPM Azra Sheets MD    dextrose 40 mL/hr Intravenous Continuous Jose A Mazariegos MD Last Rate: 40 mL/hr (03/09/20 1601)   digoxin 125 mcg Per PEG Tube Daily Azra Sheets MD    folic acid 3,702 mcg Per PEG Tube Daily Hunter Elizondo MD    gabapentin 300 mg Per PEG Tube Daily Hunter Elizondo MD    HYDROmorphone 0 5 mg Intravenous Q4H PRN Hunter Elizondo MD    insulin lispro 1-5 Units Subcutaneous Q6H Bibi Esparza MD    insulin lispro 1-5 Units Subcutaneous TID AC Azra Ybarra MD    Labetalol HCl 10 mg Intravenous Q6H PRN Poppy Vasquez PA-C    metoprolol tartrate 25 mg Per PEG Tube Q12H [address] Hunter Elizondo MD    polyethylene glycol 17 g Oral Daily Hunter Elziondo MD    primidone 50 mg Per PEG Tube Q12H [address] Hunter Elizondo MD         Today, Patient Was Seen By: Azra Ybarra MD    ** Please Note: Dictation voice to text software may have been used in the creation of this document   **

## 2020-03-10 NOTE — SOCIAL WORK
Per the request of Dr Chriss Hinds reached out to patient's daughter to review discharge options  Adriana Rose feels that patient is currently medically stable and appropriate for placement  LSW did review the concern of re-hospitalization and [facility] did understand and agreed to placement for rehabilitation  [facility] is agreeable for an insurance authorization to be placed for [facility]  CM informed of Flaca's decision and authorization will be completed  A POLST should be completed with [facility] when she visits this evening, around 5:00  The POLST is discussed with Dr Adriana Rose  Counseling / Coordination of Care  Total floor / unit time spent today 30 minutes  Greater than 50% of total time was spent with the patient and / or family counseling and / or coordination of care   A description of the counseling / coordination of care: support and coordination

## 2020-03-10 NOTE — PLAN OF CARE
Problem: OCCUPATIONAL THERAPY ADULT  Goal: Performs self-care activities at highest level of function for planned discharge setting  See evaluation for individualized goals  Description  Treatment Interventions: ADL retraining, Visual perceptual retraining, Functional transfer training, UE strengthening/ROM, Endurance training, Cognitive reorientation, Patient/family training, Equipment evaluation/education, Compensatory technique education, Continued evaluation, Activityengagement      Progressing:    See flowsheet documentation for full assessment, interventions and recommendations  Note:   Limitation: Decreased ADL status, Decreased UE ROM, Decreased UE strength, Decreased Safe judgement during ADL, Decreased cognition, Decreased endurance, Decreased sensation, Visual deficit, Decreased fine motor control, Decreased self-care trans, Decreased high-level ADLs, Non-func L UE  Prognosis: Fair  Assessment: Patient presents supine in bed agreeable to engage in OT  Patient pleasant and motivated  Patient able to state full name and   Patient able to verbalize name of this [facility]  Patient able to follow one step directions for components of adl/self care tasks  Patient able to reach to face for hygiene  Patient able to bring spoon to mouth with soft meat and gravy with close supervision secondary to aspiration precautions  Nursing with patient for continuation with eating of lunch  Patient with chair pad alarmed and activated  Continue to recommend Short Term Rehab when medically cleared for discharge        OT Discharge Recommendation: Short Term Rehab  OT - OK to Discharge: (when medically cleared)  Pascual Caldwell

## 2020-03-10 NOTE — RESTORATIVE TECHNICIAN NOTE
Restorative Specialist Mobility Note       Activity: Other (Comment), Chair(Educated/encouraged pt to get In/OOB via lateral transfer only with assistance  Assist x4 for lateral transfer back to bed via smooth   Bed alarm on   Pt callbell, phone/tray within reach )     Assistive Device: Other (Comment)(Assist x4 for lateral transfer back to bed from the drop-arm recliner chair via smooth  )         Carl WOODRUFF, Restorative Technician, [employer]

## 2020-03-10 NOTE — PLAN OF CARE
Problem: Potential for Falls  Goal: Patient will remain free of falls  Description  INTERVENTIONS:  - Assess patient frequently for physical needs  -  Identify cognitive and physical deficits and behaviors that affect risk of falls    -  [place] fall precautions as indicated by assessment   - Educate patient/family on patient safety including physical limitations  - Instruct patient to call for assistance with activity based on assessment  - Modify environment to reduce risk of injury  - Consider OT/PT consult to assist with strengthening/mobility  Outcome: Progressing     Problem: Prexisting or High Potential for Compromised Skin Integrity  Goal: Skin integrity is maintained or improved  Description  INTERVENTIONS:  - Identify patients at risk for skin breakdown  - Assess and monitor skin integrity  - Assess and monitor nutrition and hydration status  - Monitor labs   - Assess for incontinence   - Turn and reposition patient  - Assist with mobility/ambulation  - Relieve pressure over bony prominences  - Avoid friction and shearing  - Provide appropriate hygiene as needed including keeping skin clean and dry  - Evaluate need for skin moisturizer/barrier cream  - Collaborate with interdisciplinary team   - Patient/family teaching  - Consider wound care consult   Outcome: Progressing     Problem: DISCHARGE PLANNING  Goal: Discharge to home or other facility with appropriate resources  Description  INTERVENTIONS:  - Identify barriers to discharge w/patient and caregiver  - Arrange for needed discharge resources and transportation as appropriate  - Identify discharge learning needs (meds, wound care, etc )  - Arrange for interpretive services to assist at discharge as needed  - Refer to Case Management Department for coordinating discharge planning if the patient needs post-hospital services based on physician/advanced practitioner order or complex needs related to functional status, cognitive ability, or social support system  Outcome: Progressing     Problem: NEUROSENSORY - ADULT  Goal: Achieves stable or improved neurological status  Description  INTERVENTIONS  - Monitor and report changes in neurological status  - Monitor vital signs such as temperature, blood pressure, glucose, and any other labs ordered   - Initiate measures to prevent increased intracranial pressure  - Monitor for seizure activity and implement precautions if appropriate      Outcome: Progressing  Goal: Achieves maximal functionality and self care  Description  INTERVENTIONS  - Monitor swallowing and airway patency with patient fatigue and changes in neurological status  - Encourage and assist patient to increase activity and self care  - Encourage visually impaired, hearing impaired and aphasic patients to use assistive/communication devices  Outcome: Progressing     Problem: Nutrition/Hydration-ADULT  Goal: Nutrient/Hydration intake appropriate for improving, restoring or maintaining nutritional needs  Description  Monitor and assess patient's nutrition/hydration status for malnutrition  Collaborate with interdisciplinary team and initiate plan and interventions as ordered  Monitor patient's weight and dietary intake as ordered or per policy  Utilize nutrition screening tool and intervene as necessary  Determine patient's food preferences and provide high-protein, high-caloric foods as appropriate       INTERVENTIONS:  - Monitor oral intake, urinary output, labs, and treatment plans  - Assess nutrition and hydration status and recommend course of action  - Evaluate amount of meals eaten  - Assist patient with eating if necessary   - Allow adequate time for meals  - Recommend/ encourage appropriate diets, oral nutritional supplements, and vitamin/mineral supplements  - Order, calculate, and assess calorie counts as needed  - Recommend, monitor, and adjust tube feedings and TPN/PPN based on assessed needs  - Assess need for intravenous fluids  - Provide specific nutrition/hydration education as appropriate  - Include patient/family/caregiver in decisions related to nutrition  Outcome: Progressing     Problem: CONFUSION/THOUGHT DISTURBANCE  Goal: Thought disturbances (confusion, delirium, depression, dementia or psychosis) are managed to maintain or return to baseline mental status and functional level  Description  INTERVENTIONS:  - Assess for possible contributors to  thought disturbance, including but not limited to medications, infection, impaired vision or hearing, underlying metabolic abnormalities, dehydration, respiratory compromise,  psychiatric diagnoses and notify attending PHYSICAN/AP  - Monitor and intervene to maintain adequate nutrition, hydration, elimination, sleep and activity  - Decrease environmental stimuli, including noise as appropriate  - Provide frequent contacts to provide refocusing, direction and reassurance as needed  Approach patient calmly with eye contact and at their level    - [place] high risk fall precautions, aspiration precautions and other safety measures, as indicated  - If delirium suspected, notify physician/AP of change in condition and request immediate in-person evaluation  - Pursue consults as appropriate including Geriatric (campus dependent), OT for cognitive evaluation/activity planning, psychiatric, pastoral care, etc   Outcome: Progressing

## 2020-03-10 NOTE — ASSESSMENT & PLAN NOTE
Overall guarded prognosis given multiple comorbidities  Discussed with palliative care, pulmonary  Overall guarded prognosis explained to the family at the multi disciplinary meeting  Discussed with daughter Wilian León at bedside and with Marlena Landrum over phone in detail, overall guarded prognosis explained  Discussed with palliative care and pulmonary  Still patient is aspirating, family does not want to go with tube feeding and plan for discharge

## 2020-03-10 NOTE — WOUND OSTOMY CARE
3/10/2020 10:31 AM   Patient Tolerance Tolerated well 3/10/2020 10:31 AM         Our skin care recommendations remains as nursing orders, please call ext [phone] or [phone] with questions or concerns, wound care to continue following          Elisabeth Rai RN, BSN, [facility]

## 2020-03-10 NOTE — OCCUPATIONAL THERAPY NOTE
Occupational Therapy Treatment Note:       03/10/20 1252   Restrictions/Precautions   Other Precautions Cognitive; Chair Alarm; Bed Alarm;Aspiration;Multiple lines;Telemetry;O2;Fall Risk;Visual impairment   Pain Assessment   Pain Assessment 0-10   Pain Score No Pain   ADL   Where Assessed Supine, bed   Eating Assistance 3  Moderate Assistance   Eating Deficit Setup;Supervision/safety; Increased time to complete  (constant supervision/aspiration precautions)   Grooming Assistance 3  Moderate Assistance   Grooming Deficit Setup;Verbal cueing; Increased time to complete;Wash/dry hands; Wash/dry face   UB Bathing Assistance 3  Moderate Assistance   LB Bathing Assistance 2  Maximal Assistance   [address]  Maximal [address]  [address]  [address]  [address]; Clothing management up;Clothing management down;Perineal hygiene   Toileting Comments   (purewick)   Bed Mobility   Rolling R 2  Maximal assistance   Additional items Assist x 1;Bedrails; Increased time required;Verbal cues;LE management   Rolling L 2  Maximal assistance   Additional items Assist x 1;Bedrails; Increased time required;Verbal cues;LE management   Cognition   Overall Cognitive Status Impaired   Arousal/Participation Responsive; Cooperative   Attention Attends with cues to redirect   Orientation Level Oriented to person;Oriented to place;Oriented to time   Memory Decreased recall of recent events;Decreased recall of precautions   Following Commands Follows one step commands with increased time or repetition   Comments motivated and pleasant   Activity Tolerance   Activity Tolerance Patient limited by fatigue   Medical Staff Made Aware cleared for OT by RN   Assessment   Assessment Patient presents supine in bed agreeable to engage in OT  Patient pleasant and motivated  Patient able to state full name and    Patient able to verbalize name of this [facility]  Patient able to follow one step directions for components of adl/self care tasks  Patient able to reach to face for hygiene  Patient able to bring spoon to mouth with soft meat and gravy with close supervision secondary to aspiration precautions  Nursing with patient for continuation with eating of lunch  Patient with chair pad alarmed and activated  Continue to recommend Short Term Rehab when medically cleared for discharge  Plan   Treatment Interventions ADL retraining; Endurance training; Compensatory technique education   Goal Expiration Date 03/25/20   OT Treatment Day 5   OT Frequency 3-5x/wk   Recommendation   OT Discharge Recommendation Short Term Rehab   OT - OK to Discharge   (when medically cleared)   Giselle Tuttle

## 2020-03-10 NOTE — PHYSICAL THERAPY NOTE
Physical Therapy Treatment Note     Patient Name: Gigi Valentine    TAXPU'Q Date: 3/10/2020     Problem List  Principal Problem:    Aphasia  Active Problems:    Wheelchair dependence    Paroxysmal atrial fibrillation ([facility] [address] )    Spinal cord stimulator status    Overactive bladder    Essential hypertension    Cervical dystonia    Stroke-like symptoms    Weakness of both lower extremities    Dysarthria    Facial droop    Oropharyngeal dysphagia    Pleural effusion, left    Abnormal CT of the chest    Goals of care, counseling/discussion    Moderate protein-calorie malnutrition (HCC)       Past Medical History  Past Medical History:   Diagnosis Date    A-fib ([facility] [address] )     Arthritis     Assistance needed for mobility     pt can stand with assistance and transfer    Chronic pain disorder     Diabetes mellitus ([facility] [address] )     NIDDM    Full dentures     History of transfusion     no adverse reaction    Hyperlipidemia     Irregular heart beat     Numbness and tingling of both feet     Skin abnormality     sacrum area - small red area, less than a dime size    Spinal stenosis     Stroke ([facility] [address] )     Unable to ambulate     Urinary incontinence     ipg stimulator x3 repakcements,battery exchange today 2/14/2018    Wears glasses     Wheelchair dependence         Past Surgical History  Past Surgical History:   Procedure Laterality Date    BACK SURGERY      fusion    BLADDER SUSPENSION      CARPAL TUNNEL RELEASE Bilateral     CHOLANGIOGRAM N/A 6/4/2018    Procedure: CHOLANGIOGRAM;  Surgeon: Harish Bee MD;  [facility]: [facility];  Service: General    CHOLECYSTECTOMY LAPAROSCOPIC N/A 6/4/2018    Procedure: CHOLECYSTECTOMY LAPAROSCOPIC;  Surgeon: Harish Bee MD;  [facility];  Service: General    COLONOSCOPY      DILATION AND CURETTAGE OF UTERUS      FRACTURE SURGERY Left     femur with hardware    HYSTERECTOMY      Sweetie Leroy / Alanna Hartmann / Shanna Singer NEUROSTIMULATOR      JOINT REPLACEMENT Bilateral     knees    SACRAL NERVE STIMULATOR PLACEMENT N/A 2/14/2018    Procedure: REMOVE AND REPLACE IPG;  Surgeon: Elinor Martini MD;  [facility];  Service: UroGynecology           TONSILLECTOMY        03/10/20 1220   Pain Assessment   Pain Assessment 0-10   Pain Score No Pain   Restrictions/Precautions   Weight Bearing Precautions Per Order No   Other Precautions Fall Risk;Pain;Cognitive; Chair Alarm; Bed Alarm;Multiple lines;Telemetry;O2   General   Chart Reviewed Yes   Family/Caregiver Present No   Cognition   Overall Cognitive Status Impaired   Arousal/Participation Cooperative;Responsive   Attention Attends with cues to redirect   Orientation Level Oriented to person;Oriented to place;Oriented to time   Memory Decreased recall of precautions;Decreased recall of recent events   Following Commands Follows one step commands with increased time or repetition   Comments Requires increased time to respond/process all questions/command   Bed Mobility   Rolling R 2  Maximal assistance   Additional items Assist x 1; Increased time required;Verbal cues   Rolling L 2  Maximal assistance   Additional items Assist x 1; Increased time required;Verbal cues;LE management   Additional Comments Performed transfers to side-lying each side with max A x 1; able to roll into L sidelying and maintain side-lying position independent with use of RUE on bed rail  Performed rolling to each side x4 to each side  Transfers   Additional Comments Tolerated seated position in bed yesterday  With nurse's permission peformed slide transfer with smooth  into chair with total assist x 4  Therapist monoitored vitals for 5 minutes after patient seated OOB, stable vitals noted  RN aware  Once seated OOB, focused on seated posture, required VC for upright trunk/head posture ; able to tolerate proper seated posistion for approx 30 seconds before fatigue      Balance   Static Sitting Poor Dynamic Sitting Poor   Endurance Deficit   Endurance Deficit Yes   Endurance Deficit Description fatigue, limited activity tolerance   Activity Tolerance   Activity Tolerance Patient limited by fatigue   Medical Staff Made Aware COY, RN   Nurse Made Aware Yes, TAYLOR Soriano reported patient is appropriate for OOB mobility   Exercises   Hip Flexion Sitting;10 reps;AAROM;AROM; Left;Right  (AAROM with LLE )   Ankle Pumps Sitting;10 reps;AAROM;AROM  (AAROM with LLE)   Balance training  Focused on supported seated posistion; required VC for upright/midline head/trunk posture, tolerated approx 30 seconds before fatiguing, Performed 30 seconds x 3  Assessment   Prognosis Guarded   Problem List Decreased strength;Decreased endurance; Impaired balance;Decreased mobility; Decreased cognition; Impaired judgement;Obesity;Pain   Assessment Patient seen for physical therapy session and agreeable to PT session  Spoke to RN and reports patient is appropriate for OOB mobility today  Due to poor activity tolerance and BLE weakness, perform lateral slide transfer from bed to chair with smooth ; total assist x 4  Pt normally uses STS lift for transfers at baseline  Vitals stable for at least 5 minutes post sitting EOB, concluded session with patient seated OOB and RN present  Performed all functional mobility with A x 1; able to maintain sidelying on L side with RUE support for approx 2 minutes  Continue to recommend STR upon discharge once medically stable  Goals   Patient Goals To sit in the chair   STG Expiration Date 03/23/20   Plan   Treatment/Interventions LE strengthening/ROM; Elevations; Therapeutic exercise; Endurance training;Bed mobility;Gait training;Patient/family training   Progress Slow progress, decreased activity tolerance   PT Frequency 2-3x/wk   Recommendation   Equipment Recommended Walker; Wheelchair   PT - OK to Discharge Yes   Additional Comments to rehab once medically stable       Shilpa Sanabria PT, DPT

## 2020-03-10 NOTE — PLAN OF CARE
Problem: PHYSICAL THERAPY ADULT  Goal: Performs mobility at highest level of function for planned discharge setting  See evaluation for individualized goals  Description  Treatment/Interventions: Functional transfer training, LE strengthening/ROM, Therapeutic exercise, Endurance training, Cognitive reorientation, Patient/family training, Bed mobility, Gait training  Equipment Recommended: Vernon Reason, Wheelchair       See flowsheet documentation for full assessment, interventions and recommendations  Outcome: Progressing  Note:   Prognosis: Guarded  Problem List: Decreased strength, Decreased endurance, Impaired balance, Decreased mobility, Decreased cognition, Impaired judgement, Obesity, Pain  Assessment: Patient seen for physical therapy session and agreeable to PT session  Spoke to RN and reports patient is appropriate for OOB mobility today  Due to poor activity tolerance and BLE weakness, perform lateral slide transfer from bed to chair with smooth ; total assist x 4  Pt normally uses STS lift for transfers at baseline  Vitals stable for at least 5 minutes post sitting EOB, concluded session with patient seated OOB and RN present  Performed all functional mobility with A x 1; able to maintain sidelying on L side with RUE support for approx 2 minutes  Continue to recommend STR upon discharge once medically stable  Barriers to Discharge: Decreased caregiver support, Inaccessible home environment     Recommendation: Post acute IP rehab     PT - OK to Discharge: Yes    See flowsheet documentation for full assessment

## 2020-03-10 NOTE — PROGRESS NOTES
Progress Note - Pulmonary   Maryjo Novak 80 y o  female MRN: [id number]  Unit/Bed#: [facility] 710-01 Encounter: [id number]    Assessment:  1  Recurrent left lung atelectasis secondary to aspiration, status post bronchoscopy last week with relapse of atelectasis/aspiration  2  Small bilateral pleural effusions  3  Dysphagia with recurrent aspiration, status post PEG  4  Improved acute pulmonary insufficiency secondary to above    Plan:  · Continue pulmonary toilet/edema chest PT and vest therapy  · Oxygen if needed to maintain pulse ox more than 88%  · Out of bed if possible, PT/OT  · No pulmonary intervention at this time, patient is very comfortable and she had bronchoscopy last week with recurrence of atelectasis  · Strict NPO (Per mouth) but start tube feeding via PEG tube knowing that there is a risk of aspiration still with PEG tube  · Discussed with palliative care and also with Internal Medicine attending    ----------------------------------------------------------------------------------------------------------------------    HPI/Interval History:   Patient appeared comfortable in bed, denies any complaints, denies chest pain or shortness of breath, afebrile  Vitals:   Blood pressure 168/70, pulse 69, temperature (!) 97 4 °F (36 3 °C), resp  rate 16, height 5' 4" (1 626 m), weight 81 2 kg (179 lb), SpO2 97 %, not currently breastfeeding  ,Body mass index is 30 73 kg/m²  SpO2: 97 %  SpO2 Activity: At Rest  O2 Device: Nasal cannula    Physical Exam:   Gen: Alert and without respiratory distress  HEENT: PERRL  O/P: clear  no erythema or exudate  Neck: Supple  There is no JVD, no LAD or thyromegaly appreciated  Trachea is midline  Chest:  Clear to auscultation but decreased breath sounds over the left lung, no wheezing or crackles  Cardiac:  S1-S2 regular no murmur  Abdomen: Soft with mild right upper quadrant tenderness  PEG tube in place  Bowel sounds are present    Extremities:  No pitting edema  Neuro: Awake and alert and oriented x3    Labs:    CBC:  Results from last 7 days   Lab Units 03/05/20  0527 03/04/20  0505   WBC Thousand/uL 7 42 6 59   HEMOGLOBIN g/dL 10 6* 10 1*   HEMATOCRIT % 33 2* 31 5*   PLATELETS Thousands/uL 240 225       CMP:   Results from last 7 days   Lab Units 03/08/20  0517 03/04/20  0505   POTASSIUM mmol/L 3 7 3 7   CHLORIDE mmol/L 105 111*   CO2 mmol/L 30 27   BUN mg/dL 11 8   CREATININE mg/dL 0 38* 0 43*   CALCIUM mg/dL 8 4 8 5   ALK PHOS U/L  --  90   ALT U/L  --  17   AST U/L  --  24         Imaging studies:  Chest x-ray reviewed on PACs:  Left lung opacification from 3/6/2020 indicating central atelectasis with small effusion      Skyla Messer MD    Portions of the record may have been created with voice recognition software  Occasional wrong word or "sound a like" substitutions may have occurred due to the inherent limitations of voice recognition software  Read the chart carefully and recognize, using context, where substitutions have occurred

## 2020-03-11 LAB
ANION GAP SERPL CALCULATED.3IONS-SCNC: 4 MMOL/L (ref 4–13)
BASOPHILS # BLD AUTO: 0.07 THOUSANDS/ΜL (ref 0–0.1)
BASOPHILS NFR BLD AUTO: 1 % (ref 0–1)
BUN SERPL-MCNC: 11 MG/DL (ref 5–25)
CALCIUM SERPL-MCNC: 9.1 MG/DL (ref 8.3–10.1)
CHLORIDE SERPL-SCNC: 105 MMOL/L (ref 100–108)
CO2 SERPL-SCNC: 33 MMOL/L (ref 21–32)
CREAT SERPL-MCNC: 0.48 MG/DL (ref 0.6–1.3)
EOSINOPHIL # BLD AUTO: 0.1 THOUSAND/ΜL (ref 0–0.61)
EOSINOPHIL NFR BLD AUTO: 2 % (ref 0–6)
ERYTHROCYTE [DISTWIDTH] IN BLOOD BY AUTOMATED COUNT: 13.1 % (ref 11.6–15.1)
GFR SERPL CREATININE-BSD FRML MDRD: 90 ML/MIN/1.73SQ M
GLUCOSE SERPL-MCNC: 111 MG/DL (ref 65–140)
GLUCOSE SERPL-MCNC: 121 MG/DL (ref 65–140)
GLUCOSE SERPL-MCNC: 124 MG/DL (ref 65–140)
GLUCOSE SERPL-MCNC: 163 MG/DL (ref 65–140)
GLUCOSE SERPL-MCNC: 251 MG/DL (ref 65–140)
HCT VFR BLD AUTO: 33.6 % (ref 34.8–46.1)
HGB BLD-MCNC: 10.8 G/DL (ref 11.5–15.4)
IMM GRANULOCYTES # BLD AUTO: 0.03 THOUSAND/UL (ref 0–0.2)
IMM GRANULOCYTES NFR BLD AUTO: 1 % (ref 0–2)
LYMPHOCYTES # BLD AUTO: 2.02 THOUSANDS/ΜL (ref 0.6–4.47)
LYMPHOCYTES NFR BLD AUTO: 33 % (ref 14–44)
MCH RBC QN AUTO: 30.7 PG (ref 26.8–34.3)
MCHC RBC AUTO-ENTMCNC: 32.1 G/DL (ref 31.4–37.4)
MCV RBC AUTO: 96 FL (ref 82–98)
MONOCYTES # BLD AUTO: 0.59 THOUSAND/ΜL (ref 0.17–1.22)
MONOCYTES NFR BLD AUTO: 10 % (ref 4–12)
NEUTROPHILS # BLD AUTO: 3.27 THOUSANDS/ΜL (ref 1.85–7.62)
NEUTS SEG NFR BLD AUTO: 53 % (ref 43–75)
NRBC BLD AUTO-RTO: 0 /100 WBCS
PLATELET # BLD AUTO: 206 THOUSANDS/UL (ref 149–390)
PMV BLD AUTO: 10.5 FL (ref 8.9–12.7)
POTASSIUM SERPL-SCNC: 4.3 MMOL/L (ref 3.5–5.3)
PREALB SERPL-MCNC: 12.9 MG/DL (ref 18–40)
RBC # BLD AUTO: 3.52 MILLION/UL (ref 3.81–5.12)
SODIUM SERPL-SCNC: 142 MMOL/L (ref 136–145)
WBC # BLD AUTO: 6.08 THOUSAND/UL (ref 4.31–10.16)

## 2020-03-11 PROCEDURE — 80048 BASIC METABOLIC PNL TOTAL CA: CPT | Performed by: INTERNAL MEDICINE

## 2020-03-11 PROCEDURE — 85025 COMPLETE CBC W/AUTO DIFF WBC: CPT | Performed by: INTERNAL MEDICINE

## 2020-03-11 PROCEDURE — 82948 REAGENT STRIP/BLOOD GLUCOSE: CPT

## 2020-03-11 PROCEDURE — 84134 ASSAY OF PREALBUMIN: CPT | Performed by: INTERNAL MEDICINE

## 2020-03-11 PROCEDURE — 99232 SBSQ HOSP IP/OBS MODERATE 35: CPT | Performed by: INTERNAL MEDICINE

## 2020-03-11 RX ADMIN — PRIMIDONE 50 MG: 50 TABLET ORAL at 22:04

## 2020-03-11 RX ADMIN — AMLODIPINE BESYLATE 5 MG: 5 TABLET ORAL at 09:55

## 2020-03-11 RX ADMIN — DEXTROSE 40 ML/HR: 5 SOLUTION INTRAVENOUS at 01:12

## 2020-03-11 RX ADMIN — METOPROLOL TARTRATE 25 MG: 25 TABLET, FILM COATED ORAL at 09:55

## 2020-03-11 RX ADMIN — POLYETHYLENE GLYCOL 3350 17 G: 17 POWDER, FOR SOLUTION ORAL at 09:55

## 2020-03-11 RX ADMIN — PRIMIDONE 50 MG: 50 TABLET ORAL at 11:07

## 2020-03-11 RX ADMIN — CYANOCOBALAMIN TAB 500 MCG 1000 MCG: 500 TAB at 17:26

## 2020-03-11 RX ADMIN — APIXABAN 5 MG: 5 TABLET, FILM COATED ORAL at 17:26

## 2020-03-11 RX ADMIN — GABAPENTIN 300 MG: 250 SOLUTION ORAL at 09:55

## 2020-03-11 RX ADMIN — METOPROLOL TARTRATE 25 MG: 25 TABLET, FILM COATED ORAL at 22:03

## 2020-03-11 RX ADMIN — FOLIC ACID 1000 MCG: 1 TABLET ORAL at 09:55

## 2020-03-11 RX ADMIN — MELATONIN 1000 UNITS: at 17:26

## 2020-03-11 RX ADMIN — ATORVASTATIN CALCIUM 40 MG: 40 TABLET, FILM COATED ORAL at 17:26

## 2020-03-11 RX ADMIN — APIXABAN 5 MG: 5 TABLET, FILM COATED ORAL at 09:55

## 2020-03-11 RX ADMIN — DIGOXIN 125 MCG: 125 TABLET ORAL at 09:55

## 2020-03-11 RX ADMIN — HYDROMORPHONE HYDROCHLORIDE 0.5 MG: 1 INJECTION, SOLUTION INTRAMUSCULAR; INTRAVENOUS; SUBCUTANEOUS at 22:03

## 2020-03-11 NOTE — PROGRESS NOTES
Diet: Patient will be started on bolus tube feeds, she is going to be on Jevity 1 2 150 mL every 8 hours, with 50 cc of water flushes before and after each bolus  She is also going to have dysphagia pureed diet with meals 3 times a day with thin liquid

## 2020-03-11 NOTE — SOCIAL WORK
Received a call from Siomara Batista at [facility] who states pt is approved for SNF level 1 for 7 days at [facility]  Auth# [id number] from 3/12-3/18  Update on 3/18 to Jonh Amador phone# [phone] ext [phone]  CM provided Roxane East at [facility] with auth information

## 2020-03-11 NOTE — PROGRESS NOTES
Progress Note - Marina Brownlee 1935, 80 y o  female MRN: [id number]    Unit/Bed#: [facility] 710-01 Encounter: [id number]    Primary Care Provider: Luis F Mclaughlin MD   Date and time admitted to hospital: 2/19/2020  8:30 AM        Moderate protein-calorie malnutrition ([address] )  Assessment & Plan  Malnutrition Findings:   Malnutrition type: Acute illness(<75% energy intake versus needs for > 7days due to aspiration risk;dysphagia & PEG placed- held after 24hrs  NPO>7days  Mild fat loss somewhat hollow orbitals& mild muscle loss slight depression temples  Treat with PEG when tolerated&future VBS  )  Degree of Malnutrition: Malnutrition of moderate degree    BMI Findings: Body mass index is 34 74 kg/m²  Goals of care, counseling/discussion  Assessment & Plan  Overall guarded prognosis given multiple comorbidities  Discussed with palliative care, pulmonary  Overall guarded prognosis explained to the family at the multi disciplinary meeting  Discussed with garth Desai at bedside and with Francisco Javier Olson over phone in detail, overall guarded prognosis explained  Discussed with palliative care and pulmonary  Still patient is aspirating, family does not want to go with tube feeding and plan for discharge  She was started back on oral diet, tube feeds have been started boluses with fluid flushes  Abnormal CT of the chest  Assessment & Plan  CT chest reveals left bronchial secretions/mucus plugging  Status post bronchoscopy  Bilateral pleural effusions      Pleural effusion, left  Assessment & Plan  Monitor      Dysarthria  Assessment & Plan  Dysarthria noted on last admission 2/14, neurology consulted and did 24-hour EEG monitoring 2/16  EEG monitoring 2/16-2/17: no focal epileptiform discharges or electrographic seizures    Removed 2/17  Unable to get MRI brain due to spine stimulator  Neurology input noted    Weakness of both lower extremities  Assessment & Plan  Per daughter,patient had difficulty standing up in wheelchair, more so than normal  Chronic bilateral weakness in lower extremities, no significant changes since last admission  Patient wheelchair-bound and needs assistance standing up and transferring in wheelchair  Continue fall precautions  PT OT    Stroke-like symptoms  Assessment & Plan  Continue atorvastatin, Eliquis  Neurology input noted    Cervical dystonia  Assessment & Plan  Patient follows up with neurologist outpatient  continue gabapentin 300mg PO QD, give Tylenol 650 mg p o  Q 6 hours p r n  Stimulator in place    Essential hypertension  Assessment & Plan  Monitor blood pressure   Continue Lopressor, amlodipine  IV labetalol p r n  Avoid hypotension    Paroxysmal atrial fibrillation (HCC)  Assessment & Plan  Continue metoprolol, digoxin  Eliquis anticoagulation      Wheelchair dependence  Assessment & Plan  Patient wheelchair-bound and needs assistance with transferring from wheelchair    * [facility]  EEG monitoring 2/16-2/17: demonstrated no epileptiform activity or epileptogenic focus  Repeat CT scan of head does not demonstrate any acute findings  Neurology input noted         VTE Pharmacologic Prophylaxis:   Pharmacologic: Apixaban (Eliquis)  Mechanical VTE Prophylaxis in Place: Yes    Patient Centered Rounds: I have performed bedside rounds with nursing staff today  Discussions with Specialists or Other Care Team Provider: neurology, nutrition regarding tube feeds, ot/pt     Education and Discussions with Family / Patient: daughter regarding tube feeding     Time Spent for Care: 1 hour  More than 50% of total time spent on counseling and coordination of care as described above      Current Length of Stay: 21 day(s)    Current Patient Status: Inpatient   Certification Statement: The patient will continue to require additional inpatient hospital stay due to need auth    Discharge Plan: possible next 24 hrs    Code Status: Level 3 - DNAR and DNI      Subjective:   Patient is improving still has issues with family  Objective:     Vitals:   Temp (24hrs), Av 5 °F (36 4 °C), Min:97 4 °F (36 3 °C), Max:97 6 °F (36 4 °C)    Temp:  [97 4 °F (36 3 °C)-97 6 °F (36 4 °C)] 97 6 °F (36 4 °C)  HR:  [70-72] 72  Resp:  [16-19] 16  BP: (150-168)/(62-76) 153/72  SpO2:  [88 %-99 %] 95 %  Body mass index is 34 74 kg/m²  Input and Output Summary (last 24 hours): Intake/Output Summary (Last 24 hours) at 3/11/2020 1841  Last data filed at 3/11/2020 1729  Gross per 24 hour   Intake 2335 7 ml   Output 1802 ml   Net 533 7 ml       Physical Exam:     Physical Exam   Constitutional: She appears well-developed and well-nourished  No distress  HENT:   Head: Normocephalic and atraumatic  Right Ear: External ear normal    Left Ear: External ear normal    Nose: Nose normal    Mouth/Throat: Oropharynx is clear and moist  No oropharyngeal exudate  Eyes: Pupils are equal, round, and reactive to light  Conjunctivae and EOM are normal  Right eye exhibits no discharge  Left eye exhibits no discharge  No scleral icterus  Neck: Normal range of motion  Neck supple  No thyromegaly present  Cardiovascular: Normal rate, regular rhythm and normal heart sounds  Exam reveals no gallop and no friction rub  No murmur heard  Pulmonary/Chest: Effort normal and breath sounds normal  No stridor  No respiratory distress  She has no wheezes  She has no rales  She exhibits no tenderness  Abdominal: Soft  Bowel sounds are normal  She exhibits no distension and no mass  There is no tenderness  There is no rebound and no guarding  No hernia  Musculoskeletal: She exhibits deformity  Neurological: She displays abnormal reflex  Skin: Skin is warm and dry  She is not diaphoretic  Psychiatric: She has a normal mood and affect  Her behavior is normal  Thought content normal    Nursing note and vitals reviewed        Additional Data:     Labs:    Results from last 7 days   Lab Units 20  0538   WBC Thousand/uL 6 08   HEMOGLOBIN g/dL 10 8*   HEMATOCRIT % 33 6*   PLATELETS Thousands/uL 206   NEUTROS PCT % 53   LYMPHS PCT % 33   MONOS PCT % 10   EOS PCT % 2     Results from last 7 days   Lab Units 03/11/20  0538   POTASSIUM mmol/L 4 3   CHLORIDE mmol/L 105   CO2 mmol/L 33*   BUN mg/dL 11   CREATININE mg/dL 0 48*   CALCIUM mg/dL 9 1           * I Have Reviewed All Lab Data Listed Above  * Additional Pertinent Lab Tests Reviewed: [address] Admission Reviewed    Imaging:    Imaging Reports Reviewed Today Include:    Imaging Personally Reviewed by Myself Includes:       Recent Cultures (last 7 days):     Results from last 7 days   Lab Units 03/05/20  1518   GRAM STAIN RESULT  No Polys or Bacteria seen       Last 24 Hours Medication List:     Current Facility-Administered Medications:  acetaminophen 650 mg Rectal Q4H PRN Dari Roberts PA-C   amLODIPine 5 mg Per PEG Tube Daily Rosalinda Byrne MD   apixaban 5 mg Per PEG Tube BID Kenan Estes MD   atorvastatin 40 mg Per PEG Tube QPM Kenan Estes MD   bisacodyl 10 mg Rectal Daily PRN Kenan Estes MD   cholecalciferol 1,000 Units Per PEG Tube QPM Kenan Estes MD   vitamin B-12 1,000 mcg Per PEG Tube QPM Kenan Estes MD   digoxin 125 mcg Per PEG Tube Daily Kenan Estes MD   folic acid 9,762 mcg Per PEG Tube Daily Kenan Estes MD   gabapentin 300 mg Per PEG Tube Daily Kenan Estes MD   HYDROmorphone 0 5 mg Intravenous Q4H PRN Kenan Estes MD   insulin lispro 1-5 Units Subcutaneous 4x Daily (AC & HS) Adelaida Prather MD   Labetalol HCl 10 mg Intravenous Q6H PRN Poppy Vasquez PA-C   metoprolol tartrate 25 mg Per PEG Tube Q12H [facility] Kenan Estes MD   polyethylene glycol 17 g Oral Daily Kenan Estes MD   primidone 50 mg Per PEG Tube Q12H [facility] Kenan Estes MD        Today, Patient Was Seen By: Adelaida Prather MD    ** Please Note: Dictation voice to text software may have been used in the creation of this document  **

## 2020-03-11 NOTE — RESTORATIVE TECHNICIAN NOTE
Restorative Specialist Mobility Note       Activity: Chair, Other (Comment)(Educated/encouraged pt to get In/OOB via lateral transfer with assistance  Chair alarm on   Pt callbell, phone/tray within reach )     Assistive Device: Other (Comment)(Assist x4 to clean pt 2* incontinence and slide pt OOB to the drop-arm recliner chair via smooth  )      Say WOODRUFF, Restorative Technician, [employer]

## 2020-03-11 NOTE — SOCIAL WORK
CM was informed by Dr Ana Brower that pt will be medically stable for dc tomorrow  CM spoke to Pricilla at [facility] who states pt is accepted  Pricilla states she submitted for authorization; awaiting determination from [employer]

## 2020-03-11 NOTE — RESTORATIVE TECHNICIAN NOTE
Restorative Specialist Mobility Note       Activity: Other (Comment), Chair(Educated/encouraged pt to get In/OOB via lateral transfer with assistance  Bed alarm on   Pt callbell, phone/tray within reach )     Assistive Device: Other (Comment)(Assist x3 back to bed from the drop-arm chair lateral transfer via smooth   )         Annemarie WOODRUFF, Restorative Technician, [employer]

## 2020-03-11 NOTE — ASSESSMENT & PLAN NOTE
Overall guarded prognosis given multiple comorbidities  Discussed with palliative care, pulmonary  Overall guarded prognosis explained to the family at the multi disciplinary meeting  Discussed with daughter Regis Howe at bedside and with Mayela Mott over phone in detail, overall guarded prognosis explained  Discussed with palliative care and pulmonary  Still patient is aspirating, family does not want to go with tube feeding and plan for discharge  She was started back on oral diet, tube feeds have been started boluses with fluid flushes

## 2020-03-11 NOTE — QUICK NOTE
Progress Note - Palliative & Supportive Care     Chart reviewed  Symptoms controlled and goals aligned  [facility] will sign off at this time  Please call with any further questions/concerns  Thank you       June Tovar,   Palliative and Supportive Care  [phone]

## 2020-03-12 VITALS
SYSTOLIC BLOOD PRESSURE: 145 MMHG | WEIGHT: 184.97 LBS | RESPIRATION RATE: 19 BRPM | BODY MASS INDEX: 31.58 KG/M2 | TEMPERATURE: 97.4 F | HEIGHT: 64 IN | HEART RATE: 74 BPM | OXYGEN SATURATION: 97 % | DIASTOLIC BLOOD PRESSURE: 63 MMHG

## 2020-03-12 LAB
ANION GAP SERPL CALCULATED.3IONS-SCNC: 4 MMOL/L (ref 4–13)
BASOPHILS # BLD AUTO: 0.11 THOUSANDS/ΜL (ref 0–0.1)
BASOPHILS NFR BLD AUTO: 2 % (ref 0–1)
BUN SERPL-MCNC: 17 MG/DL (ref 5–25)
CALCIUM SERPL-MCNC: 9.1 MG/DL (ref 8.3–10.1)
CHLORIDE SERPL-SCNC: 105 MMOL/L (ref 100–108)
CO2 SERPL-SCNC: 31 MMOL/L (ref 21–32)
CREAT SERPL-MCNC: 0.48 MG/DL (ref 0.6–1.3)
EOSINOPHIL # BLD AUTO: 0.16 THOUSAND/ΜL (ref 0–0.61)
EOSINOPHIL NFR BLD AUTO: 2 % (ref 0–6)
ERYTHROCYTE [DISTWIDTH] IN BLOOD BY AUTOMATED COUNT: 13.4 % (ref 11.6–15.1)
GFR SERPL CREATININE-BSD FRML MDRD: 90 ML/MIN/1.73SQ M
GLUCOSE SERPL-MCNC: 133 MG/DL (ref 65–140)
GLUCOSE SERPL-MCNC: 136 MG/DL (ref 65–140)
GLUCOSE SERPL-MCNC: 155 MG/DL (ref 65–140)
GLUCOSE SERPL-MCNC: 164 MG/DL (ref 65–140)
HCT VFR BLD AUTO: 34.2 % (ref 34.8–46.1)
HGB BLD-MCNC: 10.5 G/DL (ref 11.5–15.4)
IMM GRANULOCYTES # BLD AUTO: 0.05 THOUSAND/UL (ref 0–0.2)
IMM GRANULOCYTES NFR BLD AUTO: 1 % (ref 0–2)
LYMPHOCYTES # BLD AUTO: 2.33 THOUSANDS/ΜL (ref 0.6–4.47)
LYMPHOCYTES NFR BLD AUTO: 34 % (ref 14–44)
MCH RBC QN AUTO: 29.7 PG (ref 26.8–34.3)
MCHC RBC AUTO-ENTMCNC: 30.7 G/DL (ref 31.4–37.4)
MCV RBC AUTO: 97 FL (ref 82–98)
MONOCYTES # BLD AUTO: 0.69 THOUSAND/ΜL (ref 0.17–1.22)
MONOCYTES NFR BLD AUTO: 10 % (ref 4–12)
NEUTROPHILS # BLD AUTO: 3.52 THOUSANDS/ΜL (ref 1.85–7.62)
NEUTS SEG NFR BLD AUTO: 51 % (ref 43–75)
NRBC BLD AUTO-RTO: 0 /100 WBCS
PLATELET # BLD AUTO: 197 THOUSANDS/UL (ref 149–390)
PMV BLD AUTO: 10.3 FL (ref 8.9–12.7)
POTASSIUM SERPL-SCNC: 4.2 MMOL/L (ref 3.5–5.3)
RBC # BLD AUTO: 3.53 MILLION/UL (ref 3.81–5.12)
SODIUM SERPL-SCNC: 140 MMOL/L (ref 136–145)
WBC # BLD AUTO: 6.86 THOUSAND/UL (ref 4.31–10.16)

## 2020-03-12 PROCEDURE — 99239 HOSP IP/OBS DSCHRG MGMT >30: CPT | Performed by: INTERNAL MEDICINE

## 2020-03-12 PROCEDURE — 82948 REAGENT STRIP/BLOOD GLUCOSE: CPT

## 2020-03-12 PROCEDURE — 85025 COMPLETE CBC W/AUTO DIFF WBC: CPT | Performed by: INTERNAL MEDICINE

## 2020-03-12 PROCEDURE — 80048 BASIC METABOLIC PNL TOTAL CA: CPT | Performed by: INTERNAL MEDICINE

## 2020-03-12 RX ORDER — HYDROMORPHONE HYDROCHLORIDE 2 MG/1
1 TABLET ORAL EVERY 4 HOURS PRN
Qty: 20 TABLET | Refills: 0
Start: 2020-03-12 | End: 2020-03-22

## 2020-03-12 RX ORDER — BISACODYL 10 MG
10 SUPPOSITORY, RECTAL RECTAL DAILY PRN
Qty: 12 SUPPOSITORY | Refills: 0
Start: 2020-03-12

## 2020-03-12 RX ORDER — AMLODIPINE BESYLATE 5 MG/1
5 TABLET ORAL DAILY
Qty: 30 TABLET | Refills: 0
Start: 2020-03-13 | End: 2020-11-11 | Stop reason: SDUPTHER

## 2020-03-12 RX ADMIN — FOLIC ACID 1000 MCG: 1 TABLET ORAL at 08:59

## 2020-03-12 RX ADMIN — APIXABAN 5 MG: 5 TABLET, FILM COATED ORAL at 08:58

## 2020-03-12 RX ADMIN — METOPROLOL TARTRATE 25 MG: 25 TABLET, FILM COATED ORAL at 08:59

## 2020-03-12 RX ADMIN — AMLODIPINE BESYLATE 5 MG: 5 TABLET ORAL at 08:58

## 2020-03-12 RX ADMIN — POLYETHYLENE GLYCOL 3350 17 G: 17 POWDER, FOR SOLUTION ORAL at 08:57

## 2020-03-12 RX ADMIN — GABAPENTIN 300 MG: 250 SOLUTION ORAL at 09:07

## 2020-03-12 RX ADMIN — PRIMIDONE 50 MG: 50 TABLET ORAL at 08:59

## 2020-03-12 RX ADMIN — HYDROMORPHONE HYDROCHLORIDE 0.5 MG: 1 INJECTION, SOLUTION INTRAMUSCULAR; INTRAVENOUS; SUBCUTANEOUS at 06:44

## 2020-03-12 RX ADMIN — DIGOXIN 125 MCG: 125 TABLET ORAL at 08:58

## 2020-03-12 NOTE — DISCHARGE SUMMARY
Discharging Physician / Practitioner: Thompson Paz MD  PCP: Daren Cummins MD  Admission Date:   Admission Orders (From admission, onward)     Ordered        02/19/20 1351  Inpatient Admission  Once                   Discharge Date: 03/12/20    Resolved Problems  Date Reviewed: 3/11/2020          Resolved    SIRS (systemic inflammatory response syndrome) ([facility] [address] ) 2/29/2020     Resolved by  Luann Gooden MD    Pneumonia 3/2/2020     Resolved by  Luann Gooden MD    UTI (urinary tract infection) 3/2/2020     Resolved by  Luann Gooden MD          Consultations During Hospital Stay:  ·  neurology, pulmonary, palliative care     Procedures Performed:   · broncoscopy 3/5, pleural effusion left  · Speech pathology 3/6  · PEG tube placement 3/2  · EKG 2/19L sinus rhythm with AV block RBBB,     Significant Findings / Test Results:   ·  Pleural effusion on the left  · Oropharyngeal dysphagia in the setting of stroke    Incidental Findings:   ·  none    Test Results Pending at Discharge (will require follow up):   · none     Outpatient Tests Requested:  · none    Complications:  none    Reason for Admission: facial droop    Hospital Course:     Earl Pryor is a 80 y o  female patient who originally presented to the hospital on 2/19/2020 due left sided facial droop  Afib on eliquis, artheritis, diabetes, panic disorder, and spinal stenosis  Patient was initially a stroke alert, no acute finding on imaging  ICA stenosis and weakness continued  She was having oropharyngeal dysphagia in the speech and swallowing study has no gag reflux, she has dysphagia  GI consulted for PEG tube placement  She has possible aspiration considered, patient developed sob with left lung opacification of the left lung noted on 3/4 on chest xray  Pulmonary was consulted  She was noted to have large pleural effusion  Thoracentesis was done 3/5  Bronchoscopy and tracheostomy was considered as patient not able to protect airway  Moderate protein calorie malnutrition with evidence of acute illness, she was started on tube feeds  She has evidence with muscle loss, slight depression of temples  Goals of care discussion with palliative care and family happened 3 times family did not want to make her comfort care at this time  As she continues to improve she was able to tolerate dysphagia diet and tube feeds resumed  She did not aspirate, family discussion, daughter wants to continue with tube feeds and oral as she tolerated  Speech and swallow study cleared her, however, patient is still risk for aspiration  Bolus feeds were suggested  She will need to improve with physical therapy and occupational therapy and discharge to rehab  Please see above list of diagnoses and related plan for additional information  Condition at Discharge: stable     Discharge Day Visit / Exam:     Subjective:  Patient no in distress, doing well  She has no signs of distress  Vitals: Blood Pressure: 145/63 (03/12/20 0811)  Pulse: 74 (03/12/20 0811)  Temperature: (!) 97 4 °F (36 3 °C) (03/12/20 0811)  Temp Source: Oral (03/11/20 1500)  Respirations: 19 (03/12/20 0811)  Height: 5' 4" (162 6 cm) (03/05/20 1411)  Weight - Scale: 91 8 kg (202 lb 6 1 oz) (03/11/20 1100)  SpO2: 97 % (03/12/20 1950)  Exam:   Physical Exam    Discussion with Family: daughter regarding tube feeds, diet  Also, discussed with family regarding aspiration risk  She is stable at this point for discharge  Discharge instructions/Information to patient and family:   See after visit summary for information provided to patient and family  Provisions for Follow-Up Care:  See after visit summary for information related to follow-up care and any pertinent home health orders  Disposition:     Other [facility]      For Discharges to [facility]:   · Not Applicable to this Patient - Unable to reach physician and no message left      Planned Readmission: 45     Discharge Statement:  I spent 45 minutes discharging the patient  This time was spent on the day of discharge  I had direct contact with the patient on the day of discharge  Greater than 50% of the total time was spent examining patient, answering all patient questions, arranging and discussing plan of care with patient as well as directly providing post-discharge instructions  Additional time then spent on discharge activities  Discharge Medications:  See after visit summary for reconciled discharge medications provided to patient and family        ** Please Note: This note has been constructed using a voice recognition system **

## 2020-03-12 NOTE — SOCIAL WORK
DC to [facility]:     CM received [facility] message from [facility] that rehab auth was received  Dr Barry Araujo confirmed that the patient is medically cleared for dc today  Pt requires BLS transport d/t bedbound status, sacral wounds, 02 2L and Max assist for transfers  CM called and spoke with Jaskaran Knott at [facility]; BLS  set for 12 noon  CMN completed, copy with stickers placed in medical record bin, original placed with face sheet on dc packet envelope  DC packet placed in pt's chart file folder  CM advised Dr Yenifer Oneal  RN Ranjeet [facility] notified that  in file folder and facility / transport info entered into [employer]  CM called and advised pt's Dtr Jazz Esparza, reviewed dc to [facility], dc today and  via BLS at 12 noon today; Jazz Esparza agreeable to dc plan  CM met with the patient and advised her of dc plan  Pt denied any other dc needs at this time  DC Plan- 12 noon BLS  via [facility] to [facility]

## 2020-03-12 NOTE — ASSESSMENT & PLAN NOTE
Lab Results   Component Value Date    HGBA1C 5 8 02/05/2020       Recent Labs     03/11/20  2110 03/12/20  0045 03/12/20  0705 03/12/20  1103   POCGLU 163* 164* 155* 133       Blood Sugar Average: Last 72 hrs:  (P) 146 875     Hold metformin  BGMs with sliding scale coverage q 6 hours while on tube feeds

## 2020-03-12 NOTE — PLAN OF CARE
Problem: Potential for Falls  Goal: Patient will remain free of falls  Description  INTERVENTIONS:  - Assess patient frequently for physical needs  -  Identify cognitive and physical deficits and behaviors that affect risk of falls    -  Bonnie fall precautions as indicated by assessment   - Educate patient/family on patient safety including physical limitations  - Instruct patient to call for assistance with activity based on assessment  - Modify environment to reduce risk of injury  - Consider OT/PT consult to assist with strengthening/mobility  Outcome: Progressing     Problem: Prexisting or High Potential for Compromised Skin Integrity  Goal: Skin integrity is maintained or improved  Description  INTERVENTIONS:  - Identify patients at risk for skin breakdown  - Assess and monitor skin integrity  - Assess and monitor nutrition and hydration status  - Monitor labs   - Assess for incontinence   - Turn and reposition patient  - Assist with mobility/ambulation  - Relieve pressure over bony prominences  - Avoid friction and shearing  - Provide appropriate hygiene as needed including keeping skin clean and dry  - Evaluate need for skin moisturizer/barrier cream  - Collaborate with interdisciplinary team   - Patient/family teaching  - Consider wound care consult   Outcome: Progressing     Problem: DISCHARGE PLANNING  Goal: Discharge to home or other facility with appropriate resources  Description  INTERVENTIONS:  - Identify barriers to discharge w/patient and caregiver  - Arrange for needed discharge resources and transportation as appropriate  - Identify discharge learning needs (meds, wound care, etc )  - Arrange for interpretive services to assist at discharge as needed  - Refer to Case Management Department for coordinating discharge planning if the patient needs post-hospital services based on physician/advanced practitioner order or complex needs related to functional status, cognitive ability, or social support system  Outcome: Progressing     Problem: NEUROSENSORY - ADULT  Goal: Achieves stable or improved neurological status  Description  INTERVENTIONS  - Monitor and report changes in neurological status  - Monitor vital signs such as temperature, blood pressure, glucose, and any other labs ordered   - Initiate measures to prevent increased intracranial pressure  - Monitor for seizure activity and implement precautions if appropriate      Outcome: Progressing  Goal: Achieves maximal functionality and self care  Description  INTERVENTIONS  - Monitor swallowing and airway patency with patient fatigue and changes in neurological status  - Encourage and assist patient to increase activity and self care  - Encourage visually impaired, hearing impaired and aphasic patients to use assistive/communication devices  Outcome: Progressing     Problem: Nutrition/Hydration-ADULT  Goal: Nutrient/Hydration intake appropriate for improving, restoring or maintaining nutritional needs  Description  Monitor and assess patient's nutrition/hydration status for malnutrition  Collaborate with interdisciplinary team and initiate plan and interventions as ordered  Monitor patient's weight and dietary intake as ordered or per policy  Utilize nutrition screening tool and intervene as necessary  Determine patient's food preferences and provide high-protein, high-caloric foods as appropriate       INTERVENTIONS:  - Monitor oral intake, urinary output, labs, and treatment plans  - Assess nutrition and hydration status and recommend course of action  - Evaluate amount of meals eaten  - Assist patient with eating if necessary   - Allow adequate time for meals  - Recommend/ encourage appropriate diets, oral nutritional supplements, and vitamin/mineral supplements  - Order, calculate, and assess calorie counts as needed  - Recommend, monitor, and adjust tube feedings and TPN/PPN based on assessed needs  - Assess need for intravenous fluids  - Provide specific nutrition/hydration education as appropriate  - Include patient/family/caregiver in decisions related to nutrition  Outcome: Progressing     Problem: CONFUSION/THOUGHT DISTURBANCE  Goal: Thought disturbances (confusion, delirium, depression, dementia or psychosis) are managed to maintain or return to baseline mental status and functional level  Description  INTERVENTIONS:  - Assess for possible contributors to  thought disturbance, including but not limited to medications, infection, impaired vision or hearing, underlying metabolic abnormalities, dehydration, respiratory compromise,  psychiatric diagnoses and notify attending PHYSICAN/AP  - Monitor and intervene to maintain adequate nutrition, hydration, elimination, sleep and activity  - Decrease environmental stimuli, including noise as appropriate  - Provide frequent contacts to provide refocusing, direction and reassurance as needed  Approach patient calmly with eye contact and at their level    - [place] high risk fall precautions, aspiration precautions and other safety measures, as indicated  - If delirium suspected, notify physician/AP of change in condition and request immediate in-person evaluation  - Pursue consults as appropriate including Geriatric (campus dependent), OT for cognitive evaluation/activity planning, psychiatric, pastoral care, etc   Outcome: Progressing

## 2020-03-12 NOTE — ASSESSMENT & PLAN NOTE
Overall guarded prognosis given multiple comorbidities  Discussed with palliative care, pulmonary  Overall guarded prognosis explained to the family at the multi disciplinary meeting  Discussed with daughter Azar Wayne at bedside and with Luis Alberto Rawls over phone in detail, overall guarded prognosis explained  Discussed with palliative care and pulmonary  Still patient is aspirating, family does not want to go with tube feeding and plan for discharge  She was started back on oral diet, tube feeds have been started boluses with fluid flushes

## 2020-03-12 NOTE — RESTORATIVE TECHNICIAN NOTE
Restorative Specialist Mobility Note          Repositioned: Other (Comment)(Rep /sat pt upright in bed  Bed alarm on   Pt callbell, phone/tray within reach )       Will WOODRUFF, Restorative Technician, [employer]

## 2020-03-12 NOTE — TRANSPORTATION MEDICAL NECESSITY
Section I - General Information    Name of Patient: Ricky Arauz                 : 1935    Medicare #: [id number]  Transport Date: 20 (PCS is valid for round trips on this date and for all repetitive trips in the 60-day range as noted below )  Origin: [address] [facility]                                                         Destination: [facility]    Is the pt's stay covered under Medicare Part A (PPS/DRG)   []     Closest appropriate facility? If no, why is transport to more distant facility required? Yes  If hospice pt, is this transport related to pt's terminal illness? No       Section II - Medical Necessity Questionnaire  Ambulance transportation is medically necessary only if other means of transport are contraindicated or would be potentially harmful to the patient  To meet this requirement, the patient must either be "bed confined" or suffer from a condition such that transport by means other than ambulance is contraindicated by the patient's condition  The following questions must be answered by the medical professional signing below for this form to be valid:    1)  Describe the MEDICAL CONDITION (physical and/or mental) of this patient AT [address] [place] [address] that requires the patient to be transported in an ambulance and why transport by other means is contraindicated by the patient's condition:  Moderate Protein-Calorie Malnutrition, Aphasia, Facial droppe, Oropharyngeal dysphagia, Left Pleural Effusion  Hx DM2, Gallstone Pancreatitis  Pressure ulcer to sacrum, Osteopenia of left foot  2) Is the patient "bed confined" as defined below? Yes  To be "be confined" the patient must satisfy all three of the following conditions: (1) unable to get up from bed without Assistance; AND (2) unable to ambulate; AND (3) unable to sit in a chair or wheelchair      3) Can this patient safely be transported by car or wheelchair van (i e , seated during transport without a medical attendant or monitoring)? No    4) In addition to completing questions 1-3 above, please check any of the following conditions that apply*:   *Note: supporting documentation for any boxes checked must be maintained in the patient's medical records  If hosp-hosp transfer, describe services needed at 2nd facility not available at 1st facility? Medical attendant required    Bed bound, max assist of two  Sacral decubitus ulcer  Unable to tolerate sitting for transport  Needs Medical attendant monitoring for respiratory stabilization - hx mucous plugging    Section III - Signature of Physician or Healthcare Professional  I certify that the above information is true and correct based on my evaluation of this patient, and represent that the patient requires transport by ambulance and that other forms of transport are contraindicated  I understand that this information will be used by the Centers for Medicare and Medicaid Services (CMS) to support the determination of medical necessity for ambulance services, and I represent that I have personal knowledge of the patient's condition at time of transport  []  If this box is checked, I also certify that the patient is physically or mentally incapable of signing the ambulance service's claim and that the institution with which I am affiliated has furnished care, services, or assistance to the patient  My signature below is made on behalf of the patient pursuant to 42 CFR §424 36(b)(4)  In accordance with 42 CFR §424 37, the specific reason(s) that the patient is physically or mentally incapable of signing the claim form is as follows: Maryjo Chol of Physician* or Healthcare Professional______________________________________________________________  Signature Date 03/12/20 (For scheduled repetitive transports, this form is not valid for transports performed more than 60 days after this date)    Printed Name & Credentials of Physician or Healthcare Professional (MD, DO, RN, etc )_____________Siomara Myaa RN Case manager ___________________  *Form must be signed by patient's attending physician for scheduled, repetitive transports   For non-repetitive, unscheduled ambulance transports, if unable to obtain the signature of the attending physician, any of the following may sign (choose appropriate option below)  [] Physician Assistant []  Clinical Nurse Specialist [x]  Registered Nurse  []  Nurse Practitioner  [x] Discharge Planner

## 2020-03-13 ENCOUNTER — NURSING HOME VISIT (OUTPATIENT)
Dept: GERIATRICS | Facility: OTHER | Age: 85
End: 2020-03-13
Payer: COMMERCIAL

## 2020-03-13 DIAGNOSIS — E04.1 THYROID NODULE: ICD-10-CM

## 2020-03-13 DIAGNOSIS — R53.1 GENERALIZED WEAKNESS: ICD-10-CM

## 2020-03-13 DIAGNOSIS — E87.1 HYPONATREMIA: ICD-10-CM

## 2020-03-13 DIAGNOSIS — R13.12 OROPHARYNGEAL DYSPHAGIA: ICD-10-CM

## 2020-03-13 DIAGNOSIS — I10 ESSENTIAL HYPERTENSION: Chronic | ICD-10-CM

## 2020-03-13 DIAGNOSIS — R06.89 ACUTE RESPIRATORY INSUFFICIENCY: ICD-10-CM

## 2020-03-13 DIAGNOSIS — R47.1 DYSARTHRIA: ICD-10-CM

## 2020-03-13 DIAGNOSIS — I48.0 PAROXYSMAL ATRIAL FIBRILLATION (HCC): Chronic | ICD-10-CM

## 2020-03-13 DIAGNOSIS — E44.0 MODERATE PROTEIN-CALORIE MALNUTRITION (HCC): Primary | ICD-10-CM

## 2020-03-13 DIAGNOSIS — R93.89 ABNORMAL CT OF THE CHEST: ICD-10-CM

## 2020-03-13 PROBLEM — R29.810 FACIAL DROOP: Status: RESOLVED | Noted: 2020-02-19 | Resolved: 2020-03-13

## 2020-03-13 PROBLEM — K81.0 ACUTE CHOLECYSTITIS: Status: RESOLVED | Noted: 2018-05-31 | Resolved: 2020-03-13

## 2020-03-13 PROBLEM — R77.8 ELEVATED TROPONIN: Status: RESOLVED | Noted: 2018-05-31 | Resolved: 2020-03-13

## 2020-03-13 PROBLEM — R79.89 ELEVATED TROPONIN: Status: RESOLVED | Noted: 2018-05-31 | Resolved: 2020-03-13

## 2020-03-13 PROBLEM — K85.10 GALLSTONE PANCREATITIS: Chronic | Status: RESOLVED | Noted: 2018-06-01 | Resolved: 2020-03-13

## 2020-03-13 PROCEDURE — 99306 1ST NF CARE HIGH MDM 50: CPT | Performed by: FAMILY MEDICINE

## 2020-03-13 NOTE — ASSESSMENT & PLAN NOTE
· Patient with significant development of dysphagia this hospitalization associated with generalized weakness and episode of lethargy  · She is now status post PEG tube due to fear that patient is unable to protect her airway  · Slowly improving since her mental status has improved as well  · Continue aspiration precautions  · Speech therapy to evaluate and treat as appropriate  · Will continue tube feeds for now to supplement her oral intake which is about 50% of her meals at this time

## 2020-03-13 NOTE — ASSESSMENT & PLAN NOTE
· CT of the chest had revealed left bronchial secretions/mucus plugging  · Evaluated by pulmonology, status post bronchoscopy  · Per pulmonology recommendations patient may benefit from tracheostomy due to difficulty protecting her airway  · Family refused at this time  · Continue aspiration precautions and supportive care

## 2020-03-13 NOTE — ASSESSMENT & PLAN NOTE
· Currently slightly elevated above goal   · Logs reviewed since admission to [facility] she has been fairly well controlled  · Continue current management with metoprolol, amlodipine  · Avoid hypotension  · Check BMP to monitor renal function and electrolytes

## 2020-03-13 NOTE — ASSESSMENT & PLAN NOTE
· Incidental finding of CT H/an:  Thyroid nodules, the largest in the inferior right thyroid lobe measures up to 1 6 cm   · TSH within normal limits  · Recommend outpatient ultrasound for follow-up

## 2020-03-13 NOTE — PROGRESS NOTES
[address]  [address] [id number] [facility]  [phone]    [facility]  HISTORY & PHYSICAL        NAME: Earl Pryor  AGE: 80 y o  SEX: female  : 1935  DATE OF ENCOUNTER: 20  POS: 31 (SNF)  PCP: Daren Cummins MD    Chief Complaint     Seen and examined today for admission to short term rehabilitation unit at [facility]  History of Present Illness     15-year-old female with hypertension, atrial fibrillation on Eliquis, diabetes mellitus, not on chronic insulin, arthritis, panic disorder, spinal stenosis with chronic bilateral lower extremity weakness and ambulatory dysfunction, status post spinal cord stimulator, overactive bladder, cervical dystonia, history of prior stroke, hyperlipidemia  Patient with multiple recent hospitalizations in the past 6-8 weeks  Patient had been evaluated multiple times due to symptoms concerning for possible stroke and the hospital without any acute findings that would support a diagnosis of an actual stroke  During this last hospitalization patient was admitted to the hospital on 2020-2020  Admitted 1 day after she had been discharged from the hospital with complaints of worsening weakness and acute change in mental status  She was initially evaluated by neurology for possible stroke, however imaging was unremarkable for acute CVA  During this hospitalization her weakness was worsening and associated with aphasia and dysphagia with absent gag reflex  Patient underwent PEG tube placement on  without complications  Due to worsening stroke-like symptoms, patient was re-evaluated by Neurology who recommended repeat CT of the head along with addition of aspirin  However, at that time patient had declined taking aspirin and she also decline repeat CT H  Hospitalization was also complicated by acute respiratory insufficiency requiring oxygen via nasal cannula    Initial chest x-ray showed almost complete opacifications of the left lung field  CT of the chest done 03/04/2020 showed obstructive atelectasis of left mainstem bronchus resulting in left lung atelectasis  She was evaluated by pulmonology due to near complete atelectasis of the left long  Per pulmonology patient unable to protect her airway given change in mental status and recommendation for bronchoscopy and tracheostomy were made  However, patient's family declined these aggressive interventions at this time  Per records reviewed, multiple meetings with palliative care team and rest of the team were held during hospitalization  Patient was not felt to have capacity to make her own medical decisions and her 2 daughters were functioning as substitute decision makers  Per patient's daughter Gigi Trinidad, patient has shown significant improvement since she came in to [facility]  She is hoping she will be able to return home but is open for discussion if she does not make adequate gains  Patient is lying comfortably in bed  States she feels better  She offers no acute complaints  She states she did well today with her meals  Per Dietician, she had about 50% of her meals  Review of Systems     Review of Systems   Constitutional: Positive for activity change and fatigue  Negative for appetite change, fever and unexpected weight change  HENT: Positive for trouble swallowing  Negative for congestion, dental problem and hearing loss  Eyes: Negative for visual disturbance  Respiratory: Negative for apnea, cough, choking, chest tightness and shortness of breath  Cardiovascular: Negative for chest pain, palpitations and leg swelling  Gastrointestinal: Positive for constipation  Negative for abdominal distention, abdominal pain, diarrhea, nausea and vomiting  Genitourinary: Negative for difficulty urinating, dysuria and flank pain  Musculoskeletal: Negative for arthralgias and gait problem  Skin: Negative for rash and wound  Psychiatric/Behavioral: Negative for agitation, behavioral problems, confusion and sleep disturbance  All other systems reviewed and are negative        History     Allergies   Allergen Reactions    Bactrim [Sulfamethoxazole-Trimethoprim] Swelling     Swelling around eyes and red eyes    Aspirin Other (See Comments)     "feels like fist in my chest"    Ciprofloxacin     Nitrofurantoin     Other      "5mz/Tmp "  "1 60 mTab amme"     Per pt's allergy list        Past Medical History:   Diagnosis Date    A-fib ([facility] )     Arthritis     Assistance needed for mobility     pt can stand with assistance and transfer    Chronic pain disorder     Diabetes mellitus ([facility] )     NIDDM    Full dentures     History of transfusion     no adverse reaction    Hyperlipidemia     Irregular heart beat     Numbness and tingling of both feet     Skin abnormality     sacrum area - small red area, less than a dime size    Spinal stenosis     Stroke ([facility] )     Unable to ambulate     Urinary incontinence     ipg stimulator x3 repakcements,battery exchange today 2/14/2018    Wears glasses     Wheelchair dependence      Past Surgical History:   Procedure Laterality Date    BACK SURGERY      fusion    BLADDER SUSPENSION      CARPAL TUNNEL RELEASE Bilateral     CHOLANGIOGRAM N/A 6/4/2018    Procedure: CHOLANGIOGRAM;  Surgeon: Sulaiman Armenta MD;  Location: [facility];  Service: General    CHOLECYSTECTOMY LAPAROSCOPIC N/A 6/4/2018    Procedure: CHOLECYSTECTOMY LAPAROSCOPIC;  Surgeon: Sulaiman Armenta MD;  [facility]: [facility];  Service: General    COLONOSCOPY      DILATION AND CURETTAGE OF UTERUS      FRACTURE SURGERY Left     femur with hardware    HYSTERECTOMY      INSERTION / Mary Tiffanie / REVISION NEUROSTIMULATOR      JOINT REPLACEMENT Bilateral     knees    SACRAL NERVE STIMULATOR PLACEMENT N/A 2/14/2018    Procedure: REMOVE AND REPLACE IPG;  Surgeon: Tia Perry MD;  Location: [facility];  Service: UroGynecology           TONSILLECTOMY         Family History: non-contributory  Social History     Tobacco Use    Smoking status: Never Smoker    Smokeless tobacco: Never Used   Substance Use Topics    Alcohol use: Not Currently     Comment: very rare    Drug use: No         She lives at home with her daughter and son in law  Objective   Vital Signs   BP:   163/77    HR:  85     T:  96 6    RR:  18     O2Sat:  97% on RA      W:    Physical Exam   Constitutional: She appears well-developed  No distress  obese   HENT:   Head: Normocephalic and atraumatic  Mouth/Throat: Oropharynx is clear and moist  No oropharyngeal exudate  Eyes: Pupils are equal, round, and reactive to light  Conjunctivae are normal  Right eye exhibits no discharge  Left eye exhibits no discharge  No scleral icterus  Conjugate gaze but unable to perform upward gaze for me today  Neck: Neck supple  No JVD present  No tracheal deviation present  Cardiovascular: Normal rate, regular rhythm, normal heart sounds and intact distal pulses  Exam reveals no gallop and no friction rub  No murmur heard  Pulmonary/Chest: Effort normal and breath sounds normal  No respiratory distress  Decreased breath sounds on L lung field  Abdominal: Soft  Bowel sounds are normal  She exhibits no distension  There is no tenderness  Musculoskeletal: She exhibits deformity  She exhibits no edema or tenderness  Neurological: She is alert  She displays normal reflexes  No cranial nerve deficit  Oriented to self  Knows she is in a nursing home in [place], does not know the name of it  Able to follow single step commands and answer questions appropriately  Skin: No rash noted  She is not diaphoretic  No erythema  No pallor  Psychiatric:   Flat affect  Vitals reviewed          Pertinent Laboratory/Diagnostic Studies:     Lab Results   Component Value Date    WBC 6 86 03/12/2020    HGB 10 5 (L) 03/12/2020    HCT 34 2 (L) 03/12/2020    MCV 97 03/12/2020     03/12/2020     Lab Results   Component Value Date    GLUCOSE 113 12/17/2014    CALCIUM 9 1 03/12/2020     12/17/2014    K 4 2 03/12/2020    CO2 31 03/12/2020     03/12/2020    BUN 17 03/12/2020    CREATININE 0 48 (L) 03/12/2020     Lab Results   Component Value Date    MLI3MWHNYWHX 1 360 02/14/2020     Lab Results   Component Value Date    HGBA1C 5 8 02/05/2020         Current Medications     All medications reviewed and updated in [facility] EMR/Chart  Assessment and Plan     Moderate protein-calorie malnutrition ([facility] )  · In the setting of acute illness with less than 75% intake of estimated energy requirements  · She is status post PEG tube placement  · Had prolonged episode of and p o  in the hospital requiring parenteral nutrition via NG tube  · Currently tolerating about 50% of her meals, will supplement with tube feeds as appropriate  · Dietitian to evaluate and treat as appropriate  · Check weights twice per week to monitor closely  Oropharyngeal dysphagia  · Patient with significant development of dysphagia this hospitalization associated with generalized weakness and episode of lethargy  · She is now status post PEG tube due to fear that patient is unable to protect her airway  · Slowly improving since her mental status has improved as well  · Continue aspiration precautions  · Speech therapy to evaluate and treat as appropriate  · Will continue tube feeds for now to supplement her oral intake which is about 50% of her meals at this time  Thyroid nodule  · Incidental finding of CT H/an:  Thyroid nodules, the largest in the inferior right thyroid lobe measures up to 1 6 cm   · TSH within normal limits  · Recommend outpatient ultrasound for follow-up  Hyponatremia  · Possible history of chronic hyponatremia  · Currently on tube feeds to supplement p o  intake  · Will check BMP to monitor electrolytes      Generalized weakness  · Initially presented with generalized weakness associated with aphasia at home  · Initial stroke evaluation negative  Unable to obtain MRI due to history of spinal cord stimulator  · During hospitalization patient is neurological condition worsened with increased lethargy, worsening generalized weakness, and development of severe oropharyngeal dysphagia requiring placement of PEG tube  · She was re-evaluated by Neurology who recommended repeat CT of the head as well as addition of aspirin to her regimen which patient refused  · PT/OT/ST to evaluate and treat as appropriate  Dysarthria  · Currently not experiencing  · Patient has had intermittent episodes of dysarthria  · Evaluated by Neurology with extensive workup including a 24 hour EEG monitoring that showed non focal epileptiform discharges or electrographic seizures  · Unable to obtain MRI due to spinal cord stimulator  Abnormal CT of the chest  · CT of the chest had revealed left bronchial secretions/mucus plugging  · Evaluated by pulmonology, status post bronchoscopy  · Per pulmonology recommendations patient may benefit from tracheostomy due to difficulty protecting her airway  · Family refused at this time  · Continue aspiration precautions and supportive care  Acute respiratory insufficiency  · Patient had an episode of pneumonia during hospitalization, felt to be secondary to ongoing aspiration in the setting of worsening lethargy and dysphagia and it patient's inability to protect her own airway  · Evaluated by pulmonology in the hospital who recommended patient would benefit from tracheostomy placement which family declined  · Patient continues to require oxygen via nasal cannula, currently at 2 liters/minute  · Continue incentive spirometry and physical activity as tolerated  Paroxysmal atrial fibrillation (HCC)  · Rate controlled, continue metoprolol and digoxin  · Continue Eliquis for anticoagulation      Essential hypertension  · Currently slightly elevated above goal   · Logs reviewed since admission to [facility] she has been fairly well controlled  · Continue current management with metoprolol, amlodipine  · Avoid hypotension  · Check BMP to monitor renal function and electrolytes  Discussed at length with patient's daughter Keshia Moore  She states she hopes she is going to be able to take her mother home after rehabilitation  Remi Mazariegos MD  Geriatric Medicine  03/13/20    Please note:  Voice-recognition software may have been used in the preparation of this document  Occasional wrong word or "sound-alike" substitutions may have occurred due to the inherent limitations of voice recognition software  Interpretation should be guided by context

## 2020-03-13 NOTE — ASSESSMENT & PLAN NOTE
· Initially presented with generalized weakness associated with aphasia at home  · Initial stroke evaluation negative  Unable to obtain MRI due to history of spinal cord stimulator  · During hospitalization patient is neurological condition worsened with increased lethargy, worsening generalized weakness, and development of severe oropharyngeal dysphagia requiring placement of PEG tube  · She was re-evaluated by Neurology who recommended repeat CT of the head as well as addition of aspirin to her regimen which patient refused  · PT/OT/ST to evaluate and treat as appropriate

## 2020-03-13 NOTE — ASSESSMENT & PLAN NOTE
· In the setting of acute illness with less than 75% intake of estimated energy requirements  · She is status post PEG tube placement  · Had prolonged episode of and p o  in the hospital requiring parenteral nutrition via NG tube  · Currently tolerating about 50% of her meals, will supplement with tube feeds as appropriate  · Dietitian to evaluate and treat as appropriate  · Check weights twice per week to monitor closely

## 2020-03-13 NOTE — ASSESSMENT & PLAN NOTE
· Possible history of chronic hyponatremia  · Currently on tube feeds to supplement p o  intake  · Will check BMP to monitor electrolytes

## 2020-03-13 NOTE — ASSESSMENT & PLAN NOTE
· Currently not experiencing  · Patient has had intermittent episodes of dysarthria  · Evaluated by Neurology with extensive workup including a 24 hour EEG monitoring that showed non focal epileptiform discharges or electrographic seizures  · Unable to obtain MRI due to spinal cord stimulator

## 2020-03-13 NOTE — ASSESSMENT & PLAN NOTE
· Patient had an episode of pneumonia during hospitalization, felt to be secondary to ongoing aspiration in the setting of worsening lethargy and dysphagia and it patient's inability to protect her own airway  · Evaluated by pulmonology in the hospital who recommended patient would benefit from tracheostomy placement which family declined  · Patient continues to require oxygen via nasal cannula, currently at 2 liters/minute  · Continue incentive spirometry and physical activity as tolerated

## 2020-03-17 ENCOUNTER — TELEPHONE (OUTPATIENT)
Dept: NEUROLOGY | Facility: CLINIC | Age: 85
End: 2020-03-17

## 2020-03-17 NOTE — TELEPHONE ENCOUNTER
----- Message from Kev López PA-C sent at 2/19/2020  3:52 PM EST -----  Regarding: HFU    Diagnosis/Reason for follow-up: Stroke risk vs seizure  Subspecialty for follow-up: general  AP/Attending: Either - if AP rigoberto Vazquez has said he would staff with them as he is aware of this patient - please make sure AP knows this  Recommended timing for HFU: 4wks   Existing neurologist: NA  Tests/Labs/Imaging ordered: NA  Additional notes: NA    Thank you!

## 2020-03-18 ENCOUNTER — NURSING HOME VISIT (OUTPATIENT)
Dept: GERIATRICS | Facility: OTHER | Age: 85
End: 2020-03-18
Payer: COMMERCIAL

## 2020-03-18 VITALS
DIASTOLIC BLOOD PRESSURE: 60 MMHG | RESPIRATION RATE: 18 BRPM | TEMPERATURE: 97.4 F | WEIGHT: 199.8 LBS | HEART RATE: 79 BPM | SYSTOLIC BLOOD PRESSURE: 156 MMHG | BODY MASS INDEX: 34.3 KG/M2

## 2020-03-18 DIAGNOSIS — R53.1 GENERALIZED WEAKNESS: ICD-10-CM

## 2020-03-18 DIAGNOSIS — I10 ESSENTIAL HYPERTENSION: Chronic | ICD-10-CM

## 2020-03-18 DIAGNOSIS — E44.0 MODERATE PROTEIN-CALORIE MALNUTRITION (HCC): ICD-10-CM

## 2020-03-18 DIAGNOSIS — R13.12 OROPHARYNGEAL DYSPHAGIA: ICD-10-CM

## 2020-03-18 DIAGNOSIS — I48.0 PAROXYSMAL ATRIAL FIBRILLATION (HCC): Primary | Chronic | ICD-10-CM

## 2020-03-18 DIAGNOSIS — K59.00 CONSTIPATION, UNSPECIFIED CONSTIPATION TYPE: ICD-10-CM

## 2020-03-18 DIAGNOSIS — E87.1 HYPONATREMIA: ICD-10-CM

## 2020-03-18 DIAGNOSIS — E04.1 THYROID NODULE: ICD-10-CM

## 2020-03-18 DIAGNOSIS — R93.89 ABNORMAL CT OF THE CHEST: ICD-10-CM

## 2020-03-18 PROCEDURE — 99309 SBSQ NF CARE MODERATE MDM 30: CPT | Performed by: NURSE PRACTITIONER

## 2020-03-18 NOTE — ASSESSMENT & PLAN NOTE
· Pt with peg tube  · Bolus feedings  · Followed by dietician  · ST following pt   · Aspirate precautions

## 2020-03-18 NOTE — PROGRESS NOTES
[address] [address] [address] Wang  [phone]  GIANNA ESTEVEZ [facility]  STR Progress Note        NAME: Hanh Meza  AGE: 80 y o  SEX: female    DATE OF ENCOUNTER: 3/18/2020    Assessment and Plan     Problem List Items Addressed This Visit        Digestive    Oropharyngeal dysphagia     · Pt completing 50% or less of meals,  · PEG tube for bolus feedings  · ST following pt            Endocrine    Thyroid nodule     · Incidental finding at hospital  · Pt to followup as outpatient with ultrasound            Cardiovascular and Mediastinum    Paroxysmal atrial fibrillation (HCC) - Primary (Chronic)     · Doing well  · Continue with metoprolol and digoxin  · Continue with eliquis  · Monitor HR and BP         Essential hypertension (Chronic)     · Doing well  · Continue with amlodipine, digoxin, metoprolol  · Monitor BP            Other    Generalized weakness     · At baseline  · Lethargic, pt states she feels tired  · Restorative care for ADL and IADL  · PT/OT         Hyponatremia     · Currently on tube feeds  · Na 138  · Continue with tube feeds  · Monitor nutrition         Abnormal CT of the chest     · Mucus plugging found on CT during hospital stay  · Pulmonary recommending tracheostomy but family refusing at this time  · ST working with pt at this time  · Continue aspirate precautions         Moderate protein-calorie malnutrition ([address] )     · Pt with peg tube  · Bolus feedings  · Followed by dietician  · ST following pt   · Aspirate precautions         Constipation     · Pt states she always feels "full" in her stomach  · Pt states she has not had BM lately    · Abdomen slightly distended and hard non tender  · Prune juice with breakfast, pt states she doesn't like eggs for breakfast  · Continue to monitor BM               No orders of the defined types were placed in this encounter       - Counseling Documentation: patient was counseled regarding: diagnostic results, instructions for management, risk factor reductions, prognosis, patient and family education, impressions, risks and benefits of treatment options and importance of compliance with treatment    History of Present Illness     Glen Vargas 80 y o  female seen for follow-up of care and treatment plan for ambulatory dysfunction doing well at STR, afib rate controlled, HTN controlled with BP meds, generalized weakness at baseline, hyponatremia labs within normal limits, dysphagia doing well with tube feeds, malnutrition improving with tube feed, constipation controlled with diet  The following portions of the patient's history were reviewed and updated as appropriate: allergies, current medications, past family history, past medical history, past social history, past surgical history and problem list     Review of Systems     Review of Systems   Constitutional: Positive for activity change and appetite change (poor)  Negative for fatigue and fever  HENT: Negative  Eyes: Negative  Respiratory: Negative for cough, shortness of breath and wheezing  Cardiovascular: Negative for chest pain and leg swelling  Gastrointestinal: Positive for abdominal distention and constipation  Negative for abdominal pain and diarrhea  Genitourinary: Negative for difficulty urinating  Musculoskeletal: Positive for gait problem  Skin: Negative for rash  Neurological: Positive for weakness  Negative for headaches  Psychiatric/Behavioral: Positive for confusion  The patient is not nervous/anxious          Active Problem List     Patient Active Problem List   Diagnosis    Wheelchair dependence    Type 2 diabetes mellitus (HCC)    Paroxysmal atrial fibrillation (HCC)    Generalized weakness    Urinary incontinence    Spinal cord stimulator status    Overactive bladder    Essential hypertension    Cervical dystonia    History of stroke    Hyperlipidemia    Calcaneal spur of foot, left    Osteopenia of left foot    Stroke-like symptoms    Hyponatremia    Thyroid nodule    Weakness of both lower extremities    Dysarthria    Slurred speech    Aphasia    Oropharyngeal dysphagia    Pleural effusion, left    Abnormal CT of the chest    Goals of care, counseling/discussion    Moderate protein-calorie malnutrition (HCC)    Acute respiratory insufficiency    Constipation       Objective     /60   Pulse 79   Temp (!) 97 4 °F (36 3 °C)   Resp 18   Wt 90 6 kg (199 lb 12 8 oz)   BMI 34 30 kg/m²     Physical Exam   Constitutional: Vital signs are normal    HENT:   Head: Normocephalic and atraumatic  Eyes: Conjunctivae are normal    Neck: Normal range of motion  Cardiovascular: Normal rate, regular rhythm, S1 normal, S2 normal and normal heart sounds  No murmur heard  Pulmonary/Chest: Effort normal and breath sounds normal  No respiratory distress  She has no wheezes  Abdominal: Soft  Bowel sounds are normal  She exhibits no distension  There is no tenderness  Musculoskeletal: Normal range of motion  Neurological: She is alert  Skin: Skin is warm, dry and intact  Psychiatric: Her speech is normal and behavior is normal  Thought content normal  Cognition and memory are impaired  She exhibits a depressed mood  She exhibits abnormal recent memory and abnormal remote memory  Vitals reviewed  Pertinent Laboratory/Diagnostic Studies:  HFM labs reviewed    Current Medications   Medication list reviewed and updated today  Please see paper chart for updated medication list      Sam Duane  3/18/70244:03 PM      Name: Katy Tobin  : 1935  MRN: [id number]  DOS: 3/18/2020    Diagnoses:   Diagnosis ICD-10-CM Associated Orders   1  Paroxysmal atrial fibrillation (HCC) I48 0    2  Essential hypertension I10    3  Generalized weakness R53 1    4  Hyponatremia E87 1    5  Thyroid nodule E04 1    6  Oropharyngeal dysphagia R13 12    7  Abnormal CT of the chest R93 89    8   Moderate protein-calorie malnutrition ([facility] ) E44 0    9   Constipation, unspecified constipation type K59 00

## 2020-03-18 NOTE — ASSESSMENT & PLAN NOTE
· Pt states she always feels "full" in her stomach  · Pt states she has not had BM lately    · Abdomen slightly distended and hard non tender  · Prune juice with breakfast, pt states she doesn't like eggs for breakfast  · Continue to monitor BM

## 2020-03-18 NOTE — ASSESSMENT & PLAN NOTE
· Mucus plugging found on CT during hospital stay  · Pulmonary recommending tracheostomy but family refusing at this time  · ST working with pt at this time  · Continue aspirate precautions

## 2020-03-24 ENCOUNTER — NURSING HOME VISIT (OUTPATIENT)
Dept: GERIATRICS | Facility: OTHER | Age: 85
End: 2020-03-24
Payer: COMMERCIAL

## 2020-03-24 VITALS
BODY MASS INDEX: 30.73 KG/M2 | DIASTOLIC BLOOD PRESSURE: 62 MMHG | RESPIRATION RATE: 18 BRPM | SYSTOLIC BLOOD PRESSURE: 139 MMHG | TEMPERATURE: 98.2 F | HEART RATE: 73 BPM | WEIGHT: 179 LBS

## 2020-03-24 DIAGNOSIS — I48.0 PAROXYSMAL ATRIAL FIBRILLATION (HCC): Chronic | ICD-10-CM

## 2020-03-24 DIAGNOSIS — E11.9 TYPE 2 DIABETES MELLITUS WITHOUT COMPLICATION, WITHOUT LONG-TERM CURRENT USE OF INSULIN (HCC): Primary | Chronic | ICD-10-CM

## 2020-03-24 DIAGNOSIS — R53.1 GENERALIZED WEAKNESS: ICD-10-CM

## 2020-03-24 DIAGNOSIS — I10 ESSENTIAL HYPERTENSION: Chronic | ICD-10-CM

## 2020-03-24 PROCEDURE — 99309 SBSQ NF CARE MODERATE MDM 30: CPT | Performed by: NURSE PRACTITIONER

## 2020-03-24 NOTE — ASSESSMENT & PLAN NOTE
· Wheelchair bound  · Doing well at STR  · Continue with restorative care  · Assistance with ADL and IADL  · Fall precautions  · PT/OT

## 2020-03-24 NOTE — PROGRESS NOTES
[facility]  [address]  [phone]  GIANNA ESTEVEZ [facility] Progress Note        NAME: Melisa Rebolledo  AGE: 80 y o  SEX: female    DATE OF ENCOUNTER: 3/24/2020    Assessment and Plan     Problem List Items Addressed This Visit        Endocrine    Type 2 diabetes mellitus ([address] ) - Primary (Chronic)     · Controlled  · Continue with metformin   · Continue to monitor BS daily            Cardiovascular and Mediastinum    Paroxysmal atrial fibrillation (HCC) (Chronic)     · Rate controlled  · Continue metoprolol and digoxin  · Continue eliquis         Essential hypertension (Chronic)     · Controlled  · Continue with metoprolol, amlodipine, digoxin  · Continue to monitor BP            Other    Generalized weakness     · Wheelchair bound  · Doing well at STR  · Continue with restorative care  · Assistance with ADL and IADL  · Fall precautions  · PT/OT               No orders of the defined types were placed in this encounter       - Counseling Documentation: patient was counseled regarding: diagnostic results, instructions for management, risk factor reductions, prognosis, patient and family education, impressions, risks and benefits of treatment options and importance of compliance with treatment    History of Present Illness     Melisa Rebolledo 80 y o  female seen for follow-up of care and treatment plan for ambulatory dysfunction doing well at STR, DM2 controlled with metformin, afib rate controlled, HTN controlled with BP meds, Generalized weakness doing well with restorative care      The following portions of the patient's history were reviewed and updated as appropriate: allergies, current medications, past family history, past medical history, past social history, past surgical history and problem list     Review of Systems     Review of Systems   Constitutional: Negative for activity change, appetite change and fatigue  HENT: Positive for drooling and trouble swallowing  Eyes: Negative  Respiratory: Negative for cough, shortness of breath and wheezing  Cardiovascular: Negative for chest pain and leg swelling  Gastrointestinal: Negative for abdominal distention, abdominal pain, constipation and diarrhea  Genitourinary: Negative for difficulty urinating  Musculoskeletal: Positive for gait problem  Skin: Negative for rash  Neurological: Positive for weakness  Negative for headaches  Psychiatric/Behavioral: The patient is not nervous/anxious  Active Problem List     Patient Active Problem List   Diagnosis    Wheelchair dependence    Type 2 diabetes mellitus (HCC)    Paroxysmal atrial fibrillation (HCC)    Generalized weakness    Urinary incontinence    Spinal cord stimulator status    Overactive bladder    Essential hypertension    Cervical dystonia    History of stroke    Hyperlipidemia    Calcaneal spur of foot, left    Osteopenia of left foot    Stroke-like symptoms    Hyponatremia    Thyroid nodule    Weakness of both lower extremities    Dysarthria    Slurred speech    Aphasia    Oropharyngeal dysphagia    Pleural effusion, left    Abnormal CT of the chest    Goals of care, counseling/discussion    Moderate protein-calorie malnutrition (HCC)    Acute respiratory insufficiency    Constipation       Objective     /62   Pulse 73   Temp 98 2 °F (36 8 °C)   Resp 18   Wt 81 2 kg (179 lb)   BMI 30 73 kg/m²     Physical Exam   Constitutional: Vital signs are normal  She appears lethargic  HENT:   Head: Normocephalic and atraumatic  Eyes: Conjunctivae are normal    Neck: Normal range of motion  Cardiovascular: Normal rate, regular rhythm, S1 normal, S2 normal and normal heart sounds  No murmur heard  Pulmonary/Chest: Effort normal and breath sounds normal  No respiratory distress  She has no wheezes  Abdominal: Soft  Bowel sounds are normal  She exhibits no distension  There is no tenderness     Musculoskeletal: Normal range of motion  Generalized weakness, right sided weakness   Neurological: She appears lethargic  Skin: Skin is warm, dry and intact  Psychiatric: Her speech is normal and behavior is normal  Thought content normal  Cognition and memory are impaired  She exhibits a depressed mood  Vitals reviewed  Pertinent Laboratory/Diagnostic Studies:  HFM labs reviewed    Current Medications   Medication list reviewed and updated today  Please see paper chart for updated medication list      Tim Brooks  73/30319:25 PM      Name: Cassidy Kevin  : 1935  MRN: [id number]  DOS: 3/24/2020    Diagnoses:   Diagnosis ICD-10-CM Associated Orders   1  Type 2 diabetes mellitus without complication, without long-term current use of insulin (HCC) E11 9    2  Paroxysmal atrial fibrillation (HCC) I48 0    3  Essential hypertension I10    4   Generalized weakness R53 1

## 2020-03-24 NOTE — ASSESSMENT & PLAN NOTE
· Pt has tube feed  · Continue with bolus feeds  · Continue with altered diet  · Monitor for residual with tube feeds  · Monitor weights

## 2020-03-25 ENCOUNTER — NURSING HOME VISIT (OUTPATIENT)
Dept: GERIATRICS | Facility: OTHER | Age: 85
End: 2020-03-25
Payer: COMMERCIAL

## 2020-03-25 VITALS
WEIGHT: 179 LBS | HEART RATE: 76 BPM | BODY MASS INDEX: 30.73 KG/M2 | DIASTOLIC BLOOD PRESSURE: 72 MMHG | TEMPERATURE: 97 F | RESPIRATION RATE: 18 BRPM | SYSTOLIC BLOOD PRESSURE: 128 MMHG

## 2020-03-25 DIAGNOSIS — R53.1 GENERALIZED WEAKNESS: ICD-10-CM

## 2020-03-25 DIAGNOSIS — R29.898 WEAKNESS OF BOTH LOWER EXTREMITIES: Primary | ICD-10-CM

## 2020-03-25 PROCEDURE — 99308 SBSQ NF CARE LOW MDM 20: CPT | Performed by: NURSE PRACTITIONER

## 2020-03-25 NOTE — ASSESSMENT & PLAN NOTE
· Pt states she is having more pain in BLLE at night  · States it is waking her up and cannot sleep well  · Continue with gabapentin in am, add 100mg gabapentin at HS  · Monitor pain level

## 2020-03-25 NOTE — PROGRESS NOTES
[facility]  [address]  [phone]  GIANNA ESTEVEZ [facility]  STR Acute Note        NAME: Oliver Salazar  AGE: 80 y o  SEX: female    DATE OF ENCOUNTER: 3/25/2020    Assessment and Plan     Problem List Items Addressed This Visit        Nervous and Auditory    Weakness of both lower extremities - Primary     · Pt states she is having more pain in BLLE at night  · States it is waking her up and cannot sleep well  · Continue with gabapentin in am, add 100mg gabapentin at HS  · Monitor pain level            Other    Generalized weakness     · Wheelchair bound  · Needs assistance with ADL and IADL  · Continue with restorative care               No orders of the defined types were placed in this encounter       - Counseling Documentation: patient was counseled regarding: diagnostic results, instructions for management, risk factor reductions, prognosis, patient and family education, impressions, risks and benefits of treatment options and importance of compliance with treatment    History of Present Illness     Oliver Salazar 80 y o  female seen for follow-up of care and treatment plan for ambulatory dysfunction doing well at STR, bilateral lower extremity weakness controlled with gabapentin, generalized weakness doing well with restorative care  The following portions of the patient's history were reviewed and updated as appropriate: allergies, current medications, past family history, past medical history, past social history, past surgical history and problem list     Review of Systems     Review of Systems   Constitutional: Negative for activity change, appetite change and fatigue  HENT: Negative  Eyes: Negative  Respiratory: Negative for cough, shortness of breath and wheezing  Cardiovascular: Negative for chest pain and leg swelling  Gastrointestinal: Negative for abdominal distention, abdominal pain, constipation and diarrhea     Genitourinary: Negative for difficulty urinating  Musculoskeletal: Positive for gait problem  Skin: Negative for rash  Neurological: Negative for headaches  Psychiatric/Behavioral: The patient is not nervous/anxious  Active Problem List     Patient Active Problem List   Diagnosis    Wheelchair dependence    Type 2 diabetes mellitus (HCC)    Paroxysmal atrial fibrillation (HCC)    Generalized weakness    Urinary incontinence    Spinal cord stimulator status    Overactive bladder    Essential hypertension    Cervical dystonia    History of stroke    Hyperlipidemia    Calcaneal spur of foot, left    Osteopenia of left foot    Stroke-like symptoms    Hyponatremia    Thyroid nodule    Weakness of both lower extremities    Dysarthria    Slurred speech    Aphasia    Oropharyngeal dysphagia    Pleural effusion, left    Abnormal CT of the chest    Goals of care, counseling/discussion    Moderate protein-calorie malnutrition (HCC)    Acute respiratory insufficiency    Constipation       Objective     /72   Pulse 76   Temp (!) 97 °F (36 1 °C)   Resp 18   Wt 81 2 kg (179 lb)   BMI 30 73 kg/m²     Physical Exam   Constitutional: Vital signs are normal  She appears lethargic  HENT:   Head: Normocephalic and atraumatic  Eyes: Conjunctivae are normal    Neck: Normal range of motion  Cardiovascular: Normal rate, regular rhythm, S1 normal, S2 normal and normal heart sounds  No murmur heard  Pulmonary/Chest: Effort normal  No respiratory distress  She has decreased breath sounds  She has no wheezes  Abdominal: Soft  Bowel sounds are normal  She exhibits no distension  There is no tenderness  Musculoskeletal: Normal range of motion  Generalized weakness   Neurological: She appears lethargic  Skin: Skin is warm, dry and intact  Psychiatric: Her behavior is normal  Thought content normal  Her speech is slurred  Cognition and memory are normal  She exhibits a depressed mood     Vitals reviewed  Pertinent Laboratory/Diagnostic Studies:  HFM labs reviewed    Current Medications   Medication list reviewed and updated today  Please see paper chart for updated medication list      Marlena Dodson  /77421:39 PM      Name: Hipolito Dias  : 1935  MRN: [id number]  DOS: 3/25/2020    Diagnoses:   Diagnosis ICD-10-CM Associated Orders   1  Weakness of both lower extremities R29 898    2   Generalized weakness R53 1

## 2020-03-31 ENCOUNTER — NURSING HOME VISIT (OUTPATIENT)
Dept: GERIATRICS | Facility: OTHER | Age: 85
End: 2020-03-31
Payer: COMMERCIAL

## 2020-03-31 VITALS
BODY MASS INDEX: 30.55 KG/M2 | DIASTOLIC BLOOD PRESSURE: 62 MMHG | SYSTOLIC BLOOD PRESSURE: 132 MMHG | TEMPERATURE: 98.2 F | HEART RATE: 72 BPM | WEIGHT: 178 LBS | RESPIRATION RATE: 18 BRPM

## 2020-03-31 DIAGNOSIS — R53.1 GENERALIZED WEAKNESS: ICD-10-CM

## 2020-03-31 DIAGNOSIS — E11.9 TYPE 2 DIABETES MELLITUS WITHOUT COMPLICATION, WITHOUT LONG-TERM CURRENT USE OF INSULIN (HCC): Primary | Chronic | ICD-10-CM

## 2020-03-31 DIAGNOSIS — I10 ESSENTIAL HYPERTENSION: Chronic | ICD-10-CM

## 2020-03-31 DIAGNOSIS — I48.0 PAROXYSMAL ATRIAL FIBRILLATION (HCC): Chronic | ICD-10-CM

## 2020-03-31 PROCEDURE — 99309 SBSQ NF CARE MODERATE MDM 30: CPT | Performed by: NURSE PRACTITIONER

## 2020-03-31 NOTE — ASSESSMENT & PLAN NOTE
· Wheelchair bound  · History of CVA  · Left sided weakness  · Continue with restorative care  · PT/OT

## 2020-03-31 NOTE — ASSESSMENT & PLAN NOTE
Lab Results   Component Value Date    HGBA1C 5 8 02/05/2020   · well controlled  · Continue with metformin  · Continue to monitor

## 2020-03-31 NOTE — PROGRESS NOTES
[address] [address] [place]  [phone]  GIANNA ESTEVEZ [facility]  STR Progress Note        NAME: Tony Moralez  AGE: 80 y o  SEX: female    DATE OF ENCOUNTER: 3/31/2020    Assessment and Plan     Problem List Items Addressed This Visit        Endocrine    Type 2 diabetes mellitus ([address] ) - Primary (Chronic)       Lab Results   Component Value Date    HGBA1C 5 8 02/05/2020 ·   well controlled  · Continue with metformin  · Continue to monitor            Cardiovascular and Mediastinum    Paroxysmal atrial fibrillation (HCC) (Chronic)     · Rate controlled  · Continue with metoprolol and eliquis  · Monitor BP and HR         Essential hypertension (Chronic)     · Controlled  · Continue with amlodipine, digoxin, metoprolol  · Continue to monitor BP            Other    Generalized weakness     · Wheelchair bound  · History of CVA  · Left sided weakness  · Continue with restorative care  · PT/OT               No orders of the defined types were placed in this encounter       - Counseling Documentation: patient was counseled regarding: diagnostic results, instructions for management, risk factor reductions, prognosis, patient and family education, impressions, risks and benefits of treatment options and importance of compliance with treatment    History of Present Illness     Tony Moralez 80 y o  female seen for follow-up of care and treatment plan for ambulatory dysfunction doing well at STR, DM2 controlled with metformin, afib rate controlled, HTN controlled with BP meds, generalized weakness doing well with restorative care  The following portions of the patient's history were reviewed and updated as appropriate: allergies, current medications, past family history, past medical history, past social history, past surgical history and problem list     Review of Systems     Review of Systems   Constitutional: Negative for activity change, appetite change and fatigue  HENT: Negative      Eyes: Negative  Respiratory: Negative for cough, shortness of breath and wheezing  Cardiovascular: Negative for chest pain and leg swelling  Gastrointestinal: Negative for abdominal distention, abdominal pain, constipation and diarrhea  Genitourinary: Negative for difficulty urinating  Musculoskeletal: Negative  Skin: Negative for rash  Neurological: Positive for weakness  Negative for headaches  Psychiatric/Behavioral: Positive for confusion  The patient is not nervous/anxious  Active Problem List     Patient Active Problem List   Diagnosis    Wheelchair dependence    Type 2 diabetes mellitus (HCC)    Paroxysmal atrial fibrillation (HCC)    Generalized weakness    Urinary incontinence    Spinal cord stimulator status    Overactive bladder    Essential hypertension    Cervical dystonia    History of stroke    Hyperlipidemia    Calcaneal spur of foot, left    Osteopenia of left foot    Stroke-like symptoms    Hyponatremia    Thyroid nodule    Weakness of both lower extremities    Dysarthria    Slurred speech    Aphasia    Oropharyngeal dysphagia    Pleural effusion, left    Abnormal CT of the chest    Goals of care, counseling/discussion    Moderate protein-calorie malnutrition (HCC)    Acute respiratory insufficiency    Constipation       Objective     /62   Pulse 72   Temp 98 2 °F (36 8 °C)   Resp 18   Wt 80 7 kg (178 lb)   BMI 30 55 kg/m²     Physical Exam   Constitutional: Vital signs are normal  She appears well-developed  HENT:   Head: Normocephalic and atraumatic  Eyes: Conjunctivae are normal    Neck: Normal range of motion  Cardiovascular: Normal rate, regular rhythm, S1 normal, S2 normal and normal heart sounds  No murmur heard  Pulmonary/Chest: Effort normal  No respiratory distress  She has decreased breath sounds  She has no wheezes  Abdominal: Soft  Bowel sounds are normal  She exhibits no distension  There is no tenderness  Musculoskeletal: Normal range of motion  Generalized weakness, left sided weakness   Neurological: She is alert  Skin: Skin is warm, dry and intact  Psychiatric: She has a normal mood and affect  Her speech is normal and behavior is normal  Thought content normal  Cognition and memory are impaired  Vitals reviewed  Pertinent Laboratory/Diagnostic Studies:  HFM labs reviewed    Current Medications   Medication list reviewed and updated today  Please see paper chart for updated medication list      Marlena Dodson  3/31/33113:50 PM      Name: Hipolito Dias  : 1935  MRN: [id number]  DOS: 3/31/2020    Diagnoses:   Diagnosis ICD-10-CM Associated Orders   1  Type 2 diabetes mellitus without complication, without long-term current use of insulin (HCC) E11 9    2  Paroxysmal atrial fibrillation (HCC) I48 0    3  Essential hypertension I10    4   Generalized weakness R53 1

## 2020-04-16 ENCOUNTER — NURSING HOME VISIT (OUTPATIENT)
Dept: GERIATRICS | Facility: OTHER | Age: 85
End: 2020-04-16
Payer: COMMERCIAL

## 2020-04-16 VITALS
WEIGHT: 181.4 LBS | DIASTOLIC BLOOD PRESSURE: 72 MMHG | SYSTOLIC BLOOD PRESSURE: 132 MMHG | BODY MASS INDEX: 31.14 KG/M2 | RESPIRATION RATE: 20 BRPM | HEART RATE: 68 BPM | TEMPERATURE: 97.8 F

## 2020-04-16 DIAGNOSIS — I48.0 PAROXYSMAL ATRIAL FIBRILLATION (HCC): Chronic | ICD-10-CM

## 2020-04-16 DIAGNOSIS — I10 ESSENTIAL HYPERTENSION: Chronic | ICD-10-CM

## 2020-04-16 DIAGNOSIS — Z86.73 HISTORY OF STROKE: ICD-10-CM

## 2020-04-16 DIAGNOSIS — E11.9 TYPE 2 DIABETES MELLITUS WITHOUT COMPLICATION, WITHOUT LONG-TERM CURRENT USE OF INSULIN (HCC): Primary | Chronic | ICD-10-CM

## 2020-04-16 PROCEDURE — 99309 SBSQ NF CARE MODERATE MDM 30: CPT | Performed by: NURSE PRACTITIONER

## 2020-04-24 ENCOUNTER — TELEPHONE (OUTPATIENT)
Dept: OTHER | Facility: OTHER | Age: 85
End: 2020-04-24

## 2020-04-26 ENCOUNTER — TELEPHONE (OUTPATIENT)
Dept: OTHER | Facility: OTHER | Age: 85
End: 2020-04-26

## 2020-05-04 ENCOUNTER — TELEPHONE (OUTPATIENT)
Dept: NEUROLOGY | Facility: CLINIC | Age: 85
End: 2020-05-04

## 2020-05-05 ENCOUNTER — TELEMEDICINE (OUTPATIENT)
Dept: NEUROLOGY | Facility: CLINIC | Age: 85
End: 2020-05-05
Payer: COMMERCIAL

## 2020-05-05 ENCOUNTER — TELEPHONE (OUTPATIENT)
Dept: NEUROLOGY | Facility: CLINIC | Age: 85
End: 2020-05-05

## 2020-05-05 DIAGNOSIS — G24.3 CERVICAL DYSTONIA: Chronic | ICD-10-CM

## 2020-05-05 DIAGNOSIS — Z86.73 HISTORY OF STROKE: Primary | ICD-10-CM

## 2020-05-05 DIAGNOSIS — R29.90 STROKE-LIKE SYMPTOMS: ICD-10-CM

## 2020-05-05 DIAGNOSIS — I48.0 PAROXYSMAL ATRIAL FIBRILLATION (HCC): Chronic | ICD-10-CM

## 2020-05-05 PROCEDURE — 99215 OFFICE O/P EST HI 40 MIN: CPT | Performed by: PHYSICIAN ASSISTANT

## 2020-05-05 RX ORDER — SACCHAROMYCES BOULARDII 250 MG
250 CAPSULE ORAL 2 TIMES DAILY
COMMUNITY

## 2020-05-06 ENCOUNTER — TELEPHONE (OUTPATIENT)
Dept: NEUROLOGY | Facility: CLINIC | Age: 85
End: 2020-05-06

## 2020-05-22 ENCOUNTER — NURSING HOME VISIT (OUTPATIENT)
Dept: GERIATRICS | Facility: OTHER | Age: 85
End: 2020-05-22
Payer: COMMERCIAL

## 2020-05-22 DIAGNOSIS — E04.1 THYROID NODULE: ICD-10-CM

## 2020-05-22 DIAGNOSIS — R13.12 OROPHARYNGEAL DYSPHAGIA: Primary | ICD-10-CM

## 2020-05-22 DIAGNOSIS — I10 ESSENTIAL HYPERTENSION: Chronic | ICD-10-CM

## 2020-05-22 DIAGNOSIS — E11.9 TYPE 2 DIABETES MELLITUS WITHOUT COMPLICATION, WITHOUT LONG-TERM CURRENT USE OF INSULIN (HCC): Chronic | ICD-10-CM

## 2020-05-22 DIAGNOSIS — E44.0 MODERATE PROTEIN-CALORIE MALNUTRITION (HCC): ICD-10-CM

## 2020-05-22 DIAGNOSIS — I48.0 PAROXYSMAL ATRIAL FIBRILLATION (HCC): Chronic | ICD-10-CM

## 2020-05-22 DIAGNOSIS — R93.89 ABNORMAL CT OF THE CHEST: ICD-10-CM

## 2020-05-22 DIAGNOSIS — R53.1 GENERALIZED WEAKNESS: ICD-10-CM

## 2020-05-22 PROCEDURE — 99309 SBSQ NF CARE MODERATE MDM 30: CPT | Performed by: FAMILY MEDICINE

## 2020-05-27 PROBLEM — E87.1 HYPONATREMIA: Status: RESOLVED | Noted: 2020-02-04 | Resolved: 2020-05-27

## 2020-05-27 PROBLEM — R06.89 ACUTE RESPIRATORY INSUFFICIENCY: Status: RESOLVED | Noted: 2020-03-13 | Resolved: 2020-05-27

## 2020-06-26 ENCOUNTER — NURSING HOME VISIT (OUTPATIENT)
Dept: GERIATRICS | Facility: OTHER | Age: 85
End: 2020-06-26
Payer: COMMERCIAL

## 2020-06-26 VITALS
DIASTOLIC BLOOD PRESSURE: 78 MMHG | WEIGHT: 189 LBS | RESPIRATION RATE: 18 BRPM | BODY MASS INDEX: 32.44 KG/M2 | SYSTOLIC BLOOD PRESSURE: 138 MMHG | TEMPERATURE: 97.9 F

## 2020-06-26 DIAGNOSIS — E11.9 TYPE 2 DIABETES MELLITUS WITHOUT COMPLICATION, WITHOUT LONG-TERM CURRENT USE OF INSULIN (HCC): Chronic | ICD-10-CM

## 2020-06-26 DIAGNOSIS — I10 ESSENTIAL HYPERTENSION: Primary | Chronic | ICD-10-CM

## 2020-06-26 DIAGNOSIS — R53.1 GENERALIZED WEAKNESS: ICD-10-CM

## 2020-06-26 DIAGNOSIS — I48.0 PAROXYSMAL ATRIAL FIBRILLATION (HCC): Chronic | ICD-10-CM

## 2020-06-26 DIAGNOSIS — R13.12 OROPHARYNGEAL DYSPHAGIA: ICD-10-CM

## 2020-06-26 DIAGNOSIS — E44.0 MODERATE PROTEIN-CALORIE MALNUTRITION (HCC): ICD-10-CM

## 2020-06-26 PROCEDURE — 99309 SBSQ NF CARE MODERATE MDM 30: CPT | Performed by: NURSE PRACTITIONER

## 2020-07-27 ENCOUNTER — NURSING HOME VISIT (OUTPATIENT)
Dept: GERIATRICS | Facility: OTHER | Age: 85
End: 2020-07-27
Payer: COMMERCIAL

## 2020-07-27 DIAGNOSIS — E44.0 MODERATE PROTEIN-CALORIE MALNUTRITION (HCC): ICD-10-CM

## 2020-07-27 DIAGNOSIS — I10 ESSENTIAL HYPERTENSION: Chronic | ICD-10-CM

## 2020-07-27 DIAGNOSIS — R13.12 OROPHARYNGEAL DYSPHAGIA: Primary | ICD-10-CM

## 2020-07-27 DIAGNOSIS — I48.0 PAROXYSMAL ATRIAL FIBRILLATION (HCC): Chronic | ICD-10-CM

## 2020-07-27 DIAGNOSIS — E78.5 HYPERLIPIDEMIA, UNSPECIFIED HYPERLIPIDEMIA TYPE: ICD-10-CM

## 2020-07-27 DIAGNOSIS — E04.1 THYROID NODULE: ICD-10-CM

## 2020-07-27 DIAGNOSIS — G24.3 CERVICAL DYSTONIA: Chronic | ICD-10-CM

## 2020-07-27 DIAGNOSIS — R93.89 ABNORMAL CT OF THE CHEST: ICD-10-CM

## 2020-07-27 DIAGNOSIS — E11.9 TYPE 2 DIABETES MELLITUS WITHOUT COMPLICATION, WITHOUT LONG-TERM CURRENT USE OF INSULIN (HCC): Chronic | ICD-10-CM

## 2020-07-27 DIAGNOSIS — R29.898 WEAKNESS OF BOTH LOWER EXTREMITIES: ICD-10-CM

## 2020-07-27 DIAGNOSIS — K59.00 CONSTIPATION, UNSPECIFIED CONSTIPATION TYPE: ICD-10-CM

## 2020-07-27 DIAGNOSIS — R53.1 GENERALIZED WEAKNESS: ICD-10-CM

## 2020-07-27 PROCEDURE — 99316 NF DSCHRG MGMT 30 MIN+: CPT | Performed by: FAMILY MEDICINE

## 2020-07-29 NOTE — ASSESSMENT & PLAN NOTE
· Chronic bilateral lower extremity weakness  · Following short-term rehab course she transition to long-term care unit at [facility] [place]  · At this time, patient will discharge home with 24/7 care and assistance at all times  · Family well obtain a [employer]son lift to help with transfers

## 2020-07-29 NOTE — ASSESSMENT & PLAN NOTE
· Patient made significant progress with speech therapy  · Currently tolerating regular texture diet and thin liquids well  · Continues with 100% meal completion  · Has had a 10 lb weight gain since admission to [facility], attributable to increase caloric intake which is favorable  · Continue aspiration precautions  · Long discussion held with patient regarding PEG tube removal, she understands that she may have another episode of dysphagia requiring tube feeds and is not interesting in pursuing that again  · She states that PEG tube is very uncomfortable in she insists on having it removed today  · PEG tube removed successfully at bedside today

## 2020-07-29 NOTE — ASSESSMENT & PLAN NOTE
· Incidental finding on CT of the head during hospitalization showed thyroid nodules, the largest in the inferior right thyroid lobe measuring up to 1 6 cm   · TSH within normal limits  · Recommendation was made for outpatient follow-up ultrasound  · Follow-up with primary care physician

## 2020-07-29 NOTE — ASSESSMENT & PLAN NOTE
· Very much improved  · She had a 10 lb weight gain since admission to [facility], attributable to increased caloric intake  · PEG tube no longer in use, patient now tolerating diet  · PEG tube removed at bedside successfully today

## 2020-07-29 NOTE — ASSESSMENT & PLAN NOTE
· Currently stable and well controlled  · Continue metoprolol and amlodipine at current dose  · Continue to follow with PCP for monitoring of renal function and electrolytes

## 2020-07-29 NOTE — ASSESSMENT & PLAN NOTE
· Patient originally presented to the hospital complaining of generalized weakness and aphasia  · Initial stroke evaluation was negative  · Unable to obtain MRI due to history of spinal cord stimulator  · Patient had worsening neurological condition in the hospital with increased lethargy, worsening weakness, development of severe oropharyngeal dysphagia requiring placement of PEG tube acute respiratory failure  · Patient has now been tolerate dying well, PEG tube was removed at bedside successfully today without any complications  · Patient feels her aphasia and dysarthria have now resolved and she is back to previous baseline  · During hospital stay a repeat CT of the head was recommended per Neurology, which patient declined  · Patient declines use of aspirin due to history of side effects  · Continue supportive care and assistance with all transfers and ADLs at home  · Home PT if appropriate

## 2020-07-29 NOTE — ASSESSMENT & PLAN NOTE
· During most recent hospitalization patient had developed acute respiratory insufficiency  · CT of the chest revealed left bronchial secretions/mucus plugging  · Pulmonology had recommended tracheostomy at the time, family had refused  · Patient now completely weaned off of oxygen and doing well on room air  No respiratory symptoms

## 2020-07-29 NOTE — ASSESSMENT & PLAN NOTE
· Continue bowel regimen  · Encourage physical activity as tolerated  · Encourage adequate hydration

## 2020-07-29 NOTE — PROGRESS NOTES
[facility]  [address]  [address]  [phone]    [facility]  Discharge Summary        NAME: Joyce Youssef  AGE: 80 y o  SEX: female  :  1935  DATE OF ENCOUNTER:   2020  POS: 32 (LTC)    Assessment and Plan     Oropharyngeal dysphagia  · Patient made significant progress with speech therapy  · Currently tolerating regular texture diet and thin liquids well  · Continues with 100% meal completion  · Has had a 10 lb weight gain since admission to [facility], attributable to increase caloric intake which is favorable  · Continue aspiration precautions  · Long discussion held with patient regarding PEG tube removal, she understands that she may have another episode of dysphagia requiring tube feeds and is not interesting in pursuing that again  · She states that PEG tube is very uncomfortable in she insists on having it removed today  · PEG tube removed successfully at bedside today  Moderate protein-calorie malnutrition ([address] )  · Very much improved  · She had a 10 lb weight gain since admission to [facility], attributable to increased caloric intake  · PEG tube no longer in use, patient now tolerating diet  · PEG tube removed at bedside successfully today  Thyroid nodule  · Incidental finding on CT of the head during hospitalization showed thyroid nodules, the largest in the inferior right thyroid lobe measuring up to 1 6 cm   · TSH within normal limits  · Recommendation was made for outpatient follow-up ultrasound  · Follow-up with primary care physician  Type 2 diabetes mellitus ([address] )    Lab Results   Component Value Date    HGBA1C 5 8 2020   ·  Last A1c stable and well controlled in 2020  · Continue metformin 500 mg b i d  with meals  · Follow-up with primary care physician for recheck of A1c and adjustment of medications as appropriate      Paroxysmal atrial fibrillation (HCC)  · Currently rate controlled  · Continue metoprolol  · Continue Eliquis for anticoagulation  Essential hypertension  · Currently stable and well controlled  · Continue metoprolol and amlodipine at current dose  · Continue to follow with PCP for monitoring of renal function and electrolytes  Weakness of both lower extremities  · Chronic bilateral lower extremity weakness  · Following short-term rehab course she transition to long-term care unit at [facility]  · At this time, patient will discharge home with 24/7 care and assistance at all times  · Family well obtain a Bryson lift to help with transfers  Cervical dystonia  · Continue gabapentin and Tylenol  · Follow-up with neurology  Generalized weakness  · Patient originally presented to the hospital complaining of generalized weakness and aphasia  · Initial stroke evaluation was negative  · Unable to obtain MRI due to history of spinal cord stimulator  · Patient had worsening neurological condition in the hospital with increased lethargy, worsening weakness, development of severe oropharyngeal dysphagia requiring placement of PEG tube acute respiratory failure  · Patient has now been tolerate dying well, PEG tube was removed at bedside successfully today without any complications  · Patient feels her aphasia and dysarthria have now resolved and she is back to previous baseline  · During hospital stay a repeat CT of the head was recommended per Neurology, which patient declined  · Patient declines use of aspirin due to history of side effects  · Continue supportive care and assistance with all transfers and ADLs at home  · Home PT if appropriate  Abnormal CT of the chest  · During most recent hospitalization patient had developed acute respiratory insufficiency  · CT of the chest revealed left bronchial secretions/mucus plugging  · Pulmonology had recommended tracheostomy at the time, family had refused    · Patient now completely weaned off of oxygen and doing well on room air  No respiratory symptoms  Constipation  · Continue bowel regimen  · Encourage physical activity as tolerated  · Encourage adequate hydration  Hyperlipidemia  · Continue atorvastatin  I spent 39 minutes for this encounter  Greater than 50% of the total time was spent on coordination of care and direct patient care during a face-to-face visit with patient  Discharge instructions provided  All quesitons were answered  Julito Jean MD  Geriatric Medicine  07/27/2020       Chief Complaint   Patient seen and examined for follow up on chronic conditions  History of Present Illness     80-year-old female with hypertension, atrial fibrillation on Eliquis, diabetes mellitus, not on chronic insulin, arthritis, panic disorder, spinal stenosis with chronic bilateral lower extremity weakness and ambulatory dysfunction, status post spinal cord stimulator, overactive bladder, cervical dystonia, history of prior stroke, hyperlipidemia     Patient had been evaluated multiple times due to symptoms concerning for possible stroke and the hospital without any acute findings that would support a diagnosis of an actual stroke  During the most recent hospitalization patient was admitted to the hospital on 02/19/2020-03/12/2020  Admitted 1 day after she had been discharged from the hospital with complaints of worsening weakness and acute change in mental status  She was initially evaluated by neurology for possible stroke, however imaging was unremarkable for acute CVA  During this hospitalization her weakness was worsening and associated with aphasia and dysphagia with absent gag reflex  Patient underwent PEG tube placement on 82/51/1013 without complications  Due to worsening stroke-like symptoms, patient was re-evaluated by Neurology who recommended repeat CT of the head along with addition of aspirin    However, at that time patient had declined taking aspirin and she also decline repeat CT H       Following that hospitalization patient completed a course of short term rehab at [facility]  upon completion of therapy course, it was felt patient required higher level of care and she then transitioned to long term care setting      Per staff, patient has remained non ambulatory, requiring a abraham lift for transfers  She still has a PEG tube in place that is not currently in use since patient is now tolerating diet again  Previously we had discussed possibility of PEG tube removal   However, patient had a discussion with her neurologist to advised to keep PEG tube in place due to possibility of a recurrent episode  Patient had initially changed her mind, however at this time patient expresses wishes to proceed with removal of PEG tube  Per nursing staff discussion was held with patient's daughter who is in agreement  Patient is lying comfortably in bed, she reports feeling well, she is excited to be going home today  She states she will be going home with 24/7 assistance and care  She denies any pain  Her only complaint today is a bit of discomfort at the PEG tube site with scant bloody discharge  Denies any fever/chills, chest pain/shortness of breath, abdominal discomfort, nausea/vomiting, diarrhea/constipation or dysuria  The following portions of the patient's history were reviewed and updated as appropriate: allergies, current medications, past family history, past medical history, past social history, past surgical history and problem list     Review of Systems     A complete review of systems was performed  All negative, except as per HPI      History     Past Medical History:   Diagnosis Date    A-fib [facility])     Arthritis     Assistance needed for mobility     pt can stand with assistance and transfer    Chronic pain disorder     Diabetes mellitus ([facility] )     NIDDM    Full dentures     History of transfusion     no adverse reaction    Hyperlipidemia     Irregular heart beat     Numbness and tingling of both feet     Skin abnormality     sacrum area - small red area, less than a dime size    Spinal stenosis     Stroke ([address] )     Unable to ambulate     Urinary incontinence     ipg stimulator x3 repakcements,battery exchange today 2/14/2018    Wears glasses     Wheelchair dependence      Allergies   Allergen Reactions    Bactrim [Sulfamethoxazole-Trimethoprim] Swelling     Swelling around eyes and red eyes    Aspirin Other (See Comments)     "feels like fist in my chest"    Ciprofloxacin     Nitrofurantoin     Other      "5mz/Tmp "  "1 60 mTab amme"     Per pt's allergy list        Objective     Vital Signs  BP:   128/68       HR:  73 T:  97 6°    RR:  18 O2Sat:  96% on RA W:  188 3 lb    Physical Exam  GEN:         NAD  Non-toxic appearing  Looks frail and deconditioned  HEENT:     NC/AT  BLUE  EIOMI  Oropharynx moist and clear  No oral lesions  CV:            S1, S2   RRR, regular rate  No murmur appreciated  PULM:       Non-labored respirations  CTA bilaterally  ABD:          Soft, NT/ND  BSx4  PEG tube in place, LUQ  EXT:          No peripheral edema  NEURO:          Awake, alert and oriented x 3  Weakness noted on bilateral lower extremities  Has RUE tremor     Pertinent Laboratory/Diagnostic Studies     05/18/2020:  , K 4 1, Cl 106, CO2 32, BUN 20, CR 0 59, , EGFR 84  04/23/2020:  WBC 5 3, HB 10 8, HCT 32 0,      Current Medications     Current Medications Reviewed and updated in EMR  Monthly orders reviewed and signed in chart  Please note:  Voice-recognition software may have been used in the preparation of this document  Occasional wrong word or "sound-alike" substitutions may have occurred due to the inherent limitations of voice recognition software  Interpretation should be guided by context

## 2020-07-29 NOTE — ASSESSMENT & PLAN NOTE
Lab Results   Component Value Date    HGBA1C 5 8 02/05/2020   ·  Last A1c stable and well controlled in February 2020  · Continue metformin 500 mg b i d  with meals  · Follow-up with primary care physician for recheck of A1c and adjustment of medications as appropriate

## 2020-09-08 ENCOUNTER — TELEPHONE (OUTPATIENT)
Dept: NEUROLOGY | Facility: CLINIC | Age: 85
End: 2020-09-08

## 2020-10-03 NOTE — PLAN OF CARE
Problem: Nutrition/Hydration-ADULT  Goal: Nutrient/Hydration intake appropriate for improving, restoring or maintaining nutritional needs  Description  Monitor and assess patient's nutrition/hydration status for malnutrition  Collaborate with interdisciplinary team and initiate plan and interventions as ordered  Monitor patient's weight and dietary intake as ordered or per policy  Utilize nutrition screening tool and intervene as necessary  Determine patient's food preferences and provide high-protein, high-caloric foods as appropriate       INTERVENTIONS:  - Monitor oral intake, urinary output, labs, and treatment plans  - Assess nutrition and hydration status and recommend course of action  - Evaluate amount of meals eaten  - Assist patient with eating if necessary   - Allow adequate time for meals  - Recommend/ encourage appropriate diets, oral nutritional supplements, and vitamin/mineral supplements  - Order, calculate, and assess calorie counts as needed  - Recommend, monitor, and adjust tube feedings and TPN/PPN based on assessed needs  - Assess need for intravenous fluids  - Provide specific nutrition/hydration education as appropriate  - Include patient/family/caregiver in decisions related to nutrition  Outcome: Adequate for Discharge SUBJECTIVE:  Nathalie Younger is a 37 year old female who presents for lesion removal. This lesion has been present for 2 1/2 years, gets inflamed off and on. I recently evaluated this and recommended excision.    OBJECTIVE:  /68 (BP Location: Right arm, Patient Position: Chair, Cuff Size: Adult Regular)   Pulse 76   Temp 98.5  F (36.9  C) (Temporal)   Resp 16   Wt 77.7 kg (171 lb 4 oz)   LMP 08/24/2020   SpO2 98%   BMI 29.63 kg/m     On the right groin, just lateral to the labia majora, she has a purplish scarred lesion approximately 1.2 cm in diameter. No surrounding induration, no drainage.     ASSESSMENT:    ICD-10-CM    1. Inflamed sebaceous cyst  L72.3 EXC BENIGN SKIN LESION SCLP/NCK/HNDS/FEET/GEN 2.1-3.0 CM        PLAN:  After informed consent was obtained, using alcohol for cleansing followed by 1% Lidocaine without epinephrine for anesthetic and then Betadine x 3 for sterile prep, with sterile technique elliptical excision in total was performed, removing a piece of skin 1.5x2.5 cm in size.  Used 5 simple sutures of 4-0 Ethilon.  Steri strips were applied.  Sterile dressing is applied, and wound care instructions provided.  Be alert for any signs of cutaneous infection.  This benign lesion is not sent to pathology for evaluation.  The procedure was well tolerated without complications.    Return for suture removal in 5-7 days or sooner if concerns.    Gemini Arredondo MD

## 2020-10-08 NOTE — PROGRESS NOTES
[facility] Internal Medicine Progress Note  Patient: Merry Austin 80 y o  female   MRN: [id number]  PCP: Ricardo Garsia MD  Unit/Bed#: E4 -01 Encounter: [id number]  Date Of Visit: 18    Assessment:    Principal Problem:    Gallstone pancreatitis  Active Problems:    Wheelchair dependence    Diabetes mellitus ([facility] [address] )    A-fib ([facility] [address] )    Acute cholecystitis    Elevated troponin    HTN    Plan:  -s/p OR, doing well  Pain controlled  Tolerating diet  CBC tomorrow  -c/w aldactone  -ISS, accuchecks  -restart coumadin when ok with surgery    Subjective:   S/p OR  States feels fine  Denies pain, tolerating soft diet  Objective:     Vitals:   Temp (24hrs), Av °F (36 7 °C), Min:97 6 °F (36 4 °C), Max:98 7 °F (37 1 °C)    HR:  [60-82] 65  Resp:  [12-18] 18  BP: (157-198)/(53-90) 157/82  SpO2:  [98 %-100 %] 98 %  Body mass index is 31 44 kg/m²  Input and Output Summary (last 24 hours):        Intake/Output Summary (Last 24 hours) at 18 1806  Last data filed at 18 1150   Gross per 24 hour   Intake          2208 33 ml   Output              729 ml   Net          1479 33 ml       Physical Exam:     Physical Exam    GEN: NAD  HEENT: PERRL  CARDIO: s1 s2 RRR  LUNGS: CTA  ABD: Soft, NT, clean incision marks   EXT: No edema       Additional Data:     Labs:      Results from last 7 days  Lab Units 18  0550   WBC Thousand/uL 6 06   HEMOGLOBIN g/dL 11 8   HEMATOCRIT % 36 8   PLATELETS Thousands/uL 128*   NEUTROS PCT % 61   LYMPHS PCT % 27   MONOS PCT % 8   EOS PCT % 3       Results from last 7 days  Lab Units 18  0545   SODIUM mmol/L 141   POTASSIUM mmol/L 3 8   CHLORIDE mmol/L 106   CO2 mmol/L 27   BUN mg/dL 10   CREATININE mg/dL 0 90   CALCIUM mg/dL 8 6   TOTAL PROTEIN g/dL 6 1*   BILIRUBIN TOTAL mg/dL 0 56   ALK PHOS U/L 99   ALT U/L 89*   AST U/L 73*   GLUCOSE RANDOM mg/dL 90       Results from last 7 days  Lab Units 18  0550   INR  1 20*       * I Have Reviewed All Lab Refill request for hydrocodone   Last Refill 9/9/20 #60 w 0   LOV 7/6/20   NOV none     Data Listed Above  * Additional Pertinent Lab Tests Reviewed: [address] [facility] Admission Reviewed    Imaging:    Imaging Reports Reviewed Today Include: all available     Recent Cultures (last 7 days):       Results from last 7 days  Lab Units 05/31/18  2337   BLOOD CULTURE  No Growth at 72 hrs  No Growth at 72 hrs  Last 24 Hours Medication List:     Current Facility-Administered Medications:  digoxin 125 mcg Oral QAWALE Wren MD    fenofibrate 48 mg Oral Daily Cornelius Chol, PA-C    hydrALAZINE 5 mg Intravenous Q6H PRN Kelli Leavitt MD    HYDROcodone-acetaminophen 1 tablet Oral Q4H PRN Carmina Montgomery MD    HYDROcodone-acetaminophen 2 tablet Oral Q6H PRN Carmina Montgomery MD    insulin lispro 1-5 Units Subcutaneous Q6H [facility] Cornelius Chol, PA-C    morphine injection 2 mg Intravenous Q4H PRN Cornelius Chol, PA-C    nystatin  Topical BID Adilson Seay MD    ondansetron 4 mg Intravenous Q6H PRN Cornelius Chol, PA-C    oxybutynin 10 mg Oral Daily Cornelius Chol, PA-C    sodium chloride 75 mL/hr Intravenous Continuous Cornelius Chol, PA-C Last Rate: 75 mL/hr (06/04/18 1013)   spironolactone 50 mg Oral Daily Cornelius Chol, PA-C         Today, Patient Was Seen By: Adilson Seay MD    ** Please Note: This note has been constructed using a voice recognition system   **

## 2020-10-21 ENCOUNTER — APPOINTMENT (EMERGENCY)
Dept: CT IMAGING | Facility: HOSPITAL | Age: 85
DRG: 310 | End: 2020-10-21
Payer: COMMERCIAL

## 2020-10-21 ENCOUNTER — APPOINTMENT (EMERGENCY)
Dept: RADIOLOGY | Facility: HOSPITAL | Age: 85
DRG: 310 | End: 2020-10-21
Payer: COMMERCIAL

## 2020-10-21 ENCOUNTER — HOSPITAL ENCOUNTER (INPATIENT)
Facility: HOSPITAL | Age: 85
LOS: 5 days | Discharge: HOME HEALTH | DRG: 310 | End: 2020-10-26
Attending: EMERGENCY MEDICINE | Admitting: INTERNAL MEDICINE
Payer: COMMERCIAL

## 2020-10-21 DIAGNOSIS — R53.1 WEAKNESS GENERALIZED: ICD-10-CM

## 2020-10-21 DIAGNOSIS — R53.1 WEAKNESS: ICD-10-CM

## 2020-10-21 DIAGNOSIS — R53.1 GENERALIZED WEAKNESS: Primary | ICD-10-CM

## 2020-10-21 DIAGNOSIS — R53.83 FATIGUE: ICD-10-CM

## 2020-10-21 DIAGNOSIS — I48.0 PAROXYSMAL ATRIAL FIBRILLATION (HCC): Chronic | ICD-10-CM

## 2020-10-21 LAB
ALBUMIN SERPL BCP-MCNC: 3.5 G/DL (ref 3.5–5)
ALP SERPL-CCNC: 159 U/L (ref 46–116)
ALT SERPL W P-5'-P-CCNC: 29 U/L (ref 12–78)
ANION GAP SERPL CALCULATED.3IONS-SCNC: 6 MMOL/L (ref 4–13)
APTT PPP: 42 SECONDS (ref 23–37)
AST SERPL W P-5'-P-CCNC: 22 U/L (ref 5–45)
ATRIAL RATE: 80 BPM
BASOPHILS # BLD AUTO: 0.06 THOUSANDS/ΜL (ref 0–0.1)
BASOPHILS NFR BLD AUTO: 1 % (ref 0–1)
BILIRUB SERPL-MCNC: 0.36 MG/DL (ref 0.2–1)
BUN SERPL-MCNC: 15 MG/DL (ref 5–25)
CALCIUM SERPL-MCNC: 10.1 MG/DL (ref 8.3–10.1)
CHLORIDE SERPL-SCNC: 103 MMOL/L (ref 100–108)
CO2 SERPL-SCNC: 31 MMOL/L (ref 21–32)
CREAT SERPL-MCNC: 0.69 MG/DL (ref 0.6–1.3)
EOSINOPHIL # BLD AUTO: 0.24 THOUSAND/ΜL (ref 0–0.61)
EOSINOPHIL NFR BLD AUTO: 3 % (ref 0–6)
ERYTHROCYTE [DISTWIDTH] IN BLOOD BY AUTOMATED COUNT: 12.7 % (ref 11.6–15.1)
GFR SERPL CREATININE-BSD FRML MDRD: 80 ML/MIN/1.73SQ M
GLUCOSE SERPL-MCNC: 132 MG/DL (ref 65–140)
GLUCOSE SERPL-MCNC: 134 MG/DL (ref 65–140)
HCT VFR BLD AUTO: 43.1 % (ref 34.8–46.1)
HGB BLD-MCNC: 13.8 G/DL (ref 11.5–15.4)
IMM GRANULOCYTES # BLD AUTO: 0.03 THOUSAND/UL (ref 0–0.2)
IMM GRANULOCYTES NFR BLD AUTO: 0 % (ref 0–2)
INR PPP: 1.39 (ref 0.84–1.19)
LYMPHOCYTES # BLD AUTO: 2.19 THOUSANDS/ΜL (ref 0.6–4.47)
LYMPHOCYTES NFR BLD AUTO: 31 % (ref 14–44)
MCH RBC QN AUTO: 29.9 PG (ref 26.8–34.3)
MCHC RBC AUTO-ENTMCNC: 32 G/DL (ref 31.4–37.4)
MCV RBC AUTO: 94 FL (ref 82–98)
MONOCYTES # BLD AUTO: 0.49 THOUSAND/ΜL (ref 0.17–1.22)
MONOCYTES NFR BLD AUTO: 7 % (ref 4–12)
NEUTROPHILS # BLD AUTO: 3.99 THOUSANDS/ΜL (ref 1.85–7.62)
NEUTS SEG NFR BLD AUTO: 58 % (ref 43–75)
NRBC BLD AUTO-RTO: 0 /100 WBCS
PLATELET # BLD AUTO: 170 THOUSANDS/UL (ref 149–390)
PMV BLD AUTO: 10.3 FL (ref 8.9–12.7)
POTASSIUM SERPL-SCNC: 4.1 MMOL/L (ref 3.5–5.3)
PROT SERPL-MCNC: 7.7 G/DL (ref 6.4–8.2)
PROTHROMBIN TIME: 16.7 SECONDS (ref 11.6–14.5)
QRS AXIS: 270 DEGREES
QRSD INTERVAL: 126 MS
QT INTERVAL: 432 MS
QTC INTERVAL: 398 MS
RBC # BLD AUTO: 4.61 MILLION/UL (ref 3.81–5.12)
SODIUM SERPL-SCNC: 140 MMOL/L (ref 136–145)
T WAVE AXIS: -4 DEGREES
TROPONIN I SERPL-MCNC: <0.02 NG/ML
VENTRICULAR RATE: 51 BPM
WBC # BLD AUTO: 7 THOUSAND/UL (ref 4.31–10.16)

## 2020-10-21 PROCEDURE — 85730 THROMBOPLASTIN TIME PARTIAL: CPT | Performed by: EMERGENCY MEDICINE

## 2020-10-21 PROCEDURE — 82948 REAGENT STRIP/BLOOD GLUCOSE: CPT

## 2020-10-21 PROCEDURE — 71045 X-RAY EXAM CHEST 1 VIEW: CPT

## 2020-10-21 PROCEDURE — 85025 COMPLETE CBC W/AUTO DIFF WBC: CPT | Performed by: EMERGENCY MEDICINE

## 2020-10-21 PROCEDURE — 70450 CT HEAD/BRAIN W/O DYE: CPT

## 2020-10-21 PROCEDURE — 85610 PROTHROMBIN TIME: CPT | Performed by: EMERGENCY MEDICINE

## 2020-10-21 PROCEDURE — 81001 URINALYSIS AUTO W/SCOPE: CPT | Performed by: INTERNAL MEDICINE

## 2020-10-21 PROCEDURE — 93010 ELECTROCARDIOGRAM REPORT: CPT | Performed by: INTERNAL MEDICINE

## 2020-10-21 PROCEDURE — 84484 ASSAY OF TROPONIN QUANT: CPT | Performed by: EMERGENCY MEDICINE

## 2020-10-21 PROCEDURE — 80162 ASSAY OF DIGOXIN TOTAL: CPT | Performed by: INTERNAL MEDICINE

## 2020-10-21 PROCEDURE — 90662 IIV NO PRSV INCREASED AG IM: CPT | Performed by: INTERNAL MEDICINE

## 2020-10-21 PROCEDURE — 36415 COLL VENOUS BLD VENIPUNCTURE: CPT | Performed by: EMERGENCY MEDICINE

## 2020-10-21 PROCEDURE — 84443 ASSAY THYROID STIM HORMONE: CPT | Performed by: INTERNAL MEDICINE

## 2020-10-21 PROCEDURE — 99285 EMERGENCY DEPT VISIT HI MDM: CPT

## 2020-10-21 PROCEDURE — 93005 ELECTROCARDIOGRAM TRACING: CPT

## 2020-10-21 PROCEDURE — G1004 CDSM NDSC: HCPCS

## 2020-10-21 PROCEDURE — 99223 1ST HOSP IP/OBS HIGH 75: CPT | Performed by: INTERNAL MEDICINE

## 2020-10-21 PROCEDURE — G0008 ADMIN INFLUENZA VIRUS VAC: HCPCS | Performed by: INTERNAL MEDICINE

## 2020-10-21 PROCEDURE — 80053 COMPREHEN METABOLIC PANEL: CPT | Performed by: EMERGENCY MEDICINE

## 2020-10-21 PROCEDURE — 99285 EMERGENCY DEPT VISIT HI MDM: CPT | Performed by: EMERGENCY MEDICINE

## 2020-10-21 RX ORDER — DIGOXIN 125 MCG
125 TABLET ORAL DAILY
Status: DISCONTINUED | OUTPATIENT
Start: 2020-10-22 | End: 2020-10-23

## 2020-10-21 RX ORDER — PRIMIDONE 50 MG/1
50 TABLET ORAL 2 TIMES DAILY
Status: DISCONTINUED | OUTPATIENT
Start: 2020-10-22 | End: 2020-10-26 | Stop reason: HOSPADM

## 2020-10-21 RX ORDER — ATORVASTATIN CALCIUM 40 MG/1
40 TABLET, FILM COATED ORAL EVERY EVENING
Status: DISCONTINUED | OUTPATIENT
Start: 2020-10-21 | End: 2020-10-26 | Stop reason: HOSPADM

## 2020-10-21 RX ORDER — BISACODYL 10 MG
10 SUPPOSITORY, RECTAL RECTAL DAILY PRN
Status: DISCONTINUED | OUTPATIENT
Start: 2020-10-21 | End: 2020-10-26 | Stop reason: HOSPADM

## 2020-10-21 RX ORDER — ACETAMINOPHEN 325 MG/1
650 TABLET ORAL 3 TIMES DAILY
Status: DISCONTINUED | OUTPATIENT
Start: 2020-10-21 | End: 2020-10-26 | Stop reason: HOSPADM

## 2020-10-21 RX ORDER — ACETAMINOPHEN 325 MG/1
650 TABLET ORAL ONCE AS NEEDED
Status: COMPLETED | OUTPATIENT
Start: 2020-10-21 | End: 2020-10-22

## 2020-10-21 RX ORDER — SODIUM CHLORIDE 9 MG/ML
75 INJECTION, SOLUTION INTRAVENOUS CONTINUOUS
Status: DISCONTINUED | OUTPATIENT
Start: 2020-10-21 | End: 2020-10-24

## 2020-10-21 RX ORDER — AMLODIPINE BESYLATE 5 MG/1
5 TABLET ORAL DAILY
Status: DISCONTINUED | OUTPATIENT
Start: 2020-10-22 | End: 2020-10-26 | Stop reason: HOSPADM

## 2020-10-21 RX ORDER — MELATONIN
1000 EVERY EVENING
Status: DISCONTINUED | OUTPATIENT
Start: 2020-10-21 | End: 2020-10-26 | Stop reason: HOSPADM

## 2020-10-21 RX ORDER — FOLIC ACID 1 MG/1
1000 TABLET ORAL DAILY
Status: DISCONTINUED | OUTPATIENT
Start: 2020-10-22 | End: 2020-10-26 | Stop reason: HOSPADM

## 2020-10-21 RX ADMIN — INFLUENZA A VIRUS A/MICHIGAN/45/2015 X-275 (H1N1) ANTIGEN (FORMALDEHYDE INACTIVATED), INFLUENZA A VIRUS A/SINGAPORE/INFIMH-16-0019/2016 IVR-186 (H3N2) ANTIGEN (FORMALDEHYDE INACTIVATED), INFLUENZA B VIRUS B/PHUKET/3073/2013 ANTIGEN (FORMALDEHYDE INACTIVATED), AND INFLUENZA B VIRUS B/MARYLAND/15/2016 BX-69A ANTIGEN (FORMALDEHYDE INACTIVATED) 0.7 ML: 60; 60; 60; 60 INJECTION, SUSPENSION INTRAMUSCULAR at 23:29

## 2020-10-21 RX ADMIN — CYANOCOBALAMIN TAB 500 MCG 1000 MCG: 500 TAB at 23:25

## 2020-10-21 RX ADMIN — Medication 1000 UNITS: at 23:25

## 2020-10-21 RX ADMIN — ATORVASTATIN CALCIUM 40 MG: 40 TABLET, FILM COATED ORAL at 23:25

## 2020-10-21 RX ADMIN — SODIUM CHLORIDE 75 ML/HR: 0.9 INJECTION, SOLUTION INTRAVENOUS at 23:24

## 2020-10-22 ENCOUNTER — APPOINTMENT (INPATIENT)
Dept: ULTRASOUND IMAGING | Facility: HOSPITAL | Age: 85
DRG: 310 | End: 2020-10-22
Payer: COMMERCIAL

## 2020-10-22 PROBLEM — R74.8 ABNORMAL ALKALINE PHOSPHATASE TEST: Status: ACTIVE | Noted: 2020-10-22

## 2020-10-22 LAB
ALBUMIN SERPL BCP-MCNC: 2.9 G/DL (ref 3.5–5)
ALP SERPL-CCNC: 137 U/L (ref 46–116)
ALT SERPL W P-5'-P-CCNC: 22 U/L (ref 12–78)
ANION GAP SERPL CALCULATED.3IONS-SCNC: 8 MMOL/L (ref 4–13)
AST SERPL W P-5'-P-CCNC: 17 U/L (ref 5–45)
BACTERIA UR QL AUTO: ABNORMAL /HPF
BASOPHILS # BLD AUTO: 0.04 THOUSANDS/ΜL (ref 0–0.1)
BASOPHILS NFR BLD AUTO: 1 % (ref 0–1)
BILIRUB SERPL-MCNC: 0.26 MG/DL (ref 0.2–1)
BILIRUB UR QL STRIP: NEGATIVE
BUN SERPL-MCNC: 15 MG/DL (ref 5–25)
CALCIUM ALBUM COR SERPL-MCNC: 10.4 MG/DL (ref 8.3–10.1)
CALCIUM SERPL-MCNC: 9.5 MG/DL (ref 8.3–10.1)
CHLORIDE SERPL-SCNC: 105 MMOL/L (ref 100–108)
CLARITY UR: ABNORMAL
CO2 SERPL-SCNC: 25 MMOL/L (ref 21–32)
COLOR UR: YELLOW
CREAT SERPL-MCNC: 0.59 MG/DL (ref 0.6–1.3)
DIGOXIN SERPL-MCNC: 2.2 NG/ML (ref 0.8–2)
EOSINOPHIL # BLD AUTO: 0.15 THOUSAND/ΜL (ref 0–0.61)
EOSINOPHIL NFR BLD AUTO: 3 % (ref 0–6)
ERYTHROCYTE [DISTWIDTH] IN BLOOD BY AUTOMATED COUNT: 12.7 % (ref 11.6–15.1)
GFR SERPL CREATININE-BSD FRML MDRD: 84 ML/MIN/1.73SQ M
GLUCOSE SERPL-MCNC: 102 MG/DL (ref 65–140)
GLUCOSE SERPL-MCNC: 124 MG/DL (ref 65–140)
GLUCOSE SERPL-MCNC: 125 MG/DL (ref 65–140)
GLUCOSE SERPL-MCNC: 161 MG/DL (ref 65–140)
GLUCOSE SERPL-MCNC: 89 MG/DL (ref 65–140)
GLUCOSE UR STRIP-MCNC: NEGATIVE MG/DL
HCT VFR BLD AUTO: 36.9 % (ref 34.8–46.1)
HGB BLD-MCNC: 11.9 G/DL (ref 11.5–15.4)
HGB UR QL STRIP.AUTO: ABNORMAL
IMM GRANULOCYTES # BLD AUTO: 0.01 THOUSAND/UL (ref 0–0.2)
IMM GRANULOCYTES NFR BLD AUTO: 0 % (ref 0–2)
KETONES UR STRIP-MCNC: NEGATIVE MG/DL
LEUKOCYTE ESTERASE UR QL STRIP: ABNORMAL
LYMPHOCYTES # BLD AUTO: 1.07 THOUSANDS/ΜL (ref 0.6–4.47)
LYMPHOCYTES NFR BLD AUTO: 22 % (ref 14–44)
MCH RBC QN AUTO: 30 PG (ref 26.8–34.3)
MCHC RBC AUTO-ENTMCNC: 32.2 G/DL (ref 31.4–37.4)
MCV RBC AUTO: 93 FL (ref 82–98)
MONOCYTES # BLD AUTO: 0.39 THOUSAND/ΜL (ref 0.17–1.22)
MONOCYTES NFR BLD AUTO: 8 % (ref 4–12)
NEUTROPHILS # BLD AUTO: 3.3 THOUSANDS/ΜL (ref 1.85–7.62)
NEUTS SEG NFR BLD AUTO: 66 % (ref 43–75)
NITRITE UR QL STRIP: POSITIVE
NON-SQ EPI CELLS URNS QL MICRO: ABNORMAL /HPF
NRBC BLD AUTO-RTO: 0 /100 WBCS
PH UR STRIP.AUTO: 5.5 [PH]
PLATELET # BLD AUTO: 118 THOUSANDS/UL (ref 149–390)
PMV BLD AUTO: 10.4 FL (ref 8.9–12.7)
POTASSIUM SERPL-SCNC: 4.1 MMOL/L (ref 3.5–5.3)
PREALB SERPL-MCNC: 19.4 MG/DL (ref 18–40)
PROT SERPL-MCNC: 6.7 G/DL (ref 6.4–8.2)
PROT UR STRIP-MCNC: NEGATIVE MG/DL
RBC # BLD AUTO: 3.97 MILLION/UL (ref 3.81–5.12)
RBC #/AREA URNS AUTO: ABNORMAL /HPF
SODIUM SERPL-SCNC: 138 MMOL/L (ref 136–145)
SP GR UR STRIP.AUTO: 1.02 (ref 1–1.03)
TSH SERPL DL<=0.05 MIU/L-ACNC: 0.52 UIU/ML (ref 0.36–3.74)
TSH SERPL DL<=0.05 MIU/L-ACNC: 1.45 UIU/ML (ref 0.36–3.74)
UROBILINOGEN UR QL STRIP.AUTO: 0.2 E.U./DL
WBC # BLD AUTO: 4.96 THOUSAND/UL (ref 4.31–10.16)
WBC #/AREA URNS AUTO: ABNORMAL /HPF

## 2020-10-22 PROCEDURE — 99232 SBSQ HOSP IP/OBS MODERATE 35: CPT | Performed by: HOSPITALIST

## 2020-10-22 PROCEDURE — 84165 PROTEIN E-PHORESIS SERUM: CPT | Performed by: PATHOLOGY

## 2020-10-22 PROCEDURE — 84134 ASSAY OF PREALBUMIN: CPT | Performed by: HOSPITALIST

## 2020-10-22 PROCEDURE — 84166 PROTEIN E-PHORESIS/URINE/CSF: CPT | Performed by: PATHOLOGY

## 2020-10-22 PROCEDURE — 97163 PT EVAL HIGH COMPLEX 45 MIN: CPT

## 2020-10-22 PROCEDURE — 76705 ECHO EXAM OF ABDOMEN: CPT

## 2020-10-22 PROCEDURE — 99222 1ST HOSP IP/OBS MODERATE 55: CPT | Performed by: NURSE PRACTITIONER

## 2020-10-22 PROCEDURE — 84166 PROTEIN E-PHORESIS/URINE/CSF: CPT | Performed by: HOSPITALIST

## 2020-10-22 PROCEDURE — 84443 ASSAY THYROID STIM HORMONE: CPT | Performed by: HOSPITALIST

## 2020-10-22 PROCEDURE — 80053 COMPREHEN METABOLIC PANEL: CPT | Performed by: INTERNAL MEDICINE

## 2020-10-22 PROCEDURE — 82948 REAGENT STRIP/BLOOD GLUCOSE: CPT

## 2020-10-22 PROCEDURE — 97167 OT EVAL HIGH COMPLEX 60 MIN: CPT

## 2020-10-22 PROCEDURE — 85025 COMPLETE CBC W/AUTO DIFF WBC: CPT | Performed by: INTERNAL MEDICINE

## 2020-10-22 PROCEDURE — 84165 PROTEIN E-PHORESIS SERUM: CPT | Performed by: HOSPITALIST

## 2020-10-22 RX ADMIN — Medication 1000 UNITS: at 17:06

## 2020-10-22 RX ADMIN — METOPROLOL TARTRATE 25 MG: 25 TABLET, FILM COATED ORAL at 21:18

## 2020-10-22 RX ADMIN — INSULIN LISPRO 1 UNITS: 100 INJECTION, SOLUTION INTRAVENOUS; SUBCUTANEOUS at 17:07

## 2020-10-22 RX ADMIN — FOLIC ACID 1000 MCG: 1 TABLET ORAL at 08:15

## 2020-10-22 RX ADMIN — ACETAMINOPHEN 650 MG: 325 TABLET ORAL at 21:18

## 2020-10-22 RX ADMIN — ACETAMINOPHEN 650 MG: 325 TABLET ORAL at 08:15

## 2020-10-22 RX ADMIN — DIGOXIN 125 MCG: 125 TABLET ORAL at 08:15

## 2020-10-22 RX ADMIN — APIXABAN 5 MG: 5 TABLET, FILM COATED ORAL at 08:15

## 2020-10-22 RX ADMIN — APIXABAN 5 MG: 5 TABLET, FILM COATED ORAL at 17:06

## 2020-10-22 RX ADMIN — PRIMIDONE 50 MG: 50 TABLET ORAL at 17:07

## 2020-10-22 RX ADMIN — PRIMIDONE 50 MG: 50 TABLET ORAL at 08:15

## 2020-10-22 RX ADMIN — METOPROLOL TARTRATE 25 MG: 25 TABLET, FILM COATED ORAL at 08:15

## 2020-10-22 RX ADMIN — CEFTRIAXONE 1000 MG: 1 INJECTION, POWDER, FOR SOLUTION INTRAMUSCULAR; INTRAVENOUS at 16:06

## 2020-10-22 RX ADMIN — CYANOCOBALAMIN TAB 500 MCG 1000 MCG: 500 TAB at 17:06

## 2020-10-22 RX ADMIN — ATORVASTATIN CALCIUM 40 MG: 40 TABLET, FILM COATED ORAL at 17:06

## 2020-10-22 RX ADMIN — AMLODIPINE BESYLATE 5 MG: 5 TABLET ORAL at 08:15

## 2020-10-22 RX ADMIN — ACETAMINOPHEN 650 MG: 325 TABLET ORAL at 15:35

## 2020-10-22 RX ADMIN — ACETAMINOPHEN 650 MG: 325 TABLET ORAL at 17:06

## 2020-10-23 PROBLEM — R00.1 BRADYCARDIA: Status: ACTIVE | Noted: 2020-10-23

## 2020-10-23 LAB
ALBUMIN SERPL BCP-MCNC: 2.7 G/DL (ref 3.5–5)
ALBUMIN SERPL ELPH-MCNC: 3.2 G/DL (ref 3.5–5)
ALBUMIN SERPL ELPH-MCNC: 51.6 % (ref 52–65)
ALBUMIN UR ELPH-MCNC: 100 %
ALP SERPL-CCNC: 131 U/L (ref 46–116)
ALPHA1 GLOB MFR UR ELPH: 0 %
ALPHA1 GLOB SERPL ELPH-MCNC: 0.33 G/DL (ref 0.1–0.4)
ALPHA1 GLOB SERPL ELPH-MCNC: 5.3 % (ref 2.5–5)
ALPHA2 GLOB MFR UR ELPH: 0 %
ALPHA2 GLOB SERPL ELPH-MCNC: 0.77 G/DL (ref 0.4–1.2)
ALPHA2 GLOB SERPL ELPH-MCNC: 12.4 % (ref 7–13)
ALT SERPL W P-5'-P-CCNC: 30 U/L (ref 12–78)
ANION GAP SERPL CALCULATED.3IONS-SCNC: 7 MMOL/L (ref 4–13)
AST SERPL W P-5'-P-CCNC: 24 U/L (ref 5–45)
B-GLOBULIN MFR UR ELPH: 0 %
BASOPHILS # BLD AUTO: 0.04 THOUSANDS/ΜL (ref 0–0.1)
BASOPHILS NFR BLD AUTO: 1 % (ref 0–1)
BETA GLOB ABNORMAL SERPL ELPH-MCNC: 0.38 G/DL (ref 0.4–0.8)
BETA1 GLOB SERPL ELPH-MCNC: 6.1 % (ref 5–13)
BETA2 GLOB SERPL ELPH-MCNC: 6.1 % (ref 2–8)
BETA2+GAMMA GLOB SERPL ELPH-MCNC: 0.38 G/DL (ref 0.2–0.5)
BILIRUB SERPL-MCNC: 0.42 MG/DL (ref 0.2–1)
BUN SERPL-MCNC: 13 MG/DL (ref 5–25)
CALCIUM ALBUM COR SERPL-MCNC: 10 MG/DL (ref 8.3–10.1)
CALCIUM SERPL-MCNC: 9 MG/DL (ref 8.3–10.1)
CHLORIDE SERPL-SCNC: 106 MMOL/L (ref 100–108)
CO2 SERPL-SCNC: 26 MMOL/L (ref 21–32)
CREAT SERPL-MCNC: 0.69 MG/DL (ref 0.6–1.3)
EOSINOPHIL # BLD AUTO: 0.14 THOUSAND/ΜL (ref 0–0.61)
EOSINOPHIL NFR BLD AUTO: 4 % (ref 0–6)
ERYTHROCYTE [DISTWIDTH] IN BLOOD BY AUTOMATED COUNT: 12.8 % (ref 11.6–15.1)
GAMMA GLOB ABNORMAL SERPL ELPH-MCNC: 1.15 G/DL (ref 0.5–1.6)
GAMMA GLOB MFR UR ELPH: 0 %
GAMMA GLOB SERPL ELPH-MCNC: 18.5 % (ref 12–22)
GFR SERPL CREATININE-BSD FRML MDRD: 80 ML/MIN/1.73SQ M
GLUCOSE SERPL-MCNC: 115 MG/DL (ref 65–140)
GLUCOSE SERPL-MCNC: 115 MG/DL (ref 65–140)
GLUCOSE SERPL-MCNC: 122 MG/DL (ref 65–140)
GLUCOSE SERPL-MCNC: 165 MG/DL (ref 65–140)
GLUCOSE SERPL-MCNC: 99 MG/DL (ref 65–140)
HCT VFR BLD AUTO: 37.3 % (ref 34.8–46.1)
HGB BLD-MCNC: 12 G/DL (ref 11.5–15.4)
IGG/ALB SER: 1.07 {RATIO} (ref 1.1–1.8)
IMM GRANULOCYTES # BLD AUTO: 0.01 THOUSAND/UL (ref 0–0.2)
IMM GRANULOCYTES NFR BLD AUTO: 0 % (ref 0–2)
LYMPHOCYTES # BLD AUTO: 0.55 THOUSANDS/ΜL (ref 0.6–4.47)
LYMPHOCYTES NFR BLD AUTO: 15 % (ref 14–44)
MAGNESIUM SERPL-MCNC: 1.6 MG/DL (ref 1.6–2.6)
MCH RBC QN AUTO: 29.9 PG (ref 26.8–34.3)
MCHC RBC AUTO-ENTMCNC: 32.2 G/DL (ref 31.4–37.4)
MCV RBC AUTO: 93 FL (ref 82–98)
MONOCYTES # BLD AUTO: 0.67 THOUSAND/ΜL (ref 0.17–1.22)
MONOCYTES NFR BLD AUTO: 18 % (ref 4–12)
NEUTROPHILS # BLD AUTO: 2.3 THOUSANDS/ΜL (ref 1.85–7.62)
NEUTS SEG NFR BLD AUTO: 62 % (ref 43–75)
NRBC BLD AUTO-RTO: 0 /100 WBCS
PLATELET # BLD AUTO: 108 THOUSANDS/UL (ref 149–390)
PMV BLD AUTO: 10.6 FL (ref 8.9–12.7)
POTASSIUM SERPL-SCNC: 3.7 MMOL/L (ref 3.5–5.3)
PROT PATTERN SERPL ELPH-IMP: ABNORMAL
PROT PATTERN UR ELPH-IMP: NORMAL
PROT SERPL-MCNC: 6.2 G/DL (ref 6.4–8.2)
PROT SERPL-MCNC: 6.3 G/DL (ref 6.4–8.2)
PROT UR-MCNC: 16 MG/DL
RBC # BLD AUTO: 4.01 MILLION/UL (ref 3.81–5.12)
SODIUM SERPL-SCNC: 139 MMOL/L (ref 136–145)
WBC # BLD AUTO: 3.71 THOUSAND/UL (ref 4.31–10.16)

## 2020-10-23 PROCEDURE — 83735 ASSAY OF MAGNESIUM: CPT | Performed by: HOSPITALIST

## 2020-10-23 PROCEDURE — 99232 SBSQ HOSP IP/OBS MODERATE 35: CPT | Performed by: HOSPITALIST

## 2020-10-23 PROCEDURE — 97110 THERAPEUTIC EXERCISES: CPT

## 2020-10-23 PROCEDURE — 80053 COMPREHEN METABOLIC PANEL: CPT | Performed by: HOSPITALIST

## 2020-10-23 PROCEDURE — 85025 COMPLETE CBC W/AUTO DIFF WBC: CPT | Performed by: HOSPITALIST

## 2020-10-23 PROCEDURE — 82948 REAGENT STRIP/BLOOD GLUCOSE: CPT

## 2020-10-23 RX ADMIN — Medication 1000 UNITS: at 17:56

## 2020-10-23 RX ADMIN — PRIMIDONE 50 MG: 50 TABLET ORAL at 09:47

## 2020-10-23 RX ADMIN — APIXABAN 5 MG: 5 TABLET, FILM COATED ORAL at 09:47

## 2020-10-23 RX ADMIN — DIGOXIN 125 MCG: 125 TABLET ORAL at 09:47

## 2020-10-23 RX ADMIN — BISACODYL 10 MG: 10 SUPPOSITORY RECTAL at 22:46

## 2020-10-23 RX ADMIN — AMLODIPINE BESYLATE 5 MG: 5 TABLET ORAL at 09:47

## 2020-10-23 RX ADMIN — ACETAMINOPHEN 650 MG: 325 TABLET ORAL at 17:56

## 2020-10-23 RX ADMIN — ATORVASTATIN CALCIUM 40 MG: 40 TABLET, FILM COATED ORAL at 17:56

## 2020-10-23 RX ADMIN — SODIUM CHLORIDE 75 ML/HR: 0.9 INJECTION, SOLUTION INTRAVENOUS at 03:31

## 2020-10-23 RX ADMIN — FOLIC ACID 1000 MCG: 1 TABLET ORAL at 09:47

## 2020-10-23 RX ADMIN — ACETAMINOPHEN 650 MG: 325 TABLET ORAL at 09:47

## 2020-10-23 RX ADMIN — METOPROLOL TARTRATE 25 MG: 25 TABLET, FILM COATED ORAL at 09:47

## 2020-10-23 RX ADMIN — ACETAMINOPHEN 650 MG: 325 TABLET ORAL at 21:02

## 2020-10-23 RX ADMIN — PRIMIDONE 50 MG: 50 TABLET ORAL at 17:56

## 2020-10-23 RX ADMIN — CEFTRIAXONE 1000 MG: 1 INJECTION, POWDER, FOR SOLUTION INTRAMUSCULAR; INTRAVENOUS at 17:51

## 2020-10-23 RX ADMIN — APIXABAN 5 MG: 5 TABLET, FILM COATED ORAL at 17:56

## 2020-10-23 RX ADMIN — CYANOCOBALAMIN TAB 500 MCG 1000 MCG: 500 TAB at 17:56

## 2020-10-24 LAB
ANION GAP SERPL CALCULATED.3IONS-SCNC: 7 MMOL/L (ref 4–13)
BUN SERPL-MCNC: 17 MG/DL (ref 5–25)
CALCIUM SERPL-MCNC: 8.8 MG/DL (ref 8.3–10.1)
CHLORIDE SERPL-SCNC: 108 MMOL/L (ref 100–108)
CO2 SERPL-SCNC: 24 MMOL/L (ref 21–32)
CREAT SERPL-MCNC: 0.61 MG/DL (ref 0.6–1.3)
ERYTHROCYTE [DISTWIDTH] IN BLOOD BY AUTOMATED COUNT: 12.9 % (ref 11.6–15.1)
GFR SERPL CREATININE-BSD FRML MDRD: 83 ML/MIN/1.73SQ M
GLUCOSE SERPL-MCNC: 104 MG/DL (ref 65–140)
GLUCOSE SERPL-MCNC: 115 MG/DL (ref 65–140)
GLUCOSE SERPL-MCNC: 127 MG/DL (ref 65–140)
GLUCOSE SERPL-MCNC: 182 MG/DL (ref 65–140)
GLUCOSE SERPL-MCNC: 85 MG/DL (ref 65–140)
HCT VFR BLD AUTO: 34.5 % (ref 34.8–46.1)
HGB BLD-MCNC: 11.2 G/DL (ref 11.5–15.4)
MCH RBC QN AUTO: 30 PG (ref 26.8–34.3)
MCHC RBC AUTO-ENTMCNC: 32.5 G/DL (ref 31.4–37.4)
MCV RBC AUTO: 93 FL (ref 82–98)
PLATELET # BLD AUTO: 111 THOUSANDS/UL (ref 149–390)
PMV BLD AUTO: 10.3 FL (ref 8.9–12.7)
POTASSIUM SERPL-SCNC: 4.1 MMOL/L (ref 3.5–5.3)
RBC # BLD AUTO: 3.73 MILLION/UL (ref 3.81–5.12)
SODIUM SERPL-SCNC: 139 MMOL/L (ref 136–145)
WBC # BLD AUTO: 4.11 THOUSAND/UL (ref 4.31–10.16)

## 2020-10-24 PROCEDURE — 82948 REAGENT STRIP/BLOOD GLUCOSE: CPT

## 2020-10-24 PROCEDURE — 99232 SBSQ HOSP IP/OBS MODERATE 35: CPT | Performed by: HOSPITALIST

## 2020-10-24 PROCEDURE — 85027 COMPLETE CBC AUTOMATED: CPT | Performed by: HOSPITALIST

## 2020-10-24 PROCEDURE — 80048 BASIC METABOLIC PNL TOTAL CA: CPT | Performed by: HOSPITALIST

## 2020-10-24 RX ADMIN — APIXABAN 5 MG: 5 TABLET, FILM COATED ORAL at 17:27

## 2020-10-24 RX ADMIN — PRIMIDONE 50 MG: 50 TABLET ORAL at 09:36

## 2020-10-24 RX ADMIN — ACETAMINOPHEN 650 MG: 325 TABLET ORAL at 09:36

## 2020-10-24 RX ADMIN — ATORVASTATIN CALCIUM 40 MG: 40 TABLET, FILM COATED ORAL at 17:27

## 2020-10-24 RX ADMIN — PRIMIDONE 50 MG: 50 TABLET ORAL at 17:28

## 2020-10-24 RX ADMIN — FOLIC ACID 1000 MCG: 1 TABLET ORAL at 09:36

## 2020-10-24 RX ADMIN — CEFTRIAXONE 1000 MG: 1 INJECTION, POWDER, FOR SOLUTION INTRAMUSCULAR; INTRAVENOUS at 16:41

## 2020-10-24 RX ADMIN — AMLODIPINE BESYLATE 5 MG: 5 TABLET ORAL at 09:36

## 2020-10-24 RX ADMIN — CYANOCOBALAMIN TAB 500 MCG 1000 MCG: 500 TAB at 17:27

## 2020-10-24 RX ADMIN — Medication 1000 UNITS: at 17:27

## 2020-10-24 RX ADMIN — APIXABAN 5 MG: 5 TABLET, FILM COATED ORAL at 09:36

## 2020-10-24 RX ADMIN — ACETAMINOPHEN 650 MG: 325 TABLET ORAL at 20:14

## 2020-10-24 RX ADMIN — SODIUM CHLORIDE 75 ML/HR: 0.9 INJECTION, SOLUTION INTRAVENOUS at 05:47

## 2020-10-24 RX ADMIN — ACETAMINOPHEN 650 MG: 325 TABLET ORAL at 17:27

## 2020-10-25 LAB
GLUCOSE SERPL-MCNC: 103 MG/DL (ref 65–140)
GLUCOSE SERPL-MCNC: 108 MG/DL (ref 65–140)
GLUCOSE SERPL-MCNC: 137 MG/DL (ref 65–140)
GLUCOSE SERPL-MCNC: 192 MG/DL (ref 65–140)

## 2020-10-25 PROCEDURE — 99232 SBSQ HOSP IP/OBS MODERATE 35: CPT | Performed by: HOSPITALIST

## 2020-10-25 PROCEDURE — 82948 REAGENT STRIP/BLOOD GLUCOSE: CPT

## 2020-10-25 RX ADMIN — CYANOCOBALAMIN TAB 500 MCG 1000 MCG: 500 TAB at 17:12

## 2020-10-25 RX ADMIN — PRIMIDONE 50 MG: 50 TABLET ORAL at 09:21

## 2020-10-25 RX ADMIN — Medication 1000 UNITS: at 17:13

## 2020-10-25 RX ADMIN — CEFTRIAXONE 1000 MG: 1 INJECTION, POWDER, FOR SOLUTION INTRAMUSCULAR; INTRAVENOUS at 15:58

## 2020-10-25 RX ADMIN — APIXABAN 5 MG: 5 TABLET, FILM COATED ORAL at 17:12

## 2020-10-25 RX ADMIN — AMLODIPINE BESYLATE 5 MG: 5 TABLET ORAL at 09:20

## 2020-10-25 RX ADMIN — PRIMIDONE 50 MG: 50 TABLET ORAL at 17:15

## 2020-10-25 RX ADMIN — ATORVASTATIN CALCIUM 40 MG: 40 TABLET, FILM COATED ORAL at 17:12

## 2020-10-25 RX ADMIN — APIXABAN 5 MG: 5 TABLET, FILM COATED ORAL at 09:20

## 2020-10-25 RX ADMIN — ACETAMINOPHEN 650 MG: 325 TABLET ORAL at 15:58

## 2020-10-25 RX ADMIN — FOLIC ACID 1000 MCG: 1 TABLET ORAL at 09:20

## 2020-10-25 RX ADMIN — ACETAMINOPHEN 650 MG: 325 TABLET ORAL at 09:20

## 2020-10-25 RX ADMIN — ACETAMINOPHEN 650 MG: 325 TABLET ORAL at 21:07

## 2020-10-26 VITALS
OXYGEN SATURATION: 99 % | TEMPERATURE: 97.7 F | HEART RATE: 56 BPM | DIASTOLIC BLOOD PRESSURE: 59 MMHG | RESPIRATION RATE: 18 BRPM | SYSTOLIC BLOOD PRESSURE: 155 MMHG

## 2020-10-26 LAB
ANION GAP SERPL CALCULATED.3IONS-SCNC: 6 MMOL/L (ref 4–13)
BUN SERPL-MCNC: 19 MG/DL (ref 5–25)
CALCIUM SERPL-MCNC: 9 MG/DL (ref 8.3–10.1)
CHLORIDE SERPL-SCNC: 105 MMOL/L (ref 100–108)
CO2 SERPL-SCNC: 28 MMOL/L (ref 21–32)
CREAT SERPL-MCNC: 0.66 MG/DL (ref 0.6–1.3)
ERYTHROCYTE [DISTWIDTH] IN BLOOD BY AUTOMATED COUNT: 13 % (ref 11.6–15.1)
GFR SERPL CREATININE-BSD FRML MDRD: 81 ML/MIN/1.73SQ M
GLUCOSE SERPL-MCNC: 102 MG/DL (ref 65–140)
GLUCOSE SERPL-MCNC: 127 MG/DL (ref 65–140)
GLUCOSE SERPL-MCNC: 135 MG/DL (ref 65–140)
HCT VFR BLD AUTO: 36.6 % (ref 34.8–46.1)
HGB BLD-MCNC: 11.7 G/DL (ref 11.5–15.4)
MAGNESIUM SERPL-MCNC: 2 MG/DL (ref 1.6–2.6)
MCH RBC QN AUTO: 29.5 PG (ref 26.8–34.3)
MCHC RBC AUTO-ENTMCNC: 32 G/DL (ref 31.4–37.4)
MCV RBC AUTO: 92 FL (ref 82–98)
PLATELET # BLD AUTO: 143 THOUSANDS/UL (ref 149–390)
PMV BLD AUTO: 10.2 FL (ref 8.9–12.7)
POTASSIUM SERPL-SCNC: 4 MMOL/L (ref 3.5–5.3)
RBC # BLD AUTO: 3.97 MILLION/UL (ref 3.81–5.12)
SODIUM SERPL-SCNC: 139 MMOL/L (ref 136–145)
WBC # BLD AUTO: 4.23 THOUSAND/UL (ref 4.31–10.16)

## 2020-10-26 PROCEDURE — 85027 COMPLETE CBC AUTOMATED: CPT | Performed by: HOSPITALIST

## 2020-10-26 PROCEDURE — 82948 REAGENT STRIP/BLOOD GLUCOSE: CPT

## 2020-10-26 PROCEDURE — 80048 BASIC METABOLIC PNL TOTAL CA: CPT | Performed by: HOSPITALIST

## 2020-10-26 PROCEDURE — 99239 HOSP IP/OBS DSCHRG MGMT >30: CPT | Performed by: PHYSICIAN ASSISTANT

## 2020-10-26 PROCEDURE — 83735 ASSAY OF MAGNESIUM: CPT | Performed by: HOSPITALIST

## 2020-10-26 RX ADMIN — ACETAMINOPHEN 650 MG: 325 TABLET ORAL at 08:39

## 2020-10-26 RX ADMIN — APIXABAN 5 MG: 5 TABLET, FILM COATED ORAL at 08:39

## 2020-10-26 RX ADMIN — AMLODIPINE BESYLATE 5 MG: 5 TABLET ORAL at 08:39

## 2020-10-26 RX ADMIN — PRIMIDONE 50 MG: 50 TABLET ORAL at 08:40

## 2020-10-26 RX ADMIN — FOLIC ACID 1000 MCG: 1 TABLET ORAL at 08:39

## 2020-10-27 ENCOUNTER — DOCUMENTATION (OUTPATIENT)
Dept: SOCIAL WORK | Facility: HOSPITAL | Age: 85
End: 2020-10-27

## 2020-11-11 ENCOUNTER — OFFICE VISIT (OUTPATIENT)
Dept: CARDIOLOGY CLINIC | Facility: CLINIC | Age: 85
End: 2020-11-11
Payer: COMMERCIAL

## 2020-11-11 VITALS
BODY MASS INDEX: 32.44 KG/M2 | DIASTOLIC BLOOD PRESSURE: 68 MMHG | TEMPERATURE: 97.3 F | SYSTOLIC BLOOD PRESSURE: 122 MMHG | HEART RATE: 71 BPM | HEIGHT: 64 IN

## 2020-11-11 DIAGNOSIS — E78.00 PURE HYPERCHOLESTEROLEMIA: ICD-10-CM

## 2020-11-11 DIAGNOSIS — J90 PLEURAL EFFUSION, LEFT: ICD-10-CM

## 2020-11-11 DIAGNOSIS — E11.9 TYPE 2 DIABETES MELLITUS WITHOUT COMPLICATION, WITHOUT LONG-TERM CURRENT USE OF INSULIN (HCC): Chronic | ICD-10-CM

## 2020-11-11 DIAGNOSIS — I48.20 CHRONIC ATRIAL FIBRILLATION (HCC): ICD-10-CM

## 2020-11-11 DIAGNOSIS — I10 ESSENTIAL HYPERTENSION: Chronic | ICD-10-CM

## 2020-11-11 DIAGNOSIS — Z86.73 HISTORY OF STROKE: ICD-10-CM

## 2020-11-11 DIAGNOSIS — E44.0 MODERATE PROTEIN-CALORIE MALNUTRITION (HCC): ICD-10-CM

## 2020-11-11 DIAGNOSIS — R09.89 LEFT CAROTID BRUIT: ICD-10-CM

## 2020-11-11 DIAGNOSIS — R53.1 WEAKNESS: ICD-10-CM

## 2020-11-11 DIAGNOSIS — Z86.73 HISTORY OF CVA (CEREBROVASCULAR ACCIDENT): ICD-10-CM

## 2020-11-11 DIAGNOSIS — I48.0 PAROXYSMAL ATRIAL FIBRILLATION (HCC): Primary | Chronic | ICD-10-CM

## 2020-11-11 PROCEDURE — 93000 ELECTROCARDIOGRAM COMPLETE: CPT | Performed by: INTERNAL MEDICINE

## 2020-11-11 PROCEDURE — 99215 OFFICE O/P EST HI 40 MIN: CPT | Performed by: INTERNAL MEDICINE

## 2020-11-11 RX ORDER — ATORVASTATIN CALCIUM 40 MG/1
40 TABLET, FILM COATED ORAL EVERY EVENING
Qty: 90 TABLET | Refills: 3 | Status: SHIPPED | OUTPATIENT
Start: 2020-11-11 | End: 2021-12-06 | Stop reason: SDUPTHER

## 2020-11-11 RX ORDER — AMLODIPINE BESYLATE 5 MG/1
5 TABLET ORAL DAILY
Qty: 90 TABLET | Refills: 3 | Status: SHIPPED | OUTPATIENT
Start: 2020-11-11 | End: 2021-07-16 | Stop reason: SINTOL

## 2020-12-01 ENCOUNTER — HOSPITAL ENCOUNTER (OUTPATIENT)
Dept: NON INVASIVE DIAGNOSTICS | Facility: CLINIC | Age: 85
Discharge: HOME | End: 2020-12-01

## 2020-12-01 DIAGNOSIS — R09.89 LEFT CAROTID BRUIT: ICD-10-CM

## 2020-12-01 DIAGNOSIS — Z86.73 HISTORY OF CVA (CEREBROVASCULAR ACCIDENT): ICD-10-CM

## 2021-01-25 ENCOUNTER — HOSPITAL ENCOUNTER (OUTPATIENT)
Dept: NON INVASIVE DIAGNOSTICS | Facility: HOSPITAL | Age: 86
Discharge: HOME | End: 2021-01-25
Attending: INTERNAL MEDICINE
Payer: COMMERCIAL

## 2021-01-25 PROCEDURE — 93880 EXTRACRANIAL BILAT STUDY: CPT

## 2021-01-28 PROCEDURE — 93880 EXTRACRANIAL BILAT STUDY: CPT | Performed by: SURGERY

## 2021-02-08 ENCOUNTER — TELEPHONE (OUTPATIENT)
Dept: CARDIOLOGY CLINIC | Facility: CLINIC | Age: 86
End: 2021-02-08

## 2021-02-08 NOTE — TELEPHONE ENCOUNTER
MD LOIS Al[facility] Ambersonglo Morgan call patient and tell her that her carotid ultrasound demonstrated no significant blockages in the arteries of her neck to her brain  LM on machine, pt to call office back about results

## 2021-03-10 ENCOUNTER — TELEPHONE (OUTPATIENT)
Dept: CARDIOLOGY CLINIC | Facility: CLINIC | Age: 86
End: 2021-03-10

## 2021-03-10 NOTE — TELEPHONE ENCOUNTER
Due to the current pandemic of COVID-19, patient was given the option to par take in a video/telephone call, which was accepted by the patient  Patient was informed via telephone,the following: There is a possibility of a $27 00 fee to participate in this Virtual Visit  This is depending on your insurance company if they will cover that cost       Patients preferred phone number to contact is [phone]

## 2021-03-11 ENCOUNTER — TELEMEDICINE (OUTPATIENT)
Dept: CARDIOLOGY CLINIC | Facility: CLINIC | Age: 86
End: 2021-03-11
Payer: COMMERCIAL

## 2021-03-11 DIAGNOSIS — E78.00 PURE HYPERCHOLESTEROLEMIA: ICD-10-CM

## 2021-03-11 DIAGNOSIS — Z99.3 WHEELCHAIR DEPENDENCE: ICD-10-CM

## 2021-03-11 DIAGNOSIS — E11.9 TYPE 2 DIABETES MELLITUS WITHOUT COMPLICATION, WITHOUT LONG-TERM CURRENT USE OF INSULIN (HCC): Chronic | ICD-10-CM

## 2021-03-11 DIAGNOSIS — E44.0 MODERATE PROTEIN-CALORIE MALNUTRITION (HCC): ICD-10-CM

## 2021-03-11 DIAGNOSIS — Z86.73 HISTORY OF STROKE: ICD-10-CM

## 2021-03-11 DIAGNOSIS — I48.0 PAROXYSMAL ATRIAL FIBRILLATION (HCC): Primary | Chronic | ICD-10-CM

## 2021-03-11 DIAGNOSIS — I10 ESSENTIAL HYPERTENSION: Chronic | ICD-10-CM

## 2021-03-11 PROCEDURE — 1160F RVW MEDS BY RX/DR IN RCRD: CPT | Performed by: INTERNAL MEDICINE

## 2021-03-11 PROCEDURE — 1036F TOBACCO NON-USER: CPT | Performed by: INTERNAL MEDICINE

## 2021-03-11 PROCEDURE — 99214 OFFICE O/P EST MOD 30 MIN: CPT | Performed by: INTERNAL MEDICINE

## 2021-03-11 NOTE — PROGRESS NOTES
Virtual Brief Visit    Assessment/Plan:    Problem List Items Addressed This Visit        Cardiology Problems    Hyperlipidemia    Paroxysmal atrial fibrillation ([facility] [address] ) - Primary (Chronic)       Other    Essential hypertension (Chronic)    History of stroke    Moderate protein-calorie malnutrition ([facility] [address] )    Type 2 diabetes mellitus ([facility] [address] ) (Chronic)    Wheelchair dependence                Reason for visit is   Chief Complaint   Patient presents with    Atrial Fibrillation    Virtual Brief Visit        Encounter provider Martin Ayala MD    Provider located at [facility] [address] [place] [id number]    Recent Visits  Date Type Provider Dept   03/10/21 Telephone Faustino Conner [facility] recent visits within past 7 days and meeting all other requirements     Today's Visits  Date Type Provider Dept   03/11/21 [facility] and meeting all other requirements     Future Appointments  No visits were found meeting these conditions  Showing future appointments within next 150 days and meeting all other requirements        Plan:    1  Patient should have a repeat visit in 3 months in the office  2  EKG on return    After connecting through telephone, the patient was identified by name and date of birth  Zeo Elizondo was informed that this is a telemedicine visit and that the visit is being conducted through telephone  My office door was closed  No one else was in the room  She acknowledged consent and understanding of privacy and security of the platform  The patient has agreed to participate and understands she can discontinue the visit at any time  Patient is aware this is a billable service       Subjective    Zoe Elizondo is a 80 y o  female  With a history of paroxysmal atrial fibrillation, trifascicular heart block, multiple CVAs in the past on metoprolol and digoxin for rate control  Patient had a carotid ultrasound since her last visit which demonstrated no significant internal carotid artery obstructions  Patient denies chest discomfort or shortness of breath  Patient has no palpitations  Patient denies symptoms of dizziness, lightheadedness or near-syncope/syncope  Patient denies leg edema  Patient denies symptoms of orthopnea or paroxysmal nocturnal dyspnea          Past Medical History:   Diagnosis Date    A-fib [facility])     Arthritis     Assistance needed for mobility     pt can stand with assistance and transfer    Chronic pain disorder     Diabetes mellitus ([facility] [address] )     NIDDM    Full dentures     History of transfusion     no adverse reaction    Hyperlipidemia     Irregular heart beat     Numbness and tingling of both feet     Skin abnormality     sacrum area - small red area, less than a dime size    Spinal stenosis     Stroke ([facility] [address] )     Unable to ambulate     Urinary incontinence     ipg stimulator x3 repakcements,battery exchange today 2/14/2018    Wears glasses     Wheelchair dependence        Past Surgical History:   Procedure Laterality Date    BACK SURGERY      fusion    BLADDER SUSPENSION      CARPAL TUNNEL RELEASE Bilateral     CHOLANGIOGRAM N/A 6/4/2018    Procedure: CHOLANGIOGRAM;  Surgeon: Clif Swan MD;  [facility]: [facility];  Service: General    CHOLECYSTECTOMY LAPAROSCOPIC N/A 6/4/2018    Procedure: CHOLECYSTECTOMY LAPAROSCOPIC;  Surgeon: Clif Swan MD;  [facility];  Service: General    COLONOSCOPY      DILATION AND CURETTAGE OF UTERUS      FRACTURE SURGERY Left     femur with hardware    HYSTERECTOMY      INSERTION / Christin Messing / REVISION NEUROSTIMULATOR      JOINT REPLACEMENT Bilateral     knees    SACRAL NERVE STIMULATOR PLACEMENT N/A 2/14/2018    Procedure: REMOVE AND REPLACE IPG;  Surgeon: Jase Johnston MD;  [facility];  Service: UroGynecology           TONSILLECTOMY         Current Outpatient Medications   Medication Sig Dispense Refill    Acetaminophen (TYLENOL PO) Take by mouth 3 (three) times a day      amLODIPine (NORVASC) 5 mg tablet Take 1 tablet (5 mg total) by mouth daily 90 tablet 3    apixaban (ELIQUIS) 5 mg Take 1 tablet (5 mg total) by mouth 2 (two) times a day 180 tablet 3    atorvastatin (LIPITOR) 40 mg tablet Take 1 tablet (40 mg total) by mouth every evening 90 tablet 3    bisacodyl (DULCOLAX) 10 mg suppository Insert 1 suppository (10 mg total) into the rectum daily as needed for constipation 12 suppository 0    cholecalciferol (VITAMIN D3) 1,000 units tablet Take 1,000 Units by mouth every evening      CRANBERRY PO Take 30,000 mg by mouth      cyanocobalamin (VITAMIN B-12) 1,000 mcg tablet Take 1,000 mcg by mouth every evening        folic acid (FOLVITE) 1 mg tablet Take 1,000 mcg by mouth daily  3    metFORMIN (GLUCOPHAGE) 1000 MG tablet Take 0 5 tablets (500 mg total) by mouth 2 (two) times a day with meals (Patient taking differently: Take 500 mg by mouth daily with breakfast ) 60 tablet 0    metoprolol tartrate (LOPRESSOR) 25 mg tablet 0 5 tablets (12 5 mg total) by Per PEG Tube route every 12 (twelve) hours 90 tablet 3    Multiple Vitamin (MULTI-VITAMIN DAILY PO) Take by mouth      primidone (MYSOLINE) 50 mg tablet Take by mouth 2 (two) times a day      saccharomyces boulardii (FLORASTOR) 250 mg capsule Take 250 mg by mouth 2 (two) times a day       No current facility-administered medications for this visit  Allergies   Allergen Reactions    Bactrim [Sulfamethoxazole-Trimethoprim] Swelling     Swelling around eyes and red eyes    Aspirin Other (See Comments)     "feels like fist in my chest"    Ciprofloxacin     Nitrofurantoin     Other      "5mz/Tmp "  "1 60 mTab amme"     Per pt's allergy list        Review of Systems   Respiratory: Negative for cough, choking, chest tightness, shortness of breath and wheezing      Cardiovascular: Negative for chest pain, palpitations and leg swelling  Musculoskeletal: Negative for gait problem  Skin: Negative for rash  Neurological: Negative for dizziness, tremors, syncope, weakness, light-headedness, numbness and headaches  Psychiatric/Behavioral: Negative for agitation and behavioral problems  The patient is not hyperactive  Vitals:     CARDIOLOGY SUMMARY  04/17/2019 lipid profile: Total cholesterol 141, triglycerides 148, HDL 54, LDL calculated 57, non HDL cholesterol 87   02/05/2020 lipid profile:  Cholesterol 104, triglycerides 121, HDL 44, LDL calculated 36      Patient Active Problem List    Diagnosis Date Noted    Bradycardia 10/23/2020    Abnormal alkaline phosphatase test 10/22/2020    Constipation 03/18/2020    Moderate protein-calorie malnutrition ([facility] [address] ) 03/06/2020    Abnormal CT of the chest 03/05/2020    Goals of care, counseling/discussion 03/05/2020    Pleural effusion, left 03/04/2020    Oropharyngeal dysphagia 02/21/2020    Aphasia 02/19/2020    Slurred speech     Weakness of both lower extremities 02/15/2020    Dysarthria 02/15/2020    Thyroid nodule 02/05/2020    Stroke-like symptoms 02/04/2020    Calcaneal spur of foot, left 10/17/2019    Osteopenia of left foot 10/17/2019    Hyperlipidemia 07/18/2019    Cervical dystonia 07/11/2019    History of stroke 07/11/2019    Spinal cord stimulator status 06/11/2019    Overactive bladder 06/11/2019    Essential hypertension 06/11/2019    Weakness generalized 01/26/2019    Urinary incontinence 01/26/2019    Wheelchair dependence 05/31/2018    Type 2 diabetes mellitus ([facility] [address] ) 05/31/2018    Paroxysmal atrial fibrillation ([facility] [address] ) 05/31/2018       Social History     Socioeconomic History    Marital status: /Civil Union     Spouse name: Not on file    Number of children: Not on file    Years of education: Not on file    Highest education level: Not on file   Occupational History    Not on file   Social Needs    Financial resource strain: Not on file    Food insecurity     Worry: Not on file     Inability: Not on file    Transportation needs     Medical: Not on file     Non-medical: Not on file   Tobacco Use    Smoking status: Never Smoker    Smokeless tobacco: Never Used   Substance and Sexual Activity    Alcohol use: Not Currently     Comment: very rare    Drug use: No    Sexual activity: Not on file   Lifestyle    Physical activity     Days per week: Not on file     Minutes per session: Not on file    Stress: Not on file   Relationships    Social connections     Talks on phone: Not on file     Gets together: Not on file     Attends Worship service: Not on file     Active member of club or organization: Not on file     Attends meetings of clubs or organizations: Not on file     Relationship status: Not on file    Intimate partner violence     Fear of current or ex partner: Not on file     Emotionally abused: Not on file     Physically abused: Not on file     Forced sexual activity: Not on file   Other Topics Concern    Not on file   Social History Narrative    Not on file        Family History   Problem Relation Age of Onset    No Known Problems Mother     No Known Problems Father          RECENT TESTING AND LAB DETAILS    --------------------------------------------------------------------------------  ECHO:   Results for orders placed during the hospital encounter of 20   Echo complete with contrast if indicated    Narrative [address]  [phone]    Transthoracic Echocardiogram  2D, M-mode, Doppler, and Color Doppler    Study date:  2020    Patient: Anant Eden  MR number: [id number]  Account number: [de-identified]  : 1935  Age: 80 years  Gender: Female  Status: Inpatient  Location: Bedside  Height: 64 in  Weight: 187 lb  BP: 140/ 69 mmHg    Indications: TIA    Diagnoses: G45 9 - Transient cerebral ischemic attack, unspecified    Sonographer:  VANE Mendez  Primary Physician:  Maria C Freitas MD  Referring Physician:  Kvng Pro MD  Group:  [facility]  Cardiology Fellow: Rhina Tobar MD  Interpreting Physician:  Juan Constantino MD    SUMMARY    LEFT VENTRICLE:  Size was normal   Systolic function was normal  Ejection fraction was estimated to be 60 %  There were no regional wall motion abnormalities  There was mild concentric hypertrophy  Features were consistent with a pseudonormal left ventricular filling pattern, with concomitant abnormal relaxation and increased filling pressure (grade 2 diastolic dysfunction)  RIGHT VENTRICLE:  The size was normal   Systolic function was normal     LEFT ATRIUM:  The atrium was mildly dilated  MITRAL VALVE:  There was mild annular calcification  There was trace regurgitation  TRICUSPID VALVE:  There was mild regurgitation  Pulmonary artery systolic pressure was mildly increased  COMPARISONS:  There has been no significant interval change  Comparison was made with the previous study of 01-Jun-2018  HISTORY: PRIOR HISTORY: A-FIB,DM2,HTN,HLD,Stroke,Elevated troponin    PROCEDURE: The procedure was performed at the bedside  This was a routine study  The transthoracic approach was used  The study included complete 2D imaging, M-mode, complete spectral Doppler, and color Doppler  The heart rate was 61 bpm,  at the start of the study  Echocardiographic views were limited due to lung interference  Image quality was adequate  LEFT VENTRICLE: Size was normal  Systolic function was normal  Ejection fraction was estimated to be 60 %  There were no regional wall motion abnormalities  Wall thickness was mildly increased  There was mild concentric hypertrophy    DOPPLER: Features were consistent with a pseudonormal left ventricular filling pattern, with concomitant abnormal relaxation and increased filling pressure (grade 2 diastolic dysfunction)  RIGHT VENTRICLE: The size was normal  Systolic function was normal     LEFT ATRIUM: The atrium was mildly dilated  RIGHT ATRIUM: Size was normal     MITRAL VALVE: There was mild annular calcification  There was minimal diffuse thickening of the valve  There was normal leaflet separation  DOPPLER: The transmitral velocity was within the normal range  There was no evidence for stenosis  There was trace regurgitation  AORTIC VALVE: The valve was trileaflet  Leaflets exhibited mildly increased thickness, mild calcification, and lower normal cuspal separation  DOPPLER: Transaortic velocity was within the normal range  There was no evidence for stenosis  There was no regurgitation  TRICUSPID VALVE: The valve structure was normal  There was normal leaflet separation  DOPPLER: The transtricuspid velocity was within the normal range  There was no evidence for stenosis  There was mild regurgitation  Pulmonary artery  systolic pressure was mildly increased  Estimated peak PA pressure was 35 mmHg  PULMONIC VALVE: Leaflets exhibited normal thickness, no calcification, and normal cuspal separation  DOPPLER: The transpulmonic velocity was within the normal range  There was no evidence for stenosis  There was no significant  regurgitation  PERICARDIUM: There was no pericardial effusion  The pericardium was normal in appearance  AORTA: The root exhibited normal size  The ascending aorta was normal in size  SYSTEMIC VEINS: IVC: The inferior vena cava was normal in size and course   Respirophasic changes were normal     SYSTEM MEASUREMENT TABLES    2D  %FS: 29 82 %  Ao Diam: 3 09 cm  EDV(Teich): 46 22 ml  EF Biplane: 60 79 %  EF(Teich): 58 15 %  ESV(Teich): 19 34 ml  IVSd: 1 11 cm  LA Area: 20 36 cm2  LA Diam: 4 52 cm  LVEDV MOD A2C: 79 08 ml  LVEDV MOD A4C: 86 83 ml  LVEDV MOD BP: 84 86 ml  LVEF MOD A2C: 59 68 %  LVEF MOD A4C: 60 53 %  LVESV MOD A2C: 31 89 ml  LVESV MOD A4C: 34 27 ml  LVESV MOD BP: 33 27 ml  LVIDd: 3 36 cm  LVIDs: 2 36 cm  LVLd A2C: 7 59 cm  LVLd A4C: 7 2 cm  LVLs A2C: 6 36 cm  LVLs A4C: 6 16 cm  LVPWd: 0 98 cm  RA Area: 11 72 cm2  RVIDd: 3 37 cm  SV MOD A2C: 47 2 ml  SV MOD A4C: 52 56 ml  SV(Teich): 26 87 ml    CW  TR Vmax: 2 48 m/s  TR maxP 52 mmHg    MM  TAPSE: 1 42 cm    PW  E': 0 07 m/s  E/E': 16 01  MV A Ajit: 0 75 m/s  MV Dec Jerauld: 3 37 m/s2  MV DecT: 331 39 ms  MV E Ajit: 1 12 m/s  MV E/A Ratio: 1 5  MV PHT: 96 1 ms  MVA By PHT: 2 29 cm2    [employer] Accredited Echocardiography Laboratory    Prepared and electronically signed by    Daniele Lambert MD  Signed 2020 09:30:48       t   --------------------------------------------------------------------------------  CAROTIDS  Results for orders placed during the hospital encounter of 20   VAS carotid complete study    Narrative    [facility] REPORT  CLINICAL:  Indications:  Surveillance of carotid artery disease  Patient is asymptomatic  at this time  Patient is wheel chair bound  Operative History:  Denies Any Cardiovascular Surgeries  Risk Factors  The patient has history of Obesity and HLD  Clinical  Right Pressure:  152/ mm Hg,     FINDINGS:     Right        Impression  PSV  EDV (cm/s)  Direction of Flow  Ratio    Dist  ICA                 37          13                      0 58    Mid  ICA                  46          12                      0 71    Prox   ICA    1 - 49%      57          14                      0 89    Dist CCA                  67          12                              Mid CCA                   64          12                      0 78    Prox CCA                  82           0                              Ext Carotid               83           0                      1 31    Prox Vert                 68          13  Antegrade                   Subclavian               168           0                              Innominate                30 7                                 Left         PSV  EDV (cm/s)  Ratio    Dist  ICA     52          12   0 83    Mid  ICA      78          25   1 25    Prox  ICA    124          31   1 98    Dist CCA      65          16           Mid CCA       63          14   0 95    Prox CCA      66          14           Ext Carotid   66           2   1 06    Prox Vert     24           7           Subclavian   149           6                    CONCLUSION:     Impression  RIGHT:  There is <50% stenosis noted in the internal carotid artery  Plaque is  heterogenous and irregular  Vertebral artery flow is antegrade  There is no significant subclavian artery  disease  LEFT:  There is <50% stenosis noted in the internal carotid artery  Plaque is  heterogenous and irregular  Vertebral artery flow is antegrade  There is no significant subclavian artery  disease  Compared to previous study on 11/04/2011, there are no significant changes  Internal carotid artery stenosis determination by consensus criteria from:  Alexei Hopper et al  Carotid Artery Stenosis: Gray-Scale and Doppler US Diagnosis  - Society of Radiologists in [address] [place] [address], Radiology 2003;  885:       SIGNATURE:  Electronically Signed by: Storm Trujillo on 2021-01-28 10:32:19 AM      ======================================================    Lab Results   Component Value Date    WBC 4 23 (L) 10/26/2020    HGB 11 7 10/26/2020    HCT 36 6 10/26/2020    MCV 92 10/26/2020     (L) 10/26/2020      Lab Results   Component Value Date    SODIUM 139 10/26/2020    K 4 0 10/26/2020     10/26/2020    CO2 28 10/26/2020    BUN 19 10/26/2020    CREATININE 0 66 10/26/2020    GLUC 127 10/26/2020    CALCIUM 9 0 10/26/2020      Lab Results   Component Value Date    HGBA1C 6 2 (H) 08/20/2020      No results found for: CHOL  Lab Results   Component Value Date    HDL 44 02/05/2020    HDL 40 06/12/2019    HDL 40 06/02/2018     Lab Results Component Value Date    LDLCALC 36 02/05/2020    LDLCALC 43 06/12/2019    LDLCALC 39 06/02/2018     Lab Results   Component Value Date    TRIG 121 02/05/2020    TRIG 96 06/12/2019    TRIG 88 06/02/2018     No results found for: [place], [place]   Lab Results   Component Value Date    INR 1 39 (H) 10/21/2020    INR 1 58 (H) 02/29/2020    INR 1 17 02/19/2020    PROTIME 16 7 (H) 10/21/2020    PROTIME 18 4 (H) 02/29/2020    PROTIME 14 5 02/19/2020          Portions of the record may have been created with voice recognition software  Occasional wrong word or "sound a like" substitutions may have occurred due to the inherent limitations of voice recognition software  Read the chart carefully and recognize, using context, where substitutions have occurred  I spent 10 minutes directly with the patient during this visit    [id number] Thacker acknowledges that she has consented to an online visit or consultation  She understands that the online visit is based solely on information provided by her, and that, in the absence of a face-to-face physical evaluation by the physician, the diagnosis she receives is both limited and provisional in terms of accuracy and completeness  This is not intended to replace a full medical face-to-face evaluation by the physician  Katy Tobin understands and accepts these terms

## 2021-03-11 NOTE — LETTER
March 11, 2021     MD Tamiko Verdin [address]  [address] [place] [address]     Patient: Katy Tobin   YOB: 1935   Date of Visit: 3/11/2021       Dear Dr Carolina Getting: Thank you for referring Deb Lau to me for evaluation  Below are my notes for this consultation  If you have questions, please do not hesitate to call me  I look forward to following your patient along with you  Sincerely,        Feliberto Arauz MD        CC: No Recipients  Feliberto Arauz MD  3/11/2021  1:55 PM  Sign when Signing Visit    Virtual Brief Visit    Assessment/Plan:    Problem List Items Addressed This Visit        Cardiology Problems    Hyperlipidemia    Paroxysmal atrial fibrillation ([facility] ) - Primary (Chronic)       Other    Essential hypertension (Chronic)    History of stroke    Moderate protein-calorie malnutrition ([facility] )    Type 2 diabetes mellitus ([facility] ) (Chronic)    Wheelchair dependence                Reason for visit is   Chief Complaint   Patient presents with    Atrial Fibrillation    Virtual Brief Visit        Encounter provider Feliberto Arauz MD    Provider located at [address] [place] [id number]    Recent Visits  Date Type Provider Dept   03/10/21 Telephone David Staley recent visits within past 7 days and meeting all other requirements     Today's Visits  Date Type Provider Dept   03/11/21 MD Rebeka Ortega today's visits and meeting all other requirements     Future Appointments  No visits were found meeting these conditions  Showing future appointments within next 150 days and meeting all other requirements        Plan:    1  Patient should have a repeat visit in 3 months in the office  2  EKG on return    After connecting through telephone, the patient was identified by name and date of birth   Katy Tobin was informed that this is a telemedicine visit and that the visit is being conducted through telephone  My office door was closed  No one else was in the room  She acknowledged consent and understanding of privacy and security of the platform  The patient has agreed to participate and understands she can discontinue the visit at any time  Patient is aware this is a billable service  Subjective    Bibi Snyder is a 80 y o  female  With a history of paroxysmal atrial fibrillation, trifascicular heart block, multiple CVAs in the past on metoprolol and digoxin for rate control  Patient had a carotid ultrasound since her last visit which demonstrated no significant internal carotid artery obstructions  Patient denies chest discomfort or shortness of breath  Patient has no palpitations  Patient denies symptoms of dizziness, lightheadedness or near-syncope/syncope  Patient denies leg edema  Patient denies symptoms of orthopnea or paroxysmal nocturnal dyspnea          Past Medical History:   Diagnosis Date    A-fib [facility])     Arthritis     Assistance needed for mobility     pt can stand with assistance and transfer    Chronic pain disorder     Diabetes mellitus ([address] )     NIDDM    Full dentures     History of transfusion     no adverse reaction    Hyperlipidemia     Irregular heart beat     Numbness and tingling of both feet     Skin abnormality     sacrum area - small red area, less than a dime size    Spinal stenosis     Stroke ([address] )     Unable to ambulate     Urinary incontinence     ipg stimulator x3 repakcements,battery exchange today 2/14/2018    Wears glasses     Wheelchair dependence        Past Surgical History:   Procedure Laterality Date    BACK SURGERY      fusion    BLADDER SUSPENSION      CARPAL TUNNEL RELEASE Bilateral     CHOLANGIOGRAM N/A 6/4/2018    Procedure: CHOLANGIOGRAM;  Surgeon: Hira Austin MD;  [facility];  Service: General    CHOLECYSTECTOMY LAPAROSCOPIC N/A 6/4/2018    Procedure: CHOLECYSTECTOMY LAPAROSCOPIC;  Surgeon: James Schlatter, MD;  [facility];  Service: General    COLONOSCOPY      DILATION AND CURETTAGE OF UTERUS      FRACTURE SURGERY Left     femur with hardware    HYSTERECTOMY      INSERTION / [place] / REVISION NEUROSTIMULATOR      JOINT REPLACEMENT Bilateral     knees    SACRAL NERVE STIMULATOR PLACEMENT N/A 2/14/2018    Procedure: REMOVE AND REPLACE IPG;  Surgeon: Loida Winters MD;  [facility];  Service: UroGynecology           TONSILLECTOMY         Current Outpatient Medications   Medication Sig Dispense Refill    Acetaminophen (TYLENOL PO) Take by mouth 3 (three) times a day      amLODIPine (NORVASC) 5 mg tablet Take 1 tablet (5 mg total) by mouth daily 90 tablet 3    apixaban (ELIQUIS) 5 mg Take 1 tablet (5 mg total) by mouth 2 (two) times a day 180 tablet 3    atorvastatin (LIPITOR) 40 mg tablet Take 1 tablet (40 mg total) by mouth every evening 90 tablet 3    bisacodyl (DULCOLAX) 10 mg suppository Insert 1 suppository (10 mg total) into the rectum daily as needed for constipation 12 suppository 0    cholecalciferol (VITAMIN D3) 1,000 units tablet Take 1,000 Units by mouth every evening      CRANBERRY PO Take 30,000 mg by mouth      cyanocobalamin (VITAMIN B-12) 1,000 mcg tablet Take 1,000 mcg by mouth every evening        folic acid (FOLVITE) 1 mg tablet Take 1,000 mcg by mouth daily  3    metFORMIN (GLUCOPHAGE) 1000 MG tablet Take 0 5 tablets (500 mg total) by mouth 2 (two) times a day with meals (Patient taking differently: Take 500 mg by mouth daily with breakfast ) 60 tablet 0    metoprolol tartrate (LOPRESSOR) 25 mg tablet 0 5 tablets (12 5 mg total) by Per PEG Tube route every 12 (twelve) hours 90 tablet 3    Multiple Vitamin (MULTI-VITAMIN DAILY PO) Take by mouth      primidone (MYSOLINE) 50 mg tablet Take by mouth 2 (two) times a day      saccharomyces boulardii (FLORASTOR) 250 mg capsule Take 250 mg by mouth 2 (two) times a day       No current facility-administered medications for this visit  Allergies   Allergen Reactions    Bactrim [Sulfamethoxazole-Trimethoprim] Swelling     Swelling around eyes and red eyes    Aspirin Other (See Comments)     "feels like fist in my chest"    Ciprofloxacin     Nitrofurantoin     Other      "5mz/Tmp "  "1 60 mTab amme"     Per pt's allergy list        Review of Systems   Respiratory: Negative for cough, choking, chest tightness, shortness of breath and wheezing  Cardiovascular: Negative for chest pain, palpitations and leg swelling  Musculoskeletal: Negative for gait problem  Skin: Negative for rash  Neurological: Negative for dizziness, tremors, syncope, weakness, light-headedness, numbness and headaches  Psychiatric/Behavioral: Negative for agitation and behavioral problems  The patient is not hyperactive  Vitals:     CARDIOLOGY SUMMARY  04/17/2019 lipid profile: Total cholesterol 141, triglycerides 148, HDL 54, LDL calculated 57, non HDL cholesterol 87   02/05/2020 lipid profile:  Cholesterol 104, triglycerides 121, HDL 44, LDL calculated 36      Patient Active Problem List    Diagnosis Date Noted    Bradycardia 10/23/2020    Abnormal alkaline phosphatase test 10/22/2020    Constipation 03/18/2020    Moderate protein-calorie malnutrition ([facility] ) 03/06/2020    Abnormal CT of the chest 03/05/2020    Goals of care, counseling/discussion 03/05/2020    Pleural effusion, left 03/04/2020    Oropharyngeal dysphagia 02/21/2020    Aphasia 02/19/2020    Slurred speech     Weakness of both lower extremities 02/15/2020    Dysarthria 02/15/2020    Thyroid nodule 02/05/2020    Stroke-like symptoms 02/04/2020    Calcaneal spur of foot, left 10/17/2019    Osteopenia of left foot 10/17/2019    Hyperlipidemia 07/18/2019    Cervical dystonia 07/11/2019    History of stroke 07/11/2019    Spinal cord stimulator status 06/11/2019    Overactive bladder 06/11/2019    Essential hypertension 06/11/2019    Weakness generalized 01/26/2019    Urinary incontinence 01/26/2019    Wheelchair dependence 05/31/2018    Type 2 diabetes mellitus ([facility] ) 05/31/2018    Paroxysmal atrial fibrillation ([facility] ) 05/31/2018       Social History     Socioeconomic History    Marital status: /Civil Union     Spouse name: Not on file    Number of children: Not on file    Years of education: Not on file    Highest education level: Not on file   Occupational History    Not on file   Social Needs    Financial resource strain: Not on file    Food insecurity     Worry: Not on file     Inability: Not on file   [employer] needs     Medical: Not on file     Non-medical: Not on file   Tobacco Use    Smoking status: Never Smoker    Smokeless tobacco: Never Used   Substance and Sexual Activity    Alcohol use: Not Currently     Comment: very rare    Drug use: No    Sexual activity: Not on file   Lifestyle    Physical activity     Days per week: Not on file     Minutes per session: Not on file    Stress: Not on file   Relationships    Social connections     Talks on phone: Not on file     Gets together: Not on file     Attends Shinto service: Not on file     Active member of club or organization: Not on file     Attends meetings of clubs or organizations: Not on file     Relationship status: Not on file    Intimate partner violence     Fear of current or ex partner: Not on file     Emotionally abused: Not on file     Physically abused: Not on file     Forced sexual activity: Not on file   Other Topics Concern    Not on file   Social History Narrative    Not on file        Family History   Problem Relation Age of Onset    No Known Problems Mother     No Known Problems Father          RECENT TESTING AND LAB DETAILS    --------------------------------------------------------------------------------  ECHO:   Results for orders placed during the hospital encounter of 20   Echo complete with contrast if indicated    Narrative [address]  [facility], [address] [facility]  [phone]    Transthoracic Echocardiogram  2D, M-mode, Doppler, and Color Doppler    Study date:  2020    Patient: Erin Francis  MR number: [id number]  Account number: [de-identified]  : 1935  Age: 80 years  Gender: Female  Status: Inpatient  Location: Bedside  Height: 64 in  Weight: 187 lb  BP: 140/ 69 mmHg    Indications: TIA    Diagnoses: G45 9 - Transient cerebral ischemic attack, unspecified    Sonographer:  VANE Osborne  Primary Physician:  Daren Cummins MD  Referring Physician:  Haily Muller MD  Group:  [facility]  Cardiology Fellow: Christine Nava MD  Interpreting Physician:  Reina Porras MD    SUMMARY    LEFT VENTRICLE:  Size was normal   Systolic function was normal  Ejection fraction was estimated to be 60 %  There were no regional wall motion abnormalities  There was mild concentric hypertrophy  Features were consistent with a pseudonormal left ventricular filling pattern, with concomitant abnormal relaxation and increased filling pressure (grade 2 diastolic dysfunction)  RIGHT VENTRICLE:  The size was normal   Systolic function was normal     LEFT ATRIUM:  The atrium was mildly dilated  MITRAL VALVE:  There was mild annular calcification  There was trace regurgitation  TRICUSPID VALVE:  There was mild regurgitation  Pulmonary artery systolic pressure was mildly increased  COMPARISONS:  There has been no significant interval change  Comparison was made with the previous study of 2018  HISTORY: PRIOR HISTORY: A-FIB,DM2,HTN,HLD,Stroke,Elevated troponin    PROCEDURE: The procedure was performed at the bedside  This was a routine study  The transthoracic approach was used   The study included complete 2D imaging, M-mode, complete spectral Doppler, and color Doppler  The heart rate was 61 bpm,  at the start of the study  Echocardiographic views were limited due to lung interference  Image quality was adequate  LEFT VENTRICLE: Size was normal  Systolic function was normal  Ejection fraction was estimated to be 60 %  There were no regional wall motion abnormalities  Wall thickness was mildly increased  There was mild concentric hypertrophy  DOPPLER: Features were consistent with a pseudonormal left ventricular filling pattern, with concomitant abnormal relaxation and increased filling pressure (grade 2 diastolic dysfunction)  RIGHT VENTRICLE: The size was normal  Systolic function was normal     LEFT ATRIUM: The atrium was mildly dilated  RIGHT ATRIUM: Size was normal     MITRAL VALVE: There was mild annular calcification  There was minimal diffuse thickening of the valve  There was normal leaflet separation  DOPPLER: The transmitral velocity was within the normal range  There was no evidence for stenosis  There was trace regurgitation  AORTIC VALVE: The valve was trileaflet  Leaflets exhibited mildly increased thickness, mild calcification, and lower normal cuspal separation  DOPPLER: Transaortic velocity was within the normal range  There was no evidence for stenosis  There was no regurgitation  TRICUSPID VALVE: The valve structure was normal  There was normal leaflet separation  DOPPLER: The transtricuspid velocity was within the normal range  There was no evidence for stenosis  There was mild regurgitation  Pulmonary artery  systolic pressure was mildly increased  Estimated peak PA pressure was 35 mmHg  PULMONIC VALVE: Leaflets exhibited normal thickness, no calcification, and normal cuspal separation  DOPPLER: The transpulmonic velocity was within the normal range  There was no evidence for stenosis  There was no significant  regurgitation  PERICARDIUM: There was no pericardial effusion   The pericardium was normal in appearance  AORTA: The root exhibited normal size  The ascending aorta was normal in size  SYSTEMIC VEINS: IVC: The inferior vena cava was normal in size and course  Respirophasic changes were normal     SYSTEM MEASUREMENT TABLES    2D  %FS: 29 82 %  Ao Diam: 3 09 cm  EDV(Teich): 46 22 ml  EF Biplane: 60 79 %  EF(Teich): 58 15 %  ESV(Teich): 19 34 ml  IVSd: 1 11 cm  LA Area: 20 36 cm2  LA Diam: 4 52 cm  LVEDV MOD A2C: 79 08 ml  LVEDV MOD A4C: 86 83 ml  LVEDV MOD BP: 84 86 ml  LVEF MOD A2C: 59 68 %  LVEF MOD A4C: 60 53 %  LVESV MOD A2C: 31 89 ml  LVESV MOD A4C: 34 27 ml  LVESV MOD BP: 33 27 ml  LVIDd: 3 36 cm  LVIDs: 2 36 cm  LVLd A2C: 7 59 cm  LVLd A4C: 7 2 cm  LVLs A2C: 6 36 cm  LVLs A4C: 6 16 cm  LVPWd: 0 98 cm  RA Area: 11 72 cm2  RVIDd: 3 37 cm  SV MOD A2C: 47 2 ml  SV MOD A4C: 52 56 ml  SV(Teich): 26 87 ml    CW  TR Vmax: 2 48 m/s  TR maxP 52 mmHg    MM  TAPSE: 1 42 cm    PW  E': 0 07 m/s  E/E': 16 01  MV A Ajit: 0 75 m/s  MV Dec Hall: 3 37 m/s2  MV DecT: 331 39 ms  MV E Ajit: 1 12 m/s  MV E/A Ratio: 1 5  MV PHT: 96 1 ms  MVA By PHT: 2 29 cm2    [facility] Accredited [facility]chocardi[facility]    Prepared and electronically signed by    Natividad Muniz MD  Signed 2020 09:30:48       t   --------------------------------------------------------------------------------  CAROTIDS  Results for orders placed during the hospital encounter of 20   VAS carotid complete study    Narrative    [facility] REPORT  CLINICAL:  Indications:  Surveillance of carotid artery disease  Patient is asymptomatic  at this time  Patient is wheel chair bound  Operative History:  Denies Any Cardiovascular Surgeries  Risk Factors  The patient has history of Obesity and HLD  Clinical  Right Pressure:  152/ mm Hg,     FINDINGS:     Right        Impression  PSV  EDV (cm/s)  Direction of Flow  Ratio    Dist  ICA                 37          13                      0 58    Mid   ICA 46          12                      0 71    Prox  ICA    1 - 49%      57          14                      0 89    Dist CCA                  67          12                              Mid CCA                   64          12                      0 78    Prox CCA                  82           0                              Ext Carotid               83           0                      1 31    Prox Vert                 68          13  Antegrade                   Subclavian               168           0                              Innominate                30           7                                 Left         PSV  EDV (cm/s)  Ratio    Dist  ICA     52          12   0 83    Mid  ICA      78          25   1 25    Prox  ICA    124          31   1 98    Dist CCA      65          16           Mid CCA       63          14   0 95    Prox CCA      66          14           Ext Carotid   66           2   1 06    Prox Vert     24           7           Subclavian   149           6                    CONCLUSION:     Impression  RIGHT:  There is <50% stenosis noted in the internal carotid artery  Plaque is  heterogenous and irregular  Vertebral artery flow is antegrade  There is no significant subclavian artery  disease  LEFT:  There is <50% stenosis noted in the internal carotid artery  Plaque is  heterogenous and irregular  Vertebral artery flow is antegrade  There is no significant subclavian artery  disease  Compared to previous study on 11/04/2011, there are no significant changes  Internal carotid artery stenosis determination by consensus criteria from:  radha Das al  Carotid Artery Stenosis: Gray-Scale and Doppler US Diagnosis  - Society of Radiologists in [address] [place] [address], Radiology 2003;  [phone]       SIGNATURE:  Electronically Signed by: Catarina Ghosh on 2021-01-28 10:32:19 AM      ======================================================    Lab Results   Component Value Date    WBC 4 23 (L) 10/26/2020    HGB 11 7 10/26/2020    HCT 36 6 10/26/2020    MCV 92 10/26/2020     (L) 10/26/2020      Lab Results   Component Value Date    SODIUM 139 10/26/2020    K 4 0 10/26/2020     10/26/2020    CO2 28 10/26/2020    BUN 19 10/26/2020    CREATININE 0 66 10/26/2020    GLUC 127 10/26/2020    CALCIUM 9 0 10/26/2020      Lab Results   Component Value Date    HGBA1C 6 2 (H) 08/20/2020      No results found for: CHOL  Lab Results   Component Value Date    HDL 44 02/05/2020    HDL 40 06/12/2019    HDL 40 06/02/2018     Lab Results   Component Value Date    LDLCALC 36 02/05/2020    LDLCALC 43 06/12/2019    LDLCALC 39 06/02/2018     Lab Results   Component Value Date    TRIG 121 02/05/2020    TRIG 96 06/12/2019    TRIG 88 06/02/2018     No results found for: [place], [place]   Lab Results   Component Value Date    INR 1 39 (H) 10/21/2020    INR 1 58 (H) 02/29/2020    INR 1 17 02/19/2020    PROTIME 16 7 (H) 10/21/2020    PROTIME 18 4 (H) 02/29/2020    PROTIME 14 5 02/19/2020          Portions of the record may have been created with voice recognition software  Occasional wrong word or "sound a like" substitutions may have occurred due to the inherent limitations of voice recognition software  Read the chart carefully and recognize, using context, where substitutions have occurred  I spent 10 minutes directly with the patient during this visit    [id number] acknowledges that she has consented to an online visit or consultation  She understands that the online visit is based solely on information provided by her, and that, in the absence of a face-to-face physical evaluation by the physician, the diagnosis she receives is both limited and provisional in terms of accuracy and completeness  This is not intended to replace a full medical face-to-face evaluation by the physician  Teresita Velásquez understands and accepts these terms

## 2021-03-29 ENCOUNTER — IMMUNIZATIONS (OUTPATIENT)
Dept: FAMILY MEDICINE CLINIC | Facility: HOSPITAL | Age: 86
End: 2021-03-29

## 2021-03-29 DIAGNOSIS — Z23 ENCOUNTER FOR IMMUNIZATION: Primary | ICD-10-CM

## 2021-03-29 PROCEDURE — 91300 SARS-COV-2 / COVID-19 MRNA VACCINE (PFIZER-BIONTECH) 30 MCG: CPT

## 2021-03-29 PROCEDURE — 0001A SARS-COV-2 / COVID-19 MRNA VACCINE (PFIZER-BIONTECH) 30 MCG: CPT

## 2021-04-19 ENCOUNTER — IMMUNIZATIONS (OUTPATIENT)
Dept: FAMILY MEDICINE CLINIC | Facility: HOSPITAL | Age: 86
End: 2021-04-19

## 2021-04-19 DIAGNOSIS — Z23 ENCOUNTER FOR IMMUNIZATION: Primary | ICD-10-CM

## 2021-04-19 PROCEDURE — 91300 SARS-COV-2 / COVID-19 MRNA VACCINE (PFIZER-BIONTECH) 30 MCG: CPT

## 2021-04-19 PROCEDURE — 0002A SARS-COV-2 / COVID-19 MRNA VACCINE (PFIZER-BIONTECH) 30 MCG: CPT

## 2021-05-22 ENCOUNTER — HOSPITAL ENCOUNTER (EMERGENCY)
Facility: HOSPITAL | Age: 86
Discharge: HOME | End: 2021-05-22
Attending: EMERGENCY MEDICINE | Admitting: EMERGENCY MEDICINE
Payer: COMMERCIAL

## 2021-05-22 ENCOUNTER — APPOINTMENT (EMERGENCY)
Dept: NON INVASIVE DIAGNOSTICS | Facility: HOSPITAL | Age: 86
End: 2021-05-22
Payer: COMMERCIAL

## 2021-05-22 VITALS
OXYGEN SATURATION: 98 % | TEMPERATURE: 96.6 F | BODY MASS INDEX: 32.44 KG/M2 | SYSTOLIC BLOOD PRESSURE: 141 MMHG | WEIGHT: 190 LBS | DIASTOLIC BLOOD PRESSURE: 61 MMHG | HEART RATE: 70 BPM | HEIGHT: 64 IN | RESPIRATION RATE: 18 BRPM

## 2021-05-22 DIAGNOSIS — L03.90 CELLULITIS: Primary | ICD-10-CM

## 2021-05-22 DIAGNOSIS — R60.0 LOWER EXTREMITY EDEMA: ICD-10-CM

## 2021-05-22 LAB
ANION GAP SERPL CALCULATED.3IONS-SCNC: 6 MMOL/L (ref 4–13)
BASOPHILS # BLD AUTO: 0.04 THOUSANDS/ΜL (ref 0–0.1)
BASOPHILS NFR BLD AUTO: 1 % (ref 0–1)
BUN SERPL-MCNC: 24 MG/DL (ref 5–25)
CALCIUM SERPL-MCNC: 9.1 MG/DL (ref 8.3–10.1)
CHLORIDE SERPL-SCNC: 109 MMOL/L (ref 100–108)
CO2 SERPL-SCNC: 26 MMOL/L (ref 21–32)
CREAT SERPL-MCNC: 0.69 MG/DL (ref 0.6–1.3)
EOSINOPHIL # BLD AUTO: 0.24 THOUSAND/ΜL (ref 0–0.61)
EOSINOPHIL NFR BLD AUTO: 4 % (ref 0–6)
ERYTHROCYTE [DISTWIDTH] IN BLOOD BY AUTOMATED COUNT: 13.4 % (ref 11.6–15.1)
GFR SERPL CREATININE-BSD FRML MDRD: 80 ML/MIN/1.73SQ M
GLUCOSE SERPL-MCNC: 115 MG/DL (ref 65–140)
HCT VFR BLD AUTO: 36 % (ref 34.8–46.1)
HGB BLD-MCNC: 11.2 G/DL (ref 11.5–15.4)
IMM GRANULOCYTES # BLD AUTO: 0.01 THOUSAND/UL (ref 0–0.2)
IMM GRANULOCYTES NFR BLD AUTO: 0 % (ref 0–2)
LYMPHOCYTES # BLD AUTO: 1.57 THOUSANDS/ΜL (ref 0.6–4.47)
LYMPHOCYTES NFR BLD AUTO: 26 % (ref 14–44)
MCH RBC QN AUTO: 29.2 PG (ref 26.8–34.3)
MCHC RBC AUTO-ENTMCNC: 31.1 G/DL (ref 31.4–37.4)
MCV RBC AUTO: 94 FL (ref 82–98)
MONOCYTES # BLD AUTO: 0.51 THOUSAND/ΜL (ref 0.17–1.22)
MONOCYTES NFR BLD AUTO: 9 % (ref 4–12)
NEUTROPHILS # BLD AUTO: 3.57 THOUSANDS/ΜL (ref 1.85–7.62)
NEUTS SEG NFR BLD AUTO: 60 % (ref 43–75)
NRBC BLD AUTO-RTO: 0 /100 WBCS
PLATELET # BLD AUTO: 120 THOUSANDS/UL (ref 149–390)
PMV BLD AUTO: 11 FL (ref 8.9–12.7)
POTASSIUM SERPL-SCNC: 4.3 MMOL/L (ref 3.5–5.3)
RBC # BLD AUTO: 3.84 MILLION/UL (ref 3.81–5.12)
SODIUM SERPL-SCNC: 141 MMOL/L (ref 136–145)
WBC # BLD AUTO: 5.94 THOUSAND/UL (ref 4.31–10.16)

## 2021-05-22 PROCEDURE — 93971 EXTREMITY STUDY: CPT

## 2021-05-22 PROCEDURE — 36415 COLL VENOUS BLD VENIPUNCTURE: CPT | Performed by: EMERGENCY MEDICINE

## 2021-05-22 PROCEDURE — 80048 BASIC METABOLIC PNL TOTAL CA: CPT | Performed by: EMERGENCY MEDICINE

## 2021-05-22 PROCEDURE — 85025 COMPLETE CBC W/AUTO DIFF WBC: CPT | Performed by: EMERGENCY MEDICINE

## 2021-05-22 PROCEDURE — 99284 EMERGENCY DEPT VISIT MOD MDM: CPT | Performed by: EMERGENCY MEDICINE

## 2021-05-22 PROCEDURE — 99284 EMERGENCY DEPT VISIT MOD MDM: CPT

## 2021-05-22 PROCEDURE — 93971 EXTREMITY STUDY: CPT | Performed by: SURGERY

## 2021-05-22 RX ORDER — CEPHALEXIN 250 MG/1
500 CAPSULE ORAL ONCE
Status: COMPLETED | OUTPATIENT
Start: 2021-05-22 | End: 2021-05-22

## 2021-05-22 RX ORDER — ACETAMINOPHEN 325 MG/1
650 TABLET ORAL ONCE
Status: COMPLETED | OUTPATIENT
Start: 2021-05-22 | End: 2021-05-22

## 2021-05-22 RX ORDER — CEPHALEXIN 500 MG/1
500 CAPSULE ORAL EVERY 6 HOURS SCHEDULED
Qty: 28 CAPSULE | Refills: 0 | Status: SHIPPED | OUTPATIENT
Start: 2021-05-22 | End: 2021-05-29

## 2021-05-22 RX ADMIN — CEPHALEXIN 500 MG: 250 CAPSULE ORAL at 09:53

## 2021-05-22 RX ADMIN — ACETAMINOPHEN 650 MG: 325 TABLET, FILM COATED ORAL at 11:24

## 2021-05-22 NOTE — DISCHARGE INSTRUCTIONS
Please follow up with your primary care doctor in the next 2-3 days  Please take Keflex 500 mg every 6 hours for the next 7 days  You may take tylenol 500 mg every 4-6 hours as needed for pain  Return to the ER if you have worsening pain or swelling or develop fevers

## 2021-05-22 NOTE — ED ATTENDING ATTESTATION
5/22/2021  IBrenna MD, saw and evaluated the patient  I have discussed the patient with the resident/non-physician practitioner and agree with the resident's/non-physician practitioner's findings, Plan of Care, and MDM as documented in the resident's/non-physician practitioner's note, except where noted  All available labs and Radiology studies were reviewed  I was present for key portions of any procedure(s) performed by the resident/non-physician practitioner and I was immediately available to provide assistance  At this point I agree with the current assessment done in the Emergency Department  I have conducted an independent evaluation of this patient a history and physical is as follows: This is a 80 y o  old female who presents to the ED for evaluation of leg swelling  Wheelchair dependent  AF on eliquis  RLE swelling and pain, knee to ankle, woke her up this AM, posteromedial  New, no trauma, Worsening swelling in the last few weeks  Never had this happen before  Took tylenol prior to arrival, is having improving pain  VS and nursing notes reviewed  General: Appears in NAD, awake, alert, speaking normally in full sentences  Well-nourished, well-developed  Appears stated age  Head: Normocephalic, atraumatic  Eyes: EOMI  Vision grossly normal  No subconjunctival hemorrhages or occular discharge noted  Symmetrical lids  ENT: Atraumatic external nose and ears  No stridor  Normal phonation  No drooling  Normal swallowing  Neck: No JVD  FROM  No goiter  CV: No pallor  Normal rate  Lungs: No tachypnea  No respiratory distress  MSK: Moving all extremities equally, L>R swelling  Mild warmth and redness  Slight amount of chronic edema  Doppler PT and DPs, present and bi-triphasic  Skin: Dry, intact  No cyanosis  Neuro: Awake, alert, GCS15  CN II-XII grossly intact  Psychiatric/Behavioral: Appropriate mood and affect       A/P: This is a 80 y o  female who presents to the ED for evaluation of leg pain  Labs, though on [facility], is bedbound, so will get duplex r/o DVT  Reevaluate and dispo accordingly      ED Course         Critical Care Time  Procedures

## 2021-05-22 NOTE — ED PROVIDER NOTES
History  Chief Complaint   Patient presents with    Leg Swelling     Patient from home who presents to the ED with left leg swelling for several weeks, started to have pain in left leg starting this morning  HPI     79 yo F with past medical history of atrial fibrillation on Eliquis, hyperlipidemia, non-ambulatory status secondary to femur fracture 10 years ago who presents for evaluation of left lower extremity pain  Patient states the pain started this morning at around 6:00 a m  Mart Rocha Pain woke her up from sleep  She took 2000 mg Tylenol with mild relief of pain  Patient states the pain is located in the lower leg from the knee to the ankle  Pain is in the back and medial aspect of the leg  Patient states she did not have any pain prior to this morning  Patient does states that she has had increasing leg swelling in the left leg over the past few weeks  Denies fevers, chills, cough, chest pain, shortness of breath, abdominal pain, nausea, vomiting or diarrhea  Patient states she has never had pain like this in the past  Denies history of DVT or PE  She does take Eliquis for atrial fibrillation  Patient is non-ambulatory at baseline for the past 10 years due to a left femur fracture  Prior to Admission Medications   Prescriptions Last Dose Informant Patient Reported? Taking?    Acetaminophen (TYLENOL PO)  Self Yes No   Sig: Take by mouth 3 (three) times a day   CRANBERRY PO  Self Yes No   Sig: Take 30,000 mg by mouth   Multiple Vitamin (MULTI-VITAMIN DAILY PO)  Self Yes No   Sig: Take by mouth   amLODIPine (NORVASC) 5 mg tablet   No No   Sig: Take 1 tablet (5 mg total) by mouth daily   apixaban (ELIQUIS) 5 mg   No No   Sig: Take 1 tablet (5 mg total) by mouth 2 (two) times a day   atorvastatin (LIPITOR) 40 mg tablet   No No   Sig: Take 1 tablet (40 mg total) by mouth every evening   bisacodyl (DULCOLAX) 10 mg suppository  Self No No   Sig: Insert 1 suppository (10 mg total) into the rectum daily as needed for constipation   cholecalciferol (VITAMIN D3) 1,000 units tablet  Self Yes No   Sig: Take 1,000 Units by mouth every evening   cyanocobalamin (VITAMIN B-12) 1,000 mcg tablet  Self Yes No   Sig: Take 1,000 mcg by mouth every evening     folic acid (FOLVITE) 1 mg tablet  Self Yes No   Sig: Take 1,000 mcg by mouth daily   metFORMIN (GLUCOPHAGE) 1000 MG tablet  Self No No   Sig: Take 0 5 tablets (500 mg total) by mouth 2 (two) times a day with meals   Patient taking differently: Take 500 mg by mouth daily with breakfast    metoprolol tartrate (LOPRESSOR) 25 mg tablet   No No   Si 5 tablets (12 5 mg total) by Per PEG Tube route every 12 (twelve) hours   primidone (MYSOLINE) 50 mg tablet  Self Yes No   Sig: Take by mouth 2 (two) times a day   saccharomyces boulardii (FLORASTOR) 250 mg capsule  Self Yes No   Sig: Take 250 mg by mouth 2 (two) times a day      Facility-Administered Medications: None       Past Medical History:   Diagnosis Date    [facility]fib ([facility] )     Arthritis     Assistance needed for mobility     pt can stand with assistance and transfer    Chronic pain disorder     Diabetes mellitus ([facility] )     NIDDM    Full dentures     History of transfusion     no adverse reaction    Hyperlipidemia     Irregular heart beat     Numbness and tingling of both feet     Skin abnormality     sacrum area - small red area, less than a dime size    Spinal stenosis     Stroke ([facility]     Unable to ambulate     Urinary incontinence     ipg stimulator x3 repakcements,battery exchange today 2018    Wears glasses     Wheelchair dependence        Past Surgical History:   Procedure Laterality Date    BACK SURGERY      fusion    BLADDER SUSPENSION      CARPAL TUNNEL RELEASE Bilateral     CHOLANGIOGRAM N/A 2018    Procedure: CHOLANGIOGRAM;  Surgeon: Guilherme Meier MD;  Location: [facility];  Service: General    CHOLECYSTECTOMY LAPAROSCOPIC N/A 2018    Procedure: CHOLECYSTECTOMY LAPAROSCOPIC; Surgeon: Telly Mar MD;  [facility];  Service: General    COLONOSCOPY      DILATION AND CURETTAGE OF UTERUS      FRACTURE SURGERY Left     femur with hardware    HYSTERECTOMY      INSERTION / Ivory Beer / REVISION NEUROSTIMULATOR      JOINT REPLACEMENT Bilateral     knees    SACRAL NERVE STIMULATOR PLACEMENT N/A 2/14/2018    Procedure: REMOVE AND REPLACE IPG;  Surgeon: Clover Oliver MD;  Location[facility];  Service: UroGynecology           TONSILLECTOMY         Family History   Problem Relation Age of Onset    No Known Problems Mother     No Known Problems Father      I have reviewed and agree with the history as documented  E-Cigarette/Vaping    E-Cigarette Use Never User      E-Cigarette/Vaping Substances     Social History     Tobacco Use    Smoking status: Never Smoker    Smokeless tobacco: Never Used   Substance Use Topics    Alcohol use: Not Currently     Comment: very rare    Drug use: No        Review of Systems   Constitutional: Negative for appetite change, chills and fever  HENT: Negative for congestion, rhinorrhea and sore throat  Respiratory: Negative for cough and shortness of breath  Cardiovascular: Positive for leg swelling  Negative for chest pain  Gastrointestinal: Negative for abdominal pain, diarrhea, nausea and vomiting  Genitourinary: Negative for dysuria, frequency, hematuria and urgency  Musculoskeletal: Negative for arthralgias and myalgias  Skin: Negative for color change, pallor and rash  Neurological: Negative for dizziness, weakness, light-headedness, numbness and headaches  All other systems reviewed and are negative        Physical Exam  ED Triage Vitals   Temperature Pulse Respirations Blood Pressure SpO2   05/22/21 0730 05/22/21 0728 05/22/21 0728 05/22/21 0728 05/22/21 0728   (!) 96 6 °F (35 9 °C) 69 18 152/62 100 %      Temp Source Heart Rate Source Patient Position - Orthostatic VS BP Location FiO2 (%)   05/22/21 0730 05/22/21 1626 05/22/21 0728 05/22/21 0728 --   Tympanic Monitor Lying Right arm       Pain Score       05/22/21 0728       5             Orthostatic Vital Signs  Vitals:    05/22/21 0728 05/22/21 0938 05/22/21 0945   BP: 152/62 141/61 141/61   Pulse: 69 64 70   Patient Position - Orthostatic VS: Lying Lying        Physical Exam  Vitals signs and nursing note reviewed  Constitutional:       General: She is not in acute distress  Appearance: Normal appearance  She is well-developed and normal weight  She is not ill-appearing, toxic-appearing or diaphoretic  HENT:      Head: Normocephalic and atraumatic  Mouth/Throat:      Mouth: Mucous membranes are moist       Pharynx: Oropharynx is clear  Eyes:      Extraocular Movements: Extraocular movements intact  Conjunctiva/sclera: Conjunctivae normal    Neck:      Musculoskeletal: Neck supple  Cardiovascular:      Rate and Rhythm: Normal rate and regular rhythm  Pulses: Normal pulses  Heart sounds: Normal heart sounds  No murmur  No friction rub  No gallop  Comments: Slightly difficult to palpate pulses secondary to lower extremity edema but strong triphasic dopplerable DP and PT pulses in the left lower extremity  Pulmonary:      Effort: Pulmonary effort is normal  No respiratory distress  Breath sounds: Normal breath sounds  No wheezing or rales  Abdominal:      General: There is no distension  Palpations: Abdomen is soft  Tenderness: There is no abdominal tenderness  There is no guarding or rebound  Musculoskeletal:         General: No tenderness  Left lower leg: Edema present  Comments: Left lower extremity with edema and tenderness to palpation in the lower leg, mild redness in the area  Skin:     General: Skin is warm and dry  Coloration: Skin is not pale  Findings: No erythema or rash  Neurological:      General: No focal deficit present        Mental Status: She is alert and oriented to person, place, and time  Cranial Nerves: No cranial nerve deficit  Sensory: No sensory deficit  Motor: No weakness  Comments: Motor and sensation intact in the left lower extremity      Psychiatric:         Mood and Affect: Mood normal          Behavior: Behavior normal          ED Medications  Medications   cephalexin (KEFLEX) capsule 500 mg (500 mg Oral Given 5/22/21 0953)   acetaminophen (TYLENOL) tablet 650 mg (650 mg Oral Given 5/22/21 1124)       Diagnostic Studies  Results Reviewed     Procedure Component Value Units Date/Time    Basic metabolic panel [[id number]]  (Abnormal) Collected: 05/22/21 0755    Lab Status: Final result Specimen: Blood from Arm, Left Updated: 05/22/21 0467     Sodium 141 mmol/L      Potassium 4 3 mmol/L      Chloride 109 mmol/L      CO2 26 mmol/L      ANION GAP 6 mmol/L      BUN 24 mg/dL      Creatinine 0 69 mg/dL      Glucose 115 mg/dL      Calcium 9 1 mg/dL      eGFR 80 ml/min/1 73sq m     Narrative:      [place] guidelines for Chronic Kidney Disease (CKD):     Stage 1 with normal or high GFR (GFR > 90 mL/min/1 73 square meters)    Stage 2 Mild CKD (GFR = 60-89 mL/min/1 73 square meters)    Stage 3A Moderate CKD (GFR = 45-59 mL/min/1 73 square meters)    Stage 3B Moderate CKD (GFR = 30-44 mL/min/1 73 square meters)    Stage 4 Severe CKD (GFR = 15-29 mL/min/1 73 square meters)    Stage 5 End Stage CKD (GFR <15 mL/min/1 73 square meters)  Note: GFR calculation is accurate only with a steady state creatinine    CBC and differential [[id number]]  (Abnormal) Collected: 05/22/21 9287    Lab Status: Final result Specimen: Blood from Arm, Left Updated: 05/22/21 0861     WBC 5 94 Thousand/uL      RBC 3 84 Million/uL      Hemoglobin 11 2 g/dL      Hematocrit 36 0 %      MCV 94 fL      MCH 29 2 pg      MCHC 31 1 g/dL      RDW 13 4 %      MPV 11 0 fL      Platelets 553 Thousands/uL      nRBC 0 /100 WBCs      Neutrophils Relative 60 %      Immat GRANS % 0 %      Lymphocytes Relative 26 %      Monocytes Relative 9 %      Eosinophils Relative 4 %      Basophils Relative 1 %      Neutrophils Absolute 3 57 Thousands/µL      Immature Grans Absolute 0 01 Thousand/uL      Lymphocytes Absolute 1 57 Thousands/µL      Monocytes Absolute 0 51 Thousand/µL      Eosinophils Absolute 0 24 Thousand/µL      Basophils Absolute 0 04 Thousands/µL                  VAS lower limb venous duplex study, unilateral/limited   Final Result by Tevin Majano MD (05/22 1043)            Procedures  Procedures      ED Course               Identification of Seniors at Risk      Most Recent Value   (ISAR) Identification of Seniors at Risk   Before the illness or injury that brought you to the Emergency, did you need someone to help you on a regular basis? 1 Filed at: 05/22/2021 0731   In the last 24 hours, have you needed more help than usual?  0 Filed at: 05/22/2021 3786   Have you been hospitalized for one or more nights during the past 6 months? 0 Filed at: 05/22/2021 0731   In general, do you see well?  0 Filed at: 05/22/2021 0731   In general, do you have serious problems with your memory? 0 Filed at: 05/22/2021 8401   Do you take more than three different medications every day? 1 Filed at: 05/22/2021 0731   ISAR Score  2 Filed at: 05/22/2021 0184                    SBIRT 22yo+      Most Recent Value   SBIRT (25 yo +)   In order to provide better care to our patients, we are screening all of our patients for alcohol and drug use  Would it be okay to ask you these screening questions? Yes Filed at: 05/22/2021 9289   Initial Alcohol Screen: US AUDIT-C    1  How often do you have a drink containing alcohol?  0 Filed at: 05/22/2021 0753   2  How many drinks containing alcohol do you have on a typical day you are drinking? 0 Filed at: 05/22/2021 0753   3b  FEMALE Any Age, or MALE 65+: How often do you have 4 or more drinks on one occassion?   0 Filed at: 05/22/2021 0753   Audit-C Score  0 Filed at: 05/22/2021 9751   DUSTIN: How many times in the past year have you    Used an illegal drug or used a prescription medication for non-medical reasons? Never Filed at: 05/22/2021 0753                MDM     81 yo F with past medical history of atrial fibrillation on Eliquis, hyperlipidemia, non-ambulatory status secondary to femur fracture 10 years ago who presents for evaluation of left lower extremity pain since this morning  Patient has palpable and dopplerable distal pulses in the left foot  Differential diagnosis includes: DVT, cellulitis  Will obtain CBC, BMP and ultrasound left lower extremity  US negative for DVT  CBC and BMP without marked abnormalities  Will treat patient with keflex for presumed cellulitis  Will give 650 mg tylenol for pain  Discussed with patient that she should only take 500 mg tylenol every 4-6 hours  Discussed with patient, provided prescription for keflex four times daily for 7 days  Instructed patient to follow up with her PCP, she states her PCP is coming to her house Monday to see her  Discussed with patient strict return precautions including worsening redness, swelling or pain in the leg  Patient expressed understanding and was agreeable for discharge  Disposition  Final diagnoses:   Cellulitis   Lower extremity edema     Time reflects when diagnosis was documented in both MDM as applicable and the Disposition within this note     Time User Action Codes Description Comment    5/22/2021  9:54 AM Deacon Pontiff Add [L03 90] Cellulitis     5/22/2021  9:55 AM Deacon Pontiff Add [R60 0] Lower extremity edema       ED Disposition     ED Disposition Condition Date/Time Comment    Discharge Stable Sat May 22, 2021  9:54 AM Carline Rico discharge to home/self care              Follow-up Information     Follow up With Specialties Details Why Consuelo Vilchis MD Family Medicine Schedule an appointment as soon as possible for a visit in 3 days  [address] [facility]  [address]  [address] [place] [address]   [phone]            Discharge Medication List as of 5/22/2021 10:06 AM      START taking these medications    Details   cephalexin (KEFLEX) 500 mg capsule Take 1 capsule (500 mg total) by mouth every 6 (six) hours for 7 days, Starting Sat 5/22/2021, Until Sat 5/29/2021, Normal         CONTINUE these medications which have NOT CHANGED    Details   Acetaminophen (TYLENOL PO) Take by mouth 3 (three) times a day, Historical Med      amLODIPine (NORVASC) 5 mg tablet Take 1 tablet (5 mg total) by mouth daily, Starting Wed 11/11/2020, Normal      apixaban (ELIQUIS) 5 mg Take 1 tablet (5 mg total) by mouth 2 (two) times a day, Starting Wed 11/11/2020, Normal      atorvastatin (LIPITOR) 40 mg tablet Take 1 tablet (40 mg total) by mouth every evening, Starting Wed 11/11/2020, Normal      bisacodyl (DULCOLAX) 10 mg suppository Insert 1 suppository (10 mg total) into the rectum daily as needed for constipation, Starting u 3/12/2020, No Print      cholecalciferol (VITAMIN D3) 1,000 units tablet Take 1,000 Units by mouth every evening, Historical Med      CRANBERRY PO Take 30,000 mg by mouth, Historical Med      cyanocobalamin (VITAMIN B-12) 1,000 mcg tablet Take 1,000 mcg by mouth every evening  , Historical Med      folic acid (FOLVITE) 1 mg tablet Take 1,000 mcg by mouth daily, Starting Fri 6/28/2019, Historical Med      metFORMIN (GLUCOPHAGE) 1000 MG tablet Take 0 5 tablets (500 mg total) by mouth 2 (two) times a day with meals, Starting Sun 6/16/2019, Print      metoprolol tartrate (LOPRESSOR) 25 mg tablet 0 5 tablets (12 5 mg total) by Per PEG Tube route every 12 (twelve) hours, Starting Wed 11/11/2020, Normal      Multiple Vitamin (MULTI-VITAMIN DAILY PO) Take by mouth, Historical Med      primidone (MYSOLINE) 50 mg tablet Take by mouth 2 (two) times a day, Historical Med      saccharomyces boulardii (FLORASTOR) 250 mg capsule Take 250 mg by mouth 2 (two) times a day, Historical Med           No discharge procedures on file  PDMP Review       Value Time User    PDMP Reviewed  Yes 3/12/2020 11:49 AM Kay Zuñiga MD           ED Provider  Attending physically available and evaluated Belkis Eldridge  I managed the patient along with the ED Attending      Electronically Signed by         Catarino Lagunas MD  05/23/21 0023

## 2021-07-16 ENCOUNTER — OFFICE VISIT (OUTPATIENT)
Dept: CARDIOLOGY CLINIC | Facility: CLINIC | Age: 86
End: 2021-07-16
Payer: COMMERCIAL

## 2021-07-16 VITALS
HEIGHT: 64 IN | DIASTOLIC BLOOD PRESSURE: 72 MMHG | HEART RATE: 61 BPM | SYSTOLIC BLOOD PRESSURE: 142 MMHG | OXYGEN SATURATION: 99 % | BODY MASS INDEX: 32.61 KG/M2

## 2021-07-16 DIAGNOSIS — Z86.73 HISTORY OF STROKE: ICD-10-CM

## 2021-07-16 DIAGNOSIS — Z99.3 WHEELCHAIR DEPENDENCE: ICD-10-CM

## 2021-07-16 DIAGNOSIS — I50.32 CHRONIC DIASTOLIC CONGESTIVE HEART FAILURE (HCC): ICD-10-CM

## 2021-07-16 DIAGNOSIS — I48.0 PAROXYSMAL ATRIAL FIBRILLATION (HCC): Primary | Chronic | ICD-10-CM

## 2021-07-16 DIAGNOSIS — E78.00 PURE HYPERCHOLESTEROLEMIA: ICD-10-CM

## 2021-07-16 DIAGNOSIS — E11.9 TYPE 2 DIABETES MELLITUS WITHOUT COMPLICATION, WITHOUT LONG-TERM CURRENT USE OF INSULIN (HCC): Chronic | ICD-10-CM

## 2021-07-16 DIAGNOSIS — I10 ESSENTIAL HYPERTENSION: Chronic | ICD-10-CM

## 2021-07-16 PROCEDURE — 99215 OFFICE O/P EST HI 40 MIN: CPT | Performed by: INTERNAL MEDICINE

## 2021-07-16 PROCEDURE — 93000 ELECTROCARDIOGRAM COMPLETE: CPT | Performed by: INTERNAL MEDICINE

## 2021-07-16 RX ORDER — FUROSEMIDE 80 MG
80 TABLET ORAL DAILY
Qty: 30 TABLET | Refills: 5 | Status: SHIPPED | OUTPATIENT
Start: 2021-07-16 | End: 2022-01-19 | Stop reason: SDUPTHER

## 2021-07-16 RX ORDER — NYSTATIN 100000 [USP'U]/G
POWDER TOPICAL
COMMUNITY
Start: 2021-06-24

## 2021-07-16 RX ORDER — FUROSEMIDE 20 MG/1
20 TABLET ORAL DAILY
COMMUNITY
Start: 2021-05-25 | End: 2021-07-16 | Stop reason: DRUGHIGH

## 2021-07-16 RX ORDER — SPIRONOLACTONE 25 MG/1
25 TABLET ORAL DAILY
Qty: 30 TABLET | Refills: 5 | Status: SHIPPED | OUTPATIENT
Start: 2021-07-16 | End: 2022-01-19 | Stop reason: SDUPTHER

## 2021-07-16 NOTE — PATIENT INSTRUCTIONS
1  Begin furosemide 80 mg 1 daily in a m  and stop 20 mg furosemide pill  2  Begin spironolactone 25 mg daily in a m , may take with furosemide  3  Stop amlodipine  4  Blood test in 1 week, 7/23  5  Return to see me the following week   6  Patient needs to stay completely away from salt and a very low-sodium diet  Look at labels    The less salt and sodium she has the less she will need to urinate

## 2021-07-16 NOTE — LETTER
July 16, 2021     Janet Carvalho MD  [facility]  [address]     Patient: Eddie Rivas   YOB: 1935   Date of Visit: 7/16/2021       Dear Dr Martín Knox: Thank you for referring Erica Tsai to me for evaluation  Below are my notes for this consultation  If you have questions, please do not hesitate to call me  I look forward to following your patient along with you  Sincerely,        Harvey Miramontes MD        CC: No Recipients  Harvey Miramontes MD  7/16/2021  4:34 PM  Sign when Signing Visit                                             Cardiology Follow Up    Eddie Rivas  1935  [id number]  [address] [facility]  [address] [facility] [address] [phone]    1  Paroxysmal atrial fibrillation (HCC)  POCT ECG   2  Chronic diastolic congestive heart failure (HCC)  furosemide (LASIX) 80 mg tablet    spironolactone (ALDACTONE) 25 mg tablet    Basic metabolic panel   3  Pure hypercholesterolemia     4  Essential hypertension     5  History of stroke     6  Wheelchair dependence     7  Type 2 diabetes mellitus without complication, without long-term current use of insulin [facility])         Patient 1st seen on July 18, 2019  HPI:  Patient with a history of paroxysmal atrial fibrillation and prior strokes previously on warfarin anticoagulation was admitted to [facility] on June 11th through June 14th and again on June 16th through June 18, 2019   Both time she was admitted with slurred speech   The symptoms resolved without residual and patient has no residual neurologic defects from her prior strokes   Her warfarin management has been difficult and she has frequently been subtherapeutic   During the last admission she was changed from warfarin to Eliquis   She has had no further episodes of slurred speech since she has been on Eliquis   At the time of both admissions, she presented in sinus rhythm        Other problems have included type 2 diabetes mellitus, cervical dystonia, spinal cord stimulator, wheelchair dependent, overactive bladder, essential hypertension     04/17/2019 lipid profile: Total cholesterol 141, triglycerides 148, HDL 54, LDL calculated 57, non HDL cholesterol 87    02/05/2020 lipid profile:  Cholesterol 104, triglycerides 121, HDL 44, LDL calculated 36  Interval History: patient's biggest complaint is the swelling in her legs and the pain that it causes  She denies other cardiac symptoms  Patient denies chest discomfort or shortness of breath  Patient has no palpitations  Patient denies symptoms of dizziness, lightheadedness or near-syncope/syncope  Patient denies symptoms of orthopnea or paroxysmal nocturnal dyspnea  Discussion/Summary:  1  Begin furosemide 80 mg 1 daily in a m  and stop 20 mg furosemide pill  2  Begin spironolactone 25 mg daily in a m , may take with furosemide  3  Stop amlodipine  4  Blood test in 1 week, 7/23  5  Return to see me the following week   6  Patient needs to stay completely away from salt and a very low-sodium diet  Look at labels    The less salt and sodium she has the less she will need to urinate    Patient Active Problem List   Diagnosis    Wheelchair dependence    Type 2 diabetes mellitus ([facility] )    Paroxysmal atrial fibrillation (HCC)    Weakness generalized    Urinary incontinence    Spinal cord stimulator status    Overactive bladder    Essential hypertension    Cervical dystonia    History of stroke    Hyperlipidemia    Calcaneal spur of foot, left    Osteopenia of left foot    Stroke-like symptoms    Thyroid nodule    Weakness of both lower extremities    Dysarthria    Slurred speech    Aphasia    Oropharyngeal dysphagia    Pleural effusion, left    Abnormal CT of the chest    Goals of care, counseling/discussion    Moderate protein-calorie malnutrition (HCC)    Constipation    Abnormal alkaline phosphatase test    Bradycardia    Chronic diastolic congestive heart failure (HCC)     Past Medical History:   Diagnosis Date    A-fib ([facility] )     Arthritis     Assistance needed for mobility     pt can stand with assistance and transfer    Chronic pain disorder     Diabetes mellitus ([facility] )     NIDDM    Full dentures     History of transfusion     no adverse reaction    Hyperlipidemia     Irregular heart beat     Numbness and tingling of both feet     Skin abnormality     sacrum area - small red area, less than a dime size    Spinal stenosis     Stroke ([place] [address] )     Unable to ambulate     Urinary incontinence     ipg stimulator x3 repakcements,battery exchange today 2/14/2018    Wears glasses     Wheelchair dependence      Social History     Socioeconomic History    Marital status: /Civil Union     Spouse name: Not on file    Number of children: Not on file    Years of education: Not on file    Highest education level: Not on file   Occupational History    Not on file   Tobacco Use    Smoking status: Never Smoker    Smokeless tobacco: Never Used   Vaping Use    Vaping Use: Never used   Substance and Sexual Activity    Alcohol use: Not Currently     Comment: very rare    Drug use: No    Sexual activity: Not on file   Other Topics Concern    Not on file   Social History Narrative    Not on file     Social Determinants of Health     Financial Resource Strain:     Difficulty of Paying Living Expenses:    Food Insecurity:     Worried About Running Out of Food in the Last Year:     Ran Out of Food in the Last Year:    Transportation Needs:     Lack of Transportation (Medical):      Lack of Transportation (Non-Medical):    Physical Activity:     Days of Exercise per Week:     Minutes of Exercise per Session:    Stress:     Feeling of Stress :    Social Connections:     Frequency of Communication with Friends and Family:     Frequency of Social Gatherings with Friends and Family:     Attends Worship Services:     Active Member of Clubs or Organizations:     Attends Club or Organization Meetings:     Marital Status:    Intimate Partner Violence:     Fear of Current or Ex-Partner:     Emotionally Abused:     Physically Abused:     Sexually Abused:       Family History   Problem Relation Age of Onset    No Known Problems Mother     No Known Problems Father      Past Surgical History:   Procedure Laterality Date    BACK SURGERY      fusion    BLADDER SUSPENSION      CARPAL TUNNEL RELEASE Bilateral     CHOLANGIOGRAM N/A 6/4/2018    Procedure: CHOLANGIOGRAM;  Surgeon: Claudia Amaya MD;  [facility];  Service: General    CHOLECYSTECTOMY LAPAROSCOPIC N/A 6/4/2018    Procedure: CHOLECYSTECTOMY LAPAROSCOPIC;  Surgeon: Claudia Amaya MD;  [facility];  Service: General    COLONOSCOPY      DILATION AND CURETTAGE OF UTERUS      FRACTURE SURGERY Left     femur with hardware    HYSTERECTOMY      INSERTION / Omid Patch / REVISION NEUROSTIMULATOR      JOINT REPLACEMENT Bilateral     knees    SACRAL NERVE STIMULATOR PLACEMENT N/A 2/14/2018    Procedure: REMOVE AND REPLACE IPG;  Surgeon: Vijaya Caballero MD;  [facility];  Service: UroGynecology           TONSILLECTOMY         Current Outpatient Medications:     Acetaminophen (TYLENOL PO), Take by mouth 3 (three) times a day, Disp: , Rfl:     apixaban (ELIQUIS) 5 mg, Take 1 tablet (5 mg total) by mouth 2 (two) times a day, Disp: 180 tablet, Rfl: 3    atorvastatin (LIPITOR) 40 mg tablet, Take 1 tablet (40 mg total) by mouth every evening, Disp: 90 tablet, Rfl: 3    cholecalciferol (VITAMIN D3) 1,000 units tablet, Take 1,000 Units by mouth every evening, Disp: , Rfl:     CRANBERRY PO, Take 30,000 mg by mouth, Disp: , Rfl:     cyanocobalamin (VITAMIN B-12) 1,000 mcg tablet, Take 1,000 mcg by mouth every evening  , Disp: , Rfl:     folic acid (FOLVITE) 1 mg tablet, Take 1,000 mcg by mouth daily, Disp: , Rfl: 3    ipratropium (ATROVENT) 0 03 % nasal spray, 1-2 sprays each nostril twice a day as needed for rhinorrhea, Disp: 30 mL, Rfl: 6    metFORMIN (GLUCOPHAGE) 1000 MG tablet, Take 0 5 tablets (500 mg total) by mouth 2 (two) times a day with meals (Patient taking differently: Take 500 mg by mouth daily with breakfast ), Disp: 60 tablet, Rfl: 0    Multiple Vitamin (MULTI-VITAMIN DAILY PO), Take by mouth, Disp: , Rfl:     mupirocin (BACTROBAN) 2 % ointment, APPLY A SMALL AMOUNT TO THE AFFECTED AREA TWICE DAILY AS NEEDED, Disp: , Rfl:     nystatin (MYCOSTATIN) powder, APPLY 1 GM ON THE SKIN THREE TIMES A DAY, Disp: , Rfl:     bisacodyl (DULCOLAX) 10 mg suppository, Insert 1 suppository (10 mg total) into the rectum daily as needed for constipation (Patient not taking: Reported on 7/16/2021), Disp: 12 suppository, Rfl: 0    furosemide (LASIX) 80 mg tablet, Take 1 tablet (80 mg total) by mouth daily, Disp: 30 tablet, Rfl: 5    metoprolol tartrate (LOPRESSOR) 25 mg tablet, 0 5 tablets (12 5 mg total) by Per PEG Tube route every 12 (twelve) hours, Disp: 90 tablet, Rfl: 3    primidone (MYSOLINE) 50 mg tablet, Take by mouth 2 (two) times a day (Patient not taking: Reported on 7/16/2021), Disp: , Rfl:     saccharomyces boulardii (FLORASTOR) 250 mg capsule, Take 250 mg by mouth 2 (two) times a day (Patient not taking: Reported on 7/16/2021), Disp: , Rfl:     spironolactone (ALDACTONE) 25 mg tablet, Take 1 tablet (25 mg total) by mouth daily, Disp: 30 tablet, Rfl: 5  Allergies   Allergen Reactions    Bactrim [Sulfamethoxazole-Trimethoprim] Swelling     Swelling around eyes and red eyes    Aspirin Other (See Comments)     "feels like fist in my chest"    Ciprofloxacin     Nitrofurantoin     Other      "5mz/Tmp "  "1 60 mTab amme"     Per pt's allergy list        Results for orders placed or performed in visit on 07/16/21   POCT ECG    Narrative     Accelerated junctional rhythm verses low amplitude P-waves with baseline artifact making interpretation difficult  Right bundle branch block pattern  Abnormal EKG  Review of Systems:  Review of Systems   Respiratory: Negative for cough, choking, chest tightness, shortness of breath and wheezing  Cardiovascular: Negative for chest pain, palpitations and leg swelling  Musculoskeletal: Negative for gait problem  Skin: Negative for rash  Neurological: Negative for dizziness, tremors, syncope, weakness, light-headedness, numbness and headaches  Psychiatric/Behavioral: Negative for agitation and behavioral problems  The patient is not hyperactive  Physical Exam:  /72 (BP Location: Left arm, Patient Position: Sitting, Cuff Size: Large)   Pulse 61   Ht 5' 4" (1 626 m)   SpO2 99%   BMI 32 61 kg/m²   Physical Exam  Constitutional:       General: She is not in acute distress  Appearance: She is well-developed  HENT:      Head: Normocephalic and atraumatic  Neck:      Thyroid: No thyromegaly  Vascular: No carotid bruit or JVD  Trachea: No tracheal deviation  Cardiovascular:      Rate and Rhythm: Normal rate and regular rhythm  Pulses: Normal pulses  Heart sounds: Normal heart sounds  No murmur heard  No friction rub  No gallop  Pulmonary:      Effort: Pulmonary effort is normal  No respiratory distress  Breath sounds: Normal breath sounds  No wheezing, rhonchi or rales  Chest:      Chest wall: No tenderness  Abdominal:      General: Bowel sounds are normal  There is no distension  Palpations: Abdomen is soft  Tenderness: There is no abdominal tenderness  Musculoskeletal:         General: Normal range of motion  Cervical back: Normal range of motion and neck supple  Right lower leg: Edema present  Left lower leg: Edema present  Skin:     General: Skin is warm and dry  Neurological:      General: No focal deficit present  Mental Status: She is alert and oriented to person, place, and time  Gait: Gait normal    Psychiatric:         Mood and Affect: Mood normal          Behavior: Behavior normal          Thought Content: Thought content normal          Judgment: Judgment normal        --------------------------------------------------------------------------------  ECHO:   Results for orders placed during the hospital encounter of 20    Echo complete with contrast if indicated    Narrative  [address]  [facility] [place], [address] [facility]  [phone]    Transthoracic Echocardiogram  2D, M-mode, Doppler, and Color Doppler    Study date:  2020    Patient: Jason Newberry  MR number: [id number]  Account number: [de-identified]  : 1935  Age: 80 years  Gender: Female  Status: Inpatient  Location: Bedside  Height: 64 in  Weight: 187 lb  BP: 140/ 69 mmHg    Indications: TIA    Diagnoses: G45 9 - Transient cerebral ischemic attack, unspecified    Sonographer:  VANE Lindsey  Primary Physician:  Janet Carvalho MD  Referring Physician:  Benny Browning MD  Group:  [facility]  Cardiology Fellow: Michael Allen MD  Interpreting Physician:  Rogelio Baeza MD    SUMMARY    LEFT VENTRICLE:  Size was normal   Systolic function was normal  Ejection fraction was estimated to be 60 %  There were no regional wall motion abnormalities  There was mild concentric hypertrophy  Features were consistent with a pseudonormal left ventricular filling pattern, with concomitant abnormal relaxation and increased filling pressure (grade 2 diastolic dysfunction)  RIGHT VENTRICLE:  The size was normal   Systolic function was normal     LEFT ATRIUM:  The atrium was mildly dilated  MITRAL VALVE:  There was mild annular calcification  There was trace regurgitation  TRICUSPID VALVE:  There was mild regurgitation  Pulmonary artery systolic pressure was mildly increased      COMPARISONS:  There has been no significant interval change  Comparison was made with the previous study of 01-Jun-2018  HISTORY: PRIOR HISTORY: A-FIB,DM2,HTN,HLD,Stroke,Elevated troponin    PROCEDURE: The procedure was performed at the bedside  This was a routine study  The transthoracic approach was used  The study included complete 2D imaging, M-mode, complete spectral Doppler, and color Doppler  The heart rate was 61 bpm,  at the start of the study  Echocardiographic views were limited due to lung interference  Image quality was adequate  LEFT VENTRICLE: Size was normal  Systolic function was normal  Ejection fraction was estimated to be 60 %  There were no regional wall motion abnormalities  Wall thickness was mildly increased  There was mild concentric hypertrophy  DOPPLER: Features were consistent with a pseudonormal left ventricular filling pattern, with concomitant abnormal relaxation and increased filling pressure (grade 2 diastolic dysfunction)  RIGHT VENTRICLE: The size was normal  Systolic function was normal     LEFT ATRIUM: The atrium was mildly dilated  RIGHT ATRIUM: Size was normal     MITRAL VALVE: There was mild annular calcification  There was minimal diffuse thickening of the valve  There was normal leaflet separation  DOPPLER: The transmitral velocity was within the normal range  There was no evidence for stenosis  There was trace regurgitation  AORTIC VALVE: The valve was trileaflet  Leaflets exhibited mildly increased thickness, mild calcification, and lower normal cuspal separation  DOPPLER: Transaortic velocity was within the normal range  There was no evidence for stenosis  There was no regurgitation  TRICUSPID VALVE: The valve structure was normal  There was normal leaflet separation  DOPPLER: The transtricuspid velocity was within the normal range  There was no evidence for stenosis  There was mild regurgitation  Pulmonary artery  systolic pressure was mildly increased   Estimated peak PA pressure was 35 mmHg     PULMONIC VALVE: Leaflets exhibited normal thickness, no calcification, and normal cuspal separation  DOPPLER: The transpulmonic velocity was within the normal range  There was no evidence for stenosis  There was no significant  regurgitation  PERICARDIUM: There was no pericardial effusion  The pericardium was normal in appearance  AORTA: The root exhibited normal size  The ascending aorta was normal in size  SYSTEMIC VEINS: IVC: The inferior vena cava was normal in size and course  Respirophasic changes were normal     SYSTEM MEASUREMENT TABLES    2D  %FS: 29 82 %  Ao Diam: 3 09 cm  EDV(Teich): 46 22 ml  EF Biplane: 60 79 %  EF(Teich): 58 15 %  ESV(Teich): 19 34 ml  IVSd: 1 11 cm  LA Area: 20 36 cm2  LA Diam: 4 52 cm  LVEDV MOD A2C: 79 08 ml  LVEDV MOD A4C: 86 83 ml  LVEDV MOD BP: 84 86 ml  LVEF MOD A2C: 59 68 %  LVEF MOD A4C: 60 53 %  LVESV MOD A2C: 31 89 ml  LVESV MOD A4C: 34 27 ml  LVESV MOD BP: 33 27 ml  LVIDd: 3 36 cm  LVIDs: 2 36 cm  LVLd A2C: 7 59 cm  LVLd A4C: 7 2 cm  LVLs A2C: 6 36 cm  LVLs A4C: 6 16 cm  LVPWd: 0 98 cm  RA Area: 11 72 cm2  RVIDd: 3 37 cm  SV MOD A2C: 47 2 ml  SV MOD A4C: 52 56 ml  SV(Teich): 26 87 ml    CW  TR Vmax: 2 48 m/s  TR maxP 52 mmHg    MM  TAPSE: 1 42 cm    PW  E': 0 07 m/s  E/E': 16 01  MV A Ajit: 0 75 m/s  MV Dec [place]: 3 37 m/s2  MV DecT: 331 39 ms  MV E Ajit: 1 12 m/s  MV E/A Ratio: 1 5  MV PHT: 96 1 ms  MVA By PHT: 2 29 cm2    [facility]    Prepared and electronically signed by    Candice Moeller MD  Signed 2020 09:30:48    No results found for this or any previous visit   --------------------------------------------------------------------------------  Amauri Browning  Results for orders placed during the hospital encounter of 20    VAS carotid complete study    Narrative  [facility] REPORT  CLINICAL:  Indications:  Surveillance of carotid artery disease    Patient is asymptomatic  at this time  Patient is wheel chair bound  Operative History:  Denies Any Cardiovascular Surgeries  Risk Factors  The patient has history of Obesity and HLD  Clinical  Right Pressure:  152/ mm Hg,    FINDINGS:    Right        Impression  PSV  EDV (cm/s)  Direction of Flow  Ratio  Dist  ICA                 37          13                      0 58  Mid  ICA                  46          12                      0 71  Prox  ICA    1 - 49%      57          14                      0 89  Dist CCA                  67          12  Mid CCA                   64          12                      0 78  Prox CCA                  82           0  Ext Carotid               83           0                      1 31  Prox Vert                 68          13  Antegrade  Subclavian               168           0  Innominate                30           7    Left         PSV  EDV (cm/s)  Ratio  Dist  ICA     52          12   0 83  Mid  ICA      78          25   1 25  Prox  ICA    124          31   1 98  Dist CCA      65          16  Mid CCA       63          14   0 95  Prox CCA      66          14  Ext Carotid   66           2   1 06  Prox Vert     24           7  Subclavian   149           6        CONCLUSION:    Impression  RIGHT:  There is <50% stenosis noted in the internal carotid artery  Plaque is  heterogenous and irregular  Vertebral artery flow is antegrade  There is no significant subclavian artery  disease  LEFT:  There is <50% stenosis noted in the internal carotid artery  Plaque is  heterogenous and irregular  Vertebral artery flow is antegrade  There is no significant subclavian artery  disease  Compared to previous study on 11/04/2011, there are no significant changes      Internal carotid artery stenosis determination by consensus criteria from:  Rudolph Butler , et al  Carotid Artery Stenosis: Gray-Scale and Doppler US Diagnosis  - Society of Radiologists in [address] [place] [address], Radiology 2003;  [phone]  SIGNATURE:  Electronically Signed by: Precious Durán on 2021-01-28 10:32:19 AM     ======================================================    Lab Results   Component Value Date    WBC 5 94 05/22/2021    HGB 11 2 (L) 05/22/2021    HCT 36 0 05/22/2021    MCV 94 05/22/2021     (L) 05/22/2021      Lab Results   Component Value Date    SODIUM 141 05/22/2021    K 4 3 05/22/2021     (H) 05/22/2021    CO2 26 05/22/2021    BUN 24 05/22/2021    CREATININE 0 69 05/22/2021    GLUC 115 05/22/2021    CALCIUM 9 1 05/22/2021      Lab Results   Component Value Date    HGBA1C 6 2 (H) 08/20/2020      No results found for: CHOL  Lab Results   Component Value Date    HDL 44 02/05/2020    HDL 40 06/12/2019    HDL 40 06/02/2018     Lab Results   Component Value Date    LDLCALC 36 02/05/2020    LDLCALC 43 06/12/2019    LDLCALC 39 06/02/2018     Lab Results   Component Value Date    TRIG 121 02/05/2020    TRIG 96 06/12/2019    TRIG 88 06/02/2018     No results found for: [place], [place]   Lab Results   Component Value Date    INR 1 39 (H) 10/21/2020    INR 1 58 (H) 02/29/2020    INR 1 17 02/19/2020    PROTIME 16 7 (H) 10/21/2020    PROTIME 18 4 (H) 02/29/2020    PROTIME 14 5 02/19/2020        Imaging:   I have personally reviewed pertinent reports  Portions of the record may have been created with voice recognition software  Occasional wrong word or "sound a like" substitutions may have occurred due to the inherent limitations of voice recognition software  Read the chart carefully and recognize, using context, where substitutions have occurred

## 2021-07-16 NOTE — PROGRESS NOTES
Cardiology Follow Up    Piyush Funez  1935  [id number]  [address] [id number] [phone]    1  Paroxysmal atrial fibrillation (HCC)  POCT ECG   2  Chronic diastolic congestive heart failure (HCC)  furosemide (LASIX) 80 mg tablet    spironolactone (ALDACTONE) 25 mg tablet    Basic metabolic panel   3  Pure hypercholesterolemia     4  Essential hypertension     5  History of stroke     6  Wheelchair dependence     7  Type 2 diabetes mellitus without complication, without long-term current use of insulin [facility])         Patient 1st seen on July 18, 2019  HPI:  Patient with a history of paroxysmal atrial fibrillation and prior strokes previously on warfarin anticoagulation was admitted to [facility] on June 11th through June 14th and again on June 16th through June 18, 2019  Both time she was admitted with slurred speech   The symptoms resolved without residual and patient has no residual neurologic defects from her prior strokes   Her warfarin management has been difficult and she has frequently been subtherapeutic   During the last admission she was changed from warfarin to Eliquis   She has had no further episodes of slurred speech since she has been on Eliquis   At the time of both admissions, she presented in sinus rhythm        Other problems have included type 2 diabetes mellitus, cervical dystonia, spinal cord stimulator, wheelchair dependent, overactive bladder, essential hypertension     04/17/2019 lipid profile: Total cholesterol 141, triglycerides 148, HDL 54, LDL calculated 57, non HDL cholesterol 87    02/05/2020 lipid profile:  Cholesterol 104, triglycerides 121, HDL 44, LDL calculated 36  Interval History: patient's biggest complaint is the swelling in her legs and the pain that it causes  She denies other cardiac symptoms   Patient denies chest discomfort or shortness of breath  Patient has no palpitations  Patient denies symptoms of dizziness, lightheadedness or near-syncope/syncope  Patient denies symptoms of orthopnea or paroxysmal nocturnal dyspnea  Discussion/Summary:  1  Begin furosemide 80 mg 1 daily in a m  and stop 20 mg furosemide pill  2  Begin spironolactone 25 mg daily in a m , may take with furosemide  3  Stop amlodipine  4  Blood test in 1 week, 7/23  5  Return to see me the following week   6  Patient needs to stay completely away from salt and a very low-sodium diet  Look at labels    The less salt and sodium she has the less she will need to urinate    Patient Active Problem List   Diagnosis    Wheelchair dependence    Type 2 diabetes mellitus (HCC)    Paroxysmal atrial fibrillation (HCC)    Weakness generalized    Urinary incontinence    Spinal cord stimulator status    Overactive bladder    Essential hypertension    Cervical dystonia    History of stroke    Hyperlipidemia    Calcaneal spur of foot, left    Osteopenia of left foot    Stroke-like symptoms    Thyroid nodule    Weakness of both lower extremities    Dysarthria    Slurred speech    Aphasia    Oropharyngeal dysphagia    Pleural effusion, left    Abnormal CT of the chest    Goals of care, counseling/discussion    Moderate protein-calorie malnutrition (HCC)    Constipation    Abnormal alkaline phosphatase test    Bradycardia    Chronic diastolic congestive heart failure (HCC)     Past Medical History:   Diagnosis Date    [facility]     Arthritis     Assistance needed for mobility     pt can stand with assistance and transfer    Chronic pain disorder     Diabetes mellitus ([facility]     NIDDM    Full dentures     History of transfusion     no adverse reaction    Hyperlipidemia     Irregular heart beat     Numbness and tingling of both feet     Skin abnormality     sacrum area - small red area, less than a dime size    Spinal stenosis     Stroke ([facility]     Unable to ambulate     Urinary incontinence     ipg stimulator x3 repakcements,battery exchange today 2/14/2018    Wears glasses     Wheelchair dependence      Social History     Socioeconomic History    Marital status: /Civil Union     Spouse name: Not on file    Number of children: Not on file    Years of education: Not on file    Highest education level: Not on file   Occupational History    Not on file   Tobacco Use    Smoking status: Never Smoker    Smokeless tobacco: Never Used   Vaping Use    Vaping Use: Never used   Substance and Sexual Activity    Alcohol use: Not Currently     Comment: very rare    Drug use: No    Sexual activity: Not on file   Other Topics Concern    Not on file   Social History Narrative    Not on file     Social Determinants of Health     Financial Resource Strain:     Difficulty of Paying Living Expenses:    Food Insecurity:     Worried About Running Out of Food in the Last Year:     [address] in the Last Year:    Transportation Needs:     Lack of Transportation (Medical):      Lack of Transportation (Non-Medical):    Physical Activity:     Days of Exercise per Week:     Minutes of Exercise per Session:    Stress:     Feeling of Stress :    Social Connections:     Frequency of Communication with Friends and Family:     Frequency of Social Gatherings with Friends and Family:     Attends [employer] Services:     Active Member of Clubs or Organizations:     Attends Club or Organization Meetings:     Marital Status:    Intimate Partner Violence:     Fear of Current or Ex-Partner:     Emotionally Abused:     Physically Abused:     Sexually Abused:       Family History   Problem Relation Age of Onset    No Known Problems Mother     No Known Problems Father      Past Surgical History:   Procedure Laterality Date    BACK SURGERY      fusion    BLADDER SUSPENSION      CARPAL TUNNEL RELEASE Bilateral     CHOLANGIOGRAM N/A 6/4/2018    Procedure: CHOLANGIOGRAM;  Surgeon: Delia Guzmán MD;  [facility];  Service: [address] [place] [address] N/A 6/4/2018    Procedure: CHOLECYSTECTOMY LAPAROSCOPIC;  Surgeon: Delia Guzmán MD;  [facility];  Service: General    COLONOSCOPY      DILATION AND CURETTAGE OF UTERUS      FRACTURE SURGERY Left     femur with hardware    HYSTERECTOMY      INSERTION / Elise Jose Miguel / REVISION NEUROSTIMULATOR      JOINT REPLACEMENT Bilateral     knees    SACRAL NERVE STIMULATOR PLACEMENT N/A 2/14/2018    Procedure: REMOVE AND REPLACE IPG;  Surgeon: Sandra Valle MD;  [facility];  Service: UroGynecology           TONSILLECTOMY         Current Outpatient Medications:     Acetaminophen (TYLENOL PO), Take by mouth 3 (three) times a day, Disp: , Rfl:     apixaban (ELIQUIS) 5 mg, Take 1 tablet (5 mg total) by mouth 2 (two) times a day, Disp: 180 tablet, Rfl: 3    atorvastatin (LIPITOR) 40 mg tablet, Take 1 tablet (40 mg total) by mouth every evening, Disp: 90 tablet, Rfl: 3    cholecalciferol (VITAMIN D3) 1,000 units tablet, Take 1,000 Units by mouth every evening, Disp: , Rfl:     CRANBERRY PO, Take 30,000 mg by mouth, Disp: , Rfl:     cyanocobalamin (VITAMIN B-12) 1,000 mcg tablet, Take 1,000 mcg by mouth every evening  , Disp: , Rfl:     folic acid (FOLVITE) 1 mg tablet, Take 1,000 mcg by mouth daily, Disp: , Rfl: 3    ipratropium (ATROVENT) 0 03 % nasal spray, 1-2 sprays each nostril twice a day as needed for rhinorrhea, Disp: 30 mL, Rfl: 6    metFORMIN (GLUCOPHAGE) 1000 MG tablet, Take 0 5 tablets (500 mg total) by mouth 2 (two) times a day with meals (Patient taking differently: Take 500 mg by mouth daily with breakfast ), Disp: 60 tablet, Rfl: 0    Multiple Vitamin (MULTI-VITAMIN DAILY PO), Take by mouth, Disp: , Rfl:     mupirocin (BACTROBAN) 2 % ointment, APPLY A SMALL AMOUNT TO THE AFFECTED AREA TWICE DAILY AS NEEDED, Disp: , Rfl:     nystatin (MYCOSTATIN) powder, APPLY 1 GM ON THE SKIN THREE TIMES A DAY, Disp: , Rfl:     bisacodyl (DULCOLAX) 10 mg suppository, Insert 1 suppository (10 mg total) into the rectum daily as needed for constipation (Patient not taking: Reported on 7/16/2021), Disp: 12 suppository, Rfl: 0    furosemide (LASIX) 80 mg tablet, Take 1 tablet (80 mg total) by mouth daily, Disp: 30 tablet, Rfl: 5    metoprolol tartrate (LOPRESSOR) 25 mg tablet, 0 5 tablets (12 5 mg total) by Per PEG Tube route every 12 (twelve) hours, Disp: 90 tablet, Rfl: 3    primidone (MYSOLINE) 50 mg tablet, Take by mouth 2 (two) times a day (Patient not taking: Reported on 7/16/2021), Disp: , Rfl:     saccharomyces boulardii (FLORASTOR) 250 mg capsule, Take 250 mg by mouth 2 (two) times a day (Patient not taking: Reported on 7/16/2021), Disp: , Rfl:     spironolactone (ALDACTONE) 25 mg tablet, Take 1 tablet (25 mg total) by mouth daily, Disp: 30 tablet, Rfl: 5  Allergies   Allergen Reactions    Bactrim [Sulfamethoxazole-Trimethoprim] Swelling     Swelling around eyes and red eyes    Aspirin Other (See Comments)     "feels like fist in my chest"    Ciprofloxacin     Nitrofurantoin     Other      "5mz/Tmp "  "1 60 mTab amme"     Per pt's allergy list        Results for orders placed or performed in visit on 07/16/21   POCT ECG    Narrative     Accelerated junctional rhythm verses low amplitude P-waves with baseline artifact making interpretation difficult  Right bundle branch block pattern  Abnormal EKG  Review of Systems:  Review of Systems   Respiratory: Negative for cough, choking, chest tightness, shortness of breath and wheezing  Cardiovascular: Negative for chest pain, palpitations and leg swelling  Musculoskeletal: Negative for gait problem  Skin: Negative for rash  Neurological: Negative for dizziness, tremors, syncope, weakness, light-headedness, numbness and headaches  Psychiatric/Behavioral: Negative for agitation and behavioral problems   The patient is not hyperactive  Physical Exam:  /72 (BP Location: Left arm, Patient Position: Sitting, Cuff Size: Large)   Pulse 61   Ht 5' 4" (1 626 m)   SpO2 99%   BMI 32 61 kg/m²   Physical Exam  Constitutional:       General: She is not in acute distress  Appearance: She is well-developed  HENT:      Head: Normocephalic and atraumatic  Neck:      Thyroid: No thyromegaly  Vascular: No carotid bruit or JVD  Trachea: No tracheal deviation  Cardiovascular:      Rate and Rhythm: Normal rate and regular rhythm  Pulses: Normal pulses  Heart sounds: Normal heart sounds  No murmur heard  No friction rub  No gallop  Pulmonary:      Effort: Pulmonary effort is normal  No respiratory distress  Breath sounds: Normal breath sounds  No wheezing, rhonchi or rales  Chest:      Chest wall: No tenderness  Abdominal:      General: Bowel sounds are normal  There is no distension  Palpations: Abdomen is soft  Tenderness: There is no abdominal tenderness  Musculoskeletal:         General: Normal range of motion  Cervical back: Normal range of motion and neck supple  Right lower leg: Edema present  Left lower leg: Edema present  Skin:     General: Skin is warm and dry  Neurological:      General: No focal deficit present  Mental Status: She is alert and oriented to person, place, and time  Gait: Gait normal    Psychiatric:         Mood and Affect: Mood normal          Behavior: Behavior normal          Thought Content:  Thought content normal          Judgment: Judgment normal        --------------------------------------------------------------------------------  ECHO:   Results for orders placed during the hospital encounter of 02/04/20    Echo complete with contrast if indicated    Narrative  [address]  [facility], [address] [facility]  [phone]    Transthoracic Echocardiogram  2D, M-mode, Doppler, and Color Doppler    Study date:  2020    Patient: Virginia Lantigua  MR number: [id number]  Account number: [de-identified]  : 1935  Age: 80 years  Gender: Female  Status: Inpatient  Location: Bedside  Height: 64 in  Weight: 187 lb  BP: 140/ 69 mmHg    Indications: TIA    Diagnoses: G45 9 - Transient cerebral ischemic attack, unspecified    Sonographer:  VANE Gomes  Primary Physician:  Everardo Foster MD  Referring Physician:  Ron Prince MD  Group:  [facility]  Cardiology Fellow: Cait Duong MD  Interpreting Physician:  Manju Guadarrama MD    SUMMARY    LEFT VENTRICLE:  Size was normal   Systolic function was normal  Ejection fraction was estimated to be 60 %  There were no regional wall motion abnormalities  There was mild concentric hypertrophy  Features were consistent with a pseudonormal left ventricular filling pattern, with concomitant abnormal relaxation and increased filling pressure (grade 2 diastolic dysfunction)  RIGHT VENTRICLE:  The size was normal   Systolic function was normal     LEFT ATRIUM:  The atrium was mildly dilated  MITRAL VALVE:  There was mild annular calcification  There was trace regurgitation  TRICUSPID VALVE:  There was mild regurgitation  Pulmonary artery systolic pressure was mildly increased  COMPARISONS:  There has been no significant interval change  Comparison was made with the previous study of 2018  HISTORY: PRIOR HISTORY: A-FIB,DM2,HTN,HLD,Stroke,Elevated troponin    PROCEDURE: The procedure was performed at the bedside  This was a routine study  The transthoracic approach was used  The study included complete 2D imaging, M-mode, complete spectral Doppler, and color Doppler  The heart rate was 61 bpm,  at the start of the study  Echocardiographic views were limited due to lung interference  Image quality was adequate      LEFT VENTRICLE: Size was normal  Systolic function was normal  Ejection fraction was estimated to be 60 %  There were no regional wall motion abnormalities  Wall thickness was mildly increased  There was mild concentric hypertrophy  DOPPLER: Features were consistent with a pseudonormal left ventricular filling pattern, with concomitant abnormal relaxation and increased filling pressure (grade 2 diastolic dysfunction)  RIGHT VENTRICLE: The size was normal  Systolic function was normal     LEFT ATRIUM: The atrium was mildly dilated  RIGHT ATRIUM: Size was normal     MITRAL VALVE: There was mild annular calcification  There was minimal diffuse thickening of the valve  There was normal leaflet separation  DOPPLER: The transmitral velocity was within the normal range  There was no evidence for stenosis  There was trace regurgitation  AORTIC VALVE: The valve was trileaflet  Leaflets exhibited mildly increased thickness, mild calcification, and lower normal cuspal separation  DOPPLER: Transaortic velocity was within the normal range  There was no evidence for stenosis  There was no regurgitation  TRICUSPID VALVE: The valve structure was normal  There was normal leaflet separation  DOPPLER: The transtricuspid velocity was within the normal range  There was no evidence for stenosis  There was mild regurgitation  Pulmonary artery  systolic pressure was mildly increased  Estimated peak PA pressure was 35 mmHg  PULMONIC VALVE: Leaflets exhibited normal thickness, no calcification, and normal cuspal separation  DOPPLER: The transpulmonic velocity was within the normal range  There was no evidence for stenosis  There was no significant  regurgitation  PERICARDIUM: There was no pericardial effusion  The pericardium was normal in appearance  AORTA: The root exhibited normal size  The ascending aorta was normal in size  SYSTEMIC VEINS: IVC: The inferior vena cava was normal in size and course   Respirophasic changes were normal     SYSTEM MEASUREMENT TABLES    2D  %FS: 29 82 %  Ao Diam: 3 09 cm  EDV(Teich): 46 22 ml  EF Biplane: 60 79 %  EF(Teich): 58 15 %  ESV(Teich): 19 34 ml  IVSd: 1 11 cm  LA Area: 20 36 cm2  LA Diam: 4 52 cm  LVEDV MOD A2C: 79 08 ml  LVEDV MOD A4C: 86 83 ml  LVEDV MOD BP: 84 86 ml  LVEF MOD A2C: 59 68 %  LVEF MOD A4C: 60 53 %  LVESV MOD A2C: 31 89 ml  LVESV MOD A4C: 34 27 ml  LVESV MOD BP: 33 27 ml  LVIDd: 3 36 cm  LVIDs: 2 36 cm  LVLd A2C: 7 59 cm  LVLd A4C: 7 2 cm  LVLs A2C: 6 36 cm  LVLs A4C: 6 16 cm  LVPWd: 0 98 cm  RA Area: 11 72 cm2  RVIDd: 3 37 cm  SV MOD A2C: 47 2 ml  SV MOD A4C: 52 56 ml  SV(Teich): 26 87 ml    CW  TR Vmax: 2 48 m/s  TR maxP 52 mmHg    MM  TAPSE: 1 42 cm    PW  E': 0 07 m/s  E/E': 16 01  MV A Ajit: 0 75 m/s  MV Dec [place]: 3 37 m/s2  MV DecT: 331 39 ms  MV E Ajit: 1 12 m/s  MV E/A Ratio: 1 5  MV PHT: 96 1 ms  MVA By PHT: 2 29 cm2    [facility]    Prepared and electronically signed by    Al Christiansen MD  Signed 2020 09:30:48    No results found for this or any previous visit   --------------------------------------------------------------------------------  Pamalee Delay  Results for orders placed during the hospital encounter of 20    VAS carotid complete study    Narrative  [facility] REPORT  CLINICAL:  Indications:  Surveillance of carotid artery disease  Patient is asymptomatic  at this time  Patient is wheel chair bound  Operative History:  Denies Any Cardiovascular Surgeries  Risk Factors  The patient has history of Obesity and HLD  Clinical  Right Pressure:  152/ mm Hg,    FINDINGS:    Right        Impression  PSV  EDV (cm/s)  Direction of Flow  Ratio  Dist  ICA                 37          13                      0 58  Mid  ICA                  46          12                      0 71  Prox   ICA    1 - 49%      57          14                      0 89  Dist CCA                  67          12  Mid CCA                   64          12 0 78  Prox CCA                  82           0  Ext Carotid               83           0                      1 31  Prox Vert                 68          13  Antegrade  Subclavian               168           0  Innominate                30           7    Left         PSV  EDV (cm/s)  Ratio  Dist  ICA     52          12   0 83  Mid  ICA      78          25   1 25  Prox  ICA    124          31   1 98  Dist CCA      65          16  Mid CCA       63          14   0 95  Prox CCA      66          14  Ext Carotid   66           2   1 06  Prox Vert     24           7  Subclavian   149           6        CONCLUSION:    Impression  RIGHT:  There is <50% stenosis noted in the internal carotid artery  Plaque is  heterogenous and irregular  Vertebral artery flow is antegrade  There is no significant subclavian artery  disease  LEFT:  There is <50% stenosis noted in the internal carotid artery  Plaque is  heterogenous and irregular  Vertebral artery flow is antegrade  There is no significant subclavian artery  disease  Compared to previous study on 11/04/2011, there are no significant changes  Internal carotid artery stenosis determination by consensus criteria from:  Rudolph Butler et al  Carotid Artery Stenosis: Gray-Scale and Doppler US Diagnosis  - Society of Radiologists in [address] [place] [address], Radiology 2003;  [phone]      SIGNATURE:  Electronically Signed by: Wendy Conn on 2021-01-28 10:32:19 AM     ======================================================    Lab Results   Component Value Date    WBC 5 94 05/22/2021    HGB 11 2 (L) 05/22/2021    HCT 36 0 05/22/2021    MCV 94 05/22/2021     (L) 05/22/2021      Lab Results   Component Value Date    SODIUM 141 05/22/2021    K 4 3 05/22/2021     (H) 05/22/2021    CO2 26 05/22/2021    BUN 24 05/22/2021    CREATININE 0 69 05/22/2021    GLUC 115 05/22/2021    CALCIUM 9 1 05/22/2021      Lab Results   Component Value Date    HGBA1C 6 2 (H) 08/20/2020      No results found for: CHOL  Lab Results   Component Value Date    HDL 44 02/05/2020    HDL 40 06/12/2019    HDL 40 06/02/2018     Lab Results   Component Value Date    LDLCALC 36 02/05/2020    LDLCALC 43 06/12/2019    LDLCALC 39 06/02/2018     Lab Results   Component Value Date    TRIG 121 02/05/2020    TRIG 96 06/12/2019    TRIG 88 06/02/2018     No results found for: [place], [place]   Lab Results   Component Value Date    INR 1 39 (H) 10/21/2020    INR 1 58 (H) 02/29/2020    INR 1 17 02/19/2020    PROTIME 16 7 (H) 10/21/2020    PROTIME 18 4 (H) 02/29/2020    PROTIME 14 5 02/19/2020        Imaging:   I have personally reviewed pertinent reports  Portions of the record may have been created with voice recognition software  Occasional wrong word or "sound a like" substitutions may have occurred due to the inherent limitations of voice recognition software  Read the chart carefully and recognize, using context, where substitutions have occurred

## 2021-07-27 ENCOUNTER — OFFICE VISIT (OUTPATIENT)
Dept: CARDIOLOGY CLINIC | Facility: CLINIC | Age: 86
End: 2021-07-27
Payer: COMMERCIAL

## 2021-07-27 VITALS
BODY MASS INDEX: 32.61 KG/M2 | HEIGHT: 64 IN | HEART RATE: 74 BPM | DIASTOLIC BLOOD PRESSURE: 82 MMHG | SYSTOLIC BLOOD PRESSURE: 142 MMHG

## 2021-07-27 DIAGNOSIS — I48.0 PAROXYSMAL ATRIAL FIBRILLATION (HCC): Chronic | ICD-10-CM

## 2021-07-27 DIAGNOSIS — I50.32 CHRONIC DIASTOLIC CONGESTIVE HEART FAILURE (HCC): Primary | ICD-10-CM

## 2021-07-27 DIAGNOSIS — E78.00 PURE HYPERCHOLESTEROLEMIA: ICD-10-CM

## 2021-07-27 DIAGNOSIS — I10 ESSENTIAL HYPERTENSION: Chronic | ICD-10-CM

## 2021-07-27 DIAGNOSIS — Z86.73 HISTORY OF STROKE: ICD-10-CM

## 2021-07-27 DIAGNOSIS — E11.9 TYPE 2 DIABETES MELLITUS WITHOUT COMPLICATION, WITHOUT LONG-TERM CURRENT USE OF INSULIN (HCC): Chronic | ICD-10-CM

## 2021-07-27 DIAGNOSIS — Z99.3 WHEELCHAIR DEPENDENCE: ICD-10-CM

## 2021-07-27 PROCEDURE — 99214 OFFICE O/P EST MOD 30 MIN: CPT | Performed by: INTERNAL MEDICINE

## 2021-07-27 PROCEDURE — 3077F SYST BP >= 140 MM HG: CPT | Performed by: INTERNAL MEDICINE

## 2021-07-27 PROCEDURE — 3079F DIAST BP 80-89 MM HG: CPT | Performed by: INTERNAL MEDICINE

## 2021-07-27 PROCEDURE — 1160F RVW MEDS BY RX/DR IN RCRD: CPT | Performed by: INTERNAL MEDICINE

## 2021-07-27 PROCEDURE — 1036F TOBACCO NON-USER: CPT | Performed by: INTERNAL MEDICINE

## 2021-07-27 NOTE — PROGRESS NOTES
[facility]  [address] [facility]   [address]  [address]  Phone#  [phone]   Fax#  [phone]  *-*-*-*-*-*-*-*-*-*-*-*-*-*-*-*-*-*-*-*-*-*-*-*-*-*-*-*-*-*-*-*-*-*-*-*-*-*-*-*-*-*-*-*-*-*-*-*-*-*-*-*-*-*                                   Cardiology Follow Up      ENCOUNTER DATE: 21 3:40 PM  PATIENT NAME: Chacorta Verdugo   : 1935    MRN: [id number]  AGE:85 y o  SEX: female  [address] Osiel Gan MD     PRIMARY CARE PHYSICIAN: Army Yamil MD    ACTIVE DIAGNOSIS THIS VISIT  1  Chronic diastolic congestive heart failure (HCC)     2  Paroxysmal atrial fibrillation (HCC)     3  Pure hypercholesterolemia     4  Type 2 diabetes mellitus without complication, without long-term current use of insulin ([address] )     5  Essential hypertension     6  History of stroke     7  Wheelchair dependence       ACTIVE PROBLEM LIST  Patient Active Problem List   Diagnosis    Wheelchair dependence    Type 2 diabetes mellitus ([address] )    Paroxysmal atrial fibrillation (HCC)    Weakness generalized    Urinary incontinence    Spinal cord stimulator status    Overactive bladder    Essential hypertension    Cervical dystonia    History of stroke    Hyperlipidemia    Calcaneal spur of foot, left    Osteopenia of left foot    Stroke-like symptoms    Thyroid nodule    Weakness of both lower extremities    Dysarthria    Slurred speech    Aphasia    Oropharyngeal dysphagia    Pleural effusion, left    Abnormal CT of the chest    Goals of care, counseling/discussion    Moderate protein-calorie malnutrition (HCC)    Constipation    Abnormal alkaline phosphatase test    Bradycardia    Chronic diastolic congestive heart failure ([address] )       CARDIOLOGY SPECIALTY COMMENTS  Patient 1st seen on 2019   HPI:  Patient with a history of paroxysmal atrial fibrillation and prior strokes previously on warfarin anticoagulation was admitted to [facility] on  11th through June 14th and again on June 16th through June 18, 2019  Both time she was admitted with slurred speech   The symptoms resolved without residual and patient has no residual neurologic defects from her prior strokes   Her warfarin management has been difficult and she has frequently been subtherapeutic   During the last admission she was changed from warfarin to Eliquis   She has had no further episodes of slurred speech since she has been on Eliquis   At the time of both admissions, she presented in sinus rhythm        Other problems have included type 2 diabetes mellitus, cervical dystonia, spinal cord stimulator, wheelchair dependent, overactive bladder, essential hypertension     04/17/2019 lipid profile: Total cholesterol 141, triglycerides 148, HDL 54, LDL calculated 57, non HDL cholesterol 87    02/05/2020 lipid profile:  Cholesterol 104, triglycerides 121, HDL 44, LDL calculated 36  INTERVAL HISTORY:       patient has no cardiac symptoms  Patient denies chest discomfort or shortness of breath  Patient has no palpitations  Patient denies symptoms of dizziness, lightheadedness or near-syncope/syncope  Patient denies leg edema  Patient denies symptoms of orthopnea or paroxysmal nocturnal dyspnea  She is wheelchair bound  DISCUSSION/PLAN:          Return in 6 months      Lab Studies:    Lab Results   Component Value Date    CHOLESTEROL 104 02/05/2020    CHOLESTEROL 102 06/12/2019    CHOLESTEROL 97 06/02/2018     Lab Results   Component Value Date    TRIG 121 02/05/2020    TRIG 96 06/12/2019    TRIG 88 06/02/2018     Lab Results   Component Value Date    HDL 44 02/05/2020    HDL 40 06/12/2019    HDL 40 06/02/2018     Lab Results   Component Value Date    LDLCALC 36 02/05/2020    LDLCALC 43 06/12/2019    LDLCALC 39 06/02/2018         Lab Results   Component Value Date    NTBNP 2,141 (H) 01/25/2019     Lab Results   Component Value Date    HGBA1C 6 2 (H) 08/20/2020      Lab Results Component Value Date    EGFR 80 05/22/2021    EGFR 81 10/26/2020    EGFR 83 10/24/2020    SODIUM 141 05/22/2021    SODIUM 139 10/26/2020    SODIUM 139 10/24/2020    K 4 3 05/22/2021    K 4 0 10/26/2020    K 4 1 10/24/2020     (H) 05/22/2021     10/26/2020     10/24/2020    CO2 26 05/22/2021    CO2 28 10/26/2020    CO2 24 10/24/2020    ANIONGAP 6 12/17/2014    ANIONGAP 7 12/12/2014    ANIONGAP 9 12/11/2014    BUN 24 05/22/2021    BUN 19 10/26/2020    BUN 17 10/24/2020    CREATININE 0 69 05/22/2021    CREATININE 0 66 10/26/2020    CREATININE 0 61 10/24/2020     Lab Results   Component Value Date    WBC 5 94 05/22/2021    WBC 4 23 (L) 10/26/2020    WBC 4 11 (L) 10/24/2020    HGB 11 2 (L) 05/22/2021    HGB 11 7 10/26/2020    HGB 11 2 (L) 10/24/2020    HCT 36 0 05/22/2021    HCT 36 6 10/26/2020    HCT 34 5 (L) 10/24/2020    MCV 94 05/22/2021    MCV 92 10/26/2020    MCV 93 10/24/2020    MCH 29 2 05/22/2021    MCH 29 5 10/26/2020    MCH 30 0 10/24/2020    MCHC 31 1 (L) 05/22/2021    MCHC 32 0 10/26/2020    MCHC 32 5 10/24/2020     (L) 05/22/2021     (L) 10/26/2020     (L) 10/24/2020      No components found for: [id number]  Lab Results   Component Value Date    GLUCOSE 113 12/17/2014    GLUCOSE 206 (H) 12/12/2014    GLUCOSE 261 (H) 12/11/2014    CALCIUM 9 1 05/22/2021    CALCIUM 9 0 10/26/2020    CALCIUM 8 8 10/24/2020    AST 24 10/23/2020    AST 17 10/22/2020    AST 22 10/21/2020    ALT 30 10/23/2020    ALT 22 10/22/2020    ALT 29 10/21/2020    ALKPHOS 131 (H) 10/23/2020    ALKPHOS 137 (H) 10/22/2020    ALKPHOS 159 (H) 10/21/2020    PROT 7 8 12/11/2014    BILITOT 0 46 12/11/2014    MG 2 0 10/26/2020    MG 1 6 10/23/2020    MG 1 8 03/04/2020     No results found for: TSH  Lab Results   Component Value Date    TROPONINI <0 02 10/21/2020    TROPONINI <0 02 02/19/2020    TROPONINI <0 02 02/04/2020     Lab Results   Component Value Date    DDIMER 369 05/31/2018     Lab Results   Component Value Date    DIGOXIN 2 2 (H) 10/21/2020    DIGOXIN 1 6 02/15/2020    DIGOXIN 0 9 06/17/2019     Lab Results   Component Value Date    IRON 20 (L) 12/15/2014         Current Outpatient Medications:     Acetaminophen (TYLENOL PO), Take by mouth 3 (three) times a day, Disp: , Rfl:     apixaban (ELIQUIS) 5 mg, Take 1 tablet (5 mg total) by mouth 2 (two) times a day, Disp: 180 tablet, Rfl: 3    atorvastatin (LIPITOR) 40 mg tablet, Take 1 tablet (40 mg total) by mouth every evening, Disp: 90 tablet, Rfl: 3    cholecalciferol (VITAMIN D3) 1,000 units tablet, Take 1,000 Units by mouth every evening, Disp: , Rfl:     CRANBERRY PO, Take 30,000 mg by mouth, Disp: , Rfl:     cyanocobalamin (VITAMIN B-12) 1,000 mcg tablet, Take 1,000 mcg by mouth every evening  , Disp: , Rfl:     folic acid (FOLVITE) 1 mg tablet, Take 1,000 mcg by mouth daily, Disp: , Rfl: 3    furosemide (LASIX) 80 mg tablet, Take 1 tablet (80 mg total) by mouth daily, Disp: 30 tablet, Rfl: 5    metFORMIN (GLUCOPHAGE) 1000 MG tablet, Take 0 5 tablets (500 mg total) by mouth 2 (two) times a day with meals (Patient taking differently: Take 500 mg by mouth daily with breakfast ), Disp: 60 tablet, Rfl: 0    metoprolol tartrate (LOPRESSOR) 25 mg tablet, 0 5 tablets (12 5 mg total) by Per PEG Tube route every 12 (twelve) hours, Disp: 90 tablet, Rfl: 3    Multiple Vitamin (MULTI-VITAMIN DAILY PO), Take by mouth, Disp: , Rfl:     primidone (MYSOLINE) 50 mg tablet, Take by mouth 2 (two) times a day , Disp: , Rfl:     spironolactone (ALDACTONE) 25 mg tablet, Take 1 tablet (25 mg total) by mouth daily, Disp: 30 tablet, Rfl: 5    bisacodyl (DULCOLAX) 10 mg suppository, Insert 1 suppository (10 mg total) into the rectum daily as needed for constipation (Patient not taking: Reported on 7/16/2021), Disp: 12 suppository, Rfl: 0    ipratropium (ATROVENT) 0 03 % nasal spray, 1-2 sprays each nostril twice a day as needed for rhinorrhea, Disp: 30 mL, Rfl: 6   mupirocin (BACTROBAN) 2 % ointment, APPLY A SMALL AMOUNT TO THE AFFECTED AREA TWICE DAILY AS NEEDED, Disp: , Rfl:     nystatin (MYCOSTATIN) powder, APPLY 1 GM ON THE SKIN THREE TIMES A DAY, Disp: , Rfl:     saccharomyces boulardii (FLORASTOR) 250 mg capsule, Take 250 mg by mouth 2 (two) times a day (Patient not taking: Reported on 7/16/2021), Disp: , Rfl:   Allergies   Allergen Reactions    Bactrim [Sulfamethoxazole-Trimethoprim] Swelling     Swelling around eyes and red eyes    Aspirin Other (See Comments)     "feels like fist in my chest"    Ciprofloxacin     Nitrofurantoin     Other      "5mz/Tmp "  "1 60 mTab amme"     Per pt's allergy list        Past Medical History:   Diagnosis Date    A-fib ([facility] )     Arthritis     Assistance needed for mobility     pt can stand with assistance and transfer    Chronic pain disorder     Diabetes mellitus [facility]     NIDDM    Full dentures     History of transfusion     no adverse reaction    Hyperlipidemia     Irregular heart beat     Numbness and tingling of both feet     Skin abnormality     sacrum area - small red area, less than a dime size    Spinal stenosis     Stroke ([facility] )     Unable to ambulate     Urinary incontinence     ipg stimulator x3 repakcements,battery exchange today 2/14/2018    Wears glasses     Wheelchair dependence      Social History     Socioeconomic History    Marital status: /Civil Union     Spouse name: Not on file    Number of children: Not on file    Years of education: Not on file    Highest education level: Not on file   Occupational History    Not on file   Tobacco Use    Smoking status: Never Smoker    Smokeless tobacco: Never Used   Vaping Use    Vaping Use: Never used   Substance and Sexual Activity    Alcohol use: Not Currently     Comment: very rare    Drug use: No    Sexual activity: Not on file   Other Topics Concern    Not on file   Social History Narrative    Not on file     Social Determinants of Health     Financial Resource Strain:     Difficulty of Paying Living Expenses:    Food Insecurity:     Worried About Running Out of Food in the Last Year:     [address] in the Last Year:    Transportation Needs:     Lack of Transportation (Medical):      Lack of Transportation (Non-Medical):    Physical Activity:     Days of Exercise per Week:     Minutes of Exercise per Session:    Stress:     Feeling of Stress :    Social Connections:     Frequency of Communication with Friends and Family:     Frequency of Social Gatherings with Friends and Family:     Attends L[employer] Services:     Active Member of Clubs or Organizations:     Attends Club or Organization Meetings:     Marital Status:    Intimate Partner Violence:     Fear of Current or Ex-Partner:     Emotionally Abused:     Physically Abused:     Sexually Abused:       Family History   Problem Relation Age of Onset    No Known Problems Mother     No Known Problems Father      Past Surgical History:   Procedure Laterality Date    BACK SURGERY      fusion    BLADDER SUSPENSION      CARPAL TUNNEL RELEASE Bilateral     CHOLANGIOGRAM N/A 6/4/2018    Procedure: CHOLANGIOGRAM;  Surgeon: Hiral Ybarra MD;  [facility];  Service: General    CHOLECYSTECTOMY LAPAROSCOPIC N/A 6/4/2018    Procedure: CHOLECYSTECTOMY LAPAROSCOPIC;  Surgeon: Hiral Ybarra MD;  [facility];  Service: General    COLONOSCOPY      DILATION AND CURETTAGE OF UTERUS      FRACTURE SURGERY Left     femur with hardware    HYSTERECTOMY      INSERTION / Ulyess Ciara / REVISION NEUROSTIMULATOR      JOINT REPLACEMENT Bilateral     knees    SACRAL NERVE STIMULATOR PLACEMENT N/A 2/14/2018    Procedure: REMOVE AND REPLACE IPG;  Surgeon: Patricia Goddard MD;  [facility];  Service: UroGynecology           TONSILLECTOMY         Review of Systems:  Review of Systems   Respiratory: Negative for cough, choking, chest tightness, shortness of breath and wheezing  Cardiovascular: Negative for chest pain, palpitations and leg swelling  Musculoskeletal: Positive for gait problem  Wheelchair dependent  Skin: Negative for rash  Neurological: Positive for weakness  Negative for dizziness, tremors, syncope, light-headedness, numbness and headaches  Psychiatric/Behavioral: Negative for agitation and behavioral problems  The patient is not hyperactive  Physical Exam:  /82   Pulse 74   Ht 5' 4" (1 626 m)   BMI 32 61 kg/m²     PREVIOUS WEIGHTS:   Wt Readings from Last 10 Encounters:   05/25/21 86 2 kg (190 lb)   05/22/21 86 2 kg (190 lb)   06/26/20 85 7 kg (189 lb)   04/16/20 82 3 kg (181 lb 6 4 oz)   03/31/20 80 7 kg (178 lb)   03/25/20 81 2 kg (179 lb)   03/24/20 81 2 kg (179 lb)   03/18/20 90 6 kg (199 lb 12 8 oz)   03/12/20 83 9 kg (184 lb 15 5 oz)   02/14/20 82 9 kg (182 lb 12 2 oz)      Physical Exam  Constitutional:       General: She is not in acute distress  Appearance: She is well-developed  HENT:      Head: Normocephalic and atraumatic  Neck:      Thyroid: No thyromegaly  Vascular: No carotid bruit or JVD  Trachea: No tracheal deviation  Cardiovascular:      Rate and Rhythm: Normal rate and regular rhythm  Pulses: Normal pulses  Heart sounds: Normal heart sounds  No murmur heard  No friction rub  No gallop  Pulmonary:      Effort: Pulmonary effort is normal  No respiratory distress  Breath sounds: Normal breath sounds  No wheezing, rhonchi or rales  Chest:      Chest wall: No tenderness  Abdominal:      General: Bowel sounds are normal  There is no distension  Palpations: Abdomen is soft  Tenderness: There is no abdominal tenderness  Musculoskeletal:         General: Normal range of motion  Cervical back: Normal range of motion and neck supple  Right lower leg: No edema  Left lower leg: No edema     Skin:     General: Skin is warm and dry    Neurological:      General: No focal deficit present  Mental Status: She is alert and oriented to person, place, and time  Gait: Gait abnormal    Psychiatric:         Mood and Affect: Mood normal          Behavior: Behavior normal          Thought Content: Thought content normal          Judgment: Judgment normal          --------------------------------------------------------------------------------  ECHO:   Results for orders placed during the hospital encounter of 20    Echo complete with contrast if indicated    Narrative  [facility]  [facility], [address] [facility]  [phone]    Transthoracic Echocardiogram  2D, M-mode, Doppler, and Color Doppler    Study date:  2020    Patient: Roni Mann  MR number: [id number]  Account number: [de-identified]  : 1935  Age: 80 years  Gender: Female  Status: Inpatient  Location: Bedside  Height: 64 in  Weight: 187 lb  BP: 140/ 69 mmHg    Indications: TIA    Diagnoses: G45 9 - Transient cerebral ischemic attack, unspecified    Sonographer:  VANE Gilliland  Primary Physician:  Tavon Betancur MD  Referring Physician:  Susy Matamoros MD  Group:  [facility]  Cardiology Fellow: Jaleesa Jenkins MD  Interpreting Physician:  Aguila Carrero MD    SUMMARY    LEFT VENTRICLE:  Size was normal   Systolic function was normal  Ejection fraction was estimated to be 60 %  There were no regional wall motion abnormalities  There was mild concentric hypertrophy  Features were consistent with a pseudonormal left ventricular filling pattern, with concomitant abnormal relaxation and increased filling pressure (grade 2 diastolic dysfunction)  RIGHT VENTRICLE:  The size was normal   Systolic function was normal     LEFT ATRIUM:  The atrium was mildly dilated  MITRAL VALVE:  There was mild annular calcification  There was trace regurgitation      TRICUSPID VALVE:  There was mild regurgitation  Pulmonary artery systolic pressure was mildly increased  COMPARISONS:  There has been no significant interval change  Comparison was made with the previous study of 01-Jun-2018  HISTORY: PRIOR HISTORY: A-FIB,DM2,HTN,HLD,Stroke,Elevated troponin    PROCEDURE: The procedure was performed at the bedside  This was a routine study  The transthoracic approach was used  The study included complete 2D imaging, M-mode, complete spectral Doppler, and color Doppler  The heart rate was 61 bpm,  at the start of the study  Echocardiographic views were limited due to lung interference  Image quality was adequate  LEFT VENTRICLE: Size was normal  Systolic function was normal  Ejection fraction was estimated to be 60 %  There were no regional wall motion abnormalities  Wall thickness was mildly increased  There was mild concentric hypertrophy  DOPPLER: Features were consistent with a pseudonormal left ventricular filling pattern, with concomitant abnormal relaxation and increased filling pressure (grade 2 diastolic dysfunction)  RIGHT VENTRICLE: The size was normal  Systolic function was normal     LEFT ATRIUM: The atrium was mildly dilated  RIGHT ATRIUM: Size was normal     MITRAL VALVE: There was mild annular calcification  There was minimal diffuse thickening of the valve  There was normal leaflet separation  DOPPLER: The transmitral velocity was within the normal range  There was no evidence for stenosis  There was trace regurgitation  AORTIC VALVE: The valve was trileaflet  Leaflets exhibited mildly increased thickness, mild calcification, and lower normal cuspal separation  DOPPLER: Transaortic velocity was within the normal range  There was no evidence for stenosis  There was no regurgitation  TRICUSPID VALVE: The valve structure was normal  There was normal leaflet separation  DOPPLER: The transtricuspid velocity was within the normal range  There was no evidence for stenosis  There was mild regurgitation  Pulmonary artery  systolic pressure was mildly increased  Estimated peak PA pressure was 35 mmHg  PULMONIC VALVE: Leaflets exhibited normal thickness, no calcification, and normal cuspal separation  DOPPLER: The transpulmonic velocity was within the normal range  There was no evidence for stenosis  There was no significant  regurgitation  PERICARDIUM: There was no pericardial effusion  The pericardium was normal in appearance  AORTA: The root exhibited normal size  The ascending aorta was normal in size  SYSTEMIC VEINS: IVC: The inferior vena cava was normal in size and course   Respirophasic changes were normal     SYSTEM MEASUREMENT TABLES    2D  %FS: 29 82 %  Ao Diam: 3 09 cm  EDV(Teich): 46 22 ml  EF Biplane: 60 79 %  EF(Teich): 58 15 %  ESV(Teich): 19 34 ml  IVSd: 1 11 cm  LA Area: 20 36 cm2  LA Diam: 4 52 cm  LVEDV MOD A2C: 79 08 ml  LVEDV MOD A4C: 86 83 ml  LVEDV MOD BP: 84 86 ml  LVEF MOD A2C: 59 68 %  LVEF MOD A4C: 60 53 %  LVESV MOD A2C: 31 89 ml  LVESV MOD A4C: 34 27 ml  LVESV MOD BP: 33 27 ml  LVIDd: 3 36 cm  LVIDs: 2 36 cm  LVLd A2C: 7 59 cm  LVLd A4C: 7 2 cm  LVLs A2C: 6 36 cm  LVLs A4C: 6 16 cm  LVPWd: 0 98 cm  RA Area: 11 72 cm2  RVIDd: 3 37 cm  SV MOD A2C: 47 2 ml  SV MOD A4C: 52 56 ml  SV(Teich): 26 87 ml    CW  TR Vmax: 2 48 m/s  TR maxP 52 mmHg    MM  TAPSE: 1 42 cm    PW  E': 0 07 m/s  E/E': 16 01  MV A Ajit: 0 75 m/s  MV Dec [place]: 3 37 m/s2  MV DecT: 331 39 ms  MV E Ajit: 1 12 m/s  MV E/A Ratio: 1 5  MV PHT: 96 1 ms  MVA By PHT: 2 29 cm2    [facility] Accredited Echocardiography Laboratory    Prepared and electronically signed by    Al Christiansen MD  Signed 2020 09:30:48    --------------------------------------------------------------------------------  CAROTIDS  Results for orders placed during the hospital encounter of 20    VAS carotid complete study    Narrative  [facility] REPORT  CLINICAL:  Indications: Surveillance of carotid artery disease  Patient is asymptomatic  at this time  Patient is wheel chair bound  Operative History:  Denies Any Cardiovascular Surgeries  Risk Factors  The patient has history of Obesity and HLD  Clinical  Right Pressure:  152/ mm Hg,    FINDINGS:    Right        Impression  PSV  EDV (cm/s)  Direction of Flow  Ratio  Dist  ICA                 37          13                      0 58  Mid  ICA                  46          12                      0 71  Prox  ICA    1 - 49%      57          14                      0 89  Dist CCA                  67          12  Mid CCA                   64          12                      0 78  Prox CCA                  82           0  Ext Carotid               83           0                      1 31  Prox Vert                 68          13  Antegrade  Subclavian               168           0  Innominate                30           7    Left         PSV  EDV (cm/s)  Ratio  Dist  ICA     52          12   0 83  Mid  ICA      78          25   1 25  Prox  ICA    124          31   1 98  Dist CCA      65          16  Mid CCA       63          14   0 95  Prox CCA      66          14  Ext Carotid   66           2   1 06  Prox Vert     24           7  Subclavian   149           6        CONCLUSION:    Impression  RIGHT:  There is <50% stenosis noted in the internal carotid artery  Plaque is  heterogenous and irregular  Vertebral artery flow is antegrade  There is no significant subclavian artery  disease  LEFT:  There is <50% stenosis noted in the internal carotid artery  Plaque is  heterogenous and irregular  Vertebral artery flow is antegrade  There is no significant subclavian artery  disease  Compared to previous study on 11/04/2011, there are no significant changes      Internal carotid artery stenosis determination by consensus criteria from:  Tonie Basurto , et al  Carotid Artery Stenosis: Gray-Scale and Doppler US Diagnosis  - Society of Radiologists in [address] [place] [address], Radiology 2003;  [id number]  SIGNATURE:  Electronically Signed by: Precious Luis Armando on 2021-01-28 10:32:19 AM     No results found for this or any previous visit      --------------------------------------------------------------------------------   RADIOLOGY RESULTS:  Chest X-Ray:  Results for orders placed during the hospital encounter of 10/21/20    XR chest 1 view portable    Narrative  CHEST    INDICATION:   weakness  COMPARISON:  03/06/2020    EXAM PERFORMED/VIEWS:  XR CHEST PORTABLE  1 image    FINDINGS:    Cardiomediastinal silhouette appears unremarkable  Pulmonary vessels are normal     The lungs are clear  No pneumothorax or pleural effusion  Osseous structures appear within normal limits for patient age  Impression  No acute cardiopulmonary disease  Workstation performed: [id number]    CT Chest :  Results for orders placed during the hospital encounter of 02/19/20    CT chest wo contrast    Narrative  CT CHEST WITHOUT IV CONTRAST    INDICATION:   Dyspnea, chronic  COMPARISON:  None  TECHNIQUE: CT examination of the chest was performed without intravenous contrast   Axial, sagittal, and coronal 2D reformatted images were created from the source data and submitted for interpretation  Radiation dose length product (DLP) for this visit:  423 79 mGy-cm   This examination, like all CT scans performed in the [facility], was performed utilizing techniques to minimize radiation dose exposure, including the use of iterative  reconstruction and automated exposure control  FINDINGS:    LUNGS:  There are secretions throughout the left mainstem bronchus and upper and lower lobe bronchi with obstructive atelectasis of the left lung  There is mild sparing at the left apex  There are groundglass opacities in the right lung  There is a  focal opacity measuring 8 mm in the right middle lobe on series 3, image 51      PLEURA:  There are bilateral pleural effusions  HEART/GREAT VESSELS:  Unremarkable for patient's age  MEDIASTINUM AND MEMO:  Unremarkable  CHEST WALL AND LOWER NECK: Incidental discovery of one or more thyroid nodule(s) measuring less than 1 5 cm and without suspicious features is noted in this patient who is above 28years old; according to guidelines published in the February 2015 white  paper on incidental thyroid nodules in the Journal of the [employer] of Radiology [facility]), no further evaluation is recommended  VISUALIZED STRUCTURES IN THE UPPER ABDOMEN:  Unremarkable  OSSEOUS STRUCTURES:  No acute fracture or destructive osseous lesion  Impression  1  Near complete collapse of the left lung secondary to mucoid impaction  2   Bilateral pleural effusions  3   Groundglass opacities in the right lung including an 8 mm focal nodular opacity  This is also likely related to acute illness though short interval follow-up CT following acute illness is recommended to exclude a true nodule  The study was marked in [facility] for immediate notification  Workstation performed: [id number]    Lower Extremity Venous Duplex  Results for orders placed during the hospital encounter of 05/22/21    VAS lower limb venous duplex study, unilateral/limited    Narrative  [facility] REPORT  CLINICAL:  Indications: Left lower extremity pain and swelling  Operative History:  Denies Any Cardiovascular Surgeries  Risk Factors  The patient has history of Obesity and HLD  CONCLUSION:  Impression:    RIGHT LOWER LIMB LIMITED:  Evaluation shows no evidence of thrombus in the common femoral vein  Doppler evaluation shows a normal response to augmentation maneuvers  LEFT LOWER LIMB:  No evidence of acute or chronic deep vein thrombosis  No evidence of superficial thrombophlebitis noted  Doppler evaluation shows a normal response to augmentation maneuvers    Popliteal, posterior tibial and anterior tibial arterial Doppler waveforms are  biphasic/monophasic  Technical findings were given to Dr Cari Ortiz  SIGNATURE:  Electronically Signed by: Rodolfo Granados on 2021-05-22 10:43:58 AM    ======================================================  Imaging:   I have personally reviewed pertinent reports  Portions of the record may have been created with voice recognition software  Occasional wrong word or "sound a like" substitutions may have occurred due to the inherent limitations of voice recognition software  Read the chart carefully and recognize, using context, where substitutions have occurred      SIGNATURES:   Elham Vanegas MD

## 2021-08-02 DIAGNOSIS — I48.20 CHRONIC ATRIAL FIBRILLATION (HCC): ICD-10-CM

## 2021-09-04 ENCOUNTER — HOSPITAL ENCOUNTER (EMERGENCY)
Facility: HOSPITAL | Age: 86
Discharge: HOME | End: 2021-09-04
Attending: EMERGENCY MEDICINE | Admitting: EMERGENCY MEDICINE
Payer: COMMERCIAL

## 2021-09-04 VITALS
SYSTOLIC BLOOD PRESSURE: 159 MMHG | TEMPERATURE: 97.5 F | RESPIRATION RATE: 16 BRPM | HEART RATE: 60 BPM | OXYGEN SATURATION: 100 % | DIASTOLIC BLOOD PRESSURE: 66 MMHG

## 2021-09-04 DIAGNOSIS — N39.0 UTI (URINARY TRACT INFECTION): Primary | ICD-10-CM

## 2021-09-04 LAB
ALBUMIN SERPL BCP-MCNC: 3.2 G/DL (ref 3.5–5)
ALP SERPL-CCNC: 205 U/L (ref 46–116)
ALT SERPL W P-5'-P-CCNC: 34 U/L (ref 12–78)
ANION GAP SERPL CALCULATED.3IONS-SCNC: 5 MMOL/L (ref 4–13)
AST SERPL W P-5'-P-CCNC: 22 U/L (ref 5–45)
BACTERIA UR QL AUTO: ABNORMAL /HPF
BASOPHILS # BLD AUTO: 0.04 THOUSANDS/ΜL (ref 0–0.1)
BASOPHILS NFR BLD AUTO: 1 % (ref 0–1)
BILIRUB DIRECT SERPL-MCNC: 0.1 MG/DL (ref 0–0.2)
BILIRUB SERPL-MCNC: 0.23 MG/DL (ref 0.2–1)
BILIRUB UR QL STRIP: NEGATIVE
BUN SERPL-MCNC: 23 MG/DL (ref 5–25)
CALCIUM SERPL-MCNC: 9.2 MG/DL (ref 8.3–10.1)
CHLORIDE SERPL-SCNC: 103 MMOL/L (ref 100–108)
CLARITY UR: CLEAR
CO2 SERPL-SCNC: 34 MMOL/L (ref 21–32)
COLOR UR: YELLOW
CREAT SERPL-MCNC: 0.91 MG/DL (ref 0.6–1.3)
EOSINOPHIL # BLD AUTO: 0.21 THOUSAND/ΜL (ref 0–0.61)
EOSINOPHIL NFR BLD AUTO: 4 % (ref 0–6)
ERYTHROCYTE [DISTWIDTH] IN BLOOD BY AUTOMATED COUNT: 13.5 % (ref 11.6–15.1)
GFR SERPL CREATININE-BSD FRML MDRD: 58 ML/MIN/1.73SQ M
GLUCOSE SERPL-MCNC: 116 MG/DL (ref 65–140)
GLUCOSE UR STRIP-MCNC: NEGATIVE MG/DL
HCT VFR BLD AUTO: 40.3 % (ref 34.8–46.1)
HGB BLD-MCNC: 13 G/DL (ref 11.5–15.4)
HGB UR QL STRIP.AUTO: ABNORMAL
IMM GRANULOCYTES # BLD AUTO: 0.03 THOUSAND/UL (ref 0–0.2)
IMM GRANULOCYTES NFR BLD AUTO: 1 % (ref 0–2)
KETONES UR STRIP-MCNC: NEGATIVE MG/DL
LEUKOCYTE ESTERASE UR QL STRIP: ABNORMAL
LIPASE SERPL-CCNC: 142 U/L (ref 73–393)
LYMPHOCYTES # BLD AUTO: 1.4 THOUSANDS/ΜL (ref 0.6–4.47)
LYMPHOCYTES NFR BLD AUTO: 23 % (ref 14–44)
MCH RBC QN AUTO: 30 PG (ref 26.8–34.3)
MCHC RBC AUTO-ENTMCNC: 32.3 G/DL (ref 31.4–37.4)
MCV RBC AUTO: 93 FL (ref 82–98)
MONOCYTES # BLD AUTO: 0.46 THOUSAND/ΜL (ref 0.17–1.22)
MONOCYTES NFR BLD AUTO: 8 % (ref 4–12)
NEUTROPHILS # BLD AUTO: 3.9 THOUSANDS/ΜL (ref 1.85–7.62)
NEUTS SEG NFR BLD AUTO: 63 % (ref 43–75)
NITRITE UR QL STRIP: NEGATIVE
NON-SQ EPI CELLS URNS QL MICRO: ABNORMAL /HPF
NRBC BLD AUTO-RTO: 0 /100 WBCS
PH UR STRIP.AUTO: 7 [PH] (ref 4.5–8)
PLATELET # BLD AUTO: 116 THOUSANDS/UL (ref 149–390)
PMV BLD AUTO: 11.3 FL (ref 8.9–12.7)
POTASSIUM SERPL-SCNC: 4.1 MMOL/L (ref 3.5–5.3)
PROT SERPL-MCNC: 7.5 G/DL (ref 6.4–8.2)
PROT UR STRIP-MCNC: NEGATIVE MG/DL
RBC # BLD AUTO: 4.34 MILLION/UL (ref 3.81–5.12)
RBC #/AREA URNS AUTO: ABNORMAL /HPF
SODIUM SERPL-SCNC: 142 MMOL/L (ref 136–145)
SP GR UR STRIP.AUTO: 1.02 (ref 1–1.03)
UROBILINOGEN UR QL STRIP.AUTO: 0.2 E.U./DL
WBC # BLD AUTO: 6.04 THOUSAND/UL (ref 4.31–10.16)
WBC #/AREA URNS AUTO: ABNORMAL /HPF

## 2021-09-04 PROCEDURE — 87086 URINE CULTURE/COLONY COUNT: CPT

## 2021-09-04 PROCEDURE — 36415 COLL VENOUS BLD VENIPUNCTURE: CPT | Performed by: EMERGENCY MEDICINE

## 2021-09-04 PROCEDURE — 83690 ASSAY OF LIPASE: CPT | Performed by: EMERGENCY MEDICINE

## 2021-09-04 PROCEDURE — 80076 HEPATIC FUNCTION PANEL: CPT | Performed by: EMERGENCY MEDICINE

## 2021-09-04 PROCEDURE — 99284 EMERGENCY DEPT VISIT MOD MDM: CPT | Performed by: EMERGENCY MEDICINE

## 2021-09-04 PROCEDURE — 85025 COMPLETE CBC W/AUTO DIFF WBC: CPT | Performed by: EMERGENCY MEDICINE

## 2021-09-04 PROCEDURE — 87186 SC STD MICRODIL/AGAR DIL: CPT

## 2021-09-04 PROCEDURE — 87077 CULTURE AEROBIC IDENTIFY: CPT

## 2021-09-04 PROCEDURE — 81001 URINALYSIS AUTO W/SCOPE: CPT

## 2021-09-04 PROCEDURE — 99284 EMERGENCY DEPT VISIT MOD MDM: CPT

## 2021-09-04 PROCEDURE — 80048 BASIC METABOLIC PNL TOTAL CA: CPT | Performed by: EMERGENCY MEDICINE

## 2021-09-04 RX ORDER — CEPHALEXIN 500 MG/1
500 CAPSULE ORAL EVERY 6 HOURS SCHEDULED
Qty: 28 CAPSULE | Refills: 0 | Status: SHIPPED | OUTPATIENT
Start: 2021-09-04 | End: 2021-09-04 | Stop reason: SDUPTHER

## 2021-09-04 RX ORDER — CEPHALEXIN 500 MG/1
500 CAPSULE ORAL EVERY 6 HOURS SCHEDULED
Qty: 28 CAPSULE | Refills: 0 | Status: SHIPPED | OUTPATIENT
Start: 2021-09-04 | End: 2021-09-11

## 2021-09-04 RX ORDER — CEPHALEXIN 250 MG/1
500 CAPSULE ORAL ONCE
Status: COMPLETED | OUTPATIENT
Start: 2021-09-04 | End: 2021-09-04

## 2021-09-04 RX ADMIN — CEPHALEXIN 500 MG: 250 CAPSULE ORAL at 16:41

## 2021-09-04 NOTE — ED NOTES
Patient's daughter Beto Huff notified of discharge  Reports she will come and pick patient up       Aurora Puga RN  09/04/21 1192

## 2021-09-04 NOTE — ED PROVIDER NOTES
History  Chief Complaint   Patient presents with    Abdominal Pain     Pt with years ling hx of urinary problems  Hx of bladder stimulators x3  Pt reporting urinary frequency   Shoulder Pain     R, r/t injury from sit to stand  This is an 70-year-old female with history of diabetes, hyperlipidemia, AFib, CVA, urinary incontinence (with bladder stimulator) who presents with bladder incontinence  Patient states that she does not sense when she has to go to urinate  States that she has been urinating frequently  She does admit to a burning sensation with urination  She admits to mild suprapubic abdominal pain as well  Patient mention right shoulder pain to the nurse  Patient states that this is chronic from using a stand assist mechanism  Denies fever/chills, nausea/vomiting, lightheadedness/dizziness, numbness/weakness, headache, change in vision, URI symptoms, neck pain, chest pain, palpitations, shortness of breath, cough, back pain, flank pain,  diarrhea, hematochezia, melena, hematuria, abnormal vaginal discharge/bleeding  Prior to Admission Medications   Prescriptions Last Dose Informant Patient Reported? Taking?    Acetaminophen (TYLENOL PO)  Self Yes No   Sig: Take by mouth 3 (three) times a day   CRANBERRY PO  Self Yes No   Sig: Take 30,000 mg by mouth   Multiple Vitamin (MULTI-VITAMIN DAILY PO)  Self Yes No   Sig: Take by mouth   apixaban (ELIQUIS) 5 mg   No No   Sig: Take 1 tablet (5 mg total) by mouth 2 (two) times a day   atorvastatin (LIPITOR) 40 mg tablet  Self No No   Sig: Take 1 tablet (40 mg total) by mouth every evening   bisacodyl (DULCOLAX) 10 mg suppository  Self No No   Sig: Insert 1 suppository (10 mg total) into the rectum daily as needed for constipation   Patient not taking: Reported on 7/16/2021   cholecalciferol (VITAMIN D3) 1,000 units tablet  Self Yes No   Sig: Take 1,000 Units by mouth every evening   cyanocobalamin (VITAMIN B-12) 1,000 mcg tablet  Self Yes No Sig: Take 1,000 mcg by mouth every evening     folic acid (FOLVITE) 1 mg tablet  Self Yes No   Sig: Take 1,000 mcg by mouth daily   furosemide (LASIX) 80 mg tablet   No No   Sig: Take 1 tablet (80 mg total) by mouth daily   ipratropium (ATROVENT) 0 03 % nasal spray  Self No No   Si-2 sprays each nostril twice a day as needed for rhinorrhea   metFORMIN (GLUCOPHAGE) 1000 MG tablet  Self No No   Sig: Take 0 5 tablets (500 mg total) by mouth 2 (two) times a day with meals   Patient taking differently: Take 500 mg by mouth daily with breakfast    metoprolol tartrate (LOPRESSOR) 25 mg tablet  Self No No   Si 5 tablets (12 5 mg total) by Per PEG Tube route every 12 (twelve) hours   mupirocin (BACTROBAN) 2 % ointment  Self Yes No   Sig: APPLY A SMALL AMOUNT TO THE AFFECTED AREA TWICE DAILY AS NEEDED   nystatin (MYCOSTATIN) powder  Self Yes No   Sig: APPLY 1 GM ON THE SKIN THREE TIMES A DAY   primidone (MYSOLINE) 50 mg tablet  Self Yes No   Sig: Take by mouth 2 (two) times a day    saccharomyces boulardii (FLORASTOR) 250 mg capsule  Self Yes No   Sig: Take 250 mg by mouth 2 (two) times a day   Patient not taking: Reported on 2021   spironolactone (ALDACTONE) 25 mg tablet   No No   Sig: Take 1 tablet (25 mg total) by mouth daily      Facility-Administered Medications: None       Past Medical History:   Diagnosis Date    A-fib ([place] [address] )     Arthritis     Assistance needed for mobility     pt can stand with assistance and transfer    Chronic pain disorder     [facility]     NIDDM    Full dentures     History of transfusion     no adverse reaction    Hyperlipidemia     Irregular heart beat     Numbness and tingling of both feet     Skin abnormality     sacrum area - small red area, less than a dime size    Spinal stenosis     [facility]     Unable to ambulate     Urinary incontinence     ipg stimulator x3 repakcements,battery exchange today 2018    Wears glasses     Wheelchair dependence        Past Surgical History:   Procedure Laterality Date    BACK SURGERY      fusion    BLADDER SUSPENSION      CARPAL TUNNEL RELEASE Bilateral     CHOLANGIOGRAM N/A 6/4/2018    Procedure: CHOLANGIOGRAM;  Surgeon: Eduar Morejon MD;  [facility];  Service: General    CHOLECYSTECTOMY LAPAROSCOPIC N/A 6/4/2018    Procedure: CHOLECYSTECTOMY LAPAROSCOPIC;  Surgeon: Eduar Morejon MD;  [facility];  Service: General    COLONOSCOPY      DILATION AND CURETTAGE OF UTERUS      FRACTURE SURGERY Left     femur with hardware    HYSTERECTOMY      INSERTION / [place] / REVISION NEUROSTIMULATOR      JOINT REPLACEMENT Bilateral     knees    SACRAL NERVE STIMULATOR PLACEMENT N/A 2/14/2018    Procedure: REMOVE AND REPLACE IPG;  Surgeon: Annemarie Saunders MD;  [facility];  Service: UroGynecology           TONSILLECTOMY         Family History   Problem Relation Age of Onset    No Known Problems Mother     No Known Problems Father      I have reviewed and agree with the history as documented  E-Cigarette/Vaping    E-Cigarette Use Never User      E-Cigarette/Vaping Substances     Social History     Tobacco Use    Smoking status: Never Smoker    Smokeless tobacco: Never Used   Vaping Use    Vaping Use: Never used   Substance Use Topics    Alcohol use: Not Currently     Comment: very rare    Drug use: No       Review of Systems   Constitutional: Negative for chills and fever  HENT: Negative for rhinorrhea, sore throat and trouble swallowing  Eyes: Negative for photophobia and visual disturbance  Respiratory: Negative for cough, chest tightness and shortness of breath  Cardiovascular: Negative for chest pain, palpitations and leg swelling  Gastrointestinal: Positive for abdominal pain  Negative for blood in stool, diarrhea, nausea and vomiting  Endocrine: Negative for polyuria  Genitourinary: Positive for dysuria and frequency   Negative for flank pain, hematuria, vaginal bleeding and vaginal discharge  Musculoskeletal: Negative for back pain and neck pain  Skin: Negative for color change and rash  Allergic/Immunologic: Negative for immunocompromised state  Neurological: Negative for dizziness, weakness, light-headedness, numbness and headaches  All other systems reviewed and are negative  Physical Exam  Physical Exam  Constitutional:       General: She is not in acute distress  Appearance: Normal appearance  She is well-developed  She is morbidly obese  Comments: Chronically ill-appearing  HENT:      Mouth/Throat:      Pharynx: Uvula midline  Eyes:      Conjunctiva/sclera: Conjunctivae normal       Pupils: Pupils are equal, round, and reactive to light  Neck:      Thyroid: No thyroid mass or thyromegaly  Trachea: Trachea normal    Cardiovascular:      Rate and Rhythm: Normal rate and regular rhythm  Pulses: Normal pulses  Heart sounds: Normal heart sounds  No murmur heard  Pulmonary:      Effort: Pulmonary effort is normal       Breath sounds: Normal breath sounds  Abdominal:      General: Bowel sounds are normal       Palpations: Abdomen is soft  Tenderness: There is abdominal tenderness in the suprapubic area  There is no guarding or rebound  Genitourinary:     Comments: No rashes or excoriation  Skin:     General: Skin is warm and dry  Neurological:      Mental Status: She is alert  Psychiatric:         Speech: Speech normal          Behavior: Behavior normal  Behavior is cooperative  Thought Content:  Thought content normal          Vital Signs  ED Triage Vitals   Temperature Pulse Respirations Blood Pressure SpO2   09/04/21 1308 09/04/21 1308 09/04/21 1308 09/04/21 1312 09/04/21 1415   97 5 °F (36 4 °C) 61 18 154/70 100 %      Temp Source Heart Rate Source Patient Position - Orthostatic VS BP Location FiO2 (%)   09/04/21 1308 09/04/21 1415 09/04/21 1415 09/04/21 1415 --   Oral Monitor Lying Right arm       Pain Score       09/04/21 1552       No Pain           Vitals:    09/04/21 1445 09/04/21 1530 09/04/21 1545 09/04/21 1615   BP: 122/91 125/62 127/62 147/65   Pulse: (!) 50 (!) 50 (!) 50 (!) 54   Patient Position - Orthostatic VS: Lying Sitting Lying Lying         Visual Acuity      ED Medications  Medications   cephalexin (KEFLEX) capsule 500 mg (has no administration in time range)       Diagnostic Studies  Results Reviewed     Procedure Component Value Units Date/Time    Urine Microscopic [[id number]]  (Abnormal) Collected: 09/04/21 1522    Lab Status: Final result Specimen: Urine, Clean Catch Updated: 09/04/21 1616     RBC, UA 0-1 /hpf      WBC, UA 10-20 /hpf      Epithelial Cells Occasional /hpf      Bacteria, UA Innumerable /hpf     Urine culture [[id number]] Collected: 09/04/21 1522    Lab Status:  In process Specimen: Urine, Clean Catch Updated: 09/04/21 1616    Urine Macroscopic, POC [[id number]]  (Abnormal) Collected: 09/04/21 1522    Lab Status: Final result Specimen: Urine Updated: 09/04/21 1523     Color, UA Yellow     Clarity, UA Clear     pH, UA 7 0     Leukocytes, UA Small     Nitrite, UA Negative     Protein, UA Negative mg/dl      Glucose, UA Negative mg/dl      Ketones, UA Negative mg/dl      Urobilinogen, UA 0 2 E U /dl      Bilirubin, UA Negative     Blood, UA Trace     Specific [place], UA 1 020    Narrative:      CLINITEK RESULT    Basic metabolic panel [[id number]]  (Abnormal) Collected: 09/04/21 1410    Lab Status: Final result Specimen: Blood from Arm, Right Updated: 09/04/21 1445     Sodium 142 mmol/L      Potassium 4 1 mmol/L      Chloride 103 mmol/L      CO2 34 mmol/L      ANION GAP 5 mmol/L      BUN 23 mg/dL      Creatinine 0 91 mg/dL      Glucose 116 mg/dL      Calcium 9 2 mg/dL      eGFR 58 ml/min/1 73sq m     Narrative:      [facility] guidelines for Chronic Kidney Disease (CKD):     Stage 1 with normal or high GFR (GFR > 90 mL/min/1 73 square meters)    Stage 2 Mild CKD (GFR = 60-89 mL/min/1 73 square meters)    Stage 3A Moderate CKD (GFR = 45-59 mL/min/1 73 square meters)    Stage 3B Moderate CKD (GFR = 30-44 mL/min/1 73 square meters)    Stage 4 Severe CKD (GFR = 15-29 mL/min/1 73 square meters)    Stage 5 End Stage CKD (GFR <15 mL/min/1 73 square meters)  Note: GFR calculation is accurate only with a steady state creatinine    Lipase [[id number]]  (Normal) Collected: 09/04/21 1410    Lab Status: Final result Specimen: Blood from Arm, Right Updated: 09/04/21 1445     Lipase 142 u/L     CBC and differential [[id number]]  (Abnormal) Collected: 09/04/21 1410    Lab Status: Final result Specimen: Blood from Arm, Right Updated: 09/04/21 1444     WBC 6 04 Thousand/uL      RBC 4 34 Million/uL      Hemoglobin 13 0 g/dL      Hematocrit 40 3 %      MCV 93 fL      MCH 30 0 pg      MCHC 32 3 g/dL      RDW 13 5 %      MPV 11 3 fL      Platelets 300 Thousands/uL      nRBC 0 /100 WBCs      Neutrophils Relative 63 %      Immat GRANS % 1 %      Lymphocytes Relative 23 %      Monocytes Relative 8 %      Eosinophils Relative 4 %      Basophils Relative 1 %      Neutrophils Absolute 3 90 Thousands/µL      Immature Grans Absolute 0 03 Thousand/uL      Lymphocytes Absolute 1 40 Thousands/µL      Monocytes Absolute 0 46 Thousand/µL      Eosinophils Absolute 0 21 Thousand/µL      Basophils Absolute 0 04 Thousands/µL     Hepatic function panel [[id number]] Collected: 09/04/21 1410    Lab Status: In process Specimen: Blood from Arm, Right Updated: 09/04/21 1419                 No orders to display              Procedures  Procedures         ED Course                                           MDM  Number of Diagnoses or Management Options  Diagnosis management comments: Will check labs and urinalysis  Disposition pending results        Disposition  Final diagnoses:   UTI (urinary tract infection)     Time reflects when diagnosis was documented in both MDM as applicable and the Disposition within this note     Time User Action Codes Description Comment    9/4/2021  4:25 PM Lucas Alert Add [N39 0] UTI (urinary tract infection)       ED Disposition     ED Disposition Condition Date/Time Comment    Discharge Stable Sat Sep 4, 2021  4:25 PM [place] Slight discharge to home/self care              Follow-up Information     Follow up With Specialties Details Why Contact Info Additional Danika Ashford MD Family Medicine Schedule an appointment as soon as possible for a visit   Tamiko Palacios [address] [facility] Emergency Department Emergency Medicine Go to  If symptoms worsen [address] [facility] [phone]  112 [facility] Emergency Department, [address] [place] [address], [id number]          Current Discharge Medication List      START taking these medications    Details   cephalexin (KEFLEX) 500 mg capsule Take 1 capsule (500 mg total) by mouth every 6 (six) hours for 7 days  Qty: 28 capsule, Refills: 0    Associated Diagnoses: UTI (urinary tract infection)         CONTINUE these medications which have NOT CHANGED    Details   Acetaminophen (TYLENOL PO) Take by mouth 3 (three) times a day      apixaban (ELIQUIS) 5 mg Take 1 tablet (5 mg total) by mouth 2 (two) times a day  Qty: 180 tablet, Refills: 3    Associated Diagnoses: Chronic atrial fibrillation (HCC)      atorvastatin (LIPITOR) 40 mg tablet Take 1 tablet (40 mg total) by mouth every evening  Qty: 90 tablet, Refills: 3    Associated Diagnoses: History of CVA (cerebrovascular accident)      bisacodyl (DULCOLAX) 10 mg suppository Insert 1 suppository (10 mg total) into the rectum daily as needed for constipation  Qty: 12 suppository, Refills: 0    Associated Diagnoses: Weakness      cholecalciferol (VITAMIN D3) 1,000 units tablet Take 1,000 Units by mouth every evening      CRANBERRY PO Take 30,000 mg by mouth      cyanocobalamin (VITAMIN B-12) 1,000 mcg tablet Take 1,000 mcg by mouth every evening        folic acid (FOLVITE) 1 mg tablet Take 1,000 mcg by mouth daily  Refills: 3      furosemide (LASIX) 80 mg tablet Take 1 tablet (80 mg total) by mouth daily  Qty: 30 tablet, Refills: 5    Comments: Discontinue furosemide 20 mg tablet  Associated Diagnoses: Chronic diastolic congestive heart failure (HCC)      ipratropium (ATROVENT) 0 03 % nasal spray 1-2 sprays each nostril twice a day as needed for rhinorrhea  Qty: 30 mL, Refills: 6    Associated Diagnoses: Vasomotor rhinitis      metFORMIN (GLUCOPHAGE) 1000 MG tablet Take 0 5 tablets (500 mg total) by mouth 2 (two) times a day with meals  Qty: 60 tablet, Refills: 0    Associated Diagnoses: Type 2 diabetes mellitus without complication, without long-term current use of insulin (HCC)      metoprolol tartrate (LOPRESSOR) 25 mg tablet 0 5 tablets (12 5 mg total) by Per PEG Tube route every 12 (twelve) hours  Qty: 90 tablet, Refills: 3    Associated Diagnoses: Weakness      Multiple Vitamin (MULTI-VITAMIN DAILY PO) Take by mouth      mupirocin (BACTROBAN) 2 % ointment APPLY A SMALL AMOUNT TO THE AFFECTED AREA TWICE DAILY AS NEEDED      nystatin (MYCOSTATIN) powder APPLY 1 GM ON THE SKIN THREE TIMES A DAY      primidone (MYSOLINE) 50 mg tablet Take by mouth 2 (two) times a day       saccharomyces boulardii (FLORASTOR) 250 mg capsule Take 250 mg by mouth 2 (two) times a day      spironolactone (ALDACTONE) 25 mg tablet Take 1 tablet (25 mg total) by mouth daily  Qty: 30 tablet, Refills: 5    Associated Diagnoses: Chronic diastolic congestive heart failure (HCC)           No discharge procedures on file      PDMP Review       Value Time User    PDMP Reviewed  Yes 3/12/2020 11:49 AM Jackie El MD          ED Provider  Electronically Signed by           Marla Mott MD  09/04/21 9055

## 2021-09-06 LAB — BACTERIA UR CULT: ABNORMAL

## 2021-10-06 ENCOUNTER — TELEPHONE (OUTPATIENT)
Dept: UROLOGY | Facility: MEDICAL CENTER | Age: 86
End: 2021-10-06

## 2021-11-03 NOTE — PROGRESS NOTES
Progress Note - General Surgery   Cha Bennett 80 y o  female MRN: [id number]  Unit/Bed#: E4 -01 Encounter: [id number]    Assessment:  Pleasant 80year old female PMH A  Fib, diabetes, now HD#5 for acute gallstone pancreatitis and cholecystitis clinically and hemodynamically stable  1  Acute gallstone pancreatitis, POA   -with cholecystitis   -lipase normalized    -clinically improved   -plan for OR today for lap yue w/ IOC    Plan:  The nature of the patient's condition was explained  She is scheduled for laparoscopic cholecystectomy with cholangiogram at 12:00 today  Informed consent previously obtained and on patient's chart  INR 1 2 today  Subjective/Objective   Chief Complaint: "I'm doing ok"    Subjective: Patient without complaints today  Denies pain, fever, chest pain, dyspnea, nausea, vomiting  Objective:     Blood pressure 160/78, pulse 68, temperature 98 1 °F (36 7 °C), temperature source Temporal, resp  rate 18, height 5' 5" (1 651 m), weight 85 7 kg (188 lb 15 oz), SpO2 98 %, not currently breastfeeding  ,Body mass index is 31 44 kg/m²  Intake/Output Summary (Last 24 hours) at 06/04/18 0825  Last data filed at 06/04/18 0315   Gross per 24 hour   Intake          2065 83 ml   Output              804 ml   Net          1261 83 ml       Invasive Devices     Peripheral Intravenous Line            Peripheral IV 05/31/18 Right Wrist 3 days    Peripheral IV 06/04/18 Left Forearm less than 1 day                Physical Exam   Constitutional: She is oriented to person, place, and time  She appears well-developed and well-nourished  HENT:   Head: Normocephalic and atraumatic  Eyes: Pupils are equal, round, and reactive to light  Neck: Normal range of motion  Cardiovascular: Normal rate  No murmur heard  Pulmonary/Chest: Effort normal and breath sounds normal    Abdominal: Soft  Bowel sounds are normal  She exhibits no distension and no mass  There is no tenderness   There is no rebound and no guarding  No hernia  Musculoskeletal: Normal range of motion  Neurological: She is alert and oriented to person, place, and time  Skin: Skin is warm and dry  Capillary refill takes less than 2 seconds  Psychiatric: She has a normal mood and affect  Lab, Imaging and other studies:  I have personally reviewed pertinent lab results    , CBC:   Lab Results   Component Value Date    WBC 6 06 06/04/2018    HGB 11 8 06/04/2018    HCT 36 8 06/04/2018    MCV 92 06/04/2018     (L) 06/04/2018    MCH 29 6 06/04/2018    MCHC 32 1 06/04/2018    RDW 13 2 06/04/2018    MPV 10 1 06/04/2018    NRBC 0 06/04/2018    Coagulation:   Lab Results   Component Value Date    INR 1 20 (H) 06/04/2018     VTE Pharmacologic Prophylaxis: Sequential compression device (Venodyne)   VTE Mechanical Prophylaxis: sequential compression device verbalization

## 2021-11-30 DIAGNOSIS — R53.1 WEAKNESS: ICD-10-CM

## 2021-12-01 ENCOUNTER — OFFICE VISIT (OUTPATIENT)
Dept: UROLOGY | Facility: MEDICAL CENTER | Age: 86
End: 2021-12-01
Payer: COMMERCIAL

## 2021-12-01 VITALS — SYSTOLIC BLOOD PRESSURE: 142 MMHG | DIASTOLIC BLOOD PRESSURE: 78 MMHG

## 2021-12-01 DIAGNOSIS — N39.41 URGE INCONTINENCE OF URINE: Primary | ICD-10-CM

## 2021-12-01 PROCEDURE — 99203 OFFICE O/P NEW LOW 30 MIN: CPT | Performed by: UROLOGY

## 2021-12-01 RX ORDER — ALBUTEROL SULFATE 90 UG/1
AEROSOL, METERED RESPIRATORY (INHALATION)
COMMUNITY
Start: 2021-11-19

## 2021-12-01 RX ORDER — NITROFURANTOIN MACROCRYSTALS 100 MG/1
CAPSULE ORAL
COMMUNITY
Start: 2021-11-16

## 2021-12-01 RX ORDER — GABAPENTIN 100 MG/1
CAPSULE ORAL
COMMUNITY
Start: 2021-10-25

## 2021-12-01 RX ORDER — CONJUGATED ESTROGENS 0.62 MG/G
CREAM VAGINAL
COMMUNITY
Start: 2021-10-21

## 2021-12-06 DIAGNOSIS — Z86.73 HISTORY OF CVA (CEREBROVASCULAR ACCIDENT): ICD-10-CM

## 2021-12-06 DIAGNOSIS — I48.20 CHRONIC ATRIAL FIBRILLATION (HCC): ICD-10-CM

## 2021-12-06 RX ORDER — ATORVASTATIN CALCIUM 40 MG/1
40 TABLET, FILM COATED ORAL EVERY EVENING
Qty: 90 TABLET | Refills: 3 | Status: SHIPPED | OUTPATIENT
Start: 2021-12-06

## 2021-12-10 ENCOUNTER — APPOINTMENT (EMERGENCY)
Dept: RADIOLOGY | Facility: HOSPITAL | Age: 86
End: 2021-12-10
Payer: COMMERCIAL

## 2021-12-10 ENCOUNTER — HOSPITAL ENCOUNTER (EMERGENCY)
Facility: HOSPITAL | Age: 86
Discharge: HOME | End: 2021-12-10
Attending: EMERGENCY MEDICINE | Admitting: EMERGENCY MEDICINE
Payer: COMMERCIAL

## 2021-12-10 VITALS
TEMPERATURE: 97.7 F | WEIGHT: 234.13 LBS | SYSTOLIC BLOOD PRESSURE: 192 MMHG | RESPIRATION RATE: 22 BRPM | OXYGEN SATURATION: 98 % | HEART RATE: 75 BPM | DIASTOLIC BLOOD PRESSURE: 80 MMHG
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
## 2021-12-10 DIAGNOSIS — S00.83XA CONTUSION OF FOREHEAD, INITIAL ENCOUNTER: ICD-10-CM

## 2021-12-10 LAB
BASE EXCESS BLDA CALC-SCNC: 9 MMOL/L (ref -2–3)
GLUCOSE SERPL-MCNC: 235 MG/DL (ref 65–140)
HCO3 BLDA-SCNC: 35.1 MMOL/L (ref 24–30)
HCT VFR BLD CALC: 38 % (ref 34.8–46.1)
HGB BLDA-MCNC: 12.9 G/DL (ref 11.5–15.4)
HOLD SPECIMEN: NORMAL
HOLD SPECIMEN: NORMAL
HOLD SPECIMEN: YES
PCO2 BLD: 37 MMOL/L (ref 21–32)
PCO2 BLD: 55.9 MM HG (ref 42–50)
PH BLD: 7.41 [PH] (ref 7.3–7.4)
PO2 BLD: 24 MM HG (ref 35–45)
POTASSIUM BLD-SCNC: 4 MMOL/L (ref 3.5–5.3)
SAO2 % BLD FROM PO2: 41 % (ref 60–85)
SODIUM BLD-SCNC: 138 MMOL/L (ref 136–145)
SPECIMEN SOURCE: ABNORMAL

## 2021-12-10 PROCEDURE — 84295 ASSAY OF SERUM SODIUM: CPT

## 2021-12-10 PROCEDURE — 84132 ASSAY OF SERUM POTASSIUM: CPT

## 2021-12-10 PROCEDURE — 70450 CT HEAD/BRAIN W/O DYE: CPT

## 2021-12-10 PROCEDURE — 82947 ASSAY GLUCOSE BLOOD QUANT: CPT

## 2021-12-10 PROCEDURE — NC001 PR NO CHARGE: Performed by: EMERGENCY MEDICINE

## 2021-12-10 PROCEDURE — 99284 EMERGENCY DEPT VISIT MOD MDM: CPT

## 2021-12-10 PROCEDURE — 36415 COLL VENOUS BLD VENIPUNCTURE: CPT | Performed by: SURGERY

## 2021-12-10 PROCEDURE — 85014 HEMATOCRIT: CPT

## 2021-12-10 PROCEDURE — 71045 X-RAY EXAM CHEST 1 VIEW: CPT

## 2021-12-10 PROCEDURE — 72125 CT NECK SPINE W/O DYE: CPT

## 2021-12-10 PROCEDURE — 82803 BLOOD GASES ANY COMBINATION: CPT

## 2021-12-10 PROCEDURE — 72170 X-RAY EXAM OF PELVIS: CPT

## 2021-12-10 PROCEDURE — 76705 ECHO EXAM OF ABDOMEN: CPT | Performed by: EMERGENCY MEDICINE

## 2021-12-10 PROCEDURE — 93308 TTE F-UP OR LMTD: CPT | Performed by: EMERGENCY MEDICINE

## 2021-12-10 PROCEDURE — 99282 EMERGENCY DEPT VISIT SF MDM: CPT | Performed by: EMERGENCY MEDICINE

## 2021-12-17 ENCOUNTER — APPOINTMENT (EMERGENCY)
Dept: RADIOLOGY | Facility: HOSPITAL | Age: 86
End: 2021-12-17
Payer: COMMERCIAL

## 2021-12-17 ENCOUNTER — HOSPITAL ENCOUNTER (EMERGENCY)
Facility: HOSPITAL | Age: 86
Discharge: HOME | End: 2021-12-17
Attending: EMERGENCY MEDICINE | Admitting: EMERGENCY MEDICINE
Payer: COMMERCIAL

## 2021-12-17 VITALS
TEMPERATURE: 98 F | DIASTOLIC BLOOD PRESSURE: 73 MMHG | OXYGEN SATURATION: 96 % | SYSTOLIC BLOOD PRESSURE: 177 MMHG | HEART RATE: 69 BPM | RESPIRATION RATE: 20 BRPM

## 2021-12-17 DIAGNOSIS — R22.1 NECK SWELLING: Primary | ICD-10-CM

## 2021-12-17 DIAGNOSIS — R58 ECCHYMOSIS: ICD-10-CM

## 2021-12-17 LAB
ANION GAP SERPL CALCULATED.3IONS-SCNC: 4 MMOL/L (ref 4–13)
BUN SERPL-MCNC: 21 MG/DL (ref 5–25)
CALCIUM SERPL-MCNC: 9.6 MG/DL (ref 8.3–10.1)
CHLORIDE SERPL-SCNC: 98 MMOL/L (ref 100–108)
CO2 SERPL-SCNC: 34 MMOL/L (ref 21–32)
CREAT SERPL-MCNC: 0.83 MG/DL (ref 0.6–1.3)
GFR SERPL CREATININE-BSD FRML MDRD: 64 ML/MIN/1.73SQ M
GLUCOSE SERPL-MCNC: 164 MG/DL (ref 65–140)
POTASSIUM SERPL-SCNC: 3.6 MMOL/L (ref 3.5–5.3)
SODIUM SERPL-SCNC: 136 MMOL/L (ref 136–145)

## 2021-12-17 PROCEDURE — 73030 X-RAY EXAM OF SHOULDER: CPT

## 2021-12-17 PROCEDURE — G1004 CDSM NDSC: HCPCS

## 2021-12-17 PROCEDURE — 70491 CT SOFT TISSUE NECK W/DYE: CPT

## 2021-12-17 PROCEDURE — 70450 CT HEAD/BRAIN W/O DYE: CPT

## 2021-12-17 PROCEDURE — 70486 CT MAXILLOFACIAL W/O DYE: CPT

## 2021-12-17 PROCEDURE — 36415 COLL VENOUS BLD VENIPUNCTURE: CPT | Performed by: EMERGENCY MEDICINE

## 2021-12-17 PROCEDURE — 99284 EMERGENCY DEPT VISIT MOD MDM: CPT

## 2021-12-17 PROCEDURE — 80048 BASIC METABOLIC PNL TOTAL CA: CPT | Performed by: EMERGENCY MEDICINE

## 2021-12-17 PROCEDURE — 99285 EMERGENCY DEPT VISIT HI MDM: CPT | Performed by: EMERGENCY MEDICINE

## 2021-12-17 PROCEDURE — 96374 THER/PROPH/DIAG INJ IV PUSH: CPT

## 2021-12-17 RX ORDER — ACETAMINOPHEN 325 MG/1
975 TABLET ORAL ONCE
Status: DISCONTINUED | OUTPATIENT
Start: 2021-12-17 | End: 2021-12-17 | Stop reason: HOSPADM

## 2021-12-17 RX ADMIN — MORPHINE SULFATE 2 MG: 2 INJECTION, SOLUTION INTRAMUSCULAR; INTRAVENOUS at 15:30

## 2021-12-17 RX ADMIN — IOHEXOL 85 ML: 350 INJECTION, SOLUTION INTRAVENOUS at 15:26

## 2022-01-19 ENCOUNTER — OFFICE VISIT (OUTPATIENT)
Dept: CARDIOLOGY CLINIC | Facility: CLINIC | Age: 87
End: 2022-01-19
Payer: COMMERCIAL

## 2022-01-19 VITALS
HEIGHT: 65 IN | RESPIRATION RATE: 16 BRPM | HEART RATE: 80 BPM | DIASTOLIC BLOOD PRESSURE: 68 MMHG | WEIGHT: 234 LBS | SYSTOLIC BLOOD PRESSURE: 126 MMHG | BODY MASS INDEX: 38.99 KG/M2

## 2022-01-19 DIAGNOSIS — I48.0 PAROXYSMAL ATRIAL FIBRILLATION (HCC): Chronic | ICD-10-CM

## 2022-01-19 DIAGNOSIS — E78.00 PURE HYPERCHOLESTEROLEMIA: ICD-10-CM

## 2022-01-19 DIAGNOSIS — E11.9 TYPE 2 DIABETES MELLITUS WITHOUT COMPLICATION, WITHOUT LONG-TERM CURRENT USE OF INSULIN (HCC): Chronic | ICD-10-CM

## 2022-01-19 DIAGNOSIS — I50.32 CHRONIC DIASTOLIC CONGESTIVE HEART FAILURE (HCC): Primary | ICD-10-CM

## 2022-01-19 DIAGNOSIS — G24.3 CERVICAL DYSTONIA: Chronic | ICD-10-CM

## 2022-01-19 DIAGNOSIS — Z99.3 WHEELCHAIR DEPENDENCE: ICD-10-CM

## 2022-01-19 DIAGNOSIS — Z86.73 HISTORY OF STROKE: ICD-10-CM

## 2022-01-19 DIAGNOSIS — E66.01 MORBID (SEVERE) OBESITY DUE TO EXCESS CALORIES (HCC): ICD-10-CM

## 2022-01-19 PROCEDURE — 99214 OFFICE O/P EST MOD 30 MIN: CPT | Performed by: INTERNAL MEDICINE

## 2022-01-19 RX ORDER — SPIRONOLACTONE 25 MG/1
25 TABLET ORAL DAILY
Qty: 30 TABLET | Refills: 5 | Status: SHIPPED | OUTPATIENT
Start: 2022-01-19 | End: 2022-02-22 | Stop reason: SDUPTHER

## 2022-01-19 RX ORDER — FUROSEMIDE 80 MG
80 TABLET ORAL DAILY
Qty: 30 TABLET | Refills: 5 | Status: SHIPPED | OUTPATIENT
Start: 2022-01-19

## 2022-01-19 NOTE — PROGRESS NOTES
[facility]  [address] [facility] [address]  [id number]  Phone#  [phone]   Fax#  [phone]  *-*-*-*-*-*-*-*-*-*-*-*-*-*-*-*-*-*-*-*-*-*-*-*-*-*-*-*-*-*-*-*-*-*-*-*-*-*-*-*-*-*-*-*-*-*-*-*-*-*-*-*-*-*                                   Cardiology Follow Up      ENCOUNTER DATE: 22 1:39 PM  PATIENT NAME: Cleveland Vasquez   : 1935    MRN: [id number]  AGE:86 y o  SEX: female  [address] Osiel Gan MD     PRIMARY CARE PHYSICIAN: Gigi Denise MD    ACTIVE DIAGNOSIS THIS VISIT  1  Chronic diastolic congestive heart failure (HCC)     2  Paroxysmal atrial fibrillation (HCC)     3  Pure hypercholesterolemia     4  Cervical dystonia     5  History of stroke     6  Type 2 diabetes mellitus without complication, without long-term current use of insulin (HCC)     7  Wheelchair dependence       ACTIVE PROBLEM LIST  Patient Active Problem List   Diagnosis    Wheelchair dependence    Type 2 diabetes mellitus ([facility] [address] )    Paroxysmal atrial fibrillation (HCC)    Weakness generalized    Urinary incontinence    Spinal cord stimulator status    Overactive bladder    Essential hypertension    Cervical dystonia    History of stroke    Hyperlipidemia    Calcaneal spur of foot, left    Osteopenia of left foot    Stroke-like symptoms    Thyroid nodule    Weakness of both lower extremities    Dysarthria    Slurred speech    Aphasia    Oropharyngeal dysphagia    Pleural effusion, left    Abnormal CT of the chest    Goals of care, counseling/discussion    Moderate protein-calorie malnutrition (HCC)    Constipation    Abnormal alkaline phosphatase test    Bradycardia    Chronic diastolic congestive heart failure ([address] )       CARDIOLOGY SPECIALTY COMMENTS  Patient 1st seen on 2019   HPI:  Patient with a history of paroxysmal atrial fibrillation and prior strokes previously on warfarin anticoagulation was [facility] to [facility] on  through June 14th and again on June 16th through June 18, 2019  Both time she was admitted with slurred speech   The symptoms resolved without residual and patient has no residual neurologic defects from her prior strokes   Her warfarin management has been difficult and she has frequently been subtherapeutic   During the last admission she was changed from warfarin to Eliquis   She has had no further episodes of slurred speech since she has been on Eliquis   At the time of both admissions, she presented in sinus rhythm        Other problems have included type 2 diabetes mellitus, cervical dystonia, spinal cord stimulator, wheelchair dependent, overactive bladder, essential hypertension    INTERVAL HISTORY:         patient has no cardiac complaints  Patient denies chest discomfort or shortness of breath  Patient has no palpitations  Patient denies symptoms of dizziness, lightheadedness or near-syncope/syncope  Patient denies leg edema  Patient denies symptoms of orthopnea or paroxysmal nocturnal dyspnea  She is careful about her salt  Her blood pressure is well controlled  She has not had a lipid profile since February 2020        DISCUSSION/PLAN:          ·  fasting lipid profile  · medications reviewed and prescriptions renewed  ·  return in 6 months  ·  EKG on return      Lab Studies:    Lab Results   Component Value Date    CHOLESTEROL 104 02/05/2020    CHOLESTEROL 102 06/12/2019    CHOLESTEROL 97 06/02/2018     Lab Results   Component Value Date    TRIG 121 02/05/2020    TRIG 96 06/12/2019    TRIG 88 06/02/2018     Lab Results   Component Value Date    HDL 44 02/05/2020    HDL 40 06/12/2019    HDL 40 06/02/2018     Lab Results   Component Value Date    LDLCALC 36 02/05/2020    LDLCALC 43 06/12/2019    LDLCALC 39 06/02/2018         Lab Results   Component Value Date    NTBNP 2,141 (H) 01/25/2019     Lab Results   Component Value Date    HGBA1C 6 2 (H) 08/20/2020      Lab Results   Component Value Date EGFR 64 12/17/2021    EGFR 58 09/04/2021    EGFR 80 05/22/2021    SODIUM 136 12/17/2021    SODIUM 142 09/04/2021    SODIUM 141 05/22/2021    K 3 6 12/17/2021    K 4 1 09/04/2021    K 4 3 05/22/2021    CL 98 (L) 12/17/2021     09/04/2021     (H) 05/22/2021    CO2 34 (H) 12/17/2021    CO2 37 (H) 12/10/2021    CO2 34 (H) 09/04/2021    ANIONGAP 6 12/17/2014    ANIONGAP 7 12/12/2014    ANIONGAP 9 12/11/2014    BUN 21 12/17/2021    BUN 23 09/04/2021    BUN 24 05/22/2021    CREATININE 0 83 12/17/2021    CREATININE 0 91 09/04/2021    CREATININE 0 69 05/22/2021     Lab Results   Component Value Date    WBC 6 04 09/04/2021    WBC 5 94 05/22/2021    WBC 4 23 (L) 10/26/2020    HGB 12 9 12/10/2021    HGB 13 0 09/04/2021    HGB 11 2 (L) 05/22/2021    HCT 38 12/10/2021    HCT 40 3 09/04/2021    HCT 36 0 05/22/2021    MCV 93 09/04/2021    MCV 94 05/22/2021    MCV 92 10/26/2020    MCH 30 0 09/04/2021    MCH 29 2 05/22/2021    MCH 29 5 10/26/2020    MCHC 32 3 09/04/2021    MCHC 31 1 (L) 05/22/2021    MCHC 32 0 10/26/2020     (L) 09/04/2021     (L) 05/22/2021     (L) 10/26/2020      Lab Results   Component Value Date    GLUCOSE 235 (H) 12/10/2021    GLUCOSE 113 12/17/2014    GLUCOSE 206 (H) 12/12/2014    CALCIUM 9 6 12/17/2021    CALCIUM 9 2 09/04/2021    CALCIUM 9 1 05/22/2021    AST 22 09/04/2021    AST 24 10/23/2020    AST 17 10/22/2020    ALT 34 09/04/2021    ALT 30 10/23/2020    ALT 22 10/22/2020    ALKPHOS 205 (H) 09/04/2021    ALKPHOS 131 (H) 10/23/2020    ALKPHOS 137 (H) 10/22/2020    PROT 7 8 12/11/2014    BILITOT 0 46 12/11/2014    MG 2 0 10/26/2020    MG 1 6 10/23/2020    MG 1 8 03/04/2020       Lab Results   Component Value Date    TROPONINI <0 02 10/21/2020    TROPONINI <0 02 02/19/2020    TROPONINI <0 02 02/04/2020     Lab Results   Component Value Date    DDIMER 369 05/31/2018     Lab Results   Component Value Date    DIGOXIN 2 2 (H) 10/21/2020    DIGOXIN 1 6 02/15/2020    DIGOXIN 0 9 06/17/2019     Lab Results   Component Value Date    IRON 20 (L) 12/15/2014     No results found for this visit on 01/19/22        Current Outpatient Medications:     Acetaminophen (TYLENOL PO), Take by mouth 3 (three) times a day, Disp: , Rfl:     albuterol (PROVENTIL HFA,VENTOLIN HFA) 90 mcg/act inhaler, , Disp: , Rfl:     apixaban (ELIQUIS) 5 mg, Take 1 tablet (5 mg total) by mouth 2 (two) times a day, Disp: 180 tablet, Rfl: 3    atorvastatin (LIPITOR) 40 mg tablet, Take 1 tablet (40 mg total) by mouth every evening, Disp: 90 tablet, Rfl: 3    cholecalciferol (VITAMIN D3) 1,000 units tablet, Take 1,000 Units by mouth every evening, Disp: , Rfl:     CRANBERRY PO, Take 30,000 mg by mouth, Disp: , Rfl:     cyanocobalamin (VITAMIN B-12) 1,000 mcg tablet, Take 1,000 mcg by mouth every evening  , Disp: , Rfl:     folic acid (FOLVITE) 1 mg tablet, Take 1,000 mcg by mouth daily, Disp: , Rfl: 3    furosemide (LASIX) 80 mg tablet, Take 1 tablet (80 mg total) by mouth daily, Disp: 30 tablet, Rfl: 5    gabapentin (NEURONTIN) 100 mg capsule, , Disp: , Rfl:     ipratropium (ATROVENT) 0 03 % nasal spray, 1-2 sprays each nostril twice a day as needed for rhinorrhea, Disp: 30 mL, Rfl: 6    metFORMIN (GLUCOPHAGE) 1000 MG tablet, Take 0 5 tablets (500 mg total) by mouth 2 (two) times a day with meals (Patient taking differently: Take 500 mg by mouth daily with breakfast ), Disp: 60 tablet, Rfl: 0    metoprolol tartrate (LOPRESSOR) 25 mg tablet, TAKE ONE-HALF TABLET BY PEG TUBE EVERY TWELVE HOURS, Disp: 90 tablet, Rfl: 3    Multiple Vitamin (MULTI-VITAMIN DAILY PO), Take by mouth, Disp: , Rfl:     mupirocin (BACTROBAN) 2 % ointment, APPLY A SMALL AMOUNT TO THE AFFECTED AREA TWICE DAILY AS NEEDED, Disp: , Rfl:     nitrofurantoin (MACRODANTIN) 100 mg capsule, , Disp: , Rfl:     nystatin (MYCOSTATIN) powder, APPLY 1 GM ON THE SKIN THREE TIMES A DAY, Disp: , Rfl:     Premarin vaginal cream, , Disp: , Rfl:     primidone (MYSOLINE) 50 mg tablet, Take by mouth 2 (two) times a day , Disp: , Rfl:     spironolactone (ALDACTONE) 25 mg tablet, Take 1 tablet (25 mg total) by mouth daily, Disp: 30 tablet, Rfl: 5    bisacodyl (DULCOLAX) 10 mg suppository, Insert 1 suppository (10 mg total) into the rectum daily as needed for constipation (Patient not taking: Reported on 7/16/2021), Disp: 12 suppository, Rfl: 0    saccharomyces boulardii (FLORASTOR) 250 mg capsule, Take 250 mg by mouth 2 (two) times a day (Patient not taking: Reported on 7/16/2021), Disp: , Rfl:   Allergies   Allergen Reactions    Sulfamethoxazole-Trimethoprim Swelling     Swelling around eyes and red eyes    Asa [Aspirin] Other (See Comments)     Unknown      Aspirin Other (See Comments)     "feels like fist in my chest"    Ciprofloxacin     Nitrofurantoin     Other      "5mz/Tmp "  "1 60 mTab amme"     Per pt's allergy list        Past Medical History:   Diagnosis Date    [facility]     Arthritis     Assistance needed for mobility     pt can stand with assistance and transfer    Chronic pain disorder     Diabetes mellitus ([facility] [address] )     NIDDM    Full dentures     History of transfusion     no adverse reaction    Hyperlipidemia     Irregular heart beat     Numbness and tingling of both feet     Skin abnormality     sacrum area - small red area, less than a dime size    Spinal stenosis     Stroke ([facility] [address] )     Unable to ambulate     Urinary incontinence     ipg stimulator x3 repakcements,battery exchange today 2/14/2018    Wears glasses     Wheelchair dependence      Social History     Socioeconomic History    Marital status: /Civil Union     Spouse name: Not on file    Number of children: Not on file    Years of education: Not on file    Highest education level: Not on file   Occupational History    Not on file   Tobacco Use    Smoking status: Never Smoker    Smokeless tobacco: Never Used   Vaping Use    Vaping Use: Never used Substance and Sexual Activity    Alcohol use: Not Currently     Comment: very rare    Drug use: No    Sexual activity: Not on file   Other Topics Concern    Not on file   Social History Narrative    Not on file     Social Determinants of Health     Financial Resource Strain: Not on file   Food Insecurity: Not on file   Transportation Needs: Not on file   Physical Activity: Not on file   Stress: Not on file   Social Connections: Not on file   Intimate Partner Violence: Not on file   Housing Stability: Not on file      Family History   Problem Relation Age of Onset    No Known Problems Mother     No Known Problems Father      Past Surgical History:   Procedure Laterality Date    BACK SURGERY      fusion    BLADDER SUSPENSION      CARPAL TUNNEL RELEASE Bilateral     CHOLANGIOGRAM N/A 6/4/2018    Procedure: CHOLANGIOGRAM;  Surgeon: Sergio Madison MD;  [facility];  Service: General    CHOLECYSTECTOMY LAPAROSCOPIC N/A 6/4/2018    Procedure: CHOLECYSTECTOMY LAPAROSCOPIC;  Surgeon: Sergio Madison MD;  [facility];  Service: General    COLONOSCOPY      DILATION AND CURETTAGE OF UTERUS      FRACTURE SURGERY Left     femur with hardware    HYSTERECTOMY      INSERTION / Marnie Battiest / REVISION NEUROSTIMULATOR      JOINT REPLACEMENT Bilateral     knees    SACRAL NERVE STIMULATOR PLACEMENT N/A 2/14/2018    Procedure: REMOVE AND REPLACE IPG;  Surgeon: Veronica Berkowitz MD;  Location: [facility];  Service: UroGynecology           TONSILLECTOMY         PREVIOUS WEIGHTS:   Wt Readings from Last 10 Encounters:   01/19/22 106 kg (234 lb)   12/10/21 106 kg (234 lb 2 1 oz)   05/25/21 86 2 kg (190 lb)   05/22/21 86 2 kg (190 lb)   06/26/20 85 7 kg (189 lb)   04/16/20 82 3 kg (181 lb 6 4 oz)   03/31/20 80 7 kg (178 lb)   03/25/20 81 2 kg (179 lb)   03/24/20 81 2 kg (179 lb)   03/18/20 90 6 kg (199 lb 12 8 oz)        Review of Systems:  Review of Systems   Respiratory: Negative for cough, choking, chest tightness, shortness of breath and wheezing  Cardiovascular: Negative for chest pain, palpitations and leg swelling  Musculoskeletal: Negative for gait problem  Skin: Negative for rash  Neurological: Negative for dizziness, tremors, syncope, weakness, light-headedness, numbness and headaches  Psychiatric/Behavioral: Negative for agitation and behavioral problems  The patient is not hyperactive  Physical Exam:  /68   Pulse 80   Resp 16   Ht 5' 5" (1 651 m)   Wt 106 kg (234 lb) Comment: weight not obtainable  BMI 38 94 kg/m²     Physical Exam  Constitutional:       General: She is not in acute distress  Appearance: She is well-developed  She is obese  HENT:      Head: Normocephalic and atraumatic  Neck:      Thyroid: No thyromegaly  Vascular: No carotid bruit or JVD  Trachea: No tracheal deviation  Cardiovascular:      Rate and Rhythm: Normal rate and regular rhythm  Pulses: Normal pulses  Heart sounds: Normal heart sounds  No murmur heard  No friction rub  No gallop  Pulmonary:      Effort: Pulmonary effort is normal  No respiratory distress  Breath sounds: Normal breath sounds  No wheezing, rhonchi or rales  Chest:      Chest wall: No tenderness  Abdominal:      General: Bowel sounds are normal    Musculoskeletal:         General: Normal range of motion  Cervical back: Normal range of motion and neck supple  Right lower leg: No edema  Left lower leg: No edema  Skin:     General: Skin is warm and dry  Neurological:      General: No focal deficit present  Mental Status: She is alert and oriented to person, place, and time  Psychiatric:         Mood and Affect: Mood normal          Behavior: Behavior normal          Thought Content: Thought content normal          Judgment: Judgment normal        ======================================================  Imaging:   I have personally reviewed pertinent reports        Portions of the record may have been created with voice recognition software  Occasional wrong word or "sound a like" substitutions may have occurred due to the inherent limitations of voice recognition software  Read the chart carefully and recognize, using context, where substitutions have occurred      SIGNATURES:   Benji Rodriguez MD

## 2022-01-21 ENCOUNTER — HOSPITAL ENCOUNTER (EMERGENCY)
Facility: HOSPITAL | Age: 87
Discharge: HOME | End: 2022-01-21
Attending: EMERGENCY MEDICINE | Admitting: EMERGENCY MEDICINE
Payer: COMMERCIAL

## 2022-01-21 ENCOUNTER — APPOINTMENT (EMERGENCY)
Dept: CT IMAGING | Facility: HOSPITAL | Age: 87
End: 2022-01-21
Payer: COMMERCIAL

## 2022-01-21 VITALS
OXYGEN SATURATION: 100 % | DIASTOLIC BLOOD PRESSURE: 72 MMHG | BODY MASS INDEX: 39.67 KG/M2 | RESPIRATION RATE: 16 BRPM | WEIGHT: 238.1 LBS | HEART RATE: 71 BPM | SYSTOLIC BLOOD PRESSURE: 180 MMHG | HEIGHT: 65 IN | TEMPERATURE: 97.7 F

## 2022-01-21 DIAGNOSIS — K86.2 PANCREATIC CYST: ICD-10-CM

## 2022-01-21 DIAGNOSIS — R53.1 WEAKNESS GENERALIZED: ICD-10-CM

## 2022-01-21 DIAGNOSIS — R91.1 NODULE OF LEFT LUNG: ICD-10-CM

## 2022-01-21 DIAGNOSIS — K59.00 CONSTIPATION, UNSPECIFIED CONSTIPATION TYPE: Primary | ICD-10-CM

## 2022-01-21 LAB
ALBUMIN SERPL BCP-MCNC: 3 G/DL (ref 3.5–5)
ALP SERPL-CCNC: 193 U/L (ref 46–116)
ALT SERPL W P-5'-P-CCNC: 43 U/L (ref 12–78)
ANION GAP SERPL CALCULATED.3IONS-SCNC: 8 MMOL/L (ref 4–13)
AST SERPL W P-5'-P-CCNC: 34 U/L (ref 5–45)
BACTERIA UR QL AUTO: ABNORMAL /HPF
BASOPHILS # BLD AUTO: 0.02 THOUSANDS/ΜL (ref 0–0.1)
BASOPHILS NFR BLD AUTO: 0 % (ref 0–1)
BILIRUB SERPL-MCNC: 0.28 MG/DL (ref 0.2–1)
BILIRUB UR QL STRIP: NEGATIVE
BUN SERPL-MCNC: 23 MG/DL (ref 5–25)
CALCIUM ALBUM COR SERPL-MCNC: 9.6 MG/DL (ref 8.3–10.1)
CALCIUM SERPL-MCNC: 8.8 MG/DL (ref 8.3–10.1)
CHLORIDE SERPL-SCNC: 101 MMOL/L (ref 100–108)
CLARITY UR: CLEAR
CO2 SERPL-SCNC: 26 MMOL/L (ref 21–32)
COLOR UR: YELLOW
CREAT SERPL-MCNC: 0.93 MG/DL (ref 0.6–1.3)
EOSINOPHIL # BLD AUTO: 0.08 THOUSAND/ΜL (ref 0–0.61)
EOSINOPHIL NFR BLD AUTO: 1 % (ref 0–6)
ERYTHROCYTE [DISTWIDTH] IN BLOOD BY AUTOMATED COUNT: 13.3 % (ref 11.6–15.1)
GFR SERPL CREATININE-BSD FRML MDRD: 55 ML/MIN/1.73SQ M
GLUCOSE SERPL-MCNC: 196 MG/DL (ref 65–140)
GLUCOSE UR STRIP-MCNC: NEGATIVE MG/DL
HCT VFR BLD AUTO: 35.2 % (ref 34.8–46.1)
HGB BLD-MCNC: 11.4 G/DL (ref 11.5–15.4)
HGB UR QL STRIP.AUTO: ABNORMAL
IMM GRANULOCYTES # BLD AUTO: 0.02 THOUSAND/UL (ref 0–0.2)
IMM GRANULOCYTES NFR BLD AUTO: 0 % (ref 0–2)
KETONES UR STRIP-MCNC: NEGATIVE MG/DL
LEUKOCYTE ESTERASE UR QL STRIP: ABNORMAL
LIPASE SERPL-CCNC: 284 U/L (ref 73–393)
LYMPHOCYTES # BLD AUTO: 0.77 THOUSANDS/ΜL (ref 0.6–4.47)
LYMPHOCYTES NFR BLD AUTO: 13 % (ref 14–44)
MCH RBC QN AUTO: 31.1 PG (ref 26.8–34.3)
MCHC RBC AUTO-ENTMCNC: 32.4 G/DL (ref 31.4–37.4)
MCV RBC AUTO: 96 FL (ref 82–98)
MONOCYTES # BLD AUTO: 0.3 THOUSAND/ΜL (ref 0.17–1.22)
MONOCYTES NFR BLD AUTO: 5 % (ref 4–12)
NEUTROPHILS # BLD AUTO: 4.68 THOUSANDS/ΜL (ref 1.85–7.62)
NEUTS SEG NFR BLD AUTO: 81 % (ref 43–75)
NITRITE UR QL STRIP: NEGATIVE
NON-SQ EPI CELLS URNS QL MICRO: ABNORMAL /HPF
NRBC BLD AUTO-RTO: 0 /100 WBCS
PH UR STRIP.AUTO: 7 [PH] (ref 4.5–8)
PLATELET # BLD AUTO: 79 THOUSANDS/UL (ref 149–390)
PMV BLD AUTO: 12.5 FL (ref 8.9–12.7)
POTASSIUM SERPL-SCNC: 4.5 MMOL/L (ref 3.5–5.3)
PROT SERPL-MCNC: 7.5 G/DL (ref 6.4–8.2)
PROT UR STRIP-MCNC: NEGATIVE MG/DL
RBC # BLD AUTO: 3.66 MILLION/UL (ref 3.81–5.12)
RBC #/AREA URNS AUTO: ABNORMAL /HPF
SODIUM SERPL-SCNC: 135 MMOL/L (ref 136–145)
SP GR UR STRIP.AUTO: 1.01 (ref 1–1.03)
UROBILINOGEN UR QL STRIP.AUTO: 0.2 E.U./DL
WBC # BLD AUTO: 5.87 THOUSAND/UL (ref 4.31–10.16)
WBC #/AREA URNS AUTO: ABNORMAL /HPF

## 2022-01-21 PROCEDURE — 99284 EMERGENCY DEPT VISIT MOD MDM: CPT

## 2022-01-21 PROCEDURE — 81001 URINALYSIS AUTO W/SCOPE: CPT

## 2022-01-21 PROCEDURE — 83690 ASSAY OF LIPASE: CPT

## 2022-01-21 PROCEDURE — 85025 COMPLETE CBC W/AUTO DIFF WBC: CPT

## 2022-01-21 PROCEDURE — G1004 CDSM NDSC: HCPCS

## 2022-01-21 PROCEDURE — 74177 CT ABD & PELVIS W/CONTRAST: CPT

## 2022-01-21 PROCEDURE — 80053 COMPREHEN METABOLIC PANEL: CPT

## 2022-01-21 PROCEDURE — 36415 COLL VENOUS BLD VENIPUNCTURE: CPT

## 2022-01-21 RX ORDER — POLYETHYLENE GLYCOL 3350 17 G/17G
17 POWDER, FOR SOLUTION ORAL DAILY
Qty: 4 EACH | Refills: 0 | Status: SHIPPED | OUTPATIENT
Start: 2022-01-21

## 2022-01-21 RX ORDER — SODIUM PHOSPHATE, DIBASIC AND SODIUM PHOSPHATE, MONOBASIC 7; 19 G/133ML; G/133ML
1 ENEMA RECTAL ONCE
Status: COMPLETED | OUTPATIENT
Start: 2022-01-21 | End: 2022-01-21

## 2022-01-21 RX ADMIN — IOHEXOL 100 ML: 350 INJECTION, SOLUTION INTRAVENOUS at 14:27

## 2022-01-21 RX ADMIN — SODIUM PHOSPHATE, DIBASIC AND SODIUM PHOSPHATE, MONOBASIC 1 ENEMA: 7; 19 ENEMA RECTAL at 16:05

## 2022-01-21 NOTE — ED PROVIDER NOTES
History  Chief Complaint   Patient presents with    Constipation     tried a stool softner, no normal BM for 3-5 days  Patient is an 43-year-old female with history of hypertension, DM, and urinary incontinence who presents to the emergency department with a 4 day history of constipation  She reports that her last bowel movement was 2 days ago, and was small in nature  Her last normal bowel movement was 4 days ago  She does report having taken 3 stool softeners yesterday, and 3 today without bowel movement  She is unsure of which stool softener she took  She does also report 1 episode of vomiting today just prior to arrival after drinking a large amount of Senna tea  She has been able to pass gas and last did today  She otherwise denies abdominal pain, back pain, dysuria, hematuria, increased leg swelling, chest pain, weakness, dyspnea, hematemesis, melena, and or hematochezia  Of note, the patient does express some concern with her home health aide and reports that she was not helpful with supply her with stool softeners recently  Prior to Admission Medications   Prescriptions Last Dose Informant Patient Reported? Taking?    Acetaminophen (TYLENOL PO) Unknown at Unknown time Self Yes No   Sig: Take by mouth 3 (three) times a day   CRANBERRY PO Unknown at Unknown time Self Yes No   Sig: Take 30,000 mg by mouth   Multiple Vitamin (MULTI-VITAMIN DAILY PO)  Self Yes No   Sig: Take by mouth   Premarin vaginal cream Unknown at Unknown time Self Yes No   albuterol (PROVENTIL HFA,VENTOLIN HFA) 90 mcg/act inhaler Unknown at Unknown time Self Yes No   apixaban (ELIQUIS) 5 mg  Self No Yes   Sig: Take 1 tablet (5 mg total) by mouth 2 (two) times a day   atorvastatin (LIPITOR) 40 mg tablet  Self No Yes   Sig: Take 1 tablet (40 mg total) by mouth every evening   bisacodyl (DULCOLAX) 10 mg suppository Unknown at Unknown time Self No No   Sig: Insert 1 suppository (10 mg total) into the rectum daily as needed for constipation   Patient not taking: Reported on 2021   cholecalciferol (VITAMIN D3) 1,000 units tablet  Self Yes Yes   Sig: Take 1,000 Units by mouth every evening   cyanocobalamin (VITAMIN B-12) 1,000 mcg tablet Unknown at Unknown time Self Yes No   Sig: Take 1,000 mcg by mouth every evening     folic acid (FOLVITE) 1 mg tablet Unknown at Unknown time Self Yes No   Sig: Take 1,000 mcg by mouth daily   furosemide (LASIX) 80 mg tablet   No Yes   Sig: Take 1 tablet (80 mg total) by mouth daily   gabapentin (NEURONTIN) 100 mg capsule  Self Yes No   ipratropium (ATROVENT) 0 03 % nasal spray  Self No No   Si-2 sprays each nostril twice a day as needed for rhinorrhea   metFORMIN (GLUCOPHAGE) 1000 MG tablet  Self No Yes   Sig: Take 0 5 tablets (500 mg total) by mouth 2 (two) times a day with meals   Patient taking differently: Take 500 mg by mouth daily with breakfast    metoprolol tartrate (LOPRESSOR) 25 mg tablet  Self No Yes   Sig: TAKE ONE-HALF TABLET BY PEG TUBE EVERY TWELVE HOURS   mupirocin (BACTROBAN) 2 % ointment  Self Yes No   Sig: APPLY A SMALL AMOUNT TO THE AFFECTED AREA TWICE DAILY AS NEEDED   nitrofurantoin (MACRODANTIN) 100 mg capsule Unknown at Unknown time Self Yes No   nystatin (MYCOSTATIN) powder Unknown at Unknown time Self Yes No   Sig: APPLY 1 GM ON THE SKIN THREE TIMES A DAY   primidone (MYSOLINE) 50 mg tablet Unknown at Unknown time Self Yes No   Sig: Take by mouth 2 (two) times a day    saccharomyces boulardii (FLORASTOR) 250 mg capsule Not Taking at Unknown time Self Yes No   Sig: Take 250 mg by mouth 2 (two) times a day   Patient not taking: Reported on 2021   spironolactone (ALDACTONE) 25 mg tablet   No Yes   Sig: Take 1 tablet (25 mg total) by mouth daily      Facility-Administered Medications: None       Past Medical History:   Diagnosis Date    [facility]     Arthritis     Assistance needed for mobility     pt can stand with assistance and transfer    Chronic pain disorder     Diabetes mellitus ([facility]     NIDDM    Full dentures     History of transfusion     no adverse reaction    Hyperlipidemia     Irregular heart beat     Numbness and tingling of both feet     Skin abnormality     sacrum area - small red area, less than a dime size    Spinal stenosis     Stroke ([facility]     Unable to ambulate     Urinary incontinence     ipg stimulator x3 repakcements,battery exchange today 2/14/2018    Wears glasses     Wheelchair dependence        Past Surgical History:   Procedure Laterality Date    BACK SURGERY      fusion    BLADDER SUSPENSION      CARPAL TUNNEL RELEASE Bilateral     CHOLANGIOGRAM N/A 6/4/2018    Procedure: CHOLANGIOGRAM;  Surgeon: Harrison Blank MD;  [facility];  Service: General    CHOLECYSTECTOMY LAPAROSCOPIC N/A 6/4/2018    Procedure: CHOLECYSTECTOMY LAPAROSCOPIC;  Surgeon: Harrison Blank MD;  [facility];  Service: General    COLONOSCOPY      DILATION AND CURETTAGE OF UTERUS      FRACTURE SURGERY Left     femur with hardware    HYSTERECTOMY      INSERTION / Louellen Passy / REVISION NEUROSTIMULATOR      JOINT REPLACEMENT Bilateral     knees    SACRAL NERVE STIMULATOR PLACEMENT N/A 2/14/2018    Procedure: REMOVE AND REPLACE IPG;  Surgeon: Mavis Bryant MD;  [facility];  Service: UroGynecology           TONSILLECTOMY         Family History   Problem Relation Age of Onset    No Known Problems Mother     No Known Problems Father      I have reviewed and agree with the history as documented  E-Cigarette/Vaping    E-Cigarette Use Never User      E-Cigarette/Vaping Substances     Social History     Tobacco Use    Smoking status: Never Smoker    Smokeless tobacco: Never Used   Vaping Use    Vaping Use: Never used   Substance Use Topics    Alcohol use: Not Currently     Comment: very rare    Drug use: No       Review of Systems   Constitutional: Negative for chills and fever     HENT: Negative for congestion and rhinorrhea  Respiratory: Negative for cough and shortness of breath  Cardiovascular: Negative for chest pain and palpitations  Gastrointestinal: Positive for constipation and vomiting  Negative for abdominal pain and diarrhea  Genitourinary: Negative for dysuria, flank pain and hematuria  Musculoskeletal: Negative for arthralgias and myalgias  Skin: Negative for rash and wound  Neurological: Negative for dizziness, weakness, numbness and headaches  Physical Exam  Physical Exam  Vitals and nursing note reviewed  Exam conducted with a chaperone present  Constitutional:       General: She is not in acute distress  Appearance: She is well-developed  HENT:      Head: Normocephalic  Comments: Patient with bilateral old ecchymosis to her frontal forehead      Right Ear: External ear normal       Left Ear: External ear normal    Eyes:      Conjunctiva/sclera: Conjunctivae normal    Cardiovascular:      Rate and Rhythm: Normal rate and regular rhythm  Heart sounds: No murmur heard  Pulmonary:      Effort: Pulmonary effort is normal  No respiratory distress  Breath sounds: Normal breath sounds  No wheezing, rhonchi or rales  Abdominal:      General: Abdomen is flat  Bowel sounds are decreased  There is no distension  Palpations: Abdomen is soft  There is no mass  Tenderness: There is no abdominal tenderness  There is no guarding or rebound  Genitourinary:     Rectum: Tenderness present  Comments: Impacted stool felt within the rectum  No bleeding noted  Brown stool noted  Musculoskeletal:      Cervical back: Neck supple  Comments: Patient with mild bilateral lower extremity edema that she reports is unchanged and not worsened from her baseline   Skin:     General: Skin is warm and dry  Capillary Refill: Capillary refill takes less than 2 seconds  Neurological:      General: No focal deficit present        Mental Status: She is alert and oriented to person, place, and time           Vital Signs  ED Triage Vitals   Temperature Pulse Respirations Blood Pressure SpO2   01/21/22 1245 01/21/22 1245 01/21/22 1245 01/21/22 1245 01/21/22 1245   97 7 °F (36 5 °C) 68 15 (!) 240/102 98 %      Temp Source Heart Rate Source Patient Position - Orthostatic VS BP Location FiO2 (%)   01/21/22 1245 01/21/22 1557 01/21/22 1557 01/21/22 1245 --   Oral Monitor Lying Right arm       Pain Score       01/21/22 1245       No Pain           Vitals:    01/21/22 1400 01/21/22 1500 01/21/22 1557 01/21/22 1820   BP: (!) 190/87 (!) 177/73 148/83 (!) 180/72   Pulse: 60 60 68 71   Patient Position - Orthostatic VS:   Lying Sitting         Visual Acuity      ED Medications  Medications   iohexol (OMNIPAQUE) 350 MG/ML injection (SINGLE-DOSE) 100 mL (100 mL Intravenous Given 1/21/22 1427)   sodium phosphate-biphosphate (FLEET) enema 1 enema (1 enema Rectal Given 1/21/22 1605)       Diagnostic Studies  Results Reviewed     Procedure Component Value Units Date/Time    Urine Microscopic [[id number]]  (Abnormal) Collected: 01/21/22 1508    Lab Status: Final result Specimen: Urine, Clean Catch Updated: 01/21/22 1533     RBC, UA 0-1 /hpf      WBC, UA 4-10 /hpf      Epithelial Cells Occasional /hpf      Bacteria, UA Occasional /hpf     Urine Macroscopic, POC [[id number]]  (Abnormal) Collected: 01/21/22 1508    Lab Status: Final result Specimen: Urine Updated: 01/21/22 1509     Color, UA Yellow     Clarity, UA Clear     pH, UA 7 0     Leukocytes, UA Small     Nitrite, UA Negative     Protein, UA Negative mg/dl      Glucose, UA Negative mg/dl      Ketones, UA Negative mg/dl      Urobilinogen, UA 0 2 E U /dl      Bilirubin, UA Negative     Blood, UA Trace     Specific [place], UA 1 010    Narrative:      CLINITEK RESULT    CBC and differential [[id number]]  (Abnormal) Collected: 01/21/22 1336    Lab Status: Final result Specimen: Blood from Arm, Right Updated: 01/21/22 1417     WBC 5 87 Thousand/uL      RBC 3 66 Million/uL      Hemoglobin 11 4 g/dL      Hematocrit 35 2 %      MCV 96 fL      MCH 31 1 pg      MCHC 32 4 g/dL      RDW 13 3 %      MPV 12 5 fL      Platelets 79 Thousands/uL      nRBC 0 /100 WBCs      Neutrophils Relative 81 %      Immat GRANS % 0 %      Lymphocytes Relative 13 %      Monocytes Relative 5 %      Eosinophils Relative 1 %      Basophils Relative 0 %      Neutrophils Absolute 4 68 Thousands/µL      Immature Grans Absolute 0 02 Thousand/uL      Lymphocytes Absolute 0 77 Thousands/µL      Monocytes Absolute 0 30 Thousand/µL      Eosinophils Absolute 0 08 Thousand/µL      Basophils Absolute 0 02 Thousands/µL     Narrative: This is an appended report  These results have been appended to a previously verified report      Comprehensive metabolic panel [[id number]]  (Abnormal) Collected: 01/21/22 1336    Lab Status: Final result Specimen: Blood from Arm, Right Updated: 01/21/22 1415     Sodium 135 mmol/L      Potassium 4 5 mmol/L      Chloride 101 mmol/L      CO2 26 mmol/L      ANION GAP 8 mmol/L      BUN 23 mg/dL      Creatinine 0 93 mg/dL      Glucose 196 mg/dL      Calcium 8 8 mg/dL      Corrected Calcium 9 6 mg/dL      AST 34 U/L      ALT 43 U/L      Alkaline Phosphatase 193 U/L      Total Protein 7 5 g/dL      Albumin 3 0 g/dL      Total Bilirubin 0 28 mg/dL      eGFR 55 ml/min/1 73sq m     Narrative:      [place] guidelines for Chronic Kidney Disease (CKD):     Stage 1 with normal or high GFR (GFR > 90 mL/min/1 73 square meters)    Stage 2 Mild CKD (GFR = 60-89 mL/min/1 73 square meters)    Stage 3A Moderate CKD (GFR = 45-59 mL/min/1 73 square meters)    Stage 3B Moderate CKD (GFR = 30-44 mL/min/1 73 square meters)    Stage 4 Severe CKD (GFR = 15-29 mL/min/1 73 square meters)    Stage 5 End Stage CKD (GFR <15 mL/min/1 73 square meters)  Note: GFR calculation is accurate only with a steady state creatinine    Lipase [[id number]]  (Normal) Collected: 01/21/22 1335    Lab Status: Final result Specimen: Blood from Arm, Right Updated: 01/21/22 1415     Lipase 284 u/L                  CT abdomen pelvis with contrast   Final Result by Jeffery Carroll MD (01/21 1443)      Large amount of fecal debris in the rectum, evaluate for fecal impaction  No small bowel obstruction   No acute inflammatory stranding      Incidentally detected cystic pancreatic lesion in the body of the pancreas, measuring 1 2 x 0 8 cm, suggest further evaluation with the MRI and MRCP on nonemergent basis      Small bilateral effusion      A focal nodular appearing density seen within the left lingular region measuring 9 mm probably to scarring and atelectasis  Follow-up suggested at 3 to 6 months to demonstrate stability      The study was marked in [facility] for immediate notification  A follow-up notification has been created      Workstation performed: [id number]                    Procedures  Procedures         ED Course    The patient is an 70-year-old female presenting to the emergency department with a 4 day history of constipation  She has attempted taking stool softeners at home without success  She does report having had 1 episode of vomiting today and states that she last passed gas today  Bowel sounds are noted to be decreased on exam, and with patient's age and episode of vomiting there is concern for bowel obstruction  Will obtain CT the abdomen and pelvis to rule out obstruction  Nursing staff reports that the patient expressed being very upset with her health care aide  The patient reportedly became tearful and states that her health aide is degrading and goes on her phone instead of helping her " Case management consulted  Nursing staff discussed this with the patient's daughter, Clay Giang, who reports that she is present with her mother in the home 60% of the time, however, this nursing aide is the only aide available at night within the company in which they use    She reported that they have been in contact with the company about replacing this aide without success and are actively working on resolving this problem  CT reveals fecal impaction  Also reveals incidental pancreatic cyst, small bilateral pleural effusion, and left lung nodule  Patient continues to deny dyspnea, is in no respiratory distress, and has good oxygenation on room air while in the ED  Patient is without increased lower extremity edema from her baseline  These findings were communicated to patient and importance of follow-up for further imaging and diagnostic testing was emphasized  I explained to the patient and her daughter at bedside that with these diagnostics, cancer cannot be ruled out, and therefore follow-up is extremely important  The patient and daughter agree with plan for prompt follow-up with primary care physician for diagnostic imaging and follow-up imaging of these incidental findings  I offered them time to ask questions; all questions were answered  Digital Fecal disimpaction performed  Small amount of stool obtained from the rectum  Patient unable to further tolerate procedure secondary to discomfort; procedure ended  Case discussed with Irving Marie, case management, who spoke with the patient  She reports that the patient feels disrespected and degraded by her health aide, but denies active abuse and does not request nursing home placement or admission at this time  She does feel safe returning home  Her daughter, Abbi Barahona, also feels patient will be safe at home  Case Management reports that they will reach out to the patient's home health agency as well as attempt to assign a visiting nurse  I stressed the importance of return to the ED if patient began to experience any dyspnea, chest pain, increased leg swelling, abdominal pain, rectal bleeding, bloody stool, and/or any new or concerning complaints  Patient and daughter verbalized understanding    I also emphasized to patient that if she continues to experience constipation and is unable to have a bowel moment, she should return to the ED if unable to see her primary care physician promptly  At the time of discharge, the patient is in no acute distress  I discussed with the patient and her daughter the diagnosis, treatment plan, follow-up, return precautions, and discharge instructions; they were given the opportunity to ask questions and verbalized understanding  SBIRT 20yo+      Most Recent Value   SBIRT (22 yo +)    In order to provide better care to our patients, we are screening all of our patients for alcohol and drug use  Would it be okay to ask you these screening questions? Yes Filed at: 01/21/2022 1247   Initial Alcohol Screen: US AUDIT-C     1  How often do you have a drink containing alcohol? 0 Filed at: 01/21/2022 1247   2  How many drinks containing alcohol do you have on a typical day you are drinking? 0 Filed at: 01/21/2022 1247   3a  Male UNDER 65: How often do you have five or more drinks on one occasion? 0 Filed at: 01/21/2022 1247   3b  FEMALE Any Age, or MALE 65+: How often do you have 4 or more drinks on one occassion? 0 Filed at: 01/21/2022 1247   Audit-C Score 0 Filed at: 01/21/2022 1247   DUSTIN: How many times in the past year have you    Used an illegal drug or used a prescription medication for non-medical reasons?  Never Filed at: 01/21/2022 1247          MDM    Disposition  Final diagnoses:   Constipation, unspecified constipation type   Pancreatic cyst   Nodule of left lung   Weakness generalized - Patient without change/increase in weakness from baseline today, however requires home health for this reason     Time reflects when diagnosis was documented in both MDM as applicable and the Disposition within this note     Time User Action Codes Description Comment    1/21/2022  4:18 PM Brie Ling Add [K59 00] Constipation, unspecified constipation type     1/21/2022  4:18 PM Brie Ling Add [K86 2] Pancreatic cyst     1/21/2022  4:19 PM Rosalia Collado Add [R91 1] Nodule of left lung     1/21/2022  4:53 PM Rosalia Collado Add [R53 1] Weakness generalized     1/21/2022  5:10 PM Rosalia Collado Modify [R53 1] Weakness generalized Patient without change in weaknessfrom baseline today, however requires home health for this reason    1/21/2022  5:10 PM Rosalia Collado Modify [R53 1] Weakness generalized Patient without change/increase in weakness from baseline today, however requires home health for this reason      ED Disposition     ED Disposition Condition Date/Time Comment    Discharge Stable Fri Jan 21, 2022  4:20 PM Rajeev Oneill discharge to home/self care              Follow-up Information     Follow up With Specialties Details Why Veronica Carter MD Family Medicine Go in 2 days  [address]  [phone]            Discharge Medication List as of 1/21/2022  5:15 PM      START taking these medications    Details   polyethylene glycol (MIRALAX) 17 g packet Take 17 g by mouth daily, Starting Fri 1/21/2022, Normal         CONTINUE these medications which have NOT CHANGED    Details   apixaban (ELIQUIS) 5 mg Take 1 tablet (5 mg total) by mouth 2 (two) times a day, Starting Mon 12/6/2021, Normal      atorvastatin (LIPITOR) 40 mg tablet Take 1 tablet (40 mg total) by mouth every evening, Starting Mon 12/6/2021, Normal      cholecalciferol (VITAMIN D3) 1,000 units tablet Take 1,000 Units by mouth every evening, Historical Med      furosemide (LASIX) 80 mg tablet Take 1 tablet (80 mg total) by mouth daily, Starting Wed 1/19/2022, Normal      metFORMIN (GLUCOPHAGE) 1000 MG tablet Take 0 5 tablets (500 mg total) by mouth 2 (two) times a day with meals, Starting Sun 6/16/2019, Print      metoprolol tartrate (LOPRESSOR) 25 mg tablet TAKE ONE-HALF TABLET BY PEG TUBE EVERY TWELVE HOURS, Normal      spironolactone (ALDACTONE) 25 mg tablet Take 1 tablet (25 mg total) by mouth daily, Starting Wed 1/19/2022, Normal      Acetaminophen (TYLENOL PO) Take by mouth 3 (three) times a day, Historical Med      albuterol (PROVENTIL HFA,VENTOLIN HFA) 90 mcg/act inhaler Starting Fri 11/19/2021, Historical Med      bisacodyl (DULCOLAX) 10 mg suppository Insert 1 suppository (10 mg total) into the rectum daily as needed for constipation, Starting Thu 3/12/2020, No Print      CRANBERRY PO Take 30,000 mg by mouth, Historical Med      cyanocobalamin (VITAMIN B-12) 1,000 mcg tablet Take 1,000 mcg by mouth every evening  , Historical Med      folic acid (FOLVITE) 1 mg tablet Take 1,000 mcg by mouth daily, Starting Fri 6/28/2019, Historical Med      gabapentin (NEURONTIN) 100 mg capsule Starting Mon 10/25/2021, Historical Med      ipratropium (ATROVENT) 0 03 % nasal spray 1-2 sprays each nostril twice a day as needed for rhinorrhea, Normal      Multiple Vitamin (MULTI-VITAMIN DAILY PO) Take by mouth, Historical Med      mupirocin (BACTROBAN) 2 % ointment APPLY A SMALL AMOUNT TO THE AFFECTED AREA TWICE DAILY AS NEEDED, Historical Med      nitrofurantoin (MACRODANTIN) 100 mg capsule Starting Tue 11/16/2021, Historical Med      nystatin (MYCOSTATIN) powder APPLY 1 GM ON THE SKIN THREE TIMES A DAY, Historical Med      Premarin vaginal cream Starting Thu 10/21/2021, Historical Med      primidone (MYSOLINE) 50 mg tablet Take by mouth 2 (two) times a day , Historical Med      saccharomyces boulardii (FLORASTOR) 250 mg capsule Take 250 mg by mouth 2 (two) times a day, Historical Med                 PDMP Review       Value Time User    PDMP Reviewed  Yes 3/12/2020 11:49 AM Debra Lee MD          ED Provider  Electronically Signed by           Ben Barron PA-C  01/22/22 1934
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
## 2022-01-21 NOTE — DISCHARGE INSTRUCTIONS
Take Miralax once daily, as prescribed, for 4 days  Continue drinking Senna tea at home    Follow up with your PCP as soon as possible for follow up evaluation of constipation as well as for follow up for Pancreatic cyst and left lung nodule : Incidentally detected cystic pancreatic lesion in the body of the pancreas, measuring 1 2 x 0 8 cm, suggest further evaluation with the MRI and MRCP on nonemergent basis     "A focal nodular appearing density seen within the left lingular region measuring 9 mm probably to scarring and atelectasis    Follow-up suggested at 3 to 6 months to demonstrate stability"      Return to the emergency department immediately for any chest pain, shortness of breath, increase in leg swelling, dizziness, lightheadedness, abdominal pain, blood from the rectum, and or any other new or worsening complaints    If constipation continues and you are unable to see PCP promptly, return to the ED

## 2022-01-21 NOTE — ED NOTES
While triaging patient, patient reported she does not feel safe in her home with her current home health aid, reports home health aid is very rude and condescending, not giving patient her stool softeners and potentially other medications, concerned that she is not receiving the proper care  Patient reports that since July she has been having issues with this aid but has been unable to have her replaced  Patient appears fearful is caregiver and became very emotional when speaking of situation, crying while talking to this RN  Case management notified of this RNs concern        Krystal Reynoso, TAYLOR  01/21/22 5677

## 2022-01-21 NOTE — CASE MANAGEMENT
Case Management ED Assessment    Patient name Frederick Reese  Location ED 02/ED 02 MRN [id number]  : 1935 Date 2022        OBJECTIVE:  Predictive Model Details         20% Factor Value    Risk of Hospital Admission or ED Visit Model Number of ED Visits 5+     Is in Relationship Yes     Has Atrial Fibrillation Yes     Has CVD Yes     Has Diabetes Yes     Has CHF Yes     Has PCP Yes            Chief Complaint: Weakness   Patient Class: Emergency  Preferred Pharmacy:   [facility] [address]  [address]  [place] [facility] [address]  Phone: [phone] Fax: [phone]    UNKNOWN - FOLLOW UP PRIOR TO DISCHARGE TO E-PRESCRIBE  No address on file      Primary Care Provider: Arash Laguerre MD    Primary Insurance: Diana Bee [facility]  Secondary Insurance:     ED ASSESSMENT:  [address] [facility], [address] [facility] Representative - Daughter   Primary Phone: [phone] (Home)  Mobile Phone: [phone]                         Outpatient Care Information  Have you seen a doctor in the last year?: Yes  Specify which physician group(s) you have seen in the past year : Other  Specialist(s) seen outside of [facility] or [facility]: PCP         Patient Information  Admitted from[de-identified] Home  Mental Status: Alert  During Assessment patient was accompanied by: Not accompanied during assessment  Assessment information provided by[de-identified] Patient,Daughter  Primary Caregiver: Private caregiver  Caregiver's Name[de-identified] Waiver services  Caregiver's Relationship to Patient[de-identified] Other (Specify)  Caregiver's Telephone Number[de-identified] Pt has home health aids, her daughter Ivory Walters [phone], cell [phone] who works for 12 hours per day and another  Pt also has another home health named Gillian Bermeo who is there for 12 hours at night  Support Systems: Daughter  [place] of Residence: [address] do you live in?: [employer]  -  [facility] entry access options   Select all that apply : No steps to enter home  Type of Current Residence: [place]  In the last 12 months, was there a time when you were not able to pay the mortgage or rent on time?: No  In the last 12 months, how many places have you lived?: 1  Living Arrangements: Lives Alone    Patient Information Continued  Income Source: Pension/FPC

## 2022-01-21 NOTE — ED NOTES
Pt assisted in getting dress  While dressing patient it was discovered patient had small bowel movement  Pt was cleaned, dressed, and transferred to wheelchair with assistance from 4 individuals        Ijeoma Gilmore RN  01/21/22 9099

## 2022-01-26 ENCOUNTER — LAB (OUTPATIENT)
Dept: LAB | Facility: CLINIC | Age: 87
End: 2022-01-26
Payer: COMMERCIAL

## 2022-01-26 DIAGNOSIS — I10 ESSENTIAL HYPERTENSION, MALIGNANT: ICD-10-CM

## 2022-01-26 DIAGNOSIS — E78.00 PURE HYPERCHOLESTEROLEMIA: ICD-10-CM

## 2022-01-26 DIAGNOSIS — E03.9 MYXEDEMA HEART DISEASE: ICD-10-CM

## 2022-01-26 DIAGNOSIS — I51.9 MYXEDEMA HEART DISEASE: ICD-10-CM

## 2022-01-26 DIAGNOSIS — N39.0 URINARY TRACT INFECTION WITHOUT HEMATURIA, SITE UNSPECIFIED: ICD-10-CM

## 2022-01-26 LAB
ALBUMIN SERPL BCP-MCNC: 3.1 G/DL (ref 3.5–5)
ALP SERPL-CCNC: 200 U/L (ref 46–116)
ALT SERPL W P-5'-P-CCNC: 33 U/L (ref 12–78)
ANION GAP SERPL CALCULATED.3IONS-SCNC: 3 MMOL/L (ref 4–13)
AST SERPL W P-5'-P-CCNC: 22 U/L (ref 5–45)
BASOPHILS # BLD AUTO: 0.07 THOUSANDS/ΜL (ref 0–0.1)
BASOPHILS NFR BLD AUTO: 1 % (ref 0–1)
BILIRUB SERPL-MCNC: 0.49 MG/DL (ref 0.2–1)
BUN SERPL-MCNC: 15 MG/DL (ref 5–25)
CALCIUM ALBUM COR SERPL-MCNC: 10.3 MG/DL (ref 8.3–10.1)
CALCIUM SERPL-MCNC: 9.6 MG/DL (ref 8.3–10.1)
CHLORIDE SERPL-SCNC: 105 MMOL/L (ref 100–108)
CHOLEST SERPL-MCNC: 105 MG/DL
CO2 SERPL-SCNC: 28 MMOL/L (ref 21–32)
CREAT SERPL-MCNC: 0.74 MG/DL (ref 0.6–1.3)
EOSINOPHIL # BLD AUTO: 0.18 THOUSAND/ΜL (ref 0–0.61)
EOSINOPHIL NFR BLD AUTO: 3 % (ref 0–6)
ERYTHROCYTE [DISTWIDTH] IN BLOOD BY AUTOMATED COUNT: 14 % (ref 11.6–15.1)
GFR SERPL CREATININE-BSD FRML MDRD: 73 ML/MIN/1.73SQ M
GLUCOSE P FAST SERPL-MCNC: 128 MG/DL (ref 65–99)
HCT VFR BLD AUTO: 36.4 % (ref 34.8–46.1)
HDLC SERPL-MCNC: 43 MG/DL
HGB BLD-MCNC: 11.8 G/DL (ref 11.5–15.4)
IMM GRANULOCYTES # BLD AUTO: 0.02 THOUSAND/UL (ref 0–0.2)
IMM GRANULOCYTES NFR BLD AUTO: 0 % (ref 0–2)
LDLC SERPL CALC-MCNC: 33 MG/DL (ref 0–100)
LYMPHOCYTES # BLD AUTO: 0.87 THOUSANDS/ΜL (ref 0.6–4.47)
LYMPHOCYTES NFR BLD AUTO: 14 % (ref 14–44)
MCH RBC QN AUTO: 30.9 PG (ref 26.8–34.3)
MCHC RBC AUTO-ENTMCNC: 32.4 G/DL (ref 31.4–37.4)
MCV RBC AUTO: 95 FL (ref 82–98)
MONOCYTES # BLD AUTO: 0.5 THOUSAND/ΜL (ref 0.17–1.22)
MONOCYTES NFR BLD AUTO: 8 % (ref 4–12)
NEUTROPHILS # BLD AUTO: 4.43 THOUSANDS/ΜL (ref 1.85–7.62)
NEUTS SEG NFR BLD AUTO: 74 % (ref 43–75)
NRBC BLD AUTO-RTO: 0 /100 WBCS
PLATELET # BLD AUTO: 155 THOUSANDS/UL (ref 149–390)
PMV BLD AUTO: 10.1 FL (ref 8.9–12.7)
POTASSIUM SERPL-SCNC: 4.8 MMOL/L (ref 3.5–5.3)
PROT SERPL-MCNC: 8.1 G/DL (ref 6.4–8.2)
RBC # BLD AUTO: 3.82 MILLION/UL (ref 3.81–5.12)
SODIUM SERPL-SCNC: 136 MMOL/L (ref 136–145)
T4 FREE SERPL-MCNC: 0.95 NG/DL (ref 0.76–1.46)
TRIGL SERPL-MCNC: 145 MG/DL
TSH SERPL DL<=0.05 MIU/L-ACNC: 0.99 UIU/ML (ref 0.36–3.74)
WBC # BLD AUTO: 6.07 THOUSAND/UL (ref 4.31–10.16)

## 2022-01-26 PROCEDURE — 84443 ASSAY THYROID STIM HORMONE: CPT

## 2022-01-26 PROCEDURE — 87077 CULTURE AEROBIC IDENTIFY: CPT

## 2022-01-26 PROCEDURE — 85025 COMPLETE CBC W/AUTO DIFF WBC: CPT

## 2022-01-26 PROCEDURE — 87186 SC STD MICRODIL/AGAR DIL: CPT

## 2022-01-26 PROCEDURE — 87086 URINE CULTURE/COLONY COUNT: CPT

## 2022-01-26 PROCEDURE — 80053 COMPREHEN METABOLIC PANEL: CPT

## 2022-01-26 PROCEDURE — 84439 ASSAY OF FREE THYROXINE: CPT

## 2022-01-26 PROCEDURE — 80061 LIPID PANEL: CPT

## 2022-01-26 PROCEDURE — 36415 COLL VENOUS BLD VENIPUNCTURE: CPT

## 2022-01-28 LAB
BACTERIA UR CULT: ABNORMAL
BACTERIA UR CULT: ABNORMAL

## 2022-01-31 ENCOUNTER — TELEPHONE (OUTPATIENT)
Dept: CARDIOLOGY CLINIC | Facility: CLINIC | Age: 87
End: 2022-01-31

## 2022-01-31 NOTE — RESULT ENCOUNTER NOTE
Call patient and tell her that her cholesterol profile is absolutely excellent  She should keep up the good work

## 2022-01-31 NOTE — TELEPHONE ENCOUNTER
----- Message from Thai Brannon MD sent at 1/31/2022  2:23 PM EST -----   Call patient and tell her that her cholesterol profile is absolutely excellent  She should keep up the good work

## 2022-02-02 DIAGNOSIS — R53.1 WEAKNESS: ICD-10-CM

## 2022-02-22 DIAGNOSIS — I50.32 CHRONIC DIASTOLIC CONGESTIVE HEART FAILURE (HCC): ICD-10-CM

## 2022-02-23 RX ORDER — SPIRONOLACTONE 25 MG/1
25 TABLET ORAL DAILY
Qty: 30 TABLET | Refills: 5 | Status: SHIPPED | OUTPATIENT
Start: 2022-02-23 | End: 2022-07-21 | Stop reason: SDUPTHER

## 2022-02-28 ENCOUNTER — TELEPHONE (OUTPATIENT)
Dept: CARDIOLOGY CLINIC | Facility: CLINIC | Age: 87
End: 2022-02-28

## 2022-02-28 NOTE — TELEPHONE ENCOUNTER
Daughter is calling stating picked up spirolactone last week unable to locate bottle   told her there is refills on bottle but will need to discuss with pharmacy medications may need to be paid out of pocket since only picked up 5 days ago   she will contact pharmacy   cannot explain where the bottle went

## 2022-04-26 ENCOUNTER — TELEPHONE (OUTPATIENT)
Dept: NEUROLOGY | Facility: CLINIC | Age: 87
End: 2022-04-26

## 2022-07-21 DIAGNOSIS — R53.1 WEAKNESS: ICD-10-CM

## 2022-07-21 DIAGNOSIS — I50.32 CHRONIC DIASTOLIC CONGESTIVE HEART FAILURE (HCC): ICD-10-CM

## 2022-07-21 RX ORDER — SPIRONOLACTONE 25 MG/1
25 TABLET ORAL DAILY
Qty: 30 TABLET | Refills: 5 | Status: SHIPPED | OUTPATIENT
Start: 2022-07-21

## 2022-11-14 ENCOUNTER — HOSPITAL ENCOUNTER (EMERGENCY)
Facility: HOSPITAL | Age: 87
Discharge: HOME | End: 2022-11-14
Attending: EMERGENCY MEDICINE

## 2022-11-14 VITALS
HEART RATE: 69 BPM | BODY MASS INDEX: 37.25 KG/M2 | RESPIRATION RATE: 18 BRPM | WEIGHT: 223.55 LBS | HEIGHT: 65 IN | OXYGEN SATURATION: 97 % | SYSTOLIC BLOOD PRESSURE: 176 MMHG | TEMPERATURE: 97.6 F | DIASTOLIC BLOOD PRESSURE: 79 MMHG

## 2022-11-14 DIAGNOSIS — E11.9 DIABETES (HCC): ICD-10-CM

## 2022-11-14 DIAGNOSIS — Z51.5 HOSPICE CARE PATIENT: ICD-10-CM

## 2022-11-14 DIAGNOSIS — I48.91 ATRIAL FIBRILLATION (HCC): ICD-10-CM

## 2022-11-14 DIAGNOSIS — Z86.73 H/O: STROKE: ICD-10-CM

## 2022-11-14 DIAGNOSIS — R53.1 WEAKNESS: Primary | ICD-10-CM

## 2022-11-14 RX ORDER — SPIRONOLACTONE 25 MG/1
25 TABLET ORAL DAILY
Status: DISCONTINUED | OUTPATIENT
Start: 2022-11-14 | End: 2022-11-14 | Stop reason: HOSPADM

## 2022-11-14 RX ADMIN — SPIRONOLACTONE 25 MG: 25 TABLET ORAL at 14:28

## 2022-11-14 RX ADMIN — METOPROLOL TARTRATE 12.5 MG: 25 TABLET, FILM COATED ORAL at 14:27

## 2022-11-14 RX ADMIN — METFORMIN HYDROCHLORIDE 500 MG: 500 TABLET ORAL at 14:27

## 2022-11-14 RX ADMIN — APIXABAN 5 MG: 5 TABLET, FILM COATED ORAL at 14:27

## 2022-11-14 NOTE — ED PROVIDER NOTES
History  Chief Complaint   Patient presents with   • Weakness - Generalized     Pt reports to er via ems from home (on hospice, [facility]) - states she thinks she had a mini stroke this am because she felt really weak this am and reports she had a hard time talking  Report from emt - (daughter living with and caring for patient, patient refused meds today, daughter and patient have been fighting  lately, pt called hospice nurse who told her to come to hospital)      66-year-old female presents via EMS from home for the evaluation of weakness  The patient is currently on home hospice with a valid POLST demonstrating that she is comfort measures only with do not hospitalized  The patient states that she has been arguing with her daughter recently  Patient states that she does not want to be in the hospital   Apparently, the home hospice nurse called EMS today          Prior to Admission Medications   Prescriptions Last Dose Informant Patient Reported? Taking?    Acetaminophen (TYLENOL PO)  Self Yes No   Sig: Take by mouth 3 (three) times a day   CRANBERRY PO  Self Yes No   Sig: Take 30,000 mg by mouth   Multiple Vitamin (MULTI-VITAMIN DAILY PO)  Self Yes No   Sig: Take by mouth   Premarin vaginal cream  Self Yes No   albuterol (PROVENTIL HFA,VENTOLIN HFA) 90 mcg/act inhaler  Self Yes No   apixaban (ELIQUIS) 5 mg  Self No No   Sig: Take 1 tablet (5 mg total) by mouth 2 (two) times a day   atorvastatin (LIPITOR) 40 mg tablet  Self No No   Sig: Take 1 tablet (40 mg total) by mouth every evening   bisacodyl (DULCOLAX) 10 mg suppository  Self No No   Sig: Insert 1 suppository (10 mg total) into the rectum daily as needed for constipation   Patient not taking: Reported on 7/16/2021   cholecalciferol (VITAMIN D3) 1,000 units tablet  Self Yes No   Sig: Take 1,000 Units by mouth every evening   cyanocobalamin (VITAMIN B-12) 1,000 mcg tablet  Self Yes No   Sig: Take 1,000 mcg by mouth every evening     folic acid (FOLVITE) 1 mg tablet  Self Yes No   Sig: Take 1,000 mcg by mouth daily   furosemide (LASIX) 80 mg tablet   No No   Sig: Take 1 tablet (80 mg total) by mouth daily   gabapentin (NEURONTIN) 100 mg capsule  Self Yes No   ipratropium (ATROVENT) 0 03 % nasal spray  Self No No   Si-2 sprays each nostril twice a day as needed for rhinorrhea   metFORMIN (GLUCOPHAGE) 1000 MG tablet  Self No No   Sig: Take 0 5 tablets (500 mg total) by mouth 2 (two) times a day with meals   Patient taking differently: Take 500 mg by mouth daily with breakfast    metoprolol tartrate (LOPRESSOR) 25 mg tablet   No No   Sig: Take 0 5 tablets (12 5 mg total) by mouth every 12 (twelve) hours   mupirocin (BACTROBAN) 2 % ointment  Self Yes No   Sig: APPLY A SMALL AMOUNT TO THE AFFECTED AREA TWICE DAILY AS NEEDED   nitrofurantoin (MACRODANTIN) 100 mg capsule  Self Yes No   nystatin (MYCOSTATIN) powder  Self Yes No   Sig: APPLY 1 GM ON THE SKIN THREE TIMES A DAY   polyethylene glycol (MIRALAX) 17 g packet   No No   Sig: Take 17 g by mouth daily   primidone (MYSOLINE) 50 mg tablet  Self Yes No   Sig: Take by mouth 2 (two) times a day    saccharomyces boulardii (FLORASTOR) 250 mg capsule  Self Yes No   Sig: Take 250 mg by mouth 2 (two) times a day   Patient not taking: Reported on 2021   spironolactone (ALDACTONE) 25 mg tablet   No No   Sig: Take 1 tablet (25 mg total) by mouth daily      Facility-Administered Medications: None       Past Medical History:   Diagnosis Date   • A-fib ([facility])    • Arthritis    • Assistance needed for mobility     pt can stand with assistance and transfer   • Chronic pain disorder    • Diabetes mellitus (HCC)     NIDDM   • Full dentures    • History of transfusion     no adverse reaction   • Hyperlipidemia    • Irregular heart beat    • Numbness and tingling of both feet    • Skin abnormality     sacrum area - small red area, less than a dime size   • Spinal stenosis    • Stroke [facility])    • Unable to ambulate    • Urinary incontinence     ipg stimulator x3 repakcements,battery exchange today 2/14/2018   • Wears glasses    • Wheelchair dependence        Past Surgical History:   Procedure Laterality Date   • BACK SURGERY      fusion   • BLADDER SUSPENSION     • CARPAL TUNNEL RELEASE Bilateral    • CHOLANGIOGRAM N/A 6/4/2018    Procedure: CHOLANGIOGRAM;  Surgeon: Mikki Haney MD;  [facility];  Service: General   • CHOLECYSTECTOMY LAPAROSCOPIC N/A 6/4/2018    Procedure: CHOLECYSTECTOMY LAPAROSCOPIC;  Surgeon: Mikki Haney MD;  [facility];  Service: General   • COLONOSCOPY     • DILATION AND CURETTAGE OF UTERUS     • FRACTURE SURGERY Left     femur with hardware   • HYSTERECTOMY     • INSERTION / Dea Sharp / REVISION NEUROSTIMULATOR     • JOINT REPLACEMENT Bilateral     knees   • SACRAL NERVE STIMULATOR PLACEMENT N/A 2/14/2018    Procedure: REMOVE AND REPLACE IPG;  Surgeon: Hilda Leroy MD;  [facility];  Service: UroGynecology          • TONSILLECTOMY         Family History   Problem Relation Age of Onset   • No Known Problems Mother    • No Known Problems Father      I have reviewed and agree with the history as documented  E-Cigarette/Vaping   • E-Cigarette Use Never User      E-Cigarette/Vaping Substances     Social History     Tobacco Use   • Smoking status: Never Smoker   • Smokeless tobacco: Never Used   Vaping Use   • Vaping Use: Never used   Substance Use Topics   • Alcohol use: Not Currently     Comment: very rare   • Drug use: No       Review of Systems   Neurological: Positive for tremors and weakness  Psychiatric/Behavioral: Positive for behavioral problems  All other systems reviewed and are negative  Physical Exam  Physical Exam  Vitals and nursing note reviewed  Constitutional:       General: She is not in acute distress  Appearance: She is well-developed  HENT:      Head: Normocephalic and atraumatic        Right Ear: External ear normal       Left Ear: External ear normal    Eyes:      General: No scleral icterus  Conjunctiva/sclera: Conjunctivae normal       Pupils: Pupils are equal, round, and reactive to light  Cardiovascular:      Rate and Rhythm: Normal rate and regular rhythm  Heart sounds: Normal heart sounds  Pulmonary:      Effort: Pulmonary effort is normal  No respiratory distress  Breath sounds: Normal breath sounds  Abdominal:      General: Bowel sounds are normal       Palpations: Abdomen is soft  Tenderness: There is no abdominal tenderness  There is no guarding or rebound  Musculoskeletal:         General: Normal range of motion  Cervical back: Normal range of motion  Skin:     General: Skin is warm and dry  Findings: No rash  Neurological:      Mental Status: She is alert and oriented to person, place, and time  Motor: Tremor ( resting) present           Vital Signs  ED Triage Vitals [11/14/22 1249]   Temperature Pulse Respirations Blood Pressure SpO2   97 6 °F (36 4 °C) 69 18 (!) 176/79 97 %      Temp Source Heart Rate Source Patient Position - Orthostatic VS BP Location FiO2 (%)   Temporal Monitor Lying Right arm --      Pain Score       --           Vitals:    11/14/22 1249   BP: (!) 176/79   Pulse: 69   Patient Position - Orthostatic VS: Lying         Visual Acuity      ED Medications  Medications   spironolactone (ALDACTONE) tablet 25 mg (25 mg Oral Given 11/14/22 1428)   apixaban (ELIQUIS) tablet 5 mg (5 mg Oral Given 11/14/22 1427)   metFORMIN (GLUCOPHAGE) tablet 500 mg (500 mg Oral Given 11/14/22 1427)   metoprolol tartrate (LOPRESSOR) tablet 12 5 mg (12 5 mg Oral Given 11/14/22 1427)       Diagnostic Studies  Results Reviewed     None                 No orders to display              Procedures  Procedures         ED Course  ED Course as of 11/14/22 1737   Mon Nov 14, 2022   1310 DIscussed the prehospital events with daughter en who states patient was weak and not answering questions this AM  Then improved but refused pills, asking for her dog  She called hospice who advised ED eval  Daughter then called 911  Daughter states she needs help taking care of her mother, there are no home health aides to assist with home care   Micah [phone]  Awaiting callback from hospice nurse   [phone] Discussed the case Verito Balderas RN CM from [facility] who agrees that the patient should return home as patient and daughter both want patient at home  Daughter is on waiver to be care provider, CM to work on additional resources  Patient and daughter have been resistent to selling house to finance SNF stay  MDM  Number of Diagnoses or Management Options  Atrial fibrillation ([facility] [address] )  Diabetes ([facility] )  H/O: stroke  Hospice care patient  Weakness  Diagnosis management comments: I discussed the case the patient's daughter as well as the case management/nurse from home hospice  The patient does not want anything done, does not want to recent hospice  The patient wants to return home and therefore the patient will be discharged home  [facility] hospice will follow up with home resources for the patient and daughter        Disposition  Final diagnoses:   Weakness   Hospice care patient   Diabetes [facility])   H/O: stroke   Atrial fibrillation ([facility] )     Time reflects when diagnosis was documented in both MDM as applicable and the Disposition within this note     Time User Action Codes Description Comment    11/14/2022  1:37 PM Leland Pecos Add [R53 1] Weakness     11/14/2022  1:37 PM Leland Pecos Add [Z51 5] Hospice care patient     11/14/2022  1:39 PM Lanre Pecos Add [E11 9] [facility]     11/14/2022  1:39 PM Leland Pecos Add [Z86 73] H/O: stroke     11/14/2022  1:39 PM Lanre Pecos Add [I48 91] Atrial fibrillation [facility])       ED Disposition     ED Disposition   Discharge    Condition   Stable    Date/Time   Mon Nov 14, 2022  1:37 PM Comment   Kristin Flynnenix discharge to home/self care                 Follow-up Information     Follow up With Specialties Details Why [address] Yael Bailey MD [facility] Schedule an appointment as soon as possible for a visit  For further evaluation [address]  [address]   [phone]            Discharge Medication List as of 11/14/2022  1:51 PM      CONTINUE these medications which have NOT CHANGED    Details   Acetaminophen (TYLENOL PO) Take by mouth 3 (three) times a day, Historical Med      albuterol (PROVENTIL HFA,VENTOLIN HFA) 90 mcg/act inhaler Starting Fri 11/19/2021, Historical Med      apixaban (ELIQUIS) 5 mg Take 1 tablet (5 mg total) by mouth 2 (two) times a day, Starting Mon 12/6/2021, Normal      atorvastatin (LIPITOR) 40 mg tablet Take 1 tablet (40 mg total) by mouth every evening, Starting Mon 12/6/2021, Normal      bisacodyl (DULCOLAX) 10 mg suppository Insert 1 suppository (10 mg total) into the rectum daily as needed for constipation, Starting Thu 3/12/2020, No Print      cholecalciferol (VITAMIN D3) 1,000 units tablet Take 1,000 Units by mouth every evening, Historical Med      CRANBERRY PO Take 30,000 mg by mouth, Historical Med      cyanocobalamin (VITAMIN B-12) 1,000 mcg tablet Take 1,000 mcg by mouth every evening  , Historical Med      folic acid (FOLVITE) 1 mg tablet Take 1,000 mcg by mouth daily, Starting Fri 6/28/2019, Historical Med      furosemide (LASIX) 80 mg tablet Take 1 tablet (80 mg total) by mouth daily, Starting Wed 1/19/2022, Normal      gabapentin (NEURONTIN) 100 mg capsule Starting Mon 10/25/2021, Historical Med      ipratropium (ATROVENT) 0 03 % nasal spray 1-2 sprays each nostril twice a day as needed for rhinorrhea, Normal      metFORMIN (GLUCOPHAGE) 1000 MG tablet Take 0 5 tablets (500 mg total) by mouth 2 (two) times a day with meals, Starting Sun 6/16/2019, Print      metoprolol tartrate (LOPRESSOR) 25 mg tablet Take 0 5 tablets (12 5 mg total) by mouth every 12 (twelve) hours, Starting Thu 7/21/2022, Normal      Multiple Vitamin (MULTI-VITAMIN DAILY PO) Take by mouth, Historical Med      mupirocin (BACTROBAN) 2 % ointment APPLY A SMALL AMOUNT TO THE AFFECTED AREA TWICE DAILY AS NEEDED, Historical Med      nitrofurantoin (MACRODANTIN) 100 mg capsule Starting Tue 11/16/2021, Historical Med      nystatin (MYCOSTATIN) powder APPLY 1 GM ON THE SKIN THREE TIMES A DAY, Historical Med      polyethylene glycol (MIRALAX) 17 g packet Take 17 g by mouth daily, Starting Fri 1/21/2022, Normal      Premarin vaginal cream Starting Thu 10/21/2021, Historical Med      primidone (MYSOLINE) 50 mg tablet Take by mouth 2 (two) times a day , Historical Med      saccharomyces boulardii (FLORASTOR) 250 mg capsule Take 250 mg by mouth 2 (two) times a day, Historical Med      spironolactone (ALDACTONE) 25 mg tablet Take 1 tablet (25 mg total) by mouth daily, Starting Thu 7/21/2022, Normal             No discharge procedures on file      PDMP Review       Value Time User    PDMP Reviewed  Yes 3/12/2020 11:49 AM Salina Bamberger, MD          ED Provider  Electronically Signed by           Benjamin Raphael DO  11/14/22 4728

## 2022-11-23 ENCOUNTER — NURSING HOME VISIT (OUTPATIENT)
Dept: GERIATRICS | Facility: OTHER | Age: 87
End: 2022-11-23

## 2022-11-23 DIAGNOSIS — Z86.73 HISTORY OF STROKE: ICD-10-CM

## 2022-11-23 DIAGNOSIS — Z51.5 HOSPICE CARE PATIENT: ICD-10-CM

## 2022-11-23 DIAGNOSIS — I10 HYPERTENSION, UNSPECIFIED TYPE: ICD-10-CM

## 2022-11-23 DIAGNOSIS — R54 FRAILTY SYNDROME IN GERIATRIC PATIENT: ICD-10-CM

## 2022-11-23 DIAGNOSIS — Z66 DNR (DO NOT RESUSCITATE): ICD-10-CM

## 2022-11-23 DIAGNOSIS — I50.9 CHRONIC CONGESTIVE HEART FAILURE, UNSPECIFIED HEART FAILURE TYPE (HCC): ICD-10-CM

## 2022-11-23 DIAGNOSIS — I48.0 PAROXYSMAL ATRIAL FIBRILLATION (HCC): Primary | Chronic | ICD-10-CM

## 2022-11-23 DIAGNOSIS — E11.40 TYPE 2 DIABETES MELLITUS WITH DIABETIC NEUROPATHY, WITHOUT LONG-TERM CURRENT USE OF INSULIN (HCC): Chronic | ICD-10-CM

## 2022-11-23 DIAGNOSIS — I69.354 HEMIPARESIS AFFECTING LEFT SIDE AS LATE EFFECT OF CEREBROVASCULAR ACCIDENT (CVA) (HCC): ICD-10-CM

## 2022-11-23 NOTE — PROGRESS NOTES
Johnnie [address]  [address] [place] [address]  Facility:  [facility]  32  Hospice Care    HISTORY AND PHYSICAL    NAME: Mona Harris  AGE: 80 y o  SEX: female    DATE OF ENCOUNTER: 11/23/2022    Code status:  DNR    Assessment and Plan     1  Paroxysmal atrial fibrillation (HCC)  Assessment & Plan:  · Will continue her prior to admission apixaban and metoprolol tartrate  · Will continue with monitoring for change in her condition  · She will follow-up with her PCP upon discharge      2  Chronic congestive heart failure, unspecified heart failure type [facility])  Assessment & Plan:  · Will continue her prior to admission furosemide  · Will continue with monitoring for change in her condition  · She will follow-up with her PCP and hospice services upon discharge            3  Frailty syndrome in geriatric patient  Assessment & Plan:  · Secondary to her chronic medical conditions  · Will continue with care/support of her ADLs while at the nursing facility  · Will continue with monitoring for change in her condition  · She will follow-up with her PCP and hospice services upon discharge      4  Hemiparesis affecting left side as late effect of cerebrovascular accident (CVA) [facility])  Assessment & Plan:  · Will continue her prior to admission apixaban and atorvastatin for secondary prevention  · Will continue with care/support of her ADLs while a resident at the nursing facility  · Will continue with monitoring for change in her condition  · She will follow-up with her PCP upon discharge      5  Type 2 diabetes mellitus with diabetic neuropathy, without long-term current use of insulin ([facility])  Assessment & Plan:  · Will continue her prior to admission metformin   · Will continue with monitoring of her fasting fingerstick blood sugar during her respite care admission   · She will follow-up with her PCP upon discharge      6   History of stroke  Assessment & Plan:  · Will continue her prior to admission apixaban and atorvastatin for secondary prevention  · She will follow-up with her PCP upon discharge      7  Hypertension, unspecified type  Assessment & Plan:  · Will continue her prior to admission metoprolol tartrate  · Will continue with monitoring for change in her condition   · She will follow-up with her PCP upon discharge      8  Hospice care patient  Assessment & Plan:  · Will continue her care in collaboration with the hospice care service      9  DNR (do not resuscitate)      All medications and routine orders were reviewed and updated as needed  Plan discussed with:  Patient and nursing staff  Chief Complaint     She is seen for visit to perform a history and physical exam to be admitted to the nursing home for respite care  History of Present Illness     She is an 24-year-old woman with the comorbidities of paroxysmal atrial fibrillation, chronic heart failure, frailty secondary to her chronic medical conditions, type 2 diabetes mellitus with neuropathy, history of CVA with left-sided hemiparesis, hypertension, and hyperlipidemia who is seen for a visit to perform a history and physical exam to be admitted to the nursing home for respite care      HISTORY:  Past Medical History:   Diagnosis Date   • A-fib [facility])    • Arthritis    • Assistance needed for mobility     pt can stand with assistance and transfer   • Chronic pain disorder    • Diabetes mellitus ([facility] )     NIDDM   • Full dentures    • History of transfusion     no adverse reaction   • Hyperlipidemia    • Irregular heart beat    • Numbness and tingling of both feet    • Skin abnormality     sacrum area - small red area, less than a dime size   • Spinal stenosis    • Stroke [facility])    • Unable to ambulate    • Urinary incontinence     ipg stimulator x3 repakcements,battery exchange today 2/14/2018   • Wears glasses    • Wheelchair dependence      Past Surgical History:   Procedure Laterality Date   • BACK SURGERY fusion   • BLADDER SUSPENSION     • CARPAL TUNNEL RELEASE Bilateral    • CHOLANGIOGRAM N/A 6/4/2018    Procedure: CHOLANGIOGRAM;  Surgeon: Heather Youngbolod MD;  [facility];  Service: General   • CHOLECYSTECTOMY LAPAROSCOPIC N/A 6/4/2018    Procedure: CHOLECYSTECTOMY LAPAROSCOPIC;  Surgeon: Heather Youngblood MD;  [facility];  Service: General   • COLONOSCOPY     • DILATION AND CURETTAGE OF UTERUS     • FRACTURE SURGERY Left     femur with hardware   • HYSTERECTOMY     • INSERTION / Duanne Neumann / REVISION NEUROSTIMULATOR     • JOINT REPLACEMENT Bilateral     knees   • SACRAL NERVE STIMULATOR PLACEMENT N/A 2/14/2018    Procedure: REMOVE AND REPLACE IPG;  Surgeon: Sulma Mejias MD;  [facility];  Service: UroGynecology          • TONSILLECTOMY       Family History   Problem Relation Age of Onset   • No Known Problems Mother    • No Known Problems Father      Social History     Socioeconomic History   • Marital status: /Civil Union     Spouse name: Not on file   • Number of children: Not on file   • Years of education: Not on file   • Highest education level: Not on file   Occupational History   • Not on file   Tobacco Use   • Smoking status: Never   • Smokeless tobacco: Never   Vaping Use   • Vaping Use: Never used   Substance and Sexual Activity   • Alcohol use: Not Currently     Comment: very rare   • Drug use: No   • Sexual activity: Not on file   Other Topics Concern   • Not on file   Social History Narrative   • Not on file     Social Determinants of Health     Financial Resource Strain: Not on file   Food Insecurity: Not on file   Transportation Needs: Not on file   Physical Activity: Not on file   Stress: Not on file   Social Connections: Not on file   Intimate Partner Violence: Not on file   Housing Stability: Unknown   • Unable to Pay for Housing in the Last Year: No   • Number of Places Lived in the Last Year: 1   • Unstable Housing in the Last Year: Not on file Allergies: Allergies   Allergen Reactions   • Sulfamethoxazole-Trimethoprim Swelling     Swelling around eyes and red eyes   • Asa [Aspirin] Other (See Comments)     Unknown     • Aspirin Other (See Comments)     "feels like fist in my chest"   • Ciprofloxacin    • Nitrofurantoin    • Other      "5mz/Tmp "  "1 60 mTab amme"     Per pt's allergy list        Review of Systems     Review of Systems   HENT: Negative for hearing loss  Eyes: Negative for visual disturbance  Respiratory: Negative for shortness of breath  Cardiovascular: Negative for chest pain  Neurological: Negative for headaches  Psychiatric/Behavioral: Negative for sleep disturbance  Medications and orders     All medications reviewed and updated in Nursing Home EMR  Objective     Vitals:  Weight 135 lb, temperature 97 8° F, pulse 75, blood pressure 151/77, fasting fingerstick blood sugar 224, pulse oximetry on room air 96%  Physical Exam  Vitals reviewed  Constitutional:       General: She is awake  She is not in acute distress  Appearance: She is well-developed  She is not toxic-appearing or diaphoretic  Comments: She appears comfortable lying in bed, her stated age, and frail  HENT:      Head: Normocephalic  Nose: Nose normal       Mouth/Throat:      Mouth: Mucous membranes are moist    Eyes:      General: No scleral icterus  Conjunctiva/sclera: Conjunctivae normal    Cardiovascular:      Rate and Rhythm: Normal rate and regular rhythm  Heart sounds: Normal heart sounds  No murmur heard  No friction rub  No gallop  Comments: There is mild swelling in her lower legs, bilaterally  Pulmonary:      Effort: Pulmonary effort is normal  No respiratory distress  Breath sounds: Normal breath sounds  No stridor  No wheezing, rhonchi or rales  Comments: Pulmonary exam performed anteriorly  Neurological:      Mental Status: She is alert        Comments: Left-sided hemiparesis Psychiatric:         Mood and Affect: Mood normal          Behavior: Behavior normal  Behavior is cooperative  - Her order summary was reviewed and signed  Portions of the record may have been created with voice recognition software  Occasional wrong word or "sound a like" substitutions may have occurred due to the inherent limitations of voice recognition software  Read the chart carefully and recognize, using context, where substitutions have occurred      WALE Romero   11/25/2022 11:21 AM

## 2022-11-25 PROBLEM — I50.9 CHF (CONGESTIVE HEART FAILURE) (HCC): Status: ACTIVE | Noted: 2022-11-25

## 2022-11-25 PROBLEM — R54 FRAILTY SYNDROME IN GERIATRIC PATIENT: Status: ACTIVE | Noted: 2022-11-25

## 2022-11-25 PROBLEM — I69.354 HEMIPARESIS AFFECTING LEFT SIDE AS LATE EFFECT OF CEREBROVASCULAR ACCIDENT (CVA) (HCC): Status: ACTIVE | Noted: 2022-11-25

## 2022-11-25 PROBLEM — Z66 DNR (DO NOT RESUSCITATE): Status: ACTIVE | Noted: 2022-11-25

## 2022-11-25 PROBLEM — Z51.5 HOSPICE CARE PATIENT: Status: ACTIVE | Noted: 2022-11-25

## 2022-11-25 RX ORDER — FUROSEMIDE 40 MG/1
40 TABLET ORAL DAILY
COMMUNITY

## 2022-11-25 NOTE — ASSESSMENT & PLAN NOTE
· Will continue her prior to admission metformin   · Will continue with monitoring of her fasting fingerstick blood sugar during her respite care admission   · She will follow-up with her PCP upon discharge

## 2022-11-25 NOTE — ASSESSMENT & PLAN NOTE
· Will continue her prior to admission apixaban and metoprolol tartrate  · Will continue with monitoring for change in her condition  · She will follow-up with her PCP upon discharge

## 2022-11-25 NOTE — ASSESSMENT & PLAN NOTE
· Will continue her prior to admission apixaban and atorvastatin for secondary prevention  · She will follow-up with her PCP upon discharge

## 2022-11-25 NOTE — ASSESSMENT & PLAN NOTE
· Will continue her prior to admission metoprolol tartrate  · Will continue with monitoring for change in her condition   · She will follow-up with her PCP upon discharge

## 2022-11-25 NOTE — ASSESSMENT & PLAN NOTE
· Will continue her prior to admission furosemide  · Will continue with monitoring for change in her condition  · She will follow-up with her PCP and hospice services upon discharge

## 2022-11-25 NOTE — ASSESSMENT & PLAN NOTE
· Secondary to her chronic medical conditions  · Will continue with care/support of her ADLs while at the nursing facility  · Will continue with monitoring for change in her condition  · She will follow-up with her PCP and hospice services upon discharge

## 2022-11-25 NOTE — ASSESSMENT & PLAN NOTE
· Will continue her prior to admission apixaban and atorvastatin for secondary prevention  · Will continue with care/support of her ADLs while a resident at the nursing facility  · Will continue with monitoring for change in her condition  · She will follow-up with her PCP upon discharge

## 2022-12-12 DIAGNOSIS — Z86.73 HISTORY OF CVA (CEREBROVASCULAR ACCIDENT): ICD-10-CM

## 2022-12-12 RX ORDER — ATORVASTATIN CALCIUM 40 MG/1
TABLET, FILM COATED ORAL
Qty: 90 TABLET | Refills: 0 | Status: ON HOLD | OUTPATIENT
Start: 2022-12-12

## 2022-12-20 DIAGNOSIS — I48.20 CHRONIC ATRIAL FIBRILLATION (HCC): ICD-10-CM

## 2022-12-20 RX ORDER — APIXABAN 5 MG/1
TABLET, FILM COATED ORAL
Qty: 152 TABLET | Refills: 0 | Status: SHIPPED | OUTPATIENT
Start: 2022-12-20

## 2022-12-20 NOTE — TELEPHONE ENCOUNTER
Requested medication(s) are du3e for refill today: Yes  Patient has already received a courtesy refill: No  Other reason request has been forwarded to provider: failed protocol

## 2022-12-23 ENCOUNTER — HOSPITAL ENCOUNTER (INPATIENT)
Facility: HOSPITAL | Age: 87
LOS: 4 days | Discharge: NURSING, REHAB OR LONG-TERM CARE | End: 2022-12-27
Attending: EMERGENCY MEDICINE | Admitting: HOSPITALIST

## 2022-12-23 DIAGNOSIS — Z02.9 PROBLEM RELATED TO DISCHARGE PLANNING: ICD-10-CM

## 2022-12-23 DIAGNOSIS — I69.954: ICD-10-CM

## 2022-12-23 DIAGNOSIS — R26.89 UNABLE TO MOBILIZE IN HOME: ICD-10-CM

## 2022-12-23 DIAGNOSIS — N30.00 ACUTE CYSTITIS WITHOUT HEMATURIA: Primary | ICD-10-CM

## 2022-12-23 PROBLEM — E66.9 OBESITY: Status: ACTIVE | Noted: 2022-01-19

## 2022-12-23 LAB
ALBUMIN SERPL BCP-MCNC: 3.1 G/DL (ref 3.5–5)
ALP SERPL-CCNC: 215 U/L (ref 46–116)
ALT SERPL W P-5'-P-CCNC: 36 U/L (ref 12–78)
ANION GAP SERPL CALCULATED.3IONS-SCNC: 10 MMOL/L (ref 4–13)
AST SERPL W P-5'-P-CCNC: 45 U/L (ref 5–45)
ATRIAL RATE: 70 BPM
BACTERIA UR QL AUTO: ABNORMAL /HPF
BASOPHILS # BLD AUTO: 0.08 THOUSANDS/ÂΜL (ref 0–0.1)
BASOPHILS NFR BLD AUTO: 1 % (ref 0–1)
BILIRUB SERPL-MCNC: 0.4 MG/DL (ref 0.2–1)
BILIRUB UR QL STRIP: NEGATIVE
BUN SERPL-MCNC: 19 MG/DL (ref 5–25)
CALCIUM ALBUM COR SERPL-MCNC: 9.8 MG/DL (ref 8.3–10.1)
CALCIUM SERPL-MCNC: 9.1 MG/DL (ref 8.3–10.1)
CHLORIDE SERPL-SCNC: 101 MMOL/L (ref 96–108)
CLARITY UR: CLEAR
CO2 SERPL-SCNC: 26 MMOL/L (ref 21–32)
COLOR UR: YELLOW
CREAT SERPL-MCNC: 0.76 MG/DL (ref 0.6–1.3)
EOSINOPHIL # BLD AUTO: 0.17 THOUSAND/ÂΜL (ref 0–0.61)
EOSINOPHIL NFR BLD AUTO: 3 % (ref 0–6)
ERYTHROCYTE [DISTWIDTH] IN BLOOD BY AUTOMATED COUNT: 12.5 % (ref 11.6–15.1)
GFR SERPL CREATININE-BSD FRML MDRD: 70 ML/MIN/1.73SQ M
GLUCOSE SERPL-MCNC: 105 MG/DL (ref 65–140)
GLUCOSE SERPL-MCNC: 106 MG/DL (ref 65–140)
GLUCOSE SERPL-MCNC: 164 MG/DL (ref 65–140)
GLUCOSE UR STRIP-MCNC: NEGATIVE MG/DL
HCT VFR BLD AUTO: 40.3 % (ref 34.8–46.1)
HGB BLD-MCNC: 13.2 G/DL (ref 11.5–15.4)
HGB UR QL STRIP.AUTO: NEGATIVE
IMM GRANULOCYTES # BLD AUTO: 0.02 THOUSAND/UL (ref 0–0.2)
IMM GRANULOCYTES NFR BLD AUTO: 0 % (ref 0–2)
KETONES UR STRIP-MCNC: NEGATIVE MG/DL
LEUKOCYTE ESTERASE UR QL STRIP: ABNORMAL
LYMPHOCYTES # BLD AUTO: 1.29 THOUSANDS/ÂΜL (ref 0.6–4.47)
LYMPHOCYTES NFR BLD AUTO: 20 % (ref 14–44)
MCH RBC QN AUTO: 31.6 PG (ref 26.8–34.3)
MCHC RBC AUTO-ENTMCNC: 32.8 G/DL (ref 31.4–37.4)
MCV RBC AUTO: 96 FL (ref 82–98)
MONOCYTES # BLD AUTO: 0.52 THOUSAND/ÂΜL (ref 0.17–1.22)
MONOCYTES NFR BLD AUTO: 8 % (ref 4–12)
NEUTROPHILS # BLD AUTO: 4.32 THOUSANDS/ÂΜL (ref 1.85–7.62)
NEUTS SEG NFR BLD AUTO: 68 % (ref 43–75)
NITRITE UR QL STRIP: NEGATIVE
NON-SQ EPI CELLS URNS QL MICRO: ABNORMAL /HPF
NRBC BLD AUTO-RTO: 0 /100 WBCS
OTHER STN SPEC: ABNORMAL
P AXIS: 13 DEGREES
PH UR STRIP.AUTO: 7.5 [PH]
PLATELET # BLD AUTO: 145 THOUSANDS/UL (ref 149–390)
PMV BLD AUTO: 9.8 FL (ref 8.9–12.7)
POTASSIUM SERPL-SCNC: 3.9 MMOL/L (ref 3.5–5.3)
PR INTERVAL: 360 MS
PROT SERPL-MCNC: 8.1 G/DL (ref 6.4–8.4)
PROT UR STRIP-MCNC: NEGATIVE MG/DL
QRS AXIS: 267 DEGREES
QRSD INTERVAL: 138 MS
QT INTERVAL: 464 MS
QTC INTERVAL: 501 MS
RBC # BLD AUTO: 4.18 MILLION/UL (ref 3.81–5.12)
RBC #/AREA URNS AUTO: ABNORMAL /HPF
SODIUM SERPL-SCNC: 137 MMOL/L (ref 135–147)
SP GR UR STRIP.AUTO: 1.02 (ref 1–1.03)
T WAVE AXIS: 33 DEGREES
UROBILINOGEN UR STRIP-ACNC: <2 MG/DL
VENTRICULAR RATE: 70 BPM
WBC # BLD AUTO: 6.4 THOUSAND/UL (ref 4.31–10.16)
WBC #/AREA URNS AUTO: ABNORMAL /HPF

## 2022-12-23 RX ORDER — FOLIC ACID 1 MG/1
1000 TABLET ORAL DAILY
Status: DISCONTINUED | OUTPATIENT
Start: 2022-12-23 | End: 2022-12-27 | Stop reason: HOSPADM

## 2022-12-23 RX ORDER — DOCUSATE SODIUM 100 MG/1
100 CAPSULE, LIQUID FILLED ORAL 2 TIMES DAILY
Status: DISCONTINUED | OUTPATIENT
Start: 2022-12-23 | End: 2022-12-27 | Stop reason: HOSPADM

## 2022-12-23 RX ORDER — ATORVASTATIN CALCIUM 40 MG/1
40 TABLET, FILM COATED ORAL EVERY EVENING
Status: DISCONTINUED | OUTPATIENT
Start: 2022-12-23 | End: 2022-12-27 | Stop reason: HOSPADM

## 2022-12-23 RX ORDER — PRIMIDONE 50 MG/1
50 TABLET ORAL 2 TIMES DAILY
Status: DISCONTINUED | OUTPATIENT
Start: 2022-12-23 | End: 2022-12-27 | Stop reason: HOSPADM

## 2022-12-23 RX ORDER — ACETAMINOPHEN 325 MG/1
650 TABLET ORAL EVERY 8 HOURS SCHEDULED
Status: DISCONTINUED | OUTPATIENT
Start: 2022-12-23 | End: 2022-12-27 | Stop reason: HOSPADM

## 2022-12-23 RX ORDER — INSULIN LISPRO 100 [IU]/ML
1-6 INJECTION, SOLUTION INTRAVENOUS; SUBCUTANEOUS
Status: DISCONTINUED | OUTPATIENT
Start: 2022-12-23 | End: 2022-12-27 | Stop reason: HOSPADM

## 2022-12-23 RX ORDER — GABAPENTIN 100 MG/1
100 CAPSULE ORAL 3 TIMES DAILY
Status: DISCONTINUED | OUTPATIENT
Start: 2022-12-23 | End: 2022-12-27 | Stop reason: HOSPADM

## 2022-12-23 RX ORDER — MELATONIN
1000 EVERY EVENING
Status: DISCONTINUED | OUTPATIENT
Start: 2022-12-23 | End: 2022-12-27 | Stop reason: HOSPADM

## 2022-12-23 RX ORDER — ONDANSETRON 2 MG/ML
4 INJECTION INTRAMUSCULAR; INTRAVENOUS EVERY 6 HOURS PRN
Status: DISCONTINUED | OUTPATIENT
Start: 2022-12-23 | End: 2022-12-23

## 2022-12-23 RX ORDER — SENNOSIDES 8.6 MG
1 TABLET ORAL DAILY
Status: DISCONTINUED | OUTPATIENT
Start: 2022-12-23 | End: 2022-12-27 | Stop reason: HOSPADM

## 2022-12-23 RX ORDER — SPIRONOLACTONE 25 MG/1
25 TABLET ORAL DAILY
Status: DISCONTINUED | OUTPATIENT
Start: 2022-12-23 | End: 2022-12-27 | Stop reason: HOSPADM

## 2022-12-23 RX ORDER — FUROSEMIDE 40 MG/1
40 TABLET ORAL DAILY
Status: DISCONTINUED | OUTPATIENT
Start: 2022-12-23 | End: 2022-12-27 | Stop reason: HOSPADM

## 2022-12-23 RX ORDER — MAGNESIUM HYDROXIDE/ALUMINUM HYDROXICE/SIMETHICONE 120; 1200; 1200 MG/30ML; MG/30ML; MG/30ML
30 SUSPENSION ORAL EVERY 6 HOURS PRN
Status: DISCONTINUED | OUTPATIENT
Start: 2022-12-23 | End: 2022-12-27 | Stop reason: HOSPADM

## 2022-12-23 RX ORDER — CEFEPIME HYDROCHLORIDE 2 G/50ML
2000 INJECTION, SOLUTION INTRAVENOUS ONCE
Status: COMPLETED | OUTPATIENT
Start: 2022-12-23 | End: 2022-12-23

## 2022-12-23 RX ADMIN — FUROSEMIDE 40 MG: 40 TABLET ORAL at 16:45

## 2022-12-23 RX ADMIN — SPIRONOLACTONE 25 MG: 25 TABLET ORAL at 16:45

## 2022-12-23 RX ADMIN — SENNOSIDES 8.6 MG: 8.6 TABLET, FILM COATED ORAL at 16:45

## 2022-12-23 RX ADMIN — PRIMIDONE 50 MG: 50 TABLET ORAL at 17:08

## 2022-12-23 RX ADMIN — ACETAMINOPHEN 650 MG: 325 TABLET ORAL at 21:36

## 2022-12-23 RX ADMIN — CEFEPIME HYDROCHLORIDE 2000 MG: 2 INJECTION, SOLUTION INTRAVENOUS at 13:18

## 2022-12-23 RX ADMIN — GABAPENTIN 100 MG: 100 CAPSULE ORAL at 16:45

## 2022-12-23 RX ADMIN — ATORVASTATIN CALCIUM 40 MG: 40 TABLET, FILM COATED ORAL at 17:09

## 2022-12-23 RX ADMIN — GABAPENTIN 100 MG: 100 CAPSULE ORAL at 21:36

## 2022-12-23 RX ADMIN — AZTREONAM 2000 MG: 2 INJECTION, POWDER, LYOPHILIZED, FOR SOLUTION INTRAMUSCULAR; INTRAVENOUS at 16:45

## 2022-12-23 RX ADMIN — CYANOCOBALAMIN TAB 500 MCG 1000 MCG: 500 TAB at 17:08

## 2022-12-23 RX ADMIN — FOLIC ACID 1000 MCG: 1 TABLET ORAL at 16:45

## 2022-12-23 RX ADMIN — DOCUSATE SODIUM 100 MG: 100 CAPSULE, LIQUID FILLED ORAL at 17:09

## 2022-12-23 RX ADMIN — METOPROLOL TARTRATE 25 MG: 25 TABLET, FILM COATED ORAL at 16:45

## 2022-12-23 RX ADMIN — B-COMPLEX W/ C & FOLIC ACID TAB 1 TABLET: TAB at 16:45

## 2022-12-23 RX ADMIN — ACETAMINOPHEN 650 MG: 325 TABLET ORAL at 16:45

## 2022-12-23 RX ADMIN — APIXABAN 5 MG: 5 TABLET, FILM COATED ORAL at 17:09

## 2022-12-23 RX ADMIN — Medication 1000 UNITS: at 17:09

## 2022-12-23 NOTE — PLAN OF CARE
Problem: Potential for Falls  Goal: Patient will remain free of falls  Description: INTERVENTIONS:  - Educate patient/family on patient safety including physical limitations  - Instruct patient to call for assistance with activity   - Consult OT/PT to assist with strengthening/mobility   - Keep Call bell within reach  - Keep bed low and locked with side rails adjusted as appropriate  - Keep care items and personal belongings within reach  - Initiate and maintain comfort rounds  - Make Fall Risk Sign visible to staf  - Apply yellow socks and bracelet for high fall risk patients  - Consider moving patient to room near nurses station  Outcome: Progressing     Problem: MOBILITY - ADULT  Goal: Maintain or return to baseline ADL function  Description: INTERVENTIONS:  -  Assess patient's ability to carry out ADLs; assess patient's baseline for ADL function and identify physical deficits which impact ability to perform ADLs (bathing, care of mouth/teeth, toileting, grooming, dressing, etc )  - Assess/evaluate cause of self-care deficits   - Assess range of motion  - Assess patient's mobility; develop plan if impaired  - Assess patient's need for assistive devices and provide as appropriate  - Encourage maximum independence but intervene and supervise when necessary  - Involve family in performance of ADLs  - Assess for home care needs following discharge   - Consider OT consult to assist with ADL evaluation and planning for discharge  - Provide patient education as appropriate  Outcome: Progressing  Goal: Maintains/Returns to pre admission functional level  Description: INTERVENTIONS:  - Perform BMAT or MOVE assessment daily    - Set and communicate daily mobility goal to care team and patient/family/caregiver     - Collaborate with rehabilitation services on mobility goals if consulted    - Out of bed for toileting  - Record patient progress and toleration of activity level   Outcome: Progressing     Problem: Prexisting or High Potential for Compromised Skin Integrity  Goal: Skin integrity is maintained or improved  Description: INTERVENTIONS:  - Identify patients at risk for skin breakdown  - Assess and monitor skin integrity  - Assess and monitor nutrition and hydration status  - Monitor labs   - Assess for incontinence   - Turn and reposition patient  - Assist with mobility/ambulation  - Relieve pressure over bony prominences  - Avoid friction and shearing  - Provide appropriate hygiene as needed including keeping skin clean and dry  - Evaluate need for skin moisturizer/barrier cream  - Collaborate with interdisciplinary team   - Patient/family teaching  - Consider wound care consult   Outcome: Progressing

## 2022-12-23 NOTE — CASE MANAGEMENT
Case Management Assessment & Discharge Planning Note    Patient name Corbin Hensley  Location [address] 232/-47 MRN [id number]  : 1935 Date 2022       Current Admission Date: 2022  Current Admission Diagnosis:Urinary tract infection   Patient Active Problem List    Diagnosis Date Noted   • Problem related to discharge planning 2022   • Frailty syndrome in geriatric patient 2022   • CHF (congestive heart failure) ([facility] ) 2022   • DNR (do not resuscitate) 2022   • Hospice care patient 2022   • Hemiparesis affecting left side as late effect of cerebrovascular accident (CVA) ([facility] ) 2022   • Obesity 2022   • Chronic diastolic congestive heart failure ([facility] ) 2021   • Bradycardia 10/23/2020   • Abnormal alkaline phosphatase test 10/22/2020   • Constipation 2020   • Moderate protein-calorie malnutrition ([facility] ) 2020   • Abnormal CT of the chest 2020   • Goals of care, counseling/discussion 2020   • Pleural effusion, left 2020   • Urinary tract infection 2020   • Oropharyngeal dysphagia 2020   • Aphasia 2020   • Slurred speech    • Weakness of both lower extremities 02/15/2020   • Dysarthria 02/15/2020   • Thyroid nodule 2020   • Stroke-like symptoms 2020   • Calcaneal spur of foot, left 10/17/2019   • Osteopenia of left foot 10/17/2019   • Hyperlipidemia 2019   • Cervical dystonia 2019   • History of stroke 2019   • Spinal cord stimulator status 2019   • Overactive bladder 2019   • Hypertension 2019   • Weakness generalized 2019   • Urinary incontinence 2019   • Wheelchair dependence 2018   • Type 2 diabetes mellitus ([facility] ) 2018   • Paroxysmal atrial fibrillation ([facility] ) 2018      LOS (days): 0  Geometric Mean LOS (GMLOS) (days):   Days to GMLOS:     OBJECTIVE:    Risk of Unplanned Readmission Score: 10 45         Current admission status: Inpatient       Preferred Pharmacy:   Nanci [address]  [address]  [facility] [address]  Phone: [phone] Fax: [phone]    UNKNOWN - FOLLOW UP PRIOR TO DISCHARGE TO E-PRESCRIBE  No address on file      [address], [address] [place] [address] [address] [address]  Phone: [phone] Fax: [phone]    Primary Care Provider: Lilly Fontana MD    Primary Insurance: [facility]  Secondary Insurance: [address] Proxies    There are no active Health Care Proxies on file  Advance Directives  Does patient have a [address]?: No  Was patient offered paperwork?: Yes (declined)  Does patient currently have a Health Care decision maker?: Yes, please see Health Care Proxy section  Does patient have Advance Directives?: No  Was patient offered paperwork?: Yes (declined)         Readmission Root Cause  30 Day Readmission: No    Patient Information  Admitted from[de-identified] Home  Mental Status: Alert  During Assessment patient was accompanied by: Not accompanied during assessment  Assessment information provided by[de-identified] Patient  Primary Caregiver: Self  Support Systems: Daughter  Home entry access options  Select all that apply : Ramp  Type of Current Residence: [place]  In the last 12 months, was there a time when you were not able to pay the mortgage or rent on time?: No  In the last 12 months, how many places have you lived?: 1  In the last 12 months, was there a time when you did not have a steady place to sleep or slept in a shelter (including now)?: No  Homeless/housing insecurity resource given?: N/A  Living Arrangements: Lives Alone    Activities of Daily Living Prior to Admission  Functional Status: Total dependent  Completes ADLs independently?: No  Level of ADL dependence:  Total Dependent  Ambulates independently?: No  Level of ambulatory dependence:  Total Dependent  Does patient use assisted devices?: Yes  Assisted Devices (DME) used: [place] lift, Other (Comment) (lift chair)  Does patient currently own DME?: Yes  What DME does the patient currently own?: Mary [place] lift, Other (Comment) (lift chair)  Does patient have a history of Outpatient Therapy (PT/OT)?: No  Does the patient have a history of Short-Term Rehab?: Yes (hx at [facility])  Does patient have a history of HHC?: No  Does patient currently have [place] [address]?: No    Patient Information Continued  Does patient have prescription coverage?: Yes  Within the past 12 months, you worried that your food would run out before you got the money to buy more : Never true  Within the past 12 months, the food you bought just didn't last and you didn't have money to get more : Never true  Food insecurity resource given?: N/A  Does patient receive dialysis treatments?: No  Does patient have a history of substance abuse?: No  Does patient have a history of Mental Health Diagnosis?: No    Means of Transportation  In the past 12 months, has lack of transportation kept you from medical appointments or from getting medications?: No  In the past 12 months, has lack of transportation kept you from meetings, work, or from getting things needed for daily living?: No  Was application for public transport provided?: N/A    DISCHARGE DETAILS:    Discharge planning discussed with[de-identified] patient, Lio (service coodinator with [employer]), and Janneth ([facility])  [place] of Choice: Yes  Comments - Freedom of Choice: CM discussed respite care and options with patient; she is agreeable to respite and a blanket referral   CM contacted family/caregiver?: Yes  Were Treatment Team discharge recommendations reviewed with patient/caregiver?: Yes  Did patient/caregiver verbalize understanding of patient care needs?: Yes  Were patient/caregiver advised of the risks associated with not following Treatment Team discharge recommendations?: Yes    Requested [address]         Is the patient interested in Keelyu Benjamin at discharge?: No    DME Referral Provided  Referral made for DME?: No    Other Referral/Resources/Interventions Provided:  Interventions: Respite Care  Referral Comments: Multiple referrals sent to facilities for respite care    Treatment Team Recommendation: Home (vs respite)  Discharge Destination Plan[de-identified] Home (vs respite)  Transport at Discharge : BLS Ambulance     Additional Comments: Consult received: Requires 24/7 home care  Home care agency unable to provide staff at least through the weekend  Brought here by ambulance  CM was informed that pt has a  through [address] [phone]  CM called and spoke to [facility] to discuss dc plan  Per [facility], pt lives alone at home but has 24/7 caregivers through the waiver program--[employer] for 2 shifts; 9am-5pm and overnight  [facility] pt's  was unable to continue assisting patient and may staff aware on Monday  Pt's overnight caregiver has been providing care around the clock; [facility] states she told the caregiver she was unable to continue doing this and they would need to look for a caregiver for the first shift  [facility] they informed the caregiver to have patient brought to the hospital  [facility] states she is working with [place] to see if they're able to provide caregiver services in the home 9am-5pm; [facility] if they can provide services the earliest would be 12/27 or 12/28  CM informed Charley that respite is not available in the hospital and pt is medically stable for discharge  [facility] pt is unable to return home without caregiver services in place  [facility] pt requires total care at home with ADL's; pt has a abraham lift and lift chair  Pt resides in a 1st floor home with a ramp to enter   Pt has a hx of rehab at [facility] along with respite care there over this past zarina  Pt is currently receiving hospice services through [facility] at home  CM discussed respite care  Per Jose Ramon Booth, she was informed by [facility] that since respite was set up over zarina it wouldnt be an option at this time  CM called [facility] [phone] to discuss same  Per Nidhi Molina at [facility], that is not correct  Respite would be an option if we are able to find a bed  CM spoke to [place] [phone] with [facility] [phone] to discuss respite  Janneth states they would approve 5 days of respite  CM called [facility] and spoke to Ijeoma Finley in admissions who states they are closed to admissions due to illnesses  CM met with pt to discuss respite; CM informed pt that [facility] does not have availability  Pt reports being agreeable to a blanket referral to facilities within a 20 mile radius  [address] referrals sent  CM contact all facilities within 20 mile radius; no available beds  CM informed Dr Rhianna Nieves of same  Message left for Jose Ramon Booth () for provide update  CM spoke to [facility] [phone] with [facility] to provide update  [facility] states they will continue to look for a facility for respite  CM discussed with Jose Ramon Booth () the option for her daughter to provide care at home  Jose Ramon Booth reports pt's daughter Erica Thornton was her paid caregiver initially but patient fired her  Per Jose Ramon Booth, they contacted pt and pt's daughter Erica Thornton about assisting with caregiver services again  Pt will not allow her to be caregiver and pt's daughter is not interested in being her caregiver any longer

## 2022-12-23 NOTE — H&P
Raul [place]on  H&P- Nasrin Chatterjee 1935, 80 y o  female MRN: [id number]  Unit/Bed#: -01 Encounter: [id number]  Primary Care Provider: Gavin Hinds MD   Date and time admitted to hospital: 12/23/2022  9:09 AM         Assessment:    Active Problems:    Type 2 diabetes mellitus (HCC)    Paroxysmal atrial fibrillation ([address] )    History of stroke    Hyperlipidemia    Urinary tract infection    Obesity    Problem related to discharge planning      Plan:    Admit to inpatient  We will start aztreonam due to patient's allergies to antibiotics and prior resistance patterns from previous urine cultures  Await urine cultures    Case management arranging placement  No home staff currently able to care for patient and is unsafe to return home    Continue Eliquis  Heart rates controlled    Patient at neurological baseline  Continue statin    Monitor blood sugars    Chief Complaint   Patient presents with   • Medical Problem     Pt to er via ems from home after getting a call from caretaker that there was no one to be with her  Patient has 24 hour home care - last employee was unable to stay, employee that was supposed to come in was unable  Patient has no complaints  No distress noted  HPI:  Nasrin Chatterjee is a 80 y o  female who presents with inability to care for self at home  Patient has 24-hour home care but the employee was unable to come care for her  Patient has no other family or anyone who can assist with her  Patient does endorse increased frequency and dysuria  She is concerned she has a urinary tract infection  She denies any fever or suprapubic abdominal pain she has no shortness of breath  She has no chest pain  She states she is working hard and exercising herself after she had several strokes      Historical Information   Past Medical History:   Diagnosis Date   • A-fib [facility])    • Arthritis    • Assistance needed for mobility     pt can stand with assistance and transfer   • Chronic pain disorder    • Diabetes mellitus (HCC)     NIDDM   • Full dentures    • History of transfusion     no adverse reaction   • Hyperlipidemia    • Irregular heart beat    • Numbness and tingling of both feet    • Skin abnormality     sacrum area - small red area, less than a dime size   • Spinal stenosis    • Stroke [facility])    • Unable to ambulate    • Urinary incontinence     ipg stimulator x3 repakcements,battery exchange today 2/14/2018   • Wears glasses    • Wheelchair dependence      Past Surgical History:   Procedure Laterality Date   • BACK SURGERY      fusion   • BLADDER SUSPENSION     • CARPAL TUNNEL RELEASE Bilateral    • CHOLANGIOGRAM N/A 6/4/2018    Procedure: CHOLANGIOGRAM;  Surgeon: Nory Berry MD;  Location: [facility];  Service: General   • CHOLECYSTECTOMY LAPAROSCOPIC N/A 6/4/2018    Procedure: CHOLECYSTECTOMY LAPAROSCOPIC;  Surgeon: Nory Berry MD;  Location[facility];  Service: General   • COLONOSCOPY     • DILATION AND CURETTAGE OF UTERUS     • FRACTURE SURGERY Left     femur with hardware   • HYSTERECTOMY     • INSERTION / Earmon Bina / REVISION NEUROSTIMULATOR     • JOINT REPLACEMENT Bilateral     knees   • SACRAL NERVE STIMULATOR PLACEMENT N/A 2/14/2018    Procedure: REMOVE AND REPLACE IPG;  Surgeon: Denise Magana MD;  Location: [facility];  Service: UroGynecology          • TONSILLECTOMY       Social History   Social History     Substance and Sexual Activity   Alcohol Use Not Currently    Comment: very rare     Social History     Substance and Sexual Activity   Drug Use No     Social History     Tobacco Use   Smoking Status Never   Smokeless Tobacco Never     Family History   Problem Relation Age of Onset   • No Known Problems Mother    • No Known Problems Father        Meds/Allergies   Allergies   Allergen Reactions   • Sulfamethoxazole-Trimethoprim Swelling     Swelling around eyes and red eyes   • Asa [Aspirin] Other (See Comments)     Unknown • Aspirin Other (See Comments)     "feels like fist in my chest"   • Ciprofloxacin    • Nitrofurantoin    • Other      "5mz/Tmp "  "1 60 mTab amme"     Per pt's allergy list        Meds:    Current Facility-Administered Medications:   •  acetaminophen (TYLENOL) tablet 650 mg, 650 mg, Oral, Q8H [facility], Renard Rizo MD  •  aluminum-magnesium hydroxide-simethicone (MYLANTA) oral suspension 30 mL, 30 mL, Oral, Q6H PRN, Renard Rizo MD  •  apixaban (ELIQUIS) tablet 5 mg, 5 mg, Oral, BID, Renard Rizo MD  •  atorvastatin (LIPITOR) tablet 40 mg, 40 mg, Oral, QPM, eRnard Rizo MD  •  aztreonam (AZACTAM) 2,000 mg in sodium chloride 0 9 % 100 mL IVPB, 2,000 mg, Intravenous, Q8H, Renard Rizo MD  •  cholecalciferol (VITAMIN D3) tablet 1,000 Units, 1,000 Units, Oral, QPM, Renard Rizo MD  •  cyanocobalamin (VITAMIN B-12) tablet 1,000 mcg, 1,000 mcg, Oral, QPM, Renard Rizo MD  •  docusate sodium (COLACE) capsule 100 mg, 100 mg, Oral, BID, Renard Rizo MD  •  folic acid (FOLVITE) tablet 1,000 mcg, 1,000 mcg, Oral, Daily, Renard Rizo MD  •  furosemide (LASIX) tablet 40 mg, 40 mg, Oral, Daily, Renard Rizo MD  •  gabapentin (NEURONTIN) capsule 100 mg, 100 mg, Oral, TID, Renard Rizo MD  •  insulin lispro (HumaLOG) 100 units/mL subcutaneous injection 1-6 Units, 1-6 Units, Subcutaneous, TID AC **AND** Fingerstick Glucose (POCT), , , TID AC, Nubia Vanegas MD  •  metoprolol tartrate (LOPRESSOR) tablet 25 mg, 25 mg, Oral, Daily, Renard Rizo MD  •  multivitamin stress formula tablet 1 tablet, 1 tablet, Oral, Daily, Nubia Vanegas MD  •  ondansetron [facility]) injection 4 mg, 4 mg, Intravenous, Q6H PRN, Renard Rizo MD  •  primidone (MYSOLINE) tablet 50 mg, 50 mg, Oral, BID, Renard Rizo MD  •  senna (SENOKOT) tablet 8 6 mg, 1 tablet, Oral, Daily, Renard Rizo MD  •  spironolactone (ALDACTONE) tablet 25 mg, 25 mg, Oral, Daily, Mike Jorge MD    Medications Prior to Admission   Medication   • Acetaminophen (TYLENOL PO)   • atorvastatin (LIPITOR) 40 mg tablet   • cholecalciferol (VITAMIN D3) 1,000 units tablet   • CRANBERRY PO   • cyanocobalamin (VITAMIN B-12) 1,000 mcg tablet   • Eliquis 5 MG   • folic acid (FOLVITE) 1 mg tablet   • furosemide (LASIX) 40 mg tablet   • gabapentin (NEURONTIN) 100 mg capsule   • ipratropium (ATROVENT) 0 03 % nasal spray   • metFORMIN (GLUCOPHAGE) 500 mg tablet   • metoprolol tartrate (LOPRESSOR) 25 mg tablet   • Multiple Vitamin (MULTI-VITAMIN DAILY PO)   • primidone (MYSOLINE) 50 mg tablet   • spironolactone (ALDACTONE) 25 mg tablet         Review of Systems:    A complete and comprehensive 14 point organ system review was performed and all other systems are negative other than stated above in the HPI    Current Vitals:   Blood Pressure: 150/77 (12/23/22 1349)  Pulse: 72 (12/23/22 1349)  Temperature: 97 8 °F (36 6 °C) (12/23/22 0906)  Temp Source: Oral (12/23/22 0906)  Respirations: 20 (12/23/22 1349)  Weight - Scale: 102 kg (224 lb 10 4 oz) (12/23/22 0906)  SpO2: 94 % (12/23/22 1349)  SPO2 RA Rest    Flowsheet Row ED to Hosp-Admission (Current) from 12/23/2022 in [address] Med Surg Unit   SpO2 94 %   SpO2 Activity At Rest   O2 Device None (Room air)   O2 Flow Rate --        No intake or output data in the 24 hours ending 12/23/22 1551  Body mass index is 37 38 kg/m²       Physical Exam:   General: well appearing, no acute distress, obese ,elderly   HEENT: atraumatic, PERRLA, moist mucosa, normal pharynx, normal tonsils and adenoids, normal tongue, no fluid in sinuses  Neck: Trachea midline, no carotid bruit, no masses  Respiratory: normal chest wall expansion, CTA B, no r/r/w, no rubs  Cardiovascular: RRR, no m/r/g, Normal S1 and S2  Abdomen: Soft, non-tender, non-distended, normal bowel sounds in all quadrants, no hepatosplenomegaly, no tympany  Rectal: deferred  Musculoskeletal: normal ROM in upper and lower extremities  Integumentary: warm, dry, and pink, with no rash, purpura, or petechia  Heme/Lymph: no lymphadenopathy, no bruises  Neurological: Cranial Nerves II-XII grossly intact; Chronic L sided weakness, L leg very weak    Psychiatric: cooperative with normal mood, affect, and cognition    Lab Results:   CBC:   Lab Results   Component Value Date    WBC 6 40 12/23/2022    HGB 13 2 12/23/2022    HCT 40 3 12/23/2022    MCV 96 12/23/2022     (L) 12/23/2022    MCH 31 6 12/23/2022    MCHC 32 8 12/23/2022    RDW 12 5 12/23/2022    MPV 9 8 12/23/2022    NRBC 0 12/23/2022     CMP:  Lab Results   Component Value Date     12/17/2014     12/23/2022     12/17/2014    CO2 26 12/23/2022    CO2 37 (H) 12/10/2021    ANIONGAP 6 12/17/2014    BUN 19 12/23/2022    BUN 18 12/17/2014    CREATININE 0 76 12/23/2022    CREATININE 0 75 12/17/2014    GLUCOSE 235 (H) 12/10/2021    GLUCOSE 113 12/17/2014    CALCIUM 9 1 12/23/2022    CALCIUM 9 0 12/17/2014    AST 45 12/23/2022    AST 37 12/11/2014    ALT 36 12/23/2022    ALT 37 12/11/2014    ALKPHOS 215 (H) 12/23/2022    ALKPHOS 189 (H) 12/11/2014    PROT 7 8 12/11/2014    BILITOT 0 46 12/11/2014    EGFR 70 12/23/2022     Lab Results   Component Value Date    TROPONINI <0 02 10/21/2020    TROPONINI <0 04 12/11/2014     Coagulation:   Lab Results   Component Value Date    INR 1 39 (H) 10/21/2020    INR 1 30 (H) 12/19/2014    Urinalysis:  Lab Results   Component Value Date    COLORU Yellow 12/23/2022    COLORU Yellow 12/11/2014    CLARITYU Clear 12/23/2022    CLARITYU Cloudy 12/11/2014    SPECGRAV 1 020 12/23/2022    SPECGRAV 1 024 12/11/2014    PHUR 7 5 12/23/2022    PHUR 7 0 01/21/2022    PHUR 5 0 12/11/2014    LEUKOCYTESUR Large (A) 12/23/2022    LEUKOCYTESUR Moderate (A) 12/11/2014    NITRITE Negative 12/23/2022    NITRITE Positive (A) 12/11/2014    PROTEINUA Negative 12/11/2014    GLUCOSEU Negative 12/23/2022    GLUCOSEU 100 (1/10%) (A) 12/11/2014    KETONESU Negative 12/23/2022    KETONESU Negative 12/11/2014    BILIRUBINUR Negative 12/23/2022    BILIRUBINUR Negative 12/11/2014    BLOODU Negative 12/23/2022    BLOODU Moderate (A) 12/11/2014      Amylase: No results found for: AMYLASE  Lipase:   Lab Results   Component Value Date    LIPASE 284 01/21/2022        Imaging: No results found  EKG, Pathology, and Other Studies: I have personally reviewed the results  VTE   Prophylaxis: In place    Code Status: Level 3 - DNAR and DNI    Anticipated Length of Stay:  Patient will be admitted on an Inpatient basis with an anticipated length of stay of  greater 2 midnights  Counseling / Coordination of Care  Total floor / unit time spent today  38 minutes  Greater than 50% of total time was spent with the patient and / or family counseling and / or coordination of care       "This note has been constructed using a voice recognition system"      Hector Calvert MD  12/23/2022, 3:51 PM

## 2022-12-23 NOTE — ED PROVIDER NOTES
History  Chief Complaint   Patient presents with   • Medical Problem     Pt to er via ems from home after getting a call from caretaker that there was no one to be with her  Patient has 24 hour home care - last employee was unable to stay, employee that was supposed to come in was unable  Patient has no complaints  No distress noted  24-year-old female with past history of stroke with residual left hemiparesis, DM 2, A  fib on Eliquis, spinal stenosis requires  home health aides  Medics were called by the patient's legal guardian because there is no home health staff available this weekend  There apparently are no other family or friends that can help this patient  Medics brought her here due to this social issue  On exam patient does admit to urinary frequency and dysuria and she is concerned that she might have a UTI  Has had no fever, vomiting, diarrhea or other symptoms  Prior to Admission Medications   Prescriptions Last Dose Informant Patient Reported? Taking?    Acetaminophen (TYLENOL PO)   Yes No   Sig: Take by mouth 3 (three) times a day   CRANBERRY PO   Yes No   Sig: Take 30,000 mg by mouth   Eliquis 5 MG   No No   Sig: TAKE 1 TABLET BY MOUTH TWICE DAILY   Multiple Vitamin (MULTI-VITAMIN DAILY PO)   Yes No   Sig: Take by mouth   atorvastatin (LIPITOR) 40 mg tablet   No No   Sig: TAKE 1 TABLET BY MOUTH ONCE DAILY IN THE EVENING   cholecalciferol (VITAMIN D3) 1,000 units tablet   Yes No   Sig: Take 1,000 Units by mouth every evening   cyanocobalamin (VITAMIN B-12) 1,000 mcg tablet   Yes No   Sig: Take 1,000 mcg by mouth every evening     folic acid (FOLVITE) 1 mg tablet   Yes No   Sig: Take 1,000 mcg by mouth daily   furosemide (LASIX) 40 mg tablet   Yes No   Sig: Take 40 mg by mouth daily   gabapentin (NEURONTIN) 100 mg capsule   Yes No   Sig: Take 100 mg by mouth 3 (three) times a day   ipratropium (ATROVENT) 0 03 % nasal spray   No No   Si-2 sprays each nostril twice a day as needed for rhinorrhea   metFORMIN (GLUCOPHAGE) 500 mg tablet   Yes No   Sig: Take 500 mg by mouth daily   metoprolol tartrate (LOPRESSOR) 25 mg tablet   Yes No   Sig: Take 25 mg by mouth daily   primidone (MYSOLINE) 50 mg tablet   Yes No   Sig: Take by mouth 2 (two) times a day    spironolactone (ALDACTONE) 25 mg tablet   No No   Sig: Take 1 tablet (25 mg total) by mouth daily      Facility-Administered Medications: None       Past Medical History:   Diagnosis Date   • A-fib ([place] [address] )    • Arthritis    • Assistance needed for mobility     pt can stand with assistance and transfer   • Chronic pain disorder    • Diabetes mellitus ([facility] )     NIDDM   • Full dentures    • History of transfusion     no adverse reaction   • Hyperlipidemia    • Irregular heart beat    • Numbness and tingling of both feet    • Skin abnormality     sacrum area - small red area, less than a dime size   • Spinal stenosis    • Stroke (HCC)    • Unable to ambulate    • Urinary incontinence     ipg stimulator x3 repakcements,battery exchange today 2/14/2018   • Wears glasses    • Wheelchair dependence        Past Surgical History:   Procedure Laterality Date   • BACK SURGERY      fusion   • BLADDER SUSPENSION     • CARPAL TUNNEL RELEASE Bilateral    • CHOLANGIOGRAM N/A 6/4/2018    Procedure: CHOLANGIOGRAM;  Surgeon: Rosey Gagnon MD;  [facility]: [facility];  Service: General   • CHOLECYSTECTOMY LAPAROSCOPIC N/A 6/4/2018    Procedure: CHOLECYSTECTOMY LAPAROSCOPIC;  Surgeon: Rosey Gagnon MD;  [facility];  Service: General   • COLONOSCOPY     • DILATION AND CURETTAGE OF UTERUS     • FRACTURE SURGERY Left     femur with hardware   • HYSTERECTOMY     • INSERTION / Jeris Ghee / REVISION NEUROSTIMULATOR     • JOINT REPLACEMENT Bilateral     knees   • SACRAL NERVE STIMULATOR PLACEMENT N/A 2/14/2018    Procedure: REMOVE AND REPLACE IPG;  Surgeon: Beti Mata MD;  Location: [facility];  Service: UroGynecology          • TONSILLECTOMY Family History   Problem Relation Age of Onset   • No Known Problems Mother    • No Known Problems Father      I have reviewed and agree with the history as documented  E-Cigarette/Vaping   • E-Cigarette Use Never User      E-Cigarette/Vaping Substances     Social History     Tobacco Use   • Smoking status: Never   • Smokeless tobacco: Never   Vaping Use   • Vaping Use: Never used   Substance Use Topics   • Alcohol use: Not Currently     Comment: very rare   • Drug use: No       Review of Systems   Constitutional: Negative for activity change, appetite change and fever  Eyes: Positive for photophobia ( Since having cataracts removed  No recent change)  Respiratory: Negative for cough and shortness of breath  Cardiovascular: Negative for chest pain and palpitations  Gastrointestinal: Negative for abdominal pain, diarrhea and vomiting  Genitourinary: Positive for dysuria and frequency  Negative for flank pain  Skin: Negative for wound  Neurological: Negative for syncope and headaches  All other systems reviewed and are negative  Physical Exam  Physical Exam  Vitals and nursing note reviewed  Constitutional:       General: She is not in acute distress  Appearance: She is well-developed  She is obese  She is ill-appearing  She is not diaphoretic  HENT:      Head: Normocephalic and atraumatic  Right Ear: External ear normal       Left Ear: External ear normal       Nose: Nose normal       Mouth/Throat:      Mouth: Mucous membranes are dry  Pharynx: Oropharynx is clear  Eyes:      General: No scleral icterus  Conjunctiva/sclera: Conjunctivae normal       Pupils: Pupils are equal, round, and reactive to light  Cardiovascular:      Rate and Rhythm: Normal rate and regular rhythm  Pulses: Normal pulses  Heart sounds: Normal heart sounds  No murmur heard  Pulmonary:      Effort: Pulmonary effort is normal       Breath sounds: Normal breath sounds  Abdominal:      General: Bowel sounds are normal       Palpations: Abdomen is soft  Tenderness: There is no abdominal tenderness  There is no guarding or rebound  Musculoskeletal:         General: No signs of injury  Normal range of motion  Cervical back: Normal range of motion and neck supple  No rigidity or tenderness  Right lower leg: No edema  Left lower leg: No edema  Skin:     General: Skin is warm and dry  Capillary Refill: Capillary refill takes less than 2 seconds  Findings: No lesion or rash  Neurological:      Mental Status: She is alert and oriented to person, place, and time  Mental status is at baseline  Cranial Nerves: No cranial nerve deficit  Motor: Weakness present  Coordination: Coordination normal       Gait: Gait abnormal       Deep Tendon Reflexes: Reflexes are normal and symmetric        Comments: Left hemiparesis   Psychiatric:         Mood and Affect: Mood normal          Behavior: Behavior normal          Vital Signs  ED Triage Vitals   Temperature Pulse Respirations Blood Pressure SpO2   12/23/22 0906 12/23/22 0906 12/23/22 0906 12/23/22 0906 12/23/22 0906   97 8 °F (36 6 °C) 72 18 137/63 94 %      Temp Source Heart Rate Source Patient Position - Orthostatic VS BP Location FiO2 (%)   12/23/22 0906 12/23/22 0906 12/23/22 0906 12/23/22 0906 --   Oral Monitor Lying Right arm       Pain Score       12/23/22 1135       No Pain           Vitals:    12/25/22 1114 12/25/22 1625 12/25/22 2125 12/26/22 0831   BP: 140/71 128/69 133/61 141/65   Pulse:  (!) 54 65 66   Patient Position - Orthostatic VS:  Lying Lying Lying         Visual Acuity  Visual Acuity    Flowsheet Row Most Recent Value   L Pupil Size (mm) 3   R Pupil Size (mm) 3   L Pupil Shape Round   R Pupil Shape Round          ED Medications  Medications   cholecalciferol (VITAMIN D3) tablet 1,000 Units (1,000 Units Oral Given 12/25/22 1719)   acetaminophen (TYLENOL) tablet 650 mg (650 mg Oral Given 12/26/22 0513)   cyanocobalamin (VITAMIN B-12) tablet 1,000 mcg (1,000 mcg Oral Given 57/68/17 0937)   folic acid (FOLVITE) tablet 1,000 mcg (1,000 mcg Oral Given 12/26/22 0838)   furosemide (LASIX) tablet 40 mg (40 mg Oral Given 12/26/22 0839)   gabapentin (NEURONTIN) capsule 100 mg (100 mg Oral Given 12/26/22 0838)   metoprolol tartrate (LOPRESSOR) tablet 25 mg (25 mg Oral Given 12/26/22 0838)   primidone (MYSOLINE) tablet 50 mg (50 mg Oral Given 12/26/22 0838)   spironolactone (ALDACTONE) tablet 25 mg (25 mg Oral Given 12/26/22 0838)   atorvastatin (LIPITOR) tablet 40 mg (40 mg Oral Given 12/25/22 1719)   apixaban (ELIQUIS) tablet 5 mg (5 mg Oral Given 12/26/22 0838)   multivitamin stress formula tablet 1 tablet (1 tablet Oral Given 12/26/22 0838)   insulin lispro (HumaLOG) 100 units/mL subcutaneous injection 1-6 Units (1 Units Subcutaneous Not Given 12/26/22 0832)   docusate sodium (COLACE) capsule 100 mg (100 mg Oral Given 12/26/22 0838)   senna (SENOKOT) tablet 8 6 mg (8 6 mg Oral Given 12/26/22 0838)   aluminum-magnesium hydroxide-simethicone (MYLANTA) oral suspension 30 mL (has no administration in time range)   cefepime (MAXIPIME) IVPB (premix in dextrose) 2,000 mg 50 mL (2,000 mg Intravenous New Bag 12/26/22 0004)   glycerin-hypromellose- (ARTIFICIAL TEARS) ophthalmic solution 1 drop (has no administration in time range)   cefepime (MAXIPIME) IVPB (premix in dextrose) 2,000 mg 50 mL (0 mg Intravenous Stopped 12/23/22 1348)   potassium chloride (K-DUR,KLOR-CON) CR tablet 40 mEq (40 mEq Oral Given 12/24/22 1731)       Diagnostic Studies  Results Reviewed     Procedure Component Value Units Date/Time    Urine culture [[id number]]  (Abnormal)  (Susceptibility) Collected: 12/23/22 7470    Lab Status: Final result Specimen: Urine, Straight Cath Updated: 12/25/22 3571     Urine Culture 60,000-69,000 cfu/ml Pseudomonas aeruginosa    Susceptibility     Pseudomonas aeruginosa (1)     Antibiotic Interpretation Microscan   Method Status    ZID Performed  Yes  GORDON Final    Aztreonam ($$$)  Susceptible <=4 ug/ml GORDON Final    Cefepime ($) Susceptible <=2 00 ug/ml GORDON Final    Ceftazidime ($$) Susceptible <=1 ug/ml GORDON Final    Ciprofloxacin ($)  Resistant >2 00 ug/ml GORDON Final    Gentamicin ($$) Susceptible <=2 ug/ml GORDON Final    Imipenem Susceptible <=1 ug/ml GORDON Final    Levofloxacin ($) Resistant >4 00 ug/ml GORDON Final    Meropenem ($$) Susceptible <=1 00 ug/ml GORDON Final    Piperacillin + Tazobactam ($$$) Susceptible <=8 ug/ml GORDON Final    Tobramycin ($) Susceptible <=2 ug/ml GORDON Final                   Urine Microscopic [[id number]]  (Abnormal) Collected: 12/23/22 0948    Lab Status: Final result Specimen: Urine, Straight Cath Updated: 12/23/22 1021     RBC, UA None Seen /hpf      WBC, UA 4-10 /hpf      Epithelial Cells Occasional /hpf      Bacteria, UA Occasional /hpf      OTHER OBSERVATIONS WBCs Clumped    Comprehensive metabolic panel [[id number]]  (Abnormal) Collected: 12/23/22 0947    Lab Status: Final result Specimen: Blood from Arm, Right Updated: 12/23/22 1011     Sodium 137 mmol/L      Potassium 3 9 mmol/L      Chloride 101 mmol/L      CO2 26 mmol/L      ANION GAP 10 mmol/L      BUN 19 mg/dL      Creatinine 0 76 mg/dL      Glucose 105 mg/dL      Calcium 9 1 mg/dL      Corrected Calcium 9 8 mg/dL      AST 45 U/L      ALT 36 U/L      Alkaline Phosphatase 215 U/L      Total Protein 8 1 g/dL      Albumin 3 1 g/dL      Total Bilirubin 0 40 mg/dL      eGFR 70 ml/min/1 73sq m     Narrative:      [place] guidelines for Chronic Kidney Disease (CKD):   •  Stage 1 with normal or high GFR (GFR > 90 mL/min/1 73 square meters)  •  Stage 2 Mild CKD (GFR = 60-89 mL/min/1 73 square meters)  •  Stage 3A Moderate CKD (GFR = 45-59 mL/min/1 73 square meters)  •  Stage 3B Moderate CKD (GFR = 30-44 mL/min/1 73 square meters)  •  Stage 4 Severe CKD (GFR = 15-29 mL/min/1 73 square meters)  •  Stage 5 End Stage CKD (GFR <15 mL/min/1 73 square meters)  Note: GFR calculation is accurate only with a steady state creatinine    UA (URINE) with reflex to Scope [[id number]]  (Abnormal) Collected: 12/23/22 0948    Lab Status: Final result Specimen: Urine, Straight Cath Updated: 12/23/22 1007     Color, UA Yellow     Clarity, UA Clear     Specific [place], UA 1 020     pH, UA 7 5     Leukocytes, UA Large     Nitrite, UA Negative     Protein, UA Negative mg/dl      Glucose, UA Negative mg/dl      Ketones, UA Negative mg/dl      Urobilinogen, UA <2 0 mg/dl      Bilirubin, UA Negative     Occult Blood, UA Negative    CBC and differential [[id number]]  (Abnormal) Collected: 12/23/22 0947    Lab Status: Final result Specimen: Blood from Arm, Right Updated: 12/23/22 0955     WBC 6 40 Thousand/uL      RBC 4 18 Million/uL      Hemoglobin 13 2 g/dL      Hematocrit 40 3 %      MCV 96 fL      MCH 31 6 pg      MCHC 32 8 g/dL      RDW 12 5 %      MPV 9 8 fL      Platelets 601 Thousands/uL      nRBC 0 /100 WBCs      Neutrophils Relative 68 %      Immat GRANS % 0 %      Lymphocytes Relative 20 %      Monocytes Relative 8 %      Eosinophils Relative 3 %      Basophils Relative 1 %      Neutrophils Absolute 4 32 Thousands/µL      Immature Grans Absolute 0 02 Thousand/uL      Lymphocytes Absolute 1 29 Thousands/µL      Monocytes Absolute 0 52 Thousand/µL      Eosinophils Absolute 0 17 Thousand/µL      Basophils Absolute 0 08 Thousands/µL                  No orders to display              Procedures  Procedures         ED Course                               SBIRT 22yo+    Flowsheet Row Most Recent Value   SBIRT (23 yo +)    In order to provide better care to our patients, we are screening all of our patients for alcohol and drug use  Would it be okay to ask you these screening questions? Yes Filed at: 12/23/2022 1058   Initial Alcohol Screen: US AUDIT-C     1   How often do you have a drink containing alcohol? 0 Filed at: 12/23/2022 4234 2  How many drinks containing alcohol do you have on a typical day you are drinking? 0 Filed at: 12/23/2022 1056   3b  FEMALE Any Age, or MALE 65+: How often do you have 4 or more drinks on one occassion? 0 Filed at: 12/23/2022 1056   Audit-C Score 0 Filed at: 12/23/2022 1056   DUSTIN: How many times in the past year have you    Used an illegal drug or used a prescription medication for non-medical reasons? 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 ([facility] )  Unable to mobilize in home  Diagnosis management comments: Elderly woman with multiple medical problems including remote stroke with residual left hemiparesis lives alone at home totally dependent on home health aides  Medics were called to the scene because the home health agency is unable to send someone to the house this weekend  Patient denies fever, but does have urinary frequency and dysuria  Check labs to rule out UTI, dehydration, JIMMIE etc   Will need to be admitted or go to placement today         Amount and/or Complexity of Data Reviewed  Clinical lab tests: ordered and reviewed  Review and summarize past medical records: yes        Disposition  Final diagnoses:   Acute cystitis without hematuria   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 ([address] )   Unable to mobilize in home     Time reflects when diagnosis was documented in both MDM as applicable and the Disposition within this note     Time User Action Codes Description Comment    12/23/2022 12:30 PM Marilin GATICA Add [N30 00] Acute cystitis without hematuria     12/23/2022 12:32 PM Amee Russo Add [B10 542] 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 ([facility] )     12/23/2022 12:38 PM Amee Russo Add [R26 89] Unable to mobilize in home     12/25/2022 12:26 PM Silvio Watson [Z02 9] Problem related to discharge planning       ED Disposition     ED Disposition   Admit    Condition   Stable    Date/Time   Fri Dec 23, 2022 12:26 PM    Comment   Case was discussed with Dr Stuart Torres and the patient's admission status was agreed to be Admission Status: inpatient status to the service of Dr Stuart Torres              Follow-up Information    None         Current Discharge Medication List      CONTINUE these medications which have NOT CHANGED    Details   Acetaminophen (TYLENOL PO) Take by mouth 3 (three) times a day      atorvastatin (LIPITOR) 40 mg tablet TAKE 1 TABLET BY MOUTH ONCE DAILY IN THE EVENING  Qty: 90 tablet, Refills: 0    Associated Diagnoses: History of CVA (cerebrovascular accident)      cholecalciferol (VITAMIN D3) 1,000 units tablet Take 1,000 Units by mouth every evening      CRANBERRY PO Take 30,000 mg by mouth      cyanocobalamin (VITAMIN B-12) 1,000 mcg tablet Take 1,000 mcg by mouth every evening        Eliquis 5 MG TAKE 1 TABLET BY MOUTH TWICE DAILY  Qty: 152 tablet, Refills: 0    Associated Diagnoses: Chronic atrial fibrillation (HCC)      folic acid (FOLVITE) 1 mg tablet Take 1,000 mcg by mouth daily  Refills: 3      furosemide (LASIX) 40 mg tablet Take 40 mg by mouth daily      gabapentin (NEURONTIN) 100 mg capsule Take 100 mg by mouth 3 (three) times a day      ipratropium (ATROVENT) 0 03 % nasal spray 1-2 sprays each nostril twice a day as needed for rhinorrhea  Qty: 30 mL, Refills: 6    Associated Diagnoses: Vasomotor rhinitis      metFORMIN (GLUCOPHAGE) 500 mg tablet Take 500 mg by mouth daily      metoprolol tartrate (LOPRESSOR) 25 mg tablet Take 25 mg by mouth daily      Multiple Vitamin (MULTI-VITAMIN DAILY PO) Take by mouth      primidone (MYSOLINE) 50 mg tablet Take by mouth 2 (two) times a day       spironolactone (ALDACTONE) 25 mg tablet Take 1 tablet (25 mg total) by mouth daily  Qty: 30 tablet, Refills: 5    Associated Diagnoses: Chronic diastolic congestive heart failure ([facility] [address] )             No discharge procedures on file      PDMP Review       Value Time User    PDMP Reviewed  Yes 3/12/2020 11:49 AM Jazz Khanna MD          ED Provider  Electronically Signed by           Ita Barr,   12/23/22 [address] [place] [address],   12/26/22 7247

## 2022-12-24 PROBLEM — E66.09 CLASS 1 OBESITY DUE TO EXCESS CALORIES WITH SERIOUS COMORBIDITY AND BODY MASS INDEX (BMI) OF 33.0 TO 33.9 IN ADULT: Status: ACTIVE | Noted: 2022-01-19

## 2022-12-24 PROBLEM — N30.00 ACUTE CYSTITIS WITHOUT HEMATURIA: Status: ACTIVE | Noted: 2020-02-23

## 2022-12-24 PROBLEM — E78.49 OTHER HYPERLIPIDEMIA: Status: ACTIVE | Noted: 2019-07-18

## 2022-12-24 LAB
ALBUMIN SERPL BCP-MCNC: 2.8 G/DL (ref 3.5–5)
ALP SERPL-CCNC: 197 U/L (ref 46–116)
ALT SERPL W P-5'-P-CCNC: 30 U/L (ref 12–78)
ANION GAP SERPL CALCULATED.3IONS-SCNC: 6 MMOL/L (ref 4–13)
AST SERPL W P-5'-P-CCNC: 31 U/L (ref 5–45)
BASOPHILS # BLD AUTO: 0.06 THOUSANDS/ÂΜL (ref 0–0.1)
BASOPHILS NFR BLD AUTO: 1 % (ref 0–1)
BILIRUB SERPL-MCNC: 0.3 MG/DL (ref 0.2–1)
BUN SERPL-MCNC: 20 MG/DL (ref 5–25)
CALCIUM ALBUM COR SERPL-MCNC: 10.9 MG/DL (ref 8.3–10.1)
CALCIUM SERPL-MCNC: 9.9 MG/DL (ref 8.3–10.1)
CHLORIDE SERPL-SCNC: 103 MMOL/L (ref 96–108)
CO2 SERPL-SCNC: 29 MMOL/L (ref 21–32)
CREAT SERPL-MCNC: 0.8 MG/DL (ref 0.6–1.3)
EOSINOPHIL # BLD AUTO: 0.18 THOUSAND/ÂΜL (ref 0–0.61)
EOSINOPHIL NFR BLD AUTO: 4 % (ref 0–6)
ERYTHROCYTE [DISTWIDTH] IN BLOOD BY AUTOMATED COUNT: 12.5 % (ref 11.6–15.1)
EST. AVERAGE GLUCOSE BLD GHB EST-MCNC: 103 MG/DL
GFR SERPL CREATININE-BSD FRML MDRD: 66 ML/MIN/1.73SQ M
GLUCOSE SERPL-MCNC: 103 MG/DL (ref 65–140)
GLUCOSE SERPL-MCNC: 145 MG/DL (ref 65–140)
GLUCOSE SERPL-MCNC: 88 MG/DL (ref 65–140)
GLUCOSE SERPL-MCNC: 95 MG/DL (ref 65–140)
GLUCOSE SERPL-MCNC: 97 MG/DL (ref 65–140)
HBA1C MFR BLD: 5.2 %
HCT VFR BLD AUTO: 35.3 % (ref 34.8–46.1)
HGB BLD-MCNC: 11.7 G/DL (ref 11.5–15.4)
IMM GRANULOCYTES # BLD AUTO: 0.02 THOUSAND/UL (ref 0–0.2)
IMM GRANULOCYTES NFR BLD AUTO: 0 % (ref 0–2)
LYMPHOCYTES # BLD AUTO: 1.94 THOUSANDS/ÂΜL (ref 0.6–4.47)
LYMPHOCYTES NFR BLD AUTO: 44 % (ref 14–44)
MCH RBC QN AUTO: 31.2 PG (ref 26.8–34.3)
MCHC RBC AUTO-ENTMCNC: 33.1 G/DL (ref 31.4–37.4)
MCV RBC AUTO: 94 FL (ref 82–98)
MONOCYTES # BLD AUTO: 0.5 THOUSAND/ÂΜL (ref 0.17–1.22)
MONOCYTES NFR BLD AUTO: 11 % (ref 4–12)
NEUTROPHILS # BLD AUTO: 1.77 THOUSANDS/ÂΜL (ref 1.85–7.62)
NEUTS SEG NFR BLD AUTO: 40 % (ref 43–75)
NRBC BLD AUTO-RTO: 0 /100 WBCS
PLATELET # BLD AUTO: 131 THOUSANDS/UL (ref 149–390)
PMV BLD AUTO: 10 FL (ref 8.9–12.7)
POTASSIUM SERPL-SCNC: 3.3 MMOL/L (ref 3.5–5.3)
PROT SERPL-MCNC: 7.3 G/DL (ref 6.4–8.4)
RBC # BLD AUTO: 3.75 MILLION/UL (ref 3.81–5.12)
SODIUM SERPL-SCNC: 138 MMOL/L (ref 135–147)
WBC # BLD AUTO: 4.47 THOUSAND/UL (ref 4.31–10.16)

## 2022-12-24 RX ORDER — POTASSIUM CHLORIDE 20 MEQ/1
40 TABLET, EXTENDED RELEASE ORAL 2 TIMES DAILY
Status: COMPLETED | OUTPATIENT
Start: 2022-12-24 | End: 2022-12-24

## 2022-12-24 RX ADMIN — CYANOCOBALAMIN TAB 500 MCG 1000 MCG: 500 TAB at 17:31

## 2022-12-24 RX ADMIN — AZTREONAM 2000 MG: 2 INJECTION, POWDER, LYOPHILIZED, FOR SOLUTION INTRAMUSCULAR; INTRAVENOUS at 00:07

## 2022-12-24 RX ADMIN — AZTREONAM 2000 MG: 2 INJECTION, POWDER, LYOPHILIZED, FOR SOLUTION INTRAMUSCULAR; INTRAVENOUS at 08:00

## 2022-12-24 RX ADMIN — FUROSEMIDE 40 MG: 40 TABLET ORAL at 08:55

## 2022-12-24 RX ADMIN — PRIMIDONE 50 MG: 50 TABLET ORAL at 08:55

## 2022-12-24 RX ADMIN — APIXABAN 5 MG: 5 TABLET, FILM COATED ORAL at 08:55

## 2022-12-24 RX ADMIN — ACETAMINOPHEN 650 MG: 325 TABLET ORAL at 05:15

## 2022-12-24 RX ADMIN — GABAPENTIN 100 MG: 100 CAPSULE ORAL at 22:25

## 2022-12-24 RX ADMIN — SPIRONOLACTONE 25 MG: 25 TABLET ORAL at 08:55

## 2022-12-24 RX ADMIN — SENNOSIDES 8.6 MG: 8.6 TABLET, FILM COATED ORAL at 08:55

## 2022-12-24 RX ADMIN — APIXABAN 5 MG: 5 TABLET, FILM COATED ORAL at 17:31

## 2022-12-24 RX ADMIN — FOLIC ACID 1000 MCG: 1 TABLET ORAL at 08:55

## 2022-12-24 RX ADMIN — ACETAMINOPHEN 650 MG: 325 TABLET ORAL at 22:25

## 2022-12-24 RX ADMIN — Medication 1000 UNITS: at 17:31

## 2022-12-24 RX ADMIN — POTASSIUM CHLORIDE 40 MEQ: 1500 TABLET, EXTENDED RELEASE ORAL at 08:57

## 2022-12-24 RX ADMIN — GABAPENTIN 100 MG: 100 CAPSULE ORAL at 16:07

## 2022-12-24 RX ADMIN — METOPROLOL TARTRATE 25 MG: 25 TABLET, FILM COATED ORAL at 08:55

## 2022-12-24 RX ADMIN — POTASSIUM CHLORIDE 40 MEQ: 1500 TABLET, EXTENDED RELEASE ORAL at 17:31

## 2022-12-24 RX ADMIN — DOCUSATE SODIUM 100 MG: 100 CAPSULE, LIQUID FILLED ORAL at 17:31

## 2022-12-24 RX ADMIN — GABAPENTIN 100 MG: 100 CAPSULE ORAL at 08:55

## 2022-12-24 RX ADMIN — DOCUSATE SODIUM 100 MG: 100 CAPSULE, LIQUID FILLED ORAL at 08:55

## 2022-12-24 RX ADMIN — ACETAMINOPHEN 650 MG: 325 TABLET ORAL at 16:07

## 2022-12-24 RX ADMIN — PRIMIDONE 50 MG: 50 TABLET ORAL at 17:31

## 2022-12-24 RX ADMIN — ATORVASTATIN CALCIUM 40 MG: 40 TABLET, FILM COATED ORAL at 17:31

## 2022-12-24 RX ADMIN — B-COMPLEX W/ C & FOLIC ACID TAB 1 TABLET: TAB at 08:55

## 2022-12-24 NOTE — PLAN OF CARE
Problem: Potential for Falls  Goal: Patient will remain free of falls  Description: INTERVENTIONS:  - Educate patient/family on patient safety including physical limitations  - Instruct patient to call for assistance with activity   - Consult OT/PT to assist with strengthening/mobility   - Keep Call bell within reach  - Keep bed low and locked with side rails adjusted as appropriate  - Keep care items and personal belongings within reach  - Initiate and maintain comfort rounds  - Make Fall Risk Sign visible to staff  - Offer Toileting every 2 Hours, in advance of need  - Initiate/Maintain bed alarm  - Obtain necessary fall risk management equipment:  socks  - Apply yellow socks and bracelet for high fall risk patients  - Consider moving patient to room near nurses station  12/23/2022 2221 by Reece Montana RN  Outcome: Progressing  12/23/2022 2219 by Reece Montana RN  Outcome: Progressing     Problem: MOBILITY - ADULT  Goal: Maintain or return to baseline ADL function  Description: INTERVENTIONS:  -  Assess patient's ability to carry out ADLs; assess patient's baseline for ADL function and identify physical deficits which impact ability to perform ADLs (bathing, care of mouth/teeth, toileting, grooming, dressing, etc )  - Assess/evaluate cause of self-care deficits   - Assess range of motion  - Assess patient's mobility; develop plan if impaired  - Assess patient's need for assistive devices and provide as appropriate  - Encourage maximum independence but intervene and supervise when necessary  - Involve family in performance of ADLs  - Assess for home care needs following discharge   - Consider OT consult to assist with ADL evaluation and planning for discharge  - Provide patient education as appropriate  12/23/2022 2221 by Reece Montana RN  Outcome: Progressing  12/23/2022 2219 by Reece Montana RN  Outcome: Progressing  Goal: Maintains/Returns to pre admission functional level  Description: INTERVENTIONS:  - Perform BMAT or MOVE assessment daily    - Set and communicate daily mobility goal to care team and patient/family/caregiver  - Collaborate with rehabilitation services on mobility goals if consulted  - Perform Range of Motion 3 times a day  - Reposition patient every 2 hours  - Dangle patient 3 times a day  - Out of bed to chair 3 times a day   - Out of bed for meals 3 times a day  - Out of bed for toileting  - Record patient progress and toleration of activity level   12/23/2022 2221 by Key Manley RN  Outcome: Progressing  12/23/2022 2219 by Key Manley RN  Outcome: Progressing     Problem: MOBILITY - ADULT  Goal: Maintains/Returns to pre admission functional level  Description: INTERVENTIONS:  - Perform BMAT or MOVE assessment daily    - Set and communicate daily mobility goal to care team and patient/family/caregiver  - Collaborate with rehabilitation services on mobility goals if consulted  - Perform Range of Motion 3 times a day  - Reposition patient every 2 hours    - Dangle patient 3 times a day  - Out of bed to chair 3 times a day   - Out of bed for meals 3 times a day  - Out of bed for toileting  - Record patient progress and toleration of activity level   12/23/2022 2221 by Key Manley RN  Outcome: Progressing  12/23/2022 2219 by Key Manley RN  Outcome: Progressing     Problem: Prexisting or High Potential for Compromised Skin Integrity  Goal: Skin integrity is maintained or improved  Description: INTERVENTIONS:  - Identify patients at risk for skin breakdown  - Assess and monitor skin integrity  - Assess and monitor nutrition and hydration status  - Monitor labs   - Assess for incontinence   - Turn and reposition patient  - Assist with mobility/ambulation  - Relieve pressure over bony prominences  - Avoid friction and shearing  - Provide appropriate hygiene as needed including keeping skin clean and dry  - Evaluate need for skin moisturizer/barrier cream  - Collaborate with interdisciplinary team   - Patient/family teaching  - Consider wound care consult   12/23/2022 2221 by Joseluis Colunga RN  Outcome: Progressing  12/23/2022 2219 by Joseluis Colunga RN  Outcome: Progressing

## 2022-12-24 NOTE — OCCUPATIONAL THERAPY NOTE
Occupational Therapy Screen Note     Patient Name: Shanna Whittaker  DQCSV'Q Date: 12/24/2022  Problem List  Active Problems:    Type 2 diabetes mellitus (HCC)    Paroxysmal atrial fibrillation (HCC)    History of stroke    Hyperlipidemia    Urinary tract infection    Obesity    Problem related to discharge planning              12/24/22 0738   OT Last Visit   OT Visit Date 12/24/22   Note Type   Note type Screen   Additional Comments OT orders received  Chart reviewed  Per CM note, pt is total assist for ADLs at home with use of abraham lift  Pt additionally on hospice services  Pt with no acute OT needs  Will DC OT Orders         Margarette Gorman OTR/L

## 2022-12-24 NOTE — PROGRESS NOTES
[place]  Progress Note - Corbin Hensley 1935, 80 y o  female MRN: [id number]  Unit/Bed#: -Jeane Encounter: [id number]  Primary Care Provider: Christen Marcelino MD   Date and time admitted to hospital: 12/23/2022  9:09 AM    * Problem related to discharge planning  Assessment & Plan  Patient has 24/7 caregivers at home however due to 1 caregiver resigning from position patient needed respite care  Was being followed by hospice at home  Admitted with a DNR/DNI status  Case management following  Working towards placement for respite care in the facility  Potentially may be discharged home with hospice care on the 27th or 28th if still admitted    Class 1 obesity due to excess calories with serious comorbidity and body mass index (BMI) of 33 0 to 33 9 in adult  Assessment & Plan  Encourage lifestyle modification      Acute cystitis without hematuria  Assessment & Plan  UA showing + leukocytes, negative nitrates occasional bacteria 4-10 white blood cells  Was initiated on aztreonam -> completed 1 3 days of antibiotics-> DC'd as UA not overtly showing UTI  continue to monitor fever curve  Urine culture pending    Other hyperlipidemia  Assessment & Plan  Continue home statin therapy    Paroxysmal atrial fibrillation (HCC)  Assessment & Plan  Rate controlled with Lopressor  Anticoagulated with Eliquis  Continue home medications    Type 2 diabetes mellitus ([facility] [address] )  Assessment & Plan  Lab Results   Component Value Date    HGBA1C 6 2 (H) 08/20/2020       Recent Labs     12/23/22  1704 12/23/22  2147 12/24/22  0713   POCGLU 106 164* 103       Blood Sugar Average: Last 72 hrs:  (P) 135 [id number]     ISS  Repeat A1c  Hypoglycemic protocol      VTE Pharmacologic Prophylaxis: VTE Score: 6 High Risk (Score >/= 5) - Pharmacological DVT Prophylaxis Ordered: apixaban (Eliquis)  Sequential Compression Devices Ordered      Patient Centered Rounds: I performed bedside rounds with nursing staff today  Discussions with Specialists or Other Care Team Provider: CM    Education and Discussions with Family / Patient: Patient declined call to   Time Spent for Care: 30 minutes  More than 50% of total time spent on counseling and coordination of care as described above  Current Length of Stay: 1 day(s)  Current Patient Status: Inpatient   Certification Statement: The patient will continue to require additional inpatient hospital stay due to placement  Discharge Plan: Anticipate discharge in >72 hrs to tbd    Code Status: Level 3 - DNAR and DNI    Subjective:   Edward Simon was seen and examined at bedside  No acute events overnight  Discussed plan of care  All questions and concerns were answered and addressed  Objective:     Vitals:   Temp (24hrs), Av 5 °F (36 4 °C), Min:97 4 °F (36 3 °C), Max:97 6 °F (36 4 °C)    Temp:  [97 4 °F (36 3 °C)-97 6 °F (36 4 °C)] 97 4 °F (36 3 °C)  HR:  [67-70] 67  Resp:  [15-18] 17  BP: (131-194)/(60-82) 131/60  SpO2:  [99 %] 99 %  Body mass index is 33 75 kg/m²  Input and Output Summary (last 24 hours): Intake/Output Summary (Last 24 hours) at 2022 1537  Last data filed at 2022 1327  Gross per 24 hour   Intake 480 ml   Output 1350 ml   Net -870 ml       Physical Exam:   Physical Exam  Vitals and nursing note reviewed  Constitutional:       Appearance: Normal appearance  She is obese  She is not ill-appearing  HENT:      Head: Normocephalic and atraumatic  Cardiovascular:      Rate and Rhythm: Normal rate and regular rhythm  Pulses: Normal pulses  Heart sounds: Normal heart sounds  Pulmonary:      Effort: Pulmonary effort is normal       Breath sounds: Normal breath sounds  Abdominal:      General: Abdomen is flat  Bowel sounds are normal       Palpations: Abdomen is soft  Musculoskeletal:      Right lower leg: No edema  Left lower leg: No edema  Skin:     General: Skin is warm     Neurological: General: No focal deficit present  Mental Status: She is alert and oriented to person, place, and time  Additional Data:     Labs:  Results from last 7 days   Lab Units 12/24/22  0503   WBC Thousand/uL 4 47   HEMOGLOBIN g/dL 11 7   HEMATOCRIT % 35 3   PLATELETS Thousands/uL 131*   NEUTROS PCT % 40*   LYMPHS PCT % 44   MONOS PCT % 11   EOS PCT % 4     Results from last 7 days   Lab Units 12/24/22  0503   SODIUM mmol/L 138   POTASSIUM mmol/L 3 3*   CHLORIDE mmol/L 103   CO2 mmol/L 29   BUN mg/dL 20   CREATININE mg/dL 0 80   ANION GAP mmol/L 6   CALCIUM mg/dL 9 9   ALBUMIN g/dL 2 8*   TOTAL BILIRUBIN mg/dL 0 30   ALK PHOS U/L 197*   ALT U/L 30   AST U/L 31   GLUCOSE RANDOM mg/dL 95         Results from last 7 days   Lab Units 12/24/22  1127 12/24/22  0713 12/23/22  2147 12/23/22  1704   POC GLUCOSE mg/dl 145* 103 164* 106     Results from last 7 days   Lab Units 12/24/22  0503   HEMOGLOBIN A1C % 5 2           Lines/Drains:  Invasive Devices     Peripheral Intravenous Line  Duration           Peripheral IV 12/23/22 Right Forearm 1 day          Drain  Duration           External Urinary Catheter <1 day                      Imaging: No pertinent imaging reviewed      Recent Cultures (last 7 days):   Results from last 7 days   Lab Units 12/23/22  0948   URINE CULTURE  Culture too young- will reincubate       Last 24 Hours Medication List:   Current Facility-Administered Medications   Medication Dose Route Frequency Provider Last Rate   • acetaminophen  650 mg Oral Q8H Jah Kenney MD     • aluminum-magnesium hydroxide-simethicone  30 mL Oral Q6H PRN Eb Cardenas MD     • apixaban  5 mg Oral BID Eb Cardenas MD     • atorvastatin  40 mg Oral QPM Eb Cardenas MD     • cholecalciferol  1,000 Units Oral QPM Eb Cardenas MD     • vitamin B-12  1,000 mcg Oral QPM Eb Cardenas MD     • docusate sodium  100 mg Oral BID Eb Cardenas MD     • folic acid  2,391 mcg Oral Daily Martinez Ng MD     • furosemide  40 mg Oral Daily Martinez Ng MD     • gabapentin  100 mg Oral TID Martinez Ng MD     • insulin lispro  1-6 Units Subcutaneous TID [facility] Martinez Ng MD     • metoprolol tartrate  25 mg Oral Daily Martinez Ng MD     • multivitamin stress formula  1 tablet Oral Daily Martinez Ng MD     • potassium chloride  40 mEq Oral BID Stephanie Alvarez MD     • primidone  50 mg Oral BID Martinez Ng MD     • senna  1 tablet Oral Daily Martinez Ng MD     • spironolactone  25 mg Oral Daily Martinez Ng MD          Today, Patient Was Seen By: Stephanie Alvarez MD    **Please Note: This note may have been constructed using a voice recognition system  **

## 2022-12-24 NOTE — ASSESSMENT & PLAN NOTE
UA showing + leukocytes, negative nitrates occasional bacteria 4-10 white blood cells  Was initiated on aztreonam -> completed 1 3 days of antibiotics-> DC'd as UA not overtly showing UTI  continue to monitor fever curve  Urine culture pending

## 2022-12-24 NOTE — PLAN OF CARE
Problem: Potential for Falls  Goal: Patient will remain free of falls  Description: INTERVENTIONS:  - Educate patient/family on patient safety including physical limitations  - Instruct patient to call for assistance with activity   - Consult OT/PT to assist with strengthening/mobility   - Keep Call bell within reach  - Keep bed low and locked with side rails adjusted as appropriate  - Keep care items and personal belongings within reach  - Initiate and maintain comfort rounds  - Make Fall Risk Sign visible to staff  - Offer Toileting every 2 Hours, in advance of need  - Initiate/Maintain bed alarm  - Obtain necessary fall risk management equipment:  socks  - Apply yellow socks and bracelet for high fall risk patients  - Consider moving patient to room near nurses station  Outcome: Progressing     Problem: MOBILITY - ADULT  Goal: Maintain or return to baseline ADL function  Description: INTERVENTIONS:  -  Assess patient's ability to carry out ADLs; assess patient's baseline for ADL function and identify physical deficits which impact ability to perform ADLs (bathing, care of mouth/teeth, toileting, grooming, dressing, etc )  - Assess/evaluate cause of self-care deficits   - Assess range of motion  - Assess patient's mobility; develop plan if impaired  - Assess patient's need for assistive devices and provide as appropriate  - Encourage maximum independence but intervene and supervise when necessary  - Involve family in performance of ADLs  - Assess for home care needs following discharge   - Consider OT consult to assist with ADL evaluation and planning for discharge  - Provide patient education as appropriate  Outcome: Progressing  Goal: Maintains/Returns to pre admission functional level  Description: INTERVENTIONS:  - Perform BMAT or MOVE assessment daily    - Set and communicate daily mobility goal to care team and patient/family/caregiver     - Collaborate with rehabilitation services on mobility goals if consulted  - Perform Range of Motion 3 times a day  - Reposition patient every 2 hours    - Dangle patient 3 times a day  - Out of bed to chair 3 times a day   - Out of bed for meals 3 times a day  - Out of bed for toileting  - Record patient progress and toleration of activity level   Outcome: Progressing     Problem: Prexisting or High Potential for Compromised Skin Integrity  Goal: Skin integrity is maintained or improved  Description: INTERVENTIONS:  - Identify patients at risk for skin breakdown  - Assess and monitor skin integrity  - Assess and monitor nutrition and hydration status  - Monitor labs   - Assess for incontinence   - Turn and reposition patient  - Assist with mobility/ambulation  - Relieve pressure over bony prominences  - Avoid friction and shearing  - Provide appropriate hygiene as needed including keeping skin clean and dry  - Evaluate need for skin moisturizer/barrier cream  - Collaborate with interdisciplinary team   - Patient/family teaching  - Consider wound care consult   Outcome: Progressing

## 2022-12-24 NOTE — UTILIZATION REVIEW
Initial Clinical Review    Admission: Date/Time/Statement:   Admission Orders (From admission, onward)     Ordered        12/23/22 [facility]  Once                      Orders Placed This Encounter   Procedures   • INPATIENT ADMISSION     Standing Status:   Standing     Number of Occurrences:   1     Order Specific Question:   Level of Care     Answer:   Med Surg [16]     Order Specific Question:   Estimated length of stay     Answer:   More than 2 Midnights     Order Specific Question:   Certification     Answer:   I certify that inpatient services are medically necessary for this patient for a duration of greater than two midnights  See H&P and MD Progress Notes for additional information about the patient's course of treatment  ED Arrival Information     Expected   -    Arrival   12/23/2022 09:06    Acuity   Urgent            Means of arrival   Ambulance    Escorted by   Silvia Polk [facility])    Service   Hospitalist    Admission type   Emergency            Arrival complaint   -           Chief Complaint   Patient presents with   • Medical Problem     Pt to er via ems from home after getting a call from caretaker that there was no one to be with her  Patient has 24 hour home care - last employee was unable to stay, employee that was supposed to come in was unable  Patient has no complaints  No distress noted  Initial Presentation: 80 y o  female presents to ED via  EMS  From home  With inability to care for  Self at home  Has a  24 hr caregiver, unable to care for  patient  No family  Patient  Complains  Of increased urinary frequency and dysuria, concerned she may have a  UTI  Denies fever or abdominal pain  PMH  Is   Stroke, PAF, DM2  Admit  Ip with  Acute cystitis,  Unsafe discharge and plan is  [place], monitor labs, urine culture and continue home meds  Date:    12/24        Day 2:   Needs  PT/OT  Continue to monitor labs and  Follow  Urine culture   U/A  Shows with + leukocytes, neg nitrates,  occ bacteria  [place]  Started, now  D/c as no   Overt UTI  Continue current meds  Needs safe  D/c plan       ED Triage Vitals   Temperature Pulse Respirations Blood Pressure SpO2   12/23/22 0906 12/23/22 0906 12/23/22 0906 12/23/22 0906 12/23/22 0906   97 8 °F (36 6 °C) 72 18 137/63 94 %      Temp Source Heart Rate Source Patient Position - Orthostatic VS BP Location FiO2 (%)   12/23/22 0906 12/23/22 0906 12/23/22 0906 12/23/22 0906 --   Oral Monitor Lying Right arm       Pain Score       12/23/22 1135       No Pain          Wt Readings from Last 1 Encounters:   12/24/22 92 kg (202 lb 13 2 oz)     Additional Vital Signs:   97 4 °F (36 3 °C) Abnormal  68 18 194/82 Abnormal  -- 99 % None (Room air) Lying    12/23/22 2130 -- -- -- -- -- -- None (Room air) --   12/23/22 1553 97 6 °F (36 4 °C) 70 15 141/65 93 -- -- Sitting   12/23/22 1349 -- 72 20 150/77 -- 94 % None (Room air) Lying   12/23/22 1135 -- 66 21 146/84 -- 95 % None (Room air) Lying   12/23/22 0955 -- -- -- -- -- -- None (Room air) --   12/23/22 0906 97 8 °F (36 6 °C) 72 18 137/63 -- 94 % None (Room air) Lying       Pertinent Labs/Diagnostic Test Results:       Results from last 7 days   Lab Units 12/24/22  0503 12/23/22  0947   WBC Thousand/uL 4 47 6 40   HEMOGLOBIN g/dL 11 7 13 2   HEMATOCRIT % 35 3 40 3   PLATELETS Thousands/uL 131* 145*   NEUTROS ABS Thousands/µL 1 77* 4 32         Results from last 7 days   Lab Units 12/24/22  0503 12/23/22  0947   SODIUM mmol/L 138 137   POTASSIUM mmol/L 3 3* 3 9   CHLORIDE mmol/L 103 101   CO2 mmol/L 29 26   ANION GAP mmol/L 6 10   BUN mg/dL 20 19   CREATININE mg/dL 0 80 0 76   EGFR ml/min/1 73sq m 66 70   CALCIUM mg/dL 9 9 9 1     Results from last 7 days   Lab Units 12/24/22  0503 12/23/22  0947   AST U/L 31 45   ALT U/L 30 36   ALK PHOS U/L 197* 215*   TOTAL PROTEIN g/dL 7 3 8 1   ALBUMIN g/dL 2 8* 3 1*   TOTAL BILIRUBIN mg/dL 0 30 0 40     Results from last 7 days   Lab Units 12/24/22  1127 12/24/22  0713 12/23/22  2147 12/23/22  1704   POC GLUCOSE mg/dl 145* 103 164* 106     Results from last 7 days   Lab Units 12/24/22  0503 12/23/22  0947   GLUCOSE RANDOM mg/dL 95 105                   Results from last 7 days   Lab Units 12/23/22  0948   CLARITY UA  Clear   COLOR UA  Yellow   SPEC GRAV UA  1 020   PH UA  7 5   GLUCOSE UA mg/dl Negative   KETONES UA mg/dl Negative   BLOOD UA  Negative   PROTEIN UA mg/dl Negative   NITRITE UA  Negative   BILIRUBIN UA  Negative   UROBILINOGEN UA (BE) mg/dl <2 0   LEUKOCYTES UA  Large*   WBC UA /hpf 4-10*   RBC UA /hpf None Seen   BACTERIA UA /hpf Occasional   EPITHELIAL CELLS WET PREP /hpf Occasional             ED Treatment:   Medication Administration from 12/23/2022 0906 to 12/23/2022 1521       Date/Time Order Dose Route Action Comments     12/23/2022 1348 EST cefepime (MAXIPIME) IVPB (premix in dextrose) 2,000 mg 50 mL 0 mg Intravenous Stopped --     12/23/2022 1318 EST cefepime (MAXIPIME) IVPB (premix in dextrose) 2,000 mg 50 mL 2,000 mg Intravenous New Bag --          Present on Admission:  • Type 2 diabetes mellitus (HCC)  • Paroxysmal atrial fibrillation (HCC)  • Other hyperlipidemia      Admitting Diagnosis: Failure to thrive in adult [R62 7]  Acute cystitis without hematuria [N30 00]  Unable to mobilize in home [R26 89]  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) [I69 214]  Age/Sex: 80 y o  female  Admission Orders:  Scheduled Medications:  acetaminophen, 650 mg, Oral, Q8H STEPHANIE  apixaban, 5 mg, Oral, BID  atorvastatin, 40 mg, Oral, QPM  cholecalciferol, 1,000 Units, Oral, QPM  vitamin B-12, 1,000 mcg, Oral, QPM  docusate sodium, 100 mg, Oral, BID  folic acid, 8,772 mcg, Oral, Daily  furosemide, 40 mg, Oral, Daily  gabapentin, 100 mg, Oral, TID  insulin lispro, 1-6 Units, Subcutaneous, TID AC  metoprolol tartrate, 25 mg, Oral, Daily  multivitamin stress formula, 1 tablet, Oral, Daily  potassium chloride, 40 mEq, Oral, BID  primidone, 50 mg, Oral, BID  senna, 1 tablet, Oral, Daily  spironolactone, 25 mg, Oral, Daily      Continuous IV Infusions:     PRN Meds:  aluminum-magnesium hydroxide-simethicone, 30 mL, Oral, Q6H PRN        IP CONSULT TO CASE MANAGEMENT    Network Utilization Review Department  ATTENTION: Please call with any questions or concerns to [phone] and carefully listen to the prompts so that you are directed to the right person  All voicemails are confidential   Randy Higginbotham all requests for admission clinical reviews, approved or denied determinations and any other requests to dedicated fax number below belonging to the campus where the patient is receiving treatment   List of dedicated fax numbers for the Facilities:  [address] DENIALS (Administrative/Medical Necessity) [phone]   [address] (Maternity/NICU/Pediatrics) [phone]   [address] Marilia Cooke [phone]   [facility] 77 [phone]   [address] [address] [address] [facility] 28 [phone] [address] [facility] 134 815 [facility] [phone]

## 2022-12-24 NOTE — ASSESSMENT & PLAN NOTE
Patient has 24/7 caregivers at home however due to 1 caregiver resigning from position patient needed respite care  Was being followed by hospice at home      Admitted with a DNR/DNI status  Case management following  Working towards placement for respite care in the facility  Potentially may be discharged home with hospice care on the 27th or 28th if still admitted

## 2022-12-24 NOTE — ASSESSMENT & PLAN NOTE
Lab Results   Component Value Date    HGBA1C 6 2 (H) 08/20/2020       Recent Labs     12/23/22  1704 12/23/22  2147 12/24/22  0713   POCGLU 106 164* 103       Blood Sugar Average: Last 72 hrs:  (P) 467 [id number]     ISS  Repeat A1c  Hypoglycemic protocol

## 2022-12-25 LAB
ANION GAP SERPL CALCULATED.3IONS-SCNC: 3 MMOL/L (ref 4–13)
BACTERIA UR CULT: ABNORMAL
BUN SERPL-MCNC: 21 MG/DL (ref 5–25)
CALCIUM SERPL-MCNC: 9.7 MG/DL (ref 8.3–10.1)
CHLORIDE SERPL-SCNC: 103 MMOL/L (ref 96–108)
CO2 SERPL-SCNC: 28 MMOL/L (ref 21–32)
CREAT SERPL-MCNC: 0.77 MG/DL (ref 0.6–1.3)
ERYTHROCYTE [DISTWIDTH] IN BLOOD BY AUTOMATED COUNT: 12.7 % (ref 11.6–15.1)
GFR SERPL CREATININE-BSD FRML MDRD: 69 ML/MIN/1.73SQ M
GLUCOSE SERPL-MCNC: 107 MG/DL (ref 65–140)
GLUCOSE SERPL-MCNC: 144 MG/DL (ref 65–140)
GLUCOSE SERPL-MCNC: 176 MG/DL (ref 65–140)
GLUCOSE SERPL-MCNC: 81 MG/DL (ref 65–140)
GLUCOSE SERPL-MCNC: 92 MG/DL (ref 65–140)
HCT VFR BLD AUTO: 37.8 % (ref 34.8–46.1)
HGB BLD-MCNC: 12.1 G/DL (ref 11.5–15.4)
MCH RBC QN AUTO: 30.7 PG (ref 26.8–34.3)
MCHC RBC AUTO-ENTMCNC: 32 G/DL (ref 31.4–37.4)
MCV RBC AUTO: 96 FL (ref 82–98)
PLATELET # BLD AUTO: 150 THOUSANDS/UL (ref 149–390)
PMV BLD AUTO: 10.1 FL (ref 8.9–12.7)
POTASSIUM SERPL-SCNC: 4.6 MMOL/L (ref 3.5–5.3)
RBC # BLD AUTO: 3.94 MILLION/UL (ref 3.81–5.12)
SODIUM SERPL-SCNC: 134 MMOL/L (ref 135–147)
WBC # BLD AUTO: 5.06 THOUSAND/UL (ref 4.31–10.16)

## 2022-12-25 RX ORDER — CEFEPIME HYDROCHLORIDE 2 G/50ML
2000 INJECTION, SOLUTION INTRAVENOUS EVERY 12 HOURS
Status: DISCONTINUED | OUTPATIENT
Start: 2022-12-25 | End: 2022-12-27 | Stop reason: HOSPADM

## 2022-12-25 RX ADMIN — ACETAMINOPHEN 650 MG: 325 TABLET ORAL at 05:04

## 2022-12-25 RX ADMIN — INSULIN LISPRO 1 UNITS: 100 INJECTION, SOLUTION INTRAVENOUS; SUBCUTANEOUS at 12:22

## 2022-12-25 RX ADMIN — METOPROLOL TARTRATE 25 MG: 25 TABLET, FILM COATED ORAL at 08:49

## 2022-12-25 RX ADMIN — ACETAMINOPHEN 650 MG: 325 TABLET ORAL at 13:18

## 2022-12-25 RX ADMIN — ATORVASTATIN CALCIUM 40 MG: 40 TABLET, FILM COATED ORAL at 17:19

## 2022-12-25 RX ADMIN — B-COMPLEX W/ C & FOLIC ACID TAB 1 TABLET: TAB at 08:49

## 2022-12-25 RX ADMIN — CEFEPIME HYDROCHLORIDE 2000 MG: 2 INJECTION, SOLUTION INTRAVENOUS at 13:18

## 2022-12-25 RX ADMIN — GABAPENTIN 100 MG: 100 CAPSULE ORAL at 17:19

## 2022-12-25 RX ADMIN — CYANOCOBALAMIN TAB 500 MCG 1000 MCG: 500 TAB at 17:19

## 2022-12-25 RX ADMIN — FUROSEMIDE 40 MG: 40 TABLET ORAL at 08:49

## 2022-12-25 RX ADMIN — FOLIC ACID 1000 MCG: 1 TABLET ORAL at 08:49

## 2022-12-25 RX ADMIN — SENNOSIDES 8.6 MG: 8.6 TABLET, FILM COATED ORAL at 08:48

## 2022-12-25 RX ADMIN — DOCUSATE SODIUM 100 MG: 100 CAPSULE, LIQUID FILLED ORAL at 17:19

## 2022-12-25 RX ADMIN — PRIMIDONE 50 MG: 50 TABLET ORAL at 08:48

## 2022-12-25 RX ADMIN — ACETAMINOPHEN 650 MG: 325 TABLET ORAL at 21:11

## 2022-12-25 RX ADMIN — PRIMIDONE 50 MG: 50 TABLET ORAL at 17:19

## 2022-12-25 RX ADMIN — GABAPENTIN 100 MG: 100 CAPSULE ORAL at 21:11

## 2022-12-25 RX ADMIN — APIXABAN 5 MG: 5 TABLET, FILM COATED ORAL at 17:19

## 2022-12-25 RX ADMIN — APIXABAN 5 MG: 5 TABLET, FILM COATED ORAL at 08:49

## 2022-12-25 RX ADMIN — SPIRONOLACTONE 25 MG: 25 TABLET ORAL at 08:48

## 2022-12-25 RX ADMIN — GABAPENTIN 100 MG: 100 CAPSULE ORAL at 08:48

## 2022-12-25 RX ADMIN — Medication 1000 UNITS: at 17:19

## 2022-12-25 RX ADMIN — DOCUSATE SODIUM 100 MG: 100 CAPSULE, LIQUID FILLED ORAL at 08:49

## 2022-12-25 NOTE — ASSESSMENT & PLAN NOTE
· UA showing + leukocytes, negative nitrates occasional bacteria 4-10 white blood cells  · Was initiated on aztreonam -> completed 1 3 days of antibiotics-> DC'd as UA not overtly showing UTI  · Continue to monitor fever curve  · Urine culture: 60,000-69,000 cfu/mL   · Initiate cefepime given symptoms
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
## 2022-12-25 NOTE — ASSESSMENT & PLAN NOTE
Lab Results   Component Value Date    HGBA1C 5 2 12/24/2022       Recent Labs     12/24/22  1607 12/24/22  2047 12/25/22  0731 12/25/22  1031   POCGLU 88 97 92 176*       Blood Sugar Average: Last 72 hrs:  (P) 121 375     · ISS  · Hypoglycemic protocol

## 2022-12-25 NOTE — PLAN OF CARE
Problem: Potential for Falls  Goal: Patient will remain free of falls  Description: INTERVENTIONS:  - Educate patient/family on patient safety including physical limitations  - Instruct patient to call for assistance with activity   - Consult OT/PT to assist with strengthening/mobility   - Keep Call bell within reach  - Keep bed low and locked with side rails adjusted as appropriate  - Keep care items and personal belongings within reach  - Initiate and maintain comfort rounds  - Make Fall Risk Sign visible to staff  - Offer Toileting every 2 Hours, in advance of need  - Initiate/Maintain bed alarm  - Obtain necessary fall risk management equipment:  socks  - Apply yellow socks and bracelet for high fall risk patients  - Consider moving patient to room near nurses station  Outcome: Progressing

## 2022-12-25 NOTE — ASSESSMENT & PLAN NOTE
· Patient has 24/7 caregivers at home however due to 1 caregiver resigning from position patient needed respite care  Was being followed by hospice at home    · Admitted with a DNR/DNI status  · Case management following  · Patient appears fearful of daughter  · Working towards placement for respite care in the facility  · Potentially may be discharged home with hospice care on the 27th or 28th if still admitted

## 2022-12-26 LAB
GLUCOSE SERPL-MCNC: 102 MG/DL (ref 65–140)
GLUCOSE SERPL-MCNC: 106 MG/DL (ref 65–140)
GLUCOSE SERPL-MCNC: 140 MG/DL (ref 65–140)

## 2022-12-26 RX ADMIN — SPIRONOLACTONE 25 MG: 25 TABLET ORAL at 08:38

## 2022-12-26 RX ADMIN — SENNOSIDES 8.6 MG: 8.6 TABLET, FILM COATED ORAL at 08:38

## 2022-12-26 RX ADMIN — DOCUSATE SODIUM 100 MG: 100 CAPSULE, LIQUID FILLED ORAL at 08:38

## 2022-12-26 RX ADMIN — PRIMIDONE 50 MG: 50 TABLET ORAL at 18:18

## 2022-12-26 RX ADMIN — GABAPENTIN 100 MG: 100 CAPSULE ORAL at 16:45

## 2022-12-26 RX ADMIN — GLYCERIN, HYPROMELLOSE, POLYETHYLENE GLYCOL 1 DROP: .2; .2; 1 LIQUID OPHTHALMIC at 22:14

## 2022-12-26 RX ADMIN — METOPROLOL TARTRATE 25 MG: 25 TABLET, FILM COATED ORAL at 08:38

## 2022-12-26 RX ADMIN — PRIMIDONE 50 MG: 50 TABLET ORAL at 08:38

## 2022-12-26 RX ADMIN — CEFEPIME HYDROCHLORIDE 2000 MG: 2 INJECTION, SOLUTION INTRAVENOUS at 12:47

## 2022-12-26 RX ADMIN — ACETAMINOPHEN 650 MG: 325 TABLET ORAL at 22:43

## 2022-12-26 RX ADMIN — DOCUSATE SODIUM 100 MG: 100 CAPSULE, LIQUID FILLED ORAL at 18:18

## 2022-12-26 RX ADMIN — Medication 1000 UNITS: at 18:18

## 2022-12-26 RX ADMIN — ACETAMINOPHEN 650 MG: 325 TABLET ORAL at 13:43

## 2022-12-26 RX ADMIN — CEFEPIME HYDROCHLORIDE 2000 MG: 2 INJECTION, SOLUTION INTRAVENOUS at 00:04

## 2022-12-26 RX ADMIN — GABAPENTIN 100 MG: 100 CAPSULE ORAL at 08:38

## 2022-12-26 RX ADMIN — ATORVASTATIN CALCIUM 40 MG: 40 TABLET, FILM COATED ORAL at 18:18

## 2022-12-26 RX ADMIN — APIXABAN 5 MG: 5 TABLET, FILM COATED ORAL at 08:38

## 2022-12-26 RX ADMIN — ACETAMINOPHEN 650 MG: 325 TABLET ORAL at 05:13

## 2022-12-26 RX ADMIN — CYANOCOBALAMIN TAB 500 MCG 1000 MCG: 500 TAB at 18:18

## 2022-12-26 RX ADMIN — B-COMPLEX W/ C & FOLIC ACID TAB 1 TABLET: TAB at 08:38

## 2022-12-26 RX ADMIN — FUROSEMIDE 40 MG: 40 TABLET ORAL at 08:39

## 2022-12-26 RX ADMIN — GABAPENTIN 100 MG: 100 CAPSULE ORAL at 22:14

## 2022-12-26 RX ADMIN — FOLIC ACID 1000 MCG: 1 TABLET ORAL at 08:38

## 2022-12-26 RX ADMIN — APIXABAN 5 MG: 5 TABLET, FILM COATED ORAL at 18:18

## 2022-12-26 NOTE — ASSESSMENT & PLAN NOTE
Lab Results   Component Value Date    HGBA1C 5 2 12/24/2022       Recent Labs     12/25/22  1031 12/25/22  1624 12/25/22  2109 12/26/22  0721   POCGLU 176* 107 144* 102       Blood Sugar Average: Last 72 hrs:  (P) 153 [id number]     · ISS  · Hypoglycemic protocol

## 2022-12-26 NOTE — CASE MANAGEMENT
Case Management Progress Note    Patient name Owen Harmon  Location [address] [phone] MRN [id number]  : 1935 Date 2022       LOS (days): 3  Geometric Mean LOS (GMLOS) (days):   Days to GMLOS:        OBJECTIVE:        Current admission status: Inpatient  Preferred Pharmacy:   [facility], [place] - [address]  [address]  [place] [address]  Phone: [phone] Fax: [phone]    UNKNOWN - FOLLOW UP PRIOR TO DISCHARGE TO E-PRESCRIBE  No address on file      [address], [address] [place] [id number]  Phone: [phone] Fax: [phone]    Primary Care Provider: Preethi Plaza MD    Primary Insurance: [facility]  Secondary Insurance: [address] NOTE:  Called and LMOM for Rani Betancourt, [address] [place] [address]  re: setting up home services  Await callback for availability

## 2022-12-26 NOTE — PHYSICAL THERAPY NOTE
Physical Therapy Screen    Patient Name: Elvis Medina    OPNQD'S Date: 12/26/2022     Problem List  Principal Problem:    Problem related to discharge planning  Active Problems:    Type 2 diabetes mellitus ([facility] )    Paroxysmal atrial fibrillation ([facility] )    Other hyperlipidemia    Acute cystitis without hematuria    Class 1 obesity due to excess calories with serious comorbidity and body mass index (BMI) of 33 0 to 33 9 in adult       Past Medical History  Past Medical History:   Diagnosis Date    A-fib ([facility] )     Arthritis     Assistance needed for mobility     pt can stand with assistance and transfer    Chronic pain disorder     Diabetes mellitus ([facility] )     NIDDM    Full dentures     History of transfusion     no adverse reaction    Hyperlipidemia     Irregular heart beat     Numbness and tingling of both feet     Skin abnormality     sacrum area - small red area, less than a dime size    Spinal stenosis     Stroke ([facility] )     Unable to ambulate     Urinary incontinence     ipg stimulator x3 repakcements,battery exchange today 2/14/2018    Wears glasses     Wheelchair dependence         Past Surgical History  Past Surgical History:   Procedure Laterality Date    BACK SURGERY      fusion    BLADDER SUSPENSION      CARPAL TUNNEL RELEASE Bilateral     CHOLANGIOGRAM N/A 6/4/2018    Procedure: CHOLANGIOGRAM;  Surgeon: Telly Mar MD;  Location: [facility];  Service: General    CHOLECYSTECTOMY LAPAROSCOPIC N/A 6/4/2018    Procedure: CHOLECYSTECTOMY LAPAROSCOPIC;  Surgeon: Telly Mar MD;  Location: [facility];  Service: General    COLONOSCOPY      DILATION AND CURETTAGE OF UTERUS      FRACTURE SURGERY Left     femur with hardware    HYSTERECTOMY      INSERTION / Ivory Beer / REVISION NEUROSTIMULATOR      JOINT REPLACEMENT Bilateral     knees    SACRAL NERVE STIMULATOR PLACEMENT N/A 2/14/2018    Procedure: REMOVE AND REPLACE IPG;  Surgeon: Clover Oliver MD; Location: [facility];  Service: UroGynecology           TONSILLECTOMY             12/26/22 0802   PT Last Visit   PT Visit Date 12/26/22   Note Type   Note type Screen   Additional Comments PT orders received  Chart reviewed  Per CM note, pt is total assist for ADLs at home with use of abraham lift  Pt additionally on hospice services  Pt with no acute PT needs  Will DC PT Orders  Peña Shetty

## 2022-12-26 NOTE — PLAN OF CARE
Problem: Potential for Falls  Goal: Patient will remain free of falls  Description: INTERVENTIONS:  - Educate patient/family on patient safety including physical limitations  - Instruct patient to call for assistance with activity   - Consult OT/PT to assist with strengthening/mobility   - Keep Call bell within reach  - Keep bed low and locked with side rails adjusted as appropriate  - Keep care items and personal belongings within reach  - Initiate and maintain comfort rounds  - Make Fall Risk Sign visible to staff  - Offer Toileting every 2 Hours, in advance of need  - Initiate/Maintain bed alarm  - Obtain necessary fall risk management equipment:  socks  - Apply yellow socks and bracelet for high fall risk patients  - Consider moving patient to room near nurses station  Outcome: Progressing     Problem: MOBILITY - ADULT  Goal: Maintain or return to baseline ADL function  Description: INTERVENTIONS:  -  Assess patient's ability to carry out ADLs; assess patient's baseline for ADL function and identify physical deficits which impact ability to perform ADLs (bathing, care of mouth/teeth, toileting, grooming, dressing, etc )  - Assess/evaluate cause of self-care deficits   - Assess range of motion  - Assess patient's mobility; develop plan if impaired  - Assess patient's need for assistive devices and provide as appropriate  - Encourage maximum independence but intervene and supervise when necessary  - Involve family in performance of ADLs  - Assess for home care needs following discharge   - Consider OT consult to assist with ADL evaluation and planning for discharge  - Provide patient education as appropriate  Outcome: Progressing  Goal: Maintains/Returns to pre admission functional level  Description: INTERVENTIONS:  - Perform BMAT or MOVE assessment daily    - Set and communicate daily mobility goal to care team and patient/family/caregiver     - Collaborate with rehabilitation services on mobility goals if consulted  - Perform Range of Motion 3 times a day  - Reposition patient every 2 hours    - Dangle patient 3 times a day  - Out of bed to chair 3 times a day   - Out of bed for meals 3 times a day  - Out of bed for toileting  - Record patient progress and toleration of activity level   Outcome: Progressing     Problem: Prexisting or High Potential for Compromised Skin Integrity  Goal: Skin integrity is maintained or improved  Description: INTERVENTIONS:  - Identify patients at risk for skin breakdown  - Assess and monitor skin integrity  - Assess and monitor nutrition and hydration status  - Monitor labs   - Assess for incontinence   - Turn and reposition patient  - Assist with mobility/ambulation  - Relieve pressure over bony prominences  - Avoid friction and shearing  - Provide appropriate hygiene as needed including keeping skin clean and dry  - Evaluate need for skin moisturizer/barrier cream  - Collaborate with interdisciplinary team   - Patient/family teaching  - Consider wound care consult   Outcome: Progressing     Problem: GENITOURINARY - ADULT  Goal: Maintains or returns to baseline urinary function  Description: INTERVENTIONS:  - Assess urinary function  - Encourage oral fluids to ensure adequate hydration if ordered  - Administer IV fluids as ordered to ensure adequate hydration  - Administer ordered medications as needed  - Offer frequent toileting  - Follow urinary retention protocol if ordered  Outcome: Progressing     Problem: METABOLIC, FLUID AND ELECTROLYTES - ADULT  Goal: Electrolytes maintained within normal limits  Description: INTERVENTIONS:  - Monitor labs and assess patient for signs and symptoms of electrolyte imbalances  - Administer electrolyte replacement as ordered  - Monitor response to electrolyte replacements, including repeat lab results as appropriate  - Instruct patient on fluid and nutrition as appropriate  Outcome: Progressing  Goal: Fluid balance maintained  Description: INTERVENTIONS:  - Monitor labs   - Monitor I/O and WT  - Instruct patient on fluid and nutrition as appropriate  - Assess for signs & symptoms of volume excess or deficit  Outcome: Progressing     Problem: SKIN/TISSUE INTEGRITY - ADULT  Goal: Skin Integrity remains intact(Skin Breakdown Prevention)  Description: Assess:  -Perform Polo assessment every x  -Clean and moisturize skin every x  -Inspect skin when repositioning, toileting, and assisting with ADLS  -Assess under medical devices such as x every x  -Assess extremities for adequate circulation and sensation     Bed Management:  -Have minimal linens on bed & keep smooth, unwrinkled  -Change linens as needed when moist or perspiring  -Avoid sitting or lying in one position for more than x hours while in bed  -Keep HOB at [facility]     Toileting:  -Offer bedside commode  -Assess for incontinence every x  -Use incontinent care products after each incontinent episode such as x    Activity:  -Mobilize patient x times a day  -Encourage activity and walks on unit  -Encourage or provide ROM exercises   -Turn and reposition patient every x Hours  -Use appropriate equipment to lift or move patient in bed  -Instruct/ Assist with weight shifting every x when out of bed in chair  -Consider limitation of chair time x hour intervals    Skin Care:  -Avoid use of baby powder, tape, friction and shearing, hot water or constrictive clothing  -Relieve pressure over bony prominences using x  -Do not massage red bony areas    Next Steps:  -Teach patient strategies to minimize risks such as xxxxxx   -Consider consults to  interdisciplinary teams such as x  Outcome: Progressing     Problem: HEMATOLOGIC - ADULT  Goal: Maintains hematologic stability  Description: INTERVENTIONS  - Assess for signs and symptoms of bleeding or hemorrhage  - Monitor labs  - Administer supportive blood products/factors as ordered and appropriate  Outcome: Progressing     Problem: MUSCULOSKELETAL - ADULT  Goal: Maintain or return mobility to safest level of function  Description: INTERVENTIONS:  - Assess patient's ability to carry out ADLs; assess patient's baseline for ADL function and identify physical deficits which impact ability to perform ADLs (bathing, care of mouth/teeth, toileting, grooming, dressing, etc )  - Assess/evaluate cause of self-care deficits   - Assess range of motion  - Assess patient's mobility  - Assess patient's need for assistive devices and provide as appropriate  - Encourage maximum independence but intervene and supervise when necessary  - Involve family in performance of ADLs  - Assess for home care needs following discharge   - Consider OT consult to assist with ADL evaluation and planning for discharge  - Provide patient education as appropriate  Outcome: Progressing     Problem: PAIN - ADULT  Goal: Verbalizes/displays adequate comfort level or baseline comfort level  Description: Interventions:  - Encourage patient to monitor pain and request assistance  - Assess pain using appropriate pain scale  - Administer analgesics based on type and severity of pain and evaluate response  - Implement non-pharmacological measures as appropriate and evaluate response  - Consider cultural and social influences on pain and pain management  - Notify physician/advanced practitioner if interventions unsuccessful or patient reports new pain  Outcome: Progressing     Problem: INFECTION - ADULT  Goal: Absence or prevention of progression during hospitalization  Description: INTERVENTIONS:  - Assess and monitor for signs and symptoms of infection  - Monitor lab/diagnostic results  - Monitor all insertion sites, i e  indwelling lines, tubes, and drains  - Monitor endotracheal if appropriate and nasal secretions for changes in amount and color  - [place] appropriate cooling/warming therapies per order  - Administer medications as ordered  - Instruct and encourage patient and family to use good hand hygiene technique  - Identify and instruct in appropriate isolation precautions for identified infection/condition  Outcome: Progressing     Problem: SAFETY ADULT  Goal: Patient will remain free of falls  Description: INTERVENTIONS:  - Educate patient/family on patient safety including physical limitations  - Instruct patient to call for assistance with activity   - Consult OT/PT to assist with strengthening/mobility   - Keep Call bell within reach  - Keep bed low and locked with side rails adjusted as appropriate  - Keep care items and personal belongings within reach  - Initiate and maintain comfort rounds  - Make Fall Risk Sign visible to staff  - Offer Toileting every 2 Hours, in advance of need  - Initiate/Maintain bed alarm  - Obtain necessary fall risk management equipment:  socks  - Apply yellow socks and bracelet for high fall risk patients  - Consider moving patient to room near nurses station  Outcome: Progressing     Problem: DISCHARGE PLANNING  Goal: Discharge to home or other facility with appropriate resources  Description: INTERVENTIONS:  - Identify barriers to discharge w/patient and caregiver  - Arrange for needed discharge resources and transportation as appropriate  - Identify discharge learning needs (meds, wound care, etc )  - Arrange for interpretive services to assist at discharge as needed  - Refer to Case Management Department for coordinating discharge planning if the patient needs post-hospital services based on physician/advanced practitioner order or complex needs related to functional status, cognitive ability, or social support system  Outcome: Progressing     Problem: Knowledge Deficit  Goal: Patient/family/caregiver demonstrates understanding of disease process, treatment plan, medications, and discharge instructions  Description: Complete learning assessment and assess knowledge base    Interventions:  - Provide teaching at level of understanding  - Provide teaching via preferred learning methods  Outcome: Progressing     Problem: Nutrition/Hydration-ADULT  Goal: Nutrient/Hydration intake appropriate for improving, restoring or maintaining nutritional needs  Description: Monitor and assess patient's nutrition/hydration status for malnutrition  Collaborate with interdisciplinary team and initiate plan and interventions as ordered  Monitor patient's weight and dietary intake as ordered or per policy  Utilize nutrition screening tool and intervene as necessary  Determine patient's food preferences and provide high-protein, high-caloric foods as appropriate       INTERVENTIONS:  - Monitor oral intake, urinary output, labs, and treatment plans  - Assess nutrition and hydration status and recommend course of action  - Evaluate amount of meals eaten  - Assist patient with eating if necessary   - Allow adequate time for meals  - Recommend/ encourage appropriate diets, oral nutritional supplements, and vitamin/mineral supplements  - Order, calculate, and assess calorie counts as needed  - Recommend, monitor, and adjust tube feedings and TPN/PPN based on assessed needs  - Assess need for intravenous fluids  - Provide specific nutrition/hydration education as appropriate  - Include patient/family/caregiver in decisions related to nutrition  Outcome: Progressing
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
## 2022-12-26 NOTE — ASSESSMENT & PLAN NOTE
· UA showing + leukocytes, negative nitrates occasional bacteria 4-10 white blood cells  · Was initiated on aztreonam -> completed 1 3 days of antibiotics-> DC'd as UA not overtly showing UTI  · Continue to monitor fever curve  · Urine culture: 60,000-69,000 cfu/mL   · Initiated cefepime given symptoms

## 2022-12-27 VITALS
HEART RATE: 56 BPM | RESPIRATION RATE: 12 BRPM | TEMPERATURE: 97.3 F | BODY MASS INDEX: 32.39 KG/M2 | DIASTOLIC BLOOD PRESSURE: 63 MMHG | OXYGEN SATURATION: 98 % | SYSTOLIC BLOOD PRESSURE: 138 MMHG | WEIGHT: 194.67 LBS

## 2022-12-27 LAB
GLUCOSE SERPL-MCNC: 142 MG/DL (ref 65–140)
GLUCOSE SERPL-MCNC: 98 MG/DL (ref 65–140)
SARS-COV-2 RNA RESP QL NAA+PROBE: NEGATIVE

## 2022-12-27 RX ADMIN — SPIRONOLACTONE 25 MG: 25 TABLET ORAL at 09:29

## 2022-12-27 RX ADMIN — DOCUSATE SODIUM 100 MG: 100 CAPSULE, LIQUID FILLED ORAL at 09:29

## 2022-12-27 RX ADMIN — SENNOSIDES 8.6 MG: 8.6 TABLET, FILM COATED ORAL at 09:29

## 2022-12-27 RX ADMIN — B-COMPLEX W/ C & FOLIC ACID TAB 1 TABLET: TAB at 09:29

## 2022-12-27 RX ADMIN — GABAPENTIN 100 MG: 100 CAPSULE ORAL at 09:28

## 2022-12-27 RX ADMIN — APIXABAN 5 MG: 5 TABLET, FILM COATED ORAL at 09:29

## 2022-12-27 RX ADMIN — FOLIC ACID 1000 MCG: 1 TABLET ORAL at 09:29

## 2022-12-27 RX ADMIN — FUROSEMIDE 40 MG: 40 TABLET ORAL at 09:29

## 2022-12-27 RX ADMIN — PRIMIDONE 50 MG: 50 TABLET ORAL at 09:29

## 2022-12-27 RX ADMIN — CEFEPIME HYDROCHLORIDE 2000 MG: 2 INJECTION, SOLUTION INTRAVENOUS at 00:38

## 2022-12-27 RX ADMIN — METOPROLOL TARTRATE 25 MG: 25 TABLET, FILM COATED ORAL at 09:29

## 2022-12-27 RX ADMIN — ACETAMINOPHEN 650 MG: 325 TABLET ORAL at 06:01

## 2022-12-27 NOTE — ASSESSMENT & PLAN NOTE
· UA showing + leukocytes, negative nitrates occasional bacteria 4-10 white blood cells  · Was initiated on aztreonam -> completed 1 3 days of antibiotics-> DC'd as UA not overtly showing UTI  · Continue to monitor fever curve  · Urine culture: 60,000-69,000 cfu/mL   · Initiated cefepime given symptoms- anticipate completion of antibiotics prior to discharge

## 2022-12-27 NOTE — PLAN OF CARE
Problem: Potential for Falls  Goal: Patient will remain free of falls  Description: INTERVENTIONS:  - Educate patient/family on patient safety including physical limitations  - Instruct patient to call for assistance with activity   - Consult OT/PT to assist with strengthening/mobility   - Keep Call bell within reach  - Keep bed low and locked with side rails adjusted as appropriate  - Keep care items and personal belongings within reach  - Initiate and maintain comfort rounds  - Make Fall Risk Sign visible to staff  - Offer Toileting every 2 Hours, in advance of need  - Initiate/Maintain bed alarm  - Obtain necessary fall risk management equipment:  socks  - Apply yellow socks and bracelet for high fall risk patients  - Consider moving patient to room near nurses station  Outcome: Progressing     Problem: MOBILITY - ADULT  Goal: Maintain or return to baseline ADL function  Description: INTERVENTIONS:  -  Assess patient's ability to carry out ADLs; assess patient's baseline for ADL function and identify physical deficits which impact ability to perform ADLs (bathing, care of mouth/teeth, toileting, grooming, dressing, etc )  - Assess/evaluate cause of self-care deficits   - Assess range of motion  - Assess patient's mobility; develop plan if impaired  - Assess patient's need for assistive devices and provide as appropriate  - Encourage maximum independence but intervene and supervise when necessary  - Involve family in performance of ADLs  - Assess for home care needs following discharge   - Consider OT consult to assist with ADL evaluation and planning for discharge  - Provide patient education as appropriate  Outcome: Progressing  Goal: Maintains/Returns to pre admission functional level  Description: INTERVENTIONS:  - Perform BMAT or MOVE assessment daily    - Set and communicate daily mobility goal to care team and patient/family/caregiver     - Collaborate with rehabilitation services on mobility goals if consulted  - Perform Range of Motion 3 times a day  - Reposition patient every 2 hours    - Dangle patient 3 times a day  - Out of bed to chair 3 times a day   - Out of bed for meals 3 times a day  - Out of bed for toileting  - Record patient progress and toleration of activity level   Outcome: Progressing     Problem: Prexisting or High Potential for Compromised Skin Integrity  Goal: Skin integrity is maintained or improved  Description: INTERVENTIONS:  - Identify patients at risk for skin breakdown  - Assess and monitor skin integrity  - Assess and monitor nutrition and hydration status  - Monitor labs   - Assess for incontinence   - Turn and reposition patient  - Assist with mobility/ambulation  - Relieve pressure over bony prominences  - Avoid friction and shearing  - Provide appropriate hygiene as needed including keeping skin clean and dry  - Evaluate need for skin moisturizer/barrier cream  - Collaborate with interdisciplinary team   - Patient/family teaching  - Consider wound care consult   Outcome: Progressing     Problem: GENITOURINARY - ADULT  Goal: Maintains or returns to baseline urinary function  Description: INTERVENTIONS:  - Assess urinary function  - Encourage oral fluids to ensure adequate hydration if ordered  - Administer IV fluids as ordered to ensure adequate hydration  - Administer ordered medications as needed  - Offer frequent toileting  - Follow urinary retention protocol if ordered  Outcome: Progressing     Problem: METABOLIC, FLUID AND ELECTROLYTES - ADULT  Goal: Electrolytes maintained within normal limits  Description: INTERVENTIONS:  - Monitor labs and assess patient for signs and symptoms of electrolyte imbalances  - Administer electrolyte replacement as ordered  - Monitor response to electrolyte replacements, including repeat lab results as appropriate  - Instruct patient on fluid and nutrition as appropriate  Outcome: Progressing  Goal: Fluid balance maintained  Description: INTERVENTIONS:  - Monitor labs   - Monitor I/O and WT  - Instruct patient on fluid and nutrition as appropriate  - Assess for signs & symptoms of volume excess or deficit  Outcome: Progressing     Problem: SKIN/TISSUE INTEGRITY - ADULT  Goal: Skin Integrity remains intact(Skin Breakdown Prevention)  Description: Assess:  -Perform Polo assessment every shift  -Clean and moisturize skin as needed  -Inspect skin when repositioning, toileting, and assisting with ADLS  -Assess under medical devices such as Masimo and purewick every shift  -Assess extremities for adequate circulation and sensation     Bed Management:  -Have minimal linens on bed & keep smooth, unwrinkled  -Change linens as needed when moist or perspiring  -Avoid sitting or lying in one position for more than 2 hours while in bed  -Keep HOB at 30 degrees     Toileting:  -Offer bedside commode  -Assess for incontinence every 4 hours  -Use incontinent care products after each incontinent episode such as moisture barrier cream    Activity:  -Mobilize patient 3 times a day  -Encourage activity and walks on unit  -Encourage or provide ROM exercises   -Turn and reposition patient every 2 Hours  -Use appropriate equipment to lift or move patient in bed  -Instruct/ Assist with weight shifting every 1 when out of bed in chair  -Consider limitation of chair time 2 hour intervals    Skin Care:  -Avoid use of baby powder, tape, friction and shearing, hot water or constrictive clothing  -Relieve pressure over bony prominences using allevyn foam and waffle cushion  -Do not massage red bony areas    Next Steps:  -Teach patient strategies to minimize risks such as repositioning and notifying RN when pt is incontinent  -Consider consults to  interdisciplinary teams such as PT  Outcome: Progressing     Problem: HEMATOLOGIC - ADULT  Goal: Maintains hematologic stability  Description: INTERVENTIONS  - Assess for signs and symptoms of bleeding or hemorrhage  - Monitor labs  - Administer supportive blood products/factors as ordered and appropriate  Outcome: Progressing     Problem: MUSCULOSKELETAL - ADULT  Goal: Maintain or return mobility to safest level of function  Description: INTERVENTIONS:  - Assess patient's ability to carry out ADLs; assess patient's baseline for ADL function and identify physical deficits which impact ability to perform ADLs (bathing, care of mouth/teeth, toileting, grooming, dressing, etc )  - Assess/evaluate cause of self-care deficits   - Assess range of motion  - Assess patient's mobility  - Assess patient's need for assistive devices and provide as appropriate  - Encourage maximum independence but intervene and supervise when necessary  - Involve family in performance of ADLs  - Assess for home care needs following discharge   - Consider OT consult to assist with ADL evaluation and planning for discharge  - Provide patient education as appropriate  Outcome: Progressing     Problem: INFECTION - ADULT  Goal: Absence or prevention of progression during hospitalization  Description: INTERVENTIONS:  - Assess and monitor for signs and symptoms of infection  - Monitor lab/diagnostic results  - Monitor all insertion sites, i e  indwelling lines, tubes, and drains  - Monitor endotracheal if appropriate and nasal secretions for changes in amount and color  - [place] appropriate cooling/warming therapies per order  - Administer medications as ordered  - Instruct and encourage patient and family to use good hand hygiene technique  - Identify and instruct in appropriate isolation precautions for identified infection/condition  Outcome: Progressing     Problem: SAFETY ADULT  Goal: Patient will remain free of falls  Description: INTERVENTIONS:  - Educate patient/family on patient safety including physical limitations  - Instruct patient to call for assistance with activity   - Consult OT/PT to assist with strengthening/mobility   - Keep Call bell within reach  - Keep bed low and locked with side rails adjusted as appropriate  - Keep care items and personal belongings within reach  - Initiate and maintain comfort rounds  - Make Fall Risk Sign visible to staff  - Offer Toileting every 2 Hours, in advance of need  - Initiate/Maintain bed alarm  - Obtain necessary fall risk management equipment:  socks  - Apply yellow socks and bracelet for high fall risk patients  - Consider moving patient to room near nurses station  Outcome: Progressing     Problem: DISCHARGE PLANNING  Goal: Discharge to home or other facility with appropriate resources  Description: INTERVENTIONS:  - Identify barriers to discharge w/patient and caregiver  - Arrange for needed discharge resources and transportation as appropriate  - Identify discharge learning needs (meds, wound care, etc )  - Arrange for interpretive services to assist at discharge as needed  - Refer to Case Management Department for coordinating discharge planning if the patient needs post-hospital services based on physician/advanced practitioner order or complex needs related to functional status, cognitive ability, or social support system  Outcome: Progressing     Problem: Knowledge Deficit  Goal: Patient/family/caregiver demonstrates understanding of disease process, treatment plan, medications, and discharge instructions  Description: Complete learning assessment and assess knowledge base  Interventions:  - Provide teaching at level of understanding  - Provide teaching via preferred learning methods  Outcome: Progressing     Problem: Nutrition/Hydration-ADULT  Goal: Nutrient/Hydration intake appropriate for improving, restoring or maintaining nutritional needs  Description: Monitor and assess patient's nutrition/hydration status for malnutrition  Collaborate with interdisciplinary team and initiate plan and interventions as ordered  Monitor patient's weight and dietary intake as ordered or per policy   Utilize nutrition screening tool and intervene as necessary  Determine patient's food preferences and provide high-protein, high-caloric foods as appropriate       INTERVENTIONS:  - Monitor oral intake, urinary output, labs, and treatment plans  - Assess nutrition and hydration status and recommend course of action  - Evaluate amount of meals eaten  - Assist patient with eating if necessary   - Allow adequate time for meals  - Recommend/ encourage appropriate diets, oral nutritional supplements, and vitamin/mineral supplements  - Order, calculate, and assess calorie counts as needed  - Recommend, monitor, and adjust tube feedings and TPN/PPN based on assessed needs  - Assess need for intravenous fluids  - Provide specific nutrition/hydration education as appropriate  - Include patient/family/caregiver in decisions related to nutrition  Outcome: Progressing
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
## 2022-12-27 NOTE — CASE MANAGEMENT
Case Management Discharge Planning Note    Patient name Corbin Hensley  Location [address] 232/-32 MRN [id number]  : 1935 Date 2022       Current Admission Date: 2022  Current Admission Diagnosis:Problem related to discharge planning   Patient Active Problem List    Diagnosis Date Noted   • Problem related to discharge planning 2022   • Frailty syndrome in geriatric patient 2022   • CHF (congestive heart failure) ([facility] ) 2022   • DNR (do not resuscitate) 2022   • Hospice care patient 2022   • Hemiparesis affecting left side as late effect of cerebrovascular accident (CVA) ([facility] ) 2022   • Class 1 obesity due to excess calories with serious comorbidity and body mass index (BMI) of 33 0 to 33 9 in adult 2022   • Chronic diastolic congestive heart failure ([facility] [facility] ) 2021   • Bradycardia 10/23/2020   • Abnormal alkaline phosphatase test 10/22/2020   • Constipation 2020   • Moderate protein-calorie malnutrition ([facility] ) 2020   • Abnormal CT of the chest 2020   • Goals of care, counseling/discussion 2020   • Pleural effusion, left 2020   • Acute cystitis without hematuria 2020   • Oropharyngeal dysphagia 2020   • Aphasia 2020   • Slurred speech    • Weakness of both lower extremities 02/15/2020   • Dysarthria 02/15/2020   • Thyroid nodule 2020   • Stroke-like symptoms 2020   • Calcaneal spur of foot, left 10/17/2019   • Osteopenia of left foot 10/17/2019   • Other hyperlipidemia 2019   • Cervical dystonia 2019   • History of stroke 2019   • Spinal cord stimulator status 2019   • Overactive bladder 2019   • Hypertension 2019   • Weakness generalized 2019   • Urinary incontinence 2019   • Wheelchair dependence 2018   • Type 2 diabetes mellitus ([facility] [facility] ) 2018   • Paroxysmal [facility] fibrillation [facility] ) 2018      LOS (days): 4  Geometric Mean LOS (GMLOS) (days):   Days to GMLOS:     OBJECTIVE:  Risk of Unplanned Readmission Score: 11 22         Current admission status: Inpatient   Preferred Pharmacy:   [facility], [place] - [address]  [address]  [place] [address]  Phone: [phone] Fax: [phone]    UNKNOWN - FOLLOW UP PRIOR TO DISCHARGE TO E-PRESCRIBE  No address on file      [address], [address] [place] [address] [address]  Phone: [phone] Fax: [phone]    Primary Care Provider: Christen Marcelino MD    Primary Insurance: [employer] [address] [facility] REP  Secondary Insurance: [facility]    DISCHARGE DETAILS:    Discharge planning discussed with[de-identified] patient, Kostas Anthony ( with [facility]) and Elmo Leiva [facility])  Freedom of Choice: Yes  Comments - Freedom of Choice: CM informed pt that [facility] accepted and a bed is available today  Met with pt to discuss; pt is agreeable  Treatment Team Recommendation: SNF  Discharge Destination Plan[de-identified] SNF  Transport at Discharge : [facility]  Dispatcher Contacted: Yes  Number/Name of Dispatcher: [facility]     ETA of Transport (Date): 12/27/22  ETA of Transport (Time): 1300     Transfer Mode: Stretcher        IMM Given (Date):: 12/27/22  IMM Given to[de-identified] Patient   IMM reviewed with patient, patient agrees with discharge determination  Additional Comments: CM was informed that pt is medically stable for dc  CM spoke to Alvarez at [facility] who states pt is accepted and a bed is abailable today  Öcsény discussed skilled time vs respite  CM informed Alvarez that pt requires a abraham lift at baseline  Öcsény spoke with Elmo Leiva at [facility] who states they will cover 5 days respite to start and they may try to skill her during her stay  CM arranged with [facility] for a 1pm dc to [facility]   ABDIAZIZ informed pt, pt's bedside RN Kala Perez ( with [facility] [place] [address]) and Senthil Torres [facility] ADELITA Castro) of dc time  Facility transfer form and CMN completed  CMN signed and placed in medical record bin  Pt does not want her daughter Meme Kaminski contacted      Accepting Facility Name, [address] : [facility] at [place] [address]  Receiving Facility/Agency Phone Number: [phone]  Facility/Agency Fax Number: [phone]

## 2022-12-27 NOTE — ASSESSMENT & PLAN NOTE
Lab Results   Component Value Date    HGBA1C 5 2 12/24/2022       Recent Labs     12/26/22  0721 12/26/22  1133 12/26/22  1614 12/27/22  0701   POCGLU 102 140 106 98       Blood Sugar Average: Last 72 hrs:  (P) 116 5     · ISS  · Hypoglycemic protocol

## 2022-12-27 NOTE — UTILIZATION REVIEW
NOTIFICATION OF INPATIENT ADMISSION   AUTHORIZATION REQUEST   SERVICING FACILITY:   [facility]  [address] [place] [address]  Tax ID: [id number]  NPI: [id number] ATTENDING PROVIDER:  Attending Name and NPI#: Stephanie Alvarez Md [[id number]]  Address: [address] [place] [address]  Phone: [phone]   ADMISSION INFORMATION:  Place of Service: [address] [place] [address] Code: 21  Inpatient Admission Date/Time: 12/23/22 12:39 PM  Discharge Date/Time: 12/27/2022  1:15 PM  Admitting Diagnosis Code/Description:  Failure to thrive in adult [R62 7]  Acute cystitis without hematuria [N30 00]  Unable to mobilize in home [R26 89]  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 ([facility] ) [T77 170]     UTILIZATION REVIEW CONTACT:  Mansoor Garduno Utilization   Network Utilization Review Department  Phone: [phone]  Fax [phone]  Email: [email or web address]  Contact for approvals/pending authorizations, clinical reviews, and discharge  PHYSICIAN ADVISORY SERVICES:  Medical Necessity Denial & Ddik-ey-Yzot Review  Phone: [phone]  Fax: [phone]  Email: [email or web address]

## 2022-12-27 NOTE — PROGRESS NOTES
[facility]  Progress Note - Corbin Hensley 1935, 80 y o  female MRN: [id number]  Unit/Bed#: -Jeane Encounter: [id number]  Primary Care Provider: Christen Marcelino MD   Date and time admitted to hospital: 12/23/2022  9:09 AM    * Problem related to discharge planning  Assessment & Plan  · Patient has 24/7 caregivers at home however due to 1 caregiver resigning from position patient needed respite care  Was being followed by hospice at home    · Admitted with a DNR/DNI status  · Case management following  · Patient appears fearful of daughter  · Working towards placement for respite care in the facility  · Potentially may be discharged home with hospice care on the 27th or 28th if still admitted    Class 1 obesity due to excess calories with serious comorbidity and body mass index (BMI) of 33 0 to 33 9 in adult  Assessment & Plan  · Encourage lifestyle modification      Acute cystitis without hematuria  Assessment & Plan  · UA showing + leukocytes, negative nitrates occasional bacteria 4-10 white blood cells  · Was initiated on aztreonam -> completed 1 3 days of antibiotics-> DC'd as UA not overtly showing UTI  · Continue to monitor fever curve  · Urine culture: 60,000-69,000 cfu/mL   · Initiated cefepime given symptoms- anticipate completion of antibiotics prior to discharge    Other hyperlipidemia  Assessment & Plan  · Continue home statin therapy    Paroxysmal atrial fibrillation ([address] )  Assessment & Plan  · Rate controlled with Lopressor  · Anticoagulated with Eliquis  · Continue home medications    Type 2 diabetes mellitus [facility])  Assessment & Plan  Lab Results   Component Value Date    HGBA1C 5 2 12/24/2022       Recent Labs     12/26/22  0721 12/26/22  1133 12/26/22  1614 12/27/22  0701   POCGLU 102 140 106 98       Blood Sugar Average: Last 72 hrs:  (P) 116 5     · ISS  · Hypoglycemic protocol      VTE Pharmacologic Prophylaxis: VTE Score: 6 High Risk (Score >/= 5) - Pharmacological DVT Prophylaxis Ordered: apixaban (Eliquis)  Sequential Compression Devices Ordered  Patient Centered Rounds: I performed bedside rounds with nursing staff today  Discussions with Specialists or Other Care Team Provider: Discussed with case management, ongoing bed search versus discharge home with hospice    Education and Discussions with Family / Patient: Patient declined call to   Time Spent for Care: 30 minutes  More than 50% of total time spent on counseling and coordination of care as described above  Current Length of Stay: 4 day(s)  Current Patient Status: Inpatient   Certification Statement: The patient will continue to require additional inpatient hospital stay due to Discharge planning  Discharge Plan: Patient medically stable for discharge, awaiting placement    Code Status: Level 3 - DNAR and DNI    Subjective:   Patient offers no medical complaints today, says she is cold, provided warm blankets, discussed her discharge plan moving forward  Requesting hot tea, at bedside  Objective:     Vitals:   Temp (24hrs), Av 4 °F (36 3 °C), Min:97 3 °F (36 3 °C), Max:97 6 °F (36 4 °C)    Temp:  [97 3 °F (36 3 °C)-97 6 °F (36 4 °C)] 97 3 °F (36 3 °C)  HR:  [56-66] 56  Resp:  [12-20] 12  BP: (115-142)/(60-68) 138/63  SpO2:  [98 %-99 %] 98 %  Body mass index is 32 39 kg/m²  Input and Output Summary (last 24 hours): Intake/Output Summary (Last 24 hours) at 2022 0840  Last data filed at 2022 0549  Gross per 24 hour   Intake 710 ml   Output 2275 ml   Net -1565 ml       Physical Exam:   Physical Exam  Vitals and nursing note reviewed  Constitutional:       General: She is not in acute distress  Appearance: Normal appearance  She is well-developed  HENT:      Head: Normocephalic and atraumatic  Eyes:      General: No scleral icterus       Conjunctiva/sclera: Conjunctivae normal    Cardiovascular:      Rate and Rhythm: Normal rate and regular rhythm  Heart sounds: Murmur heard  Pulmonary:      Effort: Pulmonary effort is normal       Breath sounds: No wheezing, rhonchi or rales  Abdominal:      General: There is no distension  Palpations: Abdomen is soft  Skin:     General: Skin is warm and dry  Neurological:      General: No focal deficit present  Mental Status: She is alert  Psychiatric:         Mood and Affect: Mood normal        Additional Data:     Labs:  Results from last 7 days   Lab Units 12/25/22  0442 12/24/22  0503   WBC Thousand/uL 5 06 4 47   HEMOGLOBIN g/dL 12 1 11 7   HEMATOCRIT % 37 8 35 3   PLATELETS Thousands/uL 150 131*   NEUTROS PCT %  --  40*   LYMPHS PCT %  --  44   MONOS PCT %  --  11   EOS PCT %  --  4     Results from last 7 days   Lab Units 12/25/22  0442 12/24/22  0503   SODIUM mmol/L 134* 138   POTASSIUM mmol/L 4 6 3 3*   CHLORIDE mmol/L 103 103   CO2 mmol/L 28 29   BUN mg/dL 21 20   CREATININE mg/dL 0 77 0 80   ANION GAP mmol/L 3* 6   CALCIUM mg/dL 9 7 9 9   ALBUMIN g/dL  --  2 8*   TOTAL BILIRUBIN mg/dL  --  0 30   ALK PHOS U/L  --  197*   ALT U/L  --  30   AST U/L  --  31   GLUCOSE RANDOM mg/dL 81 95         Results from last 7 days   Lab Units 12/27/22  0701 12/26/22  1614 12/26/22  1133 12/26/22  0721 12/25/22  2109 12/25/22  1624 12/25/22  1031 12/25/22  0731 12/24/22  2047 12/24/22  1607 12/24/22  1127 12/24/22  0713   POC GLUCOSE mg/dl 98 106 140 102 144* 107 176* 92 97 88 145* 103     Results from last 7 days   Lab Units 12/24/22  0503   HEMOGLOBIN A1C % 5 2           Lines/Drains:  Invasive Devices     Peripheral Intravenous Line  Duration           Peripheral IV 12/27/22 Right;Ventral (anterior) Wrist <1 day          Drain  Duration           External Urinary Catheter 3 days                      Imaging: No pertinent imaging reviewed      Recent Cultures (last 7 days):   Results from last 7 days   Lab Units 12/23/22  0948   URINE CULTURE  60,000-69,000 cfu/ml Pseudomonas aeruginosa* Last 24 Hours Medication List:   Current Facility-Administered Medications   Medication Dose Route Frequency Provider Last Rate   • acetaminophen  650 mg Oral [facility] Alonzo Evans MD     • aluminum-magnesium hydroxide-simethicone  30 mL Oral Q6H PRN Alonzo Evans MD     • apixaban  5 mg Oral BID Alonzo Evans MD     • atorvastatin  40 mg Oral QPM Alonzo Evans MD     • cefepime  2,000 mg Intravenous Q12H Cher KENT PA-C 100 mL/hr at 12/27/22 0147   • cholecalciferol  1,000 Units Oral QPM Alonzo Evans MD     • vitamin B-12  1,000 mcg Oral QPM Alonzo Evans MD     • docusate sodium  100 mg Oral BID Alonzo Evans MD     • folic acid  0,693 mcg Oral Daily Alonzo Evans MD     • furosemide  40 mg Oral Daily Alonzo Evans MD     • gabapentin  100 mg Oral TID Alonzo Evans MD     • glycerin-hypromellose-  1 drop Both Eyes Q4H PRN Cas Lane PA-C     • insulin lispro  1-6 Units Subcutaneous TID AC Alonzo Evans MD     • metoprolol tartrate  25 mg Oral Daily Alonzo Evans MD     • multivitamin stress formula  1 tablet Oral Daily Alonzo Evans MD     • primidone  50 mg Oral BID Alonzo Evans MD     • senna  1 tablet Oral Daily Alonzo Evans MD     • spironolactone  25 mg Oral Daily Alonzo Evans MD          Today, Patient Was Seen By: Ramone Ramirez PA-C    **Please Note: This note may have been constructed using a voice recognition system  **

## 2022-12-27 NOTE — ASSESSMENT & PLAN NOTE
· UA showing + leukocytes, negative nitrates occasional bacteria 4-10 white blood cells  · Was initiated on aztreonam -> completed 1 3 days of antibiotics-> DC'd as UA not overtly showing UTI  · Continue to monitor fever curve  · Urine culture: 60,000-69,000 cfu/mL   · Initiated cefepime given symptoms-no need for further antibiotics on discharge, patient has received 5 days of antibiotics

## 2022-12-27 NOTE — ASSESSMENT & PLAN NOTE
· Patient has 24/7 caregivers at home however due to 1 caregiver resigning from position patient needed respite care  Was being followed by hospice at home    · Admitted with a DNR/DNI status  · Case management following  · Patient appears fearful of daughter  · Working towards placement for respite care in the facility

## 2022-12-28 NOTE — UTILIZATION REVIEW
Notification of Discharge   This is a Notification of Discharge from our facility [address]  Please be advised that this patient has been discharge from our facility  Below you will find the admission and discharge date and time including the patient’s disposition  UTILIZATION REVIEW CONTACT:  Navya Gross  Utilization   Network Utilization Review Department  Phone: [phone] carefully listen to the prompts  All voicemails are confidential   Email: [email or web address]     PHYSICIAN ADVISORY SERVICES:  FOR NVIA-[place]-PDDT REVIEW - MEDICAL NECESSITY DENIAL  Phone: [phone]  Fax: [phone]  Email: [email or web address]     PRESENTATION DATE: 12/23/2022  9:09 AM  OBERVATION ADMISSION DATE:   INPATIENT ADMISSION DATE: 12/23/22 12:39 PM   DISCHARGE DATE: 12/27/2022  1:15 PM  DISPOSITION: Non [facility] SNF/TCU/SNU Non [facility] SNF/TC[facility]/SNU      IMPORTANT INFORMATION:  Send all requests for admission clinical reviews, approved or denied determinations and any other requests to dedicated fax number below belonging to the campus where the patient is receiving treatment   List of dedicated fax numbers:  [address] [facility] [facility] [address] DENIALS (Administrative/Medical Necessity) [phone]   [address] [facility] [address] (Maternity/NICU/Pediatrics) [phone]   [facility] [phone]   [facility] [phone]   [address] [phone]   [address] [facility] [phone]   [address] [place] [address] Road [phone]   [address] [place] [address] 119 [phone]   [facility]  [phone]   [address] [facility] [phone]   [address] [facility] [address] [facility] [phone]

## 2023-01-22 ENCOUNTER — APPOINTMENT (OUTPATIENT)
Dept: RADIOLOGY | Facility: HOSPITAL | Age: 88
End: 2023-01-22

## 2023-01-22 ENCOUNTER — HOSPITAL ENCOUNTER (INPATIENT)
Facility: HOSPITAL | Age: 88
LOS: 3 days | Discharge: HOSPICE | End: 2023-01-26
Attending: EMERGENCY MEDICINE | Admitting: INTERNAL MEDICINE

## 2023-01-22 DIAGNOSIS — I67.0 DISSECTION OF CEREBRAL ARTERY (HCC): ICD-10-CM

## 2023-01-22 DIAGNOSIS — R29.90 STROKE-LIKE EPISODE: ICD-10-CM

## 2023-01-22 DIAGNOSIS — I77.71 DISSECTION OF CAROTID ARTERY (HCC): Primary | ICD-10-CM

## 2023-01-22 PROBLEM — U07.1 COVID-19: Status: ACTIVE | Noted: 2023-01-22

## 2023-01-22 LAB
2HR DELTA HS TROPONIN: 1 NG/L
ANION GAP SERPL CALCULATED.3IONS-SCNC: 5 MMOL/L (ref 4–13)
APTT PPP: 34 SECONDS (ref 23–37)
BUN SERPL-MCNC: 19 MG/DL (ref 5–25)
CALCIUM SERPL-MCNC: 8.4 MG/DL (ref 8.3–10.1)
CARDIAC TROPONIN I PNL SERPL HS: 10 NG/L
CARDIAC TROPONIN I PNL SERPL HS: 9 NG/L
CHLORIDE SERPL-SCNC: 110 MMOL/L (ref 96–108)
CHOLEST SERPL-MCNC: 96 MG/DL
CO2 SERPL-SCNC: 25 MMOL/L (ref 21–32)
CREAT SERPL-MCNC: 0.65 MG/DL (ref 0.6–1.3)
ERYTHROCYTE [DISTWIDTH] IN BLOOD BY AUTOMATED COUNT: 13.2 % (ref 11.6–15.1)
EST. AVERAGE GLUCOSE BLD GHB EST-MCNC: 105 MG/DL
FLUAV RNA RESP QL NAA+PROBE: NEGATIVE
FLUBV RNA RESP QL NAA+PROBE: NEGATIVE
GFR SERPL CREATININE-BSD FRML MDRD: 80 ML/MIN/1.73SQ M
GLUCOSE SERPL-MCNC: 103 MG/DL (ref 65–140)
GLUCOSE SERPL-MCNC: 113 MG/DL (ref 65–140)
HBA1C MFR BLD: 5.3 %
HCT VFR BLD AUTO: 37.9 % (ref 34.8–46.1)
HDLC SERPL-MCNC: 46 MG/DL
HGB BLD-MCNC: 12.4 G/DL (ref 11.5–15.4)
INR PPP: 1.27 (ref 0.84–1.19)
LDLC SERPL CALC-MCNC: 29 MG/DL (ref 0–100)
MCH RBC QN AUTO: 31.2 PG (ref 26.8–34.3)
MCHC RBC AUTO-ENTMCNC: 32.7 G/DL (ref 31.4–37.4)
MCV RBC AUTO: 96 FL (ref 82–98)
PLATELET # BLD AUTO: 127 THOUSANDS/UL (ref 149–390)
PMV BLD AUTO: 9.9 FL (ref 8.9–12.7)
POTASSIUM SERPL-SCNC: 3.8 MMOL/L (ref 3.5–5.3)
PROTHROMBIN TIME: 16.2 SECONDS (ref 11.6–14.5)
RBC # BLD AUTO: 3.97 MILLION/UL (ref 3.81–5.12)
RSV RNA RESP QL NAA+PROBE: NEGATIVE
SARS-COV-2 RNA RESP QL NAA+PROBE: POSITIVE
SODIUM SERPL-SCNC: 140 MMOL/L (ref 135–147)
TRIGL SERPL-MCNC: 104 MG/DL
WBC # BLD AUTO: 5.3 THOUSAND/UL (ref 4.31–10.16)

## 2023-01-22 RX ORDER — FUROSEMIDE 40 MG/1
40 TABLET ORAL DAILY
Status: DISCONTINUED | OUTPATIENT
Start: 2023-01-23 | End: 2023-01-26 | Stop reason: HOSPADM

## 2023-01-22 RX ORDER — SPIRONOLACTONE 25 MG/1
25 TABLET ORAL DAILY
Status: DISCONTINUED | OUTPATIENT
Start: 2023-01-23 | End: 2023-01-26 | Stop reason: HOSPADM

## 2023-01-22 RX ORDER — FOLIC ACID 1 MG/1
1000 TABLET ORAL DAILY
Status: DISCONTINUED | OUTPATIENT
Start: 2023-01-22 | End: 2023-01-26 | Stop reason: HOSPADM

## 2023-01-22 RX ORDER — MELATONIN
1000 EVERY EVENING
Status: DISCONTINUED | OUTPATIENT
Start: 2023-01-22 | End: 2023-01-26 | Stop reason: HOSPADM

## 2023-01-22 RX ORDER — ATORVASTATIN CALCIUM 40 MG/1
40 TABLET, FILM COATED ORAL EVERY EVENING
Status: DISCONTINUED | OUTPATIENT
Start: 2023-01-22 | End: 2023-01-26 | Stop reason: HOSPADM

## 2023-01-22 RX ORDER — GABAPENTIN 100 MG/1
100 CAPSULE ORAL 3 TIMES DAILY
Status: DISCONTINUED | OUTPATIENT
Start: 2023-01-22 | End: 2023-01-26 | Stop reason: HOSPADM

## 2023-01-22 RX ORDER — PRIMIDONE 50 MG/1
50 TABLET ORAL 2 TIMES DAILY
Status: DISCONTINUED | OUTPATIENT
Start: 2023-01-22 | End: 2023-01-26 | Stop reason: HOSPADM

## 2023-01-22 RX ORDER — OXYCODONE HYDROCHLORIDE 5 MG/1
2.5 TABLET ORAL EVERY 6 HOURS PRN
Status: DISCONTINUED | OUTPATIENT
Start: 2023-01-22 | End: 2023-01-22

## 2023-01-22 RX ORDER — HYDROMORPHONE HCL IN WATER/PF 6 MG/30 ML
0.1 PATIENT CONTROLLED ANALGESIA SYRINGE INTRAVENOUS EVERY 6 HOURS PRN
Status: DISCONTINUED | OUTPATIENT
Start: 2023-01-22 | End: 2023-01-26 | Stop reason: HOSPADM

## 2023-01-22 RX ORDER — ACETAMINOPHEN 325 MG/1
650 TABLET ORAL EVERY 6 HOURS PRN
Status: DISCONTINUED | OUTPATIENT
Start: 2023-01-22 | End: 2023-01-26 | Stop reason: HOSPADM

## 2023-01-22 RX ADMIN — IOHEXOL 85 ML: 350 INJECTION, SOLUTION INTRAVENOUS at 11:54

## 2023-01-22 RX ADMIN — APIXABAN 5 MG: 5 TABLET, FILM COATED ORAL at 23:47

## 2023-01-22 RX ADMIN — HYDROMORPHONE HYDROCHLORIDE 0.1 MG: 0.2 INJECTION, SOLUTION INTRAMUSCULAR; INTRAVENOUS; SUBCUTANEOUS at 21:01

## 2023-01-22 NOTE — PROGRESS NOTES
Pastoral Care Progress Note    2023  Patient: Beti Duffy : 1935  Admission Date & Time: 2023 1155  MRN: [id number] [facility]: [id number]                     Chaplaincy Interventions Utilized:      23 1200   Clinical Encounter Type   Visited With Patient   Crisis Visit Critical Care  (stroke alert)     Stroke Alert - According to Paramedics, the first symptoms seemed to appear around 8:30 am  Family members were present in the residence when it happened   No family members are in the hospital

## 2023-01-22 NOTE — ASSESSMENT & PLAN NOTE
Patient incidentally COVID positive on admission  Plan:  -Not reporting any respiratory symptoms, on room air   -Remdesivir/other measures if patient develops respiratory symptoms   -Isolation precautions

## 2023-01-22 NOTE — ASSESSMENT & PLAN NOTE
Assessment:  Sole Echeverria is a 80 y o  right handed female with a PMH of prior CVA, Afib (on Eliquis), NIDDM, HLD, Spinal stenosis, urinary incontenence (s/p spinal stimulator), bed bound, chronic pain disorder presents to [facility] as a pre-hospital stroke alert  According to EMS handoff the patient has an unknown LKW however it is thought to be at bedtime on 1/21/2023  On 1/22/2023 around 0830 the patients new nurse noted that the patient was demonstrating a left sided facial droop, reduction in her ability to squeeze her left hand, and dysarthria  Per EMS hand-off the her BP in the field was 179/90, and her BGL was 105  At time of evaluation the NIHSS was 7  The patient was asked if she wanted intervention including tNK or surgery and the patient outright refused  Therefore neurosurgery was not consulted as the patient refused TNK and interventions such as surgery  Considering the extent of proximal occlusive disease and premorbid debility there’d be no role for intervention due to risks involved  The patient will be admitted for further stroke workup in addition to the evaluation for patchy groundglass opacities in bilateral upper lobes that was found on CTA-HN  Studies:  - Lipids: CHOL 104, TGL 97, HDL 49, LDL 36  - TSH-fT4: 0 372 - 0 99  - A1C: 5 3  - TTE: Left ventricular cavity size is normal  Wall thickness is mildly increased  The left ventricular ejection fraction is 70%  Systolic function is vigorous  Wall motion is normal  Left Atrium: The atrium is moderately dilated  Right Atrium: The atrium is mildly dilated  Aortic Valve: The aortic valve is trileaflet  The leaflets are not thickened  The leaflets are moderately calcified  There is mildly reduced mobility  Mitral Valve: There is mild annular calcification  There is mild regurgitation  Tricuspid Valve: There is mild to moderate regurgitation   - CTH: No acute intracranial abnormality   Moderate chronic microangiopathy   - CTA HN: 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 (given new abrupt tapering) with long segment occlusion extending from proximal right ICA cervical segment to right ICA distal cavernous segment, distal reconstitution in right paraophthalmic segment with moderate-to-severe stenosis in right ICA supraclinoid segment likely receiving flow through patent incomplete Belkofski of Lind  Asymmetrically smaller caliber right M1 segment with diminished contrast opacification in right MCA branch vessels (worse distally)  Patchy groundglass opacities in bilateral upper lobes, suspicious for infection/inflammation or aspiration  Impression:  The patient reported that she has had prior episodes in the past where she would experience right hemispheric dysfunction (left sided facial droop, weakness in the LUE, and dysarthria)  Per chart review this was confirmed  With workup demonstrating a large vascular burden, and the patient's wishes to not undergo heroic measures including surgery, maximal medical therapy is recommended  BP management is critical  Keep MAP >90 or better  Treatment of COVID is important as her stroke etiology could be in the setting of hypercoagulability in the setting of active COVID infection  Plan:  - Stroke pathway  - Discussed plan with neurology attending, Dr Lolis Rider  - BP: Maintain MAP 90 or better  - Euglycaemia and Normothermia  - Maintain glucose <180, SSI for coverage if indicated  - Cannot obtain MRI d/t spinal stimulator  - Continue Atorvastatin 40mg QD  - Continue Eliquis 5mg BID  - Maintain glucose <180, SSI for coverage if indicated  - Medical management as per primary team appreciated  - DVT ppx and SCDs   - Monitor on telemetry  - Frequent Neuro Checks  - Obtain STAT CTH wo for changes in mental status or neurologic examination and alert neurology  - PT/OT/Speech/PM&R input appreciated  - Stroke education  - No further inpatient neurology recommendations  Please contact for further questions    - Patient will need to follow up in 4-6 weeks with a neurovascular AP  No further imaging studies required

## 2023-01-22 NOTE — ED ATTENDING ATTESTATION
1/22/2023  I, Oscar Crain DO, saw and evaluated the patient  I have discussed the patient with the resident/non-physician practitioner and agree with the resident's/non-physician practitioner's findings, Plan of Care, and MDM as documented in the resident's/non-physician practitioner's note, except where noted  All available labs and Radiology studies were reviewed  I was present for key portions of any procedure(s) performed by the resident/non-physician practitioner and I was immediately available to provide assistance  At this point I agree with the current assessment done in the Emergency Department  I have conducted an independent evaluation of this patient a history and physical is as follows:    Chief Complaint   Patient presents with   • CVA/TIA-like Symptoms     Pt having increased weakness on L-side  Pt was a pre-hospital stroke alert         49-year-old female coming from a nursing home bedbound at baseline history of heart failure hypertension chronic pain COPD on Eliquis  Presents as a prehospital stroke alert  Unknown last normal but slurring speech with facial droop  On arrival no facial droop but dysarthria  Seems to be moving all extremities  Neurology at the bedside for stroke alert  CTA CT head and neck for stroke, dissection, ICH  labs for metabolic derangement  not tPA candidate given on Eliquis  Due to AMS cannot get history from patient    ED Course     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  Neurology on board  No intervention planned due to age, patient/family preference  Will admit to medicine   BP control     Critical Care Time  CriticalCare Time  Performed by: Oscar Crain DO  Authorized by: Oscar Crain DO     Critical care provider statement:     Critical care time (minutes):  40    Critical care time was exclusive of:  Separately billable procedures and treating other patients and teaching time    Critical care was necessary to treat or prevent imminent or life-threatening deterioration of the following conditions:  CNS failure or compromise    Critical care was time spent personally by me on the following activities:  Blood draw for specimens, obtaining history from patient or surrogate, re-evaluation of patient's condition, review of old charts, evaluation of patient's response to treatment, examination of patient, ordering and review of laboratory studies, ordering and performing treatments and interventions, development of treatment plan with patient or surrogate and ordering and review of radiographic studies

## 2023-01-22 NOTE — ASSESSMENT & PLAN NOTE
Wt Readings from Last 3 Encounters:   01/22/23 72 kg (158 lb 13 5 oz)   12/27/22 88 3 kg (194 lb 10 7 oz)   11/14/22 101 kg (223 lb 8 7 oz)     Plan:  -Continue home Lasix and Spironolactone

## 2023-01-22 NOTE — ASSESSMENT & PLAN NOTE
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 (given new abrupt tapering) with long segment occlusion extending from proximal right ICA cervical segment to right ICA distal cavernous segment, distal reconstitution in right paraophthalmic   segment with moderate-to-severe stenosis in right ICA supraclinoid segment likely receiving flow through patent incomplete [place]  Asymmetrically smaller caliber right M1 segment with diminished contrast opacification in right MCA branch vessels (worse distally)  Plan:  -Neurology  evaluated patient    -Patient has declined any surgical intervention, level 3 DNR   -Patient has a history of similar symptoms that have improved in the past   -Atorvastatin 40 mg daily   -Eliquis 5 mg BID  -Echo completed, final reading pending   -Frequent neuro checks  -BP - MAP > 90   -Goal euglycemia and normothermia  -Goal glucose < 180   -On telemetry    -STAT CTH and alert neuro for changes in mental status    -Speech eval completed   -discharge to home with caregiver in am

## 2023-01-22 NOTE — ED PROVIDER NOTES
History  Chief Complaint   Patient presents with   • CVA/TIA-like Symptoms     Pt having increased weakness on L-side  Pt was a pre-hospital stroke alert     79-year-old female with a past medical history of diabetes, atrial fibrillation, hypertension, CHF, and a prior stroke with residual left-sided weakness presents with slurred speech, facial droop, and weakness  Patient lives at home with a 24-hour caretaker  She had a new caretaker, visit her this morning that 8:30 in the morning  She was found to have slurred speech, left-sided facial droop, and left-sided weakness  Patient's last known normal is not known because the caretaker did not know what time she went to bed last night with the previous caretaker  Patient's only complaint right now is pain in her toes  Patient reports that her speech still sounds slurred  She notes that she has residual left-sided weakness from her prior stroke, but it is a little worse right now  Prior to Admission Medications   Prescriptions Last Dose Informant Patient Reported? Taking?    Acetaminophen (TYLENOL PO) Past Week  Yes Yes   Sig: Take by mouth 3 (three) times a day   CRANBERRY PO Past Week  Yes Yes   Sig: Take 30,000 mg by mouth   Eliquis 5 MG 1/22/2023  No Yes   Sig: TAKE 1 TABLET BY MOUTH TWICE DAILY   Multiple Vitamin (MULTI-VITAMIN DAILY PO) 1/22/2023  Yes Yes   Sig: Take by mouth   atorvastatin (LIPITOR) 40 mg tablet 1/21/2023  No Yes   Sig: TAKE 1 TABLET BY MOUTH ONCE DAILY IN THE EVENING   cholecalciferol (VITAMIN D3) 1,000 units tablet 1/21/2023  Yes Yes   Sig: Take 1,000 Units by mouth every evening   cyanocobalamin (VITAMIN B-12) 1,000 mcg tablet 1/21/2023  Yes Yes   Sig: Take 1,000 mcg by mouth every evening     folic acid (FOLVITE) 1 mg tablet 1/22/2023  Yes Yes   Sig: Take 1,000 mcg by mouth daily   furosemide (LASIX) 40 mg tablet 1/22/2023  Yes Yes   Sig: Take 40 mg by mouth daily   gabapentin (NEURONTIN) 100 mg capsule 1/22/2023  Yes Yes Sig: Take 100 mg by mouth 3 (three) times a day   ipratropium (ATROVENT) 0 03 % nasal spray Past Week  No Yes   Si-2 sprays each nostril twice a day as needed for rhinorrhea   metFORMIN (GLUCOPHAGE) 500 mg tablet 2023  Yes Yes   Sig: Take 500 mg by mouth daily   metoprolol tartrate (LOPRESSOR) 25 mg tablet 2023  Yes Yes   Sig: Take 25 mg by mouth daily   primidone (MYSOLINE) 50 mg tablet 2023  Yes Yes   Sig: Take by mouth 2 (two) times a day    spironolactone (ALDACTONE) 25 mg tablet 2023  No Yes   Sig: Take 1 tablet (25 mg total) by mouth daily      Facility-Administered Medications: None       Past Medical History:   Diagnosis Date   • A-fib ([place] [address] )    • Arthritis    • Assistance needed for mobility     pt can stand with assistance and transfer   • Chronic pain disorder    • Diabetes mellitus ([place] [address] )     NIDDM   • Full dentures    • History of transfusion     no adverse reaction   • Hyperlipidemia    • Irregular heart beat    • Numbness and tingling of both feet    • Skin abnormality     sacrum area - small red area, less than a dime size   • Spinal stenosis    • Stroke [facility])    • Unable to ambulate    • Urinary incontinence     ipg stimulator x3 repakcements,battery exchange today 2018   • Wears glasses    • Wheelchair dependence        Past Surgical History:   Procedure Laterality Date   • BACK SURGERY      fusion   • BLADDER SUSPENSION     • CARPAL TUNNEL RELEASE Bilateral    • CHOLANGIOGRAM N/A 2018    Procedure: CHOLANGIOGRAM;  Surgeon: Werner Gonzalez MD;  [facility];  Service: General   • CHOLECYSTECTOMY LAPAROSCOPIC N/A 2018    Procedure: CHOLECYSTECTOMY LAPAROSCOPIC;  Surgeon: Werner Gonzalez MD;  [facility];  Service: General   • COLONOSCOPY     • DILATION AND CURETTAGE OF UTERUS     • FRACTURE SURGERY Left     femur with hardware   • HYSTERECTOMY     • INSERTION / PLACEMENT / REVISION NEUROSTIMULATOR     • JOINT REPLACEMENT Bilateral     knees   • SACRAL NERVE STIMULATOR PLACEMENT N/A 2/14/2018    Procedure: REMOVE AND REPLACE IPG;  Surgeon: Vivi Noble MD;  [facility];  Service: UroGynecology          • TONSILLECTOMY         Family History   Problem Relation Age of Onset   • No Known Problems Mother    • No Known Problems Father      I have reviewed and agree with the history as documented  E-Cigarette/Vaping   • E-Cigarette Use Never User      E-Cigarette/Vaping Substances     Social History     Tobacco Use   • Smoking status: Never   • Smokeless tobacco: Never   Vaping Use   • Vaping Use: Never used   Substance Use Topics   • Alcohol use: Not Currently     Comment: very rare   • Drug use: No        Review of Systems   Constitutional: Negative for chills, fatigue and fever  HENT: Negative for congestion, rhinorrhea and sore throat  Eyes: Negative for pain and redness  Respiratory: Negative for cough, chest tightness, shortness of breath and wheezing  Cardiovascular: Negative for chest pain and palpitations  Gastrointestinal: Negative for abdominal pain, diarrhea, nausea and vomiting  Endocrine: Negative  Genitourinary: Negative for difficulty urinating and hematuria  Musculoskeletal: Negative for back pain and myalgias  Skin: Negative for pallor and rash  Allergic/Immunologic: Negative  Neurological: Positive for speech difficulty and weakness  Negative for dizziness, light-headedness and headaches  Hematological: Negative          Physical Exam  ED Triage Vitals   Temperature Pulse Respirations Blood Pressure SpO2   01/22/23 1200 01/22/23 1149 01/22/23 1149 01/22/23 1149 01/22/23 1149   (!) 97 3 °F (36 3 °C) 87 18 (!) 172/95 100 %      Temp Source Heart Rate Source Patient Position - Orthostatic VS BP Location FiO2 (%)   01/22/23 1151 01/22/23 1149 01/22/23 1149 01/22/23 1149 --   Oral Monitor Lying Right arm       Pain Score       01/22/23 1149       No Pain             Orthostatic Vital Signs  Vitals:    01/22/23 1245 01/22/23 1330 01/22/23 1430 01/22/23 1530   BP: 134/63 153/67 168/73 134/87   Pulse: 80 74 78 82   Patient Position - Orthostatic VS: Sitting Sitting Sitting Sitting       Physical Exam  Vitals and nursing note reviewed  Constitutional:       General: She is not in acute distress  Appearance: Normal appearance  She is not ill-appearing  HENT:      Head: Normocephalic and atraumatic  Eyes:      General: No visual field deficit  Conjunctiva/sclera: Conjunctivae normal    Cardiovascular:      Rate and Rhythm: Normal rate and regular rhythm  Pulmonary:      Effort: Pulmonary effort is normal  No respiratory distress  Abdominal:      General: Abdomen is flat  Palpations: Abdomen is soft  Musculoskeletal:         General: Normal range of motion  Cervical back: Normal range of motion and neck supple  Skin:     General: Skin is warm and dry  Neurological:      General: No focal deficit present  Mental Status: She is alert and oriented to person, place, and time  Cranial Nerves: Dysarthria present  No facial asymmetry  Sensory: Sensation is intact  Motor: Weakness (Left upper and lower extremities, may be baseline) present  Coordination: Finger-Nose-Finger Test and Heel to [place] bernadine Test normal       Comments: Unable to lift either leg off the bed or hold them in the air  Pt is bed-bound and unclear if this is baseline  No drift in the upper extremities            ED Medications  Medications   atorvastatin (LIPITOR) tablet 40 mg (has no administration in time range)   apixaban (ELIQUIS) tablet 5 mg (has no administration in time range)   cholecalciferol (VITAMIN D3) tablet 1,000 Units (has no administration in time range)   cyanocobalamin (VITAMIN B-12) tablet 1,000 mcg (has no administration in time range)   folic acid (FOLVITE) tablet 1,000 mcg (1,000 mcg Oral Not Given 1/22/23 1530)   furosemide (LASIX) tablet 40 mg (has no administration in time range) gabapentin (NEURONTIN) capsule 100 mg (100 mg Oral Not Given 1/22/23 1530)   metoprolol tartrate (LOPRESSOR) tablet 25 mg (has no administration in time range)   primidone (MYSOLINE) tablet 50 mg (has no administration in time range)   spironolactone (ALDACTONE) tablet 25 mg (has no administration in time range)   iohexol (OMNIPAQUE) 350 MG/ML injection (SINGLE-DOSE) 85 mL (85 mL Intravenous Given 1/22/23 1154)       Diagnostic Studies  Results Reviewed     Procedure Component Value Units Date/Time    HS Troponin I 2hr [[id number]]  (Normal) Collected: 01/22/23 1349    Lab Status: Final result Specimen: Blood from Arm, Left Updated: 01/22/23 1421     hs TnI 2hr 10 ng/L      Delta 2hr hsTnI 1 ng/L     Hemoglobin A1C [[id number]] Collected: 01/22/23 1152    Lab Status: Final result Specimen: Blood from Arm, Left Updated: 01/22/23 1337     Hemoglobin A1C 5 3 %       mg/dl     Lipid Panel with Direct LDL reflex [[id number]]  (Abnormal) Collected: 01/22/23 1152    Lab Status: Final result Specimen: Blood from Arm, Left Updated: 01/22/23 1333     Cholesterol 96 mg/dL      Triglycerides 104 mg/dL      HDL, Direct 46 mg/dL      LDL Calculated 29 mg/dL     HS Troponin I 4hr [[id number]]     Lab Status: No result Specimen: Blood     HS Troponin 0hr (reflex protocol) [[id number]]  (Normal) Collected: 01/22/23 1152    Lab Status: Final result Specimen: Blood from Arm, Left Updated: 01/22/23 1244     hs TnI 0hr 9 ng/L     FLU/RSV/COVID - if FLU/RSV clinically relevant [[id number]]  (Abnormal) Collected: 01/22/23 1152    Lab Status: Final result Specimen: Nares from Nose Updated: 01/22/23 1242     SARS-CoV-2 Positive     INFLUENZA A PCR Negative     INFLUENZA B PCR Negative     RSV PCR Negative    Narrative:      FOR PEDIATRIC PATIENTS - copy/paste COVID Guidelines URL to browser: [email or web address]    SARS-CoV-2 assay is a Nucleic Acid Amplification assay intended for the  qualitative detection of nucleic acid from SARS-CoV-2 in nasopharyngeal  swabs  Results are for the presumptive identification of SARS-CoV-2 RNA  Positive results are indicative of infection with SARS-CoV-2, the virus  causing COVID-19, but do not rule out bacterial infection or co-infection  with other viruses  Laboratories within the [facility] and its  territories are required to report all positive results to the appropriate  public health authorities  Negative results do not preclude SARS-CoV-2  infection and should not be used as the sole basis for treatment or other  patient management decisions  Negative results must be combined with  clinical observations, patient history, and epidemiological information  This test has not been FDA cleared or approved  This test has been authorized by FDA under an Emergency Use Authorization  (EUA)  This test is only authorized for the duration of time the  declaration that circumstances exist justifying the authorization of the  emergency use of an in vitro diagnostic tests for detection of SARS-CoV-2  virus and/or diagnosis of COVID-19 infection under section 564(b)(1) of  the Act, 21 U  S C  296PAD-4(F)(2), unless the authorization is terminated  or revoked sooner  The test has been validated but independent review by FDA  and CLIA is pending  Test performed using [facility] GeneXpert: This RT-PCR assay targets N2,  a region unique to SARS-CoV-2  A conserved region in the E-gene was chosen  for pan-Sarbecovirus detection which includes SARS-CoV-2  According to CMS-2020-01-R, this platform meets the definition of high-throughput technology      Basic metabolic panel [[id number]]  (Abnormal) Collected: 01/22/23 1152    Lab Status: Final result Specimen: Blood from Arm, Left Updated: 01/22/23 1233     Sodium 140 mmol/L      Potassium 3 8 mmol/L      Chloride 110 mmol/L      CO2 25 mmol/L      ANION GAP 5 mmol/L      BUN 19 mg/dL      Creatinine 0 65 mg/dL Glucose 103 mg/dL      Calcium 8 4 mg/dL      eGFR 80 ml/min/1 73sq m     Narrative:      [place] guidelines for Chronic Kidney Disease (CKD):   •  Stage 1 with normal or high GFR (GFR > 90 mL/min/1 73 square meters)  •  Stage 2 Mild CKD (GFR = 60-89 mL/min/1 73 square meters)  •  Stage 3A Moderate CKD (GFR = 45-59 mL/min/1 73 square meters)  •  Stage 3B Moderate CKD (GFR = 30-44 mL/min/1 73 square meters)  •  Stage 4 Severe CKD (GFR = 15-29 mL/min/1 73 square meters)  •  Stage 5 End Stage CKD (GFR <15 mL/min/1 73 square meters)  Note: GFR calculation is accurate only with a steady state creatinine    APTT [[id number]]  (Normal) Collected: 01/22/23 1152    Lab Status: Final result Specimen: Blood from Arm, Left Updated: 01/22/23 1228     PTT 34 seconds     Protime-INR [[id number]]  (Abnormal) Collected: 01/22/23 1152    Lab Status: Final result Specimen: Blood from Arm, Left Updated: 01/22/23 1228     Protime 16 2 seconds      INR 1 27    CBC and Platelet [[id number]]  (Abnormal) Collected: 01/22/23 1152    Lab Status: Final result Specimen: Blood from Arm, Left Updated: 01/22/23 1215     WBC 5 30 Thousand/uL      RBC 3 97 Million/uL      Hemoglobin 12 4 g/dL      Hematocrit 37 9 %      MCV 96 fL      MCH 31 2 pg      MCHC 32 7 g/dL      RDW 13 2 %      Platelets 284 Thousands/uL      MPV 9 9 fL     Fingerstick Glucose (POCT) [[id number]]  (Normal) Collected: 01/22/23 1145    Lab Status: Final result Updated: 01/22/23 1148     POC Glucose 113 mg/dl                  CTA stroke alert (head/neck)   Final Result by Adolfo Nunn MD (01/22 1217)      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 (given new abrupt tapering) with long segment occlusion extending from proximal right ICA cervical segment to right ICA distal cavernous segment, distal reconstitution in right paraophthalmic    segment with moderate-to-severe stenosis in right ICA supraclinoid segment likely receiving flow through patent incomplete [place]  Asymmetrically smaller caliber right M1 segment with diminished contrast opacification in right MCA branch vessels (worse distally)  Patchy groundglass opacities in bilateral upper lobes, suspicious for infection/inflammation or aspiration  Additional chronic/incidental findings as detailed above  Findings were directly discussed with Ravinder Dobbins  at 12:00 PM                            Workstation performed: [id number]         CT stroke alert brain   Final Result by Lolly Yadav MD (01/22 1202)      No acute intracranial abnormality  Moderate chronic microangiopathy  Findings were directly discussed with Ravinder Dobbins  at 12:00 PM       Workstation performed: [id number]               Procedures  Procedures      ED Course  ED Course as of 01/22/23 1647   Sun Jan 22, 2023   1237 Slim texted for admission per neuro request  Neuro ordered Eliquis and statin for ICA dissection   1308 ECG 12 lead  The heart rate is 84, which is normal  The rhythm is regular  The axis is left  The P waves are normal and the CA interval is prolonged  The QRS height is normal and width is widened  The ST segments are not elevated or depressed  The T waves are normal  The QT segment is normal  This ecg shows sinus rhythm with 1st degree AV block and RBBB                       Stroke Assessment     Row Name 01/22/23 1310             NIH Stroke Scale    Interval Baseline      Level of Consciousness (1a ) 0      LOC Questions (1b ) 0      LOC Commands (1c ) 0      Best Gaze (2 ) 0      Visual (3 ) 0      Facial Palsy (4 ) 0      Motor Arm, Left (5a ) 0      Motor Arm, Right (5b ) 0      Motor Leg, Left (6a ) 3      Motor Leg, Right (6b ) 3      Limb Ataxia (7 ) 0      Sensory (8 ) 0      Best Language (9 ) 0      Dysarthria (10 ) 1      Extinction and Inattention (11 ) (Formerly Neglect) 0      Total 7              Flowsheet Row Most Recent Value   Thrombolytic Decision Options    Thrombolytic Decision Patient not a candidate  Patient is not a candidate options Unclear time of onset outside appropriate time window  SBIRT 22yo+    Flowsheet Row Most Recent Value   SBIRT (23 yo +)    In order to provide better care to our patients, we are screening all of our patients for alcohol and drug use  Would it be okay to ask you these screening questions? Yes Filed at: 01/22/2023 1234   Initial Alcohol Screen: US AUDIT-C     1  How often do you have a drink containing alcohol? 0 Filed at: 01/22/2023 1234   2  How many drinks containing alcohol do you have on a typical day you are drinking? 0 Filed at: 01/22/2023 1234   3a  Male UNDER 65: How often do you have five or more drinks on one occasion? 0 Filed at: 01/22/2023 1234   3b  FEMALE Any Age, or MALE 65+: How often do you have 4 or more drinks on one occassion? 0 Filed at: 01/22/2023 1234   Audit-C Score 0 Filed at: 01/22/2023 1234   DUSTIN: How many times in the past year have you    Used an illegal drug or used a prescription medication for non-medical reasons? Never Filed at: 01/22/2023 1234                Medical Decision Making  80-year-old female presents as a prehospital stroke alert for dysarthria, facial droop, and weakness  Patient no longer has facial droop  She still has dysarthria and weakness  She does have some residual left-sided deficits from a prior stroke  Patient was immediately taken to CT scan  Neurology saw patient at bedside  Patient is not a TNK candidate secondary to timing of the onset of her symptoms  Initial imaging showed acute dissection of the ICA  Per neurology, patient declined any surgical intervention  Neurology recommended medicine admission  Patient was admitted to [facility] for further management      Dissection of carotid artery [facility]): acute illness or injury  Dissection of carotid artery [facility]): acute illness or injury  Stroke-like episode: acute illness or injury     Details: Symptoms improving  Amount and/or Complexity of Data Reviewed  External Data Reviewed: notes  Details: Reviewed prior medical history to obtain baseline neurologic exam  Labs: ordered  Details: Per stroke alert protocol  Radiology: ordered  Details: Per stroke alert protocol  ECG/medicine tests:  Decision-making details documented in ED Course  Discussion of management or test interpretation with external provider(s): Discussed with neurology and internal medicine to obtain further plans    Risk  Prescription drug management  Decision regarding hospitalization      Risk Details: Patient is at increased risk for decompensation secondary to chronic medical conditions          Disposition  Final diagnoses:   Stroke-like episode   Dissection of carotid artery (HCC)   [facility]     Time reflects when diagnosis was documented in both MDM as applicable and the Disposition within this note     Time User Action Codes Description Comment    1/22/2023 11:36 AM Glenn Emerson Add [R29 90] Stroke-like episode     1/22/2023  1:39 PM Glenn Emerson Add [I67 0] Dissection of cerebral artery ([facility] )     1/22/2023  1:39 PM Michael Emerson [R29 90] Stroke-like episode     1/22/2023  1:39 PM Glenn Emerson Modify [I67 0] Dissection of cerebral artery ([facility] )     1/22/2023  4:47 PM Glenn Emerson Add [I77 71] Dis[facility]     1/22/2023  4:47 PM HaGlenn momin Modify [I67 0] Dissection of cerebral artery ([facility] )     1/22/2023  4:47 PM Glenn Emerson Modify [I77 71] Dissection of carotid artery ([facility] )     1/22/2023  4:47 PM Glenn Emerson Modify [I67 0] Dissection of carotid artery [facility])       ED Disposition     ED Disposition   Admit    Condition   Stable    Date/Time   Sun Jan 22, 2023  1:39 PM    Comment   Case was discussed with Dr Brad No and the patient's admission status was agreed to be Admission Status: observation status to the service of Dr Fazal Bautista   Follow-up Information    None         Patient's Medications   Discharge Prescriptions    No medications on file     No discharge procedures on file  PDMP Review       Value Time User    PDMP Reviewed  Yes 3/12/2020 11:49 AM Yasmin Wells MD           ED Provider  Attending physically available and evaluated Loraine Clinton I managed the patient along with the ED Attending      Electronically Signed by         Chito Jordan, DO  01/22/23 1051 Natali Thacker, DO  01/22/23 [id number]

## 2023-01-22 NOTE — ASSESSMENT & PLAN NOTE
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

## 2023-01-22 NOTE — ED NOTES
Pt daughter would like a call with an update on pt when there is more information available       Dale Serrano RN  01/22/23 8421

## 2023-01-22 NOTE — CONSULTS
Consultation - Stroke   Elza Schnedier 80 y o  female MRN: [id number]  Unit/Bed#: ED 27 Encounter: [id number]      Recommendations for outpatient neurological follow up have yet to be determined  Pending for discharge: Stroke workup, others per primary  Assessment/Plan   Stroke-like symptoms  Assessment & Plan  Assessment:  Elza Schneider is a 80 y o  right handed female with a PMH of prior CVA, Afib (on Eliquis), NIDDM, HLD, Spinal stenosis, urinary incontenence (s/p spinal stimulator), bed bound, chronic pain disorder [facility] as a pre-hospital stroke alert  According to EMS handoff the patient has an unknown LKW however it is thought to be at bedtime on 1/21/2023  On 1/22/2023 around 0830 the patients new nurse noted that the patient was demonstrating a left sided facial droop, reduction in her ability to squeeze her left hand, and dysarthria  Per EMS hand-off the her BP in the field was 179/90, and her BGL was 105  At time of evaluation the NIHSS was 7  The patient was asked if she wanted intervention including tNK or surgery and the patient outright refused  Therefore neurosurgery was not consulted as the patient refused TNK and interventions such as surgery  Considering the extent of proximal occlusive disease and premorbid debility there’d be no role for intervention due to risks involved  The patient will be admitted for further stroke workup in addition to the evaluation for patchy groundglass opacities in bilateral upper lobes that was found on CTA-HN  Impression:  The patient reported that she has had prior episodes in the past where she would experience right hemispheric dysfunction (left sided facial droop, weakness in the LUE, and dysarthria)  Per chart review this was confirmed  A basic stroke workup will be completed with the suspicion of vascular insufficieny if right-ICA       Plan:  - Stroke pathway  - Discussed plan with neurology attending, Dr Angel Bernal  - BP: Maintain MAP 90 or better  - Euglycaemia and Normothermia  - Maintain glucose <180, SSI for coverage if indicated  - Cannot obtain MRI d/t spinal stimulator  - Obtain lipids, TSH/fT4 and HgbA1c  - Continue Atorvastatin 40mg QD  - Continue Eliquis 5mg BID  - Maintain glucose <180, SSI for coverage if indicated  - Medical management as per primary team appreciated  - DVT ppx and SCDs   - Monitor on telemetry  - Obtain TTE  - Frequent Neuro Checks  - Obtain STAT CTH wo for changes in mental status or neurologic examination and alert neurology  - PT/OT/Speech/PM&R input appreciated  - Stroke education    Thrombolytic Decision: Patient not a candidate  Unclear time of onset outside appropriate time window  and Patient refused    History of Present Illness   Reason for Consult / Principal Problem: Stroke Alert  Hx and PE limited by: Lethargy, prior CVA residual sx, and dysartria  Patient last known well: 01/21/2023 "at bedtime'  Stroke alert called: 11:38  Neurology time of arrival: 11:40  Time NIHSS was completed: 12:15  Modified Cee Score: 5 (Severe disability; bedridden, incontinent and requiring constant nursing care and attention)    HPI: Santos Roque is a 80 y o  right handed female with a PMH of prior CVA, Afib (on Eliquis), NIDDM, HLD, Spinal stenosis, urinary incontenence (s/p spinal stimulator), bed bound, chronic pain disorder presents to [facility] as a pre-hospital stroke alert  According to EMS handoff the patient has an unknown LKW however it is thought to be at bedtime on 1/21/2023  On 1/22/2023 around 0830 the patients new nurse noted that the patient was demonstrating a left sided facial droop, reduction in her ability to squeeze her left hand, and dysarthria  Per EMS hand-off the her BP in the field was 179/90, and her BGL was 105  At time of evaluation the NIHSS was 7 (please see complete scoring below), CTH wo demonstrated no acute intracranial abnormality and moderate chronic microangiopathy   CTA HN demonstrating an acute R-ICA proximal cervical segment with long segment occlusions extending from proximal R-ICA cervical segment to R periophthalmic segment with moderate to severe stenosis of the R-ICA supraclinoid segment where flow is likely through patent incomplete [place]  Other findings noted in official read  The patient was asked if she wanted intervention including tNK or surgery and the patient outright refused  Therefore neurosurgery was not consulted as the patient refused TNK and interventions such as surgery  Considering the extent of proximal occlusive disease and premorbid debility there’d be no role for intervention due to risks involved  The patient will be admitted for further stroke workup in addition to the evaluation for patchy groundglass opacities in bilateral upper lobes that was found on CTA-HN  Inpatient consult to Neurology  Consult performed by: Varsha Gross DO  Consult ordered by: Varsha Gross DO        Review of Systems   Constitutional: Positive for fatigue  Negative for chills and fever  HENT: Positive for voice change  Negative for ear discharge, ear pain, facial swelling, nosebleeds and sneezing  Eyes: Positive for pain (Photophoebia)  Negative for discharge  Respiratory: Negative for cough and wheezing  Cardiovascular: Positive for leg swelling  Negative for palpitations  Gastrointestinal: Negative for blood in stool, nausea and vomiting  Genitourinary: Negative for hematuria  Urinary incontenence   Musculoskeletal: Positive for gait problem ( Bed bound)  Negative for arthralgias, neck pain and neck stiffness  Skin: Negative for rash  Neurological: Positive for tremors, speech difficulty and weakness  Negative for dizziness, seizures, syncope, facial asymmetry, light-headedness, numbness and headaches  Psychiatric/Behavioral: Positive for confusion and decreased concentration   Negative for agitation, behavioral problems, dysphoric mood, hallucinations, self-injury and suicidal ideas  The patient is not nervous/anxious and is not hyperactive        Historical Information   Past Medical History:   Diagnosis Date   • A-fib [facility])    • Arthritis    • Assistance needed for mobility     pt can stand with assistance and transfer   • Chronic pain disorder    • Diabetes mellitus ([facility] [address] )     NIDDM   • Full dentures    • History of transfusion     no adverse reaction   • Hyperlipidemia    • Irregular heart beat    • Numbness and tingling of both feet    • Skin abnormality     sacrum area - small red area, less than a dime size   • Spinal stenosis    • Stroke [facility])    • Unable to ambulate    • Urinary incontinence     ipg stimulator x3 repakcements,battery exchange today 2/14/2018   • Wears glasses    • Wheelchair dependence      Past Surgical History:   Procedure Laterality Date   • BACK SURGERY      fusion   • BLADDER SUSPENSION     • CARPAL TUNNEL RELEASE Bilateral    • CHOLANGIOGRAM N/A 6/4/2018    Procedure: CHOLANGIOGRAM;  Surgeon: Ricky Kenney MD;  Location: [facility];  Service: General   • CHOLECYSTECTOMY LAPAROSCOPIC N/A 6/4/2018    Procedure: CHOLECYSTECTOMY LAPAROSCOPIC;  Surgeon: Ricky Kenney MD;  [facility];  Service: General   • COLONOSCOPY     • DILATION AND CURETTAGE OF UTERUS     • FRACTURE SURGERY Left     femur with hardware   • HYSTERECTOMY     • INSERTION / Morgan Kishore / REVISION NEUROSTIMULATOR     • JOINT REPLACEMENT Bilateral     knees   • SACRAL NERVE STIMULATOR PLACEMENT N/A 2/14/2018    Procedure: REMOVE AND REPLACE IPG;  Surgeon: Navid Mcdonough MD;  Location: [facility];  Service: UroGynecology          • TONSILLECTOMY       Social History   Social History     Substance and Sexual Activity   Alcohol Use Not Currently    Comment: very rare     Social History     Substance and Sexual Activity   Drug Use No     E-Cigarette/Vaping   • E-Cigarette Use Never User      E-Cigarette/Vaping Substances     Social History     Tobacco Use   Smoking Status Never   Smokeless Tobacco Never     Family History:   Family History   Problem Relation Age of Onset   • No Known Problems Mother    • No Known Problems Father      Review of previous medical records was completed  Meds/Allergies   all current active meds have been reviewed, current meds:   Current Facility-Administered Medications   Medication Dose Route Frequency   • apixaban (ELIQUIS) tablet 5 mg  5 mg Oral BID   • atorvastatin (LIPITOR) tablet 40 mg  40 mg Oral QPM    and PTA meds:   Prior to Admission Medications   Prescriptions Last Dose Informant Patient Reported? Taking?    Acetaminophen (TYLENOL PO)   Yes No   Sig: Take by mouth 3 (three) times a day   CRANBERRY PO   Yes No   Sig: Take 30,000 mg by mouth   Eliquis 5 MG   No No   Sig: TAKE 1 TABLET BY MOUTH TWICE DAILY   Multiple Vitamin (MULTI-VITAMIN DAILY PO)   Yes No   Sig: Take by mouth   atorvastatin (LIPITOR) 40 mg tablet   No No   Sig: TAKE 1 TABLET BY MOUTH ONCE DAILY IN THE EVENING   cholecalciferol (VITAMIN D3) 1,000 units tablet   Yes No   Sig: Take 1,000 Units by mouth every evening   cyanocobalamin (VITAMIN B-12) 1,000 mcg tablet   Yes No   Sig: Take 1,000 mcg by mouth every evening     folic acid (FOLVITE) 1 mg tablet   Yes No   Sig: Take 1,000 mcg by mouth daily   furosemide (LASIX) 40 mg tablet   Yes No   Sig: Take 40 mg by mouth daily   gabapentin (NEURONTIN) 100 mg capsule   Yes No   Sig: Take 100 mg by mouth 3 (three) times a day   ipratropium (ATROVENT) 0 03 % nasal spray   No No   Si-2 sprays each nostril twice a day as needed for rhinorrhea   metFORMIN (GLUCOPHAGE) 500 mg tablet   Yes No   Sig: Take 500 mg by mouth daily   metoprolol tartrate (LOPRESSOR) 25 mg tablet   Yes No   Sig: Take 25 mg by mouth daily   primidone (MYSOLINE) 50 mg tablet   Yes No   Sig: Take by mouth 2 (two) times a day    spironolactone (ALDACTONE) 25 mg tablet   No No   Sig: Take 1 tablet (25 mg total) by mouth daily      Facility-Administered Medications: None     Allergies   Allergen Reactions   • Sulfamethoxazole-Trimethoprim Swelling     Swelling around eyes and red eyes   • Asa [Aspirin] Other (See Comments)     Unknown     • Aspirin Other (See Comments)     "feels like fist in my chest"   • Ciprofloxacin    • Nitrofurantoin    • Other      "5mz/Tmp "  "1 60 mTab amme"     Per pt's allergy list      Objective   Vitals:Blood pressure 153/67, pulse 74, temperature (!) 97 3 °F (36 3 °C), temperature source Oral, resp  rate 18, weight 72 kg (158 lb 13 5 oz), SpO2 97 %, not currently breastfeeding  ,Body mass index is 26 43 kg/m²  No intake or output data in the 24 hours ending 01/22/23 1353    Invasive Devices: Invasive Devices     Peripheral Intravenous Line  Duration           Peripheral IV 01/22/23 Left Antecubital <1 day    Peripheral IV 01/22/23 Left;Ventral (anterior) Forearm <1 day          Drain  Duration           External Urinary Catheter 29 days              Physical Exam  Vitals and nursing note reviewed  Constitutional:       General: She is not in acute distress  Appearance: She is well-developed  HENT:      Head: Normocephalic and atraumatic  Nose: Nose normal       Mouth/Throat:      Mouth: Mucous membranes are moist       Pharynx: Oropharynx is clear  No oropharyngeal exudate  Eyes:      Extraocular Movements: Extraocular movements intact and EOM normal       Conjunctiva/sclera: Conjunctivae normal       Pupils: Pupils are equal, round, and reactive to light  Cardiovascular:      Rate and Rhythm: Normal rate and regular rhythm  Heart sounds: No murmur heard  Pulmonary:      Effort: Pulmonary effort is normal  No respiratory distress  Breath sounds: Normal breath sounds  Abdominal:      Palpations: Abdomen is soft  Tenderness: There is no abdominal tenderness  Musculoskeletal:      Cervical back: Neck supple  Right lower leg: Edema present  Left lower leg: Edema present     Skin: General: Skin is warm and dry  Capillary Refill: Capillary refill takes less than 2 seconds  Neurological:      Mental Status: She is alert  Mental status is at baseline  Cranial Nerves: No cranial nerve deficit  Sensory: No sensory deficit  Motor: Weakness present  Coordination: Coordination abnormal       Gait: Gait abnormal       Deep Tendon Reflexes: Reflexes normal    Psychiatric:         Mood and Affect: Mood normal        Neurologic Exam     Mental Status   Oriented to person  Oriented to place  Oriented to year and month  Follows 2 step commands  Attention: decreased  Concentration: decreased  Level of consciousness: alert ,  arousable by tactile stimuli  Able to perform simple calculations  Able to name object  Able to repeat  Normal comprehension    - Speech dysarthric     Cranial Nerves     CN II   Right visual field deficit: none  Left visual field deficit: none     CN III, IV, VI   Pupils are equal, round, and reactive to light  Extraocular motions are normal    Right pupil: Size: 3 mm  Shape: regular  Accommodation: intact  Left pupil: Size: 3 mm  Shape: regular  Accommodation: intact  CN III: no CN III palsy  CN VI: no CN VI palsy  Nystagmus: none   Upgaze: normal  Downgaze: normal  Conjugate gaze: present    CN V   Right facial sensation deficit: none  Left facial sensation deficit: none    CN VII   Right facial weakness: none  Left facial weakness: none    CN VIII   Hearing: intact    CN IX, X   Palate: symmetric    CN XI   Right sternocleidomastoid strength: normal  Left sternocleidomastoid strength: normal  Right trapezius strength: normal  Left trapezius strength: normal    CN XII   Tongue: not atrophic  Fasciculations: absent  Tongue deviation: none    NIHSS:  1a Level of Consciousness: 1 = Not alert, but arousable with minimal stimulation   1b  LOC Questions: 0 = Answers both correctly   1c  LOC Commands: 0 = Obeys both correctly   2   Best Gaze: 0 = Normal   3  Visual: 0 = No visual field loss   4  Facial Palsy: 0=Normal symmetric movement   5a  Motor Right Arm: 0=No drift, limb holds 90 (or 45) degrees for full 10 seconds   5b  Motor Left Arm: 0=No drift, limb holds 90 (or 45) degrees for full 10 seconds   6a  Motor Right Le=Some effort against gravity, limb cannot get to or maintain (if cured) 90 (or 45) degrees, drifts down to bed, but has some effort against gravity   6b  Motor Left Le=Some effort against gravity, limb cannot get to or maintain (if cured) 90 (or 45) degrees, drifts down to bed, but has some effort against gravity   7  Limb Ataxia:  1=Present in one limb   8  Sensory: 0=Normal; no sensory loss   9  Best Language:  0=No aphasia, normal   10  Dysarthria: 1=Mild to moderate, patient slurs at least some words and at worst, can be understood with some difficulty   11  Extinction and Inattention (formerly Neglect): 0=No abnormality   Total Score: 7     Lab Results:   I have personally reviewed pertinent reports  CBC:   Results from last 7 days   Lab Units 23  1152   WBC Thousand/uL 5 30   RBC Million/uL 3 97   HEMOGLOBIN g/dL 12 4   HEMATOCRIT % 37 9   MCV fL 96   PLATELETS Thousands/uL 127*     BMP/CMP:   Results from last 7 days   Lab Units 23  1152   SODIUM mmol/L 140   POTASSIUM mmol/L 3 8   CHLORIDE mmol/L 110*   CO2 mmol/L 25   BUN mg/dL 19   CREATININE mg/dL 0 65   CALCIUM mg/dL 8 4   EGFR ml/min/1 73sq m 80     Coagulation:   Results from last 7 days   Lab Units 23  1152   INR  1 27*     Imaging Studies: I have personally reviewed pertinent reports  and I have personally reviewed pertinent films in PACS  EKG, Pathology, and Other Studies: I have personally reviewed pertinent reports      VTE Prophylaxis: Sequential compression device Sarah Manjarrez) Patient on Eliquis     Code Status: Prior  Advance Directive and Living Will:      Power of :    POLST:      Counseling / Coordination of Care  Please see attending's Attestation

## 2023-01-22 NOTE — ASSESSMENT & PLAN NOTE
Lab Results   Component Value Date    HGBA1C 5 3 01/23/2023       Recent Labs     01/22/23  1145   POCGLU 113       Blood Sugar Average: Last 72 hrs:  (P) 113     Plan:  -On Metformin at home, held  -Follow blood sugars   -Consider SSI if needed

## 2023-01-22 NOTE — H&P
INTERNAL MEDICINE RESIDENCY ADMISSION H&P     Name: Reed Molina   Age & Sex: 80 y o  female   MRN: [id number]  Unit/Bed#: ED 32   Encounter: [id number]  Primary Care Provider: Jeniffer Sheets MD    Code Status: Level 3 - DNAR and DNI  Admission Status: OBSERVATION  Disposition: Patient requires Med/Surg    Admit to team: SOD Team B     ASSESSMENT/PLAN     Active Problems:    Stroke-like symptoms    Type 2 diabetes mellitus (HCC)    Paroxysmal atrial fibrillation (HCC)    Hypertension    Slurred speech    CHF (congestive heart failure) ([facility] [address] )    Internal carotid artery dissection (HCC)      Stroke-like symptoms  Assessment & Plan  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 (given new abrupt tapering) with long segment occlusion extending from proximal right ICA cervical segment to right ICA distal cavernous segment, distal reconstitution in right paraophthalmic   segment with moderate-to-severe stenosis in right ICA supraclinoid segment likely receiving flow through patent incomplete [place]  Asymmetrically smaller caliber right M1 segment with diminished contrast opacification in right MCA branch vessels (worse distally)    -Patient has     Plan:  -Neurology consulted, evaluated patient    -Patient has declined any surgical intervention, level 3 DNR   -Patient has a history of similar symptoms that have improved in the past   -Atorvastatin 40 mg daily   -Eliquis 5 mg BID  -Echo ordered  -Frequent neuro checks  -BP - MAP > 90   -Goal euglycemia and normothermia  -Goal glucose < 180   -On telemetry  -STAT CTH and alert neuro for changes in mental status    -Speech eval        Internal carotid artery dissection [facility])  Assessment & Plan  -1/22 CTA Stroke alert head/neck: 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 (given new abrupt tapering) with long segment occlusion extending from proximal right ICA cervical segment to right ICA distal cavernous segment, distal reconstitution in right paraophthalmic   segment with moderate-to-severe stenosis in right ICA supraclinoid segment likely receiving flow through patent incomplete [place] of [place]  Asymmetrically smaller caliber right M1 segment with diminished contrast opacification in right MCA branch vessels (worse distally)  Plan:  -Medical management and obervation, see A&P for stroke-like symptoms   -Level 3 DNR   -No surgical intervention        CHF (congestive heart failure) (HCC)  Assessment & Plan  Wt Readings from Last 3 Encounters:   01/22/23 72 kg (158 lb 13 5 oz)   12/27/22 88 3 kg (194 lb 10 7 oz)   11/14/22 101 kg (223 lb 8 7 oz)     Plan:  -Continue home Lasix and Spironolactone  Slurred speech  Assessment & Plan  Presenting with slurred speech  Plan:  -See A&P for stroke-like symptoms  Hypertension  Assessment & Plan  Plan:  -Monitor BP closely   -Continue home Lasix, Lopressor, Aldactone  Paroxysmal atrial fibrillation (HCC)  Assessment & Plan  On Eliquis at home  Plan:  -Continue home Eliquis and Lopressor   -Monitor on telemetry  Type 2 diabetes mellitus [facility])  Assessment & Plan  Lab Results   Component Value Date    HGBA1C 5 3 01/22/2023       Recent Labs     01/22/23  1145   POCGLU 113       Blood Sugar Average: Last 72 hrs:  (P) 113     Plan:  -On Metformin at home, held  -Follow blood sugars   -Consider SSI if needed        VTE Pharmacologic Prophylaxis: Reason for no pharmacologic prophylaxis on Eliquis  VTE Mechanical Prophylaxis: sequential compression device    CHIEF COMPLAINT     Chief Complaint   Patient presents with   • CVA/TIA-like Symptoms     Pt having increased weakness on L-side  Pt was a pre-hospital stroke alert      HISTORY OF PRESENT ILLNESS     Patient is an 79 y/o female with PMH including prior CVA with residual left sided weakness, afib on Eliquis, HLD, spinal stenosis, urinary incontinence, bedbound at baseline, who presents with left sided weakness, slurred speech and facial droop  Patient's last known well is unknown as symptoms were first noticed when patient woke up this morning, reporting above weakness, speech difficulties and facial droop  Patient has a history of episodes with similar presentation in the past that resolved spontaneously  She additionally reports "shock" like pain radiating up her left leg  10/10 pain in left leg this morning, has improved since  Patient reports that she had 3 prior strokes that felt similar to this  Patient lives at home with caretaker- she was at her baseline when she went to bed last night  Patient denies all substance use  Code status was discussed with the patient  She is level 3 DNR/DNI  Patient has declined any surgical intervention  In the ED, patient's vital signs were stable, afebrile, BP up to max of 176/71  , K 3 8, CR 0 65, WBC 5 3, HGB 12 4  Troponins negative  INR 1 27  Patient was positive for COVID in the ED  Reports no respiratory symptoms  CT Head showing no acute abnormality  CTA stroke alert showing   "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 (given new abrupt tapering) with long segment occlusion extending from proximal right ICA cervical segment to right ICA distal cavernous segment, distal reconstitution in right paraophthalmic   segment with moderate-to-severe stenosis in right ICA supraclinoid segment likely receiving flow through patent incomplete [place] of [place] " Neurology evaluated patient, believes presentation similar to previous transient right hemispheric dysfunction  Patient was admitted to [facility] for medical management of acute ICA dissection  REVIEW OF SYSTEMS     Review of Systems   Constitutional: Negative for chills, fatigue, fever and unexpected weight change  HENT: Negative for hearing loss and sore throat  Eyes: Negative for visual disturbance  Respiratory: Negative for cough and shortness of breath  Cardiovascular: Negative for chest pain, palpitations and leg swelling  Gastrointestinal: Negative for abdominal distention, abdominal pain, blood in stool, constipation, diarrhea, nausea and vomiting  Endocrine: Negative for polyuria  Genitourinary: Negative for dysuria and hematuria  Musculoskeletal: Negative for arthralgias and back pain  Left leg pain   Skin: Negative for rash and wound  Neurological: Positive for facial asymmetry, speech difficulty and weakness  Negative for dizziness, tremors, seizures, light-headedness and headaches  Psychiatric/Behavioral: Negative for dysphoric mood  The patient is not nervous/anxious  All other systems reviewed and are negative  OBJECTIVE     Vitals:    23 1245 23 1330 23 1430 23 1530   BP: 134/63 153/67 168/73 134/87   BP Location: Right arm Right arm Right arm Right arm   Pulse: 80 74 78 82   Resp: 18 18 18 18   Temp:       TempSrc:       SpO2: 95% 97% 95% 96%   Weight:          Temperature:   Temp (24hrs), Av 3 °F (36 3 °C), Min:97 3 °F (36 3 °C), Max:97 3 °F (36 3 °C)    Temperature: (!) 97 3 °F (36 3 °C)  Intake & Output:  I/O     None        Weights:        Body mass index is 26 43 kg/m²  Weight (last 2 days)     Date/Time Weight    23 1145 72 (158 84)        Physical Exam  Vitals reviewed  Constitutional:       Appearance: Normal appearance  HENT:      Head: Normocephalic        Right Ear: External ear normal  Left Ear: External ear normal       Nose: Nose normal       Mouth/Throat:      Mouth: Mucous membranes are moist    Eyes:      Extraocular Movements: Extraocular movements intact  Pupils: Pupils are equal, round, and reactive to light  Cardiovascular:      Rate and Rhythm: Normal rate and regular rhythm  Pulses: Normal pulses  Heart sounds: Normal heart sounds  Pulmonary:      Effort: Pulmonary effort is normal       Breath sounds: Normal breath sounds  No wheezing or rhonchi  Comments: Limited by: patient unable to sit up  Abdominal:      General: Abdomen is flat  Bowel sounds are normal  There is no distension  Palpations: Abdomen is soft  Tenderness: There is no abdominal tenderness  There is no right CVA tenderness or left CVA tenderness  Musculoskeletal:         General: Swelling present  Normal range of motion  Cervical back: Normal range of motion  Left lower leg: Edema present  Comments: Left lower extremity pitting edema  Mild swelling of B/L extremities  Skin:     General: Skin is warm  Capillary Refill: Capillary refill takes less than 2 seconds  Neurological:      Mental Status: She is alert and oriented to person, place, and time  Sensory: Sensory deficit present  Motor: Weakness present  Comments: 2/5 strength LUE, 0/5 strength LLE  5/5 RUE and RLE  Left sided facial droop  Slurred speech         PAST MEDICAL HISTORY     Past Medical History:   Diagnosis Date   • A-fib [facility])    • Arthritis    • Assistance needed for mobility     pt can stand with assistance and transfer   • Chronic pain disorder    • Diabetes mellitus ([facility] [address] )     NIDDM   • Full dentures    • History of transfusion     no adverse reaction   • Hyperlipidemia    • Irregular heart beat    • Numbness and tingling of both feet    • Skin abnormality     sacrum area - small red area, less than a dime size   • Spinal stenosis    • Stroke [facility])    • Unable to ambulate • Urinary incontinence     ipg stimulator x3 repakcements,battery exchange today 2/14/2018   • Wears glasses    • Wheelchair dependence      PAST SURGICAL HISTORY     Past Surgical History:   Procedure Laterality Date   • BACK SURGERY      fusion   • BLADDER SUSPENSION     • CARPAL TUNNEL RELEASE Bilateral    • CHOLANGIOGRAM N/A 6/4/2018    Procedure: CHOLANGIOGRAM;  Surgeon: Sylvia Garcia MD;  Location: [facility];  Service: General   • CHOLECYSTECTOMY LAPAROSCOPIC N/A 6/4/2018    Procedure: Holland Keas;  Surgeon: Sylvia Garcia MD;  Location: [facility];  Service: General   • COLONOSCOPY     • DILATION AND CURETTAGE OF UTERUS     • FRACTURE SURGERY Left     femur with hardware   • HYSTERECTOMY     • INSERTION / Ora Halo / REVISION NEUROSTIMULATOR     • JOINT REPLACEMENT Bilateral     knees   • SACRAL NERVE STIMULATOR PLACEMENT N/A 2/14/2018    Procedure: REMOVE AND REPLACE IPG;  Surgeon: Lakia Sage MD;  Location: [facility];  Service: UroGynecology          • TONSILLECTOMY       SOCIAL & FAMILY HISTORY     Social History     Substance and Sexual Activity   Alcohol Use Not Currently    Comment: very rare     Substance and Sexual Activity   Alcohol Use Not Currently    Comment: very rare        Substance and Sexual Activity   Drug Use No     Social History     Tobacco Use   Smoking Status Never   Smokeless Tobacco Never     Family History   Problem Relation Age of Onset   • No Known Problems Mother    • No Known Problems Father      LABORATORY DATA     Labs: I have personally reviewed pertinent reports      Results from last 7 days   Lab Units 01/22/23  1152   WBC Thousand/uL 5 30   HEMOGLOBIN g/dL 12 4   HEMATOCRIT % 37 9   PLATELETS Thousands/uL 127*      Results from last 7 days   Lab Units 01/22/23  1152   POTASSIUM mmol/L 3 8   CHLORIDE mmol/L 110*   CO2 mmol/L 25   BUN mg/dL 19   CREATININE mg/dL 0 65   CALCIUM mg/dL 8 4              Results from last 7 days   Lab Units 01/22/23  1152 INR  1 27*   PTT seconds 34             Micro:  Lab Results   Component Value Date    BLOODCX No Growth After 5 Days  02/23/2020    BLOODCX No Growth After 5 Days  02/23/2020    BLOODCX Staphylococcus coagulase negative (A) 02/15/2020    URINECX 60,000-69,000 cfu/ml Pseudomonas aeruginosa (A) 12/23/2022    URINECX >100,000 cfu/ml Pseudomonas aeruginosa (A) 01/26/2022    URINECX 50,000-59,000 cfu/ml Pseudomonas aeruginosa (A) 01/26/2022     IMAGING & DIAGNOSTIC TESTS     Imaging: I have personally reviewed pertinent reports  CT stroke alert brain    Result Date: 1/22/2023  Impression: No acute intracranial abnormality  Moderate chronic microangiopathy  Findings were directly discussed with Annita Tobias  at 12:00 PM  Workstation performed: [id number]     CTA stroke alert (head/neck)    Result Date: 1/22/2023  Impression: 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 (given new abrupt tapering) with long segment occlusion extending from proximal right ICA cervical segment to right ICA distal cavernous segment, distal reconstitution in right paraophthalmic segment with moderate-to-severe stenosis in right ICA supraclinoid segment likely receiving flow through patent incomplete [place] of [place]  Asymmetrically smaller caliber right M1 segment with diminished contrast opacification in right MCA branch vessels (worse distally)  Patchy groundglass opacities in bilateral upper lobes, suspicious for infection/inflammation or aspiration  Additional chronic/incidental findings as detailed above  Findings were directly discussed with Swati Anderson  at 12:00 PM  Workstation performed: [id number]     EKG, Pathology, and Other Studies: I have personally reviewed pertinent reports       ALLERGIES     Allergies   Allergen Reactions   • Sulfamethoxazole-Trimethoprim Swelling     Swelling around eyes and red eyes   • Asa [Aspirin] Other (See Comments)     Unknown     • Aspirin Other (See Comments)     "feels like fist in my chest"   • Ciprofloxacin    • Nitrofurantoin    • Other      "5mz/Tmp "  "1 60 mTab amme"     Per pt's allergy list      MEDICATIONS PRIOR TO ARRIVAL     Prior to Admission medications    Medication Sig Start Date End Date Taking?  Authorizing Provider   Acetaminophen (TYLENOL PO) Take by mouth 3 (three) times a day    Historical Provider, MD   atorvastatin (LIPITOR) 40 mg tablet TAKE 1 TABLET BY MOUTH ONCE DAILY IN THE EVENING 12/12/22   Alan Dickerson MD   cholecalciferol (VITAMIN D3) 1,000 units tablet Take 1,000 Units by mouth every evening    Historical Provider, MD   CRANBERRY PO Take 30,000 mg by mouth    Historical Provider, MD   cyanocobalamin (VITAMIN B-12) 1,000 mcg tablet Take 1,000 mcg by mouth every evening      Historical Provider, MD   Eliquis 5 MG TAKE 1 TABLET BY MOUTH TWICE DAILY 12/20/22   Ja Lyons MD   folic acid (FOLVITE) 1 mg tablet Take 1,000 mcg by mouth daily 6/28/19   Historical Provider, MD   furosemide (LASIX) 40 mg tablet Take 40 mg by mouth daily    Historical Provider, MD   gabapentin (NEURONTIN) 100 mg capsule Take 100 mg by mouth 3 (three) times a day 10/25/21   Historical Provider, MD   ipratropium (ATROVENT) 0 03 % nasal spray 1-2 sprays each nostril twice a day as needed for rhinorrhea 5/25/21   Shazia Michaud MD   metFORMIN (GLUCOPHAGE) 500 mg tablet Take 500 mg by mouth daily    Historical Provider, MD   metoprolol tartrate (LOPRESSOR) 25 mg tablet Take 25 mg by mouth daily    Historical Provider, MD   Multiple Vitamin (MULTI-VITAMIN DAILY PO) Take by mouth    Historical Provider, MD   primidone (MYSOLINE) 50 mg tablet Take by mouth 2 (two) times a day     Historical Provider, MD   spironolactone (ALDACTONE) 25 mg tablet Take 1 tablet (25 mg total) by mouth daily 7/21/22   Alan Dickerson MD     MEDICATIONS ADMINISTERED IN LAST 24 HOURS     Medication Administration - last 24 hours from 01/21/2023 1632 to 01/22/2023 1632       Date/Time Order Dose Route Action Action by     01/22/2023 1154 EST iohexol (OMNIPAQUE) 350 MG/ML injection (SINGLE-DOSE) 85 mL 85 mL Intravenous Given Reena Sandersge     01/22/2023 1232 EST apixaban (ELIQUIS) tablet 5 mg 5 mg Oral Not Given Donna Base, RN     90/13/8350 6308 EST folic acid (FOLVITE) tablet 1,000 mcg 1,000 mcg Oral Not Given Donna Base, RN     01/22/2023 1530 EST gabapentin (NEURONTIN) capsule 100 mg 100 mg Oral Not Given Donna Base, RN        CURRENT MEDICATIONS     Current Facility-Administered Medications   Medication Dose Route Frequency Provider Last Rate   • apixaban  5 mg Oral BID María Carrion MD     • atorvastatin  40 mg Oral QPM María Carrion MD     • cholecalciferol  1,000 Units Oral QPM María Carrion MD     • vitamin B-12  1,000 mcg Oral QPM María Carrion MD     • folic acid  8,000 mcg Oral Daily María Carrion MD     • [START ON 1/23/2023] furosemide  40 mg Oral Daily María Carrion MD     • gabapentin  100 mg Oral TID María Carrion MD     • [START ON 1/23/2023] metoprolol tartrate  25 mg Oral Daily María Carrion MD     • primidone  50 mg Oral BID María Carrion MD     • [START ON 1/23/2023] spironolactone  25 mg Oral Daily María Carrion MD               Admission Time  I spent 1 hour admitting the patient  This involved direct patient contact where I performed a full history and physical, reviewing previous records, and reviewing laboratory and other diagnostic studies  Portions of the record may have been created with voice recognition software  Occasional wrong word or "sound a like" substitutions may have occurred due to the inherent limitations of voice recognition software    Read the chart carefully and recognize, using context, where substitutions have occurred     ==  María Carrion MD  [address]  Internal Medicine Residency PGY-1

## 2023-01-23 ENCOUNTER — APPOINTMENT (OUTPATIENT)
Dept: NON INVASIVE DIAGNOSTICS | Facility: HOSPITAL | Age: 88
End: 2023-01-23

## 2023-01-23 LAB
ANION GAP SERPL CALCULATED.3IONS-SCNC: 6 MMOL/L (ref 4–13)
APICAL FOUR CHAMBER EJECTION FRACTION: 65 %
ATRIAL RATE: 84 BPM
BASOPHILS # BLD AUTO: 0.04 THOUSANDS/ÂΜL (ref 0–0.1)
BASOPHILS NFR BLD AUTO: 1 % (ref 0–1)
BUN SERPL-MCNC: 17 MG/DL (ref 5–25)
CALCIUM SERPL-MCNC: 9.5 MG/DL (ref 8.3–10.1)
CHLORIDE SERPL-SCNC: 107 MMOL/L (ref 96–108)
CHOLEST SERPL-MCNC: 104 MG/DL
CO2 SERPL-SCNC: 26 MMOL/L (ref 21–32)
CREAT SERPL-MCNC: 0.62 MG/DL (ref 0.6–1.3)
E WAVE DECELERATION TIME: 241 MS
EOSINOPHIL # BLD AUTO: 0 THOUSAND/ÂΜL (ref 0–0.61)
EOSINOPHIL NFR BLD AUTO: 0 % (ref 0–6)
ERYTHROCYTE [DISTWIDTH] IN BLOOD BY AUTOMATED COUNT: 13.6 % (ref 11.6–15.1)
EST. AVERAGE GLUCOSE BLD GHB EST-MCNC: 105 MG/DL
GFR SERPL CREATININE-BSD FRML MDRD: 81 ML/MIN/1.73SQ M
GLUCOSE SERPL-MCNC: 103 MG/DL (ref 65–140)
HBA1C MFR BLD: 5.3 %
HCT VFR BLD AUTO: 40 % (ref 34.8–46.1)
HDLC SERPL-MCNC: 49 MG/DL
HGB BLD-MCNC: 13.1 G/DL (ref 11.5–15.4)
IMM GRANULOCYTES # BLD AUTO: 0.01 THOUSAND/UL (ref 0–0.2)
IMM GRANULOCYTES NFR BLD AUTO: 0 % (ref 0–2)
LAAS-AP4: 26.6 CM2
LDLC SERPL CALC-MCNC: 36 MG/DL (ref 0–100)
LYMPHOCYTES # BLD AUTO: 0.89 THOUSANDS/ÂΜL (ref 0.6–4.47)
LYMPHOCYTES NFR BLD AUTO: 18 % (ref 14–44)
MCH RBC QN AUTO: 31 PG (ref 26.8–34.3)
MCHC RBC AUTO-ENTMCNC: 32.8 G/DL (ref 31.4–37.4)
MCV RBC AUTO: 95 FL (ref 82–98)
MONOCYTES # BLD AUTO: 0.74 THOUSAND/ÂΜL (ref 0.17–1.22)
MONOCYTES NFR BLD AUTO: 15 % (ref 4–12)
MV PEAK A VEL: 0.01 M/S
MV PEAK E VEL: 125 CM/S
MV STENOSIS PRESSURE HALF TIME: 70 MS
MV VALVE AREA P 1/2 METHOD: 3.14 CM2
NEUTROPHILS # BLD AUTO: 3.16 THOUSANDS/ÂΜL (ref 1.85–7.62)
NEUTS SEG NFR BLD AUTO: 66 % (ref 43–75)
NRBC BLD AUTO-RTO: 0 /100 WBCS
P AXIS: 28 DEGREES
PLATELET # BLD AUTO: 124 THOUSANDS/UL (ref 149–390)
PMV BLD AUTO: 10.1 FL (ref 8.9–12.7)
POTASSIUM SERPL-SCNC: 3.5 MMOL/L (ref 3.5–5.3)
PR INTERVAL: 304 MS
QRS AXIS: 262 DEGREES
QRSD INTERVAL: 142 MS
QT INTERVAL: 398 MS
QTC INTERVAL: 470 MS
RBC # BLD AUTO: 4.23 MILLION/UL (ref 3.81–5.12)
RIGHT ATRIUM AREA SYSTOLE A4C: 13.5 CM2
RIGHT VENTRICLE ID DIMENSION: 3.8 CM
SL CV LV EF: 70
SODIUM SERPL-SCNC: 139 MMOL/L (ref 135–147)
T WAVE AXIS: 13 DEGREES
T4 FREE SERPL-MCNC: 0.99 NG/DL (ref 0.76–1.46)
TR MAX PG: 40 MMHG
TR PEAK VELOCITY: 3.2 M/S
TRICUSPID ANNULAR PLANE SYSTOLIC EXCURSION: 1.5 CM
TRICUSPID VALVE PEAK REGURGITATION VELOCITY: 3.17 M/S
TRIGL SERPL-MCNC: 97 MG/DL
TSH SERPL DL<=0.05 MIU/L-ACNC: 0.37 UIU/ML (ref 0.45–4.5)
VENTRICULAR RATE: 84 BPM
WBC # BLD AUTO: 4.84 THOUSAND/UL (ref 4.31–10.16)

## 2023-01-23 RX ADMIN — FUROSEMIDE 40 MG: 40 TABLET ORAL at 12:31

## 2023-01-23 RX ADMIN — GABAPENTIN 100 MG: 100 CAPSULE ORAL at 12:31

## 2023-01-23 RX ADMIN — PRIMIDONE 50 MG: 50 TABLET ORAL at 17:12

## 2023-01-23 RX ADMIN — CHOLECALCIFEROL TAB 25 MCG (1000 UNIT) 1000 UNITS: 25 TAB at 17:12

## 2023-01-23 RX ADMIN — SPIRONOLACTONE 25 MG: 25 TABLET ORAL at 12:31

## 2023-01-23 RX ADMIN — ATORVASTATIN CALCIUM 40 MG: 40 TABLET, FILM COATED ORAL at 17:12

## 2023-01-23 RX ADMIN — GABAPENTIN 100 MG: 100 CAPSULE ORAL at 21:23

## 2023-01-23 RX ADMIN — METOPROLOL TARTRATE 25 MG: 25 TABLET, FILM COATED ORAL at 12:31

## 2023-01-23 RX ADMIN — GABAPENTIN 100 MG: 100 CAPSULE ORAL at 17:12

## 2023-01-23 RX ADMIN — FOLIC ACID 1000 MCG: 1 TABLET ORAL at 12:31

## 2023-01-23 RX ADMIN — APIXABAN 5 MG: 5 TABLET, FILM COATED ORAL at 12:31

## 2023-01-23 RX ADMIN — APIXABAN 5 MG: 5 TABLET, FILM COATED ORAL at 21:23

## 2023-01-23 RX ADMIN — CYANOCOBALAMIN TAB 500 MCG 1000 MCG: 500 TAB at 17:12

## 2023-01-23 NOTE — SPEECH THERAPY NOTE
Speech Language/Pathology  Speech-Language Pathology Bedside Swallow Evaluation      Patient Name: Beti NAM'S Date: 1/23/2023     Problem List  Principal Problem:    Stroke-like symptoms  Active Problems:    Type 2 diabetes mellitus (HCC)    Paroxysmal atrial fibrillation (HCC)    Hypertension    Slurred speech    CHF (congestive heart failure) ([facility] )    Internal carotid artery dissection ([facility] )    COVID-19      Past Medical History  Past Medical History:   Diagnosis Date   • A-fib ([place] [address] )    • Arthritis    • Assistance needed for mobility     pt can stand with assistance and transfer   • Chronic pain disorder    • Diabetes mellitus ([facility] )     NIDDM   • Full dentures    • History of transfusion     no adverse reaction   • Hyperlipidemia    • Irregular heart beat    • Numbness and tingling of both feet    • Skin abnormality     sacrum area - small red area, less than a dime size   • Spinal stenosis    • Stroke ([facility] )    • Unable to ambulate    • Urinary incontinence     ipg stimulator x3 repakcements,battery exchange today 2/14/2018   • Wears glasses    • Wheelchair dependence        Past Surgical History  Past Surgical History:   Procedure Laterality Date   • BACK SURGERY      fusion   • BLADDER SUSPENSION     • CARPAL TUNNEL RELEASE Bilateral    • CHOLANGIOGRAM N/A 6/4/2018    Procedure: CHOLANGIOGRAM;  Surgeon: Nehemiah Fritz MD;  [facility]: [facility];  Service: General   • CHOLECYSTECTOMY LAPAROSCOPIC N/A 6/4/2018    Procedure: CHOLECYSTECTOMY LAPAROSCOPIC;  Surgeon: Nehemiah Fritz MD;  [facility];  Service: General   • COLONOSCOPY     • DILATION AND CURETTAGE OF UTERUS     • FRACTURE SURGERY Left     femur with hardware   • HYSTERECTOMY     • INSERTION / [place] / REVISION NEUROSTIMULATOR     • JOINT REPLACEMENT Bilateral     knees   • SACRAL NERVE STIMULATOR PLACEMENT N/A 2/14/2018    Procedure: REMOVE AND REPLACE IPG;  Surgeon: Nathaniel Valdez MD;  Location: [facility];  Service: UroGynecology          • TONSILLECTOMY         Summary   Pt presented with functional appearing oral and pharyngeal stage swallowing skills with materials administered today  Some mildly deliberate and prolonged manipulation of solids today but fully able to clear, mult swallows w/ all material   No overt change in voicing or coughing noted  Risk/s for Aspiration: new neuro event     Recommended Diet: regular diet, thin liquids and cut up for pt, assist w/ set up w/ weak L UE   Recommended Form of Meds: as desired, whole in puree ok   Aspiration precautions and swallowing strategies: upright posture, only feed when fully alert, slow rate of feeding, small bites/sips and alternating bites and sips  Other Recommendations: Continue frequent oral care, brief f/u as able         Current Medical Status  Pt is a 80 y o  female who presented to [facility] with slurred speech, left-sided facial droop, and left-sided weakness  Patient's last known normal is not known because the caretaker did not know what time she went to bed last night with the previous caretaker  CT does show some chronic ischemic changes in the right hemisphere  She has been seen on several prior occasions for similar presentations with right hemispheric dysfunction  She has been ineligible for MRI due to her spinal cord stimulator, and refused addition of aspirin to her prophylactic regimen on previous admission  She has maintained on Eliquis twice daily  Evidently she was in her usual state of health the night prior to presentation, but this morning she was noted to have left-sided facial droop, marked slurred speech, and decreased left hand   Current Precautions:  Fall  Aspiration  Contact  AIrborn    Allergies:  No known food allergies    Past medical history:  Please see H&P for details    Special Studies:  3/6/2020 VBS Summary:  Images are on PACS for review     Pt presents w/ improved swallow function since her previous vbs  Mild/mod oropharyngeal dysphagia w/ reduced transfer, mild spill, reduced anterior hyolaryngeal movement & mild valleculae residue w/ need for 2* swallow to clear  Pt w/ no penetration or aspiration this study          Recommendations:  Speech will begin pleasure feeds w/ the pt bedside in hopes to advance to a diet  Will need to make sure she remains fully awake, alert & appropriate  If a dedicated assessment of the esophagus is desired, consider esophagram/barium swallow or EGD      1/22 CTA Stroke alert head/neck: 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 (given new abrupt tapering) with long segment occlusion extending from proximal right ICA cervical segment to right ICA distal cavernous segment, distal reconstitution in right paraophthalmic   segment with moderate-to-severe stenosis in right ICA supraclinoid segment likely receiving flow through patent incomplete [place] of [place]  Asymmetrically smaller caliber right M1 segment with diminished contrast opacification in right MCA branch vessels (worse distally)      Social/Education/Vocational Hx:  Pt lives with caregivers 24/7 at home, abraham lift to [employer]   Current Risks for Dysphagia & Aspiration: CVA and known history of dysphagia  Current Symptoms/Concerns: failed screen  Current Diet: NPO   Baseline Diet: regular diet and thin liquids      Baseline Assessment   Behavior/Cognition: alert  Speech/Language Status: able to participate in basic conversation, speech is slow, mild dysarthric   Patient Positioning: upright in bed  Pain Status/Interventions/Response to Interventions:  No report of or nonverbal indications of pain         Swallow Mechanism Exam  Facial: left facial droop  Labial: WFL  Lingual: WFL  Velum: unable to visualize  Mandible: adequate ROM  Dentition: upper dentures and lower dentures  Vocal quality:strained   Volitional Cough: -   Respiratory Status: on NC    Consistencies Assessed and Performance   Consistencies Administered: thin liquids, nectar thick, puree and hard solids  Materials administered included liquids by straw    Oral Stage: minimal, mild and decreased mastication  Mastication was mod prolonged but able to clear all solids despite this  Bolus formation and transfer were mildly slow but with no significant oral residue noted  No overt s/s reduced oral control  Pharyngeal Stage: suspected, minimal and multiple swallows  Swallow Mechanics:  Swallowing initiation appeared ? Delayed  Laryngeal rise was palpated and judged to be ffair  No coughing, throat clearing, change in vocal quality or respiratory status noted today  Esophageal Concerns: none reported    Strategies and Efficacy: -    Summary and Recommendations (see above)    Results Reviewed with: patient and RN     Treatment Recommended: brief f/u for diet     Frequency of treatment: as able     Patient Stated Goal: to get turkey    Dysphagia LTG  -Patient will demonstrate safe and effective oral intake (without overt s/s significant oral/pharyngeal dysphagia including s/s penetration or aspiration) for the highest appropriate diet level       Speech Therapy Prognosis   Prognosis: guarded    Prognosis Considerations: medical status and prior medical history

## 2023-01-23 NOTE — UTILIZATION REVIEW
Initial Clinical Review    Admission: Date/Time/Statement:   Admission Orders (From admission, onward)     Ordered        01/22/23 1340  Place in Observation  Once                      Orders Placed This Encounter   Procedures   • Place in Observation     Standing Status:   Standing     Number of Occurrences:   1     Order Specific Question:   Level of Care     Answer:   Med Surg [16]     ED Arrival Information     Expected   -    Arrival   1/22/2023 11:42    Acuity   Immediate            Means of arrival   Ambulance    Escorted by   [id number] [facility]    Service   [facility]    Admission type   Emergency            Arrival complaint   Stroke Alert            Chief Complaint   Patient presents with   • CVA/TIA-like Symptoms     Pt having increased weakness on L-side  Pt was a pre-hospital stroke alert       Initial Presentation: 80 y o  female  Presents to ed from home via ems as stroke alert to evaluate and treat left sided weakness, speech difficulties and facial droop  PMHX: CHF, AFIB, HTN  Clinical assessment significant for R ICA stenosis / acute dissection  LLE with pitting edema  Initially treated with eliquis and iv dilaudid x1  Patient declines surgery  Medical management initiated  Admit to observation  Plan PT/OT/ST, telemetry, neuro checks, echo         ED Triage Vitals   01/22/23 1200 01/22/23 1149 01/22/23 1149 01/22/23 1149 01/22/23 1149   (!) 97 3 °F (36 3 °C) 87 18 (!) 172/95 100 %      Oral Monitor           No Pain          01/23/23 72 kg (158 lb 11 7 oz)     Additional Vital Signs:     Date/Time Temp Pulse Resp BP MAP SpO2 Nasal Cannula O2 Flow Rate (L/min) O2 Device   01/23/23 15:30:27 98 °F (36 7 °C) 82 14 156/71 99 96 % -- --   01/23/23 1000 -- 97 -- 152/83 -- -- -- --   01/23/23 09:39:16 99 3 °F (37 4 °C) 97 -- 152/83 106 94 % -- --   01/23/23 0630 -- 83 -- 110/71 84 92 % -- --   01/23/23 0530 -- 89 -- 105/70 82 92 % -- --   01/23/23 0430 -- 82 -- 153/73 100 92 % -- --   01/23/23 0330 99 6 °F (37 6 °C) 90 -- 150/76 101 94 % -- --   01/23/23 03:27:31 99 6 °F (37 6 °C) 90 -- 150/76 101 94 % -- --   01/23/23 0327 -- -- -- -- -- -- -- None (Room air)   01/23/23 0130 -- 90 18 155/82 -- 95 % -- None (Room air)   01/22/23 2330 -- 92 18 168/70 -- 96 % -- None (Room air)   01/22/23 2130 -- 84 20 184/84   Abnormal  -- 96 % 2 L/min None (Room air)   01/22/23 1930 -- 84 18 193/79   Abnormal  -- 97 % -- None (Room air)   01/22/23 1730 -- 82 20 172/74   Abnormal  107 96 % -- None (Room air)   01/22/23 1630 -- 78 18 173/72   Abnormal  104 95 % -- None (Room air)   01/22/23 1530 -- 82 18 134/87 106 96 % -- None (Room air)   01/22/23 1430 -- 78 18 168/73 105 95 % -- None (Room air)   01/22/23 1330 -- 74 18 153/67 95 97 % -- None (Room air)   01/22/23 1245 -- 80 18 134/63 94 95 % -- None (Room air)   01/22/23 12:20:18 -- 89 18 134/63 -- 95 % -- None (Room air)   01/22/23 1210 -- 84 18 176/71   Abnormal  -- 95 % -- None (Room air)   01/22/23 12:00:39 97 3 °F (36 3 °C)   Abnormal  82 18 150/67 -- 97 % -- None (Room air)   01/22/23 1151 -- 75 18 147/73 -- 97 % -- None (Room air)   01/22/23 1149 -- 87 18 172/95   Abnormal  -- 100 % -- None (Room air)           Pertinent Labs/Diagnostic Test Results:     CTA stroke alert (head/neck)   Final  (01/22 1217)      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 (given new abrupt tapering) with long segment occlusion extending from proximal right ICA cervical segment to right ICA distal cavernous segment, distal reconstitution in right paraophthalmic    segment with moderate-to-severe stenosis in right ICA supraclinoid segment likely receiving flow through patent incomplete [place] of [place]  Asymmetrically smaller caliber right M1 segment with diminished contrast opacification in right MCA branch vessels (worse distally)  Patchy groundglass opacities in bilateral upper lobes, suspicious for infection/inflammation or aspiration              CT stroke alert brain   Final  (01/22 1202)      No acute intracranial abnormality  Moderate chronic microangiopathy             Results from last 7 days   Lab Units 01/22/23  1152   SARS-COV-2  Positive*     Results from last 7 days   Lab Units 01/23/23  0459 01/22/23  1152   WBC Thousand/uL 4 84 5 30   HEMOGLOBIN g/dL 13 1 12 4   HEMATOCRIT % 40 0 37 9   PLATELETS Thousands/uL 124* 127*   NEUTROS ABS Thousands/µL 3 16  --          Results from last 7 days   Lab Units 01/23/23  0459 01/22/23  1152   SODIUM mmol/L 139 140   POTASSIUM mmol/L 3 5 3 8   CHLORIDE mmol/L 107 110*   CO2 mmol/L 26 25   ANION GAP mmol/L 6 5   BUN mg/dL 17 19   CREATININE mg/dL 0 62 0 65   EGFR ml/min/1 73sq m 81 80   CALCIUM mg/dL 9 5 8 4         Results from last 7 days   Lab Units 01/22/23  1145   POC GLUCOSE mg/dl 113     Results from last 7 days   Lab Units 01/23/23  0459 01/22/23  1152   GLUCOSE RANDOM mg/dL 103 103         Results from last 7 days   Lab Units 01/23/23  0459 01/22/23  1152   HEMOGLOBIN A1C % 5 3 5 3   EAG mg/dl 105 105       Results from last 7 days   Lab Units 01/22/23  1349 01/22/23  1152   HS TNI 0HR ng/L  --  9   HS TNI 2HR ng/L 10  --    HSTNI D2 ng/L 1  --          Results from last 7 days   Lab Units 01/22/23  1152   PROTIME seconds 16 2*   INR  1 27*   PTT seconds 34     Results from last 7 days   Lab Units 01/23/23  0459   TSH 3RD GENERATON uIU/mL 0 372*       Results from last 7 days   Lab Units 01/22/23  1152   INFLUENZA A PCR  Negative   INFLUENZA B PCR  Negative   RSV PCR  Negative       ED Treatment:   Medication Administration from 01/22/2023 1134 to 01/23/2023 0315       Date/Time Order Dose Route Action     01/22/2023 2347 EST apixaban (ELIQUIS) tablet 5 mg 5 mg Oral Given     01/22/2023 2101 EST HYDROmorphone HCl (DILAUDID) injection 0 1 mg 0 1 mg Intravenous Given        Past Medical History:   Diagnosis   • A-fib ([address] )   • Arthritis   • Assistance needed for mobility    pt can stand with assistance and transfer   • Chronic pain disorder   • Diabetes mellitus (HCC)    NIDDM   • Full dentures   • History of transfusion    no adverse reaction   • Hyperlipidemia   • Irregular heart beat   • Numbness and tingling of both feet   • Skin abnormality    sacrum area - small red area, less than a dime size   • Spinal stenosis   • Stroke [facility])   • Unable to ambulate   • Urinary incontinence    ipg stimulator x3 repakcements,battery exchange today 2/14/2018   • Wears glasses   • Wheelchair dependence     Present on Admission:  • Stroke-like symptoms  • Type 2 diabetes mellitus ([facility])  • Paroxysmal atrial fibrillation ([facility])  • Slurred speech  • CHF (congestive heart failure) ([facility])  • Hypertension      Admitting Diagnosis:     Dissection of carotid artery ([facility]) [I77 71]  Stroke (cerebrum) ([facility]) [I63 9]  Stroke-like episode [R29 90]  Dissection of cerebral artery ([facility] ) [I67 0]    Age/Sex: 80 y o  female    Scheduled Medications:  apixaban, 5 mg, Oral, BID  atorvastatin, 40 mg, Oral, QPM  cholecalciferol, 1,000 Units, Oral, QPM  vitamin B-12, 1,000 mcg, Oral, QPM  folic acid, 2,057 mcg, Oral, Daily  furosemide, 40 mg, Oral, Daily  gabapentin, 100 mg, Oral, TID  metoprolol tartrate, 25 mg, Oral, Daily  primidone, 50 mg, Oral, BID  spironolactone, 25 mg, Oral, Daily      Continuous IV Infusions:     PRN Meds:  acetaminophen, 650 mg, Oral, Q6H PRN  HYDROmorphone, 0 1 mg, Intravenous, Q6H PRN        IP CONSULT TO NEUROLOGY  IP CONSULT TO PHYSICAL MEDICINE REHAB  IP CONSULT TO NEUROLOGY  IP CONSULT TO CASE MANAGEMENT  IP CONSULT TO NUTRITION SERVICES  IP CONSULT TO CASE MANAGEMENT    Network Utilization Review Department  ATTENTION: Please call with any questions or concerns to [phone] and carefully listen to the prompts so that you are directed to the right person   All voicemails are confidential   [place] all requests for admission clinical reviews, approved or denied determinations and any other requests to dedicated fax number below belonging to the campus where the patient is receiving treatment   List of dedicated fax numbers for the Facilities:  [address] [place] [address] DENIALS (Administrative/Medical Necessity) [phone]   [address]  (Maternity/NICU/Pediatrics) [phone]   [address] [phone]   [facility] [address] [phone]   [address] [place] [address] [address] [phone]   [address] [facility] [address] 305 [facility] [phone]

## 2023-01-23 NOTE — PROGRESS NOTES
NEUROLOGY RESIDENCY PROGRESS NOTE     Name: Aye Vieira   Age & Sex: 80 y o  female   MRN: [id number]  Unit/Bed#: [facility] 706-01   Encounter: [id number]    Aye Vieira will need follow up in in 6 weeks with neurovascular AP  She will not require outpatient neurological testing  Pending for discharge: Per Primary Team    ASSESSMENT & PLAN     * Stroke-like symptoms  Assessment & Plan  Assessment:  Aye Vieira is a 80 y o  right handed female with a PMH of prior CVA, Afib (on Eliquis), NIDDM, HLD, Spinal stenosis, urinary incontenence (s/p spinal stimulator), bed bound, chronic pain disorder presents to [facility] as a pre-hospital stroke alert  According to EMS handoff the patient has an unknown LKW however it is thought to be at bedtime on 1/21/2023  On 1/22/2023 around 0830 the patients new nurse noted that the patient was demonstrating a left sided facial droop, reduction in her ability to squeeze her left hand, and dysarthria  Per EMS hand-off the her BP in the field was 179/90, and her BGL was 105  At time of evaluation the NIHSS was 7  The patient was asked if she wanted intervention including tNK or surgery and the patient outright refused  Therefore neurosurgery was not consulted as the patient refused TNK and interventions such as surgery  Considering the extent of proximal occlusive disease and premorbid debility there’d be no role for intervention due to risks involved  The patient will be admitted for further stroke workup in addition to the evaluation for patchy groundglass opacities in bilateral upper lobes that was found on CTA-HN  Studies:  - Lipids: CHOL 104, TGL 97, HDL 49, LDL 36  - TSH-fT4: 0 372 - 0 99  - A1C: 5 3  - TTE: Left ventricular cavity size is normal  Wall thickness is mildly increased  The left ventricular ejection fraction is 70%  Systolic function is vigorous  Wall motion is normal  Left Atrium: The atrium is moderately dilated  Right Atrium: The atrium is mildly dilated  Aortic Valve: The aortic valve is trileaflet  The leaflets are not thickened  The leaflets are moderately calcified  There is mildly reduced mobility  Mitral Valve: There is mild annular calcification  There is mild regurgitation  Tricuspid Valve: There is mild to moderate regurgitation   - CTH: No acute intracranial abnormality  Moderate chronic microangiopathy   - CTA HN: 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 (given new abrupt tapering) with long segment occlusion extending from proximal right ICA cervical segment to right ICA distal cavernous segment, distal reconstitution in right paraophthalmic segment with moderate-to-severe stenosis in right ICA supraclinoid segment likely receiving flow through patent incomplete [place] of [place]  Asymmetrically smaller caliber right M1 segment with diminished contrast opacification in right MCA branch vessels (worse distally)  Patchy groundglass opacities in bilateral upper lobes, suspicious for infection/inflammation or aspiration  Impression:  The patient reported that she has had prior episodes in the past where she would experience right hemispheric dysfunction (left sided facial droop, weakness in the LUE, and dysarthria)  Per chart review this was confirmed  With workup demonstrating a large vascular burden, and the patient's wishes to not undergo heroic measures including surgery, maximal medical therapy is recommended  BP management is critical  Keep MAP >90 or better  Treatment of COVID is important as her stroke etiology could be in the setting of hypercoagulability in the setting of active COVID infection      Plan:  - Stroke pathway  - Discussed plan with neurology attending, Dr Johana Hummel  - BP: Maintain MAP 90 or better  - Euglycaemia and Normothermia  - Maintain glucose <180, SSI for coverage if indicated  - Cannot obtain MRI d/t spinal stimulator  - Continue Atorvastatin 40mg QD  - Continue Eliquis 5mg BID  - Maintain glucose <180, SSI for coverage if indicated  - Medical management as per primary team appreciated  - DVT ppx and SCDs   - Monitor on telemetry  - Frequent Neuro Checks  - Obtain STAT CTH wo for changes in mental status or neurologic examination and alert neurology  - PT/OT/Speech/PM&R input appreciated  - Stroke education  - No further inpatient neurology recommendations  Please contact for further questions  - Patient will need to follow up in 4-6 weeks with a neurovascular AP  No further imaging studies required  SUBJECTIVE     Patient was seen and examined  No acute events overnight  The patient was resting in bed this AM and was able to answer questions and complete commands when encouraged  The examination was inconsistent as it was very effort dependent  The patient stated that she was quite tired and fatigued  She was able to state her name, location, only got the year correct, was able to state why she was in the hospital  The patient did not report new changes in sensorium  The patient did not report a HA, CP, SOB, ABD-pain, N/V, she is incontinent of urine and bowel at baseline  Review of Systems   Constitutional: Positive for fatigue  Negative for chills and fever  HENT: Positive for voice change  Negative for ear discharge, ear pain, facial swelling, nosebleeds and sneezing  Eyes: Positive for pain (Photophoebia)  Negative for discharge  Respiratory: Negative for cough and wheezing  Cardiovascular: Positive for leg swelling  Negative for palpitations  Gastrointestinal: Negative for blood in stool, nausea and vomiting  Genitourinary: Negative for hematuria  Urinary incontenence   Musculoskeletal: Positive for gait problem ( Bed bound)  Negative for arthralgias, neck pain and neck stiffness  Skin: Negative for rash  Neurological: Positive for tremors, speech difficulty and weakness  Negative for dizziness, seizures, syncope, facial asymmetry, light-headedness, numbness and headaches  Psychiatric/Behavioral: Positive for confusion and decreased concentration  Negative for agitation, behavioral problems, dysphoric mood, hallucinations, self-injury and suicidal ideas  The patient is not nervous/anxious and is not hyperactive  OBJECTIVE     Patient ID: Loraine Clinton is a 80 y o  female  Vitals:    23 1230 23 1430 23 1530 23 1530   BP: 154/74 (!) 115/49 156/71 156/71   Pulse: 91 77 77 82   Resp:    14   Temp:   98 °F (36 7 °C) 98 °F (36 7 °C)   TempSrc:       SpO2: 92% 94% 94% 96%   Weight:       Height:          Temperature:   Temp (24hrs), Av 9 °F (37 2 °C), Min:98 °F (36 7 °C), Max:99 6 °F (37 6 °C)    Temperature: 98 °F (36 7 °C)    Physical Exam  Vitals and nursing note reviewed  Constitutional:       General: She is not in acute distress  Appearance: She is well-developed  HENT:      Head: Normocephalic and atraumatic  Nose: Nose normal       Mouth/Throat:      Mouth: Mucous membranes are moist       Pharynx: Oropharynx is clear  No oropharyngeal exudate  Eyes:      Extraocular Movements: Extraocular movements intact and EOM normal       Conjunctiva/sclera: Conjunctivae normal       Pupils: Pupils are equal, round, and reactive to light  Cardiovascular:      Rate and Rhythm: Normal rate and regular rhythm  Heart sounds: No murmur heard  Pulmonary:      Effort: Pulmonary effort is normal  No respiratory distress  Breath sounds: Normal breath sounds  Abdominal:      Palpations: Abdomen is soft  Tenderness: There is no abdominal tenderness  Musculoskeletal:      Cervical back: Neck supple  Right lower leg: Edema present  Left lower leg: Edema present  Skin:     General: Skin is warm and dry  Capillary Refill: Capillary refill takes less than 2 seconds  Neurological:      Mental Status: She is alert  Mental status is at baseline  Cranial Nerves: No cranial nerve deficit        Sensory: No sensory deficit  Motor: Weakness present  Coordination: Coordination abnormal  Finger-Nose-Finger Test normal  Heel-to-shin test: able to complete with encouragement  Effort dependednt examination  Gait: Gait abnormal       Deep Tendon Reflexes: Reflexes normal       Reflex Scores:       Bicep reflexes are 2+ on the right side and 2+ on the left side  Brachioradialis reflexes are 2+ on the right side and 2+ on the left side  Patellar reflexes are 1+ on the right side and 1+ on the left side  Achilles reflexes are 1+ on the right side and 1+ on the left side  Psychiatric:         Mood and Affect: Mood normal         Neurologic Exam     Mental Status   Disoriented to month and date  Oriented to year  Follows 2 step commands  Attention: decreased  Concentration: decreased  Level of consciousness: alert ,  arousable by tactile stimuli  Unable to perform simple calculations  Able to name object  Able to repeat  Speech mildly dysarthric     Cranial Nerves     CN II   Right visual field deficit: none  Left visual field deficit: none     CN III, IV, VI   Pupils are equal, round, and reactive to light  Extraocular motions are normal    Right pupil: Size: 3 mm  Shape: regular  Reactivity: brisk  Consensual response: intact  Accommodation: intact  Left pupil: Size: 3 mm  Shape: regular  Reactivity: brisk  Consensual response: intact  Accommodation: intact     CN III: no CN III palsy  CN VI: no CN VI palsy  Nystagmus: none   Upgaze: normal  Downgaze: normal  Conjugate gaze: present    CN V   Right facial sensation deficit: none  Left facial sensation deficit: none    CN VII   Right facial weakness: none  Left facial weakness: none    CN VIII   Hearing: intact    CN IX, X   Palate: symmetric    CN XI   Right sternocleidomastoid strength: normal  Left sternocleidomastoid strength: normal  Right trapezius strength: normal  Left trapezius strength: normal    CN XII   Tongue: not atrophic  Fasciculations: absent  Tongue deviation: none    Motor Exam   Muscle bulk: normal  Overall muscle tone: normal  Right arm tone: normal  Left arm tone: normal  Right arm pronator drift: absent  Left arm pronator drift: absent  Right leg tone: normal  Left leg tone: normal  - UEs: demonstrating fatigue, effort depended, able to demonstrate antigravity up to 4/5 with encouragement  - LE: demonstrating fatigue, effort depended, able to demonstrate antigravity (3/5) with encouragement     Sensory Exam   Right arm light touch: normal  Left arm light touch: normal  Right leg light touch: normal  Left leg light touch: normal  Right arm vibration: normal  Left arm vibration: normal  Right leg vibration: normal  Left leg vibration: normal    Gait, Coordination, and Reflexes     Coordination   Finger to nose coordination: normal  Heel-to-shin test: able to complete with encouragement  Effort dependednt examination  Reflexes   Right brachioradialis: 2+  Left brachioradialis: 2+  Right biceps: 2+  Left biceps: 2+  Right patellar: 1+  Left patellar: 1+  Right achilles: 1+  Left achilles: 1+  Right plantar: normal  Left plantar: normal  Right Anthony: absent  Left Anthony: absent  Right ankle clonus: absent  Left ankle clonus: absent  - Gait deferred  Patient is bedbound      LABORATORY DATA     Labs: I have personally reviewed pertinent reports         Results from last 7 days   Lab Units 01/23/23  0459 01/22/23  1152   WBC Thousand/uL 4 84 5 30   HEMOGLOBIN g/dL 13 1 12 4   HEMATOCRIT % 40 0 37 9   PLATELETS Thousands/uL 124* 127*   NEUTROS PCT % 66  --    MONOS PCT % 15*  --       Results from last 7 days   Lab Units 01/23/23  0459 01/22/23  1152   SODIUM mmol/L 139 140   POTASSIUM mmol/L 3 5 3 8   CHLORIDE mmol/L 107 110*   CO2 mmol/L 26 25   BUN mg/dL 17 19   CREATININE mg/dL 0 62 0 65   CALCIUM mg/dL 9 5 8 4              Results from last 7 days   Lab Units 01/22/23  1152   INR  1 27*   PTT seconds 34 IMAGING & DIAGNOSTIC TESTING     Radiology Results: I have personally reviewed pertinent reports  CTA stroke alert (head/neck)   Final Result by Belén Stovall MD (01/22 1217)      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 (given new abrupt tapering) with long segment occlusion extending from proximal right ICA cervical segment to right ICA distal cavernous segment, distal reconstitution in right paraophthalmic    segment with moderate-to-severe stenosis in right ICA supraclinoid segment likely receiving flow through patent incomplete [place] of [place]  Asymmetrically smaller caliber right M1 segment with diminished contrast opacification in right MCA branch vessels (worse distally)  Patchy groundglass opacities in bilateral upper lobes, suspicious for infection/inflammation or aspiration  Additional chronic/incidental findings as detailed above  Findings were directly discussed with Dominick Dillard  at 12:00 PM                            Workstation performed: [id number]         CT stroke alert brain   Final Result by Belén Stovall MD (01/22 1202)      No acute intracranial abnormality  Moderate chronic microangiopathy  Findings were directly discussed with Dominick Dillard  at 12:00 PM       Workstation performed: [id number]           Other Diagnostic Testing: I have personally reviewed pertinent reports        ACTIVE MEDICATIONS     Current Facility-Administered Medications   Medication Dose Route Frequency   • acetaminophen (TYLENOL) tablet 650 mg  650 mg Oral Q6H PRN   • apixaban (ELIQUIS) tablet 5 mg  5 mg Oral BID   • atorvastatin (LIPITOR) tablet 40 mg  40 mg Oral QPM   • cholecalciferol (VITAMIN D3) tablet 1,000 Units  1,000 Units Oral QPM   • cyanocobalamin (VITAMIN B-12) tablet 1,000 mcg  1,000 mcg Oral QPM   • folic acid (FOLVITE) tablet 1,000 mcg  1,000 mcg Oral Daily   • furosemide (LASIX) tablet 40 mg  40 mg Oral Daily   • gabapentin (NEURONTIN) capsule 100 mg  100 mg Oral TID   • HYDROmorphone HCl (DILAUDID) injection 0 1 mg  0 1 mg Intravenous Q6H PRN   • metoprolol tartrate (LOPRESSOR) tablet 25 mg  25 mg Oral Daily   • primidone (MYSOLINE) tablet 50 mg  50 mg Oral BID   • spironolactone (ALDACTONE) tablet 25 mg  25 mg Oral Daily     Prior to Admission medications    Medication Sig Start Date End Date Taking?  Authorizing Provider   Acetaminophen (TYLENOL PO) Take by mouth 3 (three) times a day   Yes Historical Provider, MD   atorvastatin (LIPITOR) 40 mg tablet TAKE 1 TABLET BY MOUTH ONCE DAILY IN THE EVENING 12/12/22  Yes Garfield Carvajal MD   cholecalciferol (VITAMIN D3) 1,000 units tablet Take 1,000 Units by mouth every evening   Yes Historical Provider, MD   CRANBERRY PO Take 30,000 mg by mouth   Yes Historical Provider, MD   cyanocobalamin (VITAMIN B-12) 1,000 mcg tablet Take 1,000 mcg by mouth every evening     Yes Historical Provider, MD   Eliquis 5 MG TAKE 1 TABLET BY MOUTH TWICE DAILY 12/20/22  Yes Luis Bryant MD   folic acid (FOLVITE) 1 mg tablet Take 1,000 mcg by mouth daily 6/28/19  Yes Historical Provider, MD   furosemide (LASIX) 40 mg tablet Take 40 mg by mouth daily   Yes Historical Provider, MD   gabapentin (NEURONTIN) 100 mg capsule Take 100 mg by mouth 3 (three) times a day 10/25/21  Yes Historical Provider, MD   ipratropium (ATROVENT) 0 03 % nasal spray 1-2 sprays each nostril twice a day as needed for rhinorrhea 5/25/21  Yes Ray Ruelas MD   metFORMIN (GLUCOPHAGE) 500 mg tablet Take 500 mg by mouth daily   Yes Historical Provider, MD   metoprolol tartrate (LOPRESSOR) 25 mg tablet Take 25 mg by mouth daily   Yes Historical Provider, MD   Multiple Vitamin (MULTI-VITAMIN DAILY PO) Take by mouth   Yes Historical Provider, MD   primidone (MYSOLINE) 50 mg tablet Take by mouth 2 (two) times a day    Yes Historical Provider, MD   spironolactone (ALDACTONE) 25 mg tablet Take 1 tablet (25 mg total) by mouth daily 7/21/22  Yes Sari Blandon, MD     VTE Pharmacologic Prophylaxis: Apixaban (Eliquis)  VTE Mechanical Prophylaxis: sequential compression device     ==  DO Bill Lynn [facility] Neurology Residency, PGY-2

## 2023-01-23 NOTE — OCCUPATIONAL THERAPY NOTE
Occupational Therapy Screen        Patient Name: Daryle Ground CRYEW'C Date: 1/23/2023 01/23/23 1000   OT Last Visit   OT Visit Date 01/23/23   Note Type   Note type Screen     OT consulted, chart reviewed  Pt admitted with stroke-like symptoms, is a abraham lift for transfers at baseline and dependent with ADL's  Will discharge pt from caseload/sign off   Kiah Wren MOT, OTR/L

## 2023-01-23 NOTE — PROGRESS NOTES
[address] [facility]  Progress Note - Marcus Delatorre 1935, 80 y o  female MRN: [id number]  Unit/Bed#: [facility] 706-01 Encounter: [id number]  Primary Care Provider: Adrianne Schultz MD   Date and time admitted to hospital: 1/22/2023 11:55 AM    * Stroke-like symptoms  Assessment & Plan  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 (given new abrupt tapering) with long segment occlusion extending from proximal right ICA cervical segment to right ICA distal cavernous segment, distal reconstitution in right paraophthalmic   segment with moderate-to-severe stenosis in right ICA supraclinoid segment likely receiving flow through patent incomplete [place]  Asymmetrically smaller caliber right M1 segment with diminished contrast opacification in right MCA branch vessels (worse distally)  Plan:  -Neurology  evaluated patient    -Patient has declined any surgical intervention, level 3 DNR   -Patient has a history of similar symptoms that have improved in the past   -Atorvastatin 40 mg daily   -Eliquis 5 mg BID  -Echo completed, final reading pending   -Frequent neuro checks  -BP - MAP > 90   -Goal euglycemia and normothermia  -Goal glucose < 180   -On telemetry    -STAT CTH and alert neuro for changes in mental status    -Speech eval completed   -discharge to home with caregiver in am       Internal carotid artery dissection [facility])  Assessment & Plan  -1/22 CTA Stroke alert head/neck: 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 (given new abrupt tapering) with long segment occlusion extending from proximal right ICA cervical segment to right ICA distal cavernous segment, distal reconstitution in right paraophthalmic   segment with moderate-to-severe stenosis in right ICA supraclinoid segment likely receiving flow through patent incomplete [place]  Asymmetrically smaller caliber right M1 segment with diminished contrast opacification in right MCA branch vessels (worse distally)  Plan:  -Medical management and obervation, see A&P for stroke-like symptoms   -Level 3 DNR   -No surgical intervention        COVID-19  Assessment & Plan  Patient incidentally COVID positive on admission  Plan:  -Not reporting any respiratory symptoms, on room air   -Remdesivir/other measures if patient develops respiratory symptoms   -Isolation precautions  CHF (congestive heart failure) (HCC)  Assessment & Plan  Wt Readings from Last 3 Encounters:   01/22/23 72 kg (158 lb 13 5 oz)   12/27/22 88 3 kg (194 lb 10 7 oz)   11/14/22 101 kg (223 lb 8 7 oz)     Plan:  -Continue home Lasix and Spironolactone  Slurred speech  Assessment & Plan  Presenting with slurred speech  Plan:  -See A&P for stroke-like symptoms  Hypertension  Assessment & Plan  Plan:  -Monitor BP closely   -Continue home Lasix, Lopressor, Aldactone  Paroxysmal atrial fibrillation (HCC)  Assessment & Plan  On Eliquis at home  Plan:  -Continue home Eliquis and Lopressor   -Monitor on telemetry  Type 2 diabetes mellitus [facility])  Assessment & Plan  Lab Results   Component Value Date    HGBA1C 5 3 01/23/2023       Recent Labs     01/22/23  1145   POCGLU 113       Blood Sugar Average: Last 72 hrs:  (P) 113     Plan:  -On Metformin at home, held  -Follow blood sugars   -Consider SSI if needed        VTE Pharmacologic Prophylaxis:     Moderate Risk (Score 3-4) - Pharmacological DVT Prophylaxis Ordered: Apixaban (Eliquis)  Mechanical VTE Prophylaxis in Place: Yes    Patient Centered Rounds: I have performed bedside rounds with nursing staff today  Discussions with Specialists or Other Care Team Provider: yes    Education and Discussions with Family / Patient: yes    Current Length of Stay: 0 day(s)    Current Patient Status: Observation     Discharge Plan / Estimated Discharge Date: Anticipate discharge tomorrow to home with home services  Code Status: Level 3 - DNAR and DNI      Subjective:     Seen and examined at bedside  Vitals stable on room air  Denies chest pain or shortness of breath  Patient awake alert and oriented  Was able to participate in encounter and cooperative on exam  Objective:     Vitals:   Temp (24hrs), Av 5 °F (37 5 °C), Min:99 3 °F (37 4 °C), Max:99 6 °F (37 6 °C)    Temp:  [99 3 °F (37 4 °C)-99 6 °F (37 6 °C)] 99 3 °F (37 4 °C)  HR:  [78-97] 97  Resp:  [18-20] 18  BP: (105-193)/(70-87) 152/83  SpO2:  [92 %-97 %] 94 %  Body mass index is 26 41 kg/m²  Input and Output Summary (last 24 hours): Intake/Output Summary (Last 24 hours) at 2023 1330  Last data filed at 2023 1200  Gross per 24 hour   Intake 0 ml   Output 0 ml   Net 0 ml       Physical Exam:     Physical Exam  Constitutional:       Comments: Fatigue  Chronically ill   HENT:      Head: Atraumatic  Eyes:      General: No scleral icterus  Cardiovascular:      Rate and Rhythm: Normal rate  Heart sounds: Normal heart sounds  Pulmonary:      Effort: No respiratory distress  Breath sounds: Normal breath sounds  Abdominal:      General: Bowel sounds are normal    Musculoskeletal:      Cervical back: Neck supple  Right lower leg: No edema  Skin:     General: Skin is warm and dry  Capillary Refill: Capillary refill takes less than 2 seconds  Neurological:      Mental Status: She is alert  She is disoriented        Motor: Weakness present  Additional Data:     Labs:  Results from last 7 days   Lab Units 01/23/23  0459   WBC Thousand/uL 4 84   HEMOGLOBIN g/dL 13 1   HEMATOCRIT % 40 0   PLATELETS Thousands/uL 124*   NEUTROS PCT % 66   LYMPHS PCT % 18   MONOS PCT % 15*   EOS PCT % 0     Results from last 7 days   Lab Units 01/23/23  0459   SODIUM mmol/L 139   POTASSIUM mmol/L 3 5   CHLORIDE mmol/L 107   CO2 mmol/L 26   BUN mg/dL 17   CREATININE mg/dL 0 62   ANION GAP mmol/L 6   CALCIUM mg/dL 9 5   GLUCOSE RANDOM mg/dL 103     Results from last 7 days   Lab Units 01/22/23  1152   INR  1 27*     Results from last 7 days   Lab Units 01/22/23  1145   POC GLUCOSE mg/dl 113     Results from last 7 days   Lab Units 01/23/23  0459 01/22/23  1152   HEMOGLOBIN A1C % 5 3 5 3           Imaging: No pertinent imaging reviewed      Recent Cultures (last 7 days):           Lines/Drains:  Invasive Devices     Peripheral Intravenous Line  Duration           Peripheral IV 01/22/23 Left Antecubital 1 day    Peripheral IV 01/22/23 Left;Ventral (anterior) Forearm 1 day          Drain  Duration           External Urinary Catheter 30 days                Telemetry:   Telemetry Orders (From admission, onward)             48 Hour Telemetry Monitoring  Continuous x 48 hours        References:    Telemetry Guidelines   Question:  Reason for 48 Hour Telemetry  Answer:  Acute CVA (<24 hrs old, hemispheric strokes, selected brainstem strokes, cardiac arrhythmias)                    Last 24 Hours Medication List:   Current Facility-Administered Medications   Medication Dose Route Frequency Provider Last Rate   • acetaminophen  650 mg Oral Q6H PRN Santi Snell MD     • apixaban  5 mg Oral BID Santi Snell MD     • atorvastatin  40 mg Oral QPM Santi Snell MD     • cholecalciferol  1,000 Units Oral QPM Santi Snell MD     • vitamin B-12  1,000 mcg Oral QPM Santi Snell MD     • folic acid  3,751 mcg Oral Daily Grayson Tavares MD     • furosemide  40 mg Oral Daily Grayson Tavares MD     • gabapentin  100 mg Oral TID Grayson Tavares MD     • HYDROmorphone  0 1 mg Intravenous Q6H PRN Grayson Tavares MD     • metoprolol tartrate  25 mg Oral Daily Grayson Tavares MD     • primidone  50 mg Oral BID Grayson Tavares MD     • spironolactone  25 mg Oral Daily Grayson Tavares MD          Today, Patient Was Seen By: Jamari Georges MD    ** Please Note: This note has been constructed using a voice recognition system   **

## 2023-01-23 NOTE — PHYSICAL THERAPY NOTE
Physical Therapy Screen    Patient Name: Sole Echeverria    [facility]HEC'A Date: 1/23/2023     Problem List  Principal Problem:    Stroke-like symptoms  Active Problems:    Type 2 diabetes mellitus (HCC)    Paroxysmal atrial fibrillation (HCC)    Hypertension    Slurred speech    CHF (congestive heart failure) ([facility] )    Internal carotid artery dissection ([facility] )    COVID-19       Past Medical History  Past Medical History:   Diagnosis Date    A-fib ([facility] )     Arthritis     Assistance needed for mobility     pt can stand with assistance and transfer    Chronic pain disorder     Diabetes mellitus ([facility] )     NIDDM    Full dentures     History of transfusion     no adverse reaction    Hyperlipidemia     Irregular heart beat     Numbness and tingling of both feet     Skin abnormality     sacrum area - small red area, less than a dime size    Spinal stenosis     Stroke ([facility] )     Unable to ambulate     Urinary incontinence     ipg stimulator x3 repakcements,battery exchange today 2/14/2018    Wears glasses     Wheelchair dependence         Past Surgical History  Past Surgical History:   Procedure Laterality Date    BACK SURGERY      fusion    BLADDER SUSPENSION      CARPAL TUNNEL RELEASE Bilateral     CHOLANGIOGRAM N/A 6/4/2018    Procedure: CHOLANGIOGRAM;  Surgeon: Shelton Allan MD;  Location: [facility];  Service: General    CHOLECYSTECTOMY LAPAROSCOPIC N/A 6/4/2018    Procedure: CHOLECYSTECTOMY LAPAROSCOPIC;  Surgeon: Shelton Allan MD;  Location: [facility];  Service: General    COLONOSCOPY      DILATION AND CURETTAGE OF UTERUS      FRACTURE SURGERY Left     femur with hardware    HYSTERECTOMY      INSERTION / [place] / REVISION NEUROSTIMULATOR      JOINT REPLACEMENT Bilateral     knees    SACRAL NERVE STIMULATOR PLACEMENT N/A 2/14/2018    Procedure: REMOVE AND REPLACE IPG;  Surgeon: Juanita Angelucci, MD;  Location: [facility];  Service: UroGynecology           TONSILLECTOMY 01/23/23 0934   PT Last Visit   PT Visit Date 01/23/23   Note Type   Note type Screen     Orders received and chart reviewed  Pt presents with stroke like symptoms  Per chart review, dependent at baseline and abraham lift OOB  No acute care PT needs at this time  Will dc from caseload  Thank you      Alfredo Nayak, PT, DPT

## 2023-01-23 NOTE — PLAN OF CARE
Problem: Potential for Falls  Goal: Patient will remain free of falls  Description: INTERVENTIONS:  - Educate patient/family on patient safety including physical limitations  - Instruct patient to call for assistance with activity   - Consult OT/PT to assist with strengthening/mobility   - Keep Call bell within reach  - Keep bed low and locked with side rails adjusted as appropriate  - Keep care items and personal belongings within reach  - Initiate and maintain comfort rounds  - Make Fall Risk Sign visible to staff  - Offer Toileting every 2 Hours, in advance of need  - Initiate/Maintain bed alarm  - Obtain necessary fall risk management equipment  - Apply yellow socks and bracelet for high fall risk patients  - Consider moving patient to room near nurses station  Outcome: Progressing     Problem: MOBILITY - ADULT  Goal: Maintain or return to baseline ADL function  Description: INTERVENTIONS:  -  Assess patient's ability to carry out ADLs; assess patient's baseline for ADL function and identify physical deficits which impact ability to perform ADLs (bathing, care of mouth/teeth, toileting, grooming, dressing, etc )  - Assess/evaluate cause of self-care deficits   - Assess range of motion  - Assess patient's mobility; develop plan if impaired  - Assess patient's need for assistive devices and provide as appropriate  - Encourage maximum independence but intervene and supervise when necessary  - Involve family in performance of ADLs  - Assess for home care needs following discharge   - Consider OT consult to assist with ADL evaluation and planning for discharge  - Provide patient education as appropriate  Outcome: Progressing  Goal: Maintains/Returns to pre admission functional level  Description: INTERVENTIONS:  - Perform BMAT or MOVE assessment daily    - Set and communicate daily mobility goal to care team and patient/family/caregiver     - Collaborate with rehabilitation services on mobility goals if consulted  - Perform Range of Motion 3 times a day  - Reposition patient every 2 hours  - Record patient progress and toleration of activity level   Outcome: Progressing     Problem: NEUROSENSORY - ADULT  Goal: Achieves stable or improved neurological status  Description: INTERVENTIONS  - Monitor and report changes in neurological status  - Monitor vital signs such as temperature, blood pressure, glucose, and any other labs ordered   - Initiate measures to prevent increased intracranial pressure  - Monitor for seizure activity and implement precautions if appropriate      Outcome: Progressing     Problem: CARDIOVASCULAR - ADULT  Goal: Maintains optimal cardiac output and hemodynamic stability  Description: INTERVENTIONS:  - Monitor I/O, vital signs and rhythm  - Monitor for S/S and trends of decreased cardiac output  - Administer and titrate ordered vasoactive medications to optimize hemodynamic stability  - Assess quality of pulses, skin color and temperature  - Assess for signs of decreased coronary artery perfusion  - Instruct patient to report change in severity of symptoms  Outcome: Progressing     Problem: Neurological Deficit  Goal: Neurological status is stable or improving  Description: Interventions:  - Monitor and assess patient's level of consciousness, motor function, sensory function, and level of assistance needed for ADLs  - Monitor and report changes from baseline  Collaborate with interdisciplinary team to initiate plan and implement interventions as ordered  - Provide and maintain a safe environment  - Consider seizure precautions  - Consider fall precautions  - Consider aspiration precautions  - Consider bleeding precautions  Outcome: Progressing     Problem:  Activity Intolerance/Impaired Mobility  Goal: Mobility/activity is maintained at optimum level for patient  Description: Interventions:  - Assess and monitor patient  barriers to mobility and need for assistive/adaptive devices  - Assess patient's emotional response to limitations  - Collaborate with interdisciplinary team and initiate plans and interventions as ordered  - Encourage independent activity per ability   - Maintain proper body alignment  - Perform active/passive rom as tolerated/ordered  - Plan activities to conserve energy   - Turn patient as appropriate  Outcome: Progressing     Problem: Communication Impairment  Goal: Ability to express needs and understand communication  Description: Assess patient's communication skills and ability to understand information  Patient will demonstrate use of effective communication techniques, alternative methods of communication and understanding even if not able to speak  - Encourage communication and provide alternate methods of communication as needed  - Collaborate with case management/ for discharge needs  - Include patient/family/caregiver in decisions related to communication  Outcome: Progressing     Problem: Potential for Aspiration  Goal: Non-ventilated patient's risk of aspiration is minimized  Description: Assess and monitor vital signs, respiratory status, and labs (WBC)  Monitor for signs of aspiration (tachypnea, cough, rales, wheezing, cyanosis, fever)  - Assess and monitor patient's ability to swallow  - Place patient up in chair to eat if possible  - HOB up at 90 degrees to eat if unable to get patient up into chair   - Supervise patient during oral intake  - Instruct patient/ family to take small bites  - Instruct patient/ family to take small single sips when taking liquids    - Follow patient-specific strategies generated by speech pathologist   Outcome: Progressing     Problem: Nutrition  Goal: Nutrition/Hydration status is improving  Description: Monitor and assess patient's nutrition/hydration status for malnutrition (ex- brittle hair, bruises, dry skin, pale skin and conjunctiva, muscle wasting, smooth red tongue, and disorientation)  Collaborate with interdisciplinary team and initiate plan and interventions as ordered  Monitor patient's weight and dietary intake as ordered or per policy  Utilize nutrition screening tool and intervene per policy  Determine patient's food preferences and provide high-protein, high-caloric foods as appropriate  - Assist patient with eating   - Allow adequate time for meals   - Encourage patient to take dietary supplement as ordered  - Collaborate with clinical nutritionist   - Include patient/family/caregiver in decisions related to nutrition    Outcome: Progressing     Problem: PAIN - ADULT  Goal: Verbalizes/displays adequate comfort level or baseline comfort level  Description: Interventions:  - Encourage patient to monitor pain and request assistance  - Assess pain using appropriate pain scale  - Administer analgesics based on type and severity of pain and evaluate response  - Implement non-pharmacological measures as appropriate and evaluate response  - Notify physician/advanced practitioner if interventions unsuccessful or patient reports new pain  Outcome: Progressing     Problem: INFECTION - ADULT  Goal: Absence or prevention of progression during hospitalization  Description: INTERVENTIONS:  - Assess and monitor for signs and symptoms of infection  - Monitor lab/diagnostic results  - Monitor all insertion sites, i e  indwelling lines, tubes, and drains  - [place] appropriate cooling/warming therapies per order  - Administer medications as ordered  - Instruct and encourage patient and family to use good hand hygiene technique  - Identify and instruct in appropriate isolation precautions for identified infection/condition  Outcome: Progressing     Problem: SAFETY ADULT  Goal: Patient will remain free of falls  Description: INTERVENTIONS:  - Educate patient/family on patient safety including physical limitations  - Instruct patient to call for assistance with activity   - Consult OT/PT to assist with strengthening/mobility   - Keep Call bell within reach  - Keep bed low and locked with side rails adjusted as appropriate  - Keep care items and personal belongings within reach  - Initiate and maintain comfort rounds  - Make Fall Risk Sign visible to staff  - Offer Toileting every 2 Hours, in advance of need  - Initiate/Maintain bed alarm  - Obtain necessary fall risk management equipment  - Apply yellow socks and bracelet for high fall risk patients  - Consider moving patient to room near nurses station  Outcome: Progressing     Problem: Knowledge Deficit  Goal: Patient/family/caregiver demonstrates understanding of disease process, treatment plan, medications, and discharge instructions  Description: Complete learning assessment and assess knowledge base    Interventions:  - Provide teaching at level of understanding  - Provide teaching via preferred learning methods  Outcome: Progressing

## 2023-01-23 NOTE — CONSULTS
PHYSICAL MEDICINE AND REHABILITATION CONSULT NOTE  Rudy Copeland 80 y o  female MRN: [id number]  Unit/Bed#: [facility] 706-01 Encounter: [id number]    Requested by (Physician/Service): Adelia Burton MD  Reason for Consultation:  Assessment of rehabilitation needs    Assessment:  Rehabilitation Diagnosis:   • Stroke like symptoms including left sided weakness and dysarthria   • Impaired mobility and self care  • Impaired cognition     Recommendations:  Rehabilitation Plan:  • The patient is dependent for ADLs, bed bound and abraham life at baseline  She is not a candidate for acute inpatient rehabilitation  • Covid-19 Testing: [facility] inpatient rehabilitation units require testing within 48 hours of all potential admissions at this time  *Re-testing is NOT required for patients recovering from COVID-19 infection if isolation has been discontinued per CDC criteria  Medical Co-morbidities Plan:  · Internal carotid dissection   · CHF  · Hypertension   · Atrial fibrillation   · Diabetes type 2  · DVT ppx: Eliquis and SCD    Thank you for this consultation  Do not hesitate to contact service with further questions  VALERI Cagle  PM&R        Total time spent:  20 minutes with more than 50% spent counseling/coordinating care  Counseling includes extended discussion with patient (+/- family/relevant historian)  re: history, exam, function, mood, rehabilitation management plan, medical co-morbidities plan, and disposition options  Additional time spent with thorough chart review in EMR, reviewing recent medications, labs, imaging, and management plan of primary service  History of Present Illness:  Rudy Copleand is a 80 y o  female with a PMH prior CVA, atrial fibrillation on Eliquis, NIDDM, HLD, spinal stenosis, urinary incontinence, chronic pain who presented to the [facility] on 1/22/23 with left sided facial droop and dysarthria   CTA showed acute dissection in the right ICA proximal cervical segment with long segment occlusion extending from proximal right ICA cervical segment with moderate to severe stenosis in right ICA supraclinoid segment likely receiving flow through patent incomplete [place] of [place]  Due to extensive proximal occlusive disease and premorbid debility there was no intervention and she was medically managed  She was found to have patchy ground glass opacities in bilateral upper lobes on CTA as well and was found to be positive for COVID 19  PM&R are consulted for rehabilitation recommendations  Function:  Prior level of function and living situation: Per documentation the patient is bed bound, total dependent for ADLs and abraham lift at baseline  She had been revieving hospice services in the past from [facility]  She was receiving 24/7 home services  Physical Exam:  /83   Pulse 97   Temp 99 3 °F (37 4 °C)   Resp 18   Wt 72 kg (158 lb 13 5 oz)   SpO2 94%   BMI 26 43 kg/m²      No intake or output data in the 24 hours ending 01/23/23 1048    Body mass index is 26 43 kg/m²        Physical Exam not completed        Social History:    Social History     Socioeconomic History   • Marital status: /Civil Union     Spouse name: None   • Number of children: None   • Years of education: None   • Highest education level: None   Occupational History   • None   Tobacco Use   • Smoking status: Never   • Smokeless tobacco: Never   Vaping Use   • Vaping Use: Never used   Substance and Sexual Activity   • Alcohol use: Not Currently     Comment: very rare   • Drug use: No   • Sexual activity: Not Currently   Other Topics Concern   • None   Social History Narrative   • None     Social Determinants of Health     Financial Resource Strain: Not on file   Food Insecurity: No Food Insecurity   • Worried About Running Out of Food in the Last Year: Never true   • Ran Out of Food in the Last Year: Never true   Transportation Needs: No Transportation Needs   • Lack of Transportation (Medical): No   • Lack of Transportation (Non-Medical):  No   Physical Activity: Not on file   Stress: Not on file   Social Connections: Not on file   Intimate Partner Violence: Not on file   Housing Stability: Low Risk    • Unable to Pay for Housing in the Last Year: No   • Number of Places Lived in the Last Year: 1   • Unstable Housing in the Last Year: No        Family History:    Family History   Problem Relation Age of Onset   • No Known Problems Mother    • No Known Problems Father          Medications:     Current Facility-Administered Medications:   •  acetaminophen (TYLENOL) tablet 650 mg, 650 mg, Oral, Q6H PRN, Olga Saldivar MD  •  apixaban Modesta Boss) tablet 5 mg, 5 mg, Oral, BID, Olga Saldivar MD, 5 mg at 01/22/23 2347  •  atorvastatin (LIPITOR) tablet 40 mg, 40 mg, Oral, QPM, Olga Saldivar MD  •  cholecalciferol (VITAMIN D3) tablet 1,000 Units, 1,000 Units, Oral, QPM, Olga Saldivar MD  •  cyanocobalamin (VITAMIN B-12) tablet 1,000 mcg, 1,000 mcg, Oral, QPM, Olga Saldivar MD  •  folic acid (FOLVITE) tablet 1,000 mcg, 1,000 mcg, Oral, Daily, Olga Saldivar MD  •  furosemide (LASIX) tablet 40 mg, 40 mg, Oral, Daily, Olga Saldivar MD  •  gabapentin (NEURONTIN) capsule 100 mg, 100 mg, Oral, TID, Olga Saldivar MD  •  HYDROmorphone HCl (DILAUDID) injection 0 1 mg, 0 1 mg, Intravenous, Q6H PRN, Olga Saldivar MD, 0 1 mg at 01/22/23 2101  •  metoprolol tartrate (LOPRESSOR) tablet 25 mg, 25 mg, Oral, Daily, Olga Saldivar MD  •  primidone (MYSOLINE) tablet 50 mg, 50 mg, Oral, BID, Olga Saldivar MD  •  spironolactone (ALDACTONE) tablet 25 mg, 25 mg, Oral, Daily, Olga Saldivar MD    Past Medical History:     Past Medical History:   Diagnosis Date   • A-fib [facility])    • Arthritis    • Assistance needed for mobility     pt can stand with assistance and transfer   • Chronic pain disorder    • Diabetes mellitus (HCC)     NIDDM   • Full dentures    • History of transfusion     no adverse reaction   • Hyperlipidemia    • Irregular heart beat    • Numbness and tingling of both feet    • Skin abnormality     sacrum area - small red area, less than a dime size   • Spinal stenosis    • Stroke [facility])    • Unable to ambulate    • Urinary incontinence     ipg stimulator x3 repakcements,battery exchange today 2/14/2018   • Wears glasses    • Wheelchair dependence         Past Surgical History:     Past Surgical History:   Procedure Laterality Date   • BACK SURGERY      fusion   • BLADDER SUSPENSION     • CARPAL TUNNEL RELEASE Bilateral    • CHOLANGIOGRAM N/A 6/4/2018    Procedure: CHOLANGIOGRAM;  Surgeon: Brittany Oreilly MD;  [facility];  Service: General   • CHOLECYSTECTOMY LAPAROSCOPIC N/A 6/4/2018    Procedure: CHOLECYSTECTOMY LAPAROSCOPIC;  Surgeon: Brittany Oreilly MD;  [facility];  Service: General   • COLONOSCOPY     • DILATION AND CURETTAGE OF UTERUS     • FRACTURE SURGERY Left     femur with hardware   • HYSTERECTOMY     • INSERTION / [place]  / REVISION NEUROSTIMULATOR     • JOINT REPLACEMENT Bilateral     knees   • SACRAL NERVE STIMULATOR PLACEMENT N/A 2/14/2018    Procedure: REMOVE AND REPLACE IPG;  Surgeon: Ni Motta MD;  [facility];  Service: UroGynecology          • TONSILLECTOMY           Allergies:      Allergies   Allergen Reactions   • Sulfamethoxazole-Trimethoprim Swelling     Swelling around eyes and red eyes   • Asa [Aspirin] Other (See Comments)     Unknown     • Aspirin Other (See Comments)     "feels like fist in my chest"   • Ciprofloxacin    • Nitrofurantoin    • Other      "5mz/Tmp "  "1 60 mTab amme"     Per pt's allergy list            LABORATORY RESULTS:      Lab Results   Component Value Date    HGB 13 1 01/23/2023    HGB 10 1 (L) 12/19/2014    HCT 40 0 01/23/2023    HCT 31 5 (L) 12/19/2014    WBC 4 84 01/23/2023    WBC 8 66 12/19/2014     Lab Results   Component Value Date    BUN 17 01/23/2023    BUN 18 12/17/2014     12/17/2014    K 3 5 01/23/2023    K 4 1 12/17/2014     01/23/2023     12/17/2014    GLUCOSE 235 (H) 12/10/2021    GLUCOSE 113 12/17/2014    CREATININE 0 62 01/23/2023    CREATININE 0 75 12/17/2014     Lab Results   Component Value Date    PROTIME 16 2 (H) 01/22/2023    PROTIME 15 9 (H) 12/19/2014    INR 1 27 (H) 01/22/2023    INR 1 30 (H) 12/19/2014        DIAGNOSTIC STUDIES: Reviewed  CT stroke alert brain    Result Date: 1/22/2023  Impression: No acute intracranial abnormality  Moderate chronic microangiopathy  Findings were directly discussed with Dena Kelley  at 12:00 PM  Workstation performed: [id number]     CTA stroke alert (head/neck)    Result Date: 1/22/2023  Impression: 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 (given new abrupt tapering) with long segment occlusion extending from proximal right ICA cervical segment to right ICA distal cavernous segment, distal reconstitution in right paraophthalmic segment with moderate-to-severe stenosis in right ICA supraclinoid segment likely receiving flow through patent incomplete [place] of [place]  Asymmetrically smaller caliber right M1 segment with diminished contrast opacification in right MCA branch vessels (worse distally)  Patchy groundglass opacities in bilateral upper lobes, suspicious for infection/inflammation or aspiration  Additional chronic/incidental findings as detailed above   Findings were directly discussed with Dena Kelley  at 12:00 PM  Workstation performed: [id number]

## 2023-01-24 PROBLEM — R53.2 FUNCTIONAL QUADRIPLEGIA (HCC): Status: ACTIVE | Noted: 2023-01-24

## 2023-01-24 PROBLEM — G82.50 QUADRIPLEGIA (HCC): Status: ACTIVE | Noted: 2023-01-24

## 2023-01-24 LAB
ANION GAP SERPL CALCULATED.3IONS-SCNC: 9 MMOL/L (ref 4–13)
BASOPHILS # BLD AUTO: 0.04 THOUSANDS/ÂΜL (ref 0–0.1)
BASOPHILS NFR BLD AUTO: 1 % (ref 0–1)
BUN SERPL-MCNC: 21 MG/DL (ref 5–25)
CALCIUM SERPL-MCNC: 8.8 MG/DL (ref 8.3–10.1)
CHLORIDE SERPL-SCNC: 104 MMOL/L (ref 96–108)
CO2 SERPL-SCNC: 24 MMOL/L (ref 21–32)
CREAT SERPL-MCNC: 0.77 MG/DL (ref 0.6–1.3)
EOSINOPHIL # BLD AUTO: 0.12 THOUSAND/ÂΜL (ref 0–0.61)
EOSINOPHIL NFR BLD AUTO: 2 % (ref 0–6)
ERYTHROCYTE [DISTWIDTH] IN BLOOD BY AUTOMATED COUNT: 14 % (ref 11.6–15.1)
GFR SERPL CREATININE-BSD FRML MDRD: 69 ML/MIN/1.73SQ M
GLUCOSE SERPL-MCNC: 105 MG/DL (ref 65–140)
HCT VFR BLD AUTO: 38.7 % (ref 34.8–46.1)
HGB BLD-MCNC: 12.4 G/DL (ref 11.5–15.4)
IMM GRANULOCYTES # BLD AUTO: 0.02 THOUSAND/UL (ref 0–0.2)
IMM GRANULOCYTES NFR BLD AUTO: 0 % (ref 0–2)
LYMPHOCYTES # BLD AUTO: 1.73 THOUSANDS/ÂΜL (ref 0.6–4.47)
LYMPHOCYTES NFR BLD AUTO: 25 % (ref 14–44)
MCH RBC QN AUTO: 30.7 PG (ref 26.8–34.3)
MCHC RBC AUTO-ENTMCNC: 32 G/DL (ref 31.4–37.4)
MCV RBC AUTO: 96 FL (ref 82–98)
MONOCYTES # BLD AUTO: 1.04 THOUSAND/ÂΜL (ref 0.17–1.22)
MONOCYTES NFR BLD AUTO: 15 % (ref 4–12)
NEUTROPHILS # BLD AUTO: 4.04 THOUSANDS/ÂΜL (ref 1.85–7.62)
NEUTS SEG NFR BLD AUTO: 57 % (ref 43–75)
NRBC BLD AUTO-RTO: 0 /100 WBCS
PLATELET # BLD AUTO: 123 THOUSANDS/UL (ref 149–390)
PMV BLD AUTO: 10.5 FL (ref 8.9–12.7)
POTASSIUM SERPL-SCNC: 3.4 MMOL/L (ref 3.5–5.3)
RBC # BLD AUTO: 4.04 MILLION/UL (ref 3.81–5.12)
SODIUM SERPL-SCNC: 137 MMOL/L (ref 135–147)
WBC # BLD AUTO: 6.99 THOUSAND/UL (ref 4.31–10.16)

## 2023-01-24 RX ORDER — POTASSIUM CHLORIDE 20 MEQ/1
20 TABLET, EXTENDED RELEASE ORAL ONCE
Status: COMPLETED | OUTPATIENT
Start: 2023-01-24 | End: 2023-01-24

## 2023-01-24 RX ADMIN — SPIRONOLACTONE 25 MG: 25 TABLET ORAL at 09:46

## 2023-01-24 RX ADMIN — APIXABAN 5 MG: 5 TABLET, FILM COATED ORAL at 17:02

## 2023-01-24 RX ADMIN — FUROSEMIDE 40 MG: 40 TABLET ORAL at 09:46

## 2023-01-24 RX ADMIN — GABAPENTIN 100 MG: 100 CAPSULE ORAL at 09:46

## 2023-01-24 RX ADMIN — PRIMIDONE 50 MG: 50 TABLET ORAL at 11:44

## 2023-01-24 RX ADMIN — GABAPENTIN 100 MG: 100 CAPSULE ORAL at 20:11

## 2023-01-24 RX ADMIN — CYANOCOBALAMIN TAB 500 MCG 1000 MCG: 500 TAB at 17:02

## 2023-01-24 RX ADMIN — FOLIC ACID 1000 MCG: 1 TABLET ORAL at 09:46

## 2023-01-24 RX ADMIN — POTASSIUM CHLORIDE 20 MEQ: 1500 TABLET, EXTENDED RELEASE ORAL at 11:10

## 2023-01-24 RX ADMIN — METOPROLOL TARTRATE 25 MG: 25 TABLET, FILM COATED ORAL at 09:46

## 2023-01-24 RX ADMIN — APIXABAN 5 MG: 5 TABLET, FILM COATED ORAL at 09:46

## 2023-01-24 RX ADMIN — PRIMIDONE 50 MG: 50 TABLET ORAL at 17:09

## 2023-01-24 RX ADMIN — GABAPENTIN 100 MG: 100 CAPSULE ORAL at 16:57

## 2023-01-24 RX ADMIN — ATORVASTATIN CALCIUM 40 MG: 40 TABLET, FILM COATED ORAL at 17:02

## 2023-01-24 RX ADMIN — CHOLECALCIFEROL TAB 25 MCG (1000 UNIT) 1000 UNITS: 25 TAB at 17:03

## 2023-01-24 NOTE — PLAN OF CARE
Problem: Activity Intolerance/Impaired Mobility  Goal: Mobility/activity is maintained at optimum level for patient  Description: Interventions:  - Assess and monitor patient  barriers to mobility and need for assistive/adaptive devices  - Assess patient's emotional response to limitations  - Collaborate with interdisciplinary team and initiate plans and interventions as ordered  - Encourage independent activity per ability   - Maintain proper body alignment  - Perform active/passive rom as tolerated/ordered    - Plan activities to conserve energy   - Turn patient as appropriate  Outcome: Progressing

## 2023-01-24 NOTE — SPEECH THERAPY NOTE
Speech Language/Pathology    Speech/Language Pathology Progress Note    Patient Name: Manolo Johnson  IOATD'A Date: 1/24/2023     Problem List  Principal Problem:    Stroke-like symptoms  Active Problems:    Type 2 diabetes mellitus (HCC)    Paroxysmal atrial fibrillation (HCC)    Hypertension    Slurred speech    CHF (congestive heart failure) ([facility] )    Internal carotid artery dissection (HCC)    COVID-19    Functional quadriplegia (HCC)       Past Medical History  Past Medical History:   Diagnosis Date   • A-fib ([place] [id number] )    • Arthritis    • Assistance needed for mobility     pt can stand with assistance and transfer   • Chronic pain disorder    • Diabetes mellitus ([facility] )     NIDDM   • Full dentures    • History of transfusion     no adverse reaction   • Hyperlipidemia    • Irregular heart beat    • Numbness and tingling of both feet    • Skin abnormality     sacrum area - small red area, less than a dime size   • Spinal stenosis    • Stroke ([facility] )    • Unable to ambulate    • Urinary incontinence     ipg stimulator x3 repakcements,battery exchange today 2/14/2018   • Wears glasses    • Wheelchair dependence         Past Surgical History  Past Surgical History:   Procedure Laterality Date   • BACK SURGERY      fusion   • BLADDER SUSPENSION     • CARPAL TUNNEL RELEASE Bilateral    • CHOLANGIOGRAM N/A 6/4/2018    Procedure: CHOLANGIOGRAM;  Surgeon: Karen Granado MD;  [facility];  Service: General   • CHOLECYSTECTOMY LAPAROSCOPIC N/A 6/4/2018    Procedure: CHOLECYSTECTOMY LAPAROSCOPIC;  Surgeon: Karen Granado MD;  [facility];  Service: General   • COLONOSCOPY     • DILATION AND CURETTAGE OF UTERUS     • FRACTURE SURGERY Left     femur with hardware   • HYSTERECTOMY     • INSERTION / [place] / REVISION NEUROSTIMULATOR     • JOINT REPLACEMENT Bilateral     knees   • SACRAL NERVE STIMULATOR PLACEMENT N/A 2/14/2018    Procedure: REMOVE AND REPLACE IPG;  Surgeon: Christina Juarez MD;  [facility]; Service: UroGynecology          • TONSILLECTOMY           Subjective:  Pt awake, alert  SLP placed teeth in oral cavity  Current Diet: NPO, made npo last evening, per nsg was taking meds overnight w/ water and had choking  Pt takes meds with puree   Objective:  Pt seen bedside w/ puree, soft solid, hard pretzels, toast, nt and thin by cup  Slow manipulation of all material w/ slow oral clearing  Labored transfers w/ need for liquid washes and puree tsps to clear  Pt w/ some oral tremors when retrieving materials  Mult swallows w/ all material   No overt coughing or clearing  Pt reports not eating much bread bc she doesn't move much  Education provided on diets- discussed option of possibly having a level three while here and she was agreeable  Assessment:  Pt w/ mild oral dysphagia w/ labored manipulation and transfers, reduced mastication  With time & liquid or puree washes can clear the oral cavity  Plan/Recommendations:  Change diet to level 3 w/ thin  Will follow as able  ? able d/c to home today

## 2023-01-24 NOTE — ASSESSMENT & PLAN NOTE
Patient incidentally COVID positive on admission  Plan:  -Not reporting any respiratory symptoms, on room air

## 2023-01-24 NOTE — ASSESSMENT & PLAN NOTE
POA  Bedbound at baseline, in setting of previous CVA     Patient with 24 x 7 home care, was also previously involved in home hospice  Being discharged to previous living situation

## 2023-01-24 NOTE — ASSESSMENT & PLAN NOTE
-1/22 CTA Stroke alert head/neck: 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 (given new abrupt tapering) with long segment occlusion extending from proximal right ICA cervical segment to right ICA distal cavernous segment, distal reconstitution in right paraophthalmic   segment with moderate-to-severe stenosis in right ICA supraclinoid segment likely receiving flow through patent incomplete [place] of [place]  Asymmetrically smaller caliber right M1 segment with diminished contrast opacification in right MCA branch vessels (worse distally)      Plan:  -Medical management and obervation, see A&P for stroke-like symptoms   -Level 3 DNR   -No surgical intervention

## 2023-01-24 NOTE — ASSESSMENT & PLAN NOTE
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 (given new abrupt tapering) with long segment occlusion extending from proximal right ICA cervical segment to right ICA distal cavernous segment, distal reconstitution in right paraophthalmic   segment with moderate-to-severe stenosis in right ICA supraclinoid segment likely receiving flow through patent incomplete [place] of [place]  Asymmetrically smaller caliber right M1 segment with diminished contrast opacification in right MCA branch vessels (worse distally)  Plan:  -Neurology  evaluated patient    -Patient has declined any surgical intervention, level 3 DNR   -Patient has a history of similar symptoms that have improved in the past   -Atorvastatin 40 mg daily   -Eliquis 5 mg BID  -Echo completed: LVEF of 70%, with vigorous systolic function  -Frequent neuro checks  -BP - MAP > 90   -Goal euglycemia and normothermia  -Goal glucose < 180   -On telemetry    -STAT CTH and alert neuro for changes in mental status    -Speech eval completed   -discharge to home with caregiver in am

## 2023-01-24 NOTE — PROGRESS NOTES
[address] [facility]  Progress Note - Marcus Delatorre 1935, 80 y o  female MRN: [id number]  Unit/Bed#: [facility] 706-01 Encounter: [id number]  Primary Care Provider: Adrianne Schultz MD   Date and time admitted to hospital: 1/22/2023 11:55 AM    * Stroke-like symptoms  Assessment & Plan  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 (given new abrupt tapering) with long segment occlusion extending from proximal right ICA cervical segment to right ICA distal cavernous segment, distal reconstitution in right paraophthalmic   segment with moderate-to-severe stenosis in right ICA supraclinoid segment likely receiving flow through patent incomplete [place] of [place]  Asymmetrically smaller caliber right M1 segment with diminished contrast opacification in right MCA branch vessels (worse distally)  Plan:  -Neurology  evaluated patient    -Patient has declined any surgical intervention, level 3 DNR   -Patient has a history of similar symptoms that have improved in the past   -Atorvastatin 40 mg daily   -Eliquis 5 mg BID  -Echo completed: LVEF of 70%, with vigorous systolic function  -Frequent neuro checks  -BP - MAP > 90   -Goal euglycemia and normothermia  -Goal glucose < 180   -On telemetry    -STAT CTH and alert neuro for changes in mental status    -Speech eval completed   -discharge to home with caregiver in am       COVID-19  Assessment & Plan  Patient incidentally COVID positive on admission  Plan:  -Not reporting any respiratory symptoms, on room air  Internal carotid artery dissection [facility])  Assessment & Plan  -1/22 CTA Stroke alert head/neck: 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 (given new abrupt tapering) with long segment occlusion extending from proximal right ICA cervical segment to right ICA distal cavernous segment, distal reconstitution in right paraophthalmic   segment with moderate-to-severe stenosis in right ICA supraclinoid segment likely receiving flow through patent incomplete [place] of [place]  Asymmetrically smaller caliber right M1 segment with diminished contrast opacification in right MCA branch vessels (worse distally)  Plan:  -Medical management and obervation, see A&P for stroke-like symptoms   -Level 3 DNR   -No surgical intervention        CHF (congestive heart failure) ([facility])  Assessment & Plan  Wt Readings from Last 3 Encounters:   01/23/23 72 kg (158 lb 11 7 oz)   12/27/22 88 3 kg (194 lb 10 7 oz)   11/14/22 101 kg (223 lb 8 7 oz)     Plan:  -Continue home Lasix and Spironolactone  Slurred speech  Assessment & Plan  Presenting with slurred speech  Plan:  -See A&P for stroke-like symptoms  Hypertension  Assessment & Plan  Plan:  -Monitor BP closely   -Continue home Lasix, Lopressor, Aldactone  Paroxysmal atrial fibrillation (HCC)  Assessment & Plan  On Eliquis at home  Plan:  -Continue home Eliquis and Lopressor  Type 2 diabetes mellitus [facility])  Assessment & Plan  Lab Results   Component Value Date    HGBA1C 5 3 01/23/2023       Recent Labs     01/22/23  1145   POCGLU 113       Blood Sugar Average: Last 72 hrs:  (P) 113     Plan:  -On Metformin at home, held  -Follow blood sugars   -Consider SSI if needed  VTE Pharmacologic Prophylaxis:     Moderate Risk (Score 3-4) - Pharmacological DVT Prophylaxis Ordered: Apixaban (Eliquis)      Mechanical VTE Prophylaxis in Place: Yes    Patient Centered Rounds: I have performed bedside rounds with nursing staff today  Discussions with Specialists or Other Care Team Provider: Respiratory    Current Length of Stay: 1 day(s)    Current Patient Status: Inpatient     Discharge Plan / Estimated Discharge Date: Anticipate discharge tomorrow to home  Code Status: Level 3 - DNAR and DNI      Subjective:   Patient not in any acute distress  Has no new complaints or concerns    Objective:     Vitals:   Temp (24hrs), Av 1 °F (36 7 °C), Min:98 °F (36 7 °C), Max:98 3 °F (36 8 °C)    Temp:  [98 °F (36 7 °C)-98 3 °F (36 8 °C)] 98 3 °F (36 8 °C)  HR:  [77-92] 92  Resp:  [14-17] 17  BP: (115-156)/(49-76) 142/71  SpO2:  [88 %-96 %] 94 %  Body mass index is 26 41 kg/m²  Input and Output Summary (last 24 hours): Intake/Output Summary (Last 24 hours) at 2023 1310  Last data filed at 2023 1445  Gross per 24 hour   Intake --   Output 550 ml   Net -550 ml       Physical Exam:     Physical Exam  Constitutional:       Comments: Fatigue  Chronically ill   HENT:      Head: Atraumatic  Eyes:      General: No scleral icterus  Cardiovascular:      Rate and Rhythm: Normal rate  Heart sounds: Normal heart sounds  Pulmonary:      Effort: No respiratory distress  Breath sounds: Normal breath sounds  Abdominal:      General: Bowel sounds are normal    Musculoskeletal:      Cervical back: Neck supple  Right lower leg: No edema  Skin:     General: Skin is warm and dry  Capillary Refill: Capillary refill takes less than 2 seconds  Neurological:      Mental Status: She is alert  She is disoriented  Motor: Weakness present            Additional Data:     Labs:  Results from last 7 days   Lab Units 23  0443   WBC Thousand/uL 6 99   HEMOGLOBIN g/dL 12 4   HEMATOCRIT % 38 7   PLATELETS Thousands/uL 123*   NEUTROS PCT % 57   LYMPHS PCT % 25   MONOS PCT % 15*   EOS PCT % 2     Results from last 7 days   Lab Units 23  0443   SODIUM mmol/L 137   POTASSIUM mmol/L 3 4*   CHLORIDE mmol/L 104   CO2 mmol/L 24   BUN mg/dL 21   CREATININE mg/dL 0 77   ANION GAP mmol/L 9   CALCIUM mg/dL 8 8   GLUCOSE RANDOM mg/dL 105     Results from last 7 days   Lab Units 23  1152   INR  1 27*     Results from last 7 days   Lab Units 23  1145   POC GLUCOSE mg/dl 113     Results from last 7 days   Lab Units 23  0459 23  1152   HEMOGLOBIN A1C % 5 3 5 3           Imaging: No pertinent imaging reviewed      Recent Cultures (last 7 days):           Lines/Drains:  Invasive Devices     Peripheral Intravenous Line  Duration           Peripheral IV 23 Left;Ventral (anterior) Forearm 2 days          Drain  Duration           External Urinary Catheter 31 days    External Urinary Catheter <1 day                Telemetry:   Telemetry Orders (From admission, onward)             48 Hour Telemetry Monitoring  Continuous x 48 hours           References:    Telemetry Guidelines   Question:  Reason for 48 Hour Telemetry  Answer:  Acute CVA (<24 hrs old, hemispheric strokes, selected brainstem strokes, cardiac arrhythmias)                    Last 24 Hours Medication List:   Current Facility-Administered Medications   Medication Dose Route Frequency Provider Last Rate   • acetaminophen  650 mg Oral Q6H PRN Ruy Eid MD     • apixaban  5 mg Oral BID Ruy Eid MD     • atorvastatin  40 mg Oral QPM Ruy Eid MD     • cholecalciferol  1,000 Units Oral QPM Ruy Eid MD     • vitamin B-12  1,000 mcg Oral QPM Ruy Eid MD     • folic acid  3,864 mcg Oral Daily Ruy Eid MD     • furosemide  40 mg Oral Daily Ruy Eid MD     • gabapentin  100 mg Oral TID Ruy Eid MD     • HYDROmorphone  0 1 mg Intravenous Q6H PRN Ruy Eid MD     • metoprolol tartrate  25 mg Oral Daily Ruy Eid MD     • primidone  50 mg Oral BID Roberth Ellsworth MD     • spironolactone  25 mg Oral Daily Roberth Ellsworth MD          Today, Patient Was Seen By: Arielle Lynn MD    ** Please Note: This note has been constructed using a voice recognition system   **

## 2023-01-24 NOTE — UTILIZATION REVIEW
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]  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]  Stroke-like episode [R29 90]  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]     UTILIZATION REVIEW CONTACT:  Yvette Costa Utilization   Network Utilization Review Department  Phone: [phone]  Fax: [phone]  Email: [email or web address]  Contact for approvals/pending authorizations, clinical reviews, and discharge  PHYSICIAN ADVISORY SERVICES:  Medical Necessity Denial & Feor-oy-Qeag Review  Phone: [phone]  Fax: [phone]  Email: [email or web address]       NOTIFICATION OF INPATIENT ADMISSION   AUTHORIZATION REQUEST   SERVICING FACILITY:   [facility]  Address: [address] [place] [address]  Tax ID: [id number]  NPI: [id number] ATTENDING PROVIDER:  Attending Name and NPI#: Laura Lynch Md [[id number]]  Address: [address] [place] [address]  Phone: [phone]   ADMISSION INFORMATION:  Place of Service: Inpatient [address]  Place of Service Code: 21  Inpatient Admission Date/Time: 1/23/23  7:06 PM  Discharge Date/Time: No discharge date for patient encounter    Admitting Diagnosis Code/Description:  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

## 2023-01-24 NOTE — UTILIZATION REVIEW
NOTIFICATION OF INPATIENT ADMISSION   AUTHORIZATION REQUEST   SERVICING FACILITY:   [facility]  Address: [address] [place] [address], [address] [place] [address]  Tax ID: [id number]  NPI: [id number] ATTENDING PROVIDER:  Attending Name and NPI#: Db Mccoy Md [[id number]]  Address: [address] [place] [address]  Phone: [phone]   ADMISSION INFORMATION:  Place of Service: [facility]patient [address] [facility]  [address]  Place of Service Code: 21  Inpatient Admission Date/Time: 1/23/23  7:06 PM  Discharge Date/Time: No discharge date for patient encounter  Admitting Diagnosis Code/Description:  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

## 2023-01-24 NOTE — UTILIZATION REVIEW
Continued Stay Review    Observation 1/22 @ 1340 and changed to Inpatient on 1/23 @ 1906  Pt requiring continued stay for Stroke-like symptoms/ Covid 19 and Safe d/c plans     Inpatient Admission  Once        Transfer Service: [facility]       Question Answer Comment   Level of Care Med Surg    Estimated length of stay More than 2 Midnights    Certification I certify that inpatient services are medically necessary for this patient for a duration of greater than two midnights  See H&P and MD Progress Notes for additional information about the patient's course of treatment  01/23/23 1906    Place in Observation  Once        Transfer Service: [facility]       Question: Level of Care Answer: Med Surg    01/22/23 1340       Date: 1/23 & 1/24                        Current Patient Class: Inpatient Current Level of Care: Med Surg    HPI:87 y o  female initially admitted on 1/23     Assessment/Plan: Stroke-like symptoms  1/23  Echo pending  Continue Eliquis bid and statin  Frequent neuro checks  Continues on tele monitoring  Continue home Lasix, Lopressor, Aldactone    Pt declined any surgical intervention, level 3 DNR  PMR cons; Pt here for strokelike symptoms including left-sided weakness and dysarthria also found to be COVID positive  At baseline is a total assist using a Bryson lift for mobility and has been receiving hospice services in the past   Currently at 24/7 care level and would not likely benefit from aggressive inpatient rehabilitation programs at this time  Per Neurology; Stroke workup and treat  Cannot obtain MRI d/t spinal stimulator  Tele monitoring and frequent neuro checks  No further inpatient neurology recommendations  1/24  Progress notes; Echo; LVEF of 70%, with vigorous systolic function     Placed on O2 2L NC for sat of 88% on 1/23 at [facility]         Vital Signs:   01/24/23 09:57:08 -- 92 17 142/71 95 94 % 28 2 L/min Nasal cannula Sitting   01/24/23 07:28:14 98 3 °F (36 8 °C) -- 16 139/76 97 -- 28 2 L/min Nasal cannula Sitting   01/23/23 2100 -- -- -- -- -- 88 %   Abnormal  -- -- None (Room air) --   01/23/23 15:30:27 98 °F (36 7 °C) 82 14 156/71 99 96 % -- -- -- --   01/23/23 1530 98 °F (36 7 °C) 77 -- 156/71 99 94 % -- --       01/23/23 0430 -- 82 -- 153/73 100 92 % -- -- -- --   01/23/23 0330 99 6 °F (37 6 °C) 90 -- 150/76 101 94 % -- -- -- --   01/23/23 03:27:31 99 6 °F (37 6 °C) 90 -- 150/76 101 94 % --        Pertinent Labs/Diagnostic Results:   Results from last 7 days   Lab Units 01/22/23  1152   SARS-COV-2  Positive*     Results from last 7 days   Lab Units 01/24/23 0443 01/23/23 0459 01/22/23  1152   WBC Thousand/uL 6 99 4 84 5 30   HEMOGLOBIN g/dL 12 4 13 1 12 4   HEMATOCRIT % 38 7 40 0 37 9   PLATELETS Thousands/uL 123* 124* 127*   NEUTROS ABS Thousands/µL 4 04 3 16  --          Results from last 7 days   Lab Units 01/24/23 0443 01/23/23 0459 01/22/23  1152   SODIUM mmol/L 137 139 140   POTASSIUM mmol/L 3 4* 3 5 3 8   CHLORIDE mmol/L 104 107 110*   CO2 mmol/L 24 26 25   ANION GAP mmol/L 9 6 5   BUN mg/dL 21 17 19   CREATININE mg/dL 0 77 0 62 0 65   EGFR ml/min/1 73sq m 69 81 80   CALCIUM mg/dL 8 8 9 5 8 4         Results from last 7 days   Lab Units 01/22/23  1145   POC GLUCOSE mg/dl 113     Results from last 7 days   Lab Units 01/24/23 0443 01/23/23  0459 01/22/23  1152   GLUCOSE RANDOM mg/dL 105 103 103         Results from last 7 days   Lab Units 01/23/23  0459 01/22/23  1152   HEMOGLOBIN A1C % 5 3 5 3   EAG mg/dl 105 105         Results from last 7 days   Lab Units 01/22/23  1349 01/22/23  1152   HS TNI 0HR ng/L  --  9   HS TNI 2HR ng/L 10  --    HSTNI D2 ng/L 1  --          Results from last 7 days   Lab Units 01/22/23  1152   PROTIME seconds 16 2*   INR  1 27*   PTT seconds 34     Results from last 7 days   Lab Units 01/23/23  0459   TSH 3RD GENERATON uIU/mL 0 372*         Results from last 7 days   Lab Units 01/22/23  1152   INFLUENZA A PCR  Negative   INFLUENZA B PCR  Negative   RSV PCR  Negative       Medications:   Scheduled Medications:  apixaban, 5 mg, Oral, BID  atorvastatin, 40 mg, Oral, QPM  cholecalciferol, 1,000 Units, Oral, QPM  vitamin B-12, 1,000 mcg, Oral, QPM  folic acid, 7,727 mcg, Oral, Daily  furosemide, 40 mg, Oral, Daily  gabapentin, 100 mg, Oral, TID  metoprolol tartrate, 25 mg, Oral, Daily  primidone, 50 mg, Oral, BID  spironolactone, 25 mg, Oral, Daily      Continuous IV Infusions: None     PRN Meds:  acetaminophen, 650 mg, Oral, Q6H PRN  HYDROmorphone, 0 1 mg, Intravenous, Q6H PRN        Discharge Plan: D    Network Utilization Review Department  ATTENTION: Please call with any questions or concerns to [phone] and carefully listen to the prompts so that you are directed to the right person  All voicemails are confidential   Elin Buerger all requests for admission clinical reviews, approved or denied determinations and any other requests to dedicated fax number below belonging to the campus where the patient is receiving treatment   List of dedicated fax numbers for the Facilities:  [address] DENIALS (Administrative/Medical Necessity) [phone]   [address] (Maternity/NICU/Pediatrics) [phone]   [address] Marilia Lees [phone]   Ramowilliam Luis  [phone]   [address] [address] Trupti Chung 28 [phone]   [facility] Felipe Schmitt [facility] [address] [facility] [phone]

## 2023-01-24 NOTE — ASSESSMENT & PLAN NOTE
Wt Readings from Last 3 Encounters:   01/23/23 72 kg (158 lb 11 7 oz)   12/27/22 88 3 kg (194 lb 10 7 oz)   11/14/22 101 kg (223 lb 8 7 oz)     Plan:  -Continue home Lasix and Spironolactone

## 2023-01-25 RX ADMIN — FUROSEMIDE 40 MG: 40 TABLET ORAL at 09:32

## 2023-01-25 RX ADMIN — CYANOCOBALAMIN TAB 500 MCG 1000 MCG: 500 TAB at 17:12

## 2023-01-25 RX ADMIN — METOPROLOL TARTRATE 25 MG: 25 TABLET, FILM COATED ORAL at 09:32

## 2023-01-25 RX ADMIN — ATORVASTATIN CALCIUM 40 MG: 40 TABLET, FILM COATED ORAL at 17:12

## 2023-01-25 RX ADMIN — GABAPENTIN 100 MG: 100 CAPSULE ORAL at 17:12

## 2023-01-25 RX ADMIN — FOLIC ACID 1000 MCG: 1 TABLET ORAL at 09:32

## 2023-01-25 RX ADMIN — GABAPENTIN 100 MG: 100 CAPSULE ORAL at 21:50

## 2023-01-25 RX ADMIN — GABAPENTIN 100 MG: 100 CAPSULE ORAL at 09:32

## 2023-01-25 RX ADMIN — SPIRONOLACTONE 25 MG: 25 TABLET ORAL at 09:32

## 2023-01-25 RX ADMIN — CHOLECALCIFEROL TAB 25 MCG (1000 UNIT) 1000 UNITS: 25 TAB at 17:12

## 2023-01-25 RX ADMIN — PRIMIDONE 50 MG: 50 TABLET ORAL at 09:33

## 2023-01-25 RX ADMIN — PRIMIDONE 50 MG: 50 TABLET ORAL at 17:15

## 2023-01-25 RX ADMIN — APIXABAN 5 MG: 5 TABLET, FILM COATED ORAL at 17:12

## 2023-01-25 RX ADMIN — APIXABAN 5 MG: 5 TABLET, FILM COATED ORAL at 09:32

## 2023-01-25 NOTE — PLAN OF CARE
Problem: Potential for Falls  Goal: Patient will remain free of falls  Description: INTERVENTIONS:  - Educate patient/family on patient safety including physical limitations  - Instruct patient to call for assistance with activity   - Consult OT/PT to assist with strengthening/mobility   - Keep Call bell within reach  - Keep bed low and locked with side rails adjusted as appropriate  - Keep care items and personal belongings within reach  - Initiate and maintain comfort rounds  - Make Fall Risk Sign visible to staff  - Offer Toileting every 2 Hours, in advance of need  - Initiate/Maintain bed alarm  - Obtain necessary fall risk management equipment  - Apply yellow socks and bracelet for high fall risk patients  - Consider moving patient to room near nurses station  Outcome: Progressing     Problem: MOBILITY - ADULT  Goal: Maintain or return to baseline ADL function  Description: INTERVENTIONS:  -  Assess patient's ability to carry out ADLs; assess patient's baseline for ADL function and identify physical deficits which impact ability to perform ADLs (bathing, care of mouth/teeth, toileting, grooming, dressing, etc )  - Assess/evaluate cause of self-care deficits   - Assess range of motion  - Assess patient's mobility; develop plan if impaired  - Assess patient's need for assistive devices and provide as appropriate  - Encourage maximum independence but intervene and supervise when necessary  - Involve family in performance of ADLs  - Assess for home care needs following discharge   - Consider OT consult to assist with ADL evaluation and planning for discharge  - Provide patient education as appropriate  Outcome: Progressing  Goal: Maintains/Returns to pre admission functional level  Description: INTERVENTIONS:  - Perform BMAT or MOVE assessment daily    - Set and communicate daily mobility goal to care team and patient/family/caregiver     - Collaborate with rehabilitation services on mobility goals if consulted  - Perform Range of Motion 3 times a day  - Reposition patient every 2 hours  - Record patient progress and toleration of activity level   Outcome: Progressing     Problem: NEUROSENSORY - ADULT  Goal: Achieves stable or improved neurological status  Description: INTERVENTIONS  - Monitor and report changes in neurological status  - Monitor vital signs such as temperature, blood pressure, glucose, and any other labs ordered   - Initiate measures to prevent increased intracranial pressure  - Monitor for seizure activity and implement precautions if appropriate      Outcome: Progressing     Problem: CARDIOVASCULAR - ADULT  Goal: Maintains optimal cardiac output and hemodynamic stability  Description: INTERVENTIONS:  - Monitor I/O, vital signs and rhythm  - Monitor for S/S and trends of decreased cardiac output  - Administer and titrate ordered vasoactive medications to optimize hemodynamic stability  - Assess quality of pulses, skin color and temperature  - Assess for signs of decreased coronary artery perfusion  - Instruct patient to report change in severity of symptoms  Outcome: Progressing     Problem: Neurological Deficit  Goal: Neurological status is stable or improving  Description: Interventions:  - Monitor and assess patient's level of consciousness, motor function, sensory function, and level of assistance needed for ADLs  - Monitor and report changes from baseline  Collaborate with interdisciplinary team to initiate plan and implement interventions as ordered  - Provide and maintain a safe environment  - Consider seizure precautions  - Consider fall precautions  - Consider aspiration precautions  - Consider bleeding precautions  Outcome: Progressing     Problem:  Activity Intolerance/Impaired Mobility  Goal: Mobility/activity is maintained at optimum level for patient  Description: Interventions:  - Assess and monitor patient  barriers to mobility and need for assistive/adaptive devices  - Assess patient's emotional response to limitations  - Collaborate with interdisciplinary team and initiate plans and interventions as ordered  - Encourage independent activity per ability   - Maintain proper body alignment  - Perform active/passive rom as tolerated/ordered  - Plan activities to conserve energy   - Turn patient as appropriate  Outcome: Progressing     Problem: Communication Impairment  Goal: Ability to express needs and understand communication  Description: Assess patient's communication skills and ability to understand information  Patient will demonstrate use of effective communication techniques, alternative methods of communication and understanding even if not able to speak  - Encourage communication and provide alternate methods of communication as needed  - Collaborate with case management/ for discharge needs  - Include patient/family/caregiver in decisions related to communication  Outcome: Progressing     Problem: Potential for Aspiration  Goal: Non-ventilated patient's risk of aspiration is minimized  Description: Assess and monitor vital signs, respiratory status, and labs (WBC)  Monitor for signs of aspiration (tachypnea, cough, rales, wheezing, cyanosis, fever)  - Assess and monitor patient's ability to swallow  - Place patient up in chair to eat if possible  - HOB up at 90 degrees to eat if unable to get patient up into chair   - Supervise patient during oral intake  - Instruct patient/ family to take small bites  - Instruct patient/ family to take small single sips when taking liquids    - Follow patient-specific strategies generated by speech pathologist   Outcome: Progressing     Problem: Nutrition  Goal: Nutrition/Hydration status is improving  Description: Monitor and assess patient's nutrition/hydration status for malnutrition (ex- brittle hair, bruises, dry skin, pale skin and conjunctiva, muscle wasting, smooth red tongue, and disorientation)  Collaborate with interdisciplinary team and initiate plan and interventions as ordered  Monitor patient's weight and dietary intake as ordered or per policy  Utilize nutrition screening tool and intervene per policy  Determine patient's food preferences and provide high-protein, high-caloric foods as appropriate  - Assist patient with eating   - Allow adequate time for meals   - Encourage patient to take dietary supplement as ordered  - Collaborate with clinical nutritionist   - Include patient/family/caregiver in decisions related to nutrition    Outcome: Progressing     Problem: PAIN - ADULT  Goal: Verbalizes/displays adequate comfort level or baseline comfort level  Description: Interventions:  - Encourage patient to monitor pain and request assistance  - Assess pain using appropriate pain scale  - Administer analgesics based on type and severity of pain and evaluate response  - Implement non-pharmacological measures as appropriate and evaluate response  - Notify physician/advanced practitioner if interventions unsuccessful or patient reports new pain  Outcome: Progressing     Problem: INFECTION - ADULT  Goal: Absence or prevention of progression during hospitalization  Description: INTERVENTIONS:  - Assess and monitor for signs and symptoms of infection  - Monitor lab/diagnostic results  - Monitor all insertion sites, i e  indwelling lines, tubes, and drains  - [place] appropriate cooling/warming therapies per order  - Administer medications as ordered  - Instruct and encourage patient and family to use good hand hygiene technique  - Identify and instruct in appropriate isolation precautions for identified infection/condition  Outcome: Progressing     Problem: SAFETY ADULT  Goal: Patient will remain free of falls  Description: INTERVENTIONS:  - Educate patient/family on patient safety including physical limitations  - Instruct patient to call for assistance with activity   - Consult OT/PT to assist with strengthening/mobility   - Keep Call bell within reach  - Keep bed low and locked with side rails adjusted as appropriate  - Keep care items and personal belongings within reach  - Initiate and maintain comfort rounds  - Make Fall Risk Sign visible to staff  - Offer Toileting every 2 Hours, in advance of need  - Initiate/Maintain bed alarm  - Obtain necessary fall risk management equipment  - Apply yellow socks and bracelet for high fall risk patients  - Consider moving patient to room near nurses station  Outcome: Progressing     Problem: Knowledge Deficit  Goal: Patient/family/caregiver demonstrates understanding of disease process, treatment plan, medications, and discharge instructions  Description: Complete learning assessment and assess knowledge base  Interventions:  - Provide teaching at level of understanding  - Provide teaching via preferred learning methods  Outcome: Progressing     Problem: Prexisting or High Potential for Compromised Skin Integrity  Goal: Skin integrity is maintained or improved  Description: INTERVENTIONS:  - Identify patients at risk for skin breakdown  - Assess and monitor skin integrity  - Assess and monitor nutrition and hydration status  - Monitor labs   - Assess for incontinence   - Turn and reposition patient  - Assist with mobility/ambulation  - Relieve pressure over bony prominences  - Avoid friction and shearing  - Provide appropriate hygiene as needed including keeping skin clean and dry  - Evaluate need for skin moisturizer/barrier cream  - Collaborate with interdisciplinary team   - Patient/family teaching  - Consider wound care consult   Outcome: Progressing     Problem: Nutrition/Hydration-ADULT  Goal: Nutrient/Hydration intake appropriate for improving, restoring or maintaining nutritional needs  Description: Monitor and assess patient's nutrition/hydration status for malnutrition   Collaborate with interdisciplinary team and initiate plan and interventions as ordered  Monitor patient's weight and dietary intake as ordered or per policy  Utilize nutrition screening tool and intervene as necessary  Determine patient's food preferences and provide high-protein, high-caloric foods as appropriate       INTERVENTIONS:  - Monitor oral intake, urinary output, labs, and treatment plans  - Assess nutrition and hydration status and recommend course of action  - Evaluate amount of meals eaten  - Assist patient with eating if necessary   - Allow adequate time for meals  - Recommend/ encourage appropriate diets, oral nutritional supplements, and vitamin/mineral supplements  - Order, calculate, and assess calorie counts as needed  - Recommend, monitor, and adjust tube feedings and TPN/PPN based on assessed needs  - Assess need for intravenous fluids  - Provide specific nutrition/hydration education as appropriate  - Include patient/family/caregiver in decisions related to nutrition  Outcome: Progressing

## 2023-01-25 NOTE — ASSESSMENT & PLAN NOTE
Patient incidentally COVID positive on admission  Plan:  -Not reporting any respiratory symptoms, on room air presently    Per CM, patient does have home O2 at home as needed, patient was previously on hospice service and services to be resumed at discharge if patient is interested

## 2023-01-25 NOTE — ASSESSMENT & PLAN NOTE
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 (given new abrupt tapering) with long segment occlusion extending from proximal right ICA cervical segment to right ICA distal cavernous segment, distal reconstitution in right paraophthalmic   segment with moderate-to-severe stenosis in right ICA supraclinoid segment likely receiving flow through patent incomplete [place]  Asymmetrically smaller caliber right M1 segment with diminished contrast opacification in right MCA branch vessels (worse distally)      Plan:  -Neurology evaluated patient    -Patient has declined any surgical intervention, level 3 DNR   -Patient has a history of similar symptoms that have improved in the past   -Atorvastatin 40 mg daily   -Eliquis 5 mg BID  -Echo completed: LVEF of 70%, with vigorous systolic function  -Patient with 24 x 7 care at home, discharge to home with caregiver
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
## 2023-01-25 NOTE — NURSING NOTE
Pt  Pulse ox dropped to 85% with bathing for about 30 seconds pt was able to recover to 93% on room air once rested
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
## 2023-01-25 NOTE — ASSESSMENT & PLAN NOTE
Lab Results   Component Value Date    HGBA1C 5 3 01/23/2023       Recent Labs     01/22/23  1145   POCGLU 113       Blood Sugar Average: Last 72 hrs:  (P) 113     Plan:  Continue home meds

## 2023-01-25 NOTE — ASSESSMENT & PLAN NOTE
-1/22 CTA Stroke alert head/neck: 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 (given new abrupt tapering) with long segment occlusion extending from proximal right ICA cervical segment to right ICA distal cavernous segment, distal reconstitution in right paraophthalmic   segment with moderate-to-severe stenosis in right ICA supraclinoid segment likely receiving flow through patent incomplete [place] of [place]  Asymmetrically smaller caliber right M1 segment with diminished contrast opacification in right MCA branch vessels (worse distally)      Plan:  -Medical management and obervation, see A&P for stroke-like symptoms   -Level 3 DNR   -No surgical intervention

## 2023-01-26 VITALS
SYSTOLIC BLOOD PRESSURE: 135 MMHG | BODY MASS INDEX: 26.45 KG/M2 | DIASTOLIC BLOOD PRESSURE: 74 MMHG | TEMPERATURE: 97.6 F | WEIGHT: 158.73 LBS | RESPIRATION RATE: 18 BRPM | HEIGHT: 65 IN | HEART RATE: 69 BPM | OXYGEN SATURATION: 96 %

## 2023-01-26 RX ADMIN — PRIMIDONE 50 MG: 50 TABLET ORAL at 08:17

## 2023-01-26 RX ADMIN — METOPROLOL TARTRATE 25 MG: 25 TABLET, FILM COATED ORAL at 08:14

## 2023-01-26 RX ADMIN — FOLIC ACID 1000 MCG: 1 TABLET ORAL at 08:14

## 2023-01-26 RX ADMIN — GABAPENTIN 100 MG: 100 CAPSULE ORAL at 08:17

## 2023-01-26 RX ADMIN — FUROSEMIDE 40 MG: 40 TABLET ORAL at 08:14

## 2023-01-26 RX ADMIN — APIXABAN 5 MG: 5 TABLET, FILM COATED ORAL at 08:15

## 2023-01-26 RX ADMIN — SPIRONOLACTONE 25 MG: 25 TABLET ORAL at 08:15

## 2023-01-26 NOTE — ASSESSMENT & PLAN NOTE
Lab Results   Component Value Date    HGBA1C 5 3 01/23/2023       No results for input(s): POCGLU in the last 72 hours      Blood Sugar Average: Last 72 hrs:  (P) 113     Plan:  Continue home meds
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
## 2023-01-26 NOTE — ASSESSMENT & PLAN NOTE
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 (given new abrupt tapering) with long segment occlusion extending from proximal right ICA cervical segment to right ICA distal cavernous segment, distal reconstitution in right paraophthalmic   segment with moderate-to-severe stenosis in right ICA supraclinoid segment likely receiving flow through patent incomplete [place] of [place]  Asymmetrically smaller caliber right M1 segment with diminished contrast opacification in right MCA branch vessels (worse distally)      Plan:  -Neurology evaluated patient    -Patient has declined any surgical intervention, level 3 DNR   -Patient has a history of similar symptoms that have improved in the past   -Atorvastatin 40 mg daily   -Eliquis 5 mg BID  -Echo completed: LVEF of 70%, with vigorous systolic function  -Patient being discharged home, has 24 x 7 home health care, CM following for transport set up

## 2023-01-26 NOTE — ASSESSMENT & PLAN NOTE
-1/22 CTA Stroke alert head/neck: 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 (given new abrupt tapering) with long segment occlusion extending from proximal right ICA cervical segment to right ICA distal cavernous segment, distal reconstitution in right paraophthalmic   segment with moderate-to-severe stenosis in right ICA supraclinoid segment likely receiving flow through patent incomplete [place] of [place]  Asymmetrically smaller caliber right M1 segment with diminished contrast opacification in right MCA branch vessels (worse distally)      Plan:  -Medical management and obervation, see A&P for stroke-like symptoms   -Level 3 DNR   -No surgical intervention

## 2023-01-26 NOTE — PLAN OF CARE
Problem: Potential for Falls  Goal: Patient will remain free of falls  Description: INTERVENTIONS:  - Educate patient/family on patient safety including physical limitations  - Instruct patient to call for assistance with activity   - Consult OT/PT to assist with strengthening/mobility   - Keep Call bell within reach  - Keep bed low and locked with side rails adjusted as appropriate  - Keep care items and personal belongings within reach  - Initiate and maintain comfort rounds  - Make Fall Risk Sign visible to staff  - Offer Toileting every 2 Hours, in advance of need  - Initiate/Maintain bed alarm  - Obtain necessary fall risk management equipment    - Apply yellow socks and bracelet for high fall risk patients  - Consider moving patient to room near nurses station  Outcome: Progressing

## 2023-01-26 NOTE — PROGRESS NOTES
[address] [facility]  Progress Note - Janet Fagan 1935, 80 y o  female MRN: [id number]  Unit/Bed#: [facility] 706-01 Encounter: [id number]  Primary Care Provider: Joaquín Hernandez MD   Date and time admitted to hospital: 1/22/2023 11:55 AM    * Stroke-like symptoms  Assessment & Plan  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 (given new abrupt tapering) with long segment occlusion extending from proximal right ICA cervical segment to right ICA distal cavernous segment, distal reconstitution in right paraophthalmic   segment with moderate-to-severe stenosis in right ICA supraclinoid segment likely receiving flow through patent incomplete [place] of [place]  Asymmetrically smaller caliber right M1 segment with diminished contrast opacification in right MCA branch vessels (worse distally)      Plan:  -Neurology evaluated patient    -Patient has declined any surgical intervention, level 3 DNR   -Patient has a history of similar symptoms that have improved in the past   -Atorvastatin 40 mg daily   -Eliquis 5 mg BID  -Echo completed: LVEF of 70%, with vigorous systolic function  -Patient with 24 x 7 care at home, patient planned for discharge to home with caregiver today, however difficulty coordinating between transport and home caregiver, discharge canceled and patient to be discharged once again in a m  once caregiver arrives      Functional quadriplegia ([facility] [address] )  Assessment & Plan  POA  Bedbound at baseline, in setting of previous CVA  Patient with 24 x 7 home care, was also previously involved in home hospice  Being discharged to previous living situation        COVID-19  Assessment & Plan  Patient incidentally COVID positive on admission  Plan:  -Not reporting any respiratory symptoms, on room air presently  Per CM, patient does have home O2 at home as needed, patient was previously on hospice service and services to be resumed at discharge if patient is interested      Internal carotid artery dissection [facility])  Assessment & Plan  -1/22 CTA Stroke alert head/neck: 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 (given new abrupt tapering) with long segment occlusion extending from proximal right ICA cervical segment to right ICA distal cavernous segment, distal reconstitution in right paraophthalmic   segment with moderate-to-severe stenosis in right ICA supraclinoid segment likely receiving flow through patent incomplete [place] of [place]  Asymmetrically smaller caliber right M1 segment with diminished contrast opacification in right MCA branch vessels (worse distally)  Plan:  -Medical management and obervation, see A&P for stroke-like symptoms   -Level 3 DNR   -No surgical intervention        CHF (congestive heart failure) (HCC)  Assessment & Plan  Wt Readings from Last 3 Encounters:   01/23/23 72 kg (158 lb 11 7 oz)   12/27/22 88 3 kg (194 lb 10 7 oz)   11/14/22 101 kg (223 lb 8 7 oz)     Plan:  -Continue home Lasix and Spironolactone  Slurred speech  Assessment & Plan  Presenting with slurred speech  Plan:  -See A&P for stroke-like symptoms  Hypertension  Assessment & Plan  Plan:    -Continue home Lasix, Lopressor, Aldactone  Paroxysmal atrial fibrillation (HCC)  Assessment & Plan  On Eliquis at home  Plan:  -Continue home Eliquis and Lopressor      Type 2 diabetes mellitus [facility])  Assessment & Plan  Lab Results Component Value Date    HGBA1C 5 3 2023       No results for input(s): POCGLU in the last 72 hours  Blood Sugar Average: Last 72 hrs:  (P) 113     Plan:  Continue home meds        VTE Pharmacologic Prophylaxis:     Moderate Risk (Score 3-4) - Pharmacological DVT Prophylaxis Ordered: Apixaban (Eliquis)  Mechanical VTE Prophylaxis in Place: Yes    Patient Centered Rounds: I have performed bedside rounds with nursing staff today  Discussions with Specialists or Other Care Team Provider: Respiratory    Current Length of Stay: 2 day(s)    Current Patient Status: Inpatient     Discharge Plan / Estimated Discharge Date: Anticipate discharge tomorrow to home  Code Status: Level 3 - DNAR and DNI      Subjective:   Patient not in any acute distress  Has no new complaints or concerns    Objective:     Vitals:   Temp (24hrs), Av 7 °F (37 1 °C), Min:98 7 °F (37 1 °C), Max:98 7 °F (37 1 °C)    Temp:  [98 7 °F (37 1 °C)] 98 7 °F (37 1 °C)  HR:  [76-87] 87  BP: (137-138)/(71-74) 137/74  SpO2:  [90 %-94 %] 93 %  Body mass index is 26 41 kg/m²  Input and Output Summary (last 24 hours): Intake/Output Summary (Last 24 hours) at 2023 1906  Last data filed at 2023 1354  Gross per 24 hour   Intake 350 ml   Output 800 ml   Net -450 ml       Physical Exam:     Physical Exam  HENT:      Head: Atraumatic  Eyes:      General: No scleral icterus  Cardiovascular:      Rate and Rhythm: Normal rate  Heart sounds: Normal heart sounds  Pulmonary:      Effort: No respiratory distress  Breath sounds: Normal breath sounds  Abdominal:      General: Bowel sounds are normal    Musculoskeletal:      Cervical back: Neck supple  Right lower leg: No edema  Skin:     General: Skin is warm and dry  Capillary Refill: Capillary refill takes less than 2 seconds  Neurological:      Mental Status: She is alert  Mental status is at baseline  Motor: Weakness present            Additional Data:     Labs:  Results from last 7 days   Lab Units 23  0443   WBC Thousand/uL 6 99   HEMOGLOBIN g/dL 12 4   HEMATOCRIT % 38 7   PLATELETS Thousands/uL 123*   NEUTROS PCT % 57   LYMPHS PCT % 25   MONOS PCT % 15*   EOS PCT % 2     Results from last 7 days   Lab Units 23  0443   SODIUM mmol/L 137   POTASSIUM mmol/L 3 4*   CHLORIDE mmol/L 104   CO2 mmol/L 24   BUN mg/dL 21   CREATININE mg/dL 0 77   ANION GAP mmol/L 9   CALCIUM mg/dL 8 8   GLUCOSE RANDOM mg/dL 105     Results from last 7 days   Lab Units 23  1152   INR  1 27*     Results from last 7 days   Lab Units 23  1145   POC GLUCOSE mg/dl 113     Results from last 7 days   Lab Units 23  0459 23  1152   HEMOGLOBIN A1C % 5 3 5 3           Imaging: No pertinent imaging reviewed      Recent Cultures (last 7 days):           Lines/Drains:  Invasive Devices     Peripheral Intravenous Line  Duration           Peripheral IV 23 Left;Ventral (anterior) Forearm 3 days          Drain  Duration           External Urinary Catheter 32 days    External Urinary Catheter 2 days                Telemetry:   Telemetry Orders (From admission, onward)             48 Hour Telemetry Monitoring  Continuous x 48 hours           References:    Telemetry Guidelines   Question:  Reason for 48 Hour Telemetry  Answer:  Acute CVA (<24 hrs old, hemispheric strokes, selected brainstem strokes, cardiac arrhythmias)                    Last 24 Hours Medication List:   Current Facility-Administered Medications   Medication Dose Route Frequency Provider Last Rate   • acetaminophen  650 mg Oral Q6H PRN Donna Smith MD     • apixaban  5 mg Oral BID Donna Smith MD     • atorvastatin  40 mg Oral QPM Donna Smith MD     • cholecalciferol  1,000 Units Oral QPM Donna Smith MD     • vitamin B-12  1,000 mcg Oral QPM Donna Smith MD     • folic acid  2,690 mcg Oral Daily Donna Smith MD     • furosemide  40 mg Oral Daily Donnie Cochran MD     • gabapentin  100 mg Oral TID Donnie Cochran MD     • HYDROmorphone  0 1 mg Intravenous Q6H PRN Donnie Cochran MD     • metoprolol tartrate  25 mg Oral Daily Donnie Cochran MD     • primidone  50 mg Oral BID Donnie Cochran MD     • spironolactone  25 mg Oral Daily Donnie Cochran MD          Today, Patient Was Seen By: Mahsa Mccormack MD    ** Please Note: This note has been constructed using a voice recognition system   **

## 2023-01-27 ENCOUNTER — TELEPHONE (OUTPATIENT)
Dept: NEUROLOGY | Facility: CLINIC | Age: 88
End: 2023-01-27

## 2023-01-27 NOTE — TELEPHONE ENCOUNTER
SCHEDULED: Mon, 3/6/23 at 9:30am w/ Catarino Elise in CV  Attempted to call patient's primary number, phone number would not connect and stated the line was busy  Called patient's alternate contact, daughter Junior Metzger  Scheduled HFU, she recorded appt date/time/address  She has our number  HFU/[email or web address]/[email or web address] SYMPTOMS        NOTES: Loraine Clinton will need follow up in in 6 weeks with neurovascular AP    She will not require outpatient neurological testing

## 2023-01-31 ENCOUNTER — TELEPHONE (OUTPATIENT)
Dept: NEUROLOGY | Facility: CLINIC | Age: 88
End: 2023-01-31

## 2023-01-31 NOTE — TELEPHONE ENCOUNTER
Post CVA Discharge Follow Up  Hospitalization: 1/22/23-1/26/23    Called patient with no answer  Left a voice message requesting for a call  Provided the office's phone number

## 2023-02-02 NOTE — TELEPHONE ENCOUNTER
Post CVA Discharge Follow Up  Hospitalization: 1/22/23-1/26/23    Called patient's daughter, Robe Ramirez  She reports how the patient is doing okay  She states how she was just discharged from [facility] due to pneumonia  She is on antibiotics for pneumonia  Patient is able to get out of bed to sit upright in a chair  She is unable to walk  Needs assistance with ADLs and mobilization  Upon chart review, patient was on home hospice prior to the hospital admission on 1/22/23  Inquired if the patient is still on home hospice  Robe Ramirez confirmed how she is  She reports how home hospice visits the patient twice weekly  Robe Ramirez will discuss with the patient whether to keep the hospital follow up appointment with neurology or to cancel it  Advised how it is up to the patient  Provided emotional support throughout this phone call  I addressed all her questions  At the conclusion of the conversation, she denies having any further questions or concerns

## 2023-02-02 NOTE — TELEPHONE ENCOUNTER
Post CVA Discharge Follow Up  Hospitalization: 1/22/23-1/26/23    Called patient and she reports how is doing okay  She requested for this RN to reach out to her daughter, [facility], for the rest of the follow up phone call  Will do  She was appreciative

## 2023-02-20 ENCOUNTER — TELEPHONE (OUTPATIENT)
Dept: NEUROLOGY | Facility: CLINIC | Age: 88
End: 2023-02-20

## 2023-02-20 NOTE — TELEPHONE ENCOUNTER
Post CVA Discharge Follow Up  Hospitalization: 1/22/23-1/26/23    Called patient's daughter, Kia Foote  She reports how the patient just had a follow up visit with the PCP today for COVID and pneumonia  Kia Foote states how the PCP requested for a patient update by the end of the week  Dr Norma Denise is closely monitoring  Kia Foote is aware to call 911 if the patient declines such as short of breath, chest pain, difficulty breathing, etc       She denies any new or worsening stroke like symptoms  She is most concerned about the pneumonia/covid  She will continue to follow up with PCP for management  Patient requires a abraham lift out of bed into the chair  She is unable to walk  Needs assistance with ADLs and transfers  Patient is scheduled for a neurology hospital follow up appointment on 3/6/23  Kia Foote wishes to keep appointment as is  She will call the office if anything changes  Patient is receiving services through [facility]  She receives a few visits per week  Kia Foote is aware to call 911 with any new or worsening stroke like symptoms  I addressed all her questions  At the conclusion of the conversation, she denies having any further questions or concerns

## 2023-02-21 ENCOUNTER — HOSPITAL ENCOUNTER (EMERGENCY)
Facility: HOSPITAL | Age: 88
Discharge: HOME | End: 2023-02-21
Attending: EMERGENCY MEDICINE

## 2023-02-21 ENCOUNTER — APPOINTMENT (EMERGENCY)
Dept: CT IMAGING | Facility: HOSPITAL | Age: 88
End: 2023-02-21

## 2023-02-21 ENCOUNTER — APPOINTMENT (EMERGENCY)
Dept: RADIOLOGY | Facility: HOSPITAL | Age: 88
End: 2023-02-21

## 2023-02-21 VITALS
TEMPERATURE: 98.9 F | DIASTOLIC BLOOD PRESSURE: 91 MMHG | HEART RATE: 74 BPM | OXYGEN SATURATION: 96 % | RESPIRATION RATE: 18 BRPM | SYSTOLIC BLOOD PRESSURE: 142 MMHG

## 2023-02-21 DIAGNOSIS — R11.0 NAUSEA: Primary | ICD-10-CM

## 2023-02-21 DIAGNOSIS — K20.90 ESOPHAGITIS: ICD-10-CM

## 2023-02-21 LAB
2HR DELTA HS TROPONIN: 4 NG/L
ALBUMIN SERPL BCP-MCNC: 3.7 G/DL (ref 3.5–5)
ALP SERPL-CCNC: 169 U/L (ref 34–104)
ALT SERPL W P-5'-P-CCNC: 18 U/L (ref 7–52)
ANION GAP SERPL CALCULATED.3IONS-SCNC: 11 MMOL/L (ref 4–13)
AST SERPL W P-5'-P-CCNC: 30 U/L (ref 13–39)
ATRIAL RATE: 67 BPM
BASOPHILS # BLD AUTO: 0.04 THOUSANDS/ÂΜL (ref 0–0.1)
BASOPHILS NFR BLD AUTO: 1 % (ref 0–1)
BILIRUB DIRECT SERPL-MCNC: 0.16 MG/DL (ref 0–0.2)
BILIRUB SERPL-MCNC: 0.48 MG/DL (ref 0.2–1)
BNP SERPL-MCNC: 67 PG/ML (ref 0–100)
BUN SERPL-MCNC: 32 MG/DL (ref 5–25)
CALCIUM SERPL-MCNC: 9.3 MG/DL (ref 8.4–10.2)
CARDIAC TROPONIN I PNL SERPL HS: 10 NG/L
CARDIAC TROPONIN I PNL SERPL HS: 14 NG/L
CHLORIDE SERPL-SCNC: 97 MMOL/L (ref 96–108)
CO2 SERPL-SCNC: 28 MMOL/L (ref 21–32)
CREAT SERPL-MCNC: 0.88 MG/DL (ref 0.6–1.3)
D DIMER PPP FEU-MCNC: 1.42 UG/ML FEU
EOSINOPHIL # BLD AUTO: 0.13 THOUSAND/ÂΜL (ref 0–0.61)
EOSINOPHIL NFR BLD AUTO: 2 % (ref 0–6)
ERYTHROCYTE [DISTWIDTH] IN BLOOD BY AUTOMATED COUNT: 13.9 % (ref 11.6–15.1)
GFR SERPL CREATININE-BSD FRML MDRD: 59 ML/MIN/1.73SQ M
GLUCOSE SERPL-MCNC: 105 MG/DL (ref 65–140)
HCT VFR BLD AUTO: 38.8 % (ref 34.8–46.1)
HGB BLD-MCNC: 12.7 G/DL (ref 11.5–15.4)
IMM GRANULOCYTES # BLD AUTO: 0.02 THOUSAND/UL (ref 0–0.2)
IMM GRANULOCYTES NFR BLD AUTO: 0 % (ref 0–2)
LIPASE SERPL-CCNC: 46 U/L (ref 11–82)
LYMPHOCYTES # BLD AUTO: 0.81 THOUSANDS/ÂΜL (ref 0.6–4.47)
LYMPHOCYTES NFR BLD AUTO: 14 % (ref 14–44)
MCH RBC QN AUTO: 31.4 PG (ref 26.8–34.3)
MCHC RBC AUTO-ENTMCNC: 32.7 G/DL (ref 31.4–37.4)
MCV RBC AUTO: 96 FL (ref 82–98)
MONOCYTES # BLD AUTO: 0.45 THOUSAND/ÂΜL (ref 0.17–1.22)
MONOCYTES NFR BLD AUTO: 8 % (ref 4–12)
NEUTROPHILS # BLD AUTO: 4.3 THOUSANDS/ÂΜL (ref 1.85–7.62)
NEUTS SEG NFR BLD AUTO: 75 % (ref 43–75)
NRBC BLD AUTO-RTO: 0 /100 WBCS
P AXIS: 39 DEGREES
PLATELET # BLD AUTO: 142 THOUSANDS/UL (ref 149–390)
PMV BLD AUTO: 11.5 FL (ref 8.9–12.7)
POTASSIUM SERPL-SCNC: 3.2 MMOL/L (ref 3.5–5.3)
PR INTERVAL: 304 MS
PROT SERPL-MCNC: 8.3 G/DL (ref 6.4–8.4)
QRS AXIS: 264 DEGREES
QRSD INTERVAL: 152 MS
QT INTERVAL: 450 MS
QTC INTERVAL: 475 MS
RBC # BLD AUTO: 4.04 MILLION/UL (ref 3.81–5.12)
SODIUM SERPL-SCNC: 136 MMOL/L (ref 135–147)
T WAVE AXIS: 16 DEGREES
VENTRICULAR RATE: 67 BPM
WBC # BLD AUTO: 5.75 THOUSAND/UL (ref 4.31–10.16)

## 2023-02-21 RX ORDER — ONDANSETRON 2 MG/ML
4 INJECTION INTRAMUSCULAR; INTRAVENOUS ONCE
Status: COMPLETED | OUTPATIENT
Start: 2023-02-21 | End: 2023-02-21

## 2023-02-21 RX ORDER — ONDANSETRON 4 MG/1
4 TABLET, ORALLY DISINTEGRATING ORAL EVERY 6 HOURS PRN
Qty: 12 TABLET | Refills: 0 | Status: SHIPPED | OUTPATIENT
Start: 2023-02-21

## 2023-02-21 RX ORDER — SODIUM CHLORIDE 9 MG/ML
3 INJECTION INTRAVENOUS
Status: DISCONTINUED | OUTPATIENT
Start: 2023-02-21 | End: 2023-02-21 | Stop reason: HOSPADM

## 2023-02-21 RX ORDER — FAMOTIDINE 10 MG/ML
20 INJECTION, SOLUTION INTRAVENOUS ONCE
Status: COMPLETED | OUTPATIENT
Start: 2023-02-21 | End: 2023-02-21

## 2023-02-21 RX ORDER — ONDANSETRON 2 MG/ML
1 INJECTION INTRAMUSCULAR; INTRAVENOUS ONCE
Status: COMPLETED | OUTPATIENT
Start: 2023-02-21 | End: 2023-02-21

## 2023-02-21 RX ADMIN — FAMOTIDINE 20 MG: 10 INJECTION INTRAVENOUS at 14:39

## 2023-02-21 RX ADMIN — ONDANSETRON 4 MG: 2 INJECTION INTRAMUSCULAR; INTRAVENOUS at 14:39

## 2023-02-21 RX ADMIN — IOHEXOL 100 ML: 350 INJECTION, SOLUTION INTRAVENOUS at 12:39

## 2023-02-21 NOTE — DISCHARGE INSTRUCTIONS
No central pulmonary emboli  Pulmonary arteries are top normal caliber suggesting borderline pulmonary hypertension  Diffuse arterial atherosclerosis  No central pulmonary emboli  Measured RV/LV ratio is within normal limits at less than 0 9  No CT evidence of right heart strain  Parenchymal opacities in both lungs likely combination of atelectasis and pleural from respiratory motion as well as sequela from recent Covid 19 infection  Previous lingular opacity appears to have dissipated  Stable ovoid cystic lesion in the pancreatic body for which MRI/MRCP was previously recommended on a nonemergent basis  Suspect mild esophagitis  Colonic diverticulosis  Fecal impaction worse from prior exam   No CT evidence for proctitis

## 2023-02-22 PROBLEM — N30.00 ACUTE CYSTITIS WITHOUT HEMATURIA: Status: RESOLVED | Noted: 2020-02-23 | Resolved: 2023-02-22

## 2023-02-22 NOTE — ED PROVIDER NOTES
History  Chief Complaint   Patient presents with   • Nausea     Pt presents from home for complaints of nausea and "left rib pain " Denies any falls  Pt is AAOx4  EMS gave 4mg IM zofran en route  80year old female presents for evaluation of nausea without vomiting  Also complains of left sided chest wall pain  Denies any trauma  Has had symptoms for a few days  No further complaints  Prior to Admission Medications   Prescriptions Last Dose Informant Patient Reported? Taking?    Acetaminophen (TYLENOL PO)   Yes No   Sig: Take by mouth 3 (three) times a day   CRANBERRY PO   Yes No   Sig: Take 30,000 mg by mouth   Eliquis 5 MG   No No   Sig: TAKE 1 TABLET BY MOUTH TWICE DAILY   Multiple Vitamin (MULTI-VITAMIN DAILY PO)   Yes No   Sig: Take by mouth   atorvastatin (LIPITOR) 40 mg tablet   No No   Sig: TAKE 1 TABLET BY MOUTH ONCE DAILY IN THE EVENING   cholecalciferol (VITAMIN D3) 1,000 units tablet   Yes No   Sig: Take 1,000 Units by mouth every evening   cyanocobalamin (VITAMIN B-12) 1,000 mcg tablet   Yes No   Sig: Take 1,000 mcg by mouth every evening     folic acid (FOLVITE) 1 mg tablet   Yes No   Sig: Take 1,000 mcg by mouth daily   furosemide (LASIX) 40 mg tablet   Yes No   Sig: Take 40 mg by mouth daily   gabapentin (NEURONTIN) 100 mg capsule   Yes No   Sig: Take 100 mg by mouth 3 (three) times a day   ipratropium (ATROVENT) 0 03 % nasal spray   No No   Si-2 sprays each nostril twice a day as needed for rhinorrhea   metFORMIN (GLUCOPHAGE) 500 mg tablet   Yes No   Sig: Take 500 mg by mouth daily   metoprolol tartrate (LOPRESSOR) 25 mg tablet   Yes No   Sig: Take 25 mg by mouth daily   primidone (MYSOLINE) 50 mg tablet   Yes No   Sig: Take by mouth 2 (two) times a day    spironolactone (ALDACTONE) 25 mg tablet   No No   Sig: Take 1 tablet (25 mg total) by mouth daily      Facility-Administered Medications: None       Past Medical History:   Diagnosis Date   • A-fib (HCC)    • Arthritis    • Assistance needed for mobility     pt can stand with assistance and transfer   • Chronic pain disorder    • Diabetes mellitus ([facility] [address] )     NIDDM   • Full dentures    • History of transfusion     no adverse reaction   • Hyperlipidemia    • Irregular heart beat    • Numbness and tingling of both feet    • Skin abnormality     sacrum area - small red area, less than a dime size   • Spinal stenosis    • Stroke [facility])    • Unable to ambulate    • Urinary incontinence     ipg stimulator x3 repakcements,battery exchange today 2/14/2018   • Wears glasses    • Wheelchair dependence        Past Surgical History:   Procedure Laterality Date   • BACK SURGERY      fusion   • BLADDER SUSPENSION     • CARPAL TUNNEL RELEASE Bilateral    • CHOLANGIOGRAM N/A 6/4/2018    Procedure: CHOLANGIOGRAM;  Surgeon: Mark Cardozo MD;  Location: [facility];  Service: General   • CHOLECYSTECTOMY LAPAROSCOPIC N/A 6/4/2018    Procedure: CHOLECYSTECTOMY LAPAROSCOPIC;  Surgeon: Mark Cardozo MD;  [facility];  Service: General   • COLONOSCOPY     • DILATION AND CURETTAGE OF UTERUS     • FRACTURE SURGERY Left     femur with hardware   • HYSTERECTOMY     • INSERTION / Hazle Raza / REVISION NEUROSTIMULATOR     • JOINT REPLACEMENT Bilateral     knees   • SACRAL NERVE STIMULATOR PLACEMENT N/A 2/14/2018    Procedure: REMOVE AND REPLACE IPG;  Surgeon: Kristie Delacruz MD;  Location: [facility];  Service: UroGynecology          • TONSILLECTOMY         Family History   Problem Relation Age of Onset   • No Known Problems Mother    • No Known Problems Father      I have reviewed and agree with the history as documented      E-Cigarette/Vaping   • E-Cigarette Use Never User      E-Cigarette/Vaping Substances   • Nicotine No    • THC No    • CBD No    • Flavoring No    • Other No    • Unknown No      Social History     Tobacco Use   • Smoking status: Never   • Smokeless tobacco: Never   Vaping Use   • Vaping Use: Never used   Substance Use Topics   • Alcohol use: Not Currently     Comment: very rare   • Drug use: No       Review of Systems   Gastrointestinal: Positive for nausea  Physical Exam  Physical Exam  Vitals and nursing note reviewed  Constitutional:       Appearance: She is well-developed  HENT:      Head: Normocephalic and atraumatic  Right Ear: External ear normal       Left Ear: External ear normal       Nose: Nose normal    Eyes:      General: No scleral icterus  Cardiovascular:      Rate and Rhythm: Normal rate  Pulmonary:      Effort: Pulmonary effort is normal  No respiratory distress  Chest:      Chest wall: Tenderness present  Abdominal:      General: There is no distension  Tenderness: There is abdominal tenderness  Comments: Mildly tender epigastric region   Musculoskeletal:         General: No deformity  Normal range of motion  Cervical back: Normal range of motion  Skin:     Findings: No rash  Neurological:      General: No focal deficit present  Mental Status: She is alert and oriented to person, place, and time     Psychiatric:         Mood and Affect: Mood normal          Vital Signs  ED Triage Vitals [02/21/23 1117]   Temperature Pulse Respirations Blood Pressure SpO2   98 9 °F (37 2 °C) 67 18 144/68 95 %      Temp Source Heart Rate Source Patient Position - Orthostatic VS BP Location FiO2 (%)   Oral Monitor Sitting Right arm --      Pain Score       No Pain           Vitals:    02/21/23 1117 02/21/23 1321 02/21/23 1442   BP: 144/68 133/64 142/91   Pulse: 67 78 74   Patient Position - Orthostatic VS: Sitting Sitting Lying         Visual Acuity      ED Medications  Medications   ondansetron (FOR EMS ONLY) (ZOFRAN) 4 mg/2 mL injection 4 mg (0 mg Does not apply Given to EMS 2/21/23 1120)   iohexol (OMNIPAQUE) 350 MG/ML injection (SINGLE-DOSE) 100 mL (100 mL Intravenous Given 2/21/23 1239)   Famotidine (PF) (PEPCID) injection 20 mg (20 mg Intravenous Given 2/21/23 1439)   ondansetron (ZOFRAN) injection 4 mg (4 mg Intravenous Given 2/21/23 1439)       Diagnostic Studies  Results Reviewed     Procedure Component Value Units Date/Time    HS Troponin I 2hr [[id number]]  (Normal) Collected: 02/21/23 1409    Lab Status: Final result Specimen: Blood from Arm, Right Updated: 02/21/23 1448     hs TnI 2hr 14 ng/L      Delta 2hr hsTnI 4 ng/L     B-Type Natriuretic Peptide(BNP) [[id number]]  (Normal) Collected: 02/21/23 1146    Lab Status: Final result Specimen: Blood from Arm, Right Updated: 02/21/23 1226     BNP 67 pg/mL     HS Troponin 0hr (reflex protocol) [[id number]]  (Normal) Collected: 02/21/23 1146    Lab Status: Final result Specimen: Blood from Arm, Right Updated: 02/21/23 1225     hs TnI 0hr 10 ng/L     D-dimer, quantitative [[id number]]  (Abnormal) Collected: 02/21/23 1146    Lab Status: Final result Specimen: Blood from Arm, Right Updated: 02/21/23 1218     D-Dimer, Quant 1 42 ug/ml FEU     Narrative: In the evaluation for possible pulmonary embolism, in the appropriate (Well's Score of 4 or less) patient, the age adjusted d-dimer cutoff for this patient can be calculated as:    Age x 0 01 (in ug/mL) for Age-adjusted D-dimer exclusion threshold for a patient over 50 years      CBC and differential [[id number]]  (Abnormal) Collected: 02/21/23 1146    Lab Status: Final result Specimen: Blood from Arm, Right Updated: 02/21/23 1217     WBC 5 75 Thousand/uL      RBC 4 04 Million/uL      Hemoglobin 12 7 g/dL      Hematocrit 38 8 %      MCV 96 fL      MCH 31 4 pg      MCHC 32 7 g/dL      RDW 13 9 %      MPV 11 5 fL      Platelets 592 Thousands/uL      nRBC 0 /100 WBCs      Neutrophils Relative 75 %      Immat GRANS % 0 %      Lymphocytes Relative 14 %      Monocytes Relative 8 %      Eosinophils Relative 2 %      Basophils Relative 1 %      Neutrophils Absolute 4 30 Thousands/µL      Immature Grans Absolute 0 02 Thousand/uL      Lymphocytes Absolute 0 81 Thousands/µL      Monocytes Absolute 0 45 Thousand/µL Eosinophils Absolute 0 13 Thousand/µL      Basophils Absolute 0 04 Thousands/µL     Basic metabolic panel [[id number]]  (Abnormal) Collected: 02/21/23 1146    Lab Status: Final result Specimen: Blood from Arm, Right Updated: 02/21/23 1212     Sodium 136 mmol/L      Potassium 3 2 mmol/L      Chloride 97 mmol/L      CO2 28 mmol/L      ANION GAP 11 mmol/L      BUN 32 mg/dL      Creatinine 0 88 mg/dL      Glucose 105 mg/dL      Calcium 9 3 mg/dL      eGFR 59 ml/min/1 73sq m     Narrative:      [place] guidelines for Chronic Kidney Disease (CKD):   •  Stage 1 with normal or high GFR (GFR > 90 mL/min/1 73 square meters)  •  Stage 2 Mild CKD (GFR = 60-89 mL/min/1 73 square meters)  •  Stage 3A Moderate CKD (GFR = 45-59 mL/min/1 73 square meters)  •  Stage 3B Moderate CKD (GFR = 30-44 mL/min/1 73 square meters)  •  Stage 4 Severe CKD (GFR = 15-29 mL/min/1 73 square meters)  •  Stage 5 End Stage CKD (GFR <15 mL/min/1 73 square meters)  Note: GFR calculation is accurate only with a steady state creatinine    Lipase [[id number]]  (Normal) Collected: 02/21/23 1146    Lab Status: Final result Specimen: Blood from Arm, Right Updated: 02/21/23 1212     Lipase 46 u/L     Hepatic function panel [[id number]]  (Abnormal) Collected: 02/21/23 1146    Lab Status: Final result Specimen: Blood from Arm, Right Updated: 02/21/23 1211     Total Bilirubin 0 48 mg/dL      Bilirubin, Direct 0 16 mg/dL      Alkaline Phosphatase 169 U/L      AST 30 U/L      ALT 18 U/L      Total Protein 8 3 g/dL      Albumin 3 7 g/dL                  PE Study with CT abdomen & pelvis with contrast   Final Result by Dino Gomez DO (02/21 1343)   No central pulmonary emboli  Pulmonary arteries are top normal caliber suggesting borderline pulmonary hypertension  Diffuse arterial atherosclerosis  No central pulmonary emboli  Measured RV/LV ratio is within normal limits at less than 0 9  No CT evidence of right heart strain  Parenchymal opacities in both lungs likely combination of atelectasis and pleural from respiratory motion as well as sequela from recent Covid 19 infection  Previous lingular opacity appears to have dissipated  Stable ovoid cystic lesion in the pancreatic body for which MRI/MRCP was previously recommended on a nonemergent basis  Suspect mild esophagitis  Colonic diverticulosis  Fecal impaction worse from prior exam   No CT evidence for proctitis  The study was marked in [facility] for immediate notification  Workstation performed: [id number]         X-ray chest 1 view portable   Final Result by Leona Uribe MD (02/21 1154)      Low lung volumes with mild left base atelectasis  Question mild pulmonary venous congestion  Trace left effusion  Workstation performed: [id number]                    Procedures  Procedures         ED Course                               SBIRT 20yo+    Flowsheet Row Most Recent Value   SBIRT (25 yo +)    In order to provide better care to our patients, we are screening all of our patients for alcohol and drug use  Would it be okay to ask you these screening questions? No Filed at: 02/21/2023 1128                    Medical Decision Making  80 yof with nausea and rib pain  Labs, dimer  CTA PE abd pelvis  Symptom control  Discussed all results with patient and daughter  Fu PCP/GI  RTED discussed  Esophagitis: complicated acute illness or injury  Nausea: complicated acute illness or injury  Amount and/or Complexity of Data Reviewed  Labs: ordered  Radiology: ordered  Risk  Prescription drug management            Disposition  Final diagnoses:   Nausea   Esophagitis     Time reflects when diagnosis was documented in both MDM as applicable and the Disposition within this note     Time User Action Codes Description Comment    2/21/2023  4:21 PM [place] [R11 0] Nausea     2/21/2023  4:21 PM [place] [K20 90] Esophagitis       ED Disposition     ED Disposition   Discharge    Condition   Stable    Date/Time   Tue Feb 21, 2023  4:21 PM    Comment   Zain Alfaro discharge to home/self care                 Follow-up Information     Follow up With Specialties Details Why Contact Info Additional Information    Symone Stevens MD Family Medicine   [address] [facility]   [address] [place] [address]  [phone] [facility] [address], [place], [address] [address]  [phone]     [address] Emergency Department Emergency Medicine  If symptoms worsen [address] [id number] [phone] [address] Emergency Department, [address] [facility], [address]          Discharge Medication List as of 2/21/2023  4:22 PM      START taking these medications    Details   ondansetron (Zofran ODT) 4 mg disintegrating tablet Take 1 tablet (4 mg total) by mouth every 6 (six) hours as needed for nausea or vomiting, Starting Tue 2/21/2023, Normal         CONTINUE these medications which have NOT CHANGED    Details   Acetaminophen (TYLENOL PO) Take by mouth 3 (three) times a day, Historical Med      atorvastatin (LIPITOR) 40 mg tablet TAKE 1 TABLET BY MOUTH ONCE DAILY IN THE EVENING, Normal      cholecalciferol (VITAMIN D3) 1,000 units tablet Take 1,000 Units by mouth every evening, Historical Med      CRANBERRY PO Take 30,000 mg by mouth, Historical Med      cyanocobalamin (VITAMIN B-12) 1,000 mcg tablet Take 1,000 mcg by mouth every evening  , Historical Med      Eliquis 5 MG TAKE 1 TABLET BY MOUTH TWICE DAILY, Normal      folic acid (FOLVITE) 1 mg tablet Take 1,000 mcg by mouth daily, Starting Fri 6/28/2019, Historical Med furosemide (LASIX) 40 mg tablet Take 40 mg by mouth daily, Historical Med      gabapentin (NEURONTIN) 100 mg capsule Take 100 mg by mouth 3 (three) times a day, Starting Mon 10/25/2021, Historical Med      ipratropium (ATROVENT) 0 03 % nasal spray 1-2 sprays each nostril twice a day as needed for rhinorrhea, Normal      metFORMIN (GLUCOPHAGE) 500 mg tablet Take 500 mg by mouth daily, Historical Med      metoprolol tartrate (LOPRESSOR) 25 mg tablet Take 25 mg by mouth daily, Historical Med      Multiple Vitamin (MULTI-VITAMIN DAILY PO) Take by mouth, Historical Med      primidone (MYSOLINE) 50 mg tablet Take by mouth 2 (two) times a day , Historical Med      spironolactone (ALDACTONE) 25 mg tablet Take 1 tablet (25 mg total) by mouth daily, Starting Thu 7/21/2022, Normal             No discharge procedures on file      PDMP Review       Value Time User    PDMP Reviewed  Yes 3/12/2020 11:49 AM Ada Fish MD          ED Provider  Electronically Signed by           Garrett Mendez DO  02/22/23 1683

## 2023-03-06 ENCOUNTER — OFFICE VISIT (OUTPATIENT)
Dept: NEUROLOGY | Facility: CLINIC | Age: 88
End: 2023-03-06

## 2023-03-06 VITALS
HEIGHT: 65 IN | SYSTOLIC BLOOD PRESSURE: 108 MMHG | TEMPERATURE: 97.3 F | HEART RATE: 80 BPM | DIASTOLIC BLOOD PRESSURE: 72 MMHG | BODY MASS INDEX: 26.41 KG/M2

## 2023-03-06 DIAGNOSIS — R29.90 STROKE-LIKE SYMPTOMS: ICD-10-CM

## 2023-03-06 DIAGNOSIS — I77.71 DISSECTION OF CAROTID ARTERY (HCC): ICD-10-CM

## 2023-03-06 DIAGNOSIS — Z86.73 HISTORY OF STROKE: Primary | ICD-10-CM

## 2023-03-06 DIAGNOSIS — R47.81 SLURRED SPEECH: ICD-10-CM

## 2023-03-06 DIAGNOSIS — R53.2 FUNCTIONAL QUADRIPLEGIA (HCC): ICD-10-CM

## 2023-03-06 RX ORDER — CLOPIDOGREL BISULFATE 75 MG/1
75 TABLET ORAL DAILY
Qty: 30 TABLET | Refills: 0 | Status: SHIPPED | OUTPATIENT
Start: 2023-03-06

## 2023-03-06 RX ORDER — GABAPENTIN 300 MG/1
300 CAPSULE ORAL
COMMUNITY
Start: 2023-02-20

## 2023-03-06 NOTE — PATIENT INSTRUCTIONS
- For ongoing stroke prevention continue: Eliquis twice daily, atorvastatin  - Start Plavix 75 mg daily   - Recommend to check blood pressure occasionally to maintain a MAP greater than 90  - Will defer to primary care team for monitoring of cholesterol panel and blood sugar numbers with target LDL cholesterol of less than 70 and hemoglobin A1c less than 7%  - Recommend following a low salt, mediterranean diet and routine physical exercise as tolerated     We will plan for her to return to the office in 3 months  but would be happy to see her sooner if the need should arise  If she has any symptoms concerning for TIA or stroke including sudden painless loss of vision or double vision, difficulty speaking or swallowing, vertigo/room spinning that does not quickly resolve, or weakness/numbness/loss of coordination affecting 1 side of the face or body she should proceed by ambulance to the nearest emergency room immediately

## 2023-03-06 NOTE — PROGRESS NOTES
Patient ID: Marylen Fees is a 80 y o  female  who presents for hospital follow-up evaluation with regard to her prior stroke  Assessment/Plan:    History of stroke  From a stroke standpoint, given her CTA results as discussed below, she would likely benefit from antiplatelet therapy  Discussed options with her  She reports an allergy to aspirin  However, she is agreeable to the addition of Plavix 75 mg daily   -She should continue on Eliquis 5 mg twice daily per cardiology for history of atrial fibrillation   -Continue atorvastatin 40 mg daily  -She continues to experience significant left-sided weakness from previous stroke, and right-sided weakness likely from deconditioning  She is wheelchair-bound at baseline, but is able to control a motorized wheelchair on her own to get around   -Recommend continuing physical therapy   -Discussed increased risk of bleeding with being on Plavix and Eliquis  - Recommend to check blood pressure occasionally to maintain a MAP greater than 90   - Will defer to primary care team for monitoring of cholesterol panel and blood sugar numbers with target LDL cholesterol of less than 70 and hemoglobin A1c less than 7%  - Recommend following a low salt, mediterranean diet and routine physical exercise as tolerated   -Continue to follow with cardiology   -We will discuss further imaging at next office visit  Spinal stimulator remains in place, she is unable to obtain MRI  We will plan for her to return to the office in 3 months  but would be happy to see her sooner if the need should arise  If she has any symptoms concerning for TIA or stroke including sudden painless loss of vision or double vision, difficulty speaking or swallowing, vertigo/room spinning that does not quickly resolve, or weakness/numbness/loss of coordination affecting 1 side of the face or body she should proceed by ambulance to the nearest emergency room immediately            Subjective:    HPI  Current stroke prevention medications:  1  Eliquis 5 mg twice daily  2  Atorvastatin 40 mg    Originally presented to the ED on 1/22/2023 with concerns of left-sided facial droop, left hand weakness, and dysarthria  Reports having had prior strokelike episode with similar symptoms and had been seen on several prior occasions for similar presentations of right hemispheric dysfunction  CT of the head reveals chronic ischemic changes in the right hemisphere  Spinal cord stimulator in place, she is on eligible for MRI  History of A-fib on Eliquis, she has refused the addition of aspirin in the past   Patient refused tenecteplase and further interventions such as surgery  CTA head revealed: "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 (given new abrupt tapering) with long segment occlusion extending from proximal right ICA cervical segment to right ICA distal cavernous segment, distal reconstitution in right paraophthalmic segment with moderate-to-severe stenosis in right ICA supraclinoid segment likely receiving flow through patent incomplete [place]  Asymmetrically smaller caliber right M1 segment with diminished contrast opacification in right MCA branch vessels (worse distally)  Patchy groundglass opacities in bilateral upper lobes, suspicious for infection/inflammation or aspiration "    Recommended maximal medical therapy with MAP greater than 90 or better  Etiology of current onset of stroke symptoms potentially in the setting of hypercoagulability due to active COVID infection at the time of hospitalization  Since her last visit, she does not report any new stroke symptoms  She takes her medication as prescribed and did not endorse any bleeding or bruising issues  She has not had any recent falls  Mood has been okay  She does have difficulty sleeping, however, this is not new for her    She was using a sit and stand and fell and how has to use a abraham lift, and since then her back has gotten worse and not as strong  She tends to lean forward very often  Continues to experience left sided weakness, and is at home with 24 hours care  She is unable to complete any of her ADLs without assistance  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  Reports her memory is good  Since the hospitalization she was left with difficulty swallowing and has been transitioned to thickened liquid  Has been having PT and speech at home  She was have visiting nurses, and her family doctor will also do home visits  She is able to swallow her pills okay  Her appetite has been poor due to swallowing and has lost her taste  At the time of the hospitalization she was on hospice  She is currently no longer on hospice  Her history was also obtained from her daughter, who was present at today's visit  The following portions of the patient's history were reviewed and updated as appropriate: allergies, current medications, past family history, past medical history, past social history, past surgical history and problem list           Objective:    Blood pressure 108/72, pulse 80, temperature (!) 97 3 °F (36 3 °C), temperature source Temporal, height 5' 5" (1 651 m), not currently breastfeeding  Lab Results   Component Value Date/Time    CHOLESTEROL 104 01/23/2023 04:59 AM     Lab Results   Component Value Date/Time    TRIG 97 01/23/2023 04:59 AM     Lab Results   Component Value Date/Time    HDL 49 (L) 01/23/2023 04:59 AM     Lab Results   Component Value Date/Time    LDLCALC 36 01/23/2023 04:59 AM       Lab Results   Component Value Date/Time    HGBA1C 5 6 01/30/2023 02:38 AM     Lab Results   Component Value Date/Time     01/30/2023 02:38 AM     Neurological Exam  Awake, alert, oriented, and in no apparent distress  Mood is appropriate to situation  Speech is significant for dysarthria with delayed responses   Cranial nerves 2-12 testing was significant for left-sided facial droop with left tongue deviation and absent left shoulder shrug  Motor testing reveals 2/5 strength throughout the left leg and 3/5 strength throughout the left arm, she is able to lift it to her nose  4/5 strength throughout the right arm and right leg  Sensation is intact to light touch in the bilateral upper and lower extremities  DTRs are symmetric and intact bilaterally  Head remains bent to the right and forward with constant tremulous movement focal to the neck  Very temporarily ceased with touching of her face  Not based on activity  She is wheelchair-bound  Review of Systems   Constitutional: Negative  Negative for appetite change and fever  HENT: Positive for tinnitus  Negative for hearing loss, trouble swallowing and voice change  Eyes: Positive for visual disturbance (double vision)  Negative for photophobia and pain  Respiratory: Negative  Negative for shortness of breath  Cardiovascular: Negative  Negative for palpitations  Gastrointestinal: Negative  Negative for nausea and vomiting  Endocrine: Negative  Negative for cold intolerance  Genitourinary: Negative  Negative for dysuria, frequency and urgency  Musculoskeletal: Positive for gait problem (wheelchair bound)  Negative for myalgias and neck pain  Skin: Negative  Negative for rash  Allergic/Immunologic: Negative  Neurological: Positive for tremors, speech difficulty and weakness (left side and trouble supporting herself up and her neck)  Negative for dizziness, seizures, syncope, facial asymmetry, light-headedness, numbness and headaches  Hematological: Negative  Does not bruise/bleed easily  Psychiatric/Behavioral: Positive for sleep disturbance  Negative for confusion and hallucinations  I personally reviewed the ROS that was entered by the medical assistant       I have spent 45 minutes with Patient  today in which greater than 50% of this time was spent in counseling/coordination of care regarding Diagnostic results, Prognosis, Risks and benefits of tx options, Intructions for management, Patient and family education, Importance of tx compliance, Risk factor reductions, Impressions and Plan of care as above

## 2023-03-07 NOTE — ASSESSMENT & PLAN NOTE
From a stroke standpoint, given her CTA results as discussed below, she would likely benefit from antiplatelet therapy  Discussed options with her  She reports an allergy to aspirin  However, she is agreeable to the addition of Plavix 75 mg daily   -She should continue on Eliquis 5 mg twice daily per cardiology for history of atrial fibrillation   -Continue atorvastatin 40 mg daily  -She continues to experience significant left-sided weakness from previous stroke, and right-sided weakness likely from deconditioning  She is wheelchair-bound at baseline, but is able to control a motorized wheelchair on her own to get around   -Recommend continuing physical therapy   -Discussed increased risk of bleeding with being on Plavix and Eliquis  - Recommend to check blood pressure occasionally to maintain a MAP greater than 90   - Will defer to primary care team for monitoring of cholesterol panel and blood sugar numbers with target LDL cholesterol of less than 70 and hemoglobin A1c less than 7%  - Recommend following a low salt, mediterranean diet and routine physical exercise as tolerated   -Continue to follow with cardiology   -We will discuss further imaging at next office visit  Spinal stimulator remains in place, she is unable to obtain MRI  We will plan for her to return to the office in 3 months  but would be happy to see her sooner if the need should arise  If she has any symptoms concerning for TIA or stroke including sudden painless loss of vision or double vision, difficulty speaking or swallowing, vertigo/room spinning that does not quickly resolve, or weakness/numbness/loss of coordination affecting 1 side of the face or body she should proceed by ambulance to the nearest emergency room immediately

## 2023-03-15 ENCOUNTER — TELEPHONE (OUTPATIENT)
Dept: NEUROLOGY | Facility: CLINIC | Age: 88
End: 2023-03-15

## 2023-03-15 NOTE — TELEPHONE ENCOUNTER
"Received VM Transcription:    Hi, this is Ross Cornejo  I'm calling for my mother Aleksey Bustamante  I had her in there last week  [facility])  put her on her on Plavix and she started getting pretty black and blue like yellowish and black and Blue  I'm calling to find out if I should keep her on that or take her off of it  My number is [phone]  Thank you  Her birthday is [birth year]  Thank you   _________________________________    Phone call to pt's daughter Rashard Sanon stated since starting Plavix pt has buising on bilateral extremities; kind of yellowish-turning blue spots  Bigger than a quarter  One really big one on her left arm  \" Pt had lab work last week on her rt arm and now has 3 large dark areas on her rt arm and a hard dark area on the back of her rt hand  Four yellow areas on left leg  Rashard Talita denies pt experiencing hemoptysis or epistaxis  Had one episode of dark urine a couple of days ago and nothing since  Rashard Sanon wants to know if this is normal and should she continue to give her mother Plavix  # [phone]  Can leave a detailed message    Rashard Sanon, would you please give you recommendations  Thank you!   "

## 2023-04-14 ENCOUNTER — TELEPHONE (OUTPATIENT)
Dept: NEUROLOGY | Facility: CLINIC | Age: 88
End: 2023-04-14

## 2023-04-14 NOTE — TELEPHONE ENCOUNTER
----- Message from Mony Buchanan MD sent at 4/14/2023 10:57 AM EDT -----  Please we you give Michelle Segovia a call. I reviewed her case from when she saw Manuelito Chen recently, and noted the plan to start Plavix for now in addition to the Eliquis because of the right carotid artery dissection. The last time we looked at that artery was a few months ago now, back in January. I would recommend that we recheck the artery to see if it is completely closed still, or if it is partially open. If it is partially open it might make sense to stay on the Plavix and Eliquis combination. If it remains completely closed, however, the likelihood that anything like a clot could pass through that area is exceptionally low and at that point we could actually go ahead and stop the Plavix and just stay on Eliquis. I have ordered a CTA just of the neck for this which she should get done. In the interim, I also see that she is scheduled to see Manuelito Chen in June. Manuelito Chen is not in the office at this point, so I would like to make sure that we get her follow-up transition to either myself, [facility], or [facility]. Can you please get that switched up? Thanks!

## 2023-04-20 NOTE — TELEPHONE ENCOUNTER
Daughter [employer] called in (she is on consent form) - she has no intent to follow up with Dr Scott Cea at this time  States patient is rather weak and she is having difficulty with ambulating  (electronically signed)  Attending Emergency Physician        Neal Quach PA-C  04/19/23 [id number]       Neal Quach PA-C  04/21/23 [id number]

## 2023-05-01 ENCOUNTER — TELEPHONE (OUTPATIENT)
Dept: NEUROLOGY | Facility: CLINIC | Age: 88
End: 2023-05-01

## 2023-05-01 NOTE — TELEPHONE ENCOUNTER
LMOM for the patient to call us back to reschedule  Also called [facility] and spoke with Kelly Casillas who stated that the patient was discharged in November to home

## 2023-05-20 ENCOUNTER — TELEPHONE (OUTPATIENT)
Dept: OTHER | Facility: OTHER | Age: 88
End: 2023-05-20

## 2023-05-20 DIAGNOSIS — Z51.5 HOSPICE CARE PATIENT: Primary | ICD-10-CM

## 2023-05-20 DIAGNOSIS — Z51.5 END OF LIFE CARE: ICD-10-CM

## 2023-05-20 RX ORDER — MORPHINE SULFATE 100 MG/5ML
10 SOLUTION, CONCENTRATE ORAL EVERY 2 HOUR PRN
Qty: 30 ML | Refills: 0 | Status: SHIPPED | OUTPATIENT
Start: 2023-05-20

## 2023-05-22 ENCOUNTER — NURSING HOME VISIT (OUTPATIENT)
Dept: GERIATRICS | Facility: OTHER | Age: 88
End: 2023-05-22

## 2023-05-22 DIAGNOSIS — R07.9 CHEST PAIN, UNSPECIFIED TYPE: ICD-10-CM

## 2023-05-22 DIAGNOSIS — I10 PRIMARY HYPERTENSION: ICD-10-CM

## 2023-05-22 DIAGNOSIS — E11.40 TYPE 2 DIABETES MELLITUS WITH DIABETIC NEUROPATHY, WITHOUT LONG-TERM CURRENT USE OF INSULIN (HCC): Chronic | ICD-10-CM

## 2023-05-22 DIAGNOSIS — R13.12 OROPHARYNGEAL DYSPHAGIA: Primary | ICD-10-CM

## 2023-05-22 NOTE — PROGRESS NOTES
[facility]  [address], [address] [place] [address]  Lisa Galloway [id number]    Nursing Home Admission    NAME: Eleanor Ervin  AGE: 80 y o  SEX: female [id number]    DATE OF ENCOUNTER: 5/22/2023    Assessment/Plan:   Patient’s care was coordinated with nursing facility staff  Recent vitals, labs and updated medications were reviewed on [facility]  Past Medical, surgical, social, medication and allergy history and patient’s previous records reviewed  1  Oropharyngeal dysphagia        2  Chest pain, unspecified type        3  Type 2 diabetes mellitus with diabetic neuropathy, without long-term current use of insulin ([address] )        4  Primary hypertension             Oropharyngeal dysphagia  Pt with hx CVA  Pt failed speech swallow eval  Pt strict NPO  [facility] spoke to both pt & family - they eventually declined peg placement & opted for hospice  Hospice recommends pleasure feeds  Cont care per hospice    Chest pain  Resolved  Pt had elev trop  Pt had no changes in ekg  No pulm embolism on ct chest  Echo with EF 62% & moderate diastolic dysfunction    Type 2 diabetes mellitus ([facility] [address] )  Last Hga1c 5 6  stable  Diet controlled    Hypertension  5/22/2023 16:58 120 / 58 mmHg   BP controlled  Cont sprinolactone & metoprolol  Cont care per hospice      Chief Complaint     Here for hospice    HPI       Patient is a 80 y o  female with PMH DMII, Afib, CVA, pleural effusion, HTN, chronic diastolic CHF, overactive bladder, and hyperlipidemia  Pt admitted from 5/5/2023-5/20/2023 for abdominal pain and chest pain for the past 3 days prior to admission  Per chart review, EMS reported pt appeared to be fatigued and pt did not appear to be well taken care of  Pt underwent CT chest/abd/pelvis which revealed moderate amount of stools and a 8 mm hypodense lesion within pancreatic body  In addition, it revealed ill-defined groundglass & nodular opacities   Pt initially was on a nectar thick diet  However pt failed video swallow test and was advised to be strict npo  Pt had a hx of peg placement  However pt & daughter had decided for comfort measures and hospice care instead  Pt on pleasure feeds and at high risk of aspiration  Family & hospice aware  Thus pt discharged to [facility] on 5/20/2023 under hospice care  Pt seen and examined today  Pt only oriented x 2  Pt upset about her home situation         Past Medical History:   Diagnosis Date   • A-fib [facility])    • Arthritis    • Assistance needed for mobility     pt can stand with assistance and transfer   • Chronic pain disorder    • Diabetes mellitus ([facility] [address] )     NIDDM   • Full dentures    • History of transfusion     no adverse reaction   • Hyperlipidemia    • Irregular heart beat    • Numbness and tingling of both feet    • Skin abnormality     sacrum area - small red area, less than a dime size   • Spinal stenosis    • Stroke [facility])    • Unable to ambulate    • Urinary incontinence     ipg stimulator x3 repakcements,battery exchange today 2/14/2018   • Wears glasses    • Wheelchair dependence        Past Surgical History:   Procedure Laterality Date   • BACK SURGERY      fusion   • BLADDER SUSPENSION     • CARPAL TUNNEL RELEASE Bilateral    • CHOLANGIOGRAM N/A 6/4/2018    Procedure: CHOLANGIOGRAM;  Surgeon: Tisha Sanon MD;  Location: [facility];  Service: General   • CHOLECYSTECTOMY LAPAROSCOPIC N/A 6/4/2018    Procedure: CHOLECYSTECTOMY LAPAROSCOPIC;  Surgeon: Tisha Sanon MD;  [facility];  Service: General   • COLONOSCOPY     • DILATION AND CURETTAGE OF UTERUS     • FRACTURE SURGERY Left     femur with hardware   • HYSTERECTOMY     • INSERTION / Oziel Fisher / REVISION NEUROSTIMULATOR     • JOINT REPLACEMENT Bilateral     knees   • SACRAL NERVE STIMULATOR PLACEMENT N/A 2/14/2018    Procedure: REMOVE AND REPLACE IPG;  Surgeon: Melissa Zurita MD;  Location: [facility];  Service: UroGynecology          • "TONSILLECTOMY         Social History     Tobacco Use   Smoking Status Never   Smokeless Tobacco Never          Family History   Problem Relation Age of Onset   • No Known Problems Mother    • No Known Problems Father         Allergies   Allergen Reactions   • Sulfamethoxazole-Trimethoprim Swelling     Swelling around eyes and red eyes   • Amlodipine Other (See Comments)   • Asa [Aspirin] Other (See Comments)     Unknown     • Aspirin Other (See Comments)     \"feels like fist in my chest\"   • Ciprofloxacin    • Nitrofurantoin    • Other      \"5mz/Tmp \"  \"1 60 mTab amme\"     Per pt's allergy list           Current Outpatient Medications:   •  Acetaminophen (TYLENOL PO), Take by mouth 3 (three) times a day, Disp: , Rfl:   •  apixaban (ELIQUIS) 5 mg, Take 5 mg by mouth 2 (two) times a day, Disp: , Rfl:   •  atorvastatin (LIPITOR) 40 mg tablet, TAKE 1 TABLET BY MOUTH ONCE DAILY IN THE EVENING, Disp: 90 tablet, Rfl: 0  •  cholecalciferol (VITAMIN D3) 1,000 units tablet, Take 1,000 Units by mouth every evening, Disp: , Rfl:   •  clopidogrel (Plavix) 75 mg tablet, Take 1 tablet (75 mg total) by mouth daily, Disp: 30 tablet, Rfl: 0  •  CRANBERRY PO, Take 30,000 mg by mouth, Disp: , Rfl:   •  cyanocobalamin (VITAMIN B-12) 1,000 mcg tablet, Take 1,000 mcg by mouth every evening  , Disp: , Rfl:   •  Eliquis 5 MG, TAKE 1 TABLET BY MOUTH TWICE DAILY, Disp: 152 tablet, Rfl: 0  •  folic acid (FOLVITE) 1 mg tablet, Take 1,000 mcg by mouth daily, Disp: , Rfl: 3  •  furosemide (LASIX) 40 mg tablet, Take 80 mg by mouth daily, Disp: , Rfl:   •  gabapentin (NEURONTIN) 100 mg capsule, Take 100 mg by mouth 3 (three) times a day, Disp: , Rfl:   •  gabapentin (NEURONTIN) 300 mg capsule, Take 300 mg by mouth daily at bedtime, Disp: , Rfl:   •  ipratropium (ATROVENT) 0 03 % nasal spray, 1-2 sprays each nostril twice a day as needed for rhinorrhea, Disp: 30 mL, Rfl: 6  •  metFORMIN (GLUCOPHAGE) 500 mg tablet, Take 500 mg by mouth 2 (two) " times a day with meals, Disp: , Rfl:   •  metoprolol tartrate (LOPRESSOR) 25 mg tablet, Take 25 mg by mouth every 12 (twelve) hours, Disp: , Rfl:   •  Morphine Sulfate, Concentrate, 20 mg/mL concentrated solution, Take 0 5 mL (10 mg total) by mouth every 2 (two) hours as needed for moderate pain or severe pain (shortness of breath and restlessness) Max Daily Amount: 120 mg, Disp: 30 mL, Rfl: 0  •  Multiple Vitamin (MULTI-VITAMIN DAILY PO), Take by mouth, Disp: , Rfl:   •  ondansetron (Zofran ODT) 4 mg disintegrating tablet, Take 1 tablet (4 mg total) by mouth every 6 (six) hours as needed for nausea or vomiting, Disp: 12 tablet, Rfl: 0  •  primidone (MYSOLINE) 50 mg tablet, Take by mouth 2 (two) times a day , Disp: , Rfl:   •  spironolactone (ALDACTONE) 25 mg tablet, Take 1 tablet (25 mg total) by mouth daily, Disp: 30 tablet, Rfl: 5    Updated list was reviewed in [facility] of facility  Vital signs were reviewed in [facility] care    Review of Systems   Constitutional: Negative for chills and fever  Gastrointestinal: Negative for nausea and vomiting  Physical Exam  Constitutional:       General: She is not in acute distress  Appearance: She is not ill-appearing  HENT:      Head: Normocephalic and atraumatic  Cardiovascular:      Rate and Rhythm: Regular rhythm  Heart sounds: Normal heart sounds  Comments: Trace b/l pitting edema  Lt hand swollen >Rt hand; pt on hospice  Pulmonary:      Effort: No respiratory distress  Breath sounds: Normal breath sounds  No wheezing  Abdominal:      General: Bowel sounds are normal       Palpations: Abdomen is soft  Musculoskeletal:      Right lower leg: Edema present  Left lower leg: Edema present  Neurological:      Mental Status: She is alert  Psychiatric:         Behavior: Behavior is agitated        Comments: Pt upset about her home situation; pt A&Ox2       Diagnostic Data     Recent labs and imaging studies were reviewed  Code Status:    DNR/DNI/hospice    Additional notes: Total time spent on H&P 36 minutes in reviewing discharge paperwork from the hospital, interpreting test results from hospital medical records, and documenting information in the medical record  In addition, I provided counseling to the patient and encouraged them to work with physical therapy       This note was electronically signed by Dr Mannie Cavazos

## 2023-05-30 PROBLEM — Z86.79 HISTORY OF CHF (CONGESTIVE HEART FAILURE): Status: ACTIVE | Noted: 2023-01-29

## 2023-05-30 PROBLEM — R07.9 CHEST PAIN: Status: ACTIVE | Noted: 2023-05-30

## 2023-05-30 PROBLEM — R25.1 TREMOR: Status: ACTIVE | Noted: 2023-01-29

## 2023-05-30 PROBLEM — I69.354 HEMIPLEGIA AND HEMIPARESIS FOLLOWING CEREBRAL INFARCTION AFFECTING LEFT NON-DOMINANT SIDE (HCC): Status: ACTIVE | Noted: 2022-11-22

## 2023-05-30 NOTE — ASSESSMENT & PLAN NOTE
Resolved  Pt had elev trop  Pt had no changes in ekg  No pulm embolism on ct chest  Echo with EF 62% & moderate diastolic dysfunction

## 2023-05-30 NOTE — ASSESSMENT & PLAN NOTE
Pt with hx CVA  Pt failed speech swallow eval  Pt strict NPO  [facility] spoke to both pt & family - they eventually declined peg placement & opted for hospice  Hospice recommends pleasure feeds  Cont care per hospice

## 2023-10-20 NOTE — PROGRESS NOTES
CMT left message x 3. Please review message thread below and advise the patient as indicated. Please schedule if necessary or indicated in message thread. Letter sent.    Patient ID: Sarah Figueroa is a 80 y o  female  Assessment/Plan:    Cervical dystonia  Nishant James is an 80year old wheelchair-bound woman with past history significant for stroke and afib on systemic anticoagulation who presents for evaluation of an isolated head tremor  Her presentation is most consistent with cervical dystonia with dystonic tremor  The tremor is very debilitating for the patient  It affects her vision and causes significant fatigue  We discussed the etiology and treatment options for dystonia  At this time, patient is not appropriate nor interested in oral treatments  Then we discussed the use of Botox for dystonia  We discussed the mechanism of action, time course, method of administration and potential risks and benefits  All the patients questions and concerns were addressed and the patient expressed an interest in trying Botox therapy for cervical dystonia  Proposed injections: 1cc dilution, no EMG  LEFT Sternocleidomastoid: 10 units x2  LEFT Splenious capitis: 20 units  RIGHT Splenious capitis: 30 units  RIGHT Levator scapulae: 20 units   Total: 90 units        Diagnoses and all orders for this visit:    Cervical dystonia  -     Chemodenervation; Future           Subjective:    HPI    I had the pleasure of seeing your patient, Sarah Figueroa in the [facility] at the [address] [place] [address] Neuroscience  The patient is a 80 y o  right handed female who presents for evaluation of head tremor  She reports the tremor began about 6 years ago and has been on and off during that time  She denies any pulling or neck pain she finds that the constant head tremor results in considerable fatigue  She describes a likely sensory trick which involves leaning her head up against the side of her recliner, suppressing the tremor to some degree    She does describe occasional right hand posturing as well which will emerge at different times depending on how she moves her arm     The patient follows with Dr Milli Hercules for prior stroke  She also has a bladder stimulator and so is not eligible for MRI scans  She was a  in the past   Lives at home with her  and receives about 72 hours of personal care per week  Non-ambulatory following a left leg femur fracture  She was tried on primidone by her primary care provider which did not help the tremor but did result in worsening fatigue  Family History: older sister with head tremor         The following portions of the patient's history were reviewed and updated as appropriate: allergies, current medications, past family history, past medical history, past social history, past surgical history and problem list        Objective:    Blood pressure 124/60, pulse 72, not currently breastfeeding  Physical Exam    Neurological Exam    GENERAL MEDICAL EXAMINATION:  General appearance: alert, in no apparent distress  Appropriately dressed and groomed  Conversing and interacting appropriately  Eyes: Sclera are non-injected  Ears, nose, Mouth, Throat: Mucous membranes are moist    Resp: Breathing comfortably on RA   Musculoskeletal: No evidence of deformities  No contractures  No Edema  Skin: No visible rashes  Warm and well perfused  Psych: normal and appropriate affect     Mental Status:  Alert and oriented to person place and time  Able to relate history without difficulty  Attentive to conversation  Language is fluent and appropriate with normal prosody  There were no paraphasic errors  Speech was not dysarthric  Able to follow both midline and appendicular commands  General Neurology examination:   When the patient closes her eyes she demonstrates a head turn to the right with increased tremor  She has hypertrophy of the left SCM and area of right splenius     Head Tremor: Mostly no-no tremor   Somewhat variable   Face Tremor:  0  Voice Tremor: maybe a little with sustained vowel sounds   Upper limb tremor       R outstretched posture: 0       L outstretched posture: 0       R Wing Beatin       L Wing Beatin       R Kinetic: 0       L Kinetic: 0  Lower limb tremor:        R: 0       L: 0  Rest tremor: 0  Tone: normal   Armando: REN are small amplitude and slow but without clear decrement or hesitations  Symmetric  Gait:   Unable to test          ROS:    Review of Systems   Constitutional: Negative  Negative for appetite change and fever  HENT: Negative  Negative for hearing loss, tinnitus, trouble swallowing and voice change  Eyes: Negative  Negative for photophobia and pain  Respiratory: Negative  Negative for shortness of breath  Cardiovascular: Negative  Negative for palpitations  Gastrointestinal: Negative  Negative for nausea and vomiting  Endocrine: Negative  Negative for cold intolerance and heat intolerance  Genitourinary: Negative  Negative for dysuria, frequency and urgency  Musculoskeletal: Negative  Negative for myalgias and neck pain  Skin: Negative  Negative for rash  Neurological: Positive for tremors (head)  Negative for dizziness, seizures, syncope, facial asymmetry, speech difficulty, weakness, light-headedness, numbness and headaches  Hematological: Negative  Does not bruise/bleed easily  Psychiatric/Behavioral: Negative  Negative for confusion, hallucinations and sleep disturbance  The above ROS was reviewed and updated       Anthony Lam MD  Medical Director   [facility]  Movement and Memory Specialist

## 2024-03-20 NOTE — TELEPHONE ENCOUNTER
[address]- spoke with Kalvin Habermann- she states the patient's Botox order is currently still in insurance verification  Order is marked as STAT  Will do a status check in 2 days  Spoke with pt who responded to [facility] text message. Asking if possible to be reinfected with COVID virus from  who also tested positive recently. Pt advised based on CDC guidelines. Pt verbalized understanding. Denies further needs  The recommendations suggest returning to normal activities when, for at least 24 hours, symptoms are improving overall, and if a fever was present, it has been gone without use of a fever-reducing medication.   Practicing good hygiene by covering coughs and sneezes, washing or sanitizing hands often, and cleaning frequently touched surfaces.  Taking steps for  air, such as bringing in more fresh outside air, purifying indoor air, or gathering outdoors.  Reason for Disposition   Health information question, no triage required and triager able to answer question    Protocols used: Information Only Call - No Triage-A-[place]

## 2024-05-29 NOTE — ASSESSMENT & PLAN NOTE
Malnutrition Findings:   Malnutrition type: Acute illness(<75% energy intake versus needs for > 7days due to aspiration risk;dysphagia & PEG placed- held after 24hrs  NPO>7days  Mild fat loss somewhat hollow orbitals& mild muscle loss slight depression temples  Treat with PEG when tolerated&future VBS  )  Degree of Malnutrition: Malnutrition of moderate degree    BMI Findings: Body mass index is 34 74 kg/m²  Diet, DASH/TLC:   Sodium & Cholesterol Restricted (05-28-24 @ 23:14)

## (undated) DEVICE — ASTOUND STANDARD SURGICAL GOWN, XL: Brand: CONVERTORS

## (undated) DEVICE — SUT SILK 2-0 SH 30 IN K833H

## (undated) DEVICE — NEEDLE HYPO 22G X 1-1/2 IN

## (undated) DEVICE — SUT MONOCRYL 4-0 PS-2 27 IN Y426H

## (undated) DEVICE — SYRINGE 30ML LL

## (undated) DEVICE — SUT VICRYL 0 UR-6 27 IN J603H

## (undated) DEVICE — GLOVE SRG BIOGEL ECLIPSE 7.5

## (undated) DEVICE — GLOVE INDICATOR PI UNDERGLOVE SZ 8 BLUE

## (undated) DEVICE — [HIGH FLOW INSUFFLATOR,  DO NOT USE IF PACKAGE IS DAMAGED,  KEEP DRY,  KEEP AWAY FROM SUNLIGHT,  PROTECT FROM HEAT AND RADIOACTIVE SOURCES.]: Brand: PNEUMOSURE

## (undated) DEVICE — PROGAMMER PATIENT HANDHELD INTERSTIM

## (undated) DEVICE — PMI DISPOSABLE PUNCTURE CLOSURE DEVICE / SUTURE GRASPER: Brand: PMI

## (undated) DEVICE — LIGAMAX 5 MM ENDOSCOPIC MULTIPLE CLIP APPLIER: Brand: LIGAMAX

## (undated) DEVICE — CHLORAPREP HI-LITE 26ML ORANGE

## (undated) DEVICE — STRL UNIVERSAL MINOR GENERAL: Brand: CARDINAL HEALTH

## (undated) DEVICE — ENDOPATH XCEL BLADELESS TROCARS WITH STABILITY SLEEVES: Brand: ENDOPATH XCEL

## (undated) DEVICE — TUBING SMOKE EVAC W/FILTRATION DEVICE PLUMEPORT ACTIV

## (undated) DEVICE — INTENDED FOR TISSUE SEPARATION, AND OTHER PROCEDURES THAT REQUIRE A SHARP SURGICAL BLADE TO PUNCTURE OR CUT.: Brand: BARD-PARKER SAFETY BLADES SIZE 11, STERILE

## (undated) DEVICE — IRRIG ENDO FLO TUBING

## (undated) DEVICE — SYRINGE 20ML LL

## (undated) DEVICE — DRAPE C-ARM X-RAY

## (undated) DEVICE — ENDOPATH 5MM CURVED SCISSORS WITH MONOPOLAR CAUTERY: Brand: ENDOPATH

## (undated) DEVICE — BLUE HEAT SCOPE WARMER

## (undated) DEVICE — ADHESIVE SKN CLSR HISTOACRYL FLEX 0.5ML LF

## (undated) DEVICE — SCD SEQUENTIAL COMPRESSION COMFORT SLEEVE MEDIUM KNEE LENGTH: Brand: KENDALL SCD

## (undated) DEVICE — TAUT INTRODUCER KIT

## (undated) DEVICE — SUT MONOCRYL 3-0 PS-2 27 IN Y427H

## (undated) DEVICE — GLOVE SRG BIOGEL 6.5

## (undated) DEVICE — ENDOPOUCH RETRIEVER SPECIMEN RETRIEVAL BAGS: Brand: ENDOPOUCH RETRIEVER

## (undated) DEVICE — ENDOPATH XCEL UNIVERSAL TROCAR STABLILITY SLEEVES: Brand: ENDOPATH XCEL

## (undated) DEVICE — INTENDED FOR TISSUE SEPARATION, AND OTHER PROCEDURES THAT REQUIRE A SHARP SURGICAL BLADE TO PUNCTURE OR CUT.: Brand: BARD-PARKER SAFETY BLADES SIZE 15, STERILE

## (undated) DEVICE — GAUZE SPONGES,USP TYPE VII GAUZE, 12 PLY: Brand: CURITY

## (undated) DEVICE — PROVE COVER: Brand: UNBRANDED

## (undated) DEVICE — 2000CC GUARDIAN II: Brand: GUARDIAN

## (undated) DEVICE — 3M™ STERI-DRAPE™ INCISE DRAPE 1050 (60CM X 45CM): Brand: STERI-DRAPE™

## (undated) DEVICE — TUBING SUCTION 5MM X 12 FT

## (undated) DEVICE — NEUROSTIMUL XTRNL 2 IN X 1.7 IN VERIFY

## (undated) DEVICE — ANTENNA PROGRAMMER PATIENT DBS

## (undated) DEVICE — CABLE TEST STIMULATION INTERSTIM

## (undated) DEVICE — CHEST/BREAST DRAPE: Brand: CONVERTORS

## (undated) DEVICE — 3000CC GUARDIAN II: Brand: GUARDIAN

## (undated) DEVICE — Device: Brand: MEDEX

## (undated) DEVICE — SPONGE STICK WITH PVP-I: Brand: KENDALL

## (undated) DEVICE — STOPCOCK 3-WAY

## (undated) DEVICE — ALLENTOWN LAP CHOLE APP PACK: Brand: CARDINAL HEALTH

## (undated) DEVICE — GLOVE INDICATOR PI UNDERGLOVE SZ 7.5 BLUE

## (undated) DEVICE — TAUT CATH INTRODUCER 4.5 FR

## (undated) DEVICE — GLOVE SRG BIOGEL 7

## (undated) DEVICE — SKIN MARKER DUAL TIP WITH RULER CAP, FLEXIBLE RULER AND LABELS: Brand: DEVON

## (undated) DEVICE — GLOVE INDICATOR PI UNDERGLOVE SZ 7 BLUE

## (undated) DEVICE — GLOVE INDICATOR PI UNDERGLOVE SZ 6.5 BLUE

## (undated) DEVICE — MEDI-VAC YANKAUER SUCTION HANDLE W/BULBOUS AND CONTROL VENT: Brand: CARDINAL HEALTH

## (undated) DEVICE — ELECTRODE LAP J HOOK SPLIT STEM E-Z CLEAN 33CM -0021S

## (undated) DEVICE — GLOVE SRG BIOGEL 7.5